# Patient Record
Sex: FEMALE | Race: WHITE | Employment: OTHER | URBAN - NONMETROPOLITAN AREA
[De-identification: names, ages, dates, MRNs, and addresses within clinical notes are randomized per-mention and may not be internally consistent; named-entity substitution may affect disease eponyms.]

---

## 2017-01-09 ENCOUNTER — OFFICE VISIT (OUTPATIENT)
Dept: SURGERY | Age: 52
End: 2017-01-09
Payer: OTHER GOVERNMENT

## 2017-01-09 ENCOUNTER — HOSPITAL ENCOUNTER (OUTPATIENT)
Dept: ULTRASOUND IMAGING | Facility: HOSPITAL | Age: 52
Discharge: HOME | End: 2017-01-09
Admitting: PHYSICIAN ASSISTANT

## 2017-01-09 ENCOUNTER — TRANSCRIBE ORDERS (OUTPATIENT)
Dept: ADMINISTRATIVE | Facility: HOSPITAL | Age: 52
End: 2017-01-09

## 2017-01-09 VITALS — DIASTOLIC BLOOD PRESSURE: 82 MMHG | SYSTOLIC BLOOD PRESSURE: 124 MMHG | HEART RATE: 80 BPM

## 2017-01-09 DIAGNOSIS — N63.20 BREAST MASS, LEFT: ICD-10-CM

## 2017-01-09 DIAGNOSIS — Z12.31 ENCOUNTER FOR SCREENING MAMMOGRAM FOR HIGH-RISK PATIENT: ICD-10-CM

## 2017-01-09 DIAGNOSIS — N63.0 BREAST MASS: Primary | ICD-10-CM

## 2017-01-09 DIAGNOSIS — N63.20 BREAST MASS, LEFT: Primary | ICD-10-CM

## 2017-01-09 PROCEDURE — 76642 ULTRASOUND BREAST LIMITED: CPT

## 2017-01-09 PROCEDURE — 99212 OFFICE O/P EST SF 10 MIN: CPT | Performed by: PHYSICIAN ASSISTANT

## 2017-01-10 ENCOUNTER — INFUSION (OUTPATIENT)
Dept: ONCOLOGY | Facility: CLINIC | Age: 52
End: 2017-01-10

## 2017-01-10 DIAGNOSIS — C50.211 MALIGNANT NEOPLASM OF UPPER-INNER QUADRANT OF RIGHT FEMALE BREAST (HCC): Primary | ICD-10-CM

## 2017-01-10 RX ORDER — SODIUM CHLORIDE 0.9 % (FLUSH) 0.9 %
10 SYRINGE (ML) INJECTION AS NEEDED
Status: DISCONTINUED | OUTPATIENT
Start: 2017-01-10 | End: 2017-01-10 | Stop reason: HOSPADM

## 2017-01-10 RX ORDER — SODIUM CHLORIDE 0.9 % (FLUSH) 0.9 %
10 SYRINGE (ML) INJECTION AS NEEDED
Status: CANCELLED | OUTPATIENT
Start: 2017-01-10

## 2017-01-10 RX ADMIN — Medication 10 ML: at 09:04

## 2017-03-07 ENCOUNTER — INFUSION (OUTPATIENT)
Dept: ONCOLOGY | Facility: CLINIC | Age: 52
End: 2017-03-07

## 2017-03-07 DIAGNOSIS — C50.211 MALIGNANT NEOPLASM OF UPPER-INNER QUADRANT OF RIGHT FEMALE BREAST (HCC): Primary | ICD-10-CM

## 2017-03-07 RX ORDER — SODIUM CHLORIDE 0.9 % (FLUSH) 0.9 %
10 SYRINGE (ML) INJECTION AS NEEDED
Status: DISCONTINUED | OUTPATIENT
Start: 2017-03-07 | End: 2017-03-07 | Stop reason: HOSPADM

## 2017-03-07 RX ORDER — SODIUM CHLORIDE 0.9 % (FLUSH) 0.9 %
10 SYRINGE (ML) INJECTION AS NEEDED
Status: CANCELLED | OUTPATIENT
Start: 2017-03-07

## 2017-03-07 RX ADMIN — Medication 10 ML: at 09:07

## 2017-04-03 RX ORDER — TAMOXIFEN CITRATE 10 MG/1
10 TABLET ORAL 2 TIMES DAILY
Qty: 180 TABLET | Refills: 3 | Status: SHIPPED | OUTPATIENT
Start: 2017-04-03 | End: 2018-03-12 | Stop reason: SDUPTHER

## 2017-04-06 ENCOUNTER — TELEPHONE (OUTPATIENT)
Dept: SURGERY | Age: 52
End: 2017-04-06

## 2017-04-06 ENCOUNTER — TRANSCRIBE ORDERS (OUTPATIENT)
Dept: ADMINISTRATIVE | Facility: HOSPITAL | Age: 52
End: 2017-04-06

## 2017-04-06 DIAGNOSIS — Z12.31 ENCOUNTER FOR SCREENING MAMMOGRAM FOR MALIGNANT NEOPLASM OF BREAST: Primary | ICD-10-CM

## 2017-04-24 ENCOUNTER — TRANSCRIBE ORDERS (OUTPATIENT)
Dept: ADMINISTRATIVE | Facility: HOSPITAL | Age: 52
End: 2017-04-24

## 2017-04-24 DIAGNOSIS — M81.0 OSTEOPOROSIS: Primary | ICD-10-CM

## 2017-05-04 ENCOUNTER — HOSPITAL ENCOUNTER (OUTPATIENT)
Dept: MAMMOGRAPHY | Facility: HOSPITAL | Age: 52
Discharge: HOME | End: 2017-05-04
Admitting: PHYSICIAN ASSISTANT

## 2017-05-04 ENCOUNTER — OFFICE VISIT (OUTPATIENT)
Dept: GASTROENTEROLOGY | Facility: CLINIC | Age: 52
End: 2017-05-04

## 2017-05-04 ENCOUNTER — HOSPITAL ENCOUNTER (OUTPATIENT)
Dept: BONE DENSITY | Facility: HOSPITAL | Age: 52
Discharge: HOME | End: 2017-05-04
Attending: INTERNAL MEDICINE

## 2017-05-04 VITALS
WEIGHT: 156 LBS | TEMPERATURE: 99.4 F | HEART RATE: 88 BPM | SYSTOLIC BLOOD PRESSURE: 124 MMHG | HEIGHT: 62 IN | OXYGEN SATURATION: 98 % | BODY MASS INDEX: 28.71 KG/M2 | DIASTOLIC BLOOD PRESSURE: 82 MMHG

## 2017-05-04 DIAGNOSIS — C50.211 MALIGNANT NEOPLASM OF UPPER-INNER QUADRANT OF RIGHT FEMALE BREAST (HCC): Primary | ICD-10-CM

## 2017-05-04 DIAGNOSIS — M81.0 OSTEOPOROSIS: ICD-10-CM

## 2017-05-04 DIAGNOSIS — Z83.71 FAMILY HISTORY OF POLYPS IN THE COLON: ICD-10-CM

## 2017-05-04 DIAGNOSIS — K63.5 COLON POLYPS: Primary | ICD-10-CM

## 2017-05-04 DIAGNOSIS — Z87.19 HISTORY OF PROCTITIS: ICD-10-CM

## 2017-05-04 DIAGNOSIS — Z12.31 ENCOUNTER FOR SCREENING MAMMOGRAM FOR MALIGNANT NEOPLASM OF BREAST: ICD-10-CM

## 2017-05-04 PROBLEM — Z83.719 FAMILY HISTORY OF POLYPS IN THE COLON: Status: ACTIVE | Noted: 2017-05-04

## 2017-05-04 PROCEDURE — G0202 SCR MAMMO BI INCL CAD: HCPCS

## 2017-05-04 PROCEDURE — 77080 DXA BONE DENSITY AXIAL: CPT

## 2017-05-04 PROCEDURE — 99212 OFFICE O/P EST SF 10 MIN: CPT | Performed by: NURSE PRACTITIONER

## 2017-05-04 PROCEDURE — 77063 BREAST TOMOSYNTHESIS BI: CPT

## 2017-05-04 RX ORDER — TIZANIDINE 4 MG/1
4 TABLET ORAL EVERY 6 HOURS PRN
COMMUNITY
End: 2017-06-09

## 2017-05-04 RX ORDER — POLYETHYLENE GLYCOL 3350, SODIUM SULFATE, SODIUM CHLORIDE, POTASSIUM CHLORIDE, ASCORBIC ACID, SODIUM ASCORBATE 7.5-2.691G
KIT ORAL
Qty: 1 EACH | Refills: 0 | COMMUNITY
Start: 2017-05-04 | End: 2017-06-09

## 2017-05-05 ENCOUNTER — LAB (OUTPATIENT)
Dept: ONCOLOGY | Facility: CLINIC | Age: 52
End: 2017-05-05

## 2017-05-05 ENCOUNTER — OFFICE VISIT (OUTPATIENT)
Dept: ONCOLOGY | Facility: CLINIC | Age: 52
End: 2017-05-05

## 2017-05-05 VITALS
HEIGHT: 62 IN | HEART RATE: 81 BPM | OXYGEN SATURATION: 98 % | WEIGHT: 152.8 LBS | SYSTOLIC BLOOD PRESSURE: 122 MMHG | DIASTOLIC BLOOD PRESSURE: 74 MMHG | RESPIRATION RATE: 16 BRPM | BODY MASS INDEX: 28.12 KG/M2 | TEMPERATURE: 98.7 F

## 2017-05-05 DIAGNOSIS — C50.211 MALIGNANT NEOPLASM OF UPPER-INNER QUADRANT OF RIGHT FEMALE BREAST (HCC): Primary | ICD-10-CM

## 2017-05-05 LAB
ALBUMIN SERPL-MCNC: 3.8 G/DL (ref 3.5–5)
ALBUMIN/GLOB SERPL: 1.2 G/DL
ALP SERPL-CCNC: 65 U/L (ref 38–126)
ALT SERPL W P-5'-P-CCNC: 31 U/L (ref 9–52)
ANION GAP SERPL CALCULATED.3IONS-SCNC: 10 MMOL/L
AST SERPL-CCNC: 24 U/L (ref 5–40)
AUTO MIXED CELLS #: 0.5 10*3/UL (ref 0.1–1.5)
AUTO MIXED CELLS %: 9.4 % (ref 0.2–15.1)
BILIRUB SERPL-MCNC: 0.5 MG/DL (ref 0.2–1.3)
BUN BLD-MCNC: 13 MG/DL (ref 7–26)
BUN/CREAT SERPL: 11.8 (ref 7–25)
CALCIUM SPEC-SCNC: 8.9 MG/DL (ref 8.4–10.2)
CHLORIDE SERPL-SCNC: 113 MMOL/L (ref 98–107)
CO2 SERPL-SCNC: 23 MMOL/L (ref 22–30)
CREAT BLD-MCNC: 1.1 MG/DL (ref 0.7–1.4)
ERYTHROCYTE [DISTWIDTH] IN BLOOD BY AUTOMATED COUNT: 15.6 % (ref 11.5–14.5)
GFR SERPL CREATININE-BSD FRML MDRD: 52 ML/MIN/1.73
GLOBULIN UR ELPH-MCNC: 3.3 GM/DL
GLUCOSE BLD-MCNC: 90 MG/DL (ref 75–110)
HCT VFR BLD AUTO: 41.8 % (ref 37–47)
HGB BLD-MCNC: 13.2 G/DL (ref 12–16)
LYMPHOCYTES # BLD AUTO: 1.8 10*3/MM3 (ref 0.8–7)
LYMPHOCYTES NFR BLD AUTO: 31.9 % (ref 10–58.5)
MCH RBC QN AUTO: 27.4 PG (ref 27–31)
MCHC RBC AUTO-ENTMCNC: 31.6 G/DL (ref 33–37)
MCV RBC AUTO: 86.8 FL (ref 81–99)
NEUTROPHILS # BLD AUTO: 3.3 10*3/MM3 (ref 2–7.8)
NEUTROPHILS NFR BLD AUTO: 58.7 % (ref 37–92)
PLATELET # BLD AUTO: 403 10*3/MM3 (ref 130–400)
PMV BLD AUTO: 9 FL (ref 6–12)
POTASSIUM BLD-SCNC: 3.9 MMOL/L (ref 3.6–5)
PROT SERPL-MCNC: 7.1 G/DL (ref 6.3–8.2)
RBC # BLD AUTO: 4.81 10*6/MM3 (ref 4.2–5.4)
SODIUM BLD-SCNC: 146 MMOL/L (ref 137–145)
WBC NRBC COR # BLD: 5.6 10*3/MM3 (ref 4.8–10.8)

## 2017-05-05 PROCEDURE — 36415 COLL VENOUS BLD VENIPUNCTURE: CPT | Performed by: INTERNAL MEDICINE

## 2017-05-05 PROCEDURE — 99213 OFFICE O/P EST LOW 20 MIN: CPT | Performed by: INTERNAL MEDICINE

## 2017-05-05 PROCEDURE — 80053 COMPREHEN METABOLIC PANEL: CPT | Performed by: INTERNAL MEDICINE

## 2017-05-05 PROCEDURE — 85025 COMPLETE CBC W/AUTO DIFF WBC: CPT | Performed by: INTERNAL MEDICINE

## 2017-05-05 RX ORDER — SODIUM CHLORIDE 0.9 % (FLUSH) 0.9 %
10 SYRINGE (ML) INJECTION AS NEEDED
OUTPATIENT
Start: 2017-05-05

## 2017-05-05 RX ORDER — SODIUM CHLORIDE 0.9 % (FLUSH) 0.9 %
10 SYRINGE (ML) INJECTION AS NEEDED
Status: SHIPPED | OUTPATIENT
Start: 2017-05-05

## 2017-05-05 RX ORDER — HEPARIN SODIUM (PORCINE) LOCK FLUSH IV SOLN 100 UNIT/ML 100 UNIT/ML
500 SOLUTION INTRAVENOUS AS NEEDED
OUTPATIENT
Start: 2017-05-05

## 2017-05-05 RX ADMIN — Medication 10 ML: at 10:09

## 2017-05-10 ENCOUNTER — OFFICE VISIT (OUTPATIENT)
Dept: SURGERY | Age: 52
End: 2017-05-10
Payer: OTHER GOVERNMENT

## 2017-05-10 VITALS
BODY MASS INDEX: 28.52 KG/M2 | HEIGHT: 62 IN | TEMPERATURE: 98.2 F | SYSTOLIC BLOOD PRESSURE: 138 MMHG | DIASTOLIC BLOOD PRESSURE: 70 MMHG | WEIGHT: 155 LBS

## 2017-05-10 DIAGNOSIS — Z85.3 PERSONAL HISTORY OF MALIGNANT NEOPLASM OF BREAST: ICD-10-CM

## 2017-05-10 DIAGNOSIS — C50.911 BREAST CANCER METASTASIZED TO AXILLARY LYMPH NODE, RIGHT (HCC): ICD-10-CM

## 2017-05-10 DIAGNOSIS — C77.3 BREAST CANCER METASTASIZED TO AXILLARY LYMPH NODE, RIGHT (HCC): ICD-10-CM

## 2017-05-10 DIAGNOSIS — Z98.890 S/P LUMPECTOMY, RIGHT BREAST: Primary | ICD-10-CM

## 2017-05-10 PROCEDURE — 99213 OFFICE O/P EST LOW 20 MIN: CPT | Performed by: PHYSICIAN ASSISTANT

## 2017-05-10 RX ORDER — PANTOPRAZOLE SODIUM 40 MG/1
40 TABLET, DELAYED RELEASE ORAL DAILY
COMMUNITY
Start: 2016-09-06

## 2017-05-10 RX ORDER — THEOPHYLLINE 400 MG/1
TABLET, EXTENDED RELEASE ORAL
COMMUNITY
Start: 2017-03-31 | End: 2017-05-10

## 2017-05-10 RX ORDER — TIZANIDINE 4 MG/1
4 TABLET ORAL NIGHTLY
COMMUNITY

## 2017-05-10 RX ORDER — THEOPHYLLINE 400 MG/1
400 TABLET, EXTENDED RELEASE ORAL
COMMUNITY
End: 2017-05-10

## 2017-05-10 RX ORDER — LORATADINE 10 MG/1
10 TABLET ORAL
COMMUNITY

## 2017-05-12 ENCOUNTER — PREP FOR PROCEDURE (OUTPATIENT)
Dept: SURGERY | Age: 52
End: 2017-05-12

## 2017-05-15 ENCOUNTER — ANESTHESIA EVENT (OUTPATIENT)
Dept: OPERATING ROOM | Age: 52
End: 2017-05-15

## 2017-05-18 ENCOUNTER — HOSPITAL ENCOUNTER (OUTPATIENT)
Age: 52
Setting detail: OUTPATIENT SURGERY
Discharge: HOME | End: 2017-05-18
Attending: SURGERY | Admitting: SURGERY
Payer: OTHER GOVERNMENT

## 2017-05-18 ENCOUNTER — ANESTHESIA (OUTPATIENT)
Dept: OPERATING ROOM | Age: 52
End: 2017-05-18
Payer: OTHER GOVERNMENT

## 2017-05-18 VITALS
SYSTOLIC BLOOD PRESSURE: 110 MMHG | WEIGHT: 155 LBS | OXYGEN SATURATION: 99 % | RESPIRATION RATE: 18 BRPM | BODY MASS INDEX: 28.52 KG/M2 | TEMPERATURE: 99.6 F | DIASTOLIC BLOOD PRESSURE: 59 MMHG | HEIGHT: 62 IN | HEART RATE: 80 BPM

## 2017-05-18 VITALS — OXYGEN SATURATION: 99 % | SYSTOLIC BLOOD PRESSURE: 155 MMHG | DIASTOLIC BLOOD PRESSURE: 80 MMHG

## 2017-05-18 PROCEDURE — 36590 REMOVAL TUNNELED CV CATH: CPT

## 2017-05-18 PROCEDURE — G8918 PT W/O PREOP ORDER IV AB PRO: HCPCS

## 2017-05-18 PROCEDURE — 00400 ANES INTEGUMENTARY SYS NOS: CPT | Performed by: NURSE ANESTHETIST, CERTIFIED REGISTERED

## 2017-05-18 PROCEDURE — G8907 PT DOC NO EVENTS ON DISCHARG: HCPCS

## 2017-05-18 PROCEDURE — 99999 PR OFFICE/OUTPT VISIT,PROCEDURE ONLY: CPT | Performed by: PHYSICIAN ASSISTANT

## 2017-05-18 PROCEDURE — 36590 REMOVAL TUNNELED CV CATH: CPT | Performed by: SURGERY

## 2017-05-18 RX ORDER — PROPOFOL 10 MG/ML
INJECTION, EMULSION INTRAVENOUS PRN
Status: DISCONTINUED | OUTPATIENT
Start: 2017-05-18 | End: 2017-05-18 | Stop reason: SDUPTHER

## 2017-05-18 RX ORDER — MIDAZOLAM HYDROCHLORIDE 1 MG/ML
INJECTION INTRAMUSCULAR; INTRAVENOUS PRN
Status: DISCONTINUED | OUTPATIENT
Start: 2017-05-18 | End: 2017-05-18 | Stop reason: SDUPTHER

## 2017-05-18 RX ORDER — HYDROCODONE BITARTRATE AND ACETAMINOPHEN 5; 325 MG/1; MG/1
TABLET ORAL
Qty: 15 TABLET | Refills: 0 | Status: ON HOLD | OUTPATIENT
Start: 2017-05-18 | End: 2019-05-24 | Stop reason: HOSPADM

## 2017-05-18 RX ORDER — HYDROMORPHONE HCL 110MG/55ML
0.25 PATIENT CONTROLLED ANALGESIA SYRINGE INTRAVENOUS EVERY 5 MIN PRN
Status: DISCONTINUED | OUTPATIENT
Start: 2017-05-18 | End: 2017-05-18 | Stop reason: HOSPADM

## 2017-05-18 RX ORDER — SODIUM CHLORIDE, SODIUM LACTATE, POTASSIUM CHLORIDE, CALCIUM CHLORIDE 600; 310; 30; 20 MG/100ML; MG/100ML; MG/100ML; MG/100ML
INJECTION, SOLUTION INTRAVENOUS CONTINUOUS
Status: DISCONTINUED | OUTPATIENT
Start: 2017-05-18 | End: 2017-05-18 | Stop reason: HOSPADM

## 2017-05-18 RX ADMIN — PROPOFOL 30 MG: 10 INJECTION, EMULSION INTRAVENOUS at 08:50

## 2017-05-18 RX ADMIN — MIDAZOLAM HYDROCHLORIDE 4 MG: 1 INJECTION INTRAMUSCULAR; INTRAVENOUS at 08:22

## 2017-05-18 RX ADMIN — SODIUM CHLORIDE, SODIUM LACTATE, POTASSIUM CHLORIDE, CALCIUM CHLORIDE: 600; 310; 30; 20 INJECTION, SOLUTION INTRAVENOUS at 08:22

## 2017-05-18 RX ADMIN — PROPOFOL 50 MG: 10 INJECTION, EMULSION INTRAVENOUS at 08:42

## 2017-05-18 RX ADMIN — SODIUM CHLORIDE, SODIUM LACTATE, POTASSIUM CHLORIDE, CALCIUM CHLORIDE: 600; 310; 30; 20 INJECTION, SOLUTION INTRAVENOUS at 07:29

## 2017-06-09 ENCOUNTER — OFFICE VISIT (OUTPATIENT)
Dept: OBSTETRICS AND GYNECOLOGY | Facility: CLINIC | Age: 52
End: 2017-06-09

## 2017-06-09 VITALS
WEIGHT: 159 LBS | DIASTOLIC BLOOD PRESSURE: 66 MMHG | SYSTOLIC BLOOD PRESSURE: 134 MMHG | BODY MASS INDEX: 29.26 KG/M2 | HEIGHT: 62 IN

## 2017-06-09 DIAGNOSIS — Z01.419 ENCOUNTER FOR GYNECOLOGICAL EXAMINATION WITHOUT ABNORMAL FINDING: Primary | ICD-10-CM

## 2017-06-09 DIAGNOSIS — Z85.3 HISTORY OF BREAST CANCER: ICD-10-CM

## 2017-06-09 DIAGNOSIS — N95.2 VAGINAL ATROPHY: ICD-10-CM

## 2017-06-09 PROCEDURE — 87798 DETECT AGENT NOS DNA AMP: CPT | Performed by: NURSE PRACTITIONER

## 2017-06-09 PROCEDURE — 87624 HPV HI-RISK TYP POOLED RSLT: CPT | Performed by: NURSE PRACTITIONER

## 2017-06-09 PROCEDURE — 87512 GARDNER VAG DNA QUANT: CPT | Performed by: NURSE PRACTITIONER

## 2017-06-09 PROCEDURE — 87481 CANDIDA DNA AMP PROBE: CPT | Performed by: NURSE PRACTITIONER

## 2017-06-09 PROCEDURE — G0123 SCREEN CERV/VAG THIN LAYER: HCPCS | Performed by: NURSE PRACTITIONER

## 2017-06-09 PROCEDURE — 87661 TRICHOMONAS VAGINALIS AMPLIF: CPT | Performed by: NURSE PRACTITIONER

## 2017-06-09 PROCEDURE — 99396 PREV VISIT EST AGE 40-64: CPT | Performed by: NURSE PRACTITIONER

## 2017-06-09 RX ORDER — TIZANIDINE 4 MG/1
TABLET ORAL
COMMUNITY
End: 2017-11-14

## 2017-06-09 RX ORDER — HYDROCODONE BITARTRATE AND ACETAMINOPHEN 5; 325 MG/1; MG/1
TABLET ORAL
COMMUNITY
Start: 2017-05-18 | End: 2018-02-26 | Stop reason: ALTCHOICE

## 2017-06-09 RX ORDER — PANTOPRAZOLE SODIUM 40 MG/1
40 TABLET, DELAYED RELEASE ORAL 2 TIMES DAILY
COMMUNITY
Start: 2016-09-06 | End: 2017-09-12 | Stop reason: SDUPTHER

## 2017-06-09 RX ORDER — LORATADINE 10 MG/1
10 TABLET ORAL DAILY PRN
COMMUNITY
End: 2021-11-09 | Stop reason: SDUPTHER

## 2017-06-09 NOTE — PROGRESS NOTES
Subjective   Luna Cruz is a 51 y.o. female.     HPI Comments: Annual exam.       The following portions of the patient's history were reviewed and updated as appropriate: allergies, current medications, past family history, past medical history, past social history, past surgical history and problem list.    Review of Systems   Constitutional: Negative for activity change, appetite change, fatigue and fever.   HENT: Negative for congestion, sore throat and trouble swallowing.    Eyes: Negative for pain, discharge and visual disturbance.   Respiratory: Negative for apnea, shortness of breath and wheezing.    Cardiovascular: Negative for chest pain, palpitations and leg swelling.   Gastrointestinal: Negative for abdominal pain, constipation and diarrhea.   Genitourinary: Positive for vaginal discharge (and irritiation irregularly.). Negative for frequency, pelvic pain and urgency.   Musculoskeletal: Negative for back pain and gait problem.   Skin: Negative for color change and rash.   Neurological: Negative for dizziness, weakness and numbness.   Psychiatric/Behavioral: Negative for confusion and sleep disturbance.       Objective   Physical Exam   Constitutional: She is oriented to person, place, and time. She appears well-developed and well-nourished. No distress.   HENT:   Head: Normocephalic.   Right Ear: External ear normal.   Left Ear: External ear normal.   Eyes: Conjunctivae are normal. Right eye exhibits no discharge. Left eye exhibits no discharge. No scleral icterus.   Neck: Normal range of motion. Neck supple. Carotid bruit is not present. No tracheal deviation present. No thyromegaly present.   Cardiovascular: Normal rate, regular rhythm, normal heart sounds and intact distal pulses.    No murmur heard.  Pulmonary/Chest: Effort normal and breath sounds normal. No respiratory distress. She has no wheezes. Right breast exhibits no inverted nipple, no mass, no nipple discharge, no skin change and no  tenderness. Left breast exhibits no inverted nipple, no mass, no nipple discharge, no skin change and no tenderness. Breasts are symmetrical. There is no breast swelling.   Abdominal: Soft. She exhibits no distension and no mass. There is no tenderness. There is no guarding. No hernia. Hernia confirmed negative in the right inguinal area and confirmed negative in the left inguinal area.   Genitourinary: Rectum normal, vagina normal and uterus normal. Rectal exam shows no mass. No breast tenderness, discharge or bleeding. Pelvic exam was performed with patient supine. There is no rash, tenderness, lesion or injury on the right labia. There is no rash, tenderness, lesion or injury on the left labia. Uterus is not enlarged, not fixed and not tender. Cervix exhibits no motion tenderness, no discharge and no friability. Right adnexum displays no mass, no tenderness and no fullness. Left adnexum displays no mass, no tenderness and no fullness. No erythema, tenderness or bleeding in the vagina. No foreign body in the vagina. No signs of injury around the vagina. No vaginal discharge found.   Genitourinary Comments: Urethral meatus  Normal  Perineum  Normal  Vaginal atrophy noted.    Musculoskeletal: Normal range of motion. She exhibits no edema or tenderness.   Lymphadenopathy:        Head (right side): No submental, no submandibular, no tonsillar, no preauricular, no posterior auricular and no occipital adenopathy present.        Head (left side): No submental, no submandibular, no tonsillar, no preauricular, no posterior auricular and no occipital adenopathy present.     She has no cervical adenopathy.        Right cervical: No superficial cervical, no deep cervical and no posterior cervical adenopathy present.       Left cervical: No superficial cervical, no deep cervical and no posterior cervical adenopathy present.     She has no axillary adenopathy.        Right: No inguinal adenopathy present.        Left: No  inguinal adenopathy present.   Neurological: She is alert and oriented to person, place, and time. Coordination normal.   Skin: Skin is warm and dry. No bruising and no rash noted. She is not diaphoretic. No erythema.   Psychiatric: She has a normal mood and affect. Her behavior is normal. Judgment and thought content normal.   Nursing note and vitals reviewed.      Assessment/Plan    Well woman exam. Pap collected, BV panel added. Mammograms followed by Dr. Houston. Annual labs and colonoscopy followed by PCP.    Discussed vaginal estrogen cream risk and benefits. Pt to consider using, will need approval from oncologist first, pt voiced understanding.     Luna was seen today for gynecologic exam.    Diagnoses and all orders for this visit:    Encounter for gynecological examination without abnormal finding  -     Liquid-based Pap Smear, Screening    History of breast cancer

## 2017-06-13 ENCOUNTER — ANESTHESIA EVENT (OUTPATIENT)
Dept: GASTROENTEROLOGY | Facility: HOSPITAL | Age: 52
End: 2017-06-13

## 2017-06-14 ENCOUNTER — HOSPITAL ENCOUNTER (OUTPATIENT)
Facility: HOSPITAL | Age: 52
Setting detail: HOSPITAL OUTPATIENT SURGERY
Discharge: HOME | End: 2017-06-14
Attending: INTERNAL MEDICINE | Admitting: INTERNAL MEDICINE

## 2017-06-14 ENCOUNTER — ANESTHESIA (OUTPATIENT)
Dept: GASTROENTEROLOGY | Facility: HOSPITAL | Age: 52
End: 2017-06-14

## 2017-06-14 VITALS
SYSTOLIC BLOOD PRESSURE: 111 MMHG | TEMPERATURE: 98 F | BODY MASS INDEX: 28.52 KG/M2 | OXYGEN SATURATION: 99 % | DIASTOLIC BLOOD PRESSURE: 97 MMHG | WEIGHT: 155 LBS | RESPIRATION RATE: 20 BRPM | HEART RATE: 89 BPM | HEIGHT: 62 IN

## 2017-06-14 PROCEDURE — 45378 DIAGNOSTIC COLONOSCOPY: CPT | Performed by: INTERNAL MEDICINE

## 2017-06-14 PROCEDURE — 25010000002 PROPOFOL 10 MG/ML EMULSION: Performed by: NURSE ANESTHETIST, CERTIFIED REGISTERED

## 2017-06-14 RX ORDER — LIDOCAINE HYDROCHLORIDE 20 MG/ML
INJECTION, SOLUTION INFILTRATION; PERINEURAL AS NEEDED
Status: DISCONTINUED | OUTPATIENT
Start: 2017-06-14 | End: 2017-06-14 | Stop reason: SURG

## 2017-06-14 RX ORDER — SODIUM CHLORIDE 9 MG/ML
9 INJECTION, SOLUTION INTRAVENOUS CONTINUOUS PRN
Status: DISCONTINUED | OUTPATIENT
Start: 2017-06-14 | End: 2017-06-14 | Stop reason: HOSPADM

## 2017-06-14 RX ORDER — SODIUM CHLORIDE 0.9 % (FLUSH) 0.9 %
1-10 SYRINGE (ML) INJECTION AS NEEDED
Status: DISCONTINUED | OUTPATIENT
Start: 2017-06-14 | End: 2017-06-14 | Stop reason: HOSPADM

## 2017-06-14 RX ORDER — FAMOTIDINE 10 MG/ML
20 INJECTION, SOLUTION INTRAVENOUS ONCE
Status: COMPLETED | OUTPATIENT
Start: 2017-06-14 | End: 2017-06-14

## 2017-06-14 RX ORDER — PROPOFOL 10 MG/ML
VIAL (ML) INTRAVENOUS AS NEEDED
Status: DISCONTINUED | OUTPATIENT
Start: 2017-06-14 | End: 2017-06-14 | Stop reason: SURG

## 2017-06-14 RX ADMIN — LIDOCAINE HYDROCHLORIDE 20 MG: 20 INJECTION, SOLUTION INFILTRATION; PERINEURAL at 10:27

## 2017-06-14 RX ADMIN — PROPOFOL 50 MG: 10 INJECTION, EMULSION INTRAVENOUS at 10:32

## 2017-06-14 RX ADMIN — PROPOFOL 50 MG: 10 INJECTION, EMULSION INTRAVENOUS at 10:35

## 2017-06-14 RX ADMIN — PROPOFOL 100 MG: 10 INJECTION, EMULSION INTRAVENOUS at 10:27

## 2017-06-14 RX ADMIN — LIDOCAINE HYDROCHLORIDE 0.5 ML: 10 INJECTION, SOLUTION EPIDURAL; INFILTRATION; INTRACAUDAL; PERINEURAL at 08:22

## 2017-06-14 RX ADMIN — FAMOTIDINE 20 MG: 10 INJECTION, SOLUTION INTRAVENOUS at 08:52

## 2017-06-14 RX ADMIN — SODIUM CHLORIDE 9 ML/HR: 9 INJECTION, SOLUTION INTRAVENOUS at 08:21

## 2017-06-14 RX ADMIN — PROPOFOL 50 MG: 10 INJECTION, EMULSION INTRAVENOUS at 10:38

## 2017-06-14 RX ADMIN — PROPOFOL 50 MG: 10 INJECTION, EMULSION INTRAVENOUS at 10:33

## 2017-06-14 RX ADMIN — PROPOFOL 50 MG: 10 INJECTION, EMULSION INTRAVENOUS at 10:29

## 2017-06-14 NOTE — PLAN OF CARE
Problem: Patient Care Overview (Adult)  Goal: Adult Individualization and Mutuality  Outcome: Ongoing (interventions implemented as appropriate)    06/14/17 0752   Individualization   Patient Specific Preferences none   Patient Specific Goals none   Patient Specific Interventions none   Mutuality/Individual Preferences   What Anxieties, Fears or Concerns Do You Have About Your Health or Care? none   What Questions Do You Have About Your Health or Care? none   What Information Would Help Us Give You More Personalized Care? none

## 2017-06-14 NOTE — ANESTHESIA POSTPROCEDURE EVALUATION
Patient: Luna Cruz    Procedure Summary     Date Anesthesia Start Anesthesia Stop Room / Location    06/14/17 1023 1043 [facility] ENDOSCOPY 6 / [facility]       Procedure Diagnosis Surgeon Provider    COLONOSCOPY WITH ANESTHESIA (N/A ) Family history of polyps in the colon; Colon polyps; History of proctitis  (Family history of polyps in the colon [Z83.71]; Colon polyps [K63.5]; History of proctitis [Z87.19]) MD Viky Chris CRNA          Anesthesia Type: MAC  Last vitals  BP      Temp      Pulse     Resp      SpO2        Post Anesthesia Care and Evaluation    Patient location during evaluation: PHASE II  Patient participation: complete - patient participated  Level of consciousness: awake and alert  Pain score: 0  Pain management: adequate  Airway patency: patent  Anesthetic complications: No anesthetic complications  PONV Status: none  Cardiovascular status: acceptable, hemodynamically stable and stable  Respiratory status: acceptable and room air  Hydration status: acceptable

## 2017-06-14 NOTE — H&P
Chief Complaint:   Screening    Subjective     HPI:   She presents for screening colonoscopy.  Mother had colon polyps at age 54/55.  Her patient's last colonoscopy was over 3 years ago in May 2013.    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 9/26/2016   • Osteoporosis    • PONV (postoperative nausea and vomiting)        Past Surgical History:  [unfilled]    Family History:  Family History   Problem Relation Age of Onset   • Colon polyps Mother    • Cirrhosis Mother    • Colon polyps Sister 54   • Breast cancer Sister    • No Known Problems Father    • No Known Problems Brother    • No Known Problems Daughter    • No Known Problems Son    • No Known Problems Maternal Grandmother    • No Known Problems Paternal Grandmother    • No Known Problems Maternal Aunt    • No Known Problems Paternal Aunt    • Colon cancer Neg Hx    • Crohn's disease Neg Hx    • Irritable bowel syndrome Neg Hx    • Liver cancer Neg Hx    • Liver disease Neg Hx    • Rectal cancer Neg Hx    • Stomach cancer Neg Hx    • BRCA 1/2 Neg Hx    • Endometrial cancer Neg Hx    • Ovarian cancer Neg Hx        Social History:   reports that she has never smoked. She has never used smokeless tobacco. She reports that she does not drink alcohol or use illicit drugs.    Medications:   Prescriptions Prior to Admission   Medication Sig Dispense Refill Last Dose   • atorvastatin (LIPITOR) 40 MG tablet Take 40 mg by mouth daily.   6/13/2017 at 2100   • calcitriol (ROCALTROL) 0.25 MCG capsule Take 0.5 mcg by mouth daily.   6/13/2017 at 0900   • Calcium Citrate-Vitamin D (CALCIUM + D PO) Take  by mouth.   6/13/2017 at 0900   • fluticasone-salmeterol (ADVAIR) 250-50 MCG/DOSE DISKUS Inhale 2 (two) times a day.   " 6/13/2017 at 2100   • levothyroxine (SYNTHROID, LEVOTHROID) 25 MCG tablet Take 0.05 mcg by mouth daily.   6/13/2017 at 0900   • losartan (COZAAR) 25 MG tablet Take 25 mg by mouth daily.   6/13/2017 at 0900   • pantoprazole (PROTONIX) 40 MG EC tablet Take  by mouth.   6/13/2017 at 2100   • rOPINIRole (REQUIP) 0.25 MG tablet Take 0.25 mg by mouth every night. Take 1 hour before bedtime.   6/13/2017 at 2100   • tamoxifen (NOLVADEX) 10 MG tablet Take 1 tablet by mouth 2 (Two) Times a Day. 180 tablet 3 6/13/2017 at 0900   • theophylline (UNIPHYL) 400 MG 24 hr tablet Take 400 mg by mouth daily.   6/13/2017 at 0900   • tiZANidine (ZANAFLEX) 4 MG tablet Take  by mouth.   6/13/2017 at 2100   • vitamin D (ERGOCALCIFEROL) 39157 UNITS capsule capsule Take 50,000 Units by mouth 1 (one) time per week.   6/13/2017 at 0900   • zafirlukast (ACCOLATE) 20 MG tablet Take 20 mg by mouth 2 (two) times a day.   6/13/2017 at 2100   • alendronate (FOSAMAX) 70 MG tablet Take 70 mg by mouth every 7 days.   6/11/2017 at 0900   • HYDROcodone-acetaminophen (NORCO) 5-325 MG per tablet 1-2 tabs PO every 4hrs PRN. For pain.   Unknown at Unknown time   • loratadine (CLARITIN) 10 MG tablet Take  by mouth.   6/11/2017       Allergies:  Keflex [cephalexin]    ROS:    General: Weight stable  Resp: No SOA  Cardiovascular: No CP      Objective     /91 (BP Location: Right arm, Patient Position: Sitting)  Pulse 90  Temp 98 °F (36.7 °C) (Temporal Artery )   Resp 18  Ht 62\" (157.5 cm)  Wt 155 lb (70.3 kg)  LMP 06/14/2008  SpO2 100%  BMI 28.35 kg/m2    Physical Exam   Constitutional: Pt is oriented to person, place, and in no distress.  Eyes: No icterus  ENMT: Unremarkable   Cardiovascular: Normal rate, regular rhythm.    Pulmonary/Chest: No distress.  No audible wheezes  Abdominal: Soft.   Skin: Skin is warm and dry.   Psychiatric: Mood, memory, affect and judgment appear normal.     Assessment/Plan     Diagnosis:  Screening  Family history of " colon polyps and mother <60 years old    Anticipated Surgical Procedure:  Colonoscopy    The risks, benefits, and alternatives of colonoscopy were reviewed with the patient today.  Risks including perforation of the colon possibly requiring surgery or colostomy.  Additional risks include risk of bleeding from biopsies or removal of colon tissue.  There is also the risk of a drug reaction or problems with anesthesia.  This will be discussed with the further by the anesthesia team on the day of the procedure.  Lastly there is a possibility of missing a colon polyp or cancer.  The benefits include the diagnosis and management of disease of the colon and rectum.  Alternatives to colonoscopy include barium enema, laboratory testing, radiographic evaluation, or no intervention.  The patient verbalizes understanding and agrees.    Much of this encounter note is an electronic transcription/translation of spoken language to printed text. The electronic translation of spoken language may permit erroneous, or at times, nonsensical words or phrases to be inadvertently transcribed; although I have reviewed the note for such errors, some may still exist.

## 2017-06-14 NOTE — ANESTHESIA POSTPROCEDURE EVALUATION
"Patient: Luna Cruz    Procedure Summary     Date Anesthesia Start Anesthesia Stop Room / Location    06/14/17 1023 1043  [facility] /  [facility]       Procedure Diagnosis Surgeon Provider    COLONOSCOPY WITH ANESTHESIA (N/A ) Family history of polyps in the colon; Colon polyps; History of proctitis  (Family history of polyps in the colon [Z83.71]; Colon polyps [K63.5]; History of proctitis [Z87.19]) MD Viky Chris, CRNA          Anesthesia Type: MAC  Last vitals  BP      Temp      Pulse     Resp      SpO2        Post Anesthesia Care and Evaluation      Comments: Blood pressure 170/91, pulse 90, temperature 98 °F (36.7 °C), temperature source Temporal Artery , resp. rate 18, height 62\" (157.5 cm), weight 155 lb (70.3 kg), last menstrual period 06/14/2008, SpO2 100 %.        "

## 2017-06-14 NOTE — PLAN OF CARE
Problem: GI Endoscopy (Adult)  Goal: Signs and Symptoms of Listed Potential Problems Will be Absent or Manageable (GI Endoscopy)  Outcome: Outcome(s) achieved Date Met:  06/14/17

## 2017-06-14 NOTE — PLAN OF CARE
Problem: Patient Care Overview (Adult)  Goal: Plan of Care Review  Outcome: Ongoing (interventions implemented as appropriate)    06/14/17 1034   Patient Care Overview   Progress improving   Outcome Evaluation   Outcome Summary/Follow up Plan pt tolerating procedure well          Problem: GI Endoscopy (Adult)  Goal: Signs and Symptoms of Listed Potential Problems Will be Absent or Manageable (GI Endoscopy)  Outcome: Ongoing (interventions implemented as appropriate)    06/14/17 1034   GI Endoscopy   Problems Assessed (GI Endoscopy) all   Problems Present (GI Endoscopy) none

## 2017-06-14 NOTE — PLAN OF CARE
Problem: Patient Care Overview (Adult)  Goal: Plan of Care Review  Outcome: Outcome(s) achieved Date Met:  06/14/17

## 2017-06-19 LAB
GEN CATEG CVX/VAG CYTO-IMP: NORMAL
LAB AP CASE REPORT: NORMAL
Lab: NORMAL
PATH INTERP SPEC-IMP: NORMAL
STAT OF ADQ CVX/VAG CYTO-IMP: NORMAL

## 2017-06-19 RX ORDER — DOXYCYCLINE 100 MG/1
100 CAPSULE ORAL 2 TIMES DAILY
Qty: 14 CAPSULE | Refills: 0 | Status: SHIPPED | OUTPATIENT
Start: 2017-06-19 | End: 2017-06-26

## 2017-07-07 ENCOUNTER — OFFICE VISIT (OUTPATIENT)
Dept: SURGERY | Age: 52
End: 2017-07-07
Payer: OTHER GOVERNMENT

## 2017-07-07 VITALS
HEIGHT: 62 IN | BODY MASS INDEX: 29.08 KG/M2 | SYSTOLIC BLOOD PRESSURE: 138 MMHG | HEART RATE: 80 BPM | DIASTOLIC BLOOD PRESSURE: 82 MMHG | WEIGHT: 158 LBS

## 2017-07-07 DIAGNOSIS — Z85.3 PERSONAL HISTORY OF MALIGNANT NEOPLASM OF BREAST: Primary | ICD-10-CM

## 2017-07-07 PROCEDURE — 99212 OFFICE O/P EST SF 10 MIN: CPT | Performed by: PHYSICIAN ASSISTANT

## 2017-08-21 ENCOUNTER — APPOINTMENT (OUTPATIENT)
Dept: GENERAL RADIOLOGY | Facility: HOSPITAL | Age: 52
End: 2017-08-21

## 2017-08-21 ENCOUNTER — HOSPITAL ENCOUNTER (EMERGENCY)
Facility: HOSPITAL | Age: 52
Discharge: HOME | End: 2017-08-21
Attending: EMERGENCY MEDICINE | Admitting: EMERGENCY MEDICINE

## 2017-08-21 VITALS
DIASTOLIC BLOOD PRESSURE: 74 MMHG | WEIGHT: 154 LBS | HEART RATE: 107 BPM | SYSTOLIC BLOOD PRESSURE: 113 MMHG | HEIGHT: 62 IN | BODY MASS INDEX: 28.34 KG/M2 | OXYGEN SATURATION: 98 % | TEMPERATURE: 98.9 F | RESPIRATION RATE: 25 BRPM

## 2017-08-21 DIAGNOSIS — K52.9 GASTROENTERITIS: Primary | ICD-10-CM

## 2017-08-21 LAB
ALBUMIN SERPL-MCNC: 3.9 G/DL (ref 3.5–5)
ALBUMIN/GLOB SERPL: 1.4 G/DL (ref 1.1–2.5)
ALP SERPL-CCNC: 63 U/L (ref 24–120)
ALT SERPL W P-5'-P-CCNC: 44 U/L (ref 0–54)
ANION GAP SERPL CALCULATED.3IONS-SCNC: 12 MMOL/L (ref 4–13)
AST SERPL-CCNC: 33 U/L (ref 7–45)
BACTERIA UR QL AUTO: ABNORMAL /HPF
BASOPHILS # BLD AUTO: 0.04 10*3/MM3 (ref 0–0.2)
BASOPHILS NFR BLD AUTO: 0.2 % (ref 0–2)
BILIRUB SERPL-MCNC: 0.7 MG/DL (ref 0.1–1)
BILIRUB UR QL STRIP: NEGATIVE
BUN BLD-MCNC: 11 MG/DL (ref 5–21)
BUN/CREAT SERPL: 10.5 (ref 7–25)
CALCIUM SPEC-SCNC: 9 MG/DL (ref 8.4–10.4)
CHLORIDE SERPL-SCNC: 110 MMOL/L (ref 98–110)
CLARITY UR: CLEAR
CO2 SERPL-SCNC: 20 MMOL/L (ref 24–31)
COLOR UR: YELLOW
CREAT BLD-MCNC: 1.05 MG/DL (ref 0.5–1.4)
D-LACTATE SERPL-SCNC: 1.9 MMOL/L (ref 0.5–2)
D-LACTATE SERPL-SCNC: 2.4 MMOL/L (ref 0.5–2)
DEPRECATED RDW RBC AUTO: 47.7 FL (ref 40–54)
EOSINOPHIL # BLD AUTO: 0.27 10*3/MM3 (ref 0–0.7)
EOSINOPHIL NFR BLD AUTO: 1.7 % (ref 0–4)
ERYTHROCYTE [DISTWIDTH] IN BLOOD BY AUTOMATED COUNT: 15.9 % (ref 12–15)
GFR SERPL CREATININE-BSD FRML MDRD: 55 ML/MIN/1.73
GLOBULIN UR ELPH-MCNC: 2.7 GM/DL
GLUCOSE BLD-MCNC: 109 MG/DL (ref 70–100)
GLUCOSE UR STRIP-MCNC: NEGATIVE MG/DL
HCT VFR BLD AUTO: 41.7 % (ref 37–47)
HGB BLD-MCNC: 13.5 G/DL (ref 12–16)
HGB UR QL STRIP.AUTO: NEGATIVE
HOLD SPECIMEN: NORMAL
HOLD SPECIMEN: NORMAL
HYALINE CASTS UR QL AUTO: ABNORMAL /LPF
IMM GRANULOCYTES # BLD: 0.07 10*3/MM3 (ref 0–0.03)
IMM GRANULOCYTES NFR BLD: 0.4 % (ref 0–5)
KETONES UR QL STRIP: NEGATIVE
LEUKOCYTE ESTERASE UR QL STRIP.AUTO: ABNORMAL
LYMPHOCYTES # BLD AUTO: 1.2 10*3/MM3 (ref 0.72–4.86)
LYMPHOCYTES NFR BLD AUTO: 7.4 % (ref 15–45)
MCH RBC QN AUTO: 26.5 PG (ref 28–32)
MCHC RBC AUTO-ENTMCNC: 32.4 G/DL (ref 33–36)
MCV RBC AUTO: 81.8 FL (ref 82–98)
MONOCYTES # BLD AUTO: 0.85 10*3/MM3 (ref 0.19–1.3)
MONOCYTES NFR BLD AUTO: 5.2 % (ref 4–12)
NEUTROPHILS # BLD AUTO: 13.87 10*3/MM3 (ref 1.87–8.4)
NEUTROPHILS NFR BLD AUTO: 85.1 % (ref 39–78)
NITRITE UR QL STRIP: NEGATIVE
NT-PROBNP SERPL-MCNC: 75.8 PG/ML (ref 0–900)
PH UR STRIP.AUTO: 5.5 [PH] (ref 5–8)
PLATELET # BLD AUTO: 384 10*3/MM3 (ref 130–400)
PMV BLD AUTO: 10.1 FL (ref 6–12)
POTASSIUM BLD-SCNC: 3.5 MMOL/L (ref 3.5–5.3)
PROT SERPL-MCNC: 6.6 G/DL (ref 6.3–8.7)
PROT UR QL STRIP: NEGATIVE
RBC # BLD AUTO: 5.1 10*6/MM3 (ref 4.2–5.4)
RBC # UR: ABNORMAL /HPF
REF LAB TEST METHOD: ABNORMAL
SODIUM BLD-SCNC: 142 MMOL/L (ref 135–145)
SP GR UR STRIP: 1.02 (ref 1–1.03)
SQUAMOUS #/AREA URNS HPF: ABNORMAL /HPF
THEOPHYLLINE SERPL-MCNC: 12.9 MCG/ML (ref 10–20)
TROPONIN I SERPL-MCNC: <0.012 NG/ML (ref 0–0.03)
UROBILINOGEN UR QL STRIP: ABNORMAL
WBC NRBC COR # BLD: 16.3 10*3/MM3 (ref 4.8–10.8)
WBC UR QL AUTO: ABNORMAL /HPF
WHOLE BLOOD HOLD SPECIMEN: NORMAL
WHOLE BLOOD HOLD SPECIMEN: NORMAL

## 2017-08-21 PROCEDURE — 87086 URINE CULTURE/COLONY COUNT: CPT | Performed by: EMERGENCY MEDICINE

## 2017-08-21 PROCEDURE — 96361 HYDRATE IV INFUSION ADD-ON: CPT

## 2017-08-21 PROCEDURE — 93005 ELECTROCARDIOGRAM TRACING: CPT

## 2017-08-21 PROCEDURE — 85025 COMPLETE CBC W/AUTO DIFF WBC: CPT | Performed by: EMERGENCY MEDICINE

## 2017-08-21 PROCEDURE — 36415 COLL VENOUS BLD VENIPUNCTURE: CPT | Performed by: EMERGENCY MEDICINE

## 2017-08-21 PROCEDURE — 87040 BLOOD CULTURE FOR BACTERIA: CPT | Performed by: EMERGENCY MEDICINE

## 2017-08-21 PROCEDURE — 99283 EMERGENCY DEPT VISIT LOW MDM: CPT

## 2017-08-21 PROCEDURE — 80053 COMPREHEN METABOLIC PANEL: CPT | Performed by: EMERGENCY MEDICINE

## 2017-08-21 PROCEDURE — 71010 HC CHEST PA OR AP: CPT

## 2017-08-21 PROCEDURE — 94640 AIRWAY INHALATION TREATMENT: CPT

## 2017-08-21 PROCEDURE — 25010000002 LEVOFLOXACIN PER 250 MG: Performed by: EMERGENCY MEDICINE

## 2017-08-21 PROCEDURE — 81001 URINALYSIS AUTO W/SCOPE: CPT | Performed by: EMERGENCY MEDICINE

## 2017-08-21 PROCEDURE — 96365 THER/PROPH/DIAG IV INF INIT: CPT

## 2017-08-21 PROCEDURE — 96366 THER/PROPH/DIAG IV INF ADDON: CPT

## 2017-08-21 PROCEDURE — 80198 ASSAY OF THEOPHYLLINE: CPT | Performed by: EMERGENCY MEDICINE

## 2017-08-21 PROCEDURE — 84484 ASSAY OF TROPONIN QUANT: CPT | Performed by: EMERGENCY MEDICINE

## 2017-08-21 PROCEDURE — 83880 ASSAY OF NATRIURETIC PEPTIDE: CPT | Performed by: EMERGENCY MEDICINE

## 2017-08-21 PROCEDURE — 25010000002 ONDANSETRON PER 1 MG: Performed by: EMERGENCY MEDICINE

## 2017-08-21 PROCEDURE — 93010 ELECTROCARDIOGRAM REPORT: CPT | Performed by: INTERNAL MEDICINE

## 2017-08-21 PROCEDURE — 83605 ASSAY OF LACTIC ACID: CPT | Performed by: EMERGENCY MEDICINE

## 2017-08-21 RX ORDER — SODIUM CHLORIDE 0.9 % (FLUSH) 0.9 %
10 SYRINGE (ML) INJECTION AS NEEDED
Status: DISCONTINUED | OUTPATIENT
Start: 2017-08-21 | End: 2017-08-21 | Stop reason: HOSPADM

## 2017-08-21 RX ORDER — ALBUTEROL SULFATE 90 UG/1
2 AEROSOL, METERED RESPIRATORY (INHALATION) EVERY 4 HOURS PRN
COMMUNITY
End: 2019-10-29 | Stop reason: ALTCHOICE

## 2017-08-21 RX ORDER — LEVOFLOXACIN 5 MG/ML
750 INJECTION, SOLUTION INTRAVENOUS ONCE
Status: COMPLETED | OUTPATIENT
Start: 2017-08-21 | End: 2017-08-21

## 2017-08-21 RX ORDER — RANITIDINE 150 MG/1
150 TABLET ORAL NIGHTLY
COMMUNITY
End: 2018-11-07

## 2017-08-21 RX ORDER — ONDANSETRON 4 MG/1
4 TABLET, ORALLY DISINTEGRATING ORAL EVERY 4 HOURS PRN
Qty: 10 TABLET | Refills: 0 | Status: SHIPPED | OUTPATIENT
Start: 2017-08-21 | End: 2018-11-12

## 2017-08-21 RX ORDER — IPRATROPIUM BROMIDE AND ALBUTEROL SULFATE 2.5; .5 MG/3ML; MG/3ML
3 SOLUTION RESPIRATORY (INHALATION) ONCE
Status: COMPLETED | OUTPATIENT
Start: 2017-08-21 | End: 2017-08-21

## 2017-08-21 RX ORDER — ONDANSETRON 2 MG/ML
4 INJECTION INTRAMUSCULAR; INTRAVENOUS EVERY 6 HOURS PRN
Status: DISCONTINUED | OUTPATIENT
Start: 2017-08-21 | End: 2017-08-21 | Stop reason: HOSPADM

## 2017-08-21 RX ADMIN — IPRATROPIUM BROMIDE AND ALBUTEROL SULFATE 3 ML: .5; 3 SOLUTION RESPIRATORY (INHALATION) at 06:00

## 2017-08-21 RX ADMIN — LEVOFLOXACIN 750 MG: 5 INJECTION INTRAVENOUS at 09:10

## 2017-08-21 RX ADMIN — ONDANSETRON 4 MG: 2 INJECTION INTRAMUSCULAR; INTRAVENOUS at 08:55

## 2017-08-21 RX ADMIN — SODIUM CHLORIDE 500 ML: 9 INJECTION, SOLUTION INTRAVENOUS at 06:08

## 2017-08-21 NOTE — ED PROVIDER NOTES
Subjective   HPI Comments: Physician says she woke up this morning with sudden severe chills.  She could not get warm.  She did not actually take her temperature but felt hot.  She then developed some nausea and vomiting along with this.  She has had a mild cough and some scattered wheezes.  She denies any dysuria or diarrhea.  She says her abdomen was hurting earlier but it was just generalized now feels okay.    Patient is a 51 y.o. female presenting with fever.   History provided by:  Patient   used: No    Fever   Max temp prior to arrival:  Unknown  Temp source:  Subjective  Severity:  Severe  Onset quality:  Sudden  Duration:  2 hours  Timing:  Constant  Progression:  Unchanged  Chronicity:  New  Relieved by:  Nothing  Worsened by:  Nothing  Ineffective treatments:  None tried  Associated symptoms: chills, cough, myalgias, nausea and vomiting    Associated symptoms: no chest pain, no confusion, no congestion, no diarrhea, no dysuria, no ear pain, no headaches, no rash, no rhinorrhea, no somnolence and no sore throat    Risk factors: no contaminated food, no contaminated water, no hx of cancer, no immunosuppression, no occupational exposure, no recent sickness, no recent travel and no sick contacts        Review of Systems   Constitutional: Positive for chills and fever.   HENT: Negative.  Negative for congestion, ear pain, rhinorrhea and sore throat.    Respiratory: Positive for cough.    Cardiovascular: Negative.  Negative for chest pain.   Gastrointestinal: Positive for nausea and vomiting. Negative for diarrhea.   Genitourinary: Negative.  Negative for dysuria.   Musculoskeletal: Positive for myalgias.   Skin: Negative.  Negative for rash.   Neurological: Negative.  Negative for headaches.   Hematological: Negative.    Psychiatric/Behavioral: Negative.  Negative for confusion.   All other systems reviewed and are negative.      Past Medical History:   Diagnosis Date   • Asthma    • Cancer      breast cancer   • Drug therapy    • GERD (gastroesophageal reflux disease)    • Hx of radiation therapy    • Hyperlipidemia    • Hypertension    • Malignant neoplasm of upper-inner quadrant of right female breast 9/26/2016   • Osteoporosis    • PONV (postoperative nausea and vomiting)        Allergies   Allergen Reactions   • Keflex [Cephalexin] Rash       Past Surgical History:   Procedure Laterality Date   • APPENDECTOMY  1994   • BREAST BIOPSY     • BREAST LUMPECTOMY Right 2008   • CHOLECYSTECTOMY  1993   • COLONOSCOPY  05/03/2013   • COLONOSCOPY N/A 6/14/2017    Procedure: COLONOSCOPY WITH ANESTHESIA;  Surgeon: Ksenia Fox MD;  Location: [facility] ENDOSCOPY;  Service:    • D&C HYSTEROSCOPY  1993   • ENDOSCOPY N/A 10/5/2016    Procedure: ESOPHAGOGASTRODUODENOSCOPY WITH ANESTHESIA;  Surgeon: Ksenia Fox MD;  Location: [facility] ENDOSCOPY;  Service:    • MEDIPORT INSERTION, SINGLE  2008   • MEDIPORT REMOVAL     • THYROIDECTOMY  2011    parathyroidectomy   • TONSILLECTOMY AND ADENOIDECTOMY  1970   • TUBAL ABDOMINAL LIGATION         Family History   Problem Relation Age of Onset   • Colon polyps Mother    • Cirrhosis Mother    • Colon polyps Sister 54   • Breast cancer Sister    • No Known Problems Father    • No Known Problems Brother    • No Known Problems Daughter    • No Known Problems Son    • No Known Problems Maternal Grandmother    • No Known Problems Paternal Grandmother    • No Known Problems Maternal Aunt    • No Known Problems Paternal Aunt    • Colon cancer Neg Hx    • Crohn's disease Neg Hx    • Irritable bowel syndrome Neg Hx    • Liver cancer Neg Hx    • Liver disease Neg Hx    • Rectal cancer Neg Hx    • Stomach cancer Neg Hx    • BRCA 1/2 Neg Hx    • Endometrial cancer Neg Hx    • Ovarian cancer Neg Hx        Social History     Social History   • Marital status:      Spouse name: N/A   • Number of children: N/A   • Years of education: N/A     Social History Main Topics   • Smoking status:  Never Smoker   • Smokeless tobacco: Never Used   • Alcohol use No   • Drug use: No   • Sexual activity: Yes     Partners: Male     Birth control/ protection: None     Other Topics Concern   • None     Social History Narrative       Prior to Admission medications    Medication Sig Start Date End Date Taking? Authorizing Provider   albuterol (PROVENTIL HFA;VENTOLIN HFA) 108 (90 Base) MCG/ACT inhaler Inhale 2 puffs Every 4 (Four) Hours As Needed for Wheezing.   Yes Historical Provider, MD   alendronate (FOSAMAX) 70 MG tablet Take 70 mg by mouth every 7 days.   Yes Historical Provider, MD   atorvastatin (LIPITOR) 40 MG tablet Take 40 mg by mouth daily.   Yes Historical Provider, MD   calcitriol (ROCALTROL) 0.25 MCG capsule Take 0.5 mcg by mouth daily.   Yes Historical Provider, MD   Calcium Citrate-Vitamin D (CALCIUM + D PO) Take  by mouth.   Yes Historical Provider, MD   fluticasone-salmeterol (ADVAIR) 250-50 MCG/DOSE DISKUS Inhale 2 (two) times a day.   Yes Historical Provider, MD   levothyroxine (SYNTHROID, LEVOTHROID) 25 MCG tablet Take 0.05 mcg by mouth daily.   Yes Historical Provider, MD   loratadine (CLARITIN) 10 MG tablet Take  by mouth.   Yes Historical Provider, MD   losartan (COZAAR) 25 MG tablet Take 25 mg by mouth daily.   Yes Historical Provider, MD   pantoprazole (PROTONIX) 40 MG EC tablet Take 40 mg by mouth 2 (Two) Times a Day. 9/6/16  Yes Historical Provider, MD   raNITIdine (ZANTAC) 150 MG tablet Take 150 mg by mouth Every Night.   Yes Historical Provider, MD   rOPINIRole (REQUIP) 0.25 MG tablet Take 0.25 mg by mouth every night. Take 1 hour before bedtime.   Yes Historical Provider, MD   tamoxifen (NOLVADEX) 10 MG tablet Take 1 tablet by mouth 2 (Two) Times a Day. 4/3/17  Yes Jese Hernandez MD   theophylline (UNIPHYL) 400 MG 24 hr tablet Take 400 mg by mouth daily.   Yes Historical Provider, MD   tiZANidine (ZANAFLEX) 4 MG tablet Take  by mouth.   Yes Historical Provider, MD   zafirlukast (ACCOLATE)  20 MG tablet Take 20 mg by mouth 2 (two) times a day.   Yes Historical Provider, MD   HYDROcodone-acetaminophen (NORCO) 5-325 MG per tablet 1-2 tabs PO every 4hrs PRN. For pain. 5/18/17   Historical Provider, MD   vitamin D (ERGOCALCIFEROL) 37106 UNITS capsule capsule Take 50,000 Units by mouth 1 (one) time per week.    Historical Provider, MD       Medications   sodium chloride 0.9 % flush 10 mL (not administered)   sodium chloride 0.9 % bolus 2,097 mL (0 mL/kg × 69.9 kg Intravenous Stopped 8/21/17 0800)   ondansetron (ZOFRAN) injection 4 mg (4 mg Intravenous Given 8/21/17 0855)   ipratropium-albuterol (DUO-NEB) nebulizer solution 3 mL (3 mL Nebulization Given 8/21/17 0600)   sodium chloride 0.9 % bolus 500 mL (0 mL Intravenous Stopped 8/21/17 0652)   levoFLOXacin (LEVAQUIN) 750 mg/150 mL D5W (premix) (LEVAQUIN) 750 mg (750 mg Intravenous New Bag 8/21/17 0910)       Vitals:    08/21/17 0931   BP: 131/68   Pulse: 115   Resp:    Temp:    SpO2: 92%         Objective   Physical Exam   Constitutional: She is oriented to person, place, and time. She appears well-developed and well-nourished.   HENT:   Head: Normocephalic and atraumatic.   Mouth/Throat: Oropharynx is clear and moist.   Eyes: EOM are normal. Pupils are equal, round, and reactive to light.   Neck: Normal range of motion. Neck supple.   Cardiovascular:   Patient is markedly tachycardic but has a regular rhythm.   Pulmonary/Chest: Effort normal.   Patient has scattered wheezes and rhonchi.   Abdominal: Soft. Bowel sounds are normal.   Musculoskeletal: Normal range of motion.   Neurological: She is alert and oriented to person, place, and time.   Skin: Skin is warm and dry.   Psychiatric: She has a normal mood and affect. Her behavior is normal.   Nursing note and vitals reviewed.      Procedures         Lab Results (last 24 hours)     Procedure Component Value Units Date/Time    CBC & Differential [[id number]] Collected:  08/21/17 0550    Specimen:  Blood  Updated:  08/21/17 0612    Narrative:       The following orders were created for panel order CBC & Differential.  Procedure                               Abnormality         Status                     ---------                               -----------         ------                     CBC Auto Differential[[id number]]        Abnormal            Final result                 Please view results for these tests on the individual orders.    BNP [[id number]]  (Normal) Collected:  08/21/17 0550    Specimen:  Blood Updated:  08/21/17 0625     proBNP 75.8 pg/mL     Troponin [[id number]]  (Normal) Collected:  08/21/17 0550    Specimen:  Blood Updated:  08/21/17 0625     Troponin I <0.012 ng/mL     Blood Culture [[id number]] Collected:  08/21/17 0550    Specimen:  Blood from Blood, Venous Line Updated:  08/21/17 0756    Blood Culture [[id number]] Collected:  08/21/17 0550    Specimen:  Blood from Hand, Left Updated:  08/21/17 0756    Lactic Acid, Plasma [[id number]]  (Abnormal) Collected:  08/21/17 0550    Specimen:  Blood Updated:  08/21/17 0635     Lactate 2.4 (C) mmol/L     CBC Auto Differential [[id number]]  (Abnormal) Collected:  08/21/17 0550    Specimen:  Blood Updated:  08/21/17 0612     WBC 16.30 (H) 10*3/mm3      RBC 5.10 10*6/mm3      Hemoglobin 13.5 g/dL      Hematocrit 41.7 %      MCV 81.8 (L) fL      MCH 26.5 (L) pg      MCHC 32.4 (L) g/dL      RDW 15.9 (H) %      RDW-SD 47.7 fl      MPV 10.1 fL      Platelets 384 10*3/mm3      Neutrophil % 85.1 (H) %      Lymphocyte % 7.4 (L) %      Monocyte % 5.2 %      Eosinophil % 1.7 %      Basophil % 0.2 %      Immature Grans % 0.4 %      Neutrophils, Absolute 13.87 (H) 10*3/mm3      Lymphocytes, Absolute 1.20 10*3/mm3      Monocytes, Absolute 0.85 10*3/mm3      Eosinophils, Absolute 0.27 10*3/mm3      Basophils, Absolute 0.04 10*3/mm3      Immature Grans, Absolute 0.07 (H) 10*3/mm3     Theophylline Level [[id number]]  (Normal) Collected:  08/21/17 0550    Specimen:  Blood  Updated:  08/21/17 0719     Theophylline Level 12.9 mcg/mL     Comprehensive Metabolic Panel [[id number]]  (Abnormal) Collected:  08/21/17 0639    Specimen:  Blood Updated:  08/21/17 0714     Glucose 109 (H) mg/dL      BUN 11 mg/dL      Creatinine 1.05 mg/dL      Sodium 142 mmol/L      Potassium 3.5 mmol/L      Chloride 110 mmol/L      CO2 20.0 (L) mmol/L      Calcium 9.0 mg/dL      Total Protein 6.6 g/dL      Albumin 3.90 g/dL      ALT (SGPT) 44 U/L      AST (SGOT) 33 U/L      Alkaline Phosphatase 63 U/L      Total Bilirubin 0.7 mg/dL      eGFR Non African Amer 55 (L) mL/min/1.73      Globulin 2.7 gm/dL      A/G Ratio 1.4 g/dL      BUN/Creatinine Ratio 10.5     Anion Gap 12.0 mmol/L     Urinalysis With / Culture If Indicated [[id number]]  (Abnormal) Collected:  08/21/17 0653    Specimen:  Urine from Urine, Clean Catch Updated:  08/21/17 0800     Color, UA Yellow     Appearance, UA Clear     pH, UA 5.5     Specific Gravity, UA 1.023     Glucose, UA Negative     Ketones, UA Negative     Bilirubin, UA Negative     Blood, UA Negative     Protein, UA Negative     Leuk Esterase, UA Small (1+) (A)     Nitrite, UA Negative     Urobilinogen, UA 0.2 E.U./dL    Urine Culture [[id number]] Collected:  08/21/17 0653    Specimen:  Urine from Urine, Clean Catch Updated:  08/21/17 0704    Urinalysis, Microscopic Only [[id number]]  (Abnormal) Collected:  08/21/17 0653    Specimen:  Urine from Urine, Clean Catch Updated:  08/21/17 0800     RBC, UA 0-2 (A) /HPF      WBC, UA 3-5 (A) /HPF      Bacteria, UA None Seen /HPF      Squamous Epithelial Cells, UA 7-12 (A) /HPF      Hyaline Casts, UA None Seen /LPF      Methodology Automated Microscopy          XR Chest 1 View   Final Result   No active disease is seen.           This report was finalized on 08/21/2017 07:12 by Dr. Aydin James MD.          ED Course  ED Course   Comment By Time   The patient's labs are all back and there is no finding of a bacterial infection.  I felt like this  is a viral infection will pass with time.  She is better with fluids and Tylenol.  She is discharged in stable condition. Ignacio Suárez Jr., MD 08/21 0974          [facility]    Final diagnoses:   Gastroenteritis          Ignacio Suárez Jr., MD  08/21/17 4921

## 2017-08-23 LAB — BACTERIA SPEC AEROBE CULT: NORMAL

## 2017-08-23 NOTE — ED NOTES
"ED Call Back Questions    1. How are you doing since leaving the Emergency Department?    Doing a little better  2. Do you have any questions about your discharge instructions? No     3. Have you filled your new prescriptions yet? Yes   a. Do you have any questions about those medications? No     4. Were you able to make a follow-up appointment with the physician? Yes     5. Do you have a primary care physician? Yes   a. If No, would you like for me to set you up with one? No   i. If Yes, “I will have our ED  give you a call right back at this number to work with you on the best time for an appointment.”    6. We are always looking to get better at what we do. Do you have any suggestions for what we can do to be even better? No   a. If Yes, \"Thank you for sharing your concerns. I apologize. I will follow up with our manager and patient . Would you like someone to call you back?\" No     7. Is there anything else I can do for you? No   Visit irais Rodriguez  08/23/17 7674    "

## 2017-08-26 LAB
BACTERIA SPEC AEROBE CULT: NORMAL
BACTERIA SPEC AEROBE CULT: NORMAL

## 2017-09-13 RX ORDER — PANTOPRAZOLE SODIUM 40 MG/1
TABLET, DELAYED RELEASE ORAL
Qty: 180 TABLET | Refills: 3 | Status: ON HOLD | OUTPATIENT
Start: 2017-09-13 | End: 2018-09-27

## 2017-11-07 ENCOUNTER — TRANSCRIBE ORDERS (OUTPATIENT)
Dept: ADMINISTRATIVE | Facility: HOSPITAL | Age: 52
End: 2017-11-07

## 2017-11-07 DIAGNOSIS — R09.89 LEFT CAROTID BRUIT: Primary | ICD-10-CM

## 2017-11-13 ENCOUNTER — HOSPITAL ENCOUNTER (OUTPATIENT)
Dept: ULTRASOUND IMAGING | Facility: HOSPITAL | Age: 52
Discharge: HOME | End: 2017-11-13
Attending: INTERNAL MEDICINE | Admitting: INTERNAL MEDICINE

## 2017-11-13 DIAGNOSIS — R09.89 LEFT CAROTID BRUIT: ICD-10-CM

## 2017-11-13 PROCEDURE — 93880 EXTRACRANIAL BILAT STUDY: CPT

## 2017-11-13 PROCEDURE — 93880 EXTRACRANIAL BILAT STUDY: CPT | Performed by: SURGERY

## 2018-01-09 ENCOUNTER — OFFICE VISIT (OUTPATIENT)
Dept: SURGERY | Age: 53
End: 2018-01-09
Payer: MEDICARE

## 2018-01-09 VITALS
WEIGHT: 164 LBS | SYSTOLIC BLOOD PRESSURE: 130 MMHG | HEIGHT: 62 IN | BODY MASS INDEX: 30.18 KG/M2 | HEART RATE: 80 BPM | DIASTOLIC BLOOD PRESSURE: 82 MMHG

## 2018-01-09 DIAGNOSIS — Z85.3 PERSONAL HISTORY OF MALIGNANT NEOPLASM OF BREAST: Primary | ICD-10-CM

## 2018-01-09 DIAGNOSIS — Z12.31 ENCOUNTER FOR SCREENING MAMMOGRAM FOR HIGH-RISK PATIENT: ICD-10-CM

## 2018-01-09 PROCEDURE — 99212 OFFICE O/P EST SF 10 MIN: CPT | Performed by: PHYSICIAN ASSISTANT

## 2018-01-09 PROCEDURE — G8417 CALC BMI ABV UP PARAM F/U: HCPCS | Performed by: PHYSICIAN ASSISTANT

## 2018-01-09 PROCEDURE — 1036F TOBACCO NON-USER: CPT | Performed by: PHYSICIAN ASSISTANT

## 2018-01-09 PROCEDURE — 3014F SCREEN MAMMO DOC REV: CPT | Performed by: PHYSICIAN ASSISTANT

## 2018-01-09 PROCEDURE — G8427 DOCREV CUR MEDS BY ELIG CLIN: HCPCS | Performed by: PHYSICIAN ASSISTANT

## 2018-01-09 PROCEDURE — 3017F COLORECTAL CA SCREEN DOC REV: CPT | Performed by: PHYSICIAN ASSISTANT

## 2018-01-09 PROCEDURE — G8484 FLU IMMUNIZE NO ADMIN: HCPCS | Performed by: PHYSICIAN ASSISTANT

## 2018-01-09 NOTE — PROGRESS NOTES
HISTORY OF PRESENT ILLNESS:   Mrs. Kathy Villanueva is a 47year old female who is status post 8-7-2008 Right Partial Mastectomy with re-excision and right axillary node dissection with a total of 1 of 11 nodes positive for metastatic malignancy. Grade 1, ER/MS +   She has no new complaints today. She denies weight loss or any other systemic symptoms. Mammograms are due in May 2018. PHYSICAL EXAM:   The right lumpectomy incision is looking good. There are moderate fibrocystic changes throughout both breasts. There are no dominant masses, no skin or nipple changes, and no axillary adenopathy. There is nothing suspicious for new carcinoma and no evidence of local tumor recurrence. ASSESSMENT:   No evidence of disease status post 8-7-2008 Right Partial Mastectomy and axillary node dissection     PLAN:   Follow-up in 6 months for physical exam and bilateral mammograms.

## 2018-01-16 ENCOUNTER — HOSPITAL ENCOUNTER (OUTPATIENT)
Dept: GENERAL RADIOLOGY | Facility: HOSPITAL | Age: 53
Discharge: HOME | End: 2018-01-16
Admitting: NURSE PRACTITIONER

## 2018-01-16 ENCOUNTER — TRANSCRIBE ORDERS (OUTPATIENT)
Dept: ADMINISTRATIVE | Facility: HOSPITAL | Age: 53
End: 2018-01-16

## 2018-01-16 DIAGNOSIS — J45.998 OTHER ASTHMA: Primary | ICD-10-CM

## 2018-01-16 PROCEDURE — 71046 X-RAY EXAM CHEST 2 VIEWS: CPT

## 2018-02-25 PROBLEM — K63.5 COLON POLYPS: Status: RESOLVED | Noted: 2017-05-04 | Resolved: 2018-02-25

## 2018-02-25 PROBLEM — Z12.11 COLON CANCER SCREENING: Status: ACTIVE | Noted: 2018-02-25

## 2018-02-26 ENCOUNTER — OFFICE VISIT (OUTPATIENT)
Dept: GASTROENTEROLOGY | Facility: CLINIC | Age: 53
End: 2018-02-26

## 2018-02-26 VITALS
TEMPERATURE: 99 F | HEART RATE: 107 BPM | WEIGHT: 166 LBS | DIASTOLIC BLOOD PRESSURE: 82 MMHG | SYSTOLIC BLOOD PRESSURE: 148 MMHG | OXYGEN SATURATION: 99 % | BODY MASS INDEX: 30.55 KG/M2 | HEIGHT: 62 IN

## 2018-02-26 DIAGNOSIS — E66.3 OVERWEIGHT (BMI 25.0-29.9): ICD-10-CM

## 2018-02-26 DIAGNOSIS — K21.9 GASTROESOPHAGEAL REFLUX DISEASE WITHOUT ESOPHAGITIS: Primary | ICD-10-CM

## 2018-02-26 DIAGNOSIS — Z83.71 FAMILY HISTORY OF POLYPS IN THE COLON: ICD-10-CM

## 2018-02-26 DIAGNOSIS — Z12.11 COLON CANCER SCREENING: ICD-10-CM

## 2018-02-26 PROBLEM — Z87.19 HISTORY OF PROCTITIS: Status: RESOLVED | Noted: 2017-05-04 | Resolved: 2018-02-26

## 2018-02-26 PROCEDURE — 99213 OFFICE O/P EST LOW 20 MIN: CPT | Performed by: INTERNAL MEDICINE

## 2018-02-26 NOTE — PATIENT INSTRUCTIONS
Anti-reflux measures were reviewed and discussed with patient.  They were advised to refrain from chocolate, alcohol, smoking, peppermint and caffeine.  They were advised to limit fatty foods, large meals, and eating late at nighttime.  They were advised to reach an ideal body mass index.    Please make sure to drink a large glass of water with your Fosamax.

## 2018-02-26 NOTE — PROGRESS NOTES
Primary Physician: Antonio Shaffer MD    Chief Complaint   Patient presents with   • Follow-up     Patient is here today following up with reflux and nausea.       Subjective     Luna Cruz is a 52 y.o. female.    HPI   GERD/Nausea  02/26/18  Patient has long-standing history of reflux disease.  She is currently maintained on pantoprazole 40 mg BID.  She uses an occasional Zantac.  She does drink caffeine.  She tries not to eat late at nighttime.  She does not smoke.  She had an endoscopy done October 2016.  She is having complaints of dysphagia at that point.  She had a nonobstructing Schatzki's ring.  This was dilated up to 20 mm.  She also had a medium-sized hiatal hernia.  She does have occasional nausea.  She has had her gallbladder previously removed.  I reviewed her medication list and she is taking Fosamax.  Her last TSH was about 3 months ago.  She doesn't have the swallowing problems she had in the past.    Colon cancer screening  02/26/18   She had a colonoscopy in May 2013 that showed very distal proctitis.  Biopsy showed mild chronic active inflammation.  Repeat colonoscopy in 2017 however shows complete resolution of symptoms.  Mother had colon polyps less than 60 years old.  Patient denies any melena or hematochezia.    Past Medical History:   Diagnosis Date   • Asthma    • Cancer     breast cancer   • Drug therapy    • GERD (gastroesophageal reflux disease)    • Hx of radiation therapy    • Hyperlipidemia    • Hypertension    • Malignant neoplasm of upper-inner quadrant of right female breast 9/26/2016   • Osteoporosis    • PONV (postoperative nausea and vomiting)        Past Surgical History:   Procedure Laterality Date   • APPENDECTOMY  1994   • BREAST BIOPSY     • BREAST LUMPECTOMY Right 2008   • CHOLECYSTECTOMY  1993   • COLONOSCOPY  05/03/2013   • COLONOSCOPY N/A 6/14/2017    Procedure: COLONOSCOPY WITH ANESTHESIA;  Surgeon: Ksenia Fox MD;  Location: [facility] ENDOSCOPY;  Service:     • D&C HYSTEROSCOPY  1993   • ENDOSCOPY N/A 10/5/2016    Procedure: ESOPHAGOGASTRODUODENOSCOPY WITH ANESTHESIA;  Surgeon: Ksenia Fox MD;  Location: [facility] ENDOSCOPY;  Service:    • MEDIPORT INSERTION, SINGLE  2008   • MEDIPORT REMOVAL     • THYROIDECTOMY  2011    parathyroidectomy   • TONSILLECTOMY AND ADENOIDECTOMY  1970   • TUBAL ABDOMINAL LIGATION          Current Outpatient Prescriptions:   •  albuterol (PROVENTIL HFA;VENTOLIN HFA) 108 (90 Base) MCG/ACT inhaler, Inhale 2 puffs Every 4 (Four) Hours As Needed for Wheezing., Disp: , Rfl:   •  alendronate (FOSAMAX) 70 MG tablet, Take 70 mg by mouth every 7 days., Disp: , Rfl:   •  atorvastatin (LIPITOR) 40 MG tablet, Take 40 mg by mouth daily., Disp: , Rfl:   •  calcitriol (ROCALTROL) 0.25 MCG capsule, Take 0.5 mcg by mouth daily., Disp: , Rfl:   •  Calcium Citrate-Vitamin D (CALCIUM + D PO), Take  by mouth., Disp: , Rfl:   •  fluticasone-salmeterol (ADVAIR) 250-50 MCG/DOSE DISKUS, Inhale 2 (two) times a day., Disp: , Rfl:   •  levothyroxine (SYNTHROID, LEVOTHROID) 25 MCG tablet, Take 0.05 mcg by mouth daily., Disp: , Rfl:   •  loratadine (CLARITIN) 10 MG tablet, Take  by mouth., Disp: , Rfl:   •  losartan (COZAAR) 25 MG tablet, Take 25 mg by mouth daily., Disp: , Rfl:   •  ondansetron ODT (ZOFRAN-ODT) 4 MG disintegrating tablet, Take 1 tablet by mouth Every 4 (Four) Hours As Needed for Nausea., Disp: 10 tablet, Rfl: 0  •  pantoprazole (PROTONIX) 40 MG EC tablet, TAKE 1 TABLET TWICE A DAY, Disp: 180 tablet, Rfl: 3  •  raNITIdine (ZANTAC) 150 MG tablet, Take 150 mg by mouth Every Night., Disp: , Rfl:   •  rOPINIRole (REQUIP) 0.25 MG tablet, Take 0.25 mg by mouth every night. Take 1 hour before bedtime., Disp: , Rfl:   •  tamoxifen (NOLVADEX) 10 MG tablet, Take 1 tablet by mouth 2 (Two) Times a Day., Disp: 180 tablet, Rfl: 3  •  theophylline (UNIPHYL) 400 MG 24 hr tablet, Take 400 mg by mouth daily., Disp: , Rfl:   •  zafirlukast (ACCOLATE) 20 MG tablet, Take 20  mg by mouth 2 (two) times a day., Disp: , Rfl:   No current facility-administered medications for this visit.     Facility-Administered Medications Ordered in Other Visits:   •  heparin flush (porcine) 100 UNIT/ML injection 500 Units, 500 Units, Intracatheter, PRN, Ki Manriquez MD, 500 Units at 09/27/16 1108  •  heparin flush (porcine) 100 UNIT/ML injection 500 Units, 500 Units, Intravenous, PRN, Malathi Brantley APRN, 500 Units at 05/05/17 1009  •  sodium chloride 0.9 % flush 10 mL, 10 mL, Intravenous, PRN, Ki Manriquez MD, 10 mL at 09/27/16 1107  •  sodium chloride 0.9 % flush 10 mL, 10 mL, Intravenous, PRN, Malathi Brantley APRN, 10 mL at 05/05/17 1009    Allergies   Allergen Reactions   • Keflex [Cephalexin] Rash and Unknown (See Comments)       Social History     Social History   • Marital status:      Spouse name: N/A   • Number of children: N/A   • Years of education: N/A     Occupational History   • Not on file.     Social History Main Topics   • Smoking status: Never Smoker   • Smokeless tobacco: Never Used   • Alcohol use No   • Drug use: No   • Sexual activity: Yes     Partners: Male     Birth control/ protection: None     Other Topics Concern   • Not on file     Social History Narrative       Family History   Problem Relation Age of Onset   • Colon polyps Mother    • Cirrhosis Mother    • Colon polyps Sister 54   • Breast cancer Sister    • No Known Problems Father    • No Known Problems Brother    • No Known Problems Daughter    • No Known Problems Son    • No Known Problems Maternal Grandmother    • No Known Problems Paternal Grandmother    • No Known Problems Maternal Aunt    • No Known Problems Paternal Aunt    • Colon cancer Neg Hx    • Crohn's disease Neg Hx    • Irritable bowel syndrome Neg Hx    • Liver cancer Neg Hx    • Liver disease Neg Hx    • Rectal cancer Neg Hx    • Stomach cancer Neg Hx    • BRCA 1/2 Neg Hx    • Endometrial cancer Neg Hx    •  "Ovarian cancer Neg Hx        Review of Systems   Constitutional: Negative for fever.   Respiratory: Negative for cough and shortness of breath.    Cardiovascular: Negative for chest pain.   Genitourinary: Negative for dysuria.   Skin: Negative for rash.   Neurological: Negative for seizures.       Objective     /82  Pulse 107  Temp 99 °F (37.2 °C)  Ht 157.5 cm (62\")  Wt 75.3 kg (166 lb)  LMP 06/14/2008  SpO2 99%  BMI 30.36 kg/m2    Physical Exam   Constitutional: She is oriented to person, place, and time. She appears well-developed.   Eyes: No scleral icterus.   Cardiovascular: Normal rate and regular rhythm.    Pulmonary/Chest: Breath sounds normal.   Abdominal: Soft. Bowel sounds are normal. She exhibits no distension and no mass. There is no tenderness. There is no rebound and no guarding.   Musculoskeletal: Normal range of motion.   Neurological: She is alert and oriented to person, place, and time.   Skin: No rash noted.   Psychiatric: She has a normal mood and affect. Her behavior is normal.   Vitals reviewed.      Lab Results   Component Value Date    WBC 16.30 (H) 08/21/2017    WBC 5.60 05/05/2017    WBC 8.39 05/23/2016    HGB 13.5 08/21/2017    HGB 13.2 05/05/2017    HGB 11.9 (L) 05/23/2016    HCT 41.7 08/21/2017    HCT 41.8 05/05/2017    HCT 36.9 (L) 05/23/2016     08/21/2017     (H) 05/05/2017     05/23/2016        Lab Results   Component Value Date     08/21/2017     (H) 05/05/2017     05/23/2016    K 3.5 08/21/2017    K 3.9 05/05/2017    K 3.4 (L) 05/23/2016     08/21/2017     (H) 05/05/2017     05/23/2016    CO2 20.0 (L) 08/21/2017    CO2 23.0 05/05/2017    CO2 23 (L) 05/23/2016    BUN 11 08/21/2017    BUN 13 05/05/2017    BUN 9 05/23/2016    CREATININE 1.05 08/21/2017    CREATININE 1.10 05/05/2017    CREATININE 0.98 05/23/2016    BILITOT 0.7 08/21/2017    BILITOT 0.5 05/05/2017    BILITOT 0.3 05/23/2016    ALKPHOS 63 08/21/2017    " ALKPHOS 65 05/05/2017    ALKPHOS 75 05/23/2016    ALT 44 08/21/2017    ALT 31 05/05/2017    ALT 22 05/23/2016    AST 33 08/21/2017    AST 24 05/05/2017    AST 32 05/23/2016    GLUCOSE 109 (H) 08/21/2017    GLUCOSE 90 05/05/2017    GLUCOSE 100 05/23/2016       Lab Results   Component Value Date    INR 0.80 (L) 10/27/2014    INR 0.89 (L) 05/16/2014       IMPRESSION/PLAN:    Assessment/Plan      Problem List Items Addressed This Visit        Digestive    Gastroesophageal reflux disease - Primary    Overview     Anti-reflux measures were reviewed and discussed with patient.  They were advised to refrain from chocolate, alcohol, smoking, peppermint and caffeine.  They were advised to limit fatty foods, large meals, and eating late at nighttime.  They were advised to reach an ideal body mass index.    She is also on Fosamax.  I advised her to drink a large glass of water after taking this pill as this can cause esophagitis.              Other    Family history of polyps in the colon    Overview     Mom had colon polyps < 60 years old.         Colon cancer screening    Overview     Last colonoscopy normal 6/2017.  Repeat 6/2022.         Overweight (BMI 25.0-29.9)    Overview     Advise weight reduction will be of benefit regarding GERD symptoms.         Current Assessment & Plan     Obesity is improving with lifestyle modifications.  Discussed the patient's BMI.  The BMI is above average; BMI management plan is completed.  General weight loss/lifestyle modification strategies discussed (elicit support from others; identify saboteurs; non-food rewards, etc).                      RTC 1 year      Ksenia Fox MD  02/26/18  3:12 PM    Much of this encounter note is an electronic transcription/translation of spoken language to printed text. The electronic translation of spoken language may permit erroneous, or at times, nonsensical words or phrases to be inadvertently transcribed; although I have reviewed the note for such  errors, some may still exist.

## 2018-02-26 NOTE — ASSESSMENT & PLAN NOTE
Obesity is improving with lifestyle modifications.  Discussed the patient's BMI.  The BMI is above average; BMI management plan is completed.  General weight loss/lifestyle modification strategies discussed (elicit support from others; identify saboteurs; non-food rewards, etc).

## 2018-03-12 RX ORDER — TAMOXIFEN CITRATE 10 MG/1
TABLET ORAL
Qty: 30 TABLET | Refills: 1 | Status: SHIPPED | OUTPATIENT
Start: 2018-03-12 | End: 2018-05-09 | Stop reason: SDUPTHER

## 2018-04-04 ENCOUNTER — TELEPHONE (OUTPATIENT)
Dept: SURGERY | Age: 53
End: 2018-04-04

## 2018-05-07 ENCOUNTER — HOSPITAL ENCOUNTER (OUTPATIENT)
Dept: WOMENS IMAGING | Age: 53
Discharge: HOME | End: 2018-05-07
Payer: MEDICARE

## 2018-05-07 ENCOUNTER — OFFICE VISIT (OUTPATIENT)
Dept: SURGERY | Age: 53
End: 2018-05-07
Payer: MEDICARE

## 2018-05-07 VITALS
HEIGHT: 62 IN | WEIGHT: 167 LBS | HEART RATE: 84 BPM | SYSTOLIC BLOOD PRESSURE: 118 MMHG | BODY MASS INDEX: 30.73 KG/M2 | DIASTOLIC BLOOD PRESSURE: 82 MMHG

## 2018-05-07 DIAGNOSIS — Z08 ENCOUNTER FOR FOLLOW-UP SURVEILLANCE OF BREAST CANCER: ICD-10-CM

## 2018-05-07 DIAGNOSIS — Z12.31 ENCOUNTER FOR SCREENING MAMMOGRAM FOR HIGH-RISK PATIENT: ICD-10-CM

## 2018-05-07 DIAGNOSIS — Z12.31 ENCOUNTER FOR SCREENING MAMMOGRAM FOR HIGH-RISK PATIENT: Primary | ICD-10-CM

## 2018-05-07 DIAGNOSIS — Z80.3 FAMILY HISTORY OF MALIGNANT NEOPLASM OF BREAST: Primary | ICD-10-CM

## 2018-05-07 DIAGNOSIS — Z85.3 ENCOUNTER FOR FOLLOW-UP SURVEILLANCE OF BREAST CANCER: ICD-10-CM

## 2018-05-07 PROCEDURE — 99212 OFFICE O/P EST SF 10 MIN: CPT | Performed by: PHYSICIAN ASSISTANT

## 2018-05-07 PROCEDURE — 3014F SCREEN MAMMO DOC REV: CPT | Performed by: PHYSICIAN ASSISTANT

## 2018-05-07 PROCEDURE — 77063 BREAST TOMOSYNTHESIS BI: CPT

## 2018-05-07 PROCEDURE — G8427 DOCREV CUR MEDS BY ELIG CLIN: HCPCS | Performed by: PHYSICIAN ASSISTANT

## 2018-05-07 PROCEDURE — G8417 CALC BMI ABV UP PARAM F/U: HCPCS | Performed by: PHYSICIAN ASSISTANT

## 2018-05-07 PROCEDURE — 1036F TOBACCO NON-USER: CPT | Performed by: PHYSICIAN ASSISTANT

## 2018-05-07 PROCEDURE — 3017F COLORECTAL CA SCREEN DOC REV: CPT | Performed by: PHYSICIAN ASSISTANT

## 2018-05-09 ENCOUNTER — APPOINTMENT (OUTPATIENT)
Dept: LAB | Facility: HOSPITAL | Age: 53
End: 2018-05-09

## 2018-05-09 ENCOUNTER — OFFICE VISIT (OUTPATIENT)
Dept: ONCOLOGY | Facility: CLINIC | Age: 53
End: 2018-05-09

## 2018-05-09 VITALS
HEART RATE: 83 BPM | RESPIRATION RATE: 18 BRPM | HEIGHT: 62 IN | TEMPERATURE: 99.1 F | OXYGEN SATURATION: 95 % | WEIGHT: 167.7 LBS | SYSTOLIC BLOOD PRESSURE: 126 MMHG | DIASTOLIC BLOOD PRESSURE: 70 MMHG | BODY MASS INDEX: 30.86 KG/M2

## 2018-05-09 DIAGNOSIS — G25.81 RESTLESS LEG: Primary | ICD-10-CM

## 2018-05-09 DIAGNOSIS — Z08 ENCOUNTER FOR FOLLOW-UP SURVEILLANCE OF BREAST CANCER: Primary | ICD-10-CM

## 2018-05-09 DIAGNOSIS — Z17.0 MALIGNANT NEOPLASM OF LOWER-INNER QUADRANT OF LEFT BREAST IN FEMALE, ESTROGEN RECEPTOR POSITIVE (HCC): ICD-10-CM

## 2018-05-09 DIAGNOSIS — C50.312 MALIGNANT NEOPLASM OF LOWER-INNER QUADRANT OF LEFT BREAST IN FEMALE, ESTROGEN RECEPTOR POSITIVE (HCC): ICD-10-CM

## 2018-05-09 DIAGNOSIS — Z85.3 ENCOUNTER FOR FOLLOW-UP SURVEILLANCE OF BREAST CANCER: Primary | ICD-10-CM

## 2018-05-09 DIAGNOSIS — Z80.3 FAMILY HISTORY OF MALIGNANT NEOPLASM OF BREAST: ICD-10-CM

## 2018-05-09 DIAGNOSIS — F45.8 OTHER SOMATOFORM DISORDERS: ICD-10-CM

## 2018-05-09 LAB
ALBUMIN SERPL-MCNC: 4 G/DL (ref 3.5–5)
ALBUMIN/GLOB SERPL: 1.3 G/DL (ref 1.1–2.5)
ALP SERPL-CCNC: 78 U/L (ref 24–120)
ALT SERPL W P-5'-P-CCNC: 33 U/L (ref 0–54)
ANION GAP SERPL CALCULATED.3IONS-SCNC: 12 MMOL/L (ref 4–13)
AST SERPL-CCNC: 40 U/L (ref 7–45)
BASOPHILS # BLD AUTO: 0.06 10*3/MM3 (ref 0–0.2)
BASOPHILS NFR BLD AUTO: 0.7 % (ref 0–2)
BILIRUB SERPL-MCNC: 0.5 MG/DL (ref 0.1–1)
BUN BLD-MCNC: 14 MG/DL (ref 5–21)
BUN/CREAT SERPL: 13 (ref 7–25)
CALCIUM SPEC-SCNC: 8.1 MG/DL (ref 8.4–10.4)
CHLORIDE SERPL-SCNC: 107 MMOL/L (ref 98–110)
CO2 SERPL-SCNC: 25 MMOL/L (ref 24–31)
CREAT BLD-MCNC: 1.08 MG/DL (ref 0.5–1.4)
DEPRECATED RDW RBC AUTO: 44.1 FL (ref 40–54)
EOSINOPHIL # BLD AUTO: 0.29 10*3/MM3 (ref 0–0.7)
EOSINOPHIL NFR BLD AUTO: 3.5 % (ref 0–4)
ERYTHROCYTE [DISTWIDTH] IN BLOOD BY AUTOMATED COUNT: 15.8 % (ref 12–15)
FERRITIN SERPL-MCNC: 7.34 NG/ML (ref 11.1–264)
GFR SERPL CREATININE-BSD FRML MDRD: 53 ML/MIN/1.73
GLOBULIN UR ELPH-MCNC: 3 GM/DL
GLUCOSE BLD-MCNC: 85 MG/DL (ref 70–100)
HCT VFR BLD AUTO: 42 % (ref 37–47)
HGB BLD-MCNC: 13.3 G/DL (ref 12–16)
HOLD SPECIMEN: NORMAL
IMM GRANULOCYTES # BLD: 0.02 10*3/MM3 (ref 0–0.03)
IMM GRANULOCYTES NFR BLD: 0.2 % (ref 0–5)
IRON 24H UR-MRATE: 44 MCG/DL (ref 42–180)
IRON SATN MFR SERPL: 11 % (ref 20–45)
LYMPHOCYTES # BLD AUTO: 1.81 10*3/MM3 (ref 0.72–4.86)
LYMPHOCYTES NFR BLD AUTO: 21.6 % (ref 15–45)
MCH RBC QN AUTO: 24.6 PG (ref 28–32)
MCHC RBC AUTO-ENTMCNC: 31.7 G/DL (ref 33–36)
MCV RBC AUTO: 77.8 FL (ref 82–98)
MONOCYTES # BLD AUTO: 0.53 10*3/MM3 (ref 0.19–1.3)
MONOCYTES NFR BLD AUTO: 6.3 % (ref 4–12)
NEUTROPHILS # BLD AUTO: 5.68 10*3/MM3 (ref 1.87–8.4)
NEUTROPHILS NFR BLD AUTO: 67.7 % (ref 39–78)
NRBC BLD MANUAL-RTO: 0 /100 WBC (ref 0–0)
PLATELET # BLD AUTO: 467 10*3/MM3 (ref 130–400)
PMV BLD AUTO: 9.4 FL (ref 6–12)
POTASSIUM BLD-SCNC: 3.8 MMOL/L (ref 3.5–5.3)
PROT SERPL-MCNC: 7 G/DL (ref 6.3–8.7)
RBC # BLD AUTO: 5.4 10*6/MM3 (ref 4.2–5.4)
SODIUM BLD-SCNC: 144 MMOL/L (ref 135–145)
TIBC SERPL-MCNC: 412 MCG/DL (ref 225–420)
WBC NRBC COR # BLD: 8.39 10*3/MM3 (ref 4.8–10.8)

## 2018-05-09 PROCEDURE — 80053 COMPREHEN METABOLIC PANEL: CPT | Performed by: INTERNAL MEDICINE

## 2018-05-09 PROCEDURE — 83550 IRON BINDING TEST: CPT | Performed by: INTERNAL MEDICINE

## 2018-05-09 PROCEDURE — 85025 COMPLETE CBC W/AUTO DIFF WBC: CPT | Performed by: INTERNAL MEDICINE

## 2018-05-09 PROCEDURE — 83540 ASSAY OF IRON: CPT | Performed by: INTERNAL MEDICINE

## 2018-05-09 PROCEDURE — 99214 OFFICE O/P EST MOD 30 MIN: CPT | Performed by: INTERNAL MEDICINE

## 2018-05-09 PROCEDURE — 82728 ASSAY OF FERRITIN: CPT | Performed by: INTERNAL MEDICINE

## 2018-05-09 PROCEDURE — 36415 COLL VENOUS BLD VENIPUNCTURE: CPT

## 2018-05-09 RX ORDER — TAMOXIFEN CITRATE 10 MG/1
10 TABLET ORAL 2 TIMES DAILY
Qty: 120 TABLET | Refills: 0 | Status: SHIPPED | OUTPATIENT
Start: 2018-05-09 | End: 2018-05-29 | Stop reason: SDUPTHER

## 2018-05-09 NOTE — PROGRESS NOTES
[facility]  HEMATOLOGY & ONCOLOGY    Cancer Staging Information:  No matching staging information was found for the patient.      Subjective     VISIT DIAGNOSIS:   Encounter Diagnoses   Name Primary?   • Restless leg Yes   • Other somatoform disorders     • Malignant neoplasm of lower-inner quadrant of left breast in female, estrogen receptor positive        REASON FOR VISIT:     Chief Complaint   Patient presents with   • Follow-up     Breast Ca: She is here for f/u visit today. No c/o today, her last mammo was Monday this week, request refill of TMX.        HEMATOLOGY / ONCOLOGY HISTORY:   Oncology/Hematology History    Ms. Luna Cruz is a pleasant 49 year old female with diagnosis of pT1pN1(mi)M0 right breast cancer, ER positive, VT positive, HER2/  leonila normal diagnosed in August 2008. She has passed the 3 yearmark.  Back then, the patient underwent lumpectomy. Tumor was  located in the lower inner quadrant of the right breast. Tye lymph node dissection found to have residual DCIS. She went on to receive  4 cycles of non anthracycline based chemotherapy with Taxotere and Cytoxan. This was followed by radiation therapy and has been  tamoxifen since 11/25/08.  She is here today for regular scheduled followup. . We will request sestamibi scan anyway. Her tumor markers have been stable. No  evidence of effusion or lymphadenopathy. Thyroid ultrasound on 07/25/11 ordered by Dr. Villasenor for hyperthyroidism study show small  thyroid, no lesions identified.  INTERVAL HISTORY  This patient had a lumpectomy for a stage IIA, estrogen receptorpositive,  HER2/  neunegative  breast cancer in 2008. She received  adjuvant interventions that included 5 years of tamoxifen administration.             INTERVAL HISTORY  Patient ID: Luna Cruz is a 52 y.o. year old female No matching staging information was found for the patient.    Past Medical History:   Past Medical History:   Diagnosis Date   • Asthma     • Cancer     breast cancer   • Drug therapy    • GERD (gastroesophageal reflux disease)    • Hx of radiation therapy    • Hyperlipidemia    • Hypertension    • Malignant neoplasm of upper-inner quadrant of right female breast 9/26/2016   • Osteoporosis    • PONV (postoperative nausea and vomiting)      Past Surgical History:   Past Surgical History:   Procedure Laterality Date   • APPENDECTOMY  1994   • BREAST BIOPSY     • BREAST LUMPECTOMY Right 2008   • CHOLECYSTECTOMY  1993   • COLONOSCOPY  05/03/2013   • COLONOSCOPY N/A 6/14/2017    Procedure: COLONOSCOPY WITH ANESTHESIA;  Surgeon: Ksenia Fox MD;  Location: [facility] ENDOSCOPY;  Service:    • D&C HYSTEROSCOPY  1993   • ENDOSCOPY N/A 10/5/2016    Procedure: ESOPHAGOGASTRODUODENOSCOPY WITH ANESTHESIA;  Surgeon: Ksenia Fox MD;  Location: [facility] ENDOSCOPY;  Service:    • MEDIPORT INSERTION, SINGLE  2008   • MEDIPORT REMOVAL     • THYROIDECTOMY  2011    parathyroidectomy   • TONSILLECTOMY AND ADENOIDECTOMY  1970   • TUBAL ABDOMINAL LIGATION       Social History:   Social History     Social History   • Marital status:      Spouse name: N/A   • Number of children: N/A   • Years of education: N/A     Occupational History   • Not on file.     Social History Main Topics   • Smoking status: Never Smoker   • Smokeless tobacco: Never Used   • Alcohol use No   • Drug use: No   • Sexual activity: Yes     Partners: Male     Birth control/ protection: None     Other Topics Concern   • Not on file     Social History Narrative   • No narrative on file     Family History:   Family History   Problem Relation Age of Onset   • Colon polyps Mother    • Cirrhosis Mother    • Colon polyps Sister 54   • Breast cancer Sister    • No Known Problems Father    • No Known Problems Brother    • No Known Problems Daughter    • No Known Problems Son    • No Known Problems Maternal Grandmother    • No Known Problems Paternal Grandmother    • No Known Problems Maternal Aunt    • No  Known Problems Paternal Aunt    • Colon cancer Neg Hx    • Crohn's disease Neg Hx    • Irritable bowel syndrome Neg Hx    • Liver cancer Neg Hx    • Liver disease Neg Hx    • Rectal cancer Neg Hx    • Stomach cancer Neg Hx    • BRCA 1/2 Neg Hx    • Endometrial cancer Neg Hx    • Ovarian cancer Neg Hx        Review of Systems   Constitutional: Negative.    HENT: Negative.    Eyes: Negative.    Respiratory: Negative.    Cardiovascular: Negative.    Gastrointestinal: Negative.    Endocrine: Negative.    Genitourinary: Negative.    Musculoskeletal: Negative.    Skin: Negative.    Neurological: Negative.    Hematological: Negative.    Psychiatric/Behavioral: Negative.         Performance Status:  Asymptomatic    Medications:    Current Outpatient Prescriptions   Medication Sig Dispense Refill   • albuterol (PROVENTIL HFA;VENTOLIN HFA) 108 (90 Base) MCG/ACT inhaler Inhale 2 puffs Every 4 (Four) Hours As Needed for Wheezing.     • alendronate (FOSAMAX) 70 MG tablet Take 70 mg by mouth every 7 days.     • atorvastatin (LIPITOR) 40 MG tablet Take 40 mg by mouth daily.     • calcitriol (ROCALTROL) 0.25 MCG capsule Take 0.5 mcg by mouth daily.     • Calcium Citrate-Vitamin D (CALCIUM + D PO) Take  by mouth.     • fluticasone-salmeterol (ADVAIR) 250-50 MCG/DOSE DISKUS Inhale 2 (two) times a day.     • levothyroxine (SYNTHROID, LEVOTHROID) 25 MCG tablet Take 0.05 mcg by mouth daily.     • loratadine (CLARITIN) 10 MG tablet Take  by mouth.     • losartan (COZAAR) 25 MG tablet Take 25 mg by mouth daily.     • pantoprazole (PROTONIX) 40 MG EC tablet TAKE 1 TABLET TWICE A  tablet 3   • raNITIdine (ZANTAC) 150 MG tablet Take 150 mg by mouth Every Night.     • rOPINIRole (REQUIP) 0.25 MG tablet Take 0.25 mg by mouth every night. Take 1 hour before bedtime.     • tamoxifen (NOLVADEX) 10 MG tablet Take 1 tablet by mouth 2 (Two) Times a Day for 60 days. 120 tablet 0   • theophylline (UNIPHYL) 400 MG 24 hr tablet Take 400 mg by  "mouth daily.     • zafirlukast (ACCOLATE) 20 MG tablet Take 20 mg by mouth 2 (two) times a day.     • ondansetron ODT (ZOFRAN-ODT) 4 MG disintegrating tablet Take 1 tablet by mouth Every 4 (Four) Hours As Needed for Nausea. 10 tablet 0     No current facility-administered medications for this visit.      Facility-Administered Medications Ordered in Other Visits   Medication Dose Route Frequency Provider Last Rate Last Dose   • heparin flush (porcine) 100 UNIT/ML injection 500 Units  500 Units Intracatheter PRN Ki Manriquez MD   500 Units at 09/27/16 1108   • heparin flush (porcine) 100 UNIT/ML injection 500 Units  500 Units Intravenous PRN HEATHER Ayers   500 Units at 05/05/17 1009   • sodium chloride 0.9 % flush 10 mL  10 mL Intravenous PRN Ki Manriquez MD   10 mL at 09/27/16 1107   • sodium chloride 0.9 % flush 10 mL  10 mL Intravenous PRN HEATHER Ayers   10 mL at 05/05/17 1009       ALLERGIES:    Allergies   Allergen Reactions   • Keflex [Cephalexin] Rash and Unknown (See Comments)       Objective      Vitals:    05/09/18 1259   BP: 126/70   Pulse: 83   Resp: 18   Temp: 99.1 °F (37.3 °C)   TempSrc: Tympanic   SpO2: 95%   Weight: 76.1 kg (167 lb 11.2 oz)   Height: 157.5 cm (62\")         Current Status 5/9/2018   ECOG score 0         Physical Exam  General Appearance: Patient is awake, alert, oriented and in no acute distress. Patient is welldeveloped, wellnourished, and appears stated age.  HEENT: Normocephalic. Sclerae clear, conjunctiva pink, extraocular movements intact, pupils, round, reactive to light and  accommodation. Mouth and throat are clear with moist oral mucosa.  NECK: Supple, no jugular venous distention, thyroid not enlarged.  LYMPH: No cervical, supraclavicular, axillary, or inguinal lymphadenopathy.  CHEST: Equal bilateral expansion, AP  diameter normal, resonant percussion note  LUNGS: Good air movement, no rales, rhonchi, rubs or wheezes with " auscultation  CARDIO: Regular sinus rhythm, no murmurs, gallops or rubs.  ABDOMEN: Nondistended, soft, No tenderness, no guarding, no rebound, No hepatosplenomegaly. No abdominal masses. Bowel sounds positive. No hernia  GENITALIA: Not examined.  BREASTS: right breast lumpectomy scar well healed. Left breast with no lumps or bumps.    MUSKEL: No joint swelling, decreased motion, or inflammation  EXTREMS: No edema, clubbing, cyanosis, No varicose veins.  NEURO: Grossly nonfocal, Gait is coordinated and smooth, Cognition is preserved.  SKIN: No rashes, no ecchymoses, no petechia.  PSYCH: Oriented to time, place and person. Memory is preserved. Mood and affect appear normal  RECENT LABS:  Orders Only on 05/07/2018   Component Date Value Ref Range Status   • Glucose 05/09/2018 85  70 - 100 mg/dL Final   • BUN 05/09/2018 14  5 - 21 mg/dL Final   • Creatinine 05/09/2018 1.08  0.50 - 1.40 mg/dL Final   • Sodium 05/09/2018 144  135 - 145 mmol/L Final   • Potassium 05/09/2018 3.8  3.5 - 5.3 mmol/L Final   • Chloride 05/09/2018 107  98 - 110 mmol/L Final   • CO2 05/09/2018 25.0  24.0 - 31.0 mmol/L Final   • Calcium 05/09/2018 8.1* 8.4 - 10.4 mg/dL Final   • Total Protein 05/09/2018 7.0  6.3 - 8.7 g/dL Final   • Albumin 05/09/2018 4.00  3.50 - 5.00 g/dL Final   • ALT (SGPT) 05/09/2018 33  0 - 54 U/L Final   • AST (SGOT) 05/09/2018 40  7 - 45 U/L Final   • Alkaline Phosphatase 05/09/2018 78  24 - 120 U/L Final   • Total Bilirubin 05/09/2018 0.5  0.1 - 1.0 mg/dL Final   • eGFR Non African Amer 05/09/2018 53* >60 mL/min/1.73 Final   • Globulin 05/09/2018 3.0  gm/dL Final   • A/G Ratio 05/09/2018 1.3  1.1 - 2.5 g/dL Final   • BUN/Creatinine Ratio 05/09/2018 13.0  7.0 - 25.0 Final   • Anion Gap 05/09/2018 12.0  4.0 - 13.0 mmol/L Final   • WBC 05/09/2018 8.39  4.80 - 10.80 10*3/mm3 Final   • RBC 05/09/2018 5.40  4.20 - 5.40 10*6/mm3 Final   • Hemoglobin 05/09/2018 13.3  12.0 - 16.0 g/dL Final   • Hematocrit 05/09/2018 42.0  37.0 -  47.0 % Final   • MCV 05/09/2018 77.8* 82.0 - 98.0 fL Final   • MCH 05/09/2018 24.6* 28.0 - 32.0 pg Final   • MCHC 05/09/2018 31.7* 33.0 - 36.0 g/dL Final   • RDW 05/09/2018 15.8* 12.0 - 15.0 % Final   • RDW-SD 05/09/2018 44.1  40.0 - 54.0 fl Final   • MPV 05/09/2018 9.4  6.0 - 12.0 fL Final   • Platelets 05/09/2018 467* 130 - 400 10*3/mm3 Final   • Neutrophil % 05/09/2018 67.7  39.0 - 78.0 % Final   • Lymphocyte % 05/09/2018 21.6  15.0 - 45.0 % Final   • Monocyte % 05/09/2018 6.3  4.0 - 12.0 % Final   • Eosinophil % 05/09/2018 3.5  0.0 - 4.0 % Final   • Basophil % 05/09/2018 0.7  0.0 - 2.0 % Final   • Immature Grans % 05/09/2018 0.2  0.0 - 5.0 % Final   • Neutrophils, Absolute 05/09/2018 5.68  1.87 - 8.40 10*3/mm3 Final   • Lymphocytes, Absolute 05/09/2018 1.81  0.72 - 4.86 10*3/mm3 Final   • Monocytes, Absolute 05/09/2018 0.53  0.19 - 1.30 10*3/mm3 Final   • Eosinophils, Absolute 05/09/2018 0.29  0.00 - 0.70 10*3/mm3 Final   • Basophils, Absolute 05/09/2018 0.06  0.00 - 0.20 10*3/mm3 Final   • Immature Grans, Absolute 05/09/2018 0.02  0.00 - 0.03 10*3/mm3 Final   • nRBC 05/09/2018 0.0  0.0 - 0.0 /100 WBC Final       RADIOLOGY:  No results found.         Assessment/Plan  Luna Cruz is a 52 y.o. year old female     Patient Active Problem List   Diagnosis   • Essential hypertension   • Gastroesophageal reflux disease   • Heartburn   • Coughing   • Family history of polyps in the colon   • Family history of malignant neoplasm of breast   • History of malignant neoplasm of breast   • Colon cancer screening   • Overweight (BMI 25.0-29.9)          1. Stage II breast cancer 2008, status post lumpectomy ×4 TC, followed by radiation and currently started on tamoxifen to complete a total of 10 year Nov 2018.  As far as her breast cancer is concerned there is no evidence of recurrent disease     --recent mammo 5/7/18 No mammographic evidence of malignancy. Recommendation is for the patient to return for routine  mammography in one year or sooner, if clinically indicated.     2. She also had a history of hyperparathyroidism status post resection of the adenoma in 2011.    3. Hyperlipidemia: On lipitor    4. Hypertension: losartan  5. Hypothyroidism: on synthroid.  6. Gerd: on pantoprazole.  7. Osteopenia: on fosamax. Dillon+vit d  8. Emphysemia: on advir, theophyline, zafirkulast      Obiageli Aly Lopes MD    5/9/2018    1:18 PM

## 2018-05-10 ENCOUNTER — TELEPHONE (OUTPATIENT)
Dept: ONCOLOGY | Facility: CLINIC | Age: 53
End: 2018-05-10

## 2018-05-10 DIAGNOSIS — D50.9 IRON DEFICIENCY ANEMIA, UNSPECIFIED IRON DEFICIENCY ANEMIA TYPE: ICD-10-CM

## 2018-05-10 RX ORDER — DIPHENHYDRAMINE HYDROCHLORIDE 50 MG/ML
50 INJECTION INTRAMUSCULAR; INTRAVENOUS AS NEEDED
Status: CANCELLED | OUTPATIENT
Start: 2018-06-08

## 2018-05-10 RX ORDER — SODIUM CHLORIDE 9 MG/ML
250 INJECTION, SOLUTION INTRAVENOUS ONCE
Status: CANCELLED | OUTPATIENT
Start: 2018-05-29

## 2018-05-10 RX ORDER — DIPHENHYDRAMINE HYDROCHLORIDE 50 MG/ML
50 INJECTION INTRAMUSCULAR; INTRAVENOUS AS NEEDED
Status: CANCELLED | OUTPATIENT
Start: 2018-05-25

## 2018-05-10 RX ORDER — FAMOTIDINE 10 MG/ML
20 INJECTION, SOLUTION INTRAVENOUS AS NEEDED
Status: CANCELLED | OUTPATIENT
Start: 2018-05-14

## 2018-05-10 RX ORDER — SODIUM CHLORIDE 9 MG/ML
250 INJECTION, SOLUTION INTRAVENOUS ONCE
Status: CANCELLED | OUTPATIENT
Start: 2018-05-25

## 2018-05-10 RX ORDER — SODIUM CHLORIDE 9 MG/ML
250 INJECTION, SOLUTION INTRAVENOUS ONCE
Status: CANCELLED | OUTPATIENT
Start: 2018-06-04

## 2018-05-10 RX ORDER — SODIUM CHLORIDE 9 MG/ML
250 INJECTION, SOLUTION INTRAVENOUS ONCE
Status: CANCELLED | OUTPATIENT
Start: 2018-05-14

## 2018-05-10 RX ORDER — SODIUM CHLORIDE 9 MG/ML
250 INJECTION, SOLUTION INTRAVENOUS ONCE
Status: CANCELLED | OUTPATIENT
Start: 2018-06-08

## 2018-05-10 RX ORDER — DIPHENHYDRAMINE HYDROCHLORIDE 50 MG/ML
50 INJECTION INTRAMUSCULAR; INTRAVENOUS AS NEEDED
Status: CANCELLED | OUTPATIENT
Start: 2018-05-18

## 2018-05-10 RX ORDER — SODIUM CHLORIDE 9 MG/ML
250 INJECTION, SOLUTION INTRAVENOUS ONCE
Status: CANCELLED | OUTPATIENT
Start: 2018-05-21

## 2018-05-10 RX ORDER — FAMOTIDINE 10 MG/ML
20 INJECTION, SOLUTION INTRAVENOUS AS NEEDED
Status: CANCELLED | OUTPATIENT
Start: 2018-06-01

## 2018-05-10 RX ORDER — MEPERIDINE HYDROCHLORIDE 50 MG/ML
25 INJECTION INTRAMUSCULAR; INTRAVENOUS; SUBCUTANEOUS
Status: CANCELLED | OUTPATIENT
Start: 2018-06-04

## 2018-05-10 RX ORDER — FAMOTIDINE 10 MG/ML
20 INJECTION, SOLUTION INTRAVENOUS AS NEEDED
Status: CANCELLED | OUTPATIENT
Start: 2018-05-25

## 2018-05-10 RX ORDER — DIPHENHYDRAMINE HYDROCHLORIDE 50 MG/ML
50 INJECTION INTRAMUSCULAR; INTRAVENOUS AS NEEDED
Status: CANCELLED | OUTPATIENT
Start: 2018-05-14

## 2018-05-10 RX ORDER — MEPERIDINE HYDROCHLORIDE 50 MG/ML
25 INJECTION INTRAMUSCULAR; INTRAVENOUS; SUBCUTANEOUS
Status: CANCELLED | OUTPATIENT
Start: 2018-05-14

## 2018-05-10 RX ORDER — DIPHENHYDRAMINE HYDROCHLORIDE 50 MG/ML
50 INJECTION INTRAMUSCULAR; INTRAVENOUS AS NEEDED
Status: CANCELLED | OUTPATIENT
Start: 2018-05-21

## 2018-05-10 RX ORDER — DIPHENHYDRAMINE HYDROCHLORIDE 50 MG/ML
50 INJECTION INTRAMUSCULAR; INTRAVENOUS AS NEEDED
Status: CANCELLED | OUTPATIENT
Start: 2018-06-01

## 2018-05-10 RX ORDER — SODIUM CHLORIDE 9 MG/ML
250 INJECTION, SOLUTION INTRAVENOUS ONCE
Status: CANCELLED | OUTPATIENT
Start: 2018-06-01

## 2018-05-10 RX ORDER — FAMOTIDINE 10 MG/ML
20 INJECTION, SOLUTION INTRAVENOUS AS NEEDED
Status: CANCELLED | OUTPATIENT
Start: 2018-05-29

## 2018-05-10 RX ORDER — FAMOTIDINE 10 MG/ML
20 INJECTION, SOLUTION INTRAVENOUS AS NEEDED
Status: CANCELLED | OUTPATIENT
Start: 2018-05-21

## 2018-05-10 RX ORDER — SODIUM CHLORIDE 9 MG/ML
250 INJECTION, SOLUTION INTRAVENOUS ONCE
Status: CANCELLED | OUTPATIENT
Start: 2018-05-18

## 2018-05-10 RX ORDER — MEPERIDINE HYDROCHLORIDE 50 MG/ML
25 INJECTION INTRAMUSCULAR; INTRAVENOUS; SUBCUTANEOUS
Status: CANCELLED | OUTPATIENT
Start: 2018-05-21

## 2018-05-10 RX ORDER — DIPHENHYDRAMINE HYDROCHLORIDE 50 MG/ML
50 INJECTION INTRAMUSCULAR; INTRAVENOUS AS NEEDED
Status: CANCELLED | OUTPATIENT
Start: 2018-05-29

## 2018-05-10 RX ORDER — MEPERIDINE HYDROCHLORIDE 50 MG/ML
25 INJECTION INTRAMUSCULAR; INTRAVENOUS; SUBCUTANEOUS
Status: CANCELLED | OUTPATIENT
Start: 2018-05-18

## 2018-05-10 RX ORDER — MEPERIDINE HYDROCHLORIDE 50 MG/ML
25 INJECTION INTRAMUSCULAR; INTRAVENOUS; SUBCUTANEOUS
Status: CANCELLED | OUTPATIENT
Start: 2018-05-25

## 2018-05-10 RX ORDER — DIPHENHYDRAMINE HYDROCHLORIDE 50 MG/ML
50 INJECTION INTRAMUSCULAR; INTRAVENOUS AS NEEDED
Status: CANCELLED | OUTPATIENT
Start: 2018-06-04

## 2018-05-10 RX ORDER — MEPERIDINE HYDROCHLORIDE 50 MG/ML
25 INJECTION INTRAMUSCULAR; INTRAVENOUS; SUBCUTANEOUS
Status: CANCELLED | OUTPATIENT
Start: 2018-06-08

## 2018-05-10 RX ORDER — MEPERIDINE HYDROCHLORIDE 50 MG/ML
25 INJECTION INTRAMUSCULAR; INTRAVENOUS; SUBCUTANEOUS
Status: CANCELLED | OUTPATIENT
Start: 2018-05-29

## 2018-05-10 RX ORDER — FAMOTIDINE 10 MG/ML
20 INJECTION, SOLUTION INTRAVENOUS AS NEEDED
Status: CANCELLED | OUTPATIENT
Start: 2018-05-18

## 2018-05-10 RX ORDER — FAMOTIDINE 10 MG/ML
20 INJECTION, SOLUTION INTRAVENOUS AS NEEDED
Status: CANCELLED | OUTPATIENT
Start: 2018-06-04

## 2018-05-10 RX ORDER — FAMOTIDINE 10 MG/ML
20 INJECTION, SOLUTION INTRAVENOUS AS NEEDED
Status: CANCELLED | OUTPATIENT
Start: 2018-06-08

## 2018-05-10 RX ORDER — MEPERIDINE HYDROCHLORIDE 50 MG/ML
25 INJECTION INTRAMUSCULAR; INTRAVENOUS; SUBCUTANEOUS
Status: CANCELLED | OUTPATIENT
Start: 2018-06-01

## 2018-05-10 NOTE — TELEPHONE ENCOUNTER
----- Message from Deann Lopes MD sent at 5/9/2018  3:21 PM CDT -----  Please call the patient regarding her abnormal result. Low %sat. Need venofer x8

## 2018-05-10 NOTE — TELEPHONE ENCOUNTER
Called Luna concerning her iron saturation being low and that Dr. Lopes wants her to get iron infusions x8.  Notified Luna that iron infusions are only done on Mondays and Fridays in the infusion center.  Verbalized understanding.

## 2018-05-14 ENCOUNTER — INFUSION (OUTPATIENT)
Dept: ONCOLOGY | Facility: HOSPITAL | Age: 53
End: 2018-05-14

## 2018-05-14 VITALS
HEART RATE: 88 BPM | RESPIRATION RATE: 16 BRPM | SYSTOLIC BLOOD PRESSURE: 141 MMHG | OXYGEN SATURATION: 100 % | DIASTOLIC BLOOD PRESSURE: 68 MMHG | TEMPERATURE: 98.6 F

## 2018-05-14 DIAGNOSIS — D50.9 IRON DEFICIENCY ANEMIA, UNSPECIFIED IRON DEFICIENCY ANEMIA TYPE: Primary | ICD-10-CM

## 2018-05-14 PROCEDURE — 25010000002 IRON SUCROSE PER 1 MG: Performed by: INTERNAL MEDICINE

## 2018-05-14 PROCEDURE — 96365 THER/PROPH/DIAG IV INF INIT: CPT

## 2018-05-14 RX ORDER — SODIUM CHLORIDE 9 MG/ML
250 INJECTION, SOLUTION INTRAVENOUS ONCE
Status: COMPLETED | OUTPATIENT
Start: 2018-05-14 | End: 2018-05-14

## 2018-05-14 RX ORDER — FAMOTIDINE 10 MG/ML
20 INJECTION, SOLUTION INTRAVENOUS AS NEEDED
Status: DISCONTINUED | OUTPATIENT
Start: 2018-05-14 | End: 2018-05-14 | Stop reason: HOSPADM

## 2018-05-14 RX ORDER — DIPHENHYDRAMINE HYDROCHLORIDE 50 MG/ML
50 INJECTION INTRAMUSCULAR; INTRAVENOUS AS NEEDED
Status: DISCONTINUED | OUTPATIENT
Start: 2018-05-14 | End: 2018-05-14 | Stop reason: HOSPADM

## 2018-05-14 RX ORDER — MEPERIDINE HYDROCHLORIDE 50 MG/ML
25 INJECTION INTRAMUSCULAR; INTRAVENOUS; SUBCUTANEOUS
Status: DISCONTINUED | OUTPATIENT
Start: 2018-05-14 | End: 2018-05-14 | Stop reason: HOSPADM

## 2018-05-14 RX ADMIN — IRON SUCROSE 200 MG: 20 INJECTION, SOLUTION INTRAVENOUS at 14:18

## 2018-05-14 RX ADMIN — SODIUM CHLORIDE 250 ML: 9 INJECTION, SOLUTION INTRAVENOUS at 14:18

## 2018-05-18 ENCOUNTER — INFUSION (OUTPATIENT)
Dept: ONCOLOGY | Facility: HOSPITAL | Age: 53
End: 2018-05-18

## 2018-05-18 VITALS
SYSTOLIC BLOOD PRESSURE: 152 MMHG | OXYGEN SATURATION: 96 % | BODY MASS INDEX: 31.1 KG/M2 | RESPIRATION RATE: 18 BRPM | DIASTOLIC BLOOD PRESSURE: 84 MMHG | WEIGHT: 169 LBS | TEMPERATURE: 98.1 F | HEART RATE: 84 BPM | HEIGHT: 62 IN

## 2018-05-18 DIAGNOSIS — D50.9 IRON DEFICIENCY ANEMIA, UNSPECIFIED IRON DEFICIENCY ANEMIA TYPE: Primary | ICD-10-CM

## 2018-05-18 PROCEDURE — 96365 THER/PROPH/DIAG IV INF INIT: CPT

## 2018-05-18 PROCEDURE — 25010000002 IRON SUCROSE PER 1 MG: Performed by: INTERNAL MEDICINE

## 2018-05-18 RX ORDER — DIPHENHYDRAMINE HYDROCHLORIDE 50 MG/ML
50 INJECTION INTRAMUSCULAR; INTRAVENOUS AS NEEDED
Status: DISCONTINUED | OUTPATIENT
Start: 2018-05-18 | End: 2018-05-18 | Stop reason: HOSPADM

## 2018-05-18 RX ORDER — MEPERIDINE HYDROCHLORIDE 50 MG/ML
25 INJECTION INTRAMUSCULAR; INTRAVENOUS; SUBCUTANEOUS
Status: DISCONTINUED | OUTPATIENT
Start: 2018-05-18 | End: 2018-05-18 | Stop reason: HOSPADM

## 2018-05-18 RX ORDER — SODIUM CHLORIDE 9 MG/ML
250 INJECTION, SOLUTION INTRAVENOUS ONCE
Status: COMPLETED | OUTPATIENT
Start: 2018-05-18 | End: 2018-05-18

## 2018-05-18 RX ORDER — FAMOTIDINE 10 MG/ML
20 INJECTION, SOLUTION INTRAVENOUS AS NEEDED
Status: DISCONTINUED | OUTPATIENT
Start: 2018-05-18 | End: 2018-05-18 | Stop reason: HOSPADM

## 2018-05-18 RX ADMIN — SODIUM CHLORIDE 250 ML: 9 INJECTION, SOLUTION INTRAVENOUS at 14:04

## 2018-05-18 RX ADMIN — IRON SUCROSE 200 MG: 20 INJECTION, SOLUTION INTRAVENOUS at 14:04

## 2018-05-21 ENCOUNTER — INFUSION (OUTPATIENT)
Dept: ONCOLOGY | Facility: HOSPITAL | Age: 53
End: 2018-05-21

## 2018-05-21 VITALS
RESPIRATION RATE: 18 BRPM | TEMPERATURE: 97.4 F | HEART RATE: 80 BPM | OXYGEN SATURATION: 100 % | DIASTOLIC BLOOD PRESSURE: 61 MMHG | SYSTOLIC BLOOD PRESSURE: 147 MMHG

## 2018-05-21 DIAGNOSIS — D50.9 IRON DEFICIENCY ANEMIA, UNSPECIFIED IRON DEFICIENCY ANEMIA TYPE: Primary | ICD-10-CM

## 2018-05-21 PROCEDURE — 96365 THER/PROPH/DIAG IV INF INIT: CPT

## 2018-05-21 PROCEDURE — 25010000002 IRON SUCROSE PER 1 MG: Performed by: INTERNAL MEDICINE

## 2018-05-21 RX ORDER — DIPHENHYDRAMINE HYDROCHLORIDE 50 MG/ML
50 INJECTION INTRAMUSCULAR; INTRAVENOUS AS NEEDED
Status: DISCONTINUED | OUTPATIENT
Start: 2018-05-21 | End: 2018-05-21 | Stop reason: HOSPADM

## 2018-05-21 RX ORDER — FAMOTIDINE 10 MG/ML
20 INJECTION, SOLUTION INTRAVENOUS AS NEEDED
Status: DISCONTINUED | OUTPATIENT
Start: 2018-05-21 | End: 2018-05-21 | Stop reason: HOSPADM

## 2018-05-21 RX ORDER — MEPERIDINE HYDROCHLORIDE 50 MG/ML
25 INJECTION INTRAMUSCULAR; INTRAVENOUS; SUBCUTANEOUS
Status: DISCONTINUED | OUTPATIENT
Start: 2018-05-21 | End: 2018-05-21 | Stop reason: HOSPADM

## 2018-05-21 RX ORDER — SODIUM CHLORIDE 9 MG/ML
250 INJECTION, SOLUTION INTRAVENOUS ONCE
Status: COMPLETED | OUTPATIENT
Start: 2018-05-21 | End: 2018-05-21

## 2018-05-21 RX ADMIN — SODIUM CHLORIDE 250 ML: 9 INJECTION, SOLUTION INTRAVENOUS at 13:45

## 2018-05-21 RX ADMIN — IRON SUCROSE 200 MG: 20 INJECTION, SOLUTION INTRAVENOUS at 13:45

## 2018-05-25 ENCOUNTER — INFUSION (OUTPATIENT)
Dept: ONCOLOGY | Facility: HOSPITAL | Age: 53
End: 2018-05-25

## 2018-05-25 VITALS
RESPIRATION RATE: 18 BRPM | SYSTOLIC BLOOD PRESSURE: 131 MMHG | HEART RATE: 87 BPM | BODY MASS INDEX: 30.73 KG/M2 | OXYGEN SATURATION: 98 % | WEIGHT: 167 LBS | HEIGHT: 62 IN | TEMPERATURE: 98.1 F | DIASTOLIC BLOOD PRESSURE: 62 MMHG

## 2018-05-25 DIAGNOSIS — D50.9 IRON DEFICIENCY ANEMIA, UNSPECIFIED IRON DEFICIENCY ANEMIA TYPE: Primary | ICD-10-CM

## 2018-05-25 PROCEDURE — 25010000002 IRON SUCROSE PER 1 MG: Performed by: INTERNAL MEDICINE

## 2018-05-25 PROCEDURE — 96365 THER/PROPH/DIAG IV INF INIT: CPT

## 2018-05-25 RX ORDER — DIPHENHYDRAMINE HYDROCHLORIDE 50 MG/ML
50 INJECTION INTRAMUSCULAR; INTRAVENOUS AS NEEDED
Status: DISCONTINUED | OUTPATIENT
Start: 2018-05-25 | End: 2018-05-25 | Stop reason: HOSPADM

## 2018-05-25 RX ORDER — FAMOTIDINE 10 MG/ML
20 INJECTION, SOLUTION INTRAVENOUS AS NEEDED
Status: DISCONTINUED | OUTPATIENT
Start: 2018-05-25 | End: 2018-05-25 | Stop reason: HOSPADM

## 2018-05-25 RX ORDER — MEPERIDINE HYDROCHLORIDE 50 MG/ML
25 INJECTION INTRAMUSCULAR; INTRAVENOUS; SUBCUTANEOUS
Status: DISCONTINUED | OUTPATIENT
Start: 2018-05-25 | End: 2018-05-25 | Stop reason: HOSPADM

## 2018-05-25 RX ORDER — SODIUM CHLORIDE 9 MG/ML
250 INJECTION, SOLUTION INTRAVENOUS ONCE
Status: COMPLETED | OUTPATIENT
Start: 2018-05-25 | End: 2018-05-25

## 2018-05-25 RX ADMIN — IRON SUCROSE 200 MG: 20 INJECTION, SOLUTION INTRAVENOUS at 13:42

## 2018-05-25 RX ADMIN — SODIUM CHLORIDE 250 ML: 9 INJECTION, SOLUTION INTRAVENOUS at 13:40

## 2018-05-29 ENCOUNTER — INFUSION (OUTPATIENT)
Dept: ONCOLOGY | Facility: HOSPITAL | Age: 53
End: 2018-05-29

## 2018-05-29 VITALS
TEMPERATURE: 98.4 F | DIASTOLIC BLOOD PRESSURE: 65 MMHG | HEIGHT: 62 IN | WEIGHT: 168 LBS | HEART RATE: 89 BPM | RESPIRATION RATE: 20 BRPM | SYSTOLIC BLOOD PRESSURE: 153 MMHG | OXYGEN SATURATION: 100 % | BODY MASS INDEX: 30.91 KG/M2

## 2018-05-29 DIAGNOSIS — D50.9 IRON DEFICIENCY ANEMIA, UNSPECIFIED IRON DEFICIENCY ANEMIA TYPE: Primary | ICD-10-CM

## 2018-05-29 PROCEDURE — 25010000002 IRON SUCROSE PER 1 MG: Performed by: INTERNAL MEDICINE

## 2018-05-29 PROCEDURE — 96365 THER/PROPH/DIAG IV INF INIT: CPT

## 2018-05-29 RX ORDER — TAMOXIFEN CITRATE 10 MG/1
10 TABLET ORAL 2 TIMES DAILY
Qty: 120 TABLET | Refills: 1 | Status: SHIPPED | OUTPATIENT
Start: 2018-05-29 | End: 2018-09-19 | Stop reason: SDUPTHER

## 2018-05-29 RX ORDER — FAMOTIDINE 10 MG/ML
20 INJECTION, SOLUTION INTRAVENOUS AS NEEDED
Status: DISCONTINUED | OUTPATIENT
Start: 2018-05-29 | End: 2018-05-29 | Stop reason: HOSPADM

## 2018-05-29 RX ORDER — SODIUM CHLORIDE 9 MG/ML
250 INJECTION, SOLUTION INTRAVENOUS ONCE
Status: COMPLETED | OUTPATIENT
Start: 2018-05-29 | End: 2018-05-29

## 2018-05-29 RX ORDER — MEPERIDINE HYDROCHLORIDE 50 MG/ML
25 INJECTION INTRAMUSCULAR; INTRAVENOUS; SUBCUTANEOUS
Status: DISCONTINUED | OUTPATIENT
Start: 2018-05-29 | End: 2018-05-29 | Stop reason: HOSPADM

## 2018-05-29 RX ORDER — DIPHENHYDRAMINE HYDROCHLORIDE 50 MG/ML
50 INJECTION INTRAMUSCULAR; INTRAVENOUS AS NEEDED
Status: DISCONTINUED | OUTPATIENT
Start: 2018-05-29 | End: 2018-05-29 | Stop reason: HOSPADM

## 2018-05-29 RX ADMIN — SODIUM CHLORIDE 250 ML: 9 INJECTION, SOLUTION INTRAVENOUS at 14:00

## 2018-05-29 RX ADMIN — IRON SUCROSE 200 MG: 20 INJECTION, SOLUTION INTRAVENOUS at 14:01

## 2018-05-29 NOTE — TELEPHONE ENCOUNTER
Received call from patient requesting her TMX be called to [facility] due to cost instead of [facility] here in [place]

## 2018-06-01 ENCOUNTER — INFUSION (OUTPATIENT)
Dept: ONCOLOGY | Facility: HOSPITAL | Age: 53
End: 2018-06-01

## 2018-06-01 VITALS
RESPIRATION RATE: 18 BRPM | SYSTOLIC BLOOD PRESSURE: 124 MMHG | WEIGHT: 167.8 LBS | OXYGEN SATURATION: 100 % | TEMPERATURE: 97.4 F | HEART RATE: 87 BPM | BODY MASS INDEX: 30.88 KG/M2 | HEIGHT: 62 IN | DIASTOLIC BLOOD PRESSURE: 66 MMHG

## 2018-06-01 DIAGNOSIS — D50.9 IRON DEFICIENCY ANEMIA, UNSPECIFIED IRON DEFICIENCY ANEMIA TYPE: Primary | ICD-10-CM

## 2018-06-01 PROCEDURE — 96365 THER/PROPH/DIAG IV INF INIT: CPT

## 2018-06-01 PROCEDURE — 25010000002 IRON SUCROSE PER 1 MG: Performed by: INTERNAL MEDICINE

## 2018-06-01 RX ORDER — DIPHENHYDRAMINE HYDROCHLORIDE 50 MG/ML
50 INJECTION INTRAMUSCULAR; INTRAVENOUS AS NEEDED
Status: DISCONTINUED | OUTPATIENT
Start: 2018-06-01 | End: 2018-06-01 | Stop reason: HOSPADM

## 2018-06-01 RX ORDER — FAMOTIDINE 10 MG/ML
20 INJECTION, SOLUTION INTRAVENOUS AS NEEDED
Status: DISCONTINUED | OUTPATIENT
Start: 2018-06-01 | End: 2018-06-01 | Stop reason: HOSPADM

## 2018-06-01 RX ORDER — SODIUM CHLORIDE 9 MG/ML
250 INJECTION, SOLUTION INTRAVENOUS ONCE
Status: COMPLETED | OUTPATIENT
Start: 2018-06-01 | End: 2018-06-01

## 2018-06-01 RX ORDER — MEPERIDINE HYDROCHLORIDE 50 MG/ML
25 INJECTION INTRAMUSCULAR; INTRAVENOUS; SUBCUTANEOUS
Status: DISCONTINUED | OUTPATIENT
Start: 2018-06-01 | End: 2018-06-01 | Stop reason: HOSPADM

## 2018-06-01 RX ADMIN — IRON SUCROSE 200 MG: 20 INJECTION, SOLUTION INTRAVENOUS at 13:59

## 2018-06-01 RX ADMIN — SODIUM CHLORIDE 250 ML: 9 INJECTION, SOLUTION INTRAVENOUS at 13:59

## 2018-06-04 ENCOUNTER — INFUSION (OUTPATIENT)
Dept: ONCOLOGY | Facility: HOSPITAL | Age: 53
End: 2018-06-04

## 2018-06-04 VITALS
BODY MASS INDEX: 31.14 KG/M2 | RESPIRATION RATE: 17 BRPM | DIASTOLIC BLOOD PRESSURE: 63 MMHG | TEMPERATURE: 97.4 F | WEIGHT: 169.2 LBS | SYSTOLIC BLOOD PRESSURE: 150 MMHG | HEART RATE: 86 BPM | HEIGHT: 62 IN | OXYGEN SATURATION: 100 %

## 2018-06-04 DIAGNOSIS — D50.9 IRON DEFICIENCY ANEMIA, UNSPECIFIED IRON DEFICIENCY ANEMIA TYPE: Primary | ICD-10-CM

## 2018-06-04 PROCEDURE — 25010000002 IRON SUCROSE PER 1 MG: Performed by: INTERNAL MEDICINE

## 2018-06-04 PROCEDURE — 96365 THER/PROPH/DIAG IV INF INIT: CPT

## 2018-06-04 RX ORDER — DIPHENHYDRAMINE HYDROCHLORIDE 50 MG/ML
50 INJECTION INTRAMUSCULAR; INTRAVENOUS AS NEEDED
Status: DISCONTINUED | OUTPATIENT
Start: 2018-06-04 | End: 2018-06-04 | Stop reason: HOSPADM

## 2018-06-04 RX ORDER — MEPERIDINE HYDROCHLORIDE 50 MG/ML
25 INJECTION INTRAMUSCULAR; INTRAVENOUS; SUBCUTANEOUS
Status: DISCONTINUED | OUTPATIENT
Start: 2018-06-04 | End: 2018-06-04 | Stop reason: HOSPADM

## 2018-06-04 RX ORDER — FAMOTIDINE 10 MG/ML
20 INJECTION, SOLUTION INTRAVENOUS AS NEEDED
Status: DISCONTINUED | OUTPATIENT
Start: 2018-06-04 | End: 2018-06-04 | Stop reason: HOSPADM

## 2018-06-04 RX ORDER — SODIUM CHLORIDE 9 MG/ML
250 INJECTION, SOLUTION INTRAVENOUS ONCE
Status: COMPLETED | OUTPATIENT
Start: 2018-06-04 | End: 2018-06-04

## 2018-06-04 RX ADMIN — SODIUM CHLORIDE 250 ML: 9 INJECTION, SOLUTION INTRAVENOUS at 14:12

## 2018-06-04 RX ADMIN — IRON SUCROSE 200 MG: 20 INJECTION, SOLUTION INTRAVENOUS at 14:12

## 2018-06-08 ENCOUNTER — INFUSION (OUTPATIENT)
Dept: ONCOLOGY | Facility: HOSPITAL | Age: 53
End: 2018-06-08

## 2018-06-08 VITALS
DIASTOLIC BLOOD PRESSURE: 70 MMHG | RESPIRATION RATE: 20 BRPM | SYSTOLIC BLOOD PRESSURE: 168 MMHG | HEART RATE: 86 BPM | TEMPERATURE: 97.4 F | OXYGEN SATURATION: 100 % | WEIGHT: 169 LBS | HEIGHT: 62 IN | BODY MASS INDEX: 31.1 KG/M2

## 2018-06-08 DIAGNOSIS — D50.9 IRON DEFICIENCY ANEMIA, UNSPECIFIED IRON DEFICIENCY ANEMIA TYPE: Primary | ICD-10-CM

## 2018-06-08 PROCEDURE — 96374 THER/PROPH/DIAG INJ IV PUSH: CPT

## 2018-06-08 PROCEDURE — 96365 THER/PROPH/DIAG IV INF INIT: CPT

## 2018-06-08 PROCEDURE — 25010000002 IRON SUCROSE PER 1 MG: Performed by: INTERNAL MEDICINE

## 2018-06-08 RX ORDER — SODIUM CHLORIDE 9 MG/ML
250 INJECTION, SOLUTION INTRAVENOUS ONCE
Status: COMPLETED | OUTPATIENT
Start: 2018-06-08 | End: 2018-06-08

## 2018-06-08 RX ORDER — MEPERIDINE HYDROCHLORIDE 50 MG/ML
25 INJECTION INTRAMUSCULAR; INTRAVENOUS; SUBCUTANEOUS
Status: DISCONTINUED | OUTPATIENT
Start: 2018-06-08 | End: 2018-06-08 | Stop reason: HOSPADM

## 2018-06-08 RX ORDER — FAMOTIDINE 10 MG/ML
20 INJECTION, SOLUTION INTRAVENOUS AS NEEDED
Status: DISCONTINUED | OUTPATIENT
Start: 2018-06-08 | End: 2018-06-08 | Stop reason: HOSPADM

## 2018-06-08 RX ORDER — DIPHENHYDRAMINE HYDROCHLORIDE 50 MG/ML
50 INJECTION INTRAMUSCULAR; INTRAVENOUS AS NEEDED
Status: DISCONTINUED | OUTPATIENT
Start: 2018-06-08 | End: 2018-06-08 | Stop reason: HOSPADM

## 2018-06-08 RX ADMIN — IRON SUCROSE 200 MG: 20 INJECTION, SOLUTION INTRAVENOUS at 13:40

## 2018-06-08 RX ADMIN — SODIUM CHLORIDE 250 ML: 9 INJECTION, SOLUTION INTRAVENOUS at 13:40

## 2018-06-11 ENCOUNTER — OFFICE VISIT (OUTPATIENT)
Dept: OBSTETRICS AND GYNECOLOGY | Facility: CLINIC | Age: 53
End: 2018-06-11

## 2018-06-11 VITALS
SYSTOLIC BLOOD PRESSURE: 142 MMHG | WEIGHT: 166 LBS | HEIGHT: 62 IN | DIASTOLIC BLOOD PRESSURE: 80 MMHG | BODY MASS INDEX: 30.55 KG/M2

## 2018-06-11 DIAGNOSIS — Z85.3 HISTORY OF BREAST CANCER: ICD-10-CM

## 2018-06-11 DIAGNOSIS — Z78.9 NON-SMOKER: ICD-10-CM

## 2018-06-11 DIAGNOSIS — Z01.419 ENCOUNTER FOR GYNECOLOGICAL EXAMINATION WITHOUT ABNORMAL FINDING: Primary | ICD-10-CM

## 2018-06-11 PROCEDURE — G0101 CA SCREEN;PELVIC/BREAST EXAM: HCPCS | Performed by: NURSE PRACTITIONER

## 2018-06-11 PROCEDURE — G0123 SCREEN CERV/VAG THIN LAYER: HCPCS | Performed by: NURSE PRACTITIONER

## 2018-06-11 PROCEDURE — 87624 HPV HI-RISK TYP POOLED RSLT: CPT | Performed by: NURSE PRACTITIONER

## 2018-06-11 NOTE — PROGRESS NOTES
Attempted to obtain health maintenance information, patient unable to provide answers for the following items Tdap and Hepatitis screening.

## 2018-06-11 NOTE — PROGRESS NOTES
"Ellis Cruz is a 52 y.o. female.     Annual exam.         The following portions of the patient's history were reviewed and updated as appropriate: allergies, current medications, past family history, past medical history, past social history, past surgical history and problem list.    /80 (BP Location: Left arm, Patient Position: Sitting, Cuff Size: Adult)   Ht 157.5 cm (62\")   Wt 75.3 kg (166 lb)   LMP 06/14/2008   BMI 30.36 kg/m²     Review of Systems   Constitutional: Negative for activity change, appetite change, fatigue and fever.   HENT: Negative for congestion, sore throat and trouble swallowing.    Eyes: Negative for pain, discharge and visual disturbance.   Respiratory: Negative for apnea, shortness of breath and wheezing.    Cardiovascular: Negative for chest pain, palpitations and leg swelling.   Gastrointestinal: Negative for abdominal pain, constipation and diarrhea.   Genitourinary: Negative for frequency, pelvic pain, urgency and vaginal discharge.   Musculoskeletal: Negative for back pain and gait problem.   Skin: Negative for color change and rash.   Neurological: Negative for dizziness, weakness and numbness.   Psychiatric/Behavioral: Negative for confusion and sleep disturbance.       Objective   Physical Exam   Constitutional: She is oriented to person, place, and time. She appears well-developed and well-nourished. No distress.   HENT:   Head: Normocephalic.   Right Ear: External ear normal.   Left Ear: External ear normal.   Nose: Nose normal.   Mouth/Throat: Oropharynx is clear and moist.   Eyes: Conjunctivae are normal. Right eye exhibits no discharge. Left eye exhibits no discharge. No scleral icterus.   Neck: Normal range of motion. Neck supple. Carotid bruit is not present. No tracheal deviation present. No thyromegaly present.   Cardiovascular: Normal rate, regular rhythm, normal heart sounds and intact distal pulses.    No murmur heard.  Pulmonary/Chest: " Effort normal and breath sounds normal. No respiratory distress. She has no wheezes. Right breast exhibits no inverted nipple, no mass, no nipple discharge, no skin change and no tenderness. Left breast exhibits no inverted nipple, no mass, no nipple discharge, no skin change and no tenderness. Breasts are symmetrical. There is no breast swelling.   Abdominal: Soft. She exhibits no distension and no mass. There is no tenderness. There is no guarding. No hernia. Hernia confirmed negative in the right inguinal area and confirmed negative in the left inguinal area.   Genitourinary: Rectum normal, vagina normal and uterus normal. Rectal exam shows no mass. No breast tenderness, discharge or bleeding. Pelvic exam was performed with patient supine. There is no rash, tenderness, lesion or injury on the right labia. There is no rash, tenderness, lesion or injury on the left labia. Uterus is not enlarged, not fixed and not tender. Cervix exhibits no motion tenderness, no discharge and no friability. Right adnexum displays no mass, no tenderness and no fullness. Left adnexum displays no mass, no tenderness and no fullness. No erythema, tenderness or bleeding in the vagina. No foreign body in the vagina. No signs of injury around the vagina. No vaginal discharge found.   Genitourinary Comments:   BSU normal  Urethral meatus  Normal  Perineum  Normal   Musculoskeletal: Normal range of motion. She exhibits no edema or tenderness.   Lymphadenopathy:        Head (right side): No submental, no submandibular, no tonsillar, no preauricular, no posterior auricular and no occipital adenopathy present.        Head (left side): No submental, no submandibular, no tonsillar, no preauricular, no posterior auricular and no occipital adenopathy present.     She has no cervical adenopathy.        Right cervical: No superficial cervical, no deep cervical and no posterior cervical adenopathy present.       Left cervical: No superficial cervical,  no deep cervical and no posterior cervical adenopathy present.     She has no axillary adenopathy.        Right: No inguinal adenopathy present.        Left: No inguinal adenopathy present.   Neurological: She is alert and oriented to person, place, and time. Coordination normal.   Skin: Skin is warm and dry. No bruising and no rash noted. She is not diaphoretic. No erythema.   Psychiatric: She has a normal mood and affect. Her behavior is normal. Judgment and thought content normal.   Nursing note and vitals reviewed.      Assessment/Plan    Well woman exam. Pap collected.   Pt reports annual labs and bone denisty followed by PCP and mammogram followed by Dr. Houston.      Patient's Body mass index is 30.36 kg/m². BMI is above normal parameters. Recommendations include: educational material.    RV annual exam/prn.   Luna was seen today for gynecologic exam.    Diagnoses and all orders for this visit:    Encounter for gynecological examination without abnormal finding  -     Liquid-based Pap Smear, Screening    History of breast cancer    BMI 30.0-30.9,adult    Non-smoker

## 2018-06-15 NOTE — PATIENT INSTRUCTIONS

## 2018-06-18 LAB
GEN CATEG CVX/VAG CYTO-IMP: NORMAL
LAB AP CASE REPORT: NORMAL
LAB AP GYN ADDITIONAL INFORMATION: NORMAL
Lab: NORMAL
PATH INTERP SPEC-IMP: NORMAL
STAT OF ADQ CVX/VAG CYTO-IMP: NORMAL

## 2018-09-20 RX ORDER — TAMOXIFEN CITRATE 10 MG/1
TABLET ORAL
Qty: 120 TABLET | Refills: 1 | Status: ON HOLD | OUTPATIENT
Start: 2018-09-20 | End: 2018-09-27

## 2018-09-27 ENCOUNTER — APPOINTMENT (OUTPATIENT)
Dept: GENERAL RADIOLOGY | Facility: HOSPITAL | Age: 53
End: 2018-09-27

## 2018-09-27 ENCOUNTER — HOSPITAL ENCOUNTER (OUTPATIENT)
Facility: HOSPITAL | Age: 53
Setting detail: OBSERVATION
Discharge: HOME | End: 2018-09-29
Attending: EMERGENCY MEDICINE | Admitting: EMERGENCY MEDICINE

## 2018-09-27 DIAGNOSIS — Z78.9 IMPAIRED MOBILITY AND ADLS: ICD-10-CM

## 2018-09-27 DIAGNOSIS — Z74.09 IMPAIRED MOBILITY AND ADLS: ICD-10-CM

## 2018-09-27 DIAGNOSIS — J18.9 PNEUMONIA DUE TO INFECTIOUS ORGANISM, UNSPECIFIED LATERALITY, UNSPECIFIED PART OF LUNG: Primary | ICD-10-CM

## 2018-09-27 DIAGNOSIS — Z74.09 IMPAIRED MOBILITY AND ENDURANCE: ICD-10-CM

## 2018-09-27 DIAGNOSIS — A41.9 SEPSIS, DUE TO UNSPECIFIED ORGANISM: ICD-10-CM

## 2018-09-27 LAB
ALBUMIN SERPL-MCNC: 4.1 G/DL (ref 3.5–5)
ALBUMIN/GLOB SERPL: 1.4 G/DL (ref 1.1–2.5)
ALP SERPL-CCNC: 83 U/L (ref 24–120)
ALT SERPL W P-5'-P-CCNC: 37 U/L (ref 0–54)
ANION GAP SERPL CALCULATED.3IONS-SCNC: 14 MMOL/L (ref 4–13)
AST SERPL-CCNC: 34 U/L (ref 7–45)
BACTERIA UR QL AUTO: ABNORMAL /HPF
BASOPHILS # BLD AUTO: 0.05 10*3/MM3 (ref 0–0.2)
BASOPHILS NFR BLD AUTO: 0.3 % (ref 0–2)
BILIRUB SERPL-MCNC: 0.6 MG/DL (ref 0.1–1)
BILIRUB UR QL STRIP: NEGATIVE
BUN BLD-MCNC: 10 MG/DL (ref 5–21)
BUN/CREAT SERPL: 9.6 (ref 7–25)
CALCIUM SPEC-SCNC: 8.2 MG/DL (ref 8.4–10.4)
CHLORIDE SERPL-SCNC: 104 MMOL/L (ref 98–110)
CLARITY UR: CLEAR
CO2 SERPL-SCNC: 25 MMOL/L (ref 24–31)
COLOR UR: YELLOW
CREAT BLD-MCNC: 1.04 MG/DL (ref 0.5–1.4)
D-LACTATE SERPL-SCNC: 2.4 MMOL/L (ref 0.5–2)
D-LACTATE SERPL-SCNC: 2.5 MMOL/L (ref 0.5–2)
DEPRECATED RDW RBC AUTO: 42 FL (ref 40–54)
EOSINOPHIL # BLD AUTO: 0.13 10*3/MM3 (ref 0–0.7)
EOSINOPHIL NFR BLD AUTO: 0.9 % (ref 0–4)
ERYTHROCYTE [DISTWIDTH] IN BLOOD BY AUTOMATED COUNT: 13.2 % (ref 12–15)
GFR SERPL CREATININE-BSD FRML MDRD: 55 ML/MIN/1.73
GLOBULIN UR ELPH-MCNC: 3 GM/DL
GLUCOSE BLD-MCNC: 111 MG/DL (ref 70–100)
GLUCOSE UR STRIP-MCNC: NEGATIVE MG/DL
HCT VFR BLD AUTO: 46.8 % (ref 37–47)
HGB BLD-MCNC: 15.9 G/DL (ref 12–16)
HGB UR QL STRIP.AUTO: NEGATIVE
HOLD SPECIMEN: NORMAL
HYALINE CASTS UR QL AUTO: ABNORMAL /LPF
IMM GRANULOCYTES # BLD: 0.09 10*3/MM3 (ref 0–0.03)
IMM GRANULOCYTES NFR BLD: 0.6 % (ref 0–5)
KETONES UR QL STRIP: NEGATIVE
LEUKOCYTE ESTERASE UR QL STRIP.AUTO: ABNORMAL
LYMPHOCYTES # BLD AUTO: 1.08 10*3/MM3 (ref 0.72–4.86)
LYMPHOCYTES NFR BLD AUTO: 7.5 % (ref 15–45)
MCH RBC QN AUTO: 29.6 PG (ref 28–32)
MCHC RBC AUTO-ENTMCNC: 34 G/DL (ref 33–36)
MCV RBC AUTO: 87 FL (ref 82–98)
MONOCYTES # BLD AUTO: 0.47 10*3/MM3 (ref 0.19–1.3)
MONOCYTES NFR BLD AUTO: 3.3 % (ref 4–12)
NEUTROPHILS # BLD AUTO: 12.6 10*3/MM3 (ref 1.87–8.4)
NEUTROPHILS NFR BLD AUTO: 87.4 % (ref 39–78)
NITRITE UR QL STRIP: NEGATIVE
NRBC BLD MANUAL-RTO: 0 /100 WBC (ref 0–0)
PH UR STRIP.AUTO: <=5 [PH] (ref 5–8)
PLATELET # BLD AUTO: 337 10*3/MM3 (ref 130–400)
PMV BLD AUTO: 10 FL (ref 6–12)
POTASSIUM BLD-SCNC: 3.4 MMOL/L (ref 3.5–5.3)
PROT SERPL-MCNC: 7.1 G/DL (ref 6.3–8.7)
PROT UR QL STRIP: NEGATIVE
RBC # BLD AUTO: 5.38 10*6/MM3 (ref 4.2–5.4)
RBC # UR: ABNORMAL /HPF
REF LAB TEST METHOD: ABNORMAL
SODIUM BLD-SCNC: 143 MMOL/L (ref 135–145)
SP GR UR STRIP: 1.02 (ref 1–1.03)
SQUAMOUS #/AREA URNS HPF: ABNORMAL /HPF
TRANS CELLS #/AREA URNS HPF: ABNORMAL /HPF
UROBILINOGEN UR QL STRIP: ABNORMAL
WBC NRBC COR # BLD: 14.42 10*3/MM3 (ref 4.8–10.8)
WBC UR QL AUTO: ABNORMAL /HPF
WHOLE BLOOD HOLD SPECIMEN: NORMAL
WHOLE BLOOD HOLD SPECIMEN: NORMAL

## 2018-09-27 PROCEDURE — G0378 HOSPITAL OBSERVATION PER HR: HCPCS

## 2018-09-27 PROCEDURE — 87150 DNA/RNA AMPLIFIED PROBE: CPT | Performed by: EMERGENCY MEDICINE

## 2018-09-27 PROCEDURE — G8989 SELF CARE D/C STATUS: HCPCS

## 2018-09-27 PROCEDURE — 80053 COMPREHEN METABOLIC PANEL: CPT | Performed by: EMERGENCY MEDICINE

## 2018-09-27 PROCEDURE — 99284 EMERGENCY DEPT VISIT MOD MDM: CPT

## 2018-09-27 PROCEDURE — 71045 X-RAY EXAM CHEST 1 VIEW: CPT

## 2018-09-27 PROCEDURE — 94799 UNLISTED PULMONARY SVC/PX: CPT

## 2018-09-27 PROCEDURE — 81001 URINALYSIS AUTO W/SCOPE: CPT | Performed by: EMERGENCY MEDICINE

## 2018-09-27 PROCEDURE — 96376 TX/PRO/DX INJ SAME DRUG ADON: CPT

## 2018-09-27 PROCEDURE — 25010000002 ONDANSETRON PER 1 MG: Performed by: EMERGENCY MEDICINE

## 2018-09-27 PROCEDURE — 93005 ELECTROCARDIOGRAM TRACING: CPT | Performed by: EMERGENCY MEDICINE

## 2018-09-27 PROCEDURE — 93005 ELECTROCARDIOGRAM TRACING: CPT

## 2018-09-27 PROCEDURE — 87040 BLOOD CULTURE FOR BACTERIA: CPT | Performed by: EMERGENCY MEDICINE

## 2018-09-27 PROCEDURE — 97161 PT EVAL LOW COMPLEX 20 MIN: CPT | Performed by: PHYSICAL THERAPIST

## 2018-09-27 PROCEDURE — G8980 MOBILITY D/C STATUS: HCPCS | Performed by: PHYSICAL THERAPIST

## 2018-09-27 PROCEDURE — 93010 ELECTROCARDIOGRAM REPORT: CPT | Performed by: INTERNAL MEDICINE

## 2018-09-27 PROCEDURE — G8987 SELF CARE CURRENT STATUS: HCPCS

## 2018-09-27 PROCEDURE — 94640 AIRWAY INHALATION TREATMENT: CPT

## 2018-09-27 PROCEDURE — 87086 URINE CULTURE/COLONY COUNT: CPT | Performed by: EMERGENCY MEDICINE

## 2018-09-27 PROCEDURE — G8978 MOBILITY CURRENT STATUS: HCPCS | Performed by: PHYSICAL THERAPIST

## 2018-09-27 PROCEDURE — 25010000002 ONDANSETRON PER 1 MG: Performed by: INTERNAL MEDICINE

## 2018-09-27 PROCEDURE — 94760 N-INVAS EAR/PLS OXIMETRY 1: CPT

## 2018-09-27 PROCEDURE — G8988 SELF CARE GOAL STATUS: HCPCS

## 2018-09-27 PROCEDURE — 97165 OT EVAL LOW COMPLEX 30 MIN: CPT

## 2018-09-27 PROCEDURE — 25010000002 LEVOFLOXACIN PER 250 MG: Performed by: EMERGENCY MEDICINE

## 2018-09-27 PROCEDURE — 96374 THER/PROPH/DIAG INJ IV PUSH: CPT

## 2018-09-27 PROCEDURE — 83605 ASSAY OF LACTIC ACID: CPT | Performed by: EMERGENCY MEDICINE

## 2018-09-27 PROCEDURE — 85025 COMPLETE CBC W/AUTO DIFF WBC: CPT | Performed by: EMERGENCY MEDICINE

## 2018-09-27 PROCEDURE — G8979 MOBILITY GOAL STATUS: HCPCS | Performed by: PHYSICAL THERAPIST

## 2018-09-27 RX ORDER — CETIRIZINE HYDROCHLORIDE 10 MG/1
10 TABLET ORAL DAILY
Status: DISCONTINUED | OUTPATIENT
Start: 2018-09-27 | End: 2018-09-29 | Stop reason: HOSPADM

## 2018-09-27 RX ORDER — ATORVASTATIN CALCIUM 40 MG/1
40 TABLET, FILM COATED ORAL NIGHTLY
Status: DISCONTINUED | OUTPATIENT
Start: 2018-09-27 | End: 2018-09-29 | Stop reason: HOSPADM

## 2018-09-27 RX ORDER — IPRATROPIUM BROMIDE AND ALBUTEROL SULFATE 2.5; .5 MG/3ML; MG/3ML
3 SOLUTION RESPIRATORY (INHALATION)
Status: DISCONTINUED | OUTPATIENT
Start: 2018-09-27 | End: 2018-09-29 | Stop reason: HOSPADM

## 2018-09-27 RX ORDER — PANTOPRAZOLE SODIUM 40 MG/1
40 TABLET, DELAYED RELEASE ORAL 2 TIMES DAILY
COMMUNITY
End: 2018-11-07 | Stop reason: SDUPTHER

## 2018-09-27 RX ORDER — SODIUM CHLORIDE 0.9 % (FLUSH) 0.9 %
1-10 SYRINGE (ML) INJECTION AS NEEDED
Status: DISCONTINUED | OUTPATIENT
Start: 2018-09-27 | End: 2018-09-29 | Stop reason: HOSPADM

## 2018-09-27 RX ORDER — TRAMADOL HYDROCHLORIDE 50 MG/1
50 TABLET ORAL EVERY 8 HOURS PRN
Status: DISCONTINUED | OUTPATIENT
Start: 2018-09-27 | End: 2018-09-27

## 2018-09-27 RX ORDER — TAMOXIFEN CITRATE 10 MG/1
10 TABLET ORAL 2 TIMES DAILY
Status: DISCONTINUED | OUTPATIENT
Start: 2018-09-27 | End: 2018-09-29 | Stop reason: HOSPADM

## 2018-09-27 RX ORDER — ONDANSETRON 4 MG/1
4 TABLET, ORALLY DISINTEGRATING ORAL EVERY 4 HOURS PRN
Status: DISCONTINUED | OUTPATIENT
Start: 2018-09-27 | End: 2018-09-29 | Stop reason: HOSPADM

## 2018-09-27 RX ORDER — TAMOXIFEN CITRATE 10 MG/1
TABLET ORAL 2 TIMES DAILY
COMMUNITY
End: 2018-11-12

## 2018-09-27 RX ORDER — PANTOPRAZOLE SODIUM 40 MG/1
40 TABLET, DELAYED RELEASE ORAL 2 TIMES DAILY
Status: DISCONTINUED | OUTPATIENT
Start: 2018-09-27 | End: 2018-09-29 | Stop reason: HOSPADM

## 2018-09-27 RX ORDER — TIZANIDINE 4 MG/1
4 TABLET ORAL NIGHTLY PRN
Status: DISCONTINUED | OUTPATIENT
Start: 2018-09-27 | End: 2018-09-27

## 2018-09-27 RX ORDER — LEVOFLOXACIN 5 MG/ML
750 INJECTION, SOLUTION INTRAVENOUS ONCE
Status: COMPLETED | OUTPATIENT
Start: 2018-09-27 | End: 2018-09-27

## 2018-09-27 RX ORDER — CALCITRIOL 0.25 UG/1
0.5 CAPSULE, LIQUID FILLED ORAL DAILY
Status: DISCONTINUED | OUTPATIENT
Start: 2018-09-27 | End: 2018-09-29 | Stop reason: HOSPADM

## 2018-09-27 RX ORDER — TRAMADOL HYDROCHLORIDE 50 MG/1
50 TABLET ORAL EVERY 8 HOURS PRN
COMMUNITY
End: 2020-10-27 | Stop reason: ALTCHOICE

## 2018-09-27 RX ORDER — TIZANIDINE 4 MG/1
4 TABLET ORAL 3 TIMES DAILY PRN
Status: DISCONTINUED | OUTPATIENT
Start: 2018-09-27 | End: 2018-09-29 | Stop reason: HOSPADM

## 2018-09-27 RX ORDER — FAMOTIDINE 20 MG/1
20 TABLET, FILM COATED ORAL DAILY
Status: DISCONTINUED | OUTPATIENT
Start: 2018-09-27 | End: 2018-09-29 | Stop reason: HOSPADM

## 2018-09-27 RX ORDER — ZAFIRLUKAST 20 MG/1
20 TABLET, FILM COATED ORAL
Status: DISCONTINUED | OUTPATIENT
Start: 2018-09-27 | End: 2018-09-29 | Stop reason: HOSPADM

## 2018-09-27 RX ORDER — SODIUM CHLORIDE 0.9 % (FLUSH) 0.9 %
10 SYRINGE (ML) INJECTION AS NEEDED
Status: DISCONTINUED | OUTPATIENT
Start: 2018-09-27 | End: 2018-09-29 | Stop reason: HOSPADM

## 2018-09-27 RX ORDER — TRAMADOL HYDROCHLORIDE 50 MG/1
50 TABLET ORAL EVERY 4 HOURS PRN
Status: DISCONTINUED | OUTPATIENT
Start: 2018-09-27 | End: 2018-09-29 | Stop reason: HOSPADM

## 2018-09-27 RX ORDER — ONDANSETRON 2 MG/ML
4 INJECTION INTRAMUSCULAR; INTRAVENOUS ONCE
Status: COMPLETED | OUTPATIENT
Start: 2018-09-27 | End: 2018-09-27

## 2018-09-27 RX ORDER — ROPINIROLE 0.25 MG/1
0.25 TABLET, FILM COATED ORAL NIGHTLY
Status: DISCONTINUED | OUTPATIENT
Start: 2018-09-27 | End: 2018-09-29 | Stop reason: HOSPADM

## 2018-09-27 RX ORDER — LEVOTHYROXINE SODIUM 0.05 MG/1
50 TABLET ORAL DAILY
Status: DISCONTINUED | OUTPATIENT
Start: 2018-09-27 | End: 2018-09-29 | Stop reason: HOSPADM

## 2018-09-27 RX ORDER — LOSARTAN POTASSIUM 25 MG/1
25 TABLET ORAL DAILY
Status: DISCONTINUED | OUTPATIENT
Start: 2018-09-27 | End: 2018-09-29 | Stop reason: HOSPADM

## 2018-09-27 RX ORDER — LEVOFLOXACIN 5 MG/ML
500 INJECTION, SOLUTION INTRAVENOUS EVERY 24 HOURS
Status: DISCONTINUED | OUTPATIENT
Start: 2018-09-28 | End: 2018-09-29 | Stop reason: HOSPADM

## 2018-09-27 RX ORDER — TIZANIDINE 4 MG/1
4 TABLET ORAL EVERY 8 HOURS PRN
COMMUNITY

## 2018-09-27 RX ORDER — ONDANSETRON 2 MG/ML
4 INJECTION INTRAMUSCULAR; INTRAVENOUS EVERY 6 HOURS PRN
Status: DISCONTINUED | OUTPATIENT
Start: 2018-09-27 | End: 2018-09-29 | Stop reason: HOSPADM

## 2018-09-27 RX ORDER — ACETAMINOPHEN 325 MG/1
650 TABLET ORAL EVERY 4 HOURS PRN
Status: DISCONTINUED | OUTPATIENT
Start: 2018-09-27 | End: 2018-09-29 | Stop reason: HOSPADM

## 2018-09-27 RX ORDER — SODIUM CHLORIDE 9 MG/ML
75 INJECTION, SOLUTION INTRAVENOUS CONTINUOUS
Status: DISCONTINUED | OUTPATIENT
Start: 2018-09-27 | End: 2018-09-28

## 2018-09-27 RX ADMIN — SODIUM CHLORIDE 75 ML/HR: 9 INJECTION, SOLUTION INTRAVENOUS at 12:30

## 2018-09-27 RX ADMIN — ONDANSETRON 4 MG: 2 INJECTION INTRAMUSCULAR; INTRAVENOUS at 06:55

## 2018-09-27 RX ADMIN — PANTOPRAZOLE SODIUM 40 MG: 40 TABLET, DELAYED RELEASE ORAL at 20:17

## 2018-09-27 RX ADMIN — PANTOPRAZOLE SODIUM 40 MG: 40 TABLET, DELAYED RELEASE ORAL at 12:31

## 2018-09-27 RX ADMIN — TAMOXIFEN CITRATE 10 MG: 10 TABLET, FILM COATED ORAL at 12:30

## 2018-09-27 RX ADMIN — ZAFIRLUKAST 20 MG: 20 TABLET, COATED ORAL at 18:23

## 2018-09-27 RX ADMIN — CETIRIZINE HYDROCHLORIDE 10 MG: 10 TABLET ORAL at 22:23

## 2018-09-27 RX ADMIN — TAMOXIFEN CITRATE 10 MG: 10 TABLET, FILM COATED ORAL at 20:17

## 2018-09-27 RX ADMIN — CALCITRIOL CAPSULES 0.25 MCG 0.5 MCG: 0.25 CAPSULE ORAL at 12:30

## 2018-09-27 RX ADMIN — TRAMADOL HYDROCHLORIDE 50 MG: 50 TABLET, FILM COATED ORAL at 22:23

## 2018-09-27 RX ADMIN — ATORVASTATIN CALCIUM 40 MG: 40 TABLET, FILM COATED ORAL at 20:17

## 2018-09-27 RX ADMIN — LEVOFLOXACIN 750 MG: 5 INJECTION, SOLUTION INTRAVENOUS at 06:56

## 2018-09-27 RX ADMIN — ROPINIROLE HYDROCHLORIDE 0.25 MG: 0.25 TABLET, FILM COATED ORAL at 20:17

## 2018-09-27 RX ADMIN — FAMOTIDINE 20 MG: 20 TABLET, FILM COATED ORAL at 12:30

## 2018-09-27 RX ADMIN — LEVOTHYROXINE SODIUM 50 MCG: 50 TABLET ORAL at 12:31

## 2018-09-27 RX ADMIN — ONDANSETRON HYDROCHLORIDE 4 MG: 2 INJECTION INTRAMUSCULAR; INTRAVENOUS at 12:01

## 2018-09-27 RX ADMIN — Medication 1 TABLET: at 12:31

## 2018-09-27 RX ADMIN — Medication 1 TABLET: at 18:23

## 2018-09-27 RX ADMIN — IPRATROPIUM BROMIDE AND ALBUTEROL SULFATE 3 ML: 2.5; .5 SOLUTION RESPIRATORY (INHALATION) at 21:24

## 2018-09-27 RX ADMIN — THEOPHYLLINE ANHYDROUS 400 MG: 200 CAPSULE, EXTENDED RELEASE ORAL at 12:32

## 2018-09-27 RX ADMIN — SODIUM CHLORIDE 1000 ML: 9 INJECTION, SOLUTION INTRAVENOUS at 06:55

## 2018-09-27 RX ADMIN — IPRATROPIUM BROMIDE AND ALBUTEROL SULFATE 3 ML: 2.5; .5 SOLUTION RESPIRATORY (INHALATION) at 15:35

## 2018-09-27 RX ADMIN — LOSARTAN POTASSIUM 25 MG: 25 TABLET ORAL at 12:30

## 2018-09-28 LAB
ANION GAP SERPL CALCULATED.3IONS-SCNC: 8 MMOL/L (ref 4–13)
BACTERIA BLD CULT: NORMAL
BASOPHILS # BLD AUTO: 0.08 10*3/MM3 (ref 0–0.2)
BASOPHILS NFR BLD AUTO: 0.6 % (ref 0–2)
BUN BLD-MCNC: 8 MG/DL (ref 5–21)
BUN/CREAT SERPL: 7.7 (ref 7–25)
CALCIUM SPEC-SCNC: 7.8 MG/DL (ref 8.4–10.4)
CHLORIDE SERPL-SCNC: 108 MMOL/L (ref 98–110)
CO2 SERPL-SCNC: 25 MMOL/L (ref 24–31)
CREAT BLD-MCNC: 1.04 MG/DL (ref 0.5–1.4)
DEPRECATED RDW RBC AUTO: 42.9 FL (ref 40–54)
EOSINOPHIL # BLD AUTO: 0.16 10*3/MM3 (ref 0–0.7)
EOSINOPHIL NFR BLD AUTO: 1.3 % (ref 0–4)
ERYTHROCYTE [DISTWIDTH] IN BLOOD BY AUTOMATED COUNT: 13.5 % (ref 12–15)
GFR SERPL CREATININE-BSD FRML MDRD: 55 ML/MIN/1.73
GLUCOSE BLD-MCNC: 107 MG/DL (ref 70–100)
HCT VFR BLD AUTO: 38.9 % (ref 37–47)
HGB BLD-MCNC: 13.3 G/DL (ref 12–16)
IMM GRANULOCYTES # BLD: 0.07 10*3/MM3 (ref 0–0.03)
IMM GRANULOCYTES NFR BLD: 0.6 % (ref 0–5)
LYMPHOCYTES # BLD AUTO: 2.57 10*3/MM3 (ref 0.72–4.86)
LYMPHOCYTES NFR BLD AUTO: 20.7 % (ref 15–45)
MCH RBC QN AUTO: 29.8 PG (ref 28–32)
MCHC RBC AUTO-ENTMCNC: 34.2 G/DL (ref 33–36)
MCV RBC AUTO: 87.2 FL (ref 82–98)
MONOCYTES # BLD AUTO: 0.67 10*3/MM3 (ref 0.19–1.3)
MONOCYTES NFR BLD AUTO: 5.4 % (ref 4–12)
NEUTROPHILS # BLD AUTO: 8.87 10*3/MM3 (ref 1.87–8.4)
NEUTROPHILS NFR BLD AUTO: 71.4 % (ref 39–78)
NRBC BLD MANUAL-RTO: 0 /100 WBC (ref 0–0)
PLATELET # BLD AUTO: 265 10*3/MM3 (ref 130–400)
PMV BLD AUTO: 10.1 FL (ref 6–12)
POTASSIUM BLD-SCNC: 3.1 MMOL/L (ref 3.5–5.3)
RBC # BLD AUTO: 4.46 10*6/MM3 (ref 4.2–5.4)
SODIUM BLD-SCNC: 141 MMOL/L (ref 135–145)
WBC NRBC COR # BLD: 12.42 10*3/MM3 (ref 4.8–10.8)

## 2018-09-28 PROCEDURE — G0008 ADMIN INFLUENZA VIRUS VAC: HCPCS | Performed by: INTERNAL MEDICINE

## 2018-09-28 PROCEDURE — 94760 N-INVAS EAR/PLS OXIMETRY 1: CPT

## 2018-09-28 PROCEDURE — 90661 CCIIV3 VAC ABX FR 0.5 ML IM: CPT | Performed by: INTERNAL MEDICINE

## 2018-09-28 PROCEDURE — 80048 BASIC METABOLIC PNL TOTAL CA: CPT | Performed by: INTERNAL MEDICINE

## 2018-09-28 PROCEDURE — G0378 HOSPITAL OBSERVATION PER HR: HCPCS

## 2018-09-28 PROCEDURE — 25010000002 LEVOFLOXACIN PER 250 MG: Performed by: INTERNAL MEDICINE

## 2018-09-28 PROCEDURE — 25010000002 INFLUENZA VAC SUBUNIT QUAD 0.5 ML SUSPENSION PREFILLED SYRINGE: Performed by: INTERNAL MEDICINE

## 2018-09-28 PROCEDURE — 85025 COMPLETE CBC W/AUTO DIFF WBC: CPT | Performed by: INTERNAL MEDICINE

## 2018-09-28 PROCEDURE — 94799 UNLISTED PULMONARY SVC/PX: CPT

## 2018-09-28 RX ORDER — POTASSIUM CHLORIDE 750 MG/1
40 CAPSULE, EXTENDED RELEASE ORAL ONCE
Status: COMPLETED | OUTPATIENT
Start: 2018-09-28 | End: 2018-09-28

## 2018-09-28 RX ORDER — CALCIUM CARBONATE 200(500)MG
2 TABLET,CHEWABLE ORAL 3 TIMES DAILY PRN
Status: DISCONTINUED | OUTPATIENT
Start: 2018-09-28 | End: 2018-09-29 | Stop reason: HOSPADM

## 2018-09-28 RX ADMIN — Medication 1 TABLET: at 12:09

## 2018-09-28 RX ADMIN — IPRATROPIUM BROMIDE AND ALBUTEROL SULFATE 3 ML: 2.5; .5 SOLUTION RESPIRATORY (INHALATION) at 06:28

## 2018-09-28 RX ADMIN — LOSARTAN POTASSIUM 25 MG: 25 TABLET ORAL at 08:10

## 2018-09-28 RX ADMIN — TAMOXIFEN CITRATE 10 MG: 10 TABLET, FILM COATED ORAL at 08:10

## 2018-09-28 RX ADMIN — FAMOTIDINE 20 MG: 20 TABLET, FILM COATED ORAL at 08:13

## 2018-09-28 RX ADMIN — IPRATROPIUM BROMIDE AND ALBUTEROL SULFATE 3 ML: 2.5; .5 SOLUTION RESPIRATORY (INHALATION) at 21:32

## 2018-09-28 RX ADMIN — CETIRIZINE HYDROCHLORIDE 10 MG: 10 TABLET ORAL at 08:10

## 2018-09-28 RX ADMIN — PANTOPRAZOLE SODIUM 40 MG: 40 TABLET, DELAYED RELEASE ORAL at 20:55

## 2018-09-28 RX ADMIN — POTASSIUM CHLORIDE 40 MEQ: 750 CAPSULE, EXTENDED RELEASE ORAL at 12:04

## 2018-09-28 RX ADMIN — CALCITRIOL CAPSULES 0.25 MCG 0.5 MCG: 0.25 CAPSULE ORAL at 08:10

## 2018-09-28 RX ADMIN — Medication 1 TABLET: at 08:10

## 2018-09-28 RX ADMIN — THEOPHYLLINE ANHYDROUS 400 MG: 200 CAPSULE, EXTENDED RELEASE ORAL at 08:10

## 2018-09-28 RX ADMIN — ZAFIRLUKAST 20 MG: 20 TABLET, COATED ORAL at 08:10

## 2018-09-28 RX ADMIN — Medication 1 TABLET: at 16:29

## 2018-09-28 RX ADMIN — INFLUENZA A VIRUS A/SINGAPORE/GP1908/2015 IVR-180 (H1N1) ANTIGEN (MDCK CELL DERIVED, PROPIOLACTONE INACTIVATED), INFLUENZA A VIRUS A/NORTH CAROLINA/04/2016 (H3N2) HEMAGGLUTININ ANTIGEN (MDCK CELL DERIVED, PROPIOLACTONE INACTIVATED), INFLUENZA B VIRUS B/IOWA/06/2017 HEMAGGLUTININ ANTIGEN (MDCK CELL DERIVED, PROPIOLACTONE INACTIVATED), INFLUENZA B VIRUS B/SINGAPORE/INFTT-16-0610/2016 HEMAGGLUTININ ANTIGEN (MDCK CELL DERIVED, PROPIOLACTONE INACTIVATED) 0.5 ML: 15; 15; 15; 15 INJECTION, SUSPENSION INTRAMUSCULAR at 12:04

## 2018-09-28 RX ADMIN — ATORVASTATIN CALCIUM 40 MG: 40 TABLET, FILM COATED ORAL at 20:55

## 2018-09-28 RX ADMIN — SODIUM CHLORIDE 75 ML/HR: 9 INJECTION, SOLUTION INTRAVENOUS at 05:46

## 2018-09-28 RX ADMIN — ROPINIROLE HYDROCHLORIDE 0.25 MG: 0.25 TABLET, FILM COATED ORAL at 20:55

## 2018-09-28 RX ADMIN — TAMOXIFEN CITRATE 10 MG: 10 TABLET, FILM COATED ORAL at 20:56

## 2018-09-28 RX ADMIN — LEVOFLOXACIN 500 MG: 5 INJECTION, SOLUTION INTRAVENOUS at 05:46

## 2018-09-28 RX ADMIN — LEVOTHYROXINE SODIUM 50 MCG: 50 TABLET ORAL at 08:13

## 2018-09-28 RX ADMIN — ZAFIRLUKAST 20 MG: 20 TABLET, COATED ORAL at 16:31

## 2018-09-28 RX ADMIN — IPRATROPIUM BROMIDE AND ALBUTEROL SULFATE 3 ML: 2.5; .5 SOLUTION RESPIRATORY (INHALATION) at 14:15

## 2018-09-28 RX ADMIN — TRAMADOL HYDROCHLORIDE 50 MG: 50 TABLET, FILM COATED ORAL at 16:29

## 2018-09-28 RX ADMIN — PANTOPRAZOLE SODIUM 40 MG: 40 TABLET, DELAYED RELEASE ORAL at 08:13

## 2018-09-28 RX ADMIN — IPRATROPIUM BROMIDE AND ALBUTEROL SULFATE 3 ML: 2.5; .5 SOLUTION RESPIRATORY (INHALATION) at 10:53

## 2018-09-29 VITALS
DIASTOLIC BLOOD PRESSURE: 83 MMHG | RESPIRATION RATE: 20 BRPM | HEART RATE: 95 BPM | TEMPERATURE: 98.5 F | HEIGHT: 62 IN | BODY MASS INDEX: 29.92 KG/M2 | OXYGEN SATURATION: 97 % | WEIGHT: 162.6 LBS | SYSTOLIC BLOOD PRESSURE: 145 MMHG

## 2018-09-29 LAB
ANION GAP SERPL CALCULATED.3IONS-SCNC: 10 MMOL/L (ref 4–13)
BACTERIA SPEC AEROBE CULT: ABNORMAL
BASOPHILS # BLD AUTO: 0.07 10*3/MM3 (ref 0–0.2)
BASOPHILS NFR BLD AUTO: 0.7 % (ref 0–2)
BUN BLD-MCNC: 7 MG/DL (ref 5–21)
BUN/CREAT SERPL: 7.4 (ref 7–25)
CALCIUM SPEC-SCNC: 7.9 MG/DL (ref 8.4–10.4)
CHLORIDE SERPL-SCNC: 109 MMOL/L (ref 98–110)
CO2 SERPL-SCNC: 23 MMOL/L (ref 24–31)
CREAT BLD-MCNC: 0.95 MG/DL (ref 0.5–1.4)
DEPRECATED RDW RBC AUTO: 44.4 FL (ref 40–54)
EOSINOPHIL # BLD AUTO: 0.48 10*3/MM3 (ref 0–0.7)
EOSINOPHIL NFR BLD AUTO: 4.7 % (ref 0–4)
ERYTHROCYTE [DISTWIDTH] IN BLOOD BY AUTOMATED COUNT: 13.6 % (ref 12–15)
GFR SERPL CREATININE-BSD FRML MDRD: 61 ML/MIN/1.73
GLUCOSE BLD-MCNC: 104 MG/DL (ref 70–100)
HCT VFR BLD AUTO: 38.9 % (ref 37–47)
HGB BLD-MCNC: 13 G/DL (ref 12–16)
IMM GRANULOCYTES # BLD: 0.05 10*3/MM3 (ref 0–0.03)
IMM GRANULOCYTES NFR BLD: 0.5 % (ref 0–5)
LYMPHOCYTES # BLD AUTO: 2.27 10*3/MM3 (ref 0.72–4.86)
LYMPHOCYTES NFR BLD AUTO: 22 % (ref 15–45)
MAGNESIUM SERPL-MCNC: 1.9 MG/DL (ref 1.4–2.2)
MCH RBC QN AUTO: 29.9 PG (ref 28–32)
MCHC RBC AUTO-ENTMCNC: 33.4 G/DL (ref 33–36)
MCV RBC AUTO: 89.4 FL (ref 82–98)
MONOCYTES # BLD AUTO: 0.8 10*3/MM3 (ref 0.19–1.3)
MONOCYTES NFR BLD AUTO: 7.8 % (ref 4–12)
NEUTROPHILS # BLD AUTO: 6.64 10*3/MM3 (ref 1.87–8.4)
NEUTROPHILS NFR BLD AUTO: 64.3 % (ref 39–78)
NRBC BLD MANUAL-RTO: 0 /100 WBC (ref 0–0)
PLATELET # BLD AUTO: 258 10*3/MM3 (ref 130–400)
PMV BLD AUTO: 10.1 FL (ref 6–12)
POTASSIUM BLD-SCNC: 3.7 MMOL/L (ref 3.5–5.3)
RBC # BLD AUTO: 4.35 10*6/MM3 (ref 4.2–5.4)
SODIUM BLD-SCNC: 142 MMOL/L (ref 135–145)
WBC NRBC COR # BLD: 10.31 10*3/MM3 (ref 4.8–10.8)

## 2018-09-29 PROCEDURE — 80048 BASIC METABOLIC PNL TOTAL CA: CPT | Performed by: INTERNAL MEDICINE

## 2018-09-29 PROCEDURE — 96365 THER/PROPH/DIAG IV INF INIT: CPT

## 2018-09-29 PROCEDURE — 85025 COMPLETE CBC W/AUTO DIFF WBC: CPT | Performed by: INTERNAL MEDICINE

## 2018-09-29 PROCEDURE — G0378 HOSPITAL OBSERVATION PER HR: HCPCS

## 2018-09-29 PROCEDURE — 83735 ASSAY OF MAGNESIUM: CPT | Performed by: NURSE PRACTITIONER

## 2018-09-29 PROCEDURE — 96366 THER/PROPH/DIAG IV INF ADDON: CPT

## 2018-09-29 PROCEDURE — 25010000002 LEVOFLOXACIN PER 250 MG: Performed by: INTERNAL MEDICINE

## 2018-09-29 RX ORDER — LEVOFLOXACIN 500 MG/1
500 TABLET, FILM COATED ORAL DAILY
Qty: 5 TABLET | Refills: 0 | Status: SHIPPED | OUTPATIENT
Start: 2018-09-29 | End: 2018-11-12

## 2018-09-29 RX ADMIN — PANTOPRAZOLE SODIUM 40 MG: 40 TABLET, DELAYED RELEASE ORAL at 08:13

## 2018-09-29 RX ADMIN — CETIRIZINE HYDROCHLORIDE 10 MG: 10 TABLET ORAL at 08:13

## 2018-09-29 RX ADMIN — LEVOFLOXACIN 500 MG: 5 INJECTION, SOLUTION INTRAVENOUS at 06:38

## 2018-09-29 RX ADMIN — ZAFIRLUKAST 20 MG: 20 TABLET, COATED ORAL at 08:13

## 2018-09-29 RX ADMIN — CALCITRIOL CAPSULES 0.25 MCG 0.5 MCG: 0.25 CAPSULE ORAL at 08:13

## 2018-09-29 RX ADMIN — LOSARTAN POTASSIUM 25 MG: 25 TABLET ORAL at 08:13

## 2018-09-29 RX ADMIN — TAMOXIFEN CITRATE 10 MG: 10 TABLET, FILM COATED ORAL at 08:13

## 2018-09-29 RX ADMIN — LEVOTHYROXINE SODIUM 50 MCG: 50 TABLET ORAL at 08:13

## 2018-09-29 RX ADMIN — Medication 1 TABLET: at 08:13

## 2018-09-29 RX ADMIN — THEOPHYLLINE ANHYDROUS 400 MG: 200 CAPSULE, EXTENDED RELEASE ORAL at 08:13

## 2018-09-29 RX ADMIN — FAMOTIDINE 20 MG: 20 TABLET, FILM COATED ORAL at 08:13

## 2018-10-02 LAB
BACTERIA SPEC AEROBE CULT: ABNORMAL
BACTERIA SPEC AEROBE CULT: NORMAL
GRAM STN SPEC: ABNORMAL
ISOLATED FROM: ABNORMAL

## 2018-11-07 RX ORDER — PANTOPRAZOLE SODIUM 40 MG/1
TABLET, DELAYED RELEASE ORAL
Qty: 180 TABLET | Refills: 1 | Status: SHIPPED | OUTPATIENT
Start: 2018-11-07 | End: 2019-02-25 | Stop reason: SDUPTHER

## 2018-11-08 ENCOUNTER — OFFICE VISIT (OUTPATIENT)
Dept: ONCOLOGY | Facility: CLINIC | Age: 53
End: 2018-11-08

## 2018-11-08 ENCOUNTER — APPOINTMENT (OUTPATIENT)
Dept: LAB | Facility: HOSPITAL | Age: 53
End: 2018-11-08

## 2018-11-08 ENCOUNTER — TELEPHONE (OUTPATIENT)
Dept: ONCOLOGY | Facility: CLINIC | Age: 53
End: 2018-11-08

## 2018-11-08 VITALS
OXYGEN SATURATION: 94 % | TEMPERATURE: 99.4 F | HEIGHT: 62 IN | RESPIRATION RATE: 16 BRPM | DIASTOLIC BLOOD PRESSURE: 78 MMHG | BODY MASS INDEX: 29.26 KG/M2 | HEART RATE: 52 BPM | WEIGHT: 159 LBS | SYSTOLIC BLOOD PRESSURE: 134 MMHG

## 2018-11-08 DIAGNOSIS — Z85.3 HISTORY OF MALIGNANT NEOPLASM OF BREAST: ICD-10-CM

## 2018-11-08 DIAGNOSIS — D50.8 OTHER IRON DEFICIENCY ANEMIA: Primary | ICD-10-CM

## 2018-11-08 LAB
ALBUMIN SERPL-MCNC: 3.9 G/DL (ref 3.5–5)
ALBUMIN/GLOB SERPL: 1.3 G/DL (ref 1.1–2.5)
ALP SERPL-CCNC: 74 U/L (ref 24–120)
ALT SERPL W P-5'-P-CCNC: 27 U/L (ref 0–54)
ANION GAP SERPL CALCULATED.3IONS-SCNC: 11 MMOL/L (ref 4–13)
AST SERPL-CCNC: 44 U/L (ref 7–45)
BASOPHILS # BLD AUTO: 0.07 10*3/MM3 (ref 0–0.2)
BASOPHILS NFR BLD AUTO: 1 % (ref 0–2)
BILIRUB SERPL-MCNC: 0.5 MG/DL (ref 0.1–1)
BUN BLD-MCNC: 9 MG/DL (ref 5–21)
BUN/CREAT SERPL: 8.3 (ref 7–25)
CALCIUM SPEC-SCNC: 8.4 MG/DL (ref 8.4–10.4)
CHLORIDE SERPL-SCNC: 105 MMOL/L (ref 98–110)
CO2 SERPL-SCNC: 26 MMOL/L (ref 24–31)
CREAT BLD-MCNC: 1.09 MG/DL (ref 0.5–1.4)
DEPRECATED RDW RBC AUTO: 39.6 FL (ref 40–54)
EOSINOPHIL # BLD AUTO: 0.16 10*3/MM3 (ref 0–0.7)
EOSINOPHIL NFR BLD AUTO: 2.4 % (ref 0–4)
ERYTHROCYTE [DISTWIDTH] IN BLOOD BY AUTOMATED COUNT: 12.6 % (ref 12–15)
GFR SERPL CREATININE-BSD FRML MDRD: 52 ML/MIN/1.73
GLOBULIN UR ELPH-MCNC: 3 GM/DL
GLUCOSE BLD-MCNC: 104 MG/DL (ref 70–100)
HCT VFR BLD AUTO: 44.2 % (ref 37–47)
HGB BLD-MCNC: 15.3 G/DL (ref 12–16)
HOLD SPECIMEN: NORMAL
HOLD SPECIMEN: NORMAL
IMM GRANULOCYTES # BLD: 0.03 10*3/MM3 (ref 0–0.03)
IMM GRANULOCYTES NFR BLD: 0.4 % (ref 0–5)
LYMPHOCYTES # BLD AUTO: 1.95 10*3/MM3 (ref 0.72–4.86)
LYMPHOCYTES NFR BLD AUTO: 29.2 % (ref 15–45)
MCH RBC QN AUTO: 29.8 PG (ref 28–32)
MCHC RBC AUTO-ENTMCNC: 34.6 G/DL (ref 33–36)
MCV RBC AUTO: 86 FL (ref 82–98)
MONOCYTES # BLD AUTO: 0.42 10*3/MM3 (ref 0.19–1.3)
MONOCYTES NFR BLD AUTO: 6.3 % (ref 4–12)
NEUTROPHILS # BLD AUTO: 4.04 10*3/MM3 (ref 1.87–8.4)
NEUTROPHILS NFR BLD AUTO: 60.7 % (ref 39–78)
NRBC BLD MANUAL-RTO: 0 /100 WBC (ref 0–0)
PLATELET # BLD AUTO: 309 10*3/MM3 (ref 130–400)
PMV BLD AUTO: 10.1 FL (ref 6–12)
POTASSIUM BLD-SCNC: 2.8 MMOL/L (ref 3.5–5.3)
PROT SERPL-MCNC: 6.9 G/DL (ref 6.3–8.7)
RBC # BLD AUTO: 5.14 10*6/MM3 (ref 4.2–5.4)
SODIUM BLD-SCNC: 142 MMOL/L (ref 135–145)
WBC NRBC COR # BLD: 6.67 10*3/MM3 (ref 4.8–10.8)

## 2018-11-08 PROCEDURE — 85025 COMPLETE CBC W/AUTO DIFF WBC: CPT | Performed by: INTERNAL MEDICINE

## 2018-11-08 PROCEDURE — 80053 COMPREHEN METABOLIC PANEL: CPT | Performed by: INTERNAL MEDICINE

## 2018-11-08 PROCEDURE — 36415 COLL VENOUS BLD VENIPUNCTURE: CPT | Performed by: INTERNAL MEDICINE

## 2018-11-08 PROCEDURE — 99213 OFFICE O/P EST LOW 20 MIN: CPT | Performed by: INTERNAL MEDICINE

## 2018-11-08 RX ORDER — POTASSIUM CHLORIDE 1500 MG/1
TABLET, FILM COATED, EXTENDED RELEASE ORAL
Qty: 30 TABLET | Refills: 0 | Status: SHIPPED | OUTPATIENT
Start: 2018-11-08 | End: 2020-03-25 | Stop reason: SDUPTHER

## 2018-11-08 NOTE — TELEPHONE ENCOUNTER
Critical potassium level of 2.8.  Verbal order from Dr. Lopes for her to take 20 meq KCL twice a day for 3 days then 20 meq daily and she needs to follow up and get lab rechecked with her PCP in one week.  Left message for Luna about low potassium level and that a script for potassium has been sent to [facility] and for her to follow up with her PCP in one week.  Labs faxed to Antonio Shaffer MD at [phone].

## 2018-11-08 NOTE — PROGRESS NOTES
[facility]  HEMATOLOGY & ONCOLOGY    Cancer Staging Information:  No matching staging information was found for the patient.      Subjective     VISIT DIAGNOSIS:   No diagnosis found.    REASON FOR VISIT:     Chief Complaint   Patient presents with   • Follow-up     breast cancer        HEMATOLOGY / ONCOLOGY HISTORY:   Oncology/Hematology History    Ms. Luna Cruz is a pleasant 49 year old female with diagnosis of pT1pN1(mi)M0 right breast cancer, ER positive, IN positive, HER2/  leonila normal diagnosed in August 2008. She has passed the 3 yearmark.  Back then, the patient underwent lumpectomy. Tumor was  located in the lower inner quadrant of the right breast. [place] lymph node dissection found to have residual DCIS. She went on to receive  4 cycles of non anthracycline based chemotherapy with Taxotere and Cytoxan. This was followed by radiation therapy and has been  tamoxifen since 11/25/08.  She is here today for regular scheduled followup. . We will request sestamibi scan anyway. Her tumor markers have been stable. No  evidence of effusion or lymphadenopathy. Thyroid ultrasound on 07/25/11 ordered by Dr. Villasenor for hyperthyroidism study show small  thyroid, no lesions identified.  INTERVAL HISTORY  This patient had a lumpectomy for a stage IIA, estrogen receptorpositive,  HER2/  neunegative  breast cancer in 2008. She received  adjuvant interventions that included 5 years of tamoxifen administration.             INTERVAL HISTORY  Patient ID: Luna Cruz is a 53 y.o. year old female No matching staging information was found for the patient.    Past Medical History:   Past Medical History:   Diagnosis Date   • Asthma    • Cancer (CMS/HCC)     breast cancer   • Drug therapy    • GERD (gastroesophageal reflux disease)    • Hx of radiation therapy    • Hyperlipidemia    • Hypertension    • Malignant neoplasm of upper-inner quadrant of right female breast (CMS/HCC) 9/26/2016   • Osteoporosis     • PONV (postoperative nausea and vomiting)      Past Surgical History:   Past Surgical History:   Procedure Laterality Date   • ADENOIDECTOMY     • APPENDECTOMY  1994   • BREAST BIOPSY     • BREAST LUMPECTOMY Right 2008   • CHOLECYSTECTOMY  1993   • COLONOSCOPY  05/03/2013   • COLONOSCOPY N/A 6/14/2017    Procedure: COLONOSCOPY WITH ANESTHESIA;  Surgeon: Ksenia Fox MD;  Location: [facility] ENDOSCOPY;  Service:    • D&C HYSTEROSCOPY  1993   • DILATATION AND CURETTAGE     • ENDOSCOPY N/A 10/5/2016    Procedure: ESOPHAGOGASTRODUODENOSCOPY WITH ANESTHESIA;  Surgeon: Ksenia Fox MD;  Location: [facility] ENDOSCOPY;  Service:    • HYSTEROSCOPY     • MEDIPORT INSERTION, SINGLE  2008   • MEDIPORT REMOVAL     • THYROIDECTOMY  2011    parathyroidectomy   • TONSILLECTOMY AND ADENOIDECTOMY  1970   • TUBAL ABDOMINAL LIGATION       Social History:   Social History     Social History   • Marital status:      Spouse name: N/A   • Number of children: N/A   • Years of education: N/A     Occupational History   • Not on file.     Social History Main Topics   • Smoking status: Never Smoker   • Smokeless tobacco: Never Used   • Alcohol use No   • Drug use: No   • Sexual activity: Yes     Partners: Male     Birth control/ protection: None     Other Topics Concern   • Not on file     Social History Narrative   • No narrative on file     Family History:   Family History   Problem Relation Age of Onset   • Colon polyps Mother    • Cirrhosis Mother    • Colon polyps Sister 54   • Breast cancer Sister    • No Known Problems Father    • No Known Problems Brother    • No Known Problems Daughter    • No Known Problems Son    • No Known Problems Maternal Grandmother    • No Known Problems Paternal Grandmother    • No Known Problems Maternal Aunt    • No Known Problems Paternal Aunt    • Colon cancer Neg Hx    • Crohn's disease Neg Hx    • Irritable bowel syndrome Neg Hx    • Liver cancer Neg Hx    • Liver disease Neg Hx    • Rectal cancer  Neg Hx    • Stomach cancer Neg Hx    • BRCA 1/2 Neg Hx    • Endometrial cancer Neg Hx    • Ovarian cancer Neg Hx    • Uterine cancer Neg Hx        Review of Systems   Constitutional: Negative.    HENT: Negative.    Eyes: Negative.    Respiratory: Negative.    Cardiovascular: Negative.    Gastrointestinal: Negative.    Endocrine: Negative.    Genitourinary: Negative.    Musculoskeletal: Negative.    Skin: Negative.    Neurological: Negative.    Hematological: Negative.    Psychiatric/Behavioral: Negative.         Performance Status:  Asymptomatic    Medications:    Current Outpatient Prescriptions   Medication Sig Dispense Refill   • albuterol (PROVENTIL HFA;VENTOLIN HFA) 108 (90 Base) MCG/ACT inhaler Inhale 2 puffs Every 4 (Four) Hours As Needed for Wheezing.     • alendronate (FOSAMAX) 70 MG tablet Take 70 mg by mouth Every 7 (Seven) Days. Sunday's     • atorvastatin (LIPITOR) 40 MG tablet Take 40 mg by mouth daily.     • calcitriol (ROCALTROL) 0.5 MCG capsule Take 0.5 mcg by mouth daily.     • Calcium Citrate-Vitamin D (CALCIUM + D PO) Take 1 tablet by mouth Daily.     • fluticasone-salmeterol (ADVAIR) 250-50 MCG/DOSE DISKUS Inhale 1 puff 2 (Two) Times a Day.     • levoFLOXacin (LEVAQUIN) 500 MG tablet Take 1 tablet by mouth Daily. 5 tablet 0   • levothyroxine (SYNTHROID, LEVOTHROID) 50 MCG tablet Take 50 mcg by mouth Daily.     • loratadine (CLARITIN) 10 MG tablet Take 10 mg by mouth Daily As Needed for Allergies.     • losartan (COZAAR) 25 MG tablet Take 25 mg by mouth daily.     • ondansetron ODT (ZOFRAN-ODT) 4 MG disintegrating tablet Take 1 tablet by mouth Every 4 (Four) Hours As Needed for Nausea. 10 tablet 0   • pantoprazole (PROTONIX) 40 MG EC tablet TAKE 1 TABLET TWICE A  tablet 1   • rOPINIRole (REQUIP) 0.25 MG tablet Take 0.25 mg by mouth every night. Take 1 hour before bedtime.     • tamoxifen (NOLVADEX) 10 MG tablet Take  by mouth 2 (Two) Times a Day.     • theophylline (UNIPHYL) 400 MG 24 hr  "tablet Take 400 mg by mouth daily.     • tiZANidine (ZANAFLEX) 4 MG tablet Take 4 mg by mouth Every 8 (Eight) Hours As Needed for Muscle Spasms.     • traMADol (ULTRAM) 50 MG tablet Take 50 mg by mouth Every 4 (Four) Hours As Needed for Moderate Pain .     • zafirlukast (ACCOLATE) 20 MG tablet Take 20 mg by mouth 2 (two) times a day.       No current facility-administered medications for this visit.      Facility-Administered Medications Ordered in Other Visits   Medication Dose Route Frequency Provider Last Rate Last Dose   • heparin flush (porcine) 100 UNIT/ML injection 500 Units  500 Units Intracatheter PRN Ki Manriquez MD   500 Units at 09/27/16 1108   • heparin flush (porcine) 100 UNIT/ML injection 500 Units  500 Units Intravenous PRN Malathi Brantley, APRN   500 Units at 05/05/17 1009   • sodium chloride 0.9 % flush 10 mL  10 mL Intravenous PRN Ki Manriquez MD   10 mL at 09/27/16 1107   • sodium chloride 0.9 % flush 10 mL  10 mL Intravenous PRN Malathi Brantley, APRN   10 mL at 05/05/17 1009       ALLERGIES:    Allergies   Allergen Reactions   • Keflex [Cephalexin] Rash and Unknown (See Comments)       Objective      Vitals:    11/08/18 1344   BP: 134/78   Pulse: 52   Resp: 16   Temp: 99.4 °F (37.4 °C)   TempSrc: Tympanic   SpO2: 94%   Weight: 72.1 kg (159 lb)   Height: 157.5 cm (62\")         Current Status 11/8/2018   ECOG score 0         Physical Exam    General Appearance: Patient is awake, alert, oriented and in no acute distress. Patient is welldeveloped, wellnourished, and appears stated age.  HEENT: Normocephalic. Sclerae clear, conjunctiva pink, extraocular movements intact, pupils, round, reactive to light and  accommodation. Mouth and throat are clear with moist oral mucosa.  NECK: Supple, no jugular venous distention, thyroid not enlarged.  LYMPH: No cervical, supraclavicular, axillary, or inguinal lymphadenopathy.  CHEST: Equal bilateral expansion, AP  diameter " normal, resonant percussion note  LUNGS: Good air movement, no rales, rhonchi, rubs or wheezes with auscultation  CARDIO: Regular sinus rhythm, no murmurs, gallops or rubs.  ABDOMEN: Nondistended, soft, No tenderness, no guarding, no rebound, No hepatosplenomegaly. No abdominal masses. Bowel sounds positive. No hernia  GENITALIA: Not examined.  BREASTS: right breast lumpectomy scar well healed. Left breast with no lumps or bumps.    MUSKEL: No joint swelling, decreased motion, or inflammation  EXTREMS: No edema, clubbing, cyanosis, No varicose veins.  NEURO: Grossly nonfocal, Gait is coordinated and smooth, Cognition is preserved.  SKIN: No rashes, no ecchymoses, no petechia.  PSYCH: Oriented to time, place and person. Memory is preserved. Mood and affect appear normal  RECENT LABS:  Orders Only on 11/08/2018   Component Date Value Ref Range Status   • Glucose 11/08/2018 104* 70 - 100 mg/dL Final   • BUN 11/08/2018 9  5 - 21 mg/dL Final   • Creatinine 11/08/2018 1.09  0.50 - 1.40 mg/dL Final   • Sodium 11/08/2018 142  135 - 145 mmol/L Final   • Potassium 11/08/2018 2.8* 3.5 - 5.3 mmol/L Final   • Chloride 11/08/2018 105  98 - 110 mmol/L Final   • CO2 11/08/2018 26.0  24.0 - 31.0 mmol/L Final   • Calcium 11/08/2018 8.4  8.4 - 10.4 mg/dL Final   • Total Protein 11/08/2018 6.9  6.3 - 8.7 g/dL Final   • Albumin 11/08/2018 3.90  3.50 - 5.00 g/dL Final   • ALT (SGPT) 11/08/2018 27  0 - 54 U/L Final   • AST (SGOT) 11/08/2018 44  7 - 45 U/L Final   • Alkaline Phosphatase 11/08/2018 74  24 - 120 U/L Final   • Total Bilirubin 11/08/2018 0.5  0.1 - 1.0 mg/dL Final   • eGFR Non African Amer 11/08/2018 52* >60 mL/min/1.73 Final   • Globulin 11/08/2018 3.0  gm/dL Final   • A/G Ratio 11/08/2018 1.3  1.1 - 2.5 g/dL Final   • BUN/Creatinine Ratio 11/08/2018 8.3  7.0 - 25.0 Final   • Anion Gap 11/08/2018 11.0  4.0 - 13.0 mmol/L Final   • WBC 11/08/2018 6.67  4.80 - 10.80 10*3/mm3 Final   • RBC 11/08/2018 5.14  4.20 - 5.40 10*6/mm3  Final   • Hemoglobin 11/08/2018 15.3  12.0 - 16.0 g/dL Final   • Hematocrit 11/08/2018 44.2  37.0 - 47.0 % Final   • MCV 11/08/2018 86.0  82.0 - 98.0 fL Final   • MCH 11/08/2018 29.8  28.0 - 32.0 pg Final   • MCHC 11/08/2018 34.6  33.0 - 36.0 g/dL Final   • RDW 11/08/2018 12.6  12.0 - 15.0 % Final   • RDW-SD 11/08/2018 39.6* 40.0 - 54.0 fl Final   • MPV 11/08/2018 10.1  6.0 - 12.0 fL Final   • Platelets 11/08/2018 309  130 - 400 10*3/mm3 Final   • Neutrophil % 11/08/2018 60.7  39.0 - 78.0 % Final   • Lymphocyte % 11/08/2018 29.2  15.0 - 45.0 % Final   • Monocyte % 11/08/2018 6.3  4.0 - 12.0 % Final   • Eosinophil % 11/08/2018 2.4  0.0 - 4.0 % Final   • Basophil % 11/08/2018 1.0  0.0 - 2.0 % Final   • Immature Grans % 11/08/2018 0.4  0.0 - 5.0 % Final   • Neutrophils, Absolute 11/08/2018 4.04  1.87 - 8.40 10*3/mm3 Final   • Lymphocytes, Absolute 11/08/2018 1.95  0.72 - 4.86 10*3/mm3 Final   • Monocytes, Absolute 11/08/2018 0.42  0.19 - 1.30 10*3/mm3 Final   • Eosinophils, Absolute 11/08/2018 0.16  0.00 - 0.70 10*3/mm3 Final   • Basophils, Absolute 11/08/2018 0.07  0.00 - 0.20 10*3/mm3 Final   • Immature Grans, Absolute 11/08/2018 0.03  0.00 - 0.03 10*3/mm3 Final   • nRBC 11/08/2018 0.0  0.0 - 0.0 /100 WBC Final       RADIOLOGY:  No results found.         Assessment/Plan  Luna Cruz is a 53 y.o. year old female     Patient Active Problem List   Diagnosis   • Essential hypertension   • Gastroesophageal reflux disease   • Heartburn   • Coughing   • Family history of polyps in the colon   • Family history of malignant neoplasm of breast   • History of malignant neoplasm of breast   • Colon cancer screening   • Overweight (BMI 25.0-29.9)   • Iron deficiency anemia   • Pneumonia due to infectious organism          1. Stage II breast cancer 2008, status post lumpectomy ×4 TC, followed by radiation and currently started on tamoxifen to complete a total of 10 year Nov 2018. Advised okay to stop tamoxifen  As far as her  breast cancer is concerned there is no evidence of recurrent disease     --recent mammo 5/7/18 No mammographic evidence of malignancy. Recommendation is for the patient to return for routine mammography in one year or sooner, if clinically indicated.     2. She also had a history of hyperparathyroidism status post resection of the adenoma in 2011.    3. Hyperlipidemia: On lipitor    4. Hypertension: losartan  5. Hypothyroidism: on synthroid.  6. Gerd: on pantoprazole.  7. Osteopenia: on fosamax. Dillon+vit d  8. Emphysemia: on advir, theophyline, zafirkulast  9. Anemia: s/p iron infusions. Hg 15.      Obnena Lopes MD    11/8/2018    2:26 PM

## 2018-11-11 PROBLEM — S83.242A TRAUMATIC TEAR OF MEDIAL MENISCUS OF KNEE, LEFT, INITIAL ENCOUNTER: Status: ACTIVE | Noted: 2018-11-11

## 2018-11-12 ENCOUNTER — APPOINTMENT (OUTPATIENT)
Dept: PREADMISSION TESTING | Facility: HOSPITAL | Age: 53
End: 2018-11-12

## 2018-11-12 VITALS
RESPIRATION RATE: 16 BRPM | DIASTOLIC BLOOD PRESSURE: 82 MMHG | BODY MASS INDEX: 30.26 KG/M2 | SYSTOLIC BLOOD PRESSURE: 154 MMHG | HEART RATE: 78 BPM | OXYGEN SATURATION: 100 % | WEIGHT: 160.27 LBS | HEIGHT: 61 IN

## 2018-11-12 LAB
ANION GAP SERPL CALCULATED.3IONS-SCNC: 11 MMOL/L (ref 4–13)
BUN BLD-MCNC: 8 MG/DL (ref 5–21)
BUN/CREAT SERPL: 7.6 (ref 7–25)
CALCIUM SPEC-SCNC: 8.4 MG/DL (ref 8.4–10.4)
CHLORIDE SERPL-SCNC: 105 MMOL/L (ref 98–110)
CO2 SERPL-SCNC: 24 MMOL/L (ref 24–31)
CREAT BLD-MCNC: 1.05 MG/DL (ref 0.5–1.4)
GFR SERPL CREATININE-BSD FRML MDRD: 55 ML/MIN/1.73
GLUCOSE BLD-MCNC: 87 MG/DL (ref 70–100)
POTASSIUM BLD-SCNC: 3.6 MMOL/L (ref 3.5–5.3)
SODIUM BLD-SCNC: 140 MMOL/L (ref 135–145)

## 2018-11-12 PROCEDURE — 36415 COLL VENOUS BLD VENIPUNCTURE: CPT

## 2018-11-12 PROCEDURE — 80048 BASIC METABOLIC PNL TOTAL CA: CPT | Performed by: ORTHOPAEDIC SURGERY

## 2018-11-12 RX ORDER — RANITIDINE 150 MG/1
150 TABLET ORAL NIGHTLY
COMMUNITY
End: 2019-02-27 | Stop reason: HOSPADM

## 2018-11-12 NOTE — OP NOTE
Patient Name: Emigdio  : 1965  MRN: [id number]    DATE of SURGERY: 18    SURGEON: Matt Maki MD    ASSISTANT: NONE    PREOPERATIVE DIAGNOSIS: Acute traumatic flap tear of the posterior horn of the medial meniscus, Left knee    POSTOPERATIVE DIAGNOSIS: Acute traumatic flap tear of the posterior horn of the medial meniscus, Left knee    PROCEDURE PERFORMED: Left knee arthroscopic partial medial meniscectomy    IMPLANTS: none    ANESTHESIA USED: LMA    OPERATIVE INDICATIONS: This patient is a 53 y.o. female who presented to my clinic with complaints of progressive pain and mechanical symptoms after a traumatic injury to the knee after twisting her knee getting out of her truck. An MRI was obtained which showed the above named diagnoses. Pain and mechanical symptoms were not relieved with conservative treatment consisting of anti-inflammatories, corticosteroid injections, physical therapy.  Due to progressive pain and loss of function, surgical evaluation was discussed and the patient wished to proceed understanding risks, benefits, and alternatives. Surgical indications were to relieve pain and mechanical symptoms related to an unstable meniscus tear.  Risks included, but were not limited to, that of anesthesia, bleeding, infection, pain, damage to local structures, need for further surgery, failure to improve, stiffness, failure of implants, and loss of function.      ESTIMATED BLOOD LOSS: minimal    DRAINS: none     COMPLICATIONS: none    SPECIMENS: none    OPERATIVE FINDINGS:  1) Exam Under Anesthesia:  ROM 0-130, stable ligamentously without laxity, no effusion  2) Patellofemoral Joint: Intact chondral surfaces, no loss bodies in gutters  3) Central Compartment:  Intact ACL/PCL  4) Lateral Compartment:  Intact chondral surface, intact lateral meniscus  5) Medial Compartment: grade I change MFC, unstable flap tear of the posterior horn medial meniscus    PROCEDURE IN DETAIL:  The patient  was seen in the preoperative holding room, the informed consent was reviewed and signed, and the correct operative extremity marked with the patient’s agreement.  The patient was transported to the operating room, where a timeout was performed identifying the correct patient and operative site.  Perioperative antibiotics were administered prior to incision.  A sterile prep and drape was performed. The operative leg was placed into an arthroscopic leg prince device, the contralateral leg well protected and a tourniquet was placed about the thigh.  Total tourniquet time was <30 minutes.    A standard anterolateral arthroscopy portal was established and an outside-in technique was used to establish and anteromedial portal.  The arthroscopic probe was inserted and a thorough diagnostic arthroscopy of the knee was performed as outline above.    Attention was turned to the medial compartment where a probe was used to demonstrate and unstable flap tear of the posterior horn of the medial meniscus.  The combination of a biter and shaver were used to debride the meniscus back to a stable surface.  The probe was reinserted showing no further tear existed and the remaining meniscus to be stable.    Free fluid was suctioned from the knee, portals were closed with monocryl suture and steri-strips.  A sterile dressing was placed.  The patient was awakened from anesthesia, transported to the PACU in stable condition.    POSTOPERATIVE PLAN:  1) Discharge home with family  2) Return to clinic 1 week for a clinical check    Electronically signed by Matt Maki MD on 11/13/2018 at 1:02 PM

## 2018-11-12 NOTE — DISCHARGE INSTRUCTIONS
DAY OF SURGERY INSTRUCTIONS        YOUR SURGEON: ***NARA     PROCEDURE: ***LEFT KNEE ARTHROSCOPIC PARTIAL MEDIAL MENISCECTOMY    DATE OF SURGERY: ***11/13/2018    ARRIVAL TIME: AS DIRECTED BY OFFICE     DAY OF SURGERY TAKE ONLY THESE MEDICATIONS UNLESS OTHERWISE INSTRUCTED BY YOUR PHYSICIAN: ***NONE        MANAGING PAIN AFTER SURGERY    We know you are probably wondering what your pain will be like after surgery.  Following surgery it is unrealistic to expect you will not have pain.   Pain is how our bodies let us know that something is wrong or cautions us to be careful.  That said, our goal is to make your pain tolerable.    Methods we may use to treat your pain include (oral or IV medications, PCAs, epidurals, nerve blocks, etc.)   While some procedures require IV pain medications for a short time after surgery, transitioning to pain medications by mouth allows for better management of pain.   Your nurse will encourage you to take oral pain medications whenever possible.  IV medications work almost immediately, but only last a short while.  Taking medications by mouth allows for a more constant level of medication in your blood stream for a longer period of time.      Once your pain is out of control it is harder to get back under control.  It is important you are aware when your next dose of pain medication is due.  If you are admitted, your nurse may write the time of your next dose on the white board in your room to help you remember.      We are interested in your pain and encourage you to inform us about aggravating factors during your visit.   Many times a simple repositioning every few hours can make a big difference.    If your physician says it is okay, do not let your pain prevent you from getting out of bed. Be sure to call your nurse for assistance prior to getting up so you do not fall.      Before surgery, please decide your tolerable pain goal.  These faces help describe the pain ratings we use  on a 0-10 scale.   Be prepared to tell us your goal and whether or not you take pain or anxiety medications at home.          BEFORE YOU COME TO THE HOSPITAL  (Pre-op instructions)  • Do not eat, drink, smoke or chew gum after midnight the night before surgery.  This also includes no mints.  • Morning of surgery take only the medicines you have been instructed with a sip of water unless otherwise instructed  by your physician.  • Do not shave, wear makeup or dark nail polish.  • Remove all jewelry including rings.  • Leave anything you consider valuable at home.  • Leave your suitcase in the car until after your surgery.  • Bring the following with you if applicable:  o Picture ID and insurance, Medicare or Medicaid cards  o Co-pay/deductible required by insurance (cash, check, credit card)  o Copy of advance directive, living will or power-of- documents if not brought to PAT  o CPAP or BIPAP mask and tubing  o Relaxation aids (MP3 player, book, magazine)  • On the day of surgery check in at registration located at the main entrance of the hospital.       Outpatient Surgery Guidelines, Adult  Outpatient procedures are those for which the person having the procedure is allowed to go home the same day as the procedure. Various procedures are done on an outpatient basis. You should follow some general guidelines if you will be having an outpatient procedure.  LET YOUR HEALTH CARE PROVIDER KNOW ABOUT:  · Any allergies you have.  · All medicines you are taking, including vitamins, herbs, eye drops, creams, and over-the-counter medicines.  · Previous problems you or members of your family have had with the use of anesthetics.  · Any blood disorders you have.  · Previous surgeries you have had.  · Medical conditions you have.  RISKS AND COMPLICATIONS  Your health care provider will discuss possible risks and complications with you before surgery. Common risks and complications include:    · Problems due to the use  of anesthetics.  · Blood loss and replacement (does not apply to minor surgical procedures).  · Temporary increase in pain due to surgery.  · Uncorrected pain or problems that the surgery was meant to correct.  · Infection.  · New damage.  BEFORE THE PROCEDURE  · Ask your health care provider about changing or stopping your regular medicines. You may need to stop taking certain medicines in the days or weeks before the procedure.  · Stop smoking at least 2 weeks before surgery. This lowers your risk for complications during and after surgery. Ask your health care provider for help with this if needed.  · Eat your usual meals and a light supper the day before surgery. Continue fluid intake. Do not drink alcohol.  · Do not eat or drink after midnight the night before your surgery.   · Arrange for someone to take you home and to stay with you for 24 hours after the procedure. Medicine given for your procedure may affect your ability to drive or to care for yourself.  · Call your health care provider's office if you develop an illness or problem that may prevent you from safely having your procedure.  AFTER THE PROCEDURE  After surgery, you will be taken to a recovery area, where your progress will be monitored. If there are no complications, you will be allowed to go home when you are awake, stable, and taking fluids well. You may have numbness around the surgical site. Healing will take some time. You will have tenderness at the surgical site and may have some swelling and bruising. You may also have some nausea.  HOME CARE INSTRUCTIONS  · Do not drive for 24 hours, or as directed by your health care provider. Do not drive while taking prescription pain medicines.  · Do not drink alcohol for 24 hours.  · Do not make important decisions or sign legal documents for 24 hours.  · You may resume a normal diet and activities as directed.  · Do not lift anything heavier than 10 pounds (4.5 kg) or play contact sports until  your health care provider says it is okay.  · Change your bandages (dressings) as directed.  · Only take over-the-counter or prescription medicines as directed by your health care provider.  · Follow up with your health care provider as directed.  SEEK MEDICAL CARE IF:  · You have increased bleeding (more than a small spot) from the surgical site.  · You have redness, swelling, or increasing pain in the wound.  · You see pus coming from the wound.  · You have a fever.  · You notice a bad smell coming from the wound or dressing.  · You feel lightheaded or faint.  · You develop a rash.  · You have trouble breathing.  · You develop allergies.  MAKE SURE YOU:  · Understand these instructions.  · Will watch your condition.  · Will get help right away if you are not doing well or get worse.     This information is not intended to replace advice given to you by your health care provider. Make sure you discuss any questions you have with your health care provider.     Document Released: 09/12/2002 Document Revised: 05/03/2016 Document Reviewed: 05/22/2014  [employer] Interactive Patient Education ©2016 [employer]       Fall Prevention in Hospitals, Adult  As a hospital patient, your condition and the treatments you receive can increase your risk for falls. Some additional risk factors for falls in a hospital include:  · Being in an unfamiliar environment.  · Being on bed rest.  · Your surgery.  · Taking certain medicines.  · Your tubing requirements, such as intravenous (IV) therapy or catheters.  It is important that you learn how to decrease fall risks while at the hospital. Below are important tips that can help prevent falls.  SAFETY TIPS FOR PREVENTING FALLS  Talk about your risk of falling.  · Ask your health care provider why you are at risk for falling. Is it your medicine, illness, tubing placement, or something else?  · Make a plan with your health care provider to keep you safe from falls.  · Ask your health  care provider or pharmacist about side effects of your medicines. Some medicines can make you dizzy or affect your coordination.  Ask for help.  · Ask for help before getting out of bed. You may need to press your call button.  · Ask for assistance in getting safely to the toilet.  · Ask for a walker or cane to be put at your bedside. Ask that most of the side rails on your bed be placed up before your health care provider leaves the room.  · Ask family or friends to sit with you.  · Ask for things that are out of your reach, such as your glasses, hearing aids, telephone, bedside table, or call button.  Follow these tips to avoid falling:  · Stay lying or seated, rather than standing, while waiting for help.  · Wear rubber-soled slippers or shoes whenever you walk in the hospital.  · Avoid quick, sudden movements.  ¨ Change positions slowly.  ¨ Sit on the side of your bed before standing.  ¨ Stand up slowly and wait before you start to walk.  · Let your health care provider know if there is a spill on the floor.  · Pay careful attention to the medical equipment, electrical cords, and tubes around you.  · When you need help, use your call button by your bed or in the bathroom. Wait for one of your health care providers to help you.  · If you feel dizzy or unsure of your footing, return to bed and wait for assistance.  · Avoid being distracted by the TV, telephone, or another person in your room.  · Do not lean or support yourself on rolling objects, such as IV poles or bedside tables.     This information is not intended to replace advice given to you by your health care provider. Make sure you discuss any questions you have with your health care provider.     Document Released: 12/15/2001 Document Revised: 01/08/2016 Document Reviewed: 08/25/2013  [employer] Interactive Patient Education ©2016 [employer]       Surgical Site Infections FAQs  What is a Surgical Site Infection (SSI)?  A surgical site infection is an  infection that occurs after surgery in the part of the body where the surgery took place. Most patients who have surgery do not develop an infection. However, infections develop in about 1 to 3 out of every 100 patients who have surgery.  Some of the common symptoms of a surgical site infection are:  · Redness and pain around the area where you had surgery  · Drainage of cloudy fluid from your surgical wound  · Fever  Can SSIs be treated?  Yes. Most surgical site infections can be treated with antibiotics. The antibiotic given to you depends on the bacteria (germs) causing the infection. Sometimes patients with SSIs also need another surgery to treat the infection.  What are some of the things that hospitals are doing to prevent SSIs?  To prevent SSIs, doctors, nurses, and other healthcare providers:  · Clean their hands and arms up to their elbows with an antiseptic agent just before the surgery.  · Clean their hands with soap and water or an alcohol-based hand rub before and after caring for each patient.  · May remove some of your hair immediately before your surgery using electric clippers if the hair is in the same area where the procedure will occur. They should not shave you with a razor.  · Wear special hair covers, masks, gowns, and gloves during surgery to keep the surgery area clean.  · Give you antibiotics before your surgery starts. In most cases, you should get antibiotics within 60 minutes before the surgery starts and the antibiotics should be stopped within 24 hours after surgery.  · Clean the skin at the site of your surgery with a special soap that kills germs.  What can I do to help prevent SSIs?  Before your surgery:  · Tell your doctor about other medical problems you may have. Health problems such as allergies, diabetes, and obesity could affect your surgery and your treatment.  · Quit smoking. Patients who smoke get more infections. Talk to your doctor about how you can quit before your  surgery.  · Do not shave near where you will have surgery. Shaving with a razor can irritate your skin and make it easier to develop an infection.  At the time of your surgery:  · Speak up if someone tries to shave you with a razor before surgery. Ask why you need to be shaved and talk with your surgeon if you have any concerns.  · Ask if you will get antibiotics before surgery.  After your surgery:  · Make sure that your healthcare providers clean their hands before examining you, either with soap and water or an alcohol-based hand rub.  · If you do not see your providers clean their hands, please ask them to do so.  · Family and friends who visit you should not touch the surgical wound or dressings.  · Family and friends should clean their hands with soap and water or an alcohol-based hand rub before and after visiting you. If you do not see them clean their hands, ask them to clean their hands.  What do I need to do when I go home from the hospital?  · Before you go home, your doctor or nurse should explain everything you need to know about taking care of your wound. Make sure you understand how to care for your wound before you leave the hospital.  · Always clean your hands before and after caring for your wound.  · Before you go home, make sure you know who to contact if you have questions or problems after you get home.  · If you have any symptoms of an infection, such as redness and pain at the surgery site, drainage, or fever, call your doctor immediately.  If you have additional questions, please ask your doctor or nurse.  Developed and co-sponsored by The Society for Healthcare Epidemiology of [place] (SHEA); Infectious Diseases Society of [place] (IDSA); American Hospital Association; Association for Professionals in Infection Control and Epidemiology (APIC); Centers for Disease Control and Prevention (CDC); and The Joint Commission.     This information is not intended to replace advice given to you by  your health care provider. Make sure you discuss any questions you have with your health care provider.     Document Released: 12/23/2014 Document Revised: 01/08/2016 Document Reviewed: 03/02/2016  [employer] Interactive Patient Education ©2016 [employer]       [facility]  CHG 4% Patient Instruction Sheet    Preparing the Skin Before Surgery  Preparing or “prepping” skin before surgery can reduce the risk of infection at the surgical site. To make the process easier,[facility] has chosen 4% Chlorhexidine Gluconate (CHG) antiseptic solution.   The steps below outline the prepping process and should be carefully followed.                                                                                                                                                      Prep the skin at the following time(s):                                                      We recommend you shower the night before surgery, and again the morning of surgery with the 4% CHG antiseptic solution using half of the bottle and a cloth each time.  Dress in clean clothes/sleepwear after showering.  See instructions below for application.          Do not apply any lotions or moisturizers.       Do not shave the area to be prepped for at least 2 days prior to surgery.    Clipping the hair may be done immediately prior to your surgery at the hospital    if needed.    Directions:  Thoroughly rinse your body with water.  Apply 4% CHG to a cloth and wash skin gently, paying special attention to the operative site.  Rinse again thoroughly.  Once you have begun using this product do not apply anything else to your skin. If itching or redness persists, rinse affected areas and discontinue use.    When using this product:  • Keep out of eyes, ears, and mouth.  • If solution should contact these areas, rinse out promptly and thoroughly with water.  • For external use only.  • Do not use in genital area, on your face or  head.      PATIENT/FAMILY/RESPONSIBLE PARTY VERBALIZES UNDERSTANDING OF ABOVE EDUCATION.  COPY OF PAIN SCALE GIVEN AND REVIEWED WITH VERBALIZED UNDERSTANDING.

## 2018-11-12 NOTE — DISCHARGE INSTRUCTIONS
YOUR NEXT PAIN MEDICATION IS DUE AT______________         General Anesthesia, Adult, Care After  Refer to this sheet in the next few weeks. These instructions provide you with information on caring for yourself after your procedure. Your health care provider may also give you more specific instructions. Your treatment has been planned according to current medical practices, but problems sometimes occur. Call your health care provider if you have any problems or questions after your procedure.  WHAT TO EXPECT AFTER THE PROCEDURE  After the procedure, it is typical to experience:  · Sleepiness.  · Nausea and vomiting.  HOME CARE INSTRUCTIONS  · For the first 24 hours after general anesthesia:  ¨ Have a responsible person with you.  ¨ Do not drive a car. If you are alone, do not take public transportation.  ¨ Do not drink alcohol.  ¨ Do not take medicine that has not been prescribed by your health care provider.  ¨ Do not sign important papers or make important decisions.  ¨ You may resume a normal diet and activities as directed by your health care provider.  · Change bandages (dressings) as directed.  · If you have questions or problems that seem related to general anesthesia, call the hospital and ask for the anesthetist or anesthesiologist on call.  SEEK MEDICAL CARE IF:  · You have nausea and vomiting that continue the day after anesthesia.  · You develop a rash.  SEEK IMMEDIATE MEDICAL CARE IF:    · You have difficulty breathing.  · You have chest pain.  · You have any allergic problems.     This information is not intended to replace advice given to you by your health care provider. Make sure you discuss any questions you have with your health care provider.     Document Released: 03/26/2002 Document Revised: 01/08/2016 Document Reviewed: 07/03/2014  [employer] Patient Education ©2016 [employer]    CALL YOUR PHYSICIAN IF YOU EXPERIENCE  INCREASED PAIN NOT HELPED BY YOUR PAIN MEDICATION.    .                                               Fall Prevention in the Home      Falls can cause injuries. They can happen to people of all ages. There are many things you can do to make your home safe and to help prevent falls.    WHAT CAN I DO ON THE OUTSIDE OF MY HOME?  · Regularly fix the edges of walkways and driveways and fix any cracks.  · Remove anything that might make you trip as you walk through a door, such as a raised step or threshold.  · Trim any bushes or trees on the path to your home.  · Use bright outdoor lighting.  · Clear any walking paths of anything that might make someone trip, such as rocks or tools.  · Regularly check to see if handrails are loose or broken. Make sure that both sides of any steps have handrails.  · Any raised decks and porches should have guardrails on the edges.  · Have any leaves, snow, or ice cleared regularly.  · Use sand or salt on walking paths during winter.  · Clean up any spills in your garage right away. This includes oil or grease spills.  WHAT CAN I DO IN THE BATHROOM?    · Use night lights.  · Install grab bars by the toilet and in the tub and shower. Do not use towel bars as grab bars.  · Use non-skid mats or decals in the tub or shower.  · If you need to sit down in the shower, use a plastic, non-slip stool.  · Keep the floor dry. Clean up any water that spills on the floor as soon as it happens.  · Remove soap buildup in the tub or shower regularly.  · Attach bath mats securely with double-sided non-slip rug tape.  · Do not have throw rugs and other things on the floor that can make you trip.  WHAT CAN I DO IN THE BEDROOM?  · Use night lights.  · Make sure that you have a light by your bed that is easy to reach.  · Do not use any sheets or blankets that are too big for your bed. They should not hang down onto the floor.  · Have a firm chair that has side arms. You can use this for support while you get dressed.  · Do not have throw rugs and other things on the floor  that can make you trip.  WHAT CAN I DO IN THE KITCHEN?  · Clean up any spills right away.  · Avoid walking on wet floors.  · Keep items that you use a lot in easy-to-reach places.  · If you need to reach something above you, use a strong step stool that has a grab bar.  · Keep electrical cords out of the way.  · Do not use floor polish or wax that makes floors slippery. If you must use wax, use non-skid floor wax.  · Do not have throw rugs and other things on the floor that can make you trip.  WHAT CAN I DO WITH MY STAIRS?  · Do not leave any items on the stairs.  · Make sure that there are handrails on both sides of the stairs and use them. Fix handrails that are broken or loose. Make sure that handrails are as long as the stairways.  · Check any carpeting to make sure that it is firmly attached to the stairs. Fix any carpet that is loose or worn.  · Avoid having throw rugs at the top or bottom of the stairs. If you do have throw rugs, attach them to the floor with carpet tape.  · Make sure that you have a light switch at the top of the stairs and the bottom of the stairs. If you do not have them, ask someone to add them for you.  WHAT ELSE CAN I DO TO HELP PREVENT FALLS?  · Wear shoes that:  ¨ Do not have high heels.  ¨ Have rubber bottoms.  ¨ Are comfortable and fit you well.  ¨ Are closed at the toe. Do not wear sandals.  · If you use a stepladder:  ¨ Make sure that it is fully opened. Do not climb a closed stepladder.  ¨ Make sure that both sides of the stepladder are locked into place.  ¨ Ask someone to hold it for you, if possible.  · Clearly nel and make sure that you can see:  ¨ Any grab bars or handrails.  ¨ First and last steps.  ¨ Where the edge of each step is.  · Use tools that help you move around (mobility aids) if they are needed. These include:  ¨ Canes.  ¨ Walkers.  ¨ Scooters.  ¨ Crutches.  · Turn on the lights when you go into a dark area. Replace any light bulbs as soon as they burn  out.  · Set up your furniture so you have a clear path. Avoid moving your furniture around.  · If any of your floors are uneven, fix them.  · If there are any pets around you, be aware of where they are.  · Review your medicines with your doctor. Some medicines can make you feel dizzy. This can increase your chance of falling.  Ask your doctor what other things that you can do to help prevent falls.     This information is not intended to replace advice given to you by your health care provider. Make sure you discuss any questions you have with your health care provider.     Document Released: 10/14/2010 Document Revised: 05/03/2016 Document Reviewed: 01/22/2016  [employer] Interactive Patient Education ©2016 [employer]     PATIENT/FAMILY/RESPONSIBLE PARTY VERBALIZES UNDERSTANDING OF ABOVE EDUCATION.  COPY OF PAIN SCALE GIVEN AND REVIEWED WITH VERBALIZED UNDERSTANDING.      Lower Extremity Post-op Instructions  Dr. BAINS        POST-OP CARE: Please follow these instructions closely!    IMPORTANT PHONE NUMBERS:  • For emergencies, please call 911  • You may reach Dr. Bains or his medical assistant Lucia Minor, Pauly Geiger, or Naina Caruso at [phone], M-F 8:0am-5:00pm  • After 5pm or on the weekends, please call the answering service which can be reached from the number above   • Call immediately if you have any of the following symptoms:  - Elevated temperature above 101.5 degrees for more than 48 hours after surgery  - Persistent drainage  from wound  - Severe pain around surgical site  - Calf pain    Weight Bearing:   __X___ Weight Bearing as tolerated (with or without crutches)   _____ Touch-Toe Weight-bearing    _____ Partial Weight Bearing  ___ % for ___ weeks (must use crutches)   _____ Non Weight Bearing for ____ weeks (must use crutches, wheelchair or                          knee walker)    Bathing:  If stated below, it is ok to remove your postop dressing and shower.  DO NOT SOAK the incision in  water.  Pat the incision site dry after surgery  _X__ You may remove your dressing and shower on the 3rd day following surgery; if you shower before this, please cover your incision thoroughly  ___Keep your splint/dressing clean, dry, intact.  Do not place foreign objects inside the splint/dressing.    Dressings: Do NOT remove dressing/splint unless unless told to do so. SOME DRAINAGE IS NORMAL!  • If you have a splint or cast, do NOT get wet!!    • DO NOT touch, remove, or apply ointment to the incision and/or steri strips  • Steri strips may fall off on their own  • Signs of infection that warrant a phone call to our clinical line:  o Excessive drainage or redness  o Red streaking coming away from the incision  o Increased pain  o Increased temperature above 101 degrees    Sutures:  If your physician uses sutures in your knee or ankle, they will dissolve on their own and will not need to be removed.  Black sutures occasionally used will need to be removed 10-14 days after surgery.    Elevate: Place 2 pillows under your ankle to get the incision area above the level of the heart to help in swelling (a recliner is not elevated!!!)    Ice: Ice your surgery site 5-6 times per day for 20 minutes at a time with dressing in place. You should wait at least 30 minutes before icing again to avoid ice irritation. It may be difficult at first to ice the surgery area due to the amount of dressing, but continue to be diligent with icing.  Your dressings will be taken down at your first post-op appointment.     Range of motion:   - For the knee- It is important to gain gain full extension (knee straight) as soon as possible following surgery.         Ice to decrease swelling --> Knee fully straight --> Walk without a limp!!!  - NO PILLOWS UNDER THE KNEE, ONLY under the ankle  - For the foot/ankle- range of motion restrictions will be given to you at your first post-op appointment. Until that time, avoid any unnecessary range o f  motion.  - Physical therapy- Your physical therapy status will be discussed with you at your first post-op appointment.   **Achieving range of motion goals and decreasing swelling/inflammation are the primary focus for the first two (2) weeks following surgery. **    Medications: You will be discharged with the appropriate medications following your surgery. Fill these at the pharmacy and take them as directed on the label.   Possible medications that will be prescribed are below.  You may or may not receive all of these. Occasionally, additional medications may be given with specific instructions.  Percocet/Lortab (oxycodone/hydrocodone with tylenol) - Pain Medication.  o Take one tablet every 4-6 hours. DO NOT EXCEED 4,000mg of Tylenol in 24 hours.        **Itching is not an allergy - take benadryl or an over the counter allergy medication (claritin, Zyrtec) if needed  **DO NOT MIX WITH ALCOHOL, DRIVE WHILE TAKING, OR TAKE EXTRA TYLENOL*   Zofran - Anti-nausea medication to help prevent nausea and vomiting after                             surgery.     Aspirin 81 mg - all patients with lower extremity surgery should take one aspirin                            81 mg for 17 days after surgery    DO NOT TAKE NSAID'S (ex. IBUPROFEN, MOTRIN, ALEVE, ETC) AFTER A BROKEN BONE HAS BEEN REPAIRED OR AFTER ACL SURGERY - THESE MEDICATIONS WILL SLOW THE HEALING PROCESS!!!    **If you are running low on pain medications, please notify us if you need a refill 24-48 hours prior to when you run out, so we can make arrangements to refill the prescription for you if we determine it is necessary**

## 2018-11-13 ENCOUNTER — ANESTHESIA (OUTPATIENT)
Dept: PERIOP | Facility: HOSPITAL | Age: 53
End: 2018-11-13

## 2018-11-13 ENCOUNTER — HOSPITAL ENCOUNTER (OUTPATIENT)
Facility: HOSPITAL | Age: 53
Setting detail: HOSPITAL OUTPATIENT SURGERY
Discharge: HOME | End: 2018-11-13
Attending: ORTHOPAEDIC SURGERY | Admitting: ORTHOPAEDIC SURGERY

## 2018-11-13 ENCOUNTER — ANESTHESIA EVENT (OUTPATIENT)
Dept: PERIOP | Facility: HOSPITAL | Age: 53
End: 2018-11-13

## 2018-11-13 VITALS
TEMPERATURE: 98 F | RESPIRATION RATE: 16 BRPM | HEART RATE: 78 BPM | OXYGEN SATURATION: 95 % | DIASTOLIC BLOOD PRESSURE: 71 MMHG | SYSTOLIC BLOOD PRESSURE: 149 MMHG

## 2018-11-13 DIAGNOSIS — S83.242A TRAUMATIC TEAR OF MEDIAL MENISCUS OF KNEE, LEFT, INITIAL ENCOUNTER: Primary | ICD-10-CM

## 2018-11-13 PROCEDURE — 25010000002 DEXAMETHASONE PER 1 MG: Performed by: ANESTHESIOLOGY

## 2018-11-13 PROCEDURE — 25010000002 PROPOFOL 10 MG/ML EMULSION: Performed by: NURSE ANESTHETIST, CERTIFIED REGISTERED

## 2018-11-13 PROCEDURE — 25010000002 ONDANSETRON PER 1 MG: Performed by: ANESTHESIOLOGY

## 2018-11-13 PROCEDURE — 25010000002 MIDAZOLAM PER 1 MG: Performed by: ANESTHESIOLOGY

## 2018-11-13 PROCEDURE — 25010000002 FENTANYL CITRATE (PF) 250 MCG/5ML SOLUTION: Performed by: NURSE ANESTHETIST, CERTIFIED REGISTERED

## 2018-11-13 PROCEDURE — 25010000002 KETOROLAC TROMETHAMINE PER 15 MG: Performed by: ORTHOPAEDIC SURGERY

## 2018-11-13 PROCEDURE — 25010000002 FENTANYL CITRATE (PF) 100 MCG/2ML SOLUTION: Performed by: ANESTHESIOLOGY

## 2018-11-13 PROCEDURE — 25010000002 FENTANYL CITRATE (PF) 100 MCG/2ML SOLUTION: Performed by: NURSE ANESTHETIST, CERTIFIED REGISTERED

## 2018-11-13 RX ORDER — ONDANSETRON 2 MG/ML
4 INJECTION INTRAMUSCULAR; INTRAVENOUS ONCE AS NEEDED
Status: DISCONTINUED | OUTPATIENT
Start: 2018-11-13 | End: 2018-11-13 | Stop reason: HOSPADM

## 2018-11-13 RX ORDER — ONDANSETRON 2 MG/ML
4 INJECTION INTRAMUSCULAR; INTRAVENOUS ONCE AS NEEDED
Status: COMPLETED | OUTPATIENT
Start: 2018-11-13 | End: 2018-11-13

## 2018-11-13 RX ORDER — LIDOCAINE HYDROCHLORIDE 20 MG/ML
INJECTION, SOLUTION INFILTRATION; PERINEURAL AS NEEDED
Status: DISCONTINUED | OUTPATIENT
Start: 2018-11-13 | End: 2018-11-13 | Stop reason: SURG

## 2018-11-13 RX ORDER — FENTANYL CITRATE 50 UG/ML
INJECTION, SOLUTION INTRAMUSCULAR; INTRAVENOUS AS NEEDED
Status: DISCONTINUED | OUTPATIENT
Start: 2018-11-13 | End: 2018-11-13 | Stop reason: SURG

## 2018-11-13 RX ORDER — SODIUM CHLORIDE 0.9 % (FLUSH) 0.9 %
3 SYRINGE (ML) INJECTION AS NEEDED
Status: DISCONTINUED | OUTPATIENT
Start: 2018-11-13 | End: 2018-11-13 | Stop reason: HOSPADM

## 2018-11-13 RX ORDER — SODIUM CHLORIDE, SODIUM LACTATE, POTASSIUM CHLORIDE, CALCIUM CHLORIDE 600; 310; 30; 20 MG/100ML; MG/100ML; MG/100ML; MG/100ML
100 INJECTION, SOLUTION INTRAVENOUS CONTINUOUS
Status: DISCONTINUED | OUTPATIENT
Start: 2018-11-13 | End: 2018-11-13 | Stop reason: HOSPADM

## 2018-11-13 RX ORDER — LABETALOL HYDROCHLORIDE 5 MG/ML
5 INJECTION, SOLUTION INTRAVENOUS
Status: DISCONTINUED | OUTPATIENT
Start: 2018-11-13 | End: 2018-11-13 | Stop reason: HOSPADM

## 2018-11-13 RX ORDER — ONDANSETRON 4 MG/1
4 TABLET, FILM COATED ORAL EVERY 8 HOURS PRN
Qty: 10 TABLET | Refills: 0 | Status: SHIPPED | OUTPATIENT
Start: 2018-11-13 | End: 2019-10-29 | Stop reason: ALTCHOICE

## 2018-11-13 RX ORDER — ACETAMINOPHEN 500 MG
1000 TABLET ORAL ONCE
Status: COMPLETED | OUTPATIENT
Start: 2018-11-13 | End: 2018-11-13

## 2018-11-13 RX ORDER — OXYCODONE AND ACETAMINOPHEN 10; 325 MG/1; MG/1
1 TABLET ORAL ONCE AS NEEDED
Status: DISCONTINUED | OUTPATIENT
Start: 2018-11-13 | End: 2018-11-13 | Stop reason: HOSPADM

## 2018-11-13 RX ORDER — METOCLOPRAMIDE HYDROCHLORIDE 5 MG/ML
5 INJECTION INTRAMUSCULAR; INTRAVENOUS
Status: DISCONTINUED | OUTPATIENT
Start: 2018-11-13 | End: 2018-11-13 | Stop reason: HOSPADM

## 2018-11-13 RX ORDER — FENTANYL CITRATE 50 UG/ML
25 INJECTION, SOLUTION INTRAMUSCULAR; INTRAVENOUS AS NEEDED
Status: DISCONTINUED | OUTPATIENT
Start: 2018-11-13 | End: 2018-11-13 | Stop reason: HOSPADM

## 2018-11-13 RX ORDER — CLINDAMYCIN PHOSPHATE 900 MG/50ML
900 INJECTION INTRAVENOUS ONCE
Status: COMPLETED | OUTPATIENT
Start: 2018-11-13 | End: 2018-11-13

## 2018-11-13 RX ORDER — SODIUM CHLORIDE 0.9 % (FLUSH) 0.9 %
1-10 SYRINGE (ML) INJECTION AS NEEDED
Status: DISCONTINUED | OUTPATIENT
Start: 2018-11-13 | End: 2018-11-13 | Stop reason: HOSPADM

## 2018-11-13 RX ORDER — MIDAZOLAM HYDROCHLORIDE 1 MG/ML
1 INJECTION INTRAMUSCULAR; INTRAVENOUS
Status: DISCONTINUED | OUTPATIENT
Start: 2018-11-13 | End: 2018-11-13 | Stop reason: HOSPADM

## 2018-11-13 RX ORDER — MEPERIDINE HYDROCHLORIDE 25 MG/ML
12.5 INJECTION INTRAMUSCULAR; INTRAVENOUS; SUBCUTANEOUS
Status: DISCONTINUED | OUTPATIENT
Start: 2018-11-13 | End: 2018-11-13 | Stop reason: HOSPADM

## 2018-11-13 RX ORDER — MAGNESIUM HYDROXIDE 1200 MG/15ML
LIQUID ORAL AS NEEDED
Status: DISCONTINUED | OUTPATIENT
Start: 2018-11-13 | End: 2018-11-13 | Stop reason: HOSPADM

## 2018-11-13 RX ORDER — HYDROCODONE BITARTRATE AND ACETAMINOPHEN 5; 325 MG/1; MG/1
TABLET ORAL
Qty: 20 TABLET | Refills: 0 | Status: SHIPPED | OUTPATIENT
Start: 2018-11-13 | End: 2019-01-29

## 2018-11-13 RX ORDER — SODIUM CHLORIDE 0.9 % (FLUSH) 0.9 %
3 SYRINGE (ML) INJECTION EVERY 12 HOURS SCHEDULED
Status: DISCONTINUED | OUTPATIENT
Start: 2018-11-13 | End: 2018-11-13 | Stop reason: HOSPADM

## 2018-11-13 RX ORDER — NALOXONE HCL 0.4 MG/ML
0.4 VIAL (ML) INJECTION AS NEEDED
Status: DISCONTINUED | OUTPATIENT
Start: 2018-11-13 | End: 2018-11-13 | Stop reason: HOSPADM

## 2018-11-13 RX ORDER — KETOROLAC TROMETHAMINE 30 MG/ML
INJECTION, SOLUTION INTRAMUSCULAR; INTRAVENOUS AS NEEDED
Status: DISCONTINUED | OUTPATIENT
Start: 2018-11-13 | End: 2018-11-13 | Stop reason: HOSPADM

## 2018-11-13 RX ORDER — SCOLOPAMINE TRANSDERMAL SYSTEM 1 MG/1
1 PATCH, EXTENDED RELEASE TRANSDERMAL CONTINUOUS
Status: DISCONTINUED | OUTPATIENT
Start: 2018-11-13 | End: 2018-11-13 | Stop reason: HOSPADM

## 2018-11-13 RX ORDER — LIDOCAINE HYDROCHLORIDE 10 MG/ML
INJECTION, SOLUTION INFILTRATION; PERINEURAL AS NEEDED
Status: DISCONTINUED | OUTPATIENT
Start: 2018-11-13 | End: 2018-11-13 | Stop reason: HOSPADM

## 2018-11-13 RX ORDER — SODIUM CHLORIDE, SODIUM LACTATE, POTASSIUM CHLORIDE, CALCIUM CHLORIDE 600; 310; 30; 20 MG/100ML; MG/100ML; MG/100ML; MG/100ML
1000 INJECTION, SOLUTION INTRAVENOUS CONTINUOUS
Status: DISCONTINUED | OUTPATIENT
Start: 2018-11-13 | End: 2018-11-13 | Stop reason: HOSPADM

## 2018-11-13 RX ORDER — DEXAMETHASONE SODIUM PHOSPHATE 4 MG/ML
4 INJECTION, SOLUTION INTRA-ARTICULAR; INTRALESIONAL; INTRAMUSCULAR; INTRAVENOUS; SOFT TISSUE ONCE AS NEEDED
Status: COMPLETED | OUTPATIENT
Start: 2018-11-13 | End: 2018-11-13

## 2018-11-13 RX ORDER — IPRATROPIUM BROMIDE AND ALBUTEROL SULFATE 2.5; .5 MG/3ML; MG/3ML
3 SOLUTION RESPIRATORY (INHALATION) ONCE AS NEEDED
Status: DISCONTINUED | OUTPATIENT
Start: 2018-11-13 | End: 2018-11-13 | Stop reason: HOSPADM

## 2018-11-13 RX ORDER — PROPOFOL 10 MG/ML
VIAL (ML) INTRAVENOUS AS NEEDED
Status: DISCONTINUED | OUTPATIENT
Start: 2018-11-13 | End: 2018-11-13 | Stop reason: SURG

## 2018-11-13 RX ORDER — MIDAZOLAM HYDROCHLORIDE 1 MG/ML
2 INJECTION INTRAMUSCULAR; INTRAVENOUS
Status: DISCONTINUED | OUTPATIENT
Start: 2018-11-13 | End: 2018-11-13 | Stop reason: HOSPADM

## 2018-11-13 RX ORDER — OXYCODONE AND ACETAMINOPHEN 7.5; 325 MG/1; MG/1
2 TABLET ORAL ONCE AS NEEDED
Status: COMPLETED | OUTPATIENT
Start: 2018-11-13 | End: 2018-11-13

## 2018-11-13 RX ORDER — IBUPROFEN 600 MG/1
600 TABLET ORAL ONCE AS NEEDED
Status: DISCONTINUED | OUTPATIENT
Start: 2018-11-13 | End: 2018-11-13 | Stop reason: HOSPADM

## 2018-11-13 RX ORDER — BUPIVACAINE HYDROCHLORIDE 2.5 MG/ML
INJECTION, SOLUTION INFILTRATION; PERINEURAL AS NEEDED
Status: DISCONTINUED | OUTPATIENT
Start: 2018-11-13 | End: 2018-11-13 | Stop reason: HOSPADM

## 2018-11-13 RX ORDER — HYDROCODONE BITARTRATE AND ACETAMINOPHEN 5; 325 MG/1; MG/1
1 TABLET ORAL ONCE AS NEEDED
Status: DISCONTINUED | OUTPATIENT
Start: 2018-11-13 | End: 2018-11-13 | Stop reason: HOSPADM

## 2018-11-13 RX ADMIN — LIDOCAINE HYDROCHLORIDE 0.5 ML: 10 INJECTION, SOLUTION EPIDURAL; INFILTRATION; INTRACAUDAL; PERINEURAL at 10:02

## 2018-11-13 RX ADMIN — SODIUM CHLORIDE, POTASSIUM CHLORIDE, SODIUM LACTATE AND CALCIUM CHLORIDE 1000 ML: 600; 310; 30; 20 INJECTION, SOLUTION INTRAVENOUS at 10:02

## 2018-11-13 RX ADMIN — MIDAZOLAM HYDROCHLORIDE 2 MG: 1 INJECTION, SOLUTION INTRAMUSCULAR; INTRAVENOUS at 11:20

## 2018-11-13 RX ADMIN — OXYCODONE HYDROCHLORIDE AND ACETAMINOPHEN 2 TABLET: 7.5; 325 TABLET ORAL at 13:29

## 2018-11-13 RX ADMIN — DEXAMETHASONE SODIUM PHOSPHATE 4 MG: 4 INJECTION, SOLUTION INTRA-ARTICULAR; INTRALESIONAL; INTRAMUSCULAR; INTRAVENOUS; SOFT TISSUE at 11:19

## 2018-11-13 RX ADMIN — LIDOCAINE HYDROCHLORIDE 100 MG: 20 INJECTION, SOLUTION INFILTRATION; PERINEURAL at 12:24

## 2018-11-13 RX ADMIN — CLINDAMYCIN PHOSPHATE 900 MG: 18 INJECTION, SOLUTION INTRAVENOUS at 12:22

## 2018-11-13 RX ADMIN — SCOPOLAMINE 1 PATCH: 1 PATCH, EXTENDED RELEASE TRANSDERMAL at 11:19

## 2018-11-13 RX ADMIN — ACETAMINOPHEN 1000 MG: 500 TABLET ORAL at 11:19

## 2018-11-13 RX ADMIN — FENTANYL CITRATE 100 MCG: 50 INJECTION INTRAMUSCULAR; INTRAVENOUS at 13:15

## 2018-11-13 RX ADMIN — SODIUM CHLORIDE, POTASSIUM CHLORIDE, SODIUM LACTATE AND CALCIUM CHLORIDE: 600; 310; 30; 20 INJECTION, SOLUTION INTRAVENOUS at 12:22

## 2018-11-13 RX ADMIN — FENTANYL CITRATE 100 MCG: 50 INJECTION, SOLUTION INTRAMUSCULAR; INTRAVENOUS at 12:24

## 2018-11-13 RX ADMIN — ONDANSETRON HYDROCHLORIDE 4 MG: 2 INJECTION INTRAMUSCULAR; INTRAVENOUS at 13:15

## 2018-11-13 RX ADMIN — PROPOFOL 200 MG: 10 INJECTION, EMULSION INTRAVENOUS at 12:24

## 2018-11-13 RX ADMIN — FENTANYL CITRATE 250 MCG: 50 INJECTION INTRAMUSCULAR; INTRAVENOUS at 12:43

## 2018-11-13 NOTE — H&P
Pt Name: Luna Cruz  MRN: [id number]  YOB: 1965  Date of evaluation: 11/13/2018    H&P including current review of systems was updated in the paper chart and/or the document previously scanned into the record.  There have been no significant changes or new problems since the original evaluation.  The patient's problems continue and indications for contemplated procedure have not changed.    Electronically signed by Matt Maki MD on 11/13/2018 at 8:59 AM

## 2018-11-13 NOTE — ANESTHESIA PROCEDURE NOTES
ANESTHESIA INTUBATION  Urgency: elective    Airway not difficult    General Information and Staff    Patient location during procedure: OR  CRNA: Luke Vega CRNA    Indications and Patient Condition  Indications for airway management: airway protection    Preoxygenated: yes  MILS maintained throughout  Mask difficulty assessment: 1 - vent by mask    Final Airway Details  Final airway type: supraglottic airway      Successful airway: unique  Size 4    Number of attempts at approach: 1

## 2018-11-13 NOTE — BRIEF OP NOTE
KNEE ARTHROSCOPY  Progress Note    Luna A Anthony  11/13/2018    Pre-op Diagnosis:   LEFT KNEE MEDIAL MENISCUS TEAR        Post-Op Diagnosis Codes:     * Traumatic tear of medial meniscus of knee, left, initial encounter [S83.242A]    Procedure/CPT® Codes:  IL KNEE SCOPE,MED/LAT MENISECTOMY [05607]    Procedure(s):  LEFT KNEE ARTHROSCOPIC PARTIAL MEDIAL MENISCECTOMY    Surgeon(s):  Matt Maki MD    Anesthesia: General    Staff:   Circulator: Mart Jean Baptiste RN  Scrub Person: Jacinta Vidal Tiffany R  Assistant: Antelmo Gonzalez PA-C    Estimated Blood Loss: minimal    Urine Voided: * No values recorded between 11/13/2018 12:23 PM and 11/13/2018  1:01 PM *    Specimens:                None      Drains:      Findings: see op note     Complications: none      Matt Maki MD     Date: 11/13/2018  Time: 1:01 PM

## 2018-11-13 NOTE — ANESTHESIA PREPROCEDURE EVALUATION
Anesthesia Evaluation     Patient summary reviewed   history of anesthetic complications: PONV  NPO Solid Status: > 8 hours  NPO Liquid Status: > 8 hours           Airway   Mallampati: II  TM distance: >3 FB  Neck ROM: full  No difficulty expected  Dental - normal exam     Pulmonary    (+) asthma,   (-) not a smoker  Cardiovascular   Exercise tolerance: good (4-7 METS)    ECG reviewed    (+) hypertension, hyperlipidemia,   (-) past MI, angina, cardiac stents      Neuro/Psych  (-) seizures, TIA, CVA  GI/Hepatic/Renal/Endo    (+)  GERD,    (-) liver disease, no renal disease, diabetes    Musculoskeletal     (+) chronic pain,   Abdominal    Substance History      OB/GYN          Other      history of cancer (breast)                  Anesthesia Plan    ASA 2     general     intravenous induction   Anesthetic plan, all risks, benefits, and alternatives have been provided, discussed and informed consent has been obtained with: patient.

## 2018-11-13 NOTE — ANESTHESIA POSTPROCEDURE EVALUATION
Patient: Luna Cruz    Procedure Summary     Date:  11/13/18 Room / Location:   [facility] OR  /  [facility]    Anesthesia Start:  1222 Anesthesia Stop:  1308    Procedure:  LEFT KNEE ARTHROSCOPIC PARTIAL MEDIAL MENISCECTOMY (Left Knee) Diagnosis:       Traumatic tear of medial meniscus of knee, left, initial encounter      (LEFT KNEE MEDIAL MENISCUS TEAR )    Surgeon:  Matt Maki MD Provider:  Luke Vega CRNA    Anesthesia Type:  general ASA Status:  2          Anesthesia Type: general  Last vitals  BP   144/72 (11/13/18 0945)   Temp   97.8 °F (36.6 °C) (11/13/18 0945)   Pulse   86 (11/13/18 1114)   Resp   18 (11/13/18 1114)     SpO2   100 % (11/13/18 1114)     Post Anesthesia Care and Evaluation    Patient location during evaluation: PACU  Patient participation: complete - patient participated  Level of consciousness: awake and alert  Pain management: adequate  Airway patency: patent  Anesthetic complications: No anesthetic complications    Cardiovascular status: acceptable  Respiratory status: acceptable  Hydration status: acceptable    Comments: Blood pressure 144/72, pulse 86, temperature 97.8 °F (36.6 °C), temperature source Temporal, resp. rate 18, last menstrual period 06/14/2008, SpO2 100 %, not currently breastfeeding.    Pt discharged from PACU based on brown score >8

## 2018-12-31 RX ORDER — POTASSIUM CHLORIDE 1500 MG/1
TABLET, FILM COATED, EXTENDED RELEASE ORAL
Qty: 30 TABLET | Refills: 0 | OUTPATIENT
Start: 2018-12-31

## 2019-01-29 ENCOUNTER — OFFICE VISIT (OUTPATIENT)
Dept: PULMONOLOGY | Facility: CLINIC | Age: 54
End: 2019-01-29

## 2019-01-29 VITALS
BODY MASS INDEX: 29.08 KG/M2 | WEIGHT: 158 LBS | HEIGHT: 62 IN | DIASTOLIC BLOOD PRESSURE: 90 MMHG | SYSTOLIC BLOOD PRESSURE: 120 MMHG | OXYGEN SATURATION: 99 % | HEART RATE: 89 BPM

## 2019-01-29 DIAGNOSIS — Z85.3 PERSONAL HISTORY OF BREAST CANCER: ICD-10-CM

## 2019-01-29 DIAGNOSIS — Z78.9 NON-SMOKER: ICD-10-CM

## 2019-01-29 DIAGNOSIS — J45.909 MODERATE ASTHMA, UNSPECIFIED WHETHER COMPLICATED, UNSPECIFIED WHETHER PERSISTENT: Primary | ICD-10-CM

## 2019-01-29 DIAGNOSIS — E66.3 OVERWEIGHT (BMI 25.0-29.9): ICD-10-CM

## 2019-01-29 PROCEDURE — 99213 OFFICE O/P EST LOW 20 MIN: CPT | Performed by: NURSE PRACTITIONER

## 2019-01-29 NOTE — PROGRESS NOTES
HEATHER Connolly  [facility]   Res[facility] Disease Clinic  [address] [place] [address]  Phone: [phone]  Fax: [phone]     Luna Cruz is a 53 y.o. female.   CC:   Chief Complaint   Patient presents with   • Shortness of Breath        HPI: She is here for her routine visit with a history of asthma, breast cancer in 2008 and being a never smoker.  She was hospitalized this past fall with pneumonia but she is now recovered and doing well.  She is on a daily asthma regimen of Advair, theophylline, Accolate and albuterol as needed.  She uses breathing treatments less often than she does her pro-air rescue inhaler.  She reports that she occasionally has wheezing and shortness of air that is helped by her rescue inhaler.  She is followed by Dr. Nabil Shaffer for routine medical care and she is up-to-date on her flu and pneumonia vaccines.    The following portions of the patient's history were reviewed and updated as appropriate: allergies, current medications, past family history, past medical history, past social history, past surgical history and problem list.    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 breast (CMS/HCC) 9/26/2016   • Osteoporosis    • PONV (postoperative nausea and vomiting)    • Scoliosis        Family History   Problem Relation Age of Onset   • Colon polyps Mother    • Cirrhosis Mother    • Colon polyps Sister 54   • Breast cancer Sister    • No Known Problems Father    • No Known Problems Brother    • No Known Problems Daughter    • No Known Problems Son    • No Known Problems Maternal Grandmother    • No Known Problems Paternal Grandmother    • No Known Problems  "Maternal Aunt    • No Known Problems Paternal Aunt    • Colon cancer Neg Hx    • Crohn's disease Neg Hx    • Irritable bowel syndrome Neg Hx    • Liver cancer Neg Hx    • Liver disease Neg Hx    • Rectal cancer Neg Hx    • Stomach cancer Neg Hx    • BRCA 1/2 Neg Hx    • Endometrial cancer Neg Hx    • Ovarian cancer Neg Hx    • Uterine cancer Neg Hx        Social History     Socioeconomic History   • Marital status:      Spouse name: Not on file   • Number of children: Not on file   • Years of education: Not on file   • Highest education level: Not on file   Social Needs   • Financial resource strain: Not on file   • Food insecurity - worry: Not on file   • Food insecurity - inability: Not on file   • Transportation needs - medical: Not on file   • Transportation needs - non-medical: Not on file   Occupational History   • Not on file   Tobacco Use   • Smoking status: Never Smoker   • Smokeless tobacco: Never Used   Substance and Sexual Activity   • Alcohol use: No   • Drug use: No   • Sexual activity: Yes     Partners: Male     Birth control/protection: None   Other Topics Concern   • Not on file   Social History Narrative   • Not on file       Review of Systems   Constitutional: Negative for appetite change, chills, fatigue and fever.   HENT: Negative for trouble swallowing and voice change.    Eyes: Negative for visual disturbance.   Respiratory: Positive for shortness of breath (Occasional) and wheezing (Occasional). Negative for cough.    Cardiovascular: Negative for chest pain and leg swelling.   Gastrointestinal: Negative for abdominal distention, abdominal pain, nausea and vomiting.   Genitourinary: Negative.    Musculoskeletal: Negative for gait problem and myalgias.   Skin: Negative.    Neurological: Negative for weakness.   Hematological: Negative.    Psychiatric/Behavioral: The patient is not nervous/anxious.        /90   Pulse 89   Ht 157.5 cm (62\")   Wt 71.7 kg (158 lb)   LMP 06/14/2008  "  SpO2 99%   Breastfeeding? No   BMI 28.90 kg/m²     Physical Exam   Constitutional: She is oriented to person, place, and time. She appears well-developed and well-nourished. No distress.   HENT:   Head: Normocephalic and atraumatic.   Eyes: Pupils are equal, round, and reactive to light. No scleral icterus.   Neck: Normal range of motion. Neck supple.   Cardiovascular: Normal rate, regular rhythm, S1 normal and S2 normal.   Pulmonary/Chest: Effort normal and breath sounds normal. No tachypnea. She has no wheezes. She has no rhonchi. She has no rales.   Abdominal: Soft. Bowel sounds are normal. She exhibits no distension.   Musculoskeletal: Normal range of motion. She exhibits no edema.   Lymphadenopathy:     She has no cervical adenopathy.   Neurological: She is alert and oriented to person, place, and time.   Skin: Skin is warm and dry. No rash noted. No cyanosis. Nails show no clubbing.   Psychiatric: She has a normal mood and affect. Her behavior is normal.   Vitals reviewed.        Luna was seen today for shortness of breath.    Diagnoses and all orders for this visit:    Moderate asthma, unspecified whether complicated, unspecified whether persistent    Personal history of breast cancer  Comments:  2008    Non-smoker  Comments:  never smoker    Overweight (BMI 25.0-29.9)      Patient's Body mass index is 28.9 kg/m². BMI is above normal parameters. Recommendations include: Recommend increase physical activity.      Follow-up/Plan: Continue on current regimen of theophylline, Advair, Accolate, pro-air, albuterol nebs.  Return to clinic in 9 months with flow volume loop.    Klaus Wagner, APRN  1/29/2019  9:27 AM

## 2019-01-29 NOTE — PATIENT INSTRUCTIONS
Asthma, Adult  Asthma is a condition of the lungs in which the airways tighten and narrow. Asthma can make it hard to breathe. Asthma cannot be cured, but medicine and lifestyle changes can help control it. Asthma may be started (triggered) by:  · Animal skin flakes (dander).  · Dust.  · Cockroaches.  · Pollen.  · Mold.  · Smoke.  · Cleaning products.  · Hair sprays or aerosol sprays.  · Paint fumes or strong smells.  · Cold air, weather changes, and winds.  · Crying or laughing hard.  · Stress.  · Certain medicines or drugs.  · Foods, such as dried fruit, potato chips, and sparkling grape juice.  · Infections or conditions (colds, flu).  · Exercise.  · Certain medical conditions or diseases.  · Exercise or tiring activities.    Follow these instructions at home:  · Take medicine as told by your doctor.  · Use a peak flow meter as told by your doctor. A peak flow meter is a tool that measures how well the lungs are working.  · Record and keep track of the peak flow meter's readings.  · Understand and use the asthma action plan. An asthma action plan is a written plan for taking care of your asthma and treating your attacks.  · To help prevent asthma attacks:  ? Do not smoke. Stay away from secondhand smoke.  ? Change your heating and air conditioning filter often.  ? Limit your use of fireplaces and wood stoves.  ? Get rid of pests (such as roaches and mice) and their droppings.  ? Throw away plants if you see mold on them.  ? Clean your floors. Dust regularly. Use cleaning products that do not smell.  ? Have someone vacuum when you are not home. Use a vacuum  with a HEPA filter if possible.  ? Replace carpet with wood, tile, or vinyl zhao. Carpet can trap animal skin flakes and dust.  ? Use allergy-proof pillows, mattress covers, and box spring covers.  ? Wash bed sheets and blankets every week in hot water and dry them in a dryer.  ? Use blankets that are made of polyester or cotton.  ? Clean bathrooms  and aranza with bleach. If possible, have someone repaint the walls in these rooms with mold-resistant paint. Keep out of the rooms that are being cleaned and painted.  ? Wash hands often.  Contact a doctor if:  · You have make a whistling sound when breaking (wheeze), have shortness of breath, or have a cough even if taking medicine to prevent attacks.  · The colored mucus you cough up (sputum) is thicker than usual.  · The colored mucus you cough up changes from clear or white to yellow, green, gray, or bloody.  · You have problems from the medicine you are taking such as:  ? A rash.  ? Itching.  ? Swelling.  ? Trouble breathing.  · You need reliever medicines more than 2-3 times a week.  · Your peak flow measurement is still at 50-79% of your personal best after following the action plan for 1 hour.  · You have a fever.  Get help right away if:  · You seem to be worse and are not responding to medicine during an asthma attack.  · You are short of breath even at rest.  · You get short of breath when doing very little activity.  · You have trouble eating, drinking, or talking.  · You have chest pain.  · You have a fast heartbeat.  · Your lips or fingernails start to turn blue.  · You are light-headed, dizzy, or faint.  · Your peak flow is less than 50% of your personal best.  This information is not intended to replace advice given to you by your health care provider. Make sure you discuss any questions you have with your health care provider.  Document Released: 06/05/2009 Document Revised: 05/25/2017 Document Reviewed: 07/17/2014  [employer] Interactive Patient Education © 2017 [employer]

## 2019-02-21 RX ORDER — ALBUTEROL SULFATE 90 UG/1
2 AEROSOL, METERED RESPIRATORY (INHALATION) EVERY 4 HOURS PRN
Qty: 3 INHALER | Refills: 3 | Status: SHIPPED | OUTPATIENT
Start: 2019-02-21 | End: 2021-01-04 | Stop reason: SDUPTHER

## 2019-02-21 NOTE — PROGRESS NOTES
Primary Physician: Antonio Shaffer MD    Chief Complaint   Patient presents with   • Follow-up     Pt presents today for yearly f/u for GERD-needs refills on Pantoprazole; pt states overall she feels pretty good-does occasionally still have reflux       Subjective     Luna Cruz is a 53 y.o. female.    HPI   GERD/Nausea  Patient has long-standing history of reflux disease.  She is currently maintained on pantoprazole 40 mg BID.  She uses an occasional Zantac.  She does drink caffeine.  She tries not to eat late at nighttime.  She does not smoke.  She had an endoscopy done October 2016.  She is having complaints of dysphagia at that point.  She had a nonobstructing Schatzki's ring.  This was dilated up to 20 mm.  She also had a medium-sized hiatal hernia.        Dysphagia-new problem today  She is having swallowing problems occur once again.  This is been going on for the last 3-4 months.  She cannot identify any exacerbating or relieving factors.  It tends to occur with breads and meats and also with large pills.  Her weight is holding stable.  She does have occasional nausea.  She has had her gallbladder previously removed.  I reviewed her medication list and she is taking Fosamax and potassium supplements.  She does use ibuprofen 600mg prn knee pain.      Colon cancer screening/family history colon polyps in first-degree relative  She had a colonoscopy in May 2013 that showed very distal proctitis.  Biopsy showed mild chronic active inflammation.  Repeat colonoscopy in 2017 however shows complete resolution of symptoms.  Mother had colon polyps less than 60 years old.  Patient denies any melena or hematochezia.  She will be due for repeat colonoscopy in 2022.        Past Medical History:   Diagnosis Date   • Asthma    • Cancer (CMS/HCC)     breast cancer   • Chronic back pain    • Disease of thyroid gland    • Drug therapy    • GERD (gastroesophageal reflux disease)    • Hx of radiation therapy    •  Hyperlipidemia    • Hypertension    • Malignant neoplasm of upper-inner quadrant of right female breast (CMS/HCC) 9/26/2016   • Osteoporosis    • PONV (postoperative nausea and vomiting)    • Scoliosis        Past Surgical History:   Procedure Laterality Date   • ADENOIDECTOMY     • APPENDECTOMY  1994   • BREAST BIOPSY     • BREAST LUMPECTOMY Right 2008   • CHOLECYSTECTOMY  1993   • COLONOSCOPY  05/03/2013    Erythematous mucosa in the rectum-biopsied; Repeat 4 years    • COLONOSCOPY N/A 6/14/2017    Normal; Repeat 5 years; Procedure: COLONOSCOPY WITH ANESTHESIA;  Surgeon: Ksenia Fox MD;  Location: [facility] ENDOSCOPY;  Service:    • COLONOSCOPY  04/23/2010    Dr. Arzola-Extremely poorly prepped colon   • D&C HYSTEROSCOPY  1993   • DILATATION AND CURETTAGE     • ENDOSCOPY N/A 10/5/2016    Medium-sized HH; Widely patent and non-obstructing Schatzki ring-dilated; Procedure: ESOPHAGOGASTRODUODENOSCOPY WITH ANESTHESIA;  Surgeon: Ksenia Fox MD;  Location:  [facility] ENDOSCOPY;  Service:    • HYSTEROSCOPY     • KNEE ARTHROSCOPY Left 11/13/2018    Procedure: LEFT KNEE ARTHROSCOPIC PARTIAL MEDIAL MENISCECTOMY;  Surgeon: Matt Maki MD;  Location: [facility] OR;  Service: Orthopedics   • MEDIPORT INSERTION, SINGLE  2008   • MEDIPORT REMOVAL     • THYROIDECTOMY  2011    parathyroidectomy   • TONSILLECTOMY AND ADENOIDECTOMY  1970   • TUBAL ABDOMINAL LIGATION          Current Outpatient Medications:   •  albuterol (PROVENTIL HFA;VENTOLIN HFA) 108 (90 Base) MCG/ACT inhaler, Inhale 2 puffs Every 4 (Four) Hours As Needed for Wheezing., Disp: , Rfl:   •  albuterol sulfate HFA (PROAIR HFA) 108 (90 Base) MCG/ACT inhaler, Inhale 2 puffs Every 4 (Four) Hours As Needed for Wheezing., Disp: 3 inhaler, Rfl: 3  •  alendronate (FOSAMAX) 70 MG tablet, Take 70 mg by mouth Every 7 (Seven) Days. Sunday's, Disp: , Rfl:   •  atorvastatin (LIPITOR) 40 MG tablet, Take 40 mg by mouth daily., Disp: , Rfl:   •  calcitriol (ROCALTROL) 0.5  MCG capsule, Take 0.5 mcg by mouth daily., Disp: , Rfl:   •  Calcium Citrate-Vitamin D (CALCIUM + D PO), Take 1 tablet by mouth Daily., Disp: , Rfl:   •  Diclofenac Sodium (PENNSAID TD), Apply 1 application topically to the appropriate area as directed 2 (Two) Times a Day. For back pain, Disp: , Rfl:   •  fluticasone-salmeterol (ADVAIR) 250-50 MCG/DOSE DISKUS, Inhale 1 puff 2 (Two) Times a Day., Disp: , Rfl:   •  levothyroxine (SYNTHROID, LEVOTHROID) 50 MCG tablet, Take 50 mcg by mouth Daily., Disp: , Rfl:   •  loratadine (CLARITIN) 10 MG tablet, Take 10 mg by mouth Daily As Needed for Allergies., Disp: , Rfl:   •  losartan (COZAAR) 25 MG tablet, Take 25 mg by mouth daily., Disp: , Rfl:   •  ondansetron (ZOFRAN) 4 MG tablet, Take 1 tablet by mouth Every 8 (Eight) Hours As Needed for Nausea or Vomiting., Disp: 10 tablet, Rfl: 0  •  pantoprazole (PROTONIX) 40 MG EC tablet, Take 1 tablet by mouth 2 (Two) Times a Day., Disp: 180 tablet, Rfl: 3  •  potassium chloride (K-TAB) 20 MEQ tablet controlled-release ER tablet, Take 1 tablet by mouth twice a day for 3 days then 1 tablet daily.  Follow up with PCP in one week. (Patient taking differently: Take 20 mEq by mouth Daily. Take 1 tablet by mouth twice a day for 3 days then 1 tablet daily.  Follow up with PCP in one week.), Disp: 30 tablet, Rfl: 0  •  raNITIdine (ZANTAC) 150 MG tablet, Take 150 mg by mouth Every Night., Disp: , Rfl:   •  rOPINIRole (REQUIP) 0.25 MG tablet, Take 0.25 mg by mouth every night. Take 1 hour before bedtime., Disp: , Rfl:   •  theophylline (UNIPHYL) 400 MG 24 hr tablet, Take 400 mg by mouth daily., Disp: , Rfl:   •  tiZANidine (ZANAFLEX) 4 MG tablet, Take 4 mg by mouth Every 8 (Eight) Hours As Needed for Muscle Spasms., Disp: , Rfl:   •  traMADol (ULTRAM) 50 MG tablet, Take 50 mg by mouth Every 8 (Eight) Hours As Needed for Moderate Pain ., Disp: , Rfl:   •  zafirlukast (ACCOLATE) 20 MG tablet, Take 20 mg by mouth 2 (two) times a day., Disp: ,  Rfl:   No current facility-administered medications for this visit.     Facility-Administered Medications Ordered in Other Visits:   •  heparin flush (porcine) 100 UNIT/ML injection 500 Units, 500 Units, Intracatheter, PRN, Ki Manriquez MD, 500 Units at 09/27/16 1108  •  heparin flush (porcine) 100 UNIT/ML injection 500 Units, 500 Units, Intravenous, PRN, Malathi Brantley APRN, 500 Units at 05/05/17 1009  •  sodium chloride 0.9 % flush 10 mL, 10 mL, Intravenous, PRN, Ki Manriquez MD, 10 mL at 09/27/16 1107  •  sodium chloride 0.9 % flush 10 mL, 10 mL, Intravenous, PRN, Malathi Brantley, APRN, 10 mL at 05/05/17 1009    Allergies   Allergen Reactions   • Keflex [Cephalexin] Rash and Unknown (See Comments)       Social History     Socioeconomic History   • Marital status:      Spouse name: Not on file   • Number of children: Not on file   • Years of education: Not on file   • Highest education level: Not on file   Social Needs   • Financial resource strain: Not on file   • Food insecurity - worry: Not on file   • Food insecurity - inability: Not on file   • Transportation needs - medical: Not on file   • Transportation needs - non-medical: Not on file   Occupational History   • Not on file   Tobacco Use   • Smoking status: Never Smoker   • Smokeless tobacco: Never Used   Substance and Sexual Activity   • Alcohol use: No   • Drug use: No   • Sexual activity: Yes     Partners: Male     Birth control/protection: None   Other Topics Concern   • Not on file   Social History Narrative   • Not on file       Family History   Problem Relation Age of Onset   • Colon polyps Mother    • Cirrhosis Mother    • Colon polyps Sister 54   • Breast cancer Sister    • No Known Problems Father    • No Known Problems Brother    • No Known Problems Daughter    • No Known Problems Son    • No Known Problems Maternal Grandmother    • No Known Problems Paternal Grandmother    • No Known Problems  "Maternal Aunt    • No Known Problems Paternal Aunt    • Colon cancer Neg Hx    • Crohn's disease Neg Hx    • Irritable bowel syndrome Neg Hx    • Liver cancer Neg Hx    • Liver disease Neg Hx    • Rectal cancer Neg Hx    • Stomach cancer Neg Hx    • BRCA 1/2 Neg Hx    • Endometrial cancer Neg Hx    • Ovarian cancer Neg Hx    • Uterine cancer Neg Hx        Review of Systems   Constitutional: Negative for fever and unexpected weight change.   HENT: Negative for hearing loss.    Eyes: Negative for visual disturbance.   Respiratory: Negative for cough.    Cardiovascular: Negative for chest pain.   Gastrointestinal:        See HPI   Endocrine: Negative for cold intolerance and heat intolerance.   Genitourinary: Negative for dysuria.   Musculoskeletal: Positive for back pain.   Skin: Negative for rash.   Neurological: Negative for seizures.   Psychiatric/Behavioral: Negative for hallucinations.       Objective     /76 (BP Location: Left arm, Patient Position: Sitting, Cuff Size: Adult)   Pulse 98   Temp 98.5 °F (36.9 °C) (Tympanic)   Ht 157.5 cm (62\")   Wt 72.1 kg (159 lb)   LMP 06/14/2008   SpO2 99%   Breastfeeding? No   BMI 29.08 kg/m²     Physical Exam   Constitutional: She is oriented to person, place, and time. She appears well-developed and well-nourished.   Eyes: EOM are normal.   Pulmonary/Chest: Effort normal.   Musculoskeletal: Normal range of motion.   Neurological: She is alert and oriented to person, place, and time.   Skin: Skin is warm.   Psychiatric: She has a normal mood and affect. Her behavior is normal.       Lab Results   Component Value Date    WBC 6.67 11/08/2018    WBC 10.31 09/29/2018    WBC 12.42 (H) 09/28/2018    HGB 15.3 11/08/2018    HGB 13.0 09/29/2018    HGB 13.3 09/28/2018    HCT 44.2 11/08/2018    HCT 38.9 09/29/2018    HCT 38.9 09/28/2018     11/08/2018     09/29/2018     09/28/2018        Lab Results   Component Value Date     11/12/2018     " 11/08/2018     09/29/2018    K 3.6 11/12/2018    K 2.8 (C) 11/08/2018    K 3.7 09/29/2018     11/12/2018     11/08/2018     09/29/2018    CO2 24.0 11/12/2018    CO2 26.0 11/08/2018    CO2 23.0 (L) 09/29/2018    BUN 8 11/12/2018    BUN 9 11/08/2018    BUN 7 09/29/2018    CREATININE 1.05 11/12/2018    CREATININE 1.09 11/08/2018    CREATININE 0.95 09/29/2018    BILITOT 0.5 11/08/2018    BILITOT 0.6 09/27/2018    BILITOT 0.5 05/09/2018    ALKPHOS 74 11/08/2018    ALKPHOS 83 09/27/2018    ALKPHOS 78 05/09/2018    ALT 27 11/08/2018    ALT 37 09/27/2018    ALT 33 05/09/2018    AST 44 11/08/2018    AST 34 09/27/2018    AST 40 05/09/2018    GLUCOSE 87 11/12/2018    GLUCOSE 104 (H) 11/08/2018    GLUCOSE 104 (H) 09/29/2018       Lab Results   Component Value Date    INR 0.80 (L) 10/27/2014    INR 0.89 (L) 05/16/2014       IMPRESSION/PLAN:    Assessment/Plan      Problem List Items Addressed This Visit        Digestive    Gastroesophageal reflux disease    Overview     Anti-reflux measures were reviewed and discussed with patient.  They were advised to refrain from chocolate, alcohol, smoking, peppermint and caffeine.  They were advised to limit fatty foods, large meals, and eating late at nighttime.  They were advised to reach an ideal body mass index.    She is also on Fosamax.  I advised her to drink a large glass of water after taking this pill as this can cause esophagitis.    Symptoms well controlled with active medical treatment with pantoprazole.           Relevant Medications    pantoprazole (PROTONIX) 40 MG EC tablet    Dysphagia - Primary    Overview     Difficulty swallowing solids and large pills.  She has Schatzki's ring that was dilated in 2016.  Symptoms are now returning over the last 3-4 months.  We will need to repeat endoscopy again.         Current Assessment & Plan     The risks, benefits, and alternatives of endoscopy were reviewed with the patient today.  Risks including  perforation, with or without dilation, possibly requiring surgery.  Additional risks include risk of bleeding.  There is also the risk of a drug reaction or problems with anesthesia.  This will be discussed with the further by the anesthesia team on the day of the procedure. The benefits include the diagnosis and management of disease of the upper digestive tract.  Alternatives to endoscopy include upper GI series, laboratory testing, radiographic evaluation, or no intervention.  The patient verbalizes understanding and agrees.    In accordance with requirements under the Affordable Care Act, [facility] has provided pricing for all hospital services and items on each of its websites. However, a patient's actual cost may differ based on the services the patient receives to meet individual healthcare needs and based on the benefits provided under the patient’s insurance coverage.           Relevant Medications    pantoprazole (PROTONIX) 40 MG EC tablet    Other Relevant Orders    Case Request (Completed)       Other    Family history of polyps in the colon    Overview     Mom had colon polyps < 60 years old.  Last colonoscopy normal 6/2017.  Repeat 6/2022.         Overweight (BMI 25.0-29.9)    Overview     Advise weight reduction will be of benefit regarding GERD symptoms.             Patient's Body mass index is 29.08 kg/m². BMI is within normal parameters. No follow-up required..        RTC 1 year      Ksenia Fox MD  02/25/19  3:26 PM    Much of this encounter note is an electronic transcription/translation of spoken language to printed text. The electronic translation of spoken language may permit erroneous, or at times, nonsensical words or phrases to be inadvertently transcribed; although I have reviewed the note for such errors, some may still exist.

## 2019-02-25 ENCOUNTER — OFFICE VISIT (OUTPATIENT)
Dept: GASTROENTEROLOGY | Facility: CLINIC | Age: 54
End: 2019-02-25

## 2019-02-25 VITALS
HEART RATE: 98 BPM | SYSTOLIC BLOOD PRESSURE: 130 MMHG | BODY MASS INDEX: 29.26 KG/M2 | WEIGHT: 159 LBS | TEMPERATURE: 98.5 F | OXYGEN SATURATION: 99 % | DIASTOLIC BLOOD PRESSURE: 76 MMHG | HEIGHT: 62 IN

## 2019-02-25 DIAGNOSIS — Z83.71 FAMILY HISTORY OF POLYPS IN THE COLON: ICD-10-CM

## 2019-02-25 DIAGNOSIS — E66.3 OVERWEIGHT (BMI 25.0-29.9): ICD-10-CM

## 2019-02-25 DIAGNOSIS — K21.9 GASTROESOPHAGEAL REFLUX DISEASE, ESOPHAGITIS PRESENCE NOT SPECIFIED: ICD-10-CM

## 2019-02-25 DIAGNOSIS — R13.19 ESOPHAGEAL DYSPHAGIA: Primary | ICD-10-CM

## 2019-02-25 PROCEDURE — 99214 OFFICE O/P EST MOD 30 MIN: CPT | Performed by: INTERNAL MEDICINE

## 2019-02-25 RX ORDER — PANTOPRAZOLE SODIUM 40 MG/1
40 TABLET, DELAYED RELEASE ORAL 2 TIMES DAILY
Qty: 180 TABLET | Refills: 3 | Status: SHIPPED | OUTPATIENT
Start: 2019-02-25 | End: 2019-07-08

## 2019-02-25 NOTE — PATIENT INSTRUCTIONS
Anti-reflux measures were reviewed and discussed with patient.  They were advised to refrain from chocolate, alcohol, smoking, peppermint and caffeine.  They were advised to limit fatty foods, large meals, and eating late at nighttime.  They were advised to reach an ideal body mass index.

## 2019-02-25 NOTE — ASSESSMENT & PLAN NOTE
The risks, benefits, and alternatives of endoscopy were reviewed with the patient today.  Risks including perforation, with or without dilation, possibly requiring surgery.  Additional risks include risk of bleeding.  There is also the risk of a drug reaction or problems with anesthesia.  This will be discussed with the further by the anesthesia team on the day of the procedure. The benefits include the diagnosis and management of disease of the upper digestive tract.  Alternatives to endoscopy include upper GI series, laboratory testing, radiographic evaluation, or no intervention.  The patient verbalizes understanding and agrees.    In accordance with requirements under the Affordable Care Act, [facility] has provided pricing for all hospital services and items on each of its websites. However, a patient's actual cost may differ based on the services the patient receives to meet individual healthcare needs and based on the benefits provided under the patient’s insurance coverage.

## 2019-02-26 PROBLEM — R13.19 ESOPHAGEAL DYSPHAGIA: Status: ACTIVE | Noted: 2019-02-26

## 2019-02-27 ENCOUNTER — ANESTHESIA EVENT (OUTPATIENT)
Dept: GASTROENTEROLOGY | Facility: HOSPITAL | Age: 54
End: 2019-02-27

## 2019-02-27 ENCOUNTER — ANESTHESIA (OUTPATIENT)
Dept: GASTROENTEROLOGY | Facility: HOSPITAL | Age: 54
End: 2019-02-27

## 2019-02-27 ENCOUNTER — HOSPITAL ENCOUNTER (OUTPATIENT)
Facility: HOSPITAL | Age: 54
Setting detail: HOSPITAL OUTPATIENT SURGERY
Discharge: HOME | End: 2019-02-27
Attending: INTERNAL MEDICINE | Admitting: INTERNAL MEDICINE

## 2019-02-27 VITALS
HEIGHT: 61 IN | SYSTOLIC BLOOD PRESSURE: 148 MMHG | RESPIRATION RATE: 14 BRPM | DIASTOLIC BLOOD PRESSURE: 72 MMHG | HEART RATE: 80 BPM | OXYGEN SATURATION: 99 % | WEIGHT: 159 LBS | BODY MASS INDEX: 30.02 KG/M2 | TEMPERATURE: 97.6 F

## 2019-02-27 DIAGNOSIS — R13.19 ESOPHAGEAL DYSPHAGIA: ICD-10-CM

## 2019-02-27 PROCEDURE — 25010000002 PROPOFOL 10 MG/ML EMULSION: Performed by: NURSE ANESTHETIST, CERTIFIED REGISTERED

## 2019-02-27 PROCEDURE — 43239 EGD BIOPSY SINGLE/MULTIPLE: CPT | Performed by: INTERNAL MEDICINE

## 2019-02-27 PROCEDURE — 43249 ESOPH EGD DILATION <30 MM: CPT | Performed by: INTERNAL MEDICINE

## 2019-02-27 PROCEDURE — 88305 TISSUE EXAM BY PATHOLOGIST: CPT | Performed by: INTERNAL MEDICINE

## 2019-02-27 PROCEDURE — 87081 CULTURE SCREEN ONLY: CPT | Performed by: INTERNAL MEDICINE

## 2019-02-27 PROCEDURE — C1726 CATH, BAL DIL, NON-VASCULAR: HCPCS | Performed by: INTERNAL MEDICINE

## 2019-02-27 RX ORDER — SODIUM CHLORIDE 0.9 % (FLUSH) 0.9 %
3 SYRINGE (ML) INJECTION AS NEEDED
Status: DISCONTINUED | OUTPATIENT
Start: 2019-02-27 | End: 2019-02-27 | Stop reason: HOSPADM

## 2019-02-27 RX ORDER — SODIUM CHLORIDE 9 MG/ML
500 INJECTION, SOLUTION INTRAVENOUS CONTINUOUS PRN
Status: DISCONTINUED | OUTPATIENT
Start: 2019-02-27 | End: 2019-02-27 | Stop reason: HOSPADM

## 2019-02-27 RX ORDER — PROPOFOL 10 MG/ML
VIAL (ML) INTRAVENOUS AS NEEDED
Status: DISCONTINUED | OUTPATIENT
Start: 2019-02-27 | End: 2019-02-27 | Stop reason: SURG

## 2019-02-27 RX ORDER — LIDOCAINE HYDROCHLORIDE 20 MG/ML
INJECTION, SOLUTION INFILTRATION; PERINEURAL AS NEEDED
Status: DISCONTINUED | OUTPATIENT
Start: 2019-02-27 | End: 2019-02-27 | Stop reason: SURG

## 2019-02-27 RX ADMIN — LIDOCAINE HYDROCHLORIDE 50 MG: 20 INJECTION, SOLUTION INFILTRATION; PERINEURAL at 08:12

## 2019-02-27 RX ADMIN — LIDOCAINE HYDROCHLORIDE 50 MG: 20 INJECTION, SOLUTION INFILTRATION; PERINEURAL at 08:07

## 2019-02-27 RX ADMIN — PROPOFOL 5 MG: 10 INJECTION, EMULSION INTRAVENOUS at 08:07

## 2019-02-27 RX ADMIN — SODIUM CHLORIDE 500 ML: 9 INJECTION, SOLUTION INTRAVENOUS at 07:30

## 2019-02-27 NOTE — ANESTHESIA POSTPROCEDURE EVALUATION
Patient: Luna Cruz    Procedure Summary     Date:  02/27/19 Room / Location:  [facility] ENDOSCOPY 2 / [facility]    Anesthesia Start:  0806 Anesthesia Stop:  0818    Procedure:  ESOPHAGOGASTRODUODENOSCOPY WITH ANESTHESIA (N/A ) Diagnosis:       Esophageal dysphagia      (Esophageal dysphagia [R13.10])    Surgeon:  Ksenia Fox MD Provider:  Sheldon Hernandez CRNA    Anesthesia Type:  MAC ASA Status:  2          Anesthesia Type: MAC  Last vitals  BP   159/96 (02/27/19 0715)   Temp   97.6 °F (36.4 °C) (02/27/19 0715)   Pulse   84 (02/27/19 0715)   Resp   18 (02/27/19 0715)     SpO2   99 % (02/27/19 0715)     Post Anesthesia Care and Evaluation    Patient location during evaluation: PHASE II  Patient participation: complete - patient participated  Level of consciousness: awake and alert  Pain score: 0  Pain management: adequate  Airway patency: patent  Anesthetic complications: No anesthetic complications  PONV Status: none  Cardiovascular status: acceptable and stable  Respiratory status: acceptable  Hydration status: acceptable

## 2019-02-27 NOTE — ANESTHESIA PREPROCEDURE EVALUATION
Anesthesia Evaluation     Patient summary reviewed   no history of anesthetic complications:  NPO Solid Status: > 8 hours             Airway   Mallampati: II  TM distance: >3 FB  Neck ROM: full  Dental      Pulmonary    (+) asthma,   Cardiovascular     (+) hypertension, hyperlipidemia,       Neuro/Psych- negative ROS  GI/Hepatic/Renal/Endo    (+)  GERD,  hypothyroidism,     Musculoskeletal     Abdominal    Substance History      OB/GYN          Other                        Anesthesia Plan    ASA 2     MAC     intravenous induction   Anesthetic plan, all risks, benefits, and alternatives have been provided, discussed and informed consent has been obtained with: patient.

## 2019-02-28 LAB
CYTO UR: NORMAL
LAB AP CASE REPORT: NORMAL
PATH REPORT.FINAL DX SPEC: NORMAL
PATH REPORT.GROSS SPEC: NORMAL
UREASE TISS QL: NEGATIVE

## 2019-03-06 ENCOUNTER — HOSPITAL ENCOUNTER (OUTPATIENT)
Dept: GENERAL RADIOLOGY | Facility: HOSPITAL | Age: 54
Discharge: HOME | End: 2019-03-06
Admitting: INTERNAL MEDICINE

## 2019-03-06 ENCOUNTER — TELEPHONE (OUTPATIENT)
Dept: GASTROENTEROLOGY | Facility: CLINIC | Age: 54
End: 2019-03-06

## 2019-03-06 DIAGNOSIS — R13.19 ESOPHAGEAL DYSPHAGIA: ICD-10-CM

## 2019-03-06 PROCEDURE — 74220 X-RAY XM ESOPHAGUS 1CNTRST: CPT

## 2019-03-06 RX ADMIN — BARIUM SULFATE 240 ML: 960 POWDER, FOR SUSPENSION ORAL at 09:07

## 2019-03-06 RX ADMIN — BARIUM SULFATE 700 MG: 700 TABLET ORAL at 09:07

## 2019-03-06 NOTE — TELEPHONE ENCOUNTER
Please let her know that her barium swallow shows that she has a large hiatal hernia which we already knew.  The pill got temporarily hung up in the bottom part of the esophagus.  This is the very area that I stretched.  I cannot stretch it any larger than what I did.  Advised her to continue Protonix 40 mg daily.  Let us have her return to see Amy in 4-6 weeks.  If she is still having swallowing complaints, we could consider repeat endoscopy with dilation as I just did.  Let us also keep the appointment as already scheduled with me for July.    Ksenia Fox MD

## 2019-03-07 DIAGNOSIS — J45.998 OTHER ASTHMA: Primary | ICD-10-CM

## 2019-03-07 RX ORDER — THEOPHYLLINE 400 MG/1
400 TABLET, EXTENDED RELEASE ORAL DAILY
Qty: 90 TABLET | Refills: 3 | Status: SHIPPED | OUTPATIENT
Start: 2019-03-07 | End: 2020-05-18

## 2019-03-07 NOTE — TELEPHONE ENCOUNTER
Can you schedule pt with Amy in 4-6 weeks for a f/u? Please don't cancel the appt in July with Dr. Fox as Dr. Fox would still like to see the pt that day. Thanks.

## 2019-03-07 NOTE — TELEPHONE ENCOUNTER
Pt returned my call this morning-left me a VM. I called her back and spoke to her re: results-she VU. I tried to transfer her up front to schedule OV but was unable to reach anyone. I will have Linda call pt to schedule OV. Pt advised to call back between now and then with any further questions/problems.

## 2019-04-02 ENCOUNTER — TELEPHONE (OUTPATIENT)
Dept: SURGERY | Age: 54
End: 2019-04-02

## 2019-04-02 NOTE — TELEPHONE ENCOUNTER
I called patient today at 0935, no answer, left  telling her I was calling to inform her of some appt info[facility] & then appt at our office to see [facility] on 5/10/19 @ 0900.

## 2019-04-22 ENCOUNTER — OFFICE VISIT (OUTPATIENT)
Dept: GASTROENTEROLOGY | Facility: CLINIC | Age: 54
End: 2019-04-22

## 2019-04-22 VITALS
TEMPERATURE: 98.6 F | HEIGHT: 62 IN | WEIGHT: 161 LBS | BODY MASS INDEX: 29.63 KG/M2 | OXYGEN SATURATION: 98 % | HEART RATE: 93 BPM | SYSTOLIC BLOOD PRESSURE: 132 MMHG | DIASTOLIC BLOOD PRESSURE: 74 MMHG

## 2019-04-22 DIAGNOSIS — K21.9 GASTROESOPHAGEAL REFLUX DISEASE, ESOPHAGITIS PRESENCE NOT SPECIFIED: ICD-10-CM

## 2019-04-22 DIAGNOSIS — R13.19 OTHER DYSPHAGIA: Primary | ICD-10-CM

## 2019-04-22 DIAGNOSIS — R12 HEARTBURN: ICD-10-CM

## 2019-04-22 PROCEDURE — 99213 OFFICE O/P EST LOW 20 MIN: CPT | Performed by: NURSE PRACTITIONER

## 2019-04-22 NOTE — PROGRESS NOTES
Chief Complaint:   Chief Complaint   Patient presents with   • Follow-up     Pt presents today for dysphagia follow up-had endo and barium swallow 2/27/19; pt states she feels a lot better since her endo, but does still have occasional issues about once a week         Patient ID: Luna Cruz is a 53 y.o. female     History of Present Illness: This is a very pleasant 53-year-old female who is here to follow-up status post EGD and barium swallow for dysphagia.    The patient underwent EGD on 2/27/19 for dysphagia.  A widely patent Schatzki's ring was found at the GE junction dilated shin was performed.  Is abnormal motility noted in the upper third of the esophagus in the middle third and lower third.  There is sigmoid esophagus of the esophageal body.  Suspicious for presbyesophagus patient instructed to continue on Zantac.  Barium swallow was ordered.  The patient was notified that she was found to have a very large hiatal hernia which was already noted.  The pill did get temporarily hung in the bottom part of the esophagus which was the area that Dr. Fox stretched on EGD.  The patient was informed that she could not stretch it any larger than what was performed.    The patient states today that she has felt much better since the dilation was performed on EGD.  She states she does have some occasional difficulty swallowing about once a week. The patient denies any nausea, vomiting, epigastric pain,  pyrosis or hematemesis.  The patient denies any fever or chills.  Denies any melena or hematochezia.  Denies any unintentional weight loss or loss of appetite.    Barium swallow 3/6/19  Summary:  1. Large hiatal hernia with prominent gastroesophageal reflux.  2. Distal esophageal narrowing with moderate delay before a 1/2 inch  barium tablet would pass into the stomach.  This report was finalized on 03/06/2019 09:28 by Dr. Isael Cesar MD.    Past Medical History:   Diagnosis Date   • Asthma    • Cancer (CMS/HCC)      breast cancer   • Chronic back pain    • Disease of thyroid gland    • Drug therapy    • GERD (gastroesophageal reflux disease)    • Hx of radiation therapy    • Hyperlipidemia    • Hypertension    • Malignant neoplasm of upper-inner quadrant of right female breast (CMS/HCC) 9/26/2016   • Osteoporosis    • PONV (postoperative nausea and vomiting)    • Scoliosis        Past Surgical History:   Procedure Laterality Date   • ADENOIDECTOMY     • APPENDECTOMY  1994   • BREAST BIOPSY     • BREAST LUMPECTOMY Right 2008   • CHOLECYSTECTOMY  1993   • COLONOSCOPY  05/03/2013    Erythematous mucosa in the rectum-biopsied; Repeat 4 years    • COLONOSCOPY N/A 6/14/2017    Normal; Repeat 5 years; Procedure: COLONOSCOPY WITH ANESTHESIA;  Surgeon: Ksenia Fox MD;  Location: [facility] ENDOSCOPY;  Service:    • COLONOSCOPY  04/23/2010    Dr. Arzola-Extremely poorly prepped colon   • D&C HYSTEROSCOPY  1993   • DILATATION AND CURETTAGE     • ENDOSCOPY N/A 10/5/2016    Medium-sized HH; Widely patent and non-obstructing Schatzki ring-dilated; Procedure: ESOPHAGOGASTRODUODENOSCOPY WITH ANESTHESIA;  Surgeon: Ksenia Fox MD;  Location: [facility] ENDOSCOPY;  Service:    • ENDOSCOPY N/A 2/27/2019    Normal 1st portion of the duodenum and 2 portion of the duodenum; A few gastric polyps-biopsied; Small HH; Widely patent Schatzki ring-dilated; Abnormal esophageal motility, suspicious for presbyesophagus; Arrange for barium swallow to assess for dysmotility   • HYSTEROSCOPY     • KNEE ARTHROSCOPY Left 11/13/2018    Procedure: LEFT KNEE ARTHROSCOPIC PARTIAL MEDIAL MENISCECTOMY;  Surgeon: Matt Maki MD;  Location: [facility] OR;  Service: Orthopedics   • MEDIPORT INSERTION, SINGLE  2008   • MEDIPORT REMOVAL     • THYROIDECTOMY  2011    parathyroidectomy   • TONSILLECTOMY AND ADENOIDECTOMY  1970   • TUBAL ABDOMINAL LIGATION           Current Outpatient Medications:   •  albuterol (PROVENTIL HFA;VENTOLIN HFA) 108 (90 Base) MCG/ACT  inhaler, Inhale 2 puffs Every 4 (Four) Hours As Needed for Wheezing., Disp: , Rfl:   •  albuterol sulfate HFA (PROAIR HFA) 108 (90 Base) MCG/ACT inhaler, Inhale 2 puffs Every 4 (Four) Hours As Needed for Wheezing., Disp: 3 inhaler, Rfl: 3  •  alendronate (FOSAMAX) 70 MG tablet, Take 70 mg by mouth Every 7 (Seven) Days. Sunday's, Disp: , Rfl:   •  atorvastatin (LIPITOR) 40 MG tablet, Take 40 mg by mouth daily., Disp: , Rfl:   •  calcitriol (ROCALTROL) 0.5 MCG capsule, Take 0.5 mcg by mouth daily., Disp: , Rfl:   •  Calcium Citrate-Vitamin D (CALCIUM + D PO), Take 1 tablet by mouth Daily., Disp: , Rfl:   •  Diclofenac Sodium (PENNSAID TD), Apply 1 application topically to the appropriate area as directed 2 (Two) Times a Day. For back pain, Disp: , Rfl:   •  fluticasone-salmeterol (ADVAIR) 250-50 MCG/DOSE DISKUS, Inhale 1 puff 2 (Two) Times a Day., Disp: , Rfl:   •  levothyroxine (SYNTHROID, LEVOTHROID) 50 MCG tablet, Take 50 mcg by mouth Daily., Disp: , Rfl:   •  loratadine (CLARITIN) 10 MG tablet, Take 10 mg by mouth Daily As Needed for Allergies., Disp: , Rfl:   •  losartan (COZAAR) 25 MG tablet, Take 25 mg by mouth daily., Disp: , Rfl:   •  ondansetron (ZOFRAN) 4 MG tablet, Take 1 tablet by mouth Every 8 (Eight) Hours As Needed for Nausea or Vomiting., Disp: 10 tablet, Rfl: 0  •  pantoprazole (PROTONIX) 40 MG EC tablet, Take 1 tablet by mouth 2 (Two) Times a Day., Disp: 180 tablet, Rfl: 3  •  potassium chloride (K-TAB) 20 MEQ tablet controlled-release ER tablet, Take 1 tablet by mouth twice a day for 3 days then 1 tablet daily.  Follow up with PCP in one week. (Patient taking differently: Take 20 mEq by mouth Daily. Take 1 tablet by mouth twice a day for 3 days then 1 tablet daily.  Follow up with PCP in one week.), Disp: 30 tablet, Rfl: 0  •  rOPINIRole (REQUIP) 0.25 MG tablet, Take 0.25 mg by mouth every night. Take 1 hour before bedtime., Disp: , Rfl:   •  theophylline (UNIPHYL) 400 MG 24 hr tablet, Take 1  tablet by mouth Daily., Disp: 90 tablet, Rfl: 3  •  tiZANidine (ZANAFLEX) 4 MG tablet, Take 4 mg by mouth Every 8 (Eight) Hours As Needed for Muscle Spasms., Disp: , Rfl:   •  traMADol (ULTRAM) 50 MG tablet, Take 50 mg by mouth Every 8 (Eight) Hours As Needed for Moderate Pain ., Disp: , Rfl:   •  zafirlukast (ACCOLATE) 20 MG tablet, Take 20 mg by mouth 2 (two) times a day., Disp: , Rfl:   No current facility-administered medications for this visit.     Facility-Administered Medications Ordered in Other Visits:   •  heparin flush (porcine) 100 UNIT/ML injection 500 Units, 500 Units, Intracatheter, PRN, Ki Manriquez MD, 500 Units at 09/27/16 1108  •  heparin flush (porcine) 100 UNIT/ML injection 500 Units, 500 Units, Intravenous, PRN, Malathi Brantley APRN, 500 Units at 05/05/17 1009  •  sodium chloride 0.9 % flush 10 mL, 10 mL, Intravenous, PRN, Ki Manriquez MD, 10 mL at 09/27/16 1107  •  sodium chloride 0.9 % flush 10 mL, 10 mL, Intravenous, PRN, Malathi Brantley APRN, 10 mL at 05/05/17 1009    Allergies   Allergen Reactions   • Keflex [Cephalexin] Rash and Unknown (See Comments)       Social History     Socioeconomic History   • Marital status:      Spouse name: Not on file   • Number of children: Not on file   • Years of education: Not on file   • Highest education level: Not on file   Tobacco Use   • Smoking status: Never Smoker   • Smokeless tobacco: Never Used   Substance and Sexual Activity   • Alcohol use: No   • Drug use: No   • Sexual activity: Yes     Partners: Male     Birth control/protection: None       Family History   Problem Relation Age of Onset   • Colon polyps Mother    • Cirrhosis Mother    • Colon polyps Sister 54   • Breast cancer Sister    • No Known Problems Father    • No Known Problems Brother    • No Known Problems Daughter    • No Known Problems Son    • No Known Problems Maternal Grandmother    • No Known Problems Paternal Grandmother    •  "No Known Problems Maternal Aunt    • No Known Problems Paternal Aunt    • Colon cancer Neg Hx    • Crohn's disease Neg Hx    • Irritable bowel syndrome Neg Hx    • Liver cancer Neg Hx    • Liver disease Neg Hx    • Rectal cancer Neg Hx    • Stomach cancer Neg Hx    • BRCA 1/2 Neg Hx    • Endometrial cancer Neg Hx    • Ovarian cancer Neg Hx    • Uterine cancer Neg Hx        Vitals:    04/22/19 1333   BP: 132/74   BP Location: Left arm   Patient Position: Sitting   Cuff Size: Adult   Pulse: 93   Temp: 98.6 °F (37 °C)   TempSrc: Tympanic   SpO2: 98%   Weight: 73 kg (161 lb)   Height: 157.5 cm (62\")       Review of Systems:    General:    Present -feeling well   Skin:    Not Present-Rash   HEENT:     Not Present-Acute visual changes or Acute hearing changes   Neck :    Not Present- swollen glands   Genitourinary:      Not Present- burning, frequency, urgency hematuria, dysuria,   Cardiovascular:   Not Present-chest pain, palpitations, or pressure   Respiratory:   Not Present- shortness of breath or cough   Gastrointestinal:  Musculoskeletal:  Neurological:  Psychiatric:   Present as mentioned in the HP    Not Present. Recent gait disturbances.    Not Present-Seizures and weakness in extremities.    Not Present- Anxiety or Depression.       Physical Exam:    General Appearance:    Alert, cooperative, in no acute distress   Psych:    Mood appropriate    Eyes:          conjunctivae and sclerae normal, no   icterus, no pallor   ENMT:    Ears appear intact with no abnormalities noted oral mucosa moist   Neck:   No adenopathy, supple, trachea midline, no thyromegaly, no   carotid bruit, no JVD    Cardiovascular:    Regular rhythm and normal rate, normal S1 and S2, no            murmur, no gallop, no rub, no click   Gastrointestinal:     Inspection normal.  Normal bowel sounds, no masses, no organomegaly, soft round non-tender, non-distended, no guarding, no rebound or tenderness. No hepatosplenomegaly.   Skin:   No bleeding, " bruising or rash   Neurologic:   nonfocal       Lab Results   Component Value Date    WBC 6.67 11/08/2018    WBC 10.31 09/29/2018    WBC 12.42 (H) 09/28/2018    HGB 15.3 11/08/2018    HGB 13.0 09/29/2018    HGB 13.3 09/28/2018    HCT 44.2 11/08/2018    HCT 38.9 09/29/2018    HCT 38.9 09/28/2018     11/08/2018     09/29/2018     09/28/2018        Lab Results   Component Value Date     11/12/2018     11/08/2018     09/29/2018    K 3.6 11/12/2018    K 2.8 (C) 11/08/2018    K 3.7 09/29/2018     11/12/2018     11/08/2018     09/29/2018    CO2 24.0 11/12/2018    CO2 26.0 11/08/2018    CO2 23.0 (L) 09/29/2018    BUN 8 11/12/2018    BUN 9 11/08/2018    BUN 7 09/29/2018    CREATININE 1.05 11/12/2018    CREATININE 1.09 11/08/2018    CREATININE 0.95 09/29/2018    BILITOT 0.5 11/08/2018    BILITOT 0.6 09/27/2018    BILITOT 0.5 05/09/2018    ALKPHOS 74 11/08/2018    ALKPHOS 83 09/27/2018    ALKPHOS 78 05/09/2018    ALT 27 11/08/2018    ALT 37 09/27/2018    ALT 33 05/09/2018    AST 44 11/08/2018    AST 34 09/27/2018    AST 40 05/09/2018    GLUCOSE 87 11/12/2018    GLUCOSE 104 (H) 11/08/2018    GLUCOSE 104 (H) 09/29/2018       Lab Results   Component Value Date    INR 0.80 (L) 10/27/2014    INR 0.89 (L) 05/16/2014       Body mass index is 29.45 kg/m². Patient's Body mass index is 29.45 kg/m². BMI is above normal parameters. Recommendations include: nutrition counseling.    Assessment and Plan:  Assessment/Plan   Luna was seen today for follow-up.    Diagnoses and all orders for this visit:    Other dysphagia    Gastroesophageal reflux disease, esophagitis presence not specified  Comments:  feels well controlled on Protonix and has been taking bid for about one year    Heartburn         There are no Patient Instructions on file for this visit.    Next follow-up appointment        EMR Dragon/Transcription disclaimer:  Much of this encounter note is an electronic  transcription/translation of spoken language to printed text. The electronic translation of spoken language may permit erroneous, or at times, nonsensical words or phrases to be inadvertently transcribed; although I have reviewed the note for such errors, some may still exist.

## 2019-05-06 DIAGNOSIS — D50.0 IRON DEFICIENCY ANEMIA DUE TO CHRONIC BLOOD LOSS: Primary | ICD-10-CM

## 2019-05-07 ENCOUNTER — DOCUMENTATION (OUTPATIENT)
Dept: PULMONOLOGY | Facility: CLINIC | Age: 54
End: 2019-05-07

## 2019-05-08 ENCOUNTER — HOSPITAL ENCOUNTER (OUTPATIENT)
Dept: WOMENS IMAGING | Age: 54
Discharge: HOME | End: 2019-05-08
Payer: MEDICARE

## 2019-05-08 ENCOUNTER — APPOINTMENT (OUTPATIENT)
Dept: LAB | Facility: HOSPITAL | Age: 54
End: 2019-05-08

## 2019-05-08 ENCOUNTER — OFFICE VISIT (OUTPATIENT)
Dept: ONCOLOGY | Facility: CLINIC | Age: 54
End: 2019-05-08

## 2019-05-08 VITALS
HEIGHT: 62 IN | TEMPERATURE: 98.4 F | OXYGEN SATURATION: 93 % | DIASTOLIC BLOOD PRESSURE: 68 MMHG | HEART RATE: 84 BPM | RESPIRATION RATE: 18 BRPM | SYSTOLIC BLOOD PRESSURE: 124 MMHG | WEIGHT: 163.2 LBS | BODY MASS INDEX: 30.03 KG/M2

## 2019-05-08 DIAGNOSIS — Z85.3 HISTORY OF MALIGNANT NEOPLASM OF BREAST: Primary | ICD-10-CM

## 2019-05-08 DIAGNOSIS — D50.0 IRON DEFICIENCY ANEMIA DUE TO CHRONIC BLOOD LOSS: Primary | ICD-10-CM

## 2019-05-08 DIAGNOSIS — Z12.31 ENCOUNTER FOR SCREENING MAMMOGRAM FOR HIGH-RISK PATIENT: ICD-10-CM

## 2019-05-08 LAB
ALBUMIN SERPL-MCNC: 4.2 G/DL (ref 3.5–5)
ALBUMIN/GLOB SERPL: 1.4 G/DL (ref 1.1–2.5)
ALP SERPL-CCNC: 91 U/L (ref 24–120)
ALT SERPL W P-5'-P-CCNC: 22 U/L (ref 0–54)
ANION GAP SERPL CALCULATED.3IONS-SCNC: 9 MMOL/L (ref 4–13)
AST SERPL-CCNC: 33 U/L (ref 7–45)
BASOPHILS # BLD AUTO: 0.06 10*3/MM3 (ref 0–0.2)
BASOPHILS NFR BLD AUTO: 0.7 % (ref 0–2)
BILIRUB SERPL-MCNC: 0.3 MG/DL (ref 0.1–1)
BUN BLD-MCNC: 14 MG/DL (ref 5–21)
BUN/CREAT SERPL: 13.2 (ref 7–25)
CALCIUM SPEC-SCNC: 9.3 MG/DL (ref 8.4–10.4)
CHLORIDE SERPL-SCNC: 105 MMOL/L (ref 98–110)
CO2 SERPL-SCNC: 27 MMOL/L (ref 24–31)
CREAT BLD-MCNC: 1.06 MG/DL (ref 0.5–1.4)
DEPRECATED RDW RBC AUTO: 43.6 FL (ref 40–54)
EOSINOPHIL # BLD AUTO: 0.33 10*3/MM3 (ref 0–0.7)
EOSINOPHIL NFR BLD AUTO: 3.7 % (ref 0–4)
ERYTHROCYTE [DISTWIDTH] IN BLOOD BY AUTOMATED COUNT: 13.5 % (ref 12–15)
FERRITIN SERPL-MCNC: 185 NG/ML (ref 11.1–264)
GFR SERPL CREATININE-BSD FRML MDRD: 54 ML/MIN/1.73
GLOBULIN UR ELPH-MCNC: 3 GM/DL
GLUCOSE BLD-MCNC: 76 MG/DL (ref 70–100)
HCT VFR BLD AUTO: 43.9 % (ref 37–47)
HGB BLD-MCNC: 14.7 G/DL (ref 12–16)
HOLD SPECIMEN: NORMAL
IMM GRANULOCYTES # BLD AUTO: 0.02 10*3/MM3 (ref 0–0.05)
IMM GRANULOCYTES NFR BLD AUTO: 0.2 % (ref 0–5)
IRON 24H UR-MRATE: 58 MCG/DL (ref 42–180)
IRON SATN MFR SERPL: 22 % (ref 20–45)
LYMPHOCYTES # BLD AUTO: 2.16 10*3/MM3 (ref 0.72–4.86)
LYMPHOCYTES NFR BLD AUTO: 24.3 % (ref 15–45)
MCH RBC QN AUTO: 29.6 PG (ref 28–32)
MCHC RBC AUTO-ENTMCNC: 33.5 G/DL (ref 33–36)
MCV RBC AUTO: 88.3 FL (ref 82–98)
MONOCYTES # BLD AUTO: 0.59 10*3/MM3 (ref 0.19–1.3)
MONOCYTES NFR BLD AUTO: 6.6 % (ref 4–12)
NEUTROPHILS # BLD AUTO: 5.73 10*3/MM3 (ref 1.87–8.4)
NEUTROPHILS NFR BLD AUTO: 64.5 % (ref 39–78)
NRBC BLD AUTO-RTO: 0 /100 WBC (ref 0–0.2)
PLATELET # BLD AUTO: 344 10*3/MM3 (ref 130–400)
PMV BLD AUTO: 9.9 FL (ref 6–12)
POTASSIUM BLD-SCNC: 4.1 MMOL/L (ref 3.5–5.3)
PROT SERPL-MCNC: 7.2 G/DL (ref 6.3–8.7)
RBC # BLD AUTO: 4.97 10*6/MM3 (ref 4.2–5.4)
SODIUM BLD-SCNC: 141 MMOL/L (ref 135–145)
TIBC SERPL-MCNC: 269 MCG/DL (ref 225–420)
WBC NRBC COR # BLD: 8.89 10*3/MM3 (ref 4.8–10.8)

## 2019-05-08 PROCEDURE — 83540 ASSAY OF IRON: CPT | Performed by: INTERNAL MEDICINE

## 2019-05-08 PROCEDURE — 82728 ASSAY OF FERRITIN: CPT | Performed by: INTERNAL MEDICINE

## 2019-05-08 PROCEDURE — 80053 COMPREHEN METABOLIC PANEL: CPT | Performed by: INTERNAL MEDICINE

## 2019-05-08 PROCEDURE — 83550 IRON BINDING TEST: CPT | Performed by: INTERNAL MEDICINE

## 2019-05-08 PROCEDURE — 85025 COMPLETE CBC W/AUTO DIFF WBC: CPT | Performed by: INTERNAL MEDICINE

## 2019-05-08 PROCEDURE — 99213 OFFICE O/P EST LOW 20 MIN: CPT | Performed by: INTERNAL MEDICINE

## 2019-05-08 PROCEDURE — 36415 COLL VENOUS BLD VENIPUNCTURE: CPT | Performed by: INTERNAL MEDICINE

## 2019-05-08 PROCEDURE — 77063 BREAST TOMOSYNTHESIS BI: CPT

## 2019-05-08 NOTE — PROGRESS NOTES
[facility]  HEMATOLOGY & ONCOLOGY    Cancer Staging Information:  No matching staging information was found for the patient.      Subjective     VISIT DIAGNOSIS:   No diagnosis found.    REASON FOR VISIT:     Chief Complaint   Patient presents with   • Breast Cancer     She is here for 6 mo f/u visit today, mammo this am KACEY Miles fall 2018   • Anemia     Here to go over her lab work   • DEPRESSION SCREENING     Initial Depression Screening (see chart)        HEMATOLOGY / ONCOLOGY HISTORY:   Oncology/Hematology History    Ms. Luna Cruz is a pleasant 49 year old female with diagnosis of pT1pN1(mi)M0 right breast cancer, ER positive, VA positive, HER2/  leonila normal diagnosed in August 2008. She has passed the 3 yearmark.  Back then, the patient underwent lumpectomy. Tumor was  located in the lower inner quadrant of the right breast. [place] lymph node dissection found to have residual DCIS. She went on to receive  4 cycles of non anthracycline based chemotherapy with Taxotere and Cytoxan. This was followed by radiation therapy and has been  tamoxifen since 11/25/08.  She is here today for regular scheduled followup. . We will request sestamibi scan anyway. Her tumor markers have been stable. No  evidence of effusion or lymphadenopathy. Thyroid ultrasound on 07/25/11 ordered by Dr. Villasenor for hyperthyroidism study show small  thyroid, no lesions identified.  INTERVAL HISTORY  This patient had a lumpectomy for a stage IIA, estrogen receptorpositive,  HER2/  neunegative  breast cancer in 2008. She received  adjuvant interventions that included 5 years of tamoxifen administration.             INTERVAL HISTORY  Patient ID: Luna Cruz is a 53 y.o. year old female  With br ca here for f/u. She had endo and barium swallow 2/27/19 that showed patent Schatzki's ring at the GE jxn. pt states she feels a lot better since her endo, but does still have occasional issues about once a week.  She is  scheduled to undergo knee surgery sometime soon  Denies new lumps or bumps. Denies sob, cp, n/v abdominal pain, BLANQUITA, new back pain, focal weakness, weight loss, night sweats.  Rest of ros unremarkable.      Past Medical History:   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 breast (CMS/HCC) 9/26/2016   • Osteoporosis    • PONV (postoperative nausea and vomiting)    • Scoliosis      Past Surgical History:   Past Surgical History:   Procedure Laterality Date   • ADENOIDECTOMY     • APPENDECTOMY  1994   • BREAST BIOPSY     • BREAST LUMPECTOMY Right 2008   • CHOLECYSTECTOMY  1993   • COLONOSCOPY  05/03/2013    Erythematous mucosa in the rectum-biopsied; Repeat 4 years    • COLONOSCOPY N/A 6/14/2017    Normal; Repeat 5 years; Procedure: COLONOSCOPY WITH ANESTHESIA;  Surgeon: Ksenia Fox MD;  Location: [facility] ENDOSCOPY;  Service:    • COLONOSCOPY  04/23/2010    Dr. Arzola-Extremely poorly prepped colon   • D&C HYSTEROSCOPY  1993   • DILATATION AND CURETTAGE     • ENDOSCOPY N/A 10/5/2016    Medium-sized HH; Widely patent and non-obstructing Schatzki ring-dilated; Procedure: ESOPHAGOGASTRODUODENOSCOPY WITH ANESTHESIA;  Surgeon: Ksenia Fox MD;  Location: [facility] ENDOSCOPY;  Service:    • ENDOSCOPY N/A 2/27/2019    Normal 1st portion of the duodenum and 2 portion of the duodenum; A few gastric polyps-biopsied; Small HH; Widely patent Schatzki ring-dilated; Abnormal esophageal motility, suspicious for presbyesophagus; Arrange for barium swallow to assess for dysmotility   • HYSTEROSCOPY     • KNEE ARTHROSCOPY Left 11/13/2018    Procedure: LEFT KNEE ARTHROSCOPIC PARTIAL MEDIAL MENISCECTOMY;  Surgeon: Matt Maki MD;   Location: [facility] OR;  Service: Orthopedics   • MEDIPORT INSERTION, SINGLE  2008   • MEDIPORT REMOVAL     • THYROIDECTOMY  2011    parathyroidectomy   • TONSILLECTOMY AND ADENOIDECTOMY  1970   • TUBAL ABDOMINAL LIGATION       Social History:   Social History     Socioeconomic History   • Marital status:      Spouse name: Not on file   • Number of children: Not on file   • Years of education: Not on file   • Highest education level: Not on file   Tobacco Use   • Smoking status: Never Smoker   • Smokeless tobacco: Never Used   Substance and Sexual Activity   • Alcohol use: No   • Drug use: No   • Sexual activity: Yes     Partners: Male     Birth control/protection: None     Family History:   Family History   Problem Relation Age of Onset   • Colon polyps Mother    • Cirrhosis Mother    • Colon polyps Sister 54   • Breast cancer Sister    • No Known Problems Father    • No Known Problems Brother    • No Known Problems Daughter    • No Known Problems Son    • No Known Problems Maternal Grandmother    • No Known Problems Paternal Grandmother    • No Known Problems Maternal Aunt    • No Known Problems Paternal Aunt    • Colon cancer Neg Hx    • Crohn's disease Neg Hx    • Irritable bowel syndrome Neg Hx    • Liver cancer Neg Hx    • Liver disease Neg Hx    • Rectal cancer Neg Hx    • Stomach cancer Neg Hx    • BRCA 1/2 Neg Hx    • Endometrial cancer Neg Hx    • Ovarian cancer Neg Hx    • Uterine cancer Neg Hx        Review of Systems   Constitutional: Negative.    HENT: Negative.    Eyes: Negative.    Respiratory: Negative.    Cardiovascular: Negative.    Gastrointestinal: Negative.    Endocrine: Negative.    Genitourinary: Negative.    Musculoskeletal: Negative.    Skin: Negative.    Neurological: Negative.    Hematological: Negative.    Psychiatric/Behavioral: Negative.         Performance Status:  Asymptomatic    Medications:    Current Outpatient Medications   Medication Sig Dispense Refill   • albuterol  (PROVENTIL HFA;VENTOLIN HFA) 108 (90 Base) MCG/ACT inhaler Inhale 2 puffs Every 4 (Four) Hours As Needed for Wheezing.     • albuterol sulfate HFA (PROAIR HFA) 108 (90 Base) MCG/ACT inhaler Inhale 2 puffs Every 4 (Four) Hours As Needed for Wheezing. 3 inhaler 3   • alendronate (FOSAMAX) 70 MG tablet Take 70 mg by mouth Every 7 (Seven) Days. Sunday's     • atorvastatin (LIPITOR) 40 MG tablet Take 40 mg by mouth daily.     • calcitriol (ROCALTROL) 0.5 MCG capsule Take 0.5 mcg by mouth daily.     • Calcium Citrate-Vitamin D (CALCIUM + D PO) Take 1 tablet by mouth Daily.     • Diclofenac Sodium (PENNSAID TD) Apply 1 application topically to the appropriate area as directed 2 (Two) Times a Day. For back pain     • fluticasone-salmeterol (ADVAIR) 250-50 MCG/DOSE DISKUS Inhale 1 puff 2 (Two) Times a Day.     • levothyroxine (SYNTHROID, LEVOTHROID) 50 MCG tablet Take 50 mcg by mouth Daily.     • loratadine (CLARITIN) 10 MG tablet Take 10 mg by mouth Daily As Needed for Allergies.     • losartan (COZAAR) 25 MG tablet Take 25 mg by mouth daily.     • ondansetron (ZOFRAN) 4 MG tablet Take 1 tablet by mouth Every 8 (Eight) Hours As Needed for Nausea or Vomiting. 10 tablet 0   • pantoprazole (PROTONIX) 40 MG EC tablet Take 1 tablet by mouth 2 (Two) Times a Day. 180 tablet 3   • potassium chloride (K-TAB) 20 MEQ tablet controlled-release ER tablet Take 1 tablet by mouth twice a day for 3 days then 1 tablet daily.  Follow up with PCP in one week. (Patient taking differently: Take 20 mEq by mouth Daily. Take 1 tablet by mouth twice a day for 3 days then 1 tablet daily.  Follow up with PCP in one week.) 30 tablet 0   • rOPINIRole (REQUIP) 0.25 MG tablet Take 0.25 mg by mouth every night. Take 1 hour before bedtime.     • theophylline (UNIPHYL) 400 MG 24 hr tablet Take 1 tablet by mouth Daily. 90 tablet 3   • tiZANidine (ZANAFLEX) 4 MG tablet Take 4 mg by mouth Every 8 (Eight) Hours As Needed for Muscle Spasms.     • traMADol  "(ULTRAM) 50 MG tablet Take 50 mg by mouth Every 8 (Eight) Hours As Needed for Moderate Pain .     • zafirlukast (ACCOLATE) 20 MG tablet Take 20 mg by mouth 2 (two) times a day.       No current facility-administered medications for this visit.      Facility-Administered Medications Ordered in Other Visits   Medication Dose Route Frequency Provider Last Rate Last Dose   • heparin flush (porcine) 100 UNIT/ML injection 500 Units  500 Units Intracatheter PRN Ki Manriquez MD   500 Units at 09/27/16 1108   • heparin flush (porcine) 100 UNIT/ML injection 500 Units  500 Units Intravenous PRN Malathi Brantley, APRN   500 Units at 05/05/17 1009   • sodium chloride 0.9 % flush 10 mL  10 mL Intravenous PRN Ki Manriquez MD   10 mL at 09/27/16 1107   • sodium chloride 0.9 % flush 10 mL  10 mL Intravenous PRN Malathi Brantley, APRN   10 mL at 05/05/17 1009       ALLERGIES:    Allergies   Allergen Reactions   • Keflex [Cephalexin] Rash and Unknown (See Comments)       Objective      Vitals:    05/08/19 1414   BP: 124/68   Pulse: 84   Resp: 18   Temp: 98.4 °F (36.9 °C)   TempSrc: Tympanic   SpO2: 93%   Weight: 74 kg (163 lb 3.2 oz)   Height: 157.5 cm (62\")   PainSc: 0-No pain         Current Status 5/8/2019   ECOG score 0         Physical Exam  General Appearance: Patient is awake, alert, oriented and in no acute distress. Patient is welldeveloped, wellnourished, and appears stated age.  HEENT: Normocephalic. Sclerae clear, conjunctiva pink, extraocular movements intact, pupils, round, reactive to light and  accommodation. Mouth and throat are clear with moist oral mucosa.  NECK: Supple, no jugular venous distention, thyroid not enlarged.  LYMPH: No cervical, supraclavicular, axillary, or inguinal lymphadenopathy.  CHEST: Equal bilateral expansion, AP  diameter normal, resonant percussion note  LUNGS: Good air movement, no rales, rhonchi, rubs or wheezes with auscultation  CARDIO: Regular sinus " rhythm, no murmurs, gallops or rubs.  ABDOMEN: Nondistended, soft, No tenderness, no guarding, no rebound, No hepatosplenomegaly. No abdominal masses. Bowel sounds positive. No hernia  GENITALIA: Not examined.  BREASTS: right breast lumpectomy scar well healed. Left breast with no lumps or bumps.    MUSKEL: No joint swelling, decreased motion, or inflammation  EXTREMS: No edema, clubbing, cyanosis, No varicose veins.  NEURO: Grossly nonfocal, Gait is coordinated and smooth, Cognition is preserved.  SKIN: No rashes, no ecchymoses, no petechia.  PSYCH: Oriented to time, place and person. Memory is preserved. Mood and affect appear normal  RECENT LABS:  Orders Only on 05/06/2019   Component Date Value Ref Range Status   • Glucose 05/08/2019 76  70 - 100 mg/dL Final   • BUN 05/08/2019 14  5 - 21 mg/dL Final   • Creatinine 05/08/2019 1.06  0.50 - 1.40 mg/dL Final   • Sodium 05/08/2019 141  135 - 145 mmol/L Final   • Potassium 05/08/2019 4.1  3.5 - 5.3 mmol/L Final   • Chloride 05/08/2019 105  98 - 110 mmol/L Final   • CO2 05/08/2019 27.0  24.0 - 31.0 mmol/L Final   • Calcium 05/08/2019 9.3  8.4 - 10.4 mg/dL Final   • Total Protein 05/08/2019 7.2  6.3 - 8.7 g/dL Final   • Albumin 05/08/2019 4.20  3.50 - 5.00 g/dL Final   • ALT (SGPT) 05/08/2019 22  0 - 54 U/L Final   • AST (SGOT) 05/08/2019 33  7 - 45 U/L Final   • Alkaline Phosphatase 05/08/2019 91  24 - 120 U/L Final   • Total Bilirubin 05/08/2019 0.3  0.1 - 1.0 mg/dL Final   • eGFR Non African Amer 05/08/2019 54* >60 mL/min/1.73 Final   • Globulin 05/08/2019 3.0  gm/dL Final   • A/G Ratio 05/08/2019 1.4  1.1 - 2.5 g/dL Final   • BUN/Creatinine Ratio 05/08/2019 13.2  7.0 - 25.0 Final   • Anion Gap 05/08/2019 9.0  4.0 - 13.0 mmol/L Final   • Iron 05/08/2019 58  42 - 180 mcg/dL Final   • TIBC 05/08/2019 269  225 - 420 mcg/dL Final   • Iron Saturation 05/08/2019 22  20 - 45 % Final   • WBC 05/08/2019 8.89  4.80 - 10.80 10*3/mm3 Final   • RBC 05/08/2019 4.97  4.20 - 5.40  10*6/mm3 Final   • Hemoglobin 05/08/2019 14.7  12.0 - 16.0 g/dL Final   • Hematocrit 05/08/2019 43.9  37.0 - 47.0 % Final   • MCV 05/08/2019 88.3  82.0 - 98.0 fL Final   • MCH 05/08/2019 29.6  28.0 - 32.0 pg Final   • MCHC 05/08/2019 33.5  33.0 - 36.0 g/dL Final   • RDW 05/08/2019 13.5  12.0 - 15.0 % Final   • RDW-SD 05/08/2019 43.6  40.0 - 54.0 fl Final   • MPV 05/08/2019 9.9  6.0 - 12.0 fL Final   • Platelets 05/08/2019 344  130 - 400 10*3/mm3 Final   • Neutrophil % 05/08/2019 64.5  39.0 - 78.0 % Final   • Lymphocyte % 05/08/2019 24.3  15.0 - 45.0 % Final   • Monocyte % 05/08/2019 6.6  4.0 - 12.0 % Final   • Eosinophil % 05/08/2019 3.7  0.0 - 4.0 % Final   • Basophil % 05/08/2019 0.7  0.0 - 2.0 % Final   • Immature Grans % 05/08/2019 0.2  0.0 - 5.0 % Final   • Neutrophils, Absolute 05/08/2019 5.73  1.87 - 8.40 10*3/mm3 Final   • Lymphocytes, Absolute 05/08/2019 2.16  0.72 - 4.86 10*3/mm3 Final   • Monocytes, Absolute 05/08/2019 0.59  0.19 - 1.30 10*3/mm3 Final   • Eosinophils, Absolute 05/08/2019 0.33  0.00 - 0.70 10*3/mm3 Final   • Basophils, Absolute 05/08/2019 0.06  0.00 - 0.20 10*3/mm3 Final   • Immature Grans, Absolute 05/08/2019 0.02  0.00 - 0.05 10*3/mm3 Final   • nRBC 05/08/2019 0.0  0.0 - 0.2 /100 WBC Final       RADIOLOGY:  No results found.         Assessment/Plan  Luna FUCHS Cruz is a 53 y.o. year old female with HR+ br s/p MG for 10yr clinically BOBBY.    Patient Active Problem List   Diagnosis   • Essential hypertension   • Gastroesophageal reflux disease   • Heartburn   • Coughing   • Dysphagia   • Family history of polyps in the colon   • Family history of malignant neoplasm of breast   • History of malignant neoplasm of breast   • Colon cancer screening   • Overweight (BMI 25.0-29.9)   • Iron deficiency anemia   • Pneumonia due to infectious organism   • Traumatic tear of medial meniscus of knee, left, initial encounter   • Esophageal dysphagia          1. Stage II breast cancer 2008, status post  lumpectomy ×4 TC, followed by radiation and currently started on tamoxifen to complete a total of 10 year Nov 2018. Advised okay to stop tamoxifen  As far as her breast cancer is concerned there is no evidence of recurrent disease   5/8/19 mammo No mammographic evidence of malignancy. Recommendation is for the  patient to return for routine mammography in one year or sooner, if  clinically indicated.     2. She also had a history of hyperparathyroidism status post resection of the adenoma in 2011.    3. Hyperlipidemia: On lipitor    4. Hypertension: losartan  5. Hypothyroidism: on synthroid.  6. Gerd: on pantoprazole.  7. Osteopenia: on fosamax. Dillon+vit d  8. Emphysemia: on advir, theophyline, zafirkulast  9. Anemia: s/p iron infusions. Hg 15.      Obiageli Aly Lopes MD    5/8/2019    2:39 PM

## 2019-05-20 ENCOUNTER — HOSPITAL ENCOUNTER (OUTPATIENT)
Dept: PREADMISSION TESTING | Age: 54
Discharge: HOME | DRG: 470 | End: 2019-05-24
Payer: MEDICARE

## 2019-05-20 VITALS — WEIGHT: 162 LBS | BODY MASS INDEX: 28.7 KG/M2 | HEIGHT: 63 IN

## 2019-05-20 LAB
APTT: 28.4 SEC (ref 26–36.2)
BILIRUBIN URINE: NEGATIVE
BLOOD, URINE: NEGATIVE
CLARITY: ABNORMAL
COLOR: YELLOW
EKG P AXIS: 41 DEGREES
EKG P-R INTERVAL: 160 MS
EKG Q-T INTERVAL: 374 MS
EKG QRS DURATION: 88 MS
EKG QTC CALCULATION (BAZETT): 400 MS
EKG T AXIS: 28 DEGREES
GLUCOSE URINE: NEGATIVE MG/DL
INR BLD: 0.94 (ref 0.88–1.18)
KETONES, URINE: NEGATIVE MG/DL
LEUKOCYTE ESTERASE, URINE: NEGATIVE
NITRITE, URINE: NEGATIVE
PH UA: 6 (ref 5–8)
PROTEIN UA: NEGATIVE MG/DL
PROTHROMBIN TIME: 12 SEC (ref 12–14.6)
SPECIFIC GRAVITY UA: 1.02 (ref 1–1.03)
URINE REFLEX TO CULTURE: ABNORMAL
UROBILINOGEN, URINE: 0.2 E.U./DL

## 2019-05-20 PROCEDURE — 93005 ELECTROCARDIOGRAM TRACING: CPT

## 2019-05-20 PROCEDURE — 85730 THROMBOPLASTIN TIME PARTIAL: CPT

## 2019-05-20 PROCEDURE — 87081 CULTURE SCREEN ONLY: CPT

## 2019-05-20 PROCEDURE — 81003 URINALYSIS AUTO W/O SCOPE: CPT

## 2019-05-20 PROCEDURE — 85610 PROTHROMBIN TIME: CPT

## 2019-05-20 NOTE — CARE COORDINATION
Patient attended [employer] on 5-7-2019.   Electronically signed by Joanne Ac RN on 5/20/2019 at 8:25 AM

## 2019-05-21 ENCOUNTER — OFFICE VISIT (OUTPATIENT)
Dept: SURGERY | Age: 54
End: 2019-05-21
Payer: MEDICARE

## 2019-05-21 VITALS
DIASTOLIC BLOOD PRESSURE: 90 MMHG | SYSTOLIC BLOOD PRESSURE: 146 MMHG | HEART RATE: 79 BPM | WEIGHT: 163 LBS | BODY MASS INDEX: 30 KG/M2 | HEIGHT: 62 IN

## 2019-05-21 DIAGNOSIS — Z85.3 PERSONAL HISTORY OF MALIGNANT NEOPLASM OF BREAST: Primary | ICD-10-CM

## 2019-05-21 DIAGNOSIS — Z12.39 SCREENING BREAST EXAMINATION: ICD-10-CM

## 2019-05-21 LAB — MRSA CULTURE ONLY: NORMAL

## 2019-05-21 PROCEDURE — 3017F COLORECTAL CA SCREEN DOC REV: CPT | Performed by: PHYSICIAN ASSISTANT

## 2019-05-21 PROCEDURE — 1036F TOBACCO NON-USER: CPT | Performed by: PHYSICIAN ASSISTANT

## 2019-05-21 PROCEDURE — G8427 DOCREV CUR MEDS BY ELIG CLIN: HCPCS | Performed by: PHYSICIAN ASSISTANT

## 2019-05-21 PROCEDURE — 99213 OFFICE O/P EST LOW 20 MIN: CPT | Performed by: PHYSICIAN ASSISTANT

## 2019-05-21 PROCEDURE — G8419 CALC BMI OUT NRM PARAM NOF/U: HCPCS | Performed by: PHYSICIAN ASSISTANT

## 2019-05-21 NOTE — PROGRESS NOTES
HISTORY OF PRESENT ILLNESS:   Mrs. Wendy Miguel is a 46year old female who is status post 8-7-2008 Right Partial Mastectomy with re-excision and right axillary node dissection with a total of 1 of 11 nodes positive for metastatic malignancy. Grade 1, ER/MO +   She has no new complaints today. She denies weight loss or any other systemic symptoms. She is having her knee replaced later this week. CM DIGITAL SCREEN W OR WO CAD BILATERAL 5/8/2019 8:30 AM   HISTORY: Z12.31     ADDITIONAL HISTORY: Personal history of right breast cancer at age 49   in 2008. Sister with breast cancer 40. COMPARISON STUDIES: Screening mammogram May 2008. BREAST COMPOSITION: Category B -- Scattered fibroglandular densities   (approximately 25-50 percent glandular tissue). FINDINGS:    2-D and 3-D digital mammography of bilateral breasts was obtained in   the CC and MLO projection. CAD was also utilized for assistance in   diagnosis. No suspicious masses or calcifications are identified. There are   stable postlumpectomy changes of the right breast. No new/developing   architectural distortion. IMPRESSION AND RECOMMENDATION:    No mammographic evidence of malignancy. Recommendation is for the   patient to return for routine mammography in one year or sooner, if   clinically indicated. BI-RADS Category 2, benign. PHYSICAL EXAM:   The right lumpectomy incision is looking good. There are moderate fibrocystic changes throughout both breasts. There are no dominant masses, no skin or nipple changes, and no axillary adenopathy. There is nothing suspicious for new carcinoma and no evidence of local tumor recurrence. ASSESSMENT:   No evidence of disease status post 8-7-2008 Right Partial Mastectomy and axillary node dissection     PLAN:   Follow-up in 1 year for physical exam and bilateral mammograms. Over 50% of the visit was counseling. I spent 15 minutes with the patient and coordinating her care.

## 2019-05-23 ENCOUNTER — ANESTHESIA (OUTPATIENT)
Dept: OPERATING ROOM | Age: 54
DRG: 470 | End: 2019-05-23
Payer: MEDICARE

## 2019-05-23 ENCOUNTER — ANESTHESIA EVENT (OUTPATIENT)
Dept: OPERATING ROOM | Age: 54
DRG: 470 | End: 2019-05-23
Payer: MEDICARE

## 2019-05-23 ENCOUNTER — HOSPITAL ENCOUNTER (INPATIENT)
Age: 54
LOS: 1 days | Discharge: HOME | DRG: 470 | End: 2019-05-24
Attending: ORTHOPAEDIC SURGERY | Admitting: ORTHOPAEDIC SURGERY
Payer: MEDICARE

## 2019-05-23 VITALS — OXYGEN SATURATION: 93 % | TEMPERATURE: 98 F | DIASTOLIC BLOOD PRESSURE: 49 MMHG | SYSTOLIC BLOOD PRESSURE: 110 MMHG

## 2019-05-23 DIAGNOSIS — M17.12 PRIMARY OSTEOARTHRITIS OF LEFT KNEE: Primary | ICD-10-CM

## 2019-05-23 LAB
ABO/RH: NORMAL
ANTIBODY SCREEN: NORMAL

## 2019-05-23 PROCEDURE — 6360000002 HC RX W HCPCS: Performed by: ORTHOPAEDIC SURGERY

## 2019-05-23 PROCEDURE — 86850 RBC ANTIBODY SCREEN: CPT

## 2019-05-23 PROCEDURE — 2720000010 HC SURG SUPPLY STERILE: Performed by: ORTHOPAEDIC SURGERY

## 2019-05-23 PROCEDURE — 1210000000 HC MED SURG R&B

## 2019-05-23 PROCEDURE — 2709999900 HC NON-CHARGEABLE SUPPLY: Performed by: ORTHOPAEDIC SURGERY

## 2019-05-23 PROCEDURE — C1713 ANCHOR/SCREW BN/BN,TIS/BN: HCPCS | Performed by: ORTHOPAEDIC SURGERY

## 2019-05-23 PROCEDURE — 6370000000 HC RX 637 (ALT 250 FOR IP): Performed by: ORTHOPAEDIC SURGERY

## 2019-05-23 PROCEDURE — 36415 COLL VENOUS BLD VENIPUNCTURE: CPT

## 2019-05-23 PROCEDURE — C1776 JOINT DEVICE (IMPLANTABLE): HCPCS | Performed by: ORTHOPAEDIC SURGERY

## 2019-05-23 PROCEDURE — 2500000003 HC RX 250 WO HCPCS: Performed by: NURSE ANESTHETIST, CERTIFIED REGISTERED

## 2019-05-23 PROCEDURE — 86900 BLOOD TYPING SEROLOGIC ABO: CPT

## 2019-05-23 PROCEDURE — 3700000001 HC ADD 15 MINUTES (ANESTHESIA): Performed by: ORTHOPAEDIC SURGERY

## 2019-05-23 PROCEDURE — 3700000000 HC ANESTHESIA ATTENDED CARE: Performed by: ORTHOPAEDIC SURGERY

## 2019-05-23 PROCEDURE — 2580000003 HC RX 258: Performed by: ORTHOPAEDIC SURGERY

## 2019-05-23 PROCEDURE — 6360000002 HC RX W HCPCS: Performed by: NURSE ANESTHETIST, CERTIFIED REGISTERED

## 2019-05-23 PROCEDURE — 2500000003 HC RX 250 WO HCPCS: Performed by: ORTHOPAEDIC SURGERY

## 2019-05-23 PROCEDURE — 6360000002 HC RX W HCPCS: Performed by: ANESTHESIOLOGY

## 2019-05-23 PROCEDURE — 94640 AIRWAY INHALATION TREATMENT: CPT

## 2019-05-23 PROCEDURE — 3600000005 HC SURGERY LEVEL 5 BASE: Performed by: ORTHOPAEDIC SURGERY

## 2019-05-23 PROCEDURE — 3600000015 HC SURGERY LEVEL 5 ADDTL 15MIN: Performed by: ORTHOPAEDIC SURGERY

## 2019-05-23 PROCEDURE — 64447 NJX AA&/STRD FEMORAL NRV IMG: CPT | Performed by: NURSE ANESTHETIST, CERTIFIED REGISTERED

## 2019-05-23 PROCEDURE — 86901 BLOOD TYPING SEROLOGIC RH(D): CPT

## 2019-05-23 PROCEDURE — 0SRD0J9 REPLACEMENT OF LEFT KNEE JOINT WITH SYNTHETIC SUBSTITUTE, CEMENTED, OPEN APPROACH: ICD-10-PCS | Performed by: ORTHOPAEDIC SURGERY

## 2019-05-23 PROCEDURE — 7100000001 HC PACU RECOVERY - ADDTL 15 MIN: Performed by: ORTHOPAEDIC SURGERY

## 2019-05-23 PROCEDURE — 7100000000 HC PACU RECOVERY - FIRST 15 MIN: Performed by: ORTHOPAEDIC SURGERY

## 2019-05-23 DEVICE — IMPLANTABLE DEVICE: Type: IMPLANTABLE DEVICE | Site: KNEE | Status: FUNCTIONAL

## 2019-05-23 DEVICE — PSN TIB STM 5 DEG SZ C L: Type: IMPLANTABLE DEVICE | Site: KNEE | Status: FUNCTIONAL

## 2019-05-23 DEVICE — CEMENT BNE 40GM HI VISC RADPQ FOR REV SURG: Type: IMPLANTABLE DEVICE | Site: KNEE | Status: FUNCTIONAL

## 2019-05-23 DEVICE — NEXGEN ALL-POLY PATELLA 26MM: Type: IMPLANTABLE DEVICE | Site: KNEE | Status: FUNCTIONAL

## 2019-05-23 DEVICE — COMPONENT FEM SZ 5 NAR L KNEE CO CHROM CEM POST STBL COR: Type: IMPLANTABLE DEVICE | Site: KNEE | Status: FUNCTIONAL

## 2019-05-23 RX ORDER — MIDAZOLAM HYDROCHLORIDE 1 MG/ML
2 INJECTION INTRAMUSCULAR; INTRAVENOUS
Status: COMPLETED | OUTPATIENT
Start: 2019-05-23 | End: 2019-05-23

## 2019-05-23 RX ORDER — SODIUM CHLORIDE, SODIUM LACTATE, POTASSIUM CHLORIDE, CALCIUM CHLORIDE 600; 310; 30; 20 MG/100ML; MG/100ML; MG/100ML; MG/100ML
INJECTION, SOLUTION INTRAVENOUS CONTINUOUS
Status: DISCONTINUED | OUTPATIENT
Start: 2019-05-23 | End: 2019-05-24 | Stop reason: HOSPADM

## 2019-05-23 RX ORDER — ZAFIRLUKAST 20 MG/1
20 TABLET, FILM COATED ORAL 2 TIMES DAILY
Status: DISCONTINUED | OUTPATIENT
Start: 2019-05-23 | End: 2019-05-24 | Stop reason: HOSPADM

## 2019-05-23 RX ORDER — KETAMINE HYDROCHLORIDE 100 MG/ML
INJECTION, SOLUTION INTRAMUSCULAR; INTRAVENOUS PRN
Status: DISCONTINUED | OUTPATIENT
Start: 2019-05-23 | End: 2019-05-23 | Stop reason: SDUPTHER

## 2019-05-23 RX ORDER — OXYCODONE HYDROCHLORIDE AND ACETAMINOPHEN 5; 325 MG/1; MG/1
2 TABLET ORAL EVERY 4 HOURS PRN
Status: DISCONTINUED | OUTPATIENT
Start: 2019-05-23 | End: 2019-05-24 | Stop reason: HOSPADM

## 2019-05-23 RX ORDER — LABETALOL HYDROCHLORIDE 5 MG/ML
INJECTION, SOLUTION INTRAVENOUS PRN
Status: DISCONTINUED | OUTPATIENT
Start: 2019-05-23 | End: 2019-05-23 | Stop reason: SDUPTHER

## 2019-05-23 RX ORDER — ALENDRONATE SODIUM 70 MG/1
70 TABLET ORAL
Status: DISCONTINUED | OUTPATIENT
Start: 2019-05-23 | End: 2019-05-23 | Stop reason: RX

## 2019-05-23 RX ORDER — SODIUM CHLORIDE, SODIUM LACTATE, POTASSIUM CHLORIDE, CALCIUM CHLORIDE 600; 310; 30; 20 MG/100ML; MG/100ML; MG/100ML; MG/100ML
INJECTION, SOLUTION INTRAVENOUS CONTINUOUS
Status: DISCONTINUED | OUTPATIENT
Start: 2019-05-23 | End: 2019-05-23

## 2019-05-23 RX ORDER — SODIUM CHLORIDE 0.9 % (FLUSH) 0.9 %
10 SYRINGE (ML) INJECTION PRN
Status: DISCONTINUED | OUTPATIENT
Start: 2019-05-23 | End: 2019-05-23 | Stop reason: HOSPADM

## 2019-05-23 RX ORDER — FENTANYL CITRATE 50 UG/ML
25 INJECTION, SOLUTION INTRAMUSCULAR; INTRAVENOUS
Status: DISCONTINUED | OUTPATIENT
Start: 2019-05-23 | End: 2019-05-23 | Stop reason: HOSPADM

## 2019-05-23 RX ORDER — TIZANIDINE 4 MG/1
4 TABLET ORAL NIGHTLY
Status: DISCONTINUED | OUTPATIENT
Start: 2019-05-23 | End: 2019-05-24 | Stop reason: HOSPADM

## 2019-05-23 RX ORDER — LABETALOL 20 MG/4 ML (5 MG/ML) INTRAVENOUS SYRINGE
5 EVERY 10 MIN PRN
Status: DISCONTINUED | OUTPATIENT
Start: 2019-05-23 | End: 2019-05-23 | Stop reason: HOSPADM

## 2019-05-23 RX ORDER — MORPHINE SULFATE 2 MG/ML
4 INJECTION, SOLUTION INTRAMUSCULAR; INTRAVENOUS
Status: DISCONTINUED | OUTPATIENT
Start: 2019-05-23 | End: 2019-05-24 | Stop reason: HOSPADM

## 2019-05-23 RX ORDER — BUDESONIDE 0.25 MG/2ML
0.25 INHALANT ORAL 2 TIMES DAILY
Status: DISCONTINUED | OUTPATIENT
Start: 2019-05-23 | End: 2019-05-24 | Stop reason: HOSPADM

## 2019-05-23 RX ORDER — OXYCODONE HCL 10 MG/1
10 TABLET, FILM COATED, EXTENDED RELEASE ORAL
Status: COMPLETED | OUTPATIENT
Start: 2019-05-23 | End: 2019-05-23

## 2019-05-23 RX ORDER — VANCOMYCIN HYDROCHLORIDE 1 G/200ML
1000 INJECTION, SOLUTION INTRAVENOUS EVERY 12 HOURS
Status: COMPLETED | OUTPATIENT
Start: 2019-05-23 | End: 2019-05-24

## 2019-05-23 RX ORDER — ONDANSETRON 2 MG/ML
4 INJECTION INTRAMUSCULAR; INTRAVENOUS EVERY 6 HOURS PRN
Status: DISCONTINUED | OUTPATIENT
Start: 2019-05-23 | End: 2019-05-24 | Stop reason: HOSPADM

## 2019-05-23 RX ORDER — ROPINIROLE 0.5 MG/1
0.25 TABLET, FILM COATED ORAL NIGHTLY
Status: DISCONTINUED | OUTPATIENT
Start: 2019-05-23 | End: 2019-05-24 | Stop reason: HOSPADM

## 2019-05-23 RX ORDER — MEPERIDINE HYDROCHLORIDE 50 MG/ML
12.5 INJECTION INTRAMUSCULAR; INTRAVENOUS; SUBCUTANEOUS EVERY 5 MIN PRN
Status: DISCONTINUED | OUTPATIENT
Start: 2019-05-23 | End: 2019-05-23 | Stop reason: HOSPADM

## 2019-05-23 RX ORDER — ONDANSETRON 2 MG/ML
INJECTION INTRAMUSCULAR; INTRAVENOUS PRN
Status: DISCONTINUED | OUTPATIENT
Start: 2019-05-23 | End: 2019-05-23 | Stop reason: SDUPTHER

## 2019-05-23 RX ORDER — SODIUM CHLORIDE 0.9 % (FLUSH) 0.9 %
10 SYRINGE (ML) INJECTION EVERY 12 HOURS SCHEDULED
Status: DISCONTINUED | OUTPATIENT
Start: 2019-05-23 | End: 2019-05-23 | Stop reason: HOSPADM

## 2019-05-23 RX ORDER — DIPHENHYDRAMINE HYDROCHLORIDE 50 MG/ML
12.5 INJECTION INTRAMUSCULAR; INTRAVENOUS
Status: DISCONTINUED | OUTPATIENT
Start: 2019-05-23 | End: 2019-05-23 | Stop reason: HOSPADM

## 2019-05-23 RX ORDER — OXYCODONE HYDROCHLORIDE AND ACETAMINOPHEN 5; 325 MG/1; MG/1
1 TABLET ORAL EVERY 4 HOURS PRN
Status: DISCONTINUED | OUTPATIENT
Start: 2019-05-23 | End: 2019-05-24 | Stop reason: HOSPADM

## 2019-05-23 RX ORDER — ROPIVACAINE HYDROCHLORIDE 5 MG/ML
INJECTION, SOLUTION EPIDURAL; INFILTRATION; PERINEURAL PRN
Status: DISCONTINUED | OUTPATIENT
Start: 2019-05-23 | End: 2019-05-23 | Stop reason: SDUPTHER

## 2019-05-23 RX ORDER — SODIUM CHLORIDE 0.9 % (FLUSH) 0.9 %
10 SYRINGE (ML) INJECTION PRN
Status: DISCONTINUED | OUTPATIENT
Start: 2019-05-23 | End: 2019-05-24 | Stop reason: HOSPADM

## 2019-05-23 RX ORDER — MORPHINE SULFATE 2 MG/ML
2 INJECTION, SOLUTION INTRAMUSCULAR; INTRAVENOUS
Status: DISCONTINUED | OUTPATIENT
Start: 2019-05-23 | End: 2019-05-24 | Stop reason: HOSPADM

## 2019-05-23 RX ORDER — ALBUTEROL SULFATE 2.5 MG/3ML
2.5 SOLUTION RESPIRATORY (INHALATION) 4 TIMES DAILY
Status: DISCONTINUED | OUTPATIENT
Start: 2019-05-23 | End: 2019-05-24 | Stop reason: HOSPADM

## 2019-05-23 RX ORDER — MIDAZOLAM HYDROCHLORIDE 1 MG/ML
INJECTION INTRAMUSCULAR; INTRAVENOUS PRN
Status: DISCONTINUED | OUTPATIENT
Start: 2019-05-23 | End: 2019-05-23 | Stop reason: SDUPTHER

## 2019-05-23 RX ORDER — 0.9 % SODIUM CHLORIDE 0.9 %
500 INTRAVENOUS SOLUTION INTRAVENOUS PRN
Status: DISCONTINUED | OUTPATIENT
Start: 2019-05-23 | End: 2019-05-24 | Stop reason: HOSPADM

## 2019-05-23 RX ORDER — PANTOPRAZOLE SODIUM 40 MG/1
40 TABLET, DELAYED RELEASE ORAL 2 TIMES DAILY
Status: DISCONTINUED | OUTPATIENT
Start: 2019-05-23 | End: 2019-05-24 | Stop reason: HOSPADM

## 2019-05-23 RX ORDER — METOCLOPRAMIDE HYDROCHLORIDE 5 MG/ML
10 INJECTION INTRAMUSCULAR; INTRAVENOUS
Status: DISCONTINUED | OUTPATIENT
Start: 2019-05-23 | End: 2019-05-23 | Stop reason: HOSPADM

## 2019-05-23 RX ORDER — SODIUM CHLORIDE 0.9 % (FLUSH) 0.9 %
10 SYRINGE (ML) INJECTION EVERY 12 HOURS SCHEDULED
Status: DISCONTINUED | OUTPATIENT
Start: 2019-05-23 | End: 2019-05-24 | Stop reason: HOSPADM

## 2019-05-23 RX ORDER — MORPHINE SULFATE 2 MG/ML
4 INJECTION, SOLUTION INTRAMUSCULAR; INTRAVENOUS EVERY 5 MIN PRN
Status: DISCONTINUED | OUTPATIENT
Start: 2019-05-23 | End: 2019-05-23 | Stop reason: HOSPADM

## 2019-05-23 RX ORDER — FENTANYL CITRATE 50 UG/ML
50 INJECTION, SOLUTION INTRAMUSCULAR; INTRAVENOUS
Status: DISCONTINUED | OUTPATIENT
Start: 2019-05-23 | End: 2019-05-23 | Stop reason: HOSPADM

## 2019-05-23 RX ORDER — ATORVASTATIN CALCIUM 40 MG/1
40 TABLET, FILM COATED ORAL NIGHTLY
Status: DISCONTINUED | OUTPATIENT
Start: 2019-05-23 | End: 2019-05-24 | Stop reason: HOSPADM

## 2019-05-23 RX ORDER — CALCITRIOL 0.25 UG/1
0.5 CAPSULE, LIQUID FILLED ORAL DAILY
Status: DISCONTINUED | OUTPATIENT
Start: 2019-05-23 | End: 2019-05-24 | Stop reason: HOSPADM

## 2019-05-23 RX ORDER — PROMETHAZINE HYDROCHLORIDE 25 MG/ML
6.25 INJECTION, SOLUTION INTRAMUSCULAR; INTRAVENOUS
Status: DISCONTINUED | OUTPATIENT
Start: 2019-05-23 | End: 2019-05-23 | Stop reason: HOSPADM

## 2019-05-23 RX ORDER — TRANEXAMIC ACID 650 1/1
1950 TABLET ORAL
Status: COMPLETED | OUTPATIENT
Start: 2019-05-23 | End: 2019-05-23

## 2019-05-23 RX ORDER — HYDRALAZINE HYDROCHLORIDE 20 MG/ML
5 INJECTION INTRAMUSCULAR; INTRAVENOUS EVERY 10 MIN PRN
Status: DISCONTINUED | OUTPATIENT
Start: 2019-05-23 | End: 2019-05-23 | Stop reason: HOSPADM

## 2019-05-23 RX ORDER — BUPIVACAINE HYDROCHLORIDE 7.5 MG/ML
INJECTION, SOLUTION INTRASPINAL PRN
Status: DISCONTINUED | OUTPATIENT
Start: 2019-05-23 | End: 2019-05-23 | Stop reason: SDUPTHER

## 2019-05-23 RX ORDER — ASPIRIN 81 MG/1
81 TABLET ORAL 2 TIMES DAILY
Status: DISCONTINUED | OUTPATIENT
Start: 2019-05-23 | End: 2019-05-24 | Stop reason: HOSPADM

## 2019-05-23 RX ORDER — DEXAMETHASONE SODIUM PHOSPHATE 4 MG/ML
INJECTION, SOLUTION INTRA-ARTICULAR; INTRALESIONAL; INTRAMUSCULAR; INTRAVENOUS; SOFT TISSUE PRN
Status: DISCONTINUED | OUTPATIENT
Start: 2019-05-23 | End: 2019-05-23 | Stop reason: SDUPTHER

## 2019-05-23 RX ORDER — MORPHINE SULFATE 2 MG/ML
2 INJECTION, SOLUTION INTRAMUSCULAR; INTRAVENOUS EVERY 5 MIN PRN
Status: DISCONTINUED | OUTPATIENT
Start: 2019-05-23 | End: 2019-05-23 | Stop reason: HOSPADM

## 2019-05-23 RX ORDER — ENALAPRILAT 2.5 MG/2ML
1.25 INJECTION INTRAVENOUS
Status: DISCONTINUED | OUTPATIENT
Start: 2019-05-23 | End: 2019-05-23 | Stop reason: HOSPADM

## 2019-05-23 RX ORDER — DOCUSATE SODIUM 100 MG/1
100 CAPSULE, LIQUID FILLED ORAL 2 TIMES DAILY
Status: DISCONTINUED | OUTPATIENT
Start: 2019-05-23 | End: 2019-05-24 | Stop reason: HOSPADM

## 2019-05-23 RX ORDER — SCOLOPAMINE TRANSDERMAL SYSTEM 1 MG/1
1 PATCH, EXTENDED RELEASE TRANSDERMAL ONCE
Status: DISCONTINUED | OUTPATIENT
Start: 2019-05-23 | End: 2019-05-23

## 2019-05-23 RX ORDER — CETIRIZINE HYDROCHLORIDE 5 MG/1
5 TABLET ORAL DAILY
Status: DISCONTINUED | OUTPATIENT
Start: 2019-05-23 | End: 2019-05-24 | Stop reason: HOSPADM

## 2019-05-23 RX ORDER — ROPIVACAINE HYDROCHLORIDE 2 MG/ML
INJECTION, SOLUTION EPIDURAL; INFILTRATION; PERINEURAL PRN
Status: DISCONTINUED | OUTPATIENT
Start: 2019-05-23 | End: 2019-05-23 | Stop reason: HOSPADM

## 2019-05-23 RX ORDER — LIDOCAINE HYDROCHLORIDE 10 MG/ML
INJECTION, SOLUTION INFILTRATION; PERINEURAL PRN
Status: DISCONTINUED | OUTPATIENT
Start: 2019-05-23 | End: 2019-05-23 | Stop reason: SDUPTHER

## 2019-05-23 RX ORDER — PROPOFOL 10 MG/ML
INJECTION, EMULSION INTRAVENOUS CONTINUOUS PRN
Status: DISCONTINUED | OUTPATIENT
Start: 2019-05-23 | End: 2019-05-23 | Stop reason: SDUPTHER

## 2019-05-23 RX ORDER — ALBUTEROL SULFATE 2.5 MG/3ML
2.5 SOLUTION RESPIRATORY (INHALATION) EVERY 4 HOURS PRN
Status: DISCONTINUED | OUTPATIENT
Start: 2019-05-23 | End: 2019-05-24 | Stop reason: HOSPADM

## 2019-05-23 RX ORDER — LOSARTAN POTASSIUM 25 MG/1
25 TABLET ORAL DAILY
Status: DISCONTINUED | OUTPATIENT
Start: 2019-05-23 | End: 2019-05-24 | Stop reason: HOSPADM

## 2019-05-23 RX ORDER — LIDOCAINE HYDROCHLORIDE 10 MG/ML
1 INJECTION, SOLUTION EPIDURAL; INFILTRATION; INTRACAUDAL; PERINEURAL
Status: DISCONTINUED | OUTPATIENT
Start: 2019-05-23 | End: 2019-05-23 | Stop reason: HOSPADM

## 2019-05-23 RX ADMIN — LIDOCAINE HYDROCHLORIDE 3 ML: 10 INJECTION, SOLUTION INFILTRATION; PERINEURAL at 11:23

## 2019-05-23 RX ADMIN — Medication 10 MG: at 11:53

## 2019-05-23 RX ADMIN — BUDESONIDE 250 MCG: 0.25 INHALANT RESPIRATORY (INHALATION) at 19:49

## 2019-05-23 RX ADMIN — Medication 15 MG: at 12:21

## 2019-05-23 RX ADMIN — OXYCODONE HYDROCHLORIDE AND ACETAMINOPHEN 2 TABLET: 5; 325 TABLET ORAL at 21:28

## 2019-05-23 RX ADMIN — LABETALOL HYDROCHLORIDE 2.5 MG: 5 INJECTION, SOLUTION INTRAVENOUS at 11:55

## 2019-05-23 RX ADMIN — VANCOMYCIN HYDROCHLORIDE 1000 MG: 1 INJECTION, SOLUTION INTRAVENOUS at 17:58

## 2019-05-23 RX ADMIN — MEPERIDINE HYDROCHLORIDE 12.5 MG: 50 INJECTION, SOLUTION INTRAMUSCULAR; INTRAVENOUS; SUBCUTANEOUS at 14:06

## 2019-05-23 RX ADMIN — ALBUTEROL SULFATE 2.5 MG: 2.5 SOLUTION RESPIRATORY (INHALATION) at 19:49

## 2019-05-23 RX ADMIN — DEXAMETHASONE SODIUM PHOSPHATE 4 MG: 4 INJECTION, SOLUTION INTRAMUSCULAR; INTRAVENOUS at 11:38

## 2019-05-23 RX ADMIN — ATORVASTATIN CALCIUM 40 MG: 40 TABLET, FILM COATED ORAL at 21:28

## 2019-05-23 RX ADMIN — PROPOFOL 50 MCG/KG/MIN: 10 INJECTION, EMULSION INTRAVENOUS at 11:32

## 2019-05-23 RX ADMIN — LABETALOL HYDROCHLORIDE 2.5 MG: 5 INJECTION, SOLUTION INTRAVENOUS at 11:35

## 2019-05-23 RX ADMIN — OXYCODONE HYDROCHLORIDE 10 MG: 10 TABLET, FILM COATED, EXTENDED RELEASE ORAL at 09:53

## 2019-05-23 RX ADMIN — OXYCODONE HYDROCHLORIDE AND ACETAMINOPHEN 2 TABLET: 5; 325 TABLET ORAL at 15:52

## 2019-05-23 RX ADMIN — BUPIVACAINE HYDROCHLORIDE IN DEXTROSE 1.6 ML: 7.5 INJECTION, SOLUTION SUBARACHNOID at 11:27

## 2019-05-23 RX ADMIN — Medication 25 MG: at 11:32

## 2019-05-23 RX ADMIN — SODIUM CHLORIDE, SODIUM LACTATE, POTASSIUM CHLORIDE, AND CALCIUM CHLORIDE: 600; 310; 30; 20 INJECTION, SOLUTION INTRAVENOUS at 12:03

## 2019-05-23 RX ADMIN — MIDAZOLAM 1 MG: 1 INJECTION INTRAMUSCULAR; INTRAVENOUS at 11:19

## 2019-05-23 RX ADMIN — SODIUM CHLORIDE, POTASSIUM CHLORIDE, SODIUM LACTATE AND CALCIUM CHLORIDE: 600; 310; 30; 20 INJECTION, SOLUTION INTRAVENOUS at 15:30

## 2019-05-23 RX ADMIN — Medication 2 G: at 11:32

## 2019-05-23 RX ADMIN — PANTOPRAZOLE SODIUM 40 MG: 40 TABLET, DELAYED RELEASE ORAL at 21:27

## 2019-05-23 RX ADMIN — ROPINIROLE HYDROCHLORIDE 0.25 MG: 0.5 TABLET, FILM COATED ORAL at 21:28

## 2019-05-23 RX ADMIN — SODIUM CHLORIDE, SODIUM LACTATE, POTASSIUM CHLORIDE, AND CALCIUM CHLORIDE: 600; 310; 30; 20 INJECTION, SOLUTION INTRAVENOUS at 09:53

## 2019-05-23 RX ADMIN — LIDOCAINE HYDROCHLORIDE 3 ML: 10 INJECTION, SOLUTION INFILTRATION; PERINEURAL at 11:16

## 2019-05-23 RX ADMIN — LOSARTAN POTASSIUM 25 MG: 25 TABLET ORAL at 15:52

## 2019-05-23 RX ADMIN — ALBUTEROL SULFATE 2.5 MG: 2.5 SOLUTION RESPIRATORY (INHALATION) at 15:33

## 2019-05-23 RX ADMIN — CALCITRIOL 0.5 MCG: 0.25 CAPSULE ORAL at 15:51

## 2019-05-23 RX ADMIN — TIZANIDINE 4 MG: 4 TABLET ORAL at 21:28

## 2019-05-23 RX ADMIN — ASPIRIN 81 MG: 81 TABLET ORAL at 21:27

## 2019-05-23 RX ADMIN — TRANEXAMIC ACID 1950 MG: 650 TABLET ORAL at 09:53

## 2019-05-23 RX ADMIN — SODIUM CHLORIDE, SODIUM LACTATE, POTASSIUM CHLORIDE, AND CALCIUM CHLORIDE: 600; 310; 30; 20 INJECTION, SOLUTION INTRAVENOUS at 12:54

## 2019-05-23 RX ADMIN — ONDANSETRON HYDROCHLORIDE 4 MG: 2 INJECTION, SOLUTION INTRAMUSCULAR; INTRAVENOUS at 11:38

## 2019-05-23 RX ADMIN — MIDAZOLAM 2 MG: 1 INJECTION INTRAMUSCULAR; INTRAVENOUS at 10:28

## 2019-05-23 RX ADMIN — MIDAZOLAM 1 MG: 1 INJECTION INTRAMUSCULAR; INTRAVENOUS at 11:31

## 2019-05-23 RX ADMIN — MORPHINE SULFATE 4 MG: 2 INJECTION, SOLUTION INTRAMUSCULAR; INTRAVENOUS at 18:56

## 2019-05-23 RX ADMIN — ROPIVACAINE HYDROCHLORIDE 20 ML: 5 INJECTION, SOLUTION EPIDURAL; INFILTRATION; PERINEURAL at 10:32

## 2019-05-23 RX ADMIN — LABETALOL HYDROCHLORIDE 2.5 MG: 5 INJECTION, SOLUTION INTRAVENOUS at 11:44

## 2019-05-23 RX ADMIN — DOCUSATE SODIUM 100 MG: 100 CAPSULE, LIQUID FILLED ORAL at 21:28

## 2019-05-23 ASSESSMENT — LIFESTYLE VARIABLES: SMOKING_STATUS: 0

## 2019-05-23 ASSESSMENT — PAIN SCALES - GENERAL
PAINLEVEL_OUTOF10: 0
PAINLEVEL_OUTOF10: 4
PAINLEVEL_OUTOF10: 8
PAINLEVEL_OUTOF10: 7
PAINLEVEL_OUTOF10: 9
PAINLEVEL_OUTOF10: 6
PAINLEVEL_OUTOF10: 3
PAINLEVEL_OUTOF10: 4
PAINLEVEL_OUTOF10: 9

## 2019-05-23 ASSESSMENT — PAIN - FUNCTIONAL ASSESSMENT: PAIN_FUNCTIONAL_ASSESSMENT: 0-10

## 2019-05-23 ASSESSMENT — PAIN DESCRIPTION - DESCRIPTORS: DESCRIPTORS: ACHING

## 2019-05-23 NOTE — ANESTHESIA PROCEDURE NOTES
Peripheral Block    Patient location during procedure: holding area  Start time: 5/23/2019 10:31 AM  End time: 5/23/2019 10:31 AM  Staffing  Anesthesiologist: Jd Landeros MD  Performed: anesthesiologist   Preanesthetic Checklist  Completed: patient identified, site marked, surgical consent, pre-op evaluation, timeout performed, IV checked, risks and benefits discussed, monitors and equipment checked, anesthesia consent given, oxygen available and patient being monitored  Peripheral Block  Patient position: supine  Prep: ChloraPrep  Patient monitoring: cardiac monitor, continuous pulse ox, frequent blood pressure checks and IV access  Block type: Femoral  Laterality: left  Injection technique: single-shot  Procedures: ultrasound guided  Local infiltration: lidocaine  Infiltration strength: 1 %  Dose: 3 mL  Adductor canal  Provider prep: sterile gloves  Local infiltration: lidocaine  Needle  Needle type: combined needle/nerve stimulator   Needle gauge: 21 G  Needle length: 10 cm  Needle localization: ultrasound guidance  Assessment  Injection assessment: negative aspiration for heme, no paresthesia on injection and local visualized surrounding nerve on ultrasound  Paresthesia pain: none  Slow fractionated injection: yes  Hemodynamics: stable  Additional Notes  Under ultrasound (\"US\") guidance, a 21 gauge needle was inserted and placed in close proximity to the femoral nerve. Ultrasound was also used to visualize the spread of the anesthetic in close proximity to the nerve being blocked. The nerve appeared anatomically normal, and there were no abnormal pathological findings. A permanent image was recorded. Emergency resuscitation equipment and lipid emulsion readily available.     Reason for block: post-op pain management

## 2019-05-23 NOTE — ANESTHESIA PRE PROCEDURE
Department of Anesthesiology  Preprocedure Note       Name:  Luc Johnson   Age:  46 y.o.  :  1965                                          MRN:  [id number]         Date:  2019      Surgeon: Alexandro Arenas):  Kendra David MD    Procedure: LEFT KNEE UNI VERSUS (Left Knee)  LEFT TOTAL KNEE REPLACEMENT (Left Knee)    Medications prior to admission:   Prior to Admission medications    Medication Sig Start Date End Date Taking? Authorizing Provider   HYDROcodone-acetaminophen (NORCO) 5-325 MG per tablet 1-2 tabs PO every 4hrs PRN. For pain. 17   Eleazar Krishna MD   loratadine (CLARITIN) 10 MG tablet Take 10 mg by mouth    Historical Provider, MD   pantoprazole (PROTONIX) 40 MG tablet Take 40 mg by mouth 2 times daily  16   Historical Provider, MD   tiZANidine (ZANAFLEX) 4 MG tablet Take 4 mg by mouth nightly     Historical Provider, MD   alendronate (FOSAMAX) 70 MG tablet Take 70 mg by mouth every 7 days Indications: Sander  10/23/14   Historical Provider, MD   calcitRIOL (ROCALTROL) 0.5 MCG capsule Take 0.5 mcg by mouth daily  10/24/14   Historical Provider, MD   rOPINIRole (REQUIP) 0.25 MG tablet Take 0.25 mg by mouth nightly  10/23/14   Historical Provider, MD   fluticasone-salmeterol (ADVAIR) 500-50 MCG/DOSE diskus inhaler Inhale 1 puff into the lungs every 12 hours. Historical Provider, MD   losartan (COZAAR) 25 MG tablet Take 25 mg by mouth daily. Historical Provider, MD   albuterol (PROVENTIL) (2.5 MG/3ML) 0.083% nebulizer solution Take 2.5 mg by nebulization every 4 hours as needed  10/23/11   Historical Provider, MD   LIPITOR 40 MG tablet Take 40 mg by mouth daily.  11   Historical Provider, MD   zafirlukast (ACCOLATE) 20 MG tablet Take 20 mg by mouth 2 times daily  10/14/11   Historical Provider, MD       Current medications:    Current Facility-Administered Medications   Medication Dose Route Frequency Provider Last Rate Last Dose    oxyCODONE (OXYCONTIN) extended release tablet 10 mg  10 mg Oral [address] Tyrese Monique MD        scopolamine (TRANSDERM-SCOP) transdermal patch 1 patch  1 patch Transdermal Once Tyrese Monique MD        lactated ringers infusion   Intravenous Continuous Tyrese Monique MD        ceFAZolin (ANCEF) 2 g in 0.9% sodium chloride 50 mL IVPB  2 g Intravenous On Call to OR Tyrese Monique MD        tranexamic acid (LYSTEDA) tablet 1,950 mg  1,950 mg Oral On Call to [address], MD           Allergies: Allergies   Allergen Reactions    Cephalexin Rash       Problem List:    Patient Active Problem List   Diagnosis Code    Personal history of malignant neoplasm of breast Z85.3    Family history of malignant neoplasm of breast, sister Z80.2    S/P lumpectomy, right breast 8/2008 Z98.890    Breast cancer metastasized to axillary lymph node 1 of 11 C50.919, C77.3       Past Medical History:        Diagnosis Date    Asthma     Cancer ([facility] [address]     Breast Cancer    GERD (gastroesophageal reflux disease)     Hyperlipidemia     Hypertension     Osteoporosis     PONV (postoperative nausea and vomiting)     Thyroid disease     Wears glasses        Past Surgical History:        Procedure Laterality Date    APPENDECTOMY      BREAST BIOPSY  6/6/2008    Rt Breast Stereotactic Biopsy, Infiltrating ductal carcinoma, grade 1, Focus of low grade ductal carcinoma in-situ    CHOLECYSTECTOMY  1993    COLONOSCOPY  2008    Dr Fredy Chung      x 3    MASTECTOMY, PARTIAL  7/3/2008    Rt Breast Lumpectomy & [place] lymph Node Biopsy displaying esidual infiltrating ductal carcinoma grade 1 with foci of low grade DCIS involving the margin. 1 of 2 sentinel lymph nodes positive for . 56mm micrometastisis.     MASTECTOMY, PARTIAL  8/7/2008    Right Partial Mastectomy Re-excision Right axillary node dissection 9 out of 9 lymph nodes negative for a total of 1 of 11 nodes positive for metastatic malignancy.  CO RMVL MARIELLA CTR VAD W/SUBQ PORT/ CTR/PRPH INSJ N/A 5/18/2017    PORT REMOVAL performed by Sumeet Marina MD at [place] [address]    Dr Yan \[facility] [place] [facility]\""       Social History:    Social History     Tobacco Use    Smoking status: Never Smoker    Smokeless tobacco: Never Used   Substance Use Topics    Alcohol use: No                                Counseling given: Not Answered      Vital Signs (Current): There were no vitals filed for this visit. BP Readings from Last 3 Encounters:   05/21/19 (!) 146/90   05/07/18 118/82   01/09/18 130/82       NPO Status:                                                                                 BMI:   Wt Readings from Last 3 Encounters:   05/21/19 163 lb (73.9 kg)   05/20/19 162 lb (73.5 kg)   05/07/18 167 lb (75.8 kg)     There is no height or weight on file to calculate BMI.    CBC: No results found for: WBC, RBC, HGB, HCT, MCV, RDW, PLT    CMP: No results found for: NA, K, CL, CO2, BUN, CREATININE, GFRAA, AGRATIO, LABGLOM, GLUCOSE, PROT, CALCIUM, BILITOT, ALKPHOS, AST, ALT    POC Tests: No results for input(s): POCGLU, POCNA, POCK, POCCL, POCBUN, POCHEMO, POCHCT in the last 72 hours. Coags:   Lab Results   Component Value Date    PROTIME 12.0 05/20/2019    INR 0.94 05/20/2019    APTT 28.4 05/20/2019       HCG (If Applicable): No results found for: PREGTESTUR, PREGSERUM, HCG, HCGQUANT     ABGs: No results found for: PHART, PO2ART, OVY3VTZ, NAB9RNC, BEART, P7LVTSHC     Type & Screen (If Applicable):  No results found for: LABABO, [address]    Anesthesia Evaluation  Patient summary reviewed and Nursing notes reviewed   history of anesthetic complications: PONV.   Airway: Mallampati: II  TM distance: >3 FB   Neck ROM: full  Mouth opening: > = 3 FB Dental:          Pulmonary:   (+) asthma:     (-) recent URI, sleep apnea and not a current smoker          Patient did not smoke on day of surgery. Cardiovascular:    (+) hypertension:,     (-) past MI, CAD, CABG/stent and dysrhythmias    ECG reviewed               Beta Blocker:  Not on Beta Blocker         Neuro/Psych:   Negative Neuro/Psych ROS     (-) TIA, CVA and headaches           GI/Hepatic/Renal:   (+) GERD:,           Endo/Other:    (+) : arthritis:., malignancy/cancer. ROS comment: Scoliosis per pt Abdominal:           Vascular:                                        Anesthesia Plan      spinal and regional     ASA 3     (Decadron/Zofran Intraop)  Induction: intravenous. MIPS: Postoperative opioids intended and Prophylactic antiemetics administered. Anesthetic plan and risks discussed with patient.                       Ramiro Kang MD   5/23/2019

## 2019-05-23 NOTE — ANESTHESIA POSTPROCEDURE EVALUATION
Department of Anesthesiology  Postprocedure Note    Patient: Yenni Delgado  MRN: [id number]  YOB: 1965  Date of evaluation: 5/23/2019  Time:  1:36 PM     Procedure Summary     Date:  05/23/19 Room / Location:  [facility] OR 09 / [facility] OR    Anesthesia Start:  1111 Anesthesia Stop:      Procedures:       LEFT KNEE UNI VERSUS (Left Knee)      LEFT TOTAL KNEE REPLACEMENT (Left Knee) Diagnosis:  (M17.12)    Surgeon:  Keshia Lin MD Responsible Provider:  DAVID Wei CRNA    Anesthesia Type:  spinal, regional ASA Status:  3          Anesthesia Type: spinal, regional    Karen Phase I:      Karen Phase II:      Last vitals: Reviewed and per EMR flowsheets.        Anesthesia Post Evaluation    Patient location during evaluation: PACU  Patient participation: complete - patient participated  Level of consciousness: sleepy but conscious  Pain score: 0  Airway patency: patent  Nausea & Vomiting: no nausea and no vomiting  Complications: no  Cardiovascular status: blood pressure returned to baseline and hemodynamically stable  Respiratory status: acceptable, room air and spontaneous ventilation  Hydration status: stable

## 2019-05-23 NOTE — ANESTHESIA PROCEDURE NOTES
Spinal Block    Patient location during procedure: OR  Start time: 5/23/2019 11:11 AM  End time: 5/23/2019 11:31 AM  Reason for block: primary anesthetic  Staffing  Resident/CRNA: Garrison Snellen, APRN - CRNA  Performed: resident/CRNA   Preanesthetic Checklist  Completed: patient identified, site marked, surgical consent, pre-op evaluation, timeout performed, IV checked, risks and benefits discussed, monitors and equipment checked, anesthesia consent given, oxygen available and patient being monitored  Spinal Block  Patient position: sitting  Prep: Betadine  Patient monitoring: continuous pulse ox and frequent blood pressure checks  Approach: midline  Location: L3/L4  Provider prep: mask and sterile gloves  Local infiltration: lidocaine  Agent: bupivacaine  Dose: 1.6  Dose: 1.6  Needle  Needle type: Pencan   Needle gauge: 25 G  Needle length: 4 in  Needle insertion depth: 12 cm  Kit: Pencan Spinal Tray  Lot number: [id number]  Expiration date: 7/1/2020  Assessment  Sensory level: T8  Swirl obtained: Yes  CSF: clear  Attempts: 1  Hemodynamics: stable  Additional Notes  Pt tolerated well.

## 2019-05-24 VITALS
OXYGEN SATURATION: 100 % | WEIGHT: 162 LBS | BODY MASS INDEX: 28.7 KG/M2 | TEMPERATURE: 97.4 F | SYSTOLIC BLOOD PRESSURE: 127 MMHG | RESPIRATION RATE: 18 BRPM | HEART RATE: 76 BPM | DIASTOLIC BLOOD PRESSURE: 68 MMHG | HEIGHT: 63 IN

## 2019-05-24 LAB
ANION GAP SERPL CALCULATED.3IONS-SCNC: 15 MMOL/L (ref 7–19)
BUN BLDV-MCNC: 12 MG/DL (ref 6–20)
CALCIUM SERPL-MCNC: 7.8 MG/DL (ref 8.6–10)
CHLORIDE BLD-SCNC: 108 MMOL/L (ref 98–111)
CO2: 20 MMOL/L (ref 22–29)
CREAT SERPL-MCNC: 1.1 MG/DL (ref 0.5–0.9)
GFR NON-AFRICAN AMERICAN: 52
GLUCOSE BLD-MCNC: 139 MG/DL (ref 74–109)
HCT VFR BLD CALC: 38 % (ref 37–47)
HEMOGLOBIN: 12.1 G/DL (ref 12–16)
POTASSIUM REFLEX MAGNESIUM: 4.2 MMOL/L (ref 3.5–5)
SODIUM BLD-SCNC: 143 MMOL/L (ref 136–145)

## 2019-05-24 PROCEDURE — 94640 AIRWAY INHALATION TREATMENT: CPT

## 2019-05-24 PROCEDURE — 2580000003 HC RX 258: Performed by: ORTHOPAEDIC SURGERY

## 2019-05-24 PROCEDURE — 97165 OT EVAL LOW COMPLEX 30 MIN: CPT

## 2019-05-24 PROCEDURE — 97535 SELF CARE MNGMENT TRAINING: CPT

## 2019-05-24 PROCEDURE — 80048 BASIC METABOLIC PNL TOTAL CA: CPT

## 2019-05-24 PROCEDURE — 6370000000 HC RX 637 (ALT 250 FOR IP): Performed by: ORTHOPAEDIC SURGERY

## 2019-05-24 PROCEDURE — 85018 HEMOGLOBIN: CPT

## 2019-05-24 PROCEDURE — 85014 HEMATOCRIT: CPT

## 2019-05-24 PROCEDURE — 36415 COLL VENOUS BLD VENIPUNCTURE: CPT

## 2019-05-24 PROCEDURE — 97161 PT EVAL LOW COMPLEX 20 MIN: CPT

## 2019-05-24 PROCEDURE — 6360000002 HC RX W HCPCS: Performed by: ORTHOPAEDIC SURGERY

## 2019-05-24 PROCEDURE — 97116 GAIT TRAINING THERAPY: CPT

## 2019-05-24 RX ORDER — ASPIRIN 81 MG/1
81 TABLET ORAL 2 TIMES DAILY
Qty: 60 TABLET | Refills: 0 | Status: SHIPPED | OUTPATIENT
Start: 2019-05-24

## 2019-05-24 RX ORDER — OXYCODONE HYDROCHLORIDE AND ACETAMINOPHEN 5; 325 MG/1; MG/1
1 TABLET ORAL EVERY 4 HOURS PRN
Qty: 30 TABLET | Refills: 0 | Status: SHIPPED | OUTPATIENT
Start: 2019-05-24 | End: 2019-05-27

## 2019-05-24 RX ORDER — IBUPROFEN 400 MG/1
400 TABLET ORAL EVERY 8 HOURS PRN
Qty: 30 TABLET | Refills: 0 | Status: SHIPPED | OUTPATIENT
Start: 2019-05-24

## 2019-05-24 RX ORDER — DOCUSATE SODIUM 100 MG/1
100 CAPSULE, LIQUID FILLED ORAL DAILY
Qty: 20 CAPSULE | Refills: 0 | Status: SHIPPED | OUTPATIENT
Start: 2019-05-24 | End: 2021-12-15

## 2019-05-24 RX ADMIN — ALBUTEROL SULFATE 2.5 MG: 2.5 SOLUTION RESPIRATORY (INHALATION) at 11:20

## 2019-05-24 RX ADMIN — CETIRIZINE HYDROCHLORIDE 5 MG: 5 TABLET ORAL at 07:51

## 2019-05-24 RX ADMIN — PANTOPRAZOLE SODIUM 40 MG: 40 TABLET, DELAYED RELEASE ORAL at 07:50

## 2019-05-24 RX ADMIN — LOSARTAN POTASSIUM 25 MG: 25 TABLET ORAL at 07:50

## 2019-05-24 RX ADMIN — ALBUTEROL SULFATE 2.5 MG: 2.5 SOLUTION RESPIRATORY (INHALATION) at 07:57

## 2019-05-24 RX ADMIN — DOCUSATE SODIUM 100 MG: 100 CAPSULE, LIQUID FILLED ORAL at 07:50

## 2019-05-24 RX ADMIN — BUDESONIDE 250 MCG: 0.25 INHALANT RESPIRATORY (INHALATION) at 07:57

## 2019-05-24 RX ADMIN — MORPHINE SULFATE 2 MG: 2 INJECTION, SOLUTION INTRAMUSCULAR; INTRAVENOUS at 00:42

## 2019-05-24 RX ADMIN — ASPIRIN 81 MG: 81 TABLET ORAL at 07:50

## 2019-05-24 RX ADMIN — VANCOMYCIN HYDROCHLORIDE 1000 MG: 1 INJECTION, SOLUTION INTRAVENOUS at 06:03

## 2019-05-24 RX ADMIN — OXYCODONE HYDROCHLORIDE AND ACETAMINOPHEN 2 TABLET: 5; 325 TABLET ORAL at 15:05

## 2019-05-24 RX ADMIN — ZAFIRLUKAST 20 MG: 20 TABLET, FILM COATED ORAL at 07:52

## 2019-05-24 RX ADMIN — OXYCODONE HYDROCHLORIDE AND ACETAMINOPHEN 2 TABLET: 5; 325 TABLET ORAL at 06:12

## 2019-05-24 RX ADMIN — MORPHINE SULFATE 2 MG: 2 INJECTION, SOLUTION INTRAMUSCULAR; INTRAVENOUS at 08:09

## 2019-05-24 RX ADMIN — CALCITRIOL 0.5 MCG: 0.25 CAPSULE ORAL at 07:50

## 2019-05-24 ASSESSMENT — PAIN SCALES - GENERAL
PAINLEVEL_OUTOF10: 3
PAINLEVEL_OUTOF10: 6
PAINLEVEL_OUTOF10: 6
PAINLEVEL_OUTOF10: 8
PAINLEVEL_OUTOF10: 8
PAINLEVEL_OUTOF10: 7

## 2019-05-24 ASSESSMENT — PAIN DESCRIPTION - ORIENTATION: ORIENTATION: LEFT

## 2019-05-24 ASSESSMENT — PAIN DESCRIPTION - LOCATION: LOCATION: KNEE

## 2019-05-24 ASSESSMENT — PAIN DESCRIPTION - PAIN TYPE: TYPE: SURGICAL PAIN

## 2019-05-24 NOTE — CARE COORDINATION
Marylen Comes, RN  P# [phone]    Patient would like dme items to be delivered to room #503. Please call if you have any questions. Patient Information     Patient Name  Sabi Denise ([id number]) Sex  Female   1965   Room Bed   9167 [id number]   Patient Ethnicity & Race     Ethnic Group Patient Race   Non-/Non [address] [place] [address] Status   No [002]   Patient Demographics     Address  [address] [place] [address] [facility] [place] [address] Phone  [phone] (Home)  [phone] (Mobile) *Preferred* E-mail Address  [email or web address]   PCP and [facility] Terri, [address]   Emergency Contact(s)     Name Relation Home Work Mobile   Keven Gould Spouse [phone]   Documents on File      Status Date Received Description   Documents for the Patient   HIPAA Notice of Privacy Signed () 11    Photo ID Received () 11 Exp 10/15/11   Insurance Card Received () 11 [facility]   Physician Office Consent for Treatment Not Received     Advance Directives and Living Will Not Received     Power of  Not Received     Financial Responsibility Form Not Received     Encounter Dictation  12 Dr Herson Zendejas  12 [facility]   Insurance Card Received () 12 [facility] 2012   Financial Assistance Program Applications Not Received     Percolate Adult Proxy Access Form Not Received     streamithart Child Proxy Access Form Not Received     Referral Attachment   REF INFO FROM DR Chance Chirinos   HIPAA Notice of Privacy Signed 12    Endoscopy Report   10/01/10 endo/path/urea/pictures   Endoscopy Report   07/09/10 endo/urea/pictures   Endoscopy Report   05/07/10 endo/urea   Colonoscopy   04/23/10 colon/pictures   Endoscopy Report   08 endo/path/urea/pictures   Endoscopy Report   06 endo/urea/pictures   Colonoscopy   05 colon/path   Progress Notes 10/18/10 gi office note   Progress Notes   03/11/10 gi office note   Progress Notes   08 gi office note   X-Ray   04/23/10 barium enema   Diagnostics   08 barium swallow   Diagnostics   05 small bowel follow-thru   Form   SCHED FORM   Form   10/4/12 [place] form/[employer]   Form   10/8/12 [employer] denial/Nexium   Encounter Dictation  13 Dr Alarcon Money   Encounter Dictation  13 Dr Louie Villagran   Oncology Report   3/16/13 ONCOLOGY NOTE   (OLD) [facility] ([facility]) Physician Consent and NPP Signed () 13    Insurance Card Received () 11/10/14 [employer]    ACO Consent for the Release of  Confidential Alcohol or Drug Treatment Information Not Received     ACO Declining to [address] [facility] Not Received     Photo ID Received () 11/10/14 Exp 10/15/15   (OLD) [facility]) Physician Consent and NPP Signed () 11/10/14    Financial Responsibility Form Received 11/10/14    Progress Notes  03/17/15 Dr Herlinda Valadez Received () 05/07/15 [employer]    Cardiac Rehab Phase 3 Payment Not Received     Recurring Hospital Consent/HIPAA Scanned Not Received     Insurance Card Received () 17    (OLD) [facility] ([facility]) Physician Consent and NPP Signed () 16    Financial Responsibility Form Signed 16    Insurance Card Received () 17    Photo ID Received () 17 EXPIRES 99/65/23   Drivers License  50/33/83    (OLD) [facility] ([facility]) Physician Consent and NPP Signed () 17    [address] Physician Communication Release NPP Signed 19    [facility]) Physician Consent and NPP Signed 19    Insurance Card Received 18 Medicare/ for [employer] Card Received 18    Photo ID Received () 18    Outside Record Received 10/03/18 [facility]   H&P Received 18 Dr Chris Marques Received 19 scanned- Cards current   Photo ID edited by  on  at    [facility] [place]   Date Reviewed: 5/18/2017      ICD-10-CM Priority Class Noted POA   Primary osteoarthritis of left knee M17.12   5/23/2019 Yes      Non-Hospital Problem List   Date Reviewed: 5/18/2017      ICD-10-CM Priority Class Noted   Personal history of malignant neoplasm of breast Z85.3   6/6/2008   Family history of malignant neoplasm of breast, sister (Chronic) Z80.3   11/18/2011   S/P lumpectomy, right breast 8/2008 Z98.890   11/15/2012   Breast cancer metastasized to axillary lymph node 1 of 11            Electronically signed by Bree Liu RN on 5/24/2019 at 9:36 AM

## 2019-05-24 NOTE — PROGRESS NOTES
Occupational Therapy   Occupational Therapy Initial Assessment  Date: 2019   Patient Name: Saverio Moritz  MRN: [id number]     : 1965    Date of Service: 2019    Discharge Recommendations:          Assessment   Performance deficits / Impairments: Decreased ADL status; Decreased balance;Decreased functional mobility   Assessment: Will progress as tolerated  Treatment Diagnosis: L TKR  Prognosis: Good  Decision Making: Low Complexity  REQUIRES OT FOLLOW UP: Yes  Activity Tolerance  Activity Tolerance: Patient Tolerated treatment well           Patient Diagnosis(es): There were no encounter diagnoses. has a past medical history of Asthma, Cancer ([facility] [address] GERD (gastroesophageal reflux disease), Hyperlipidemia, Hypertension, Osteoporosis, PONV (postoperative nausea and vomiting), Thyroid disease, and Wears glasses. has a past surgical history that includes Cholecystectomy (); Appendectomy; Ectopic pregnancy surgery; Breast biopsy (2008); Mastectomy, partial (7/3/2008); Mastectomy, partial (2008); Excision of Parathyroid Mass; Colonoscopy (); Upper gastrointestinal endoscopy (); pr rmvl marisela ctr vad w/subq port/ ctr/prph insj (N/A, 2017); Knee Arthroplasty (Left, 2019); and Total knee arthroplasty (Left, 2019).     Treatment Diagnosis: L TKR      Restrictions       Subjective   General  Chart Reviewed: Yes  Patient assessed for rehabilitation services?: Yes  Family / Caregiver Present: No  Diagnosis: L TKR  Pain Assessment  Pain Assessment: 0-10  Pain Level: 8  Pain Type: Surgical pain  Pain Location: Knee  Pain Orientation: Left  Social/Functional History  Social/Functional History  Lives With: Spouse  Type of Home: House  Home Layout: One level  Home Access: Stairs to enter with rails  Entrance Stairs - Number of Steps: 5  Bathroom Shower/Tub: Tub/Shower unit  Bathroom Toilet: Standard  ADL Assistance: Independent  Homemaking Assistance: Independent  Homemaking
PHARMACY NOTE  Coral Hussein was ordered alendronate (Fosamax). Per the [address], this medication is non-formulary and not stocked by pharmacy. It has been discontinued. The medication can be reordered at discharge.      Electronically signed by Peggy Vallejo [address] [place] [address] on 5/23/2019 at 3:09 PM
Patient discharged home today with outpatient therapy script. Medications and discharge instructions reviewed with patient. A handout of all new medications was given to the patient for reference with all possible side effects highlighted. Patient verbalized understanding. Patient stable upon discharge.   Electronically signed by Brain White RN on 5/24/2019 at 12:56 PM
level  Home Access: Stairs to enter with rails  Entrance Stairs - Number of Steps: 5  Bathroom Shower/Tub: Tub/Shower unit  Bathroom Toilet: Standard  ADL Assistance: Independent  Homemaking Assistance: Independent  Homemaking Responsibilities: Yes  Ambulation Assistance: Independent  Transfer Assistance: Independent  Cognition        Objective          AROM LLE (degrees)  LLE General AROM: SEATED KNEE FLEX ~ 75  Strength LLE  Comment: GROSSLY +3/5        Bed mobility  Supine to Sit: Minimal assistance  Transfers  Sit to Stand: Contact guard assistance  Bed to Chair: Contact guard assistance  Ambulation  WB Status: FWB  Ambulation 1  Device: Rolling Walker  Assistance: Contact guard assistance  Quality of Gait: STEP-TO PATTERN, SUFFICIENT WEIGHTBEARING  Gait Deviations: Slow Silvia;Decreased step length  Distance: 10' X 2     Balance  Sitting - Dynamic: Good  Standing - Dynamic: Good;-        Plan   Plan  Times per week: AT LEAST 7  Current Treatment Recommendations: Strengthening, Balance Training, ROM, Functional Mobility Training, Transfer Training, Gait Training, Stair training, Patient/Caregiver Education & Training, Safety Education & Training  Safety Devices  Type of devices: Left in chair, Call light within reach    G-Code       OutComes Score                                                  AM-PAC Score             Goals  Short term goals  Time Frame for Short term goals: 14 DAYS  Short term goal 1: BED MOB MOD IND  Short term goal 2: TRANSFERS SUP-IND  Short term goal 3: ' RW SUP-IND  Short term goal 4: UP/DOWN 3 STEPS CGA       Therapy Time   Individual Concurrent Group Co-treatment   Time In           Time Out           Minutes                   Timmy Acuna PT

## 2019-05-26 ASSESSMENT — ENCOUNTER SYMPTOMS
COUGH: 0
VOMITING: 0
DIARRHEA: 0
SHORTNESS OF BREATH: 0
NAUSEA: 0

## 2019-05-27 NOTE — CONSULTS
N/A 5/18/2017    PORT REMOVAL performed by Antonette Dupont MD at [address] Left 5/23/2019    LEFT TOTAL KNEE REPLACEMENT performed by Scott Walker MD at [facility] 2008    Dr Salome Hernandez        Medications Prior to Admission:       Prior to Admission medications    Medication Sig Start Date End Date Taking? Authorizing Provider   oxyCODONE-acetaminophen (PERCOCET) 5-325 MG per tablet Take 1 tablet by mouth every 4 hours as needed for Pain for up to 3 days. 5/24/19 5/27/19 Yes Scott Walker MD   aspirin EC 81 MG EC tablet Take 1 tablet by mouth 2 times daily 5/24/19  Yes Scott Walker MD   docusate sodium (COLACE) 100 MG capsule Take 1 capsule by mouth daily To prevent constipation 5/24/19  Yes Scott Walker MD   ibuprofen (ADVIL;MOTRIN) 400 MG tablet Take 1 tablet by mouth every 8 hours as needed for Pain 5/24/19  Yes Scott Walker MD   albuterol (PROVENTIL) (2.5 MG/3ML) 0.083% nebulizer solution Take 2.5 mg by nebulization every 4 hours as needed  10/23/11  Yes Historical Provider, MD   loratadine (CLARITIN) 10 MG tablet Take 10 mg by mouth    Historical Provider, MD   pantoprazole (PROTONIX) 40 MG tablet Take 40 mg by mouth 2 times daily  9/6/16   Historical Provider, MD   tiZANidine (ZANAFLEX) 4 MG tablet Take 4 mg by mouth nightly     Historical Provider, MD   alendronate (FOSAMAX) 70 MG tablet Take 70 mg by mouth every 7 days Indications: Sander  10/23/14   Historical Provider, MD   calcitRIOL (ROCALTROL) 0.5 MCG capsule Take 0.5 mcg by mouth daily  10/24/14   Historical Provider, MD   rOPINIRole (REQUIP) 0.25 MG tablet Take 0.25 mg by mouth nightly  10/23/14   Historical Provider, MD   fluticasone-salmeterol (ADVAIR) 500-50 MCG/DOSE diskus inhaler Inhale 1 puff into the lungs every 12 hours. Historical Provider, MD   losartan (COZAAR) 25 MG tablet Take 25 mg by mouth daily.       Historical with dc, encourage therapy, follow up at the office      Electronically signed by Alva Dominguez MD on 5/26/2019 at 8:15 PM     Copy sent to Paula Miller MD

## 2019-05-31 ENCOUNTER — HOSPITAL ENCOUNTER (EMERGENCY)
Facility: HOSPITAL | Age: 54
Discharge: HOME | End: 2019-05-31
Admitting: EMERGENCY MEDICINE

## 2019-05-31 VITALS
OXYGEN SATURATION: 100 % | HEIGHT: 62 IN | RESPIRATION RATE: 16 BRPM | SYSTOLIC BLOOD PRESSURE: 146 MMHG | BODY MASS INDEX: 29.81 KG/M2 | WEIGHT: 162 LBS | TEMPERATURE: 98.3 F | DIASTOLIC BLOOD PRESSURE: 89 MMHG | HEART RATE: 77 BPM

## 2019-05-31 DIAGNOSIS — H66.90 ACUTE OTITIS MEDIA, UNSPECIFIED OTITIS MEDIA TYPE: Primary | ICD-10-CM

## 2019-05-31 PROCEDURE — 99282 EMERGENCY DEPT VISIT SF MDM: CPT

## 2019-05-31 RX ORDER — IBUPROFEN 400 MG/1
400 TABLET ORAL EVERY 8 HOURS PRN
COMMUNITY

## 2019-05-31 RX ORDER — OXYCODONE HYDROCHLORIDE AND ACETAMINOPHEN 5; 325 MG/1; MG/1
1 TABLET ORAL EVERY 4 HOURS
COMMUNITY
End: 2019-10-29 | Stop reason: ALTCHOICE

## 2019-05-31 RX ORDER — AMOXICILLIN 500 MG/1
1000 CAPSULE ORAL 2 TIMES DAILY
Qty: 40 CAPSULE | Refills: 0 | Status: SHIPPED | OUTPATIENT
Start: 2019-05-31 | End: 2019-06-10

## 2019-05-31 RX ORDER — DOCUSATE SODIUM 100 MG/1
100 CAPSULE, LIQUID FILLED ORAL AS NEEDED
COMMUNITY

## 2019-05-31 RX ORDER — ASPIRIN 81 MG/1
81 TABLET ORAL DAILY
COMMUNITY
End: 2019-10-29 | Stop reason: ALTCHOICE

## 2019-06-01 NOTE — ED PROVIDER NOTES
Subjective   History of Present Illness  53-year-old female presents with a chief complaint of decreased hearing and pain in her left ear.  She reports just prior to arrival that she had itching in her ear and she had put a finger in there and then she felt decreased hearing.  She reports very mild tenderness.  No nausea vomiting diarrhea  Review of Systems   All other systems reviewed and are negative.      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 breast (CMS/HCC) 9/26/2016   • Osteoporosis    • PONV (postoperative nausea and vomiting)    • Scoliosis        Allergies   Allergen Reactions   • Keflex [Cephalexin] Rash and Unknown (See Comments)       Past Surgical History:   Procedure Laterality Date   • ADENOIDECTOMY     • APPENDECTOMY  1994   • BREAST BIOPSY     • BREAST LUMPECTOMY Right 2008   • CHOLECYSTECTOMY  1993   • COLONOSCOPY  05/03/2013    Erythematous mucosa in the rectum-biopsied; Repeat 4 years    • COLONOSCOPY N/A 6/14/2017    Normal; Repeat 5 years; Procedure: COLONOSCOPY WITH ANESTHESIA;  Surgeon: Ksenia Fox MD;  Location: [facility] ENDOSCOPY;  Service:    • COLONOSCOPY  04/23/2010    Dr. Arzola-Extremely poorly prepped colon   • D&C HYSTEROSCOPY  1993   • DILATATION AND CURETTAGE     • ENDOSCOPY N/A 10/5/2016    Medium-sized HH; Widely patent and non-obstructing Schatzki ring-dilated; Procedure: ESOPHAGOGASTRODUODENOSCOPY WITH ANESTHESIA;  Surgeon: Ksenia Fox MD;  Location: [facility] ENDOSCOPY;  Service:    • ENDOSCOPY N/A 2/27/2019    Normal 1st portion of the duodenum and 2 portion of the duodenum; A few gastric polyps-biopsied; Small HH; Widely patent Schatzki ring-dilated; Abnormal esophageal  motility, suspicious for presbyesophagus; Arrange for barium swallow to assess for dysmotility   • HYSTEROSCOPY     • KNEE ARTHROSCOPY Left 11/13/2018    Procedure: LEFT KNEE ARTHROSCOPIC PARTIAL MEDIAL MENISCECTOMY;  Surgeon: Matt Maki MD;  Location: Health system;  Service: Orthopedics   • MEDIPORT INSERTION, SINGLE  2008   • MEDIPORT REMOVAL     • REPLACEMENT TOTAL KNEE Left 05/23/2019   • THYROIDECTOMY  2011    parathyroidectomy   • TONSILLECTOMY AND ADENOIDECTOMY  1970   • TUBAL ABDOMINAL LIGATION         Family History   Problem Relation Age of Onset   • Colon polyps Mother    • Cirrhosis Mother    • Colon polyps Sister 54   • Breast cancer Sister    • No Known Problems Father    • No Known Problems Brother    • No Known Problems Daughter    • No Known Problems Son    • No Known Problems Maternal Grandmother    • No Known Problems Paternal Grandmother    • No Known Problems Maternal Aunt    • No Known Problems Paternal Aunt    • Colon cancer Neg Hx    • Crohn's disease Neg Hx    • Irritable bowel syndrome Neg Hx    • Liver cancer Neg Hx    • Liver disease Neg Hx    • Rectal cancer Neg Hx    • Stomach cancer Neg Hx    • BRCA 1/2 Neg Hx    • Endometrial cancer Neg Hx    • Ovarian cancer Neg Hx    • Uterine cancer Neg Hx        Social History     Socioeconomic History   • Marital status:      Spouse name: Not on file   • Number of children: Not on file   • Years of education: Not on file   • Highest education level: Not on file   Tobacco Use   • Smoking status: Never Smoker   • Smokeless tobacco: Never Used   Substance and Sexual Activity   • Alcohol use: No   • Drug use: No   • Sexual activity: Yes     Partners: Male     Birth control/protection: None           Objective   Physical Exam   Constitutional: She is oriented to person, place, and time. She appears well-developed and well-nourished.   HENT:   Head: Normocephalic and atraumatic.   Middle ear effusion   Eyes: EOM are normal. Pupils are  equal, round, and reactive to light.   Neck: Normal range of motion. Neck supple.   Cardiovascular: Normal rate and regular rhythm.   Pulmonary/Chest: Effort normal and breath sounds normal.   Abdominal: Soft. Bowel sounds are normal.   Musculoskeletal: Normal range of motion.   Neurological: She is alert and oriented to person, place, and time. No cranial nerve deficit. Coordination normal.   Skin: Skin is warm and dry. Capillary refill takes less than 2 seconds.   Psychiatric: She has a normal mood and affect. Her behavior is normal.   Nursing note and vitals reviewed.      Procedures           ED Course                  MDM  Number of Diagnoses or Management Options  Acute otitis media, unspecified otitis media type: new and requires workup  Diagnosis management comments: Probable developing otitis media     Risk of Complications, Morbidity, and/or Mortality  Presenting problems: moderate  Diagnostic procedures: moderate  Management options: moderate    Patient Progress  Patient progress: stable        Final diagnoses:   Acute otitis media, unspecified otitis media type            Nicolás Cantu PA-C  05/31/19 [id number]

## 2019-06-11 RX ORDER — ZAFIRLUKAST 20 MG/1
TABLET, FILM COATED ORAL
Qty: 180 TABLET | Refills: 3 | Status: SHIPPED | OUTPATIENT
Start: 2019-06-11 | End: 2020-08-17

## 2019-06-13 ENCOUNTER — TELEPHONE (OUTPATIENT)
Dept: INPATIENT UNIT | Age: 54
End: 2019-06-13

## 2019-06-13 NOTE — TELEPHONE ENCOUNTER
Follow up phone call returned. Left me a voicemail.   Electronically signed by Rosa Elena Donnelly RN on 6/13/2019 at 1:12 PM

## 2019-07-07 NOTE — PROGRESS NOTES
"Primary Physician: Antonio Shaffer MD    Chief Complaint   Patient presents with   • Follow-up     Pt presents today for dysphagia follow up; pt states overall she is better but still gets choked occasionally-not as often as before though       Subjective     Luna Cruz is a 53 y.o. female.    HPI   GERD/Nausea  Patient has long-standing history of reflux disease.  She is currently maintained on pantoprazole 40 mg BID which she has done for \"awhile\".  She uses an occasional Zantac.  She does drink caffeine.  She tries not to eat late at nighttime.  She does not smoke.  She had an endoscopy done October 2016.  She is having complaints of dysphagia at that point.  She had a nonobstructing Schatzki's ring.  This was dilated up to 20 mm.  She also had a medium-sized hiatal hernia.  Denies any melena/hematochezia.     Dysphagia  She has a h/o dysphagia.  She cannot identify any exacerbating or relieving factors.  It tends to occur with breads and meats and also with large pills.  Her weight is holding stable.  She does have occasional nausea.  She has had her gallbladder previously removed.  I reviewed her medication list and she is taking Fosamax and potassium supplements.  She does use ibuprofen 600mg prn knee pain. Endoscopy 2/2019 showed what looked like presbyesophagus with poor motility.  Dilation up to 20mm was done.  BaS 3/2019 showed that the barium tablet transiently got hung up in the distal esophagus.  She is doing better since the dilation took place.  She is on pantoprazole 40mg BID for \"awhile\".     Colon cancer screening/family history colon polyps in first-degree relative  She had a colonoscopy in May 2013 that showed very distal proctitis.  Biopsy showed mild chronic active inflammation.  Repeat colonoscopy in 2017 however shows complete resolution of symptoms.  Mother had colon polyps less than 60 years old.  Patient denies any melena or hematochezia.  She will be due for repeat colonoscopy " in 2022.        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 Arzola-Extremely poorly prepped colon   • D&C HYSTEROSCOPY  1993   • DILATATION AND CURETTAGE     • ENDOSCOPY N/A 10/5/2016    Medium-sized HH; Widely patent and non-obstructing Schatzki ring-dilated; Procedure: ESOPHAGOGASTRODUODENOSCOPY WITH ANESTHESIA;  Surgeon: Ksenia Fox MD;  Location: [facility] ENDOSCOPY;  Service:    • ENDOSCOPY N/A 2/27/2019    Normal 1st portion of the duodenum and 2 portion of the duodenum; A few gastric polyps-biopsied; Small HH; Widely patent Schatzki ring-dilated; Abnormal esophageal motility, suspicious for presbyesophagus; Arrange for barium swallow to assess for dysmotility   • HYSTEROSCOPY     • KNEE ARTHROSCOPY Left 11/13/2018    Procedure: LEFT KNEE ARTHROSCOPIC PARTIAL MEDIAL MENISCECTOMY;  Surgeon: Matt Maki MD;  Location: [facility] OR;  Service: Orthopedics   • MEDIPORT INSERTION, SINGLE  2008   • MEDIPORT REMOVAL     • REPLACEMENT TOTAL KNEE Left 05/23/2019   • THYROIDECTOMY  2011    parathyroidectomy   • TONSILLECTOMY AND ADENOIDECTOMY  1970   • TUBAL ABDOMINAL  LIGATION          Current Outpatient Medications:   •  albuterol (PROVENTIL HFA;VENTOLIN HFA) 108 (90 Base) MCG/ACT inhaler, Inhale 2 puffs Every 4 (Four) Hours As Needed for Wheezing., Disp: , Rfl:   •  albuterol sulfate HFA (PROAIR HFA) 108 (90 Base) MCG/ACT inhaler, Inhale 2 puffs Every 4 (Four) Hours As Needed for Wheezing., Disp: 3 inhaler, Rfl: 3  •  alendronate (FOSAMAX) 70 MG tablet, Take 70 mg by mouth Every 7 (Seven) Days. Sunday's, Disp: , Rfl:   •  aspirin 81 MG EC tablet, Take 81 mg by mouth Daily., Disp: , Rfl:   •  atorvastatin (LIPITOR) 40 MG tablet, Take 40 mg by mouth daily., Disp: , Rfl:   •  calcitriol (ROCALTROL) 0.5 MCG capsule, Take 0.5 mcg by mouth daily., Disp: , Rfl:   •  Calcium Citrate-Vitamin D (CALCIUM + D PO), Take 1 tablet by mouth Daily., Disp: , Rfl:   •  Diclofenac Sodium (PENNSAID TD), Apply 1 application topically to the appropriate area as directed 2 (Two) Times a Day. For back pain, Disp: , Rfl:   •  docusate sodium (COLACE) 100 MG capsule, Take 100 mg by mouth Daily., Disp: , Rfl:   •  docusate sodium (COLACE) 100 MG capsule, Take 100 mg by mouth Daily., Disp: , Rfl:   •  fluticasone-salmeterol (ADVAIR) 250-50 MCG/DOSE DISKUS, Inhale 1 puff 2 (Two) Times a Day., Disp: , Rfl:   •  HYDROcodone-acetaminophen (NORCO) 5-325 MG per tablet, Take 1 tablet by mouth As Needed. for pain, Disp: , Rfl: 0  •  ibuprofen (ADVIL,MOTRIN) 400 MG tablet, Take 400 mg by mouth Every 8 (Eight) Hours As Needed for Mild Pain ., Disp: , Rfl:   •  levothyroxine (SYNTHROID, LEVOTHROID) 50 MCG tablet, Take 50 mcg by mouth Daily., Disp: , Rfl:   •  loratadine (CLARITIN) 10 MG tablet, Take 10 mg by mouth Daily As Needed for Allergies., Disp: , Rfl:   •  losartan (COZAAR) 25 MG tablet, Take 25 mg by mouth daily., Disp: , Rfl:   •  ondansetron (ZOFRAN) 4 MG tablet, Take 1 tablet by mouth Every 8 (Eight) Hours As Needed for Nausea or Vomiting., Disp: 10 tablet, Rfl: 0  •  oxyCODONE-acetaminophen (PERCOCET)  5-325 MG per tablet, Take 1 tablet by mouth Every 4 (Four) Hours., Disp: , Rfl:   •  pantoprazole (PROTONIX) 40 MG EC tablet, Take 1 tablet by mouth 2 (Two) Times a Day., Disp: 180 tablet, Rfl: 3  •  potassium chloride (K-TAB) 20 MEQ tablet controlled-release ER tablet, Take 1 tablet by mouth twice a day for 3 days then 1 tablet daily.  Follow up with PCP in one week. (Patient taking differently: Take 20 mEq by mouth Daily. Take 1 tablet by mouth twice a day for 3 days then 1 tablet daily.  Follow up with PCP in one week.), Disp: 30 tablet, Rfl: 0  •  rOPINIRole (REQUIP) 0.25 MG tablet, Take 0.25 mg by mouth every night. Take 1 hour before bedtime., Disp: , Rfl:   •  theophylline (UNIPHYL) 400 MG 24 hr tablet, Take 1 tablet by mouth Daily., Disp: 90 tablet, Rfl: 3  •  tiZANidine (ZANAFLEX) 4 MG tablet, Take 4 mg by mouth Every 8 (Eight) Hours As Needed for Muscle Spasms., Disp: , Rfl:   •  traMADol (ULTRAM) 50 MG tablet, Take 50 mg by mouth Every 8 (Eight) Hours As Needed for Moderate Pain ., Disp: , Rfl:   •  zafirlukast (ACCOLATE) 20 MG tablet, TAKE 1 TABLET TWICE A DAY, Disp: 180 tablet, Rfl: 3  No current facility-administered medications for this visit.     Facility-Administered Medications Ordered in Other Visits:   •  heparin flush (porcine) 100 UNIT/ML injection 500 Units, 500 Units, Intracatheter, PRN, Ki Manriquez MD, 500 Units at 09/27/16 1108  •  heparin flush (porcine) 100 UNIT/ML injection 500 Units, 500 Units, Intravenous, PRN, Malathi Brantley APRN, 500 Units at 05/05/17 1009  •  sodium chloride 0.9 % flush 10 mL, 10 mL, Intravenous, PRN, Ki Manriquez MD, 10 mL at 09/27/16 1107  •  sodium chloride 0.9 % flush 10 mL, 10 mL, Intravenous, PRN, Malathi Brantley APRN, 10 mL at 05/05/17 1009    Allergies   Allergen Reactions   • Keflex [Cephalexin] Rash and Unknown (See Comments)       Social History     Socioeconomic History   • Marital status:      Spouse  "name: Not on file   • Number of children: Not on file   • Years of education: Not on file   • Highest education level: Not on file   Tobacco Use   • Smoking status: Never Smoker   • Smokeless tobacco: Never Used   Substance and Sexual Activity   • Alcohol use: No   • Drug use: No   • Sexual activity: Yes     Partners: Male     Birth control/protection: None       Family History   Problem Relation Age of Onset   • Colon polyps Mother    • Cirrhosis Mother    • Colon polyps Sister 54   • Breast cancer Sister    • No Known Problems Father    • No Known Problems Brother    • No Known Problems Daughter    • No Known Problems Son    • No Known Problems Maternal Grandmother    • No Known Problems Paternal Grandmother    • No Known Problems Maternal Aunt    • No Known Problems Paternal Aunt    • Colon cancer Neg Hx    • Crohn's disease Neg Hx    • Irritable bowel syndrome Neg Hx    • Liver cancer Neg Hx    • Liver disease Neg Hx    • Rectal cancer Neg Hx    • Stomach cancer Neg Hx    • BRCA 1/2 Neg Hx    • Endometrial cancer Neg Hx    • Ovarian cancer Neg Hx    • Uterine cancer Neg Hx        Review of Systems   Constitutional: Negative for fever.   Respiratory: Negative for cough and shortness of breath.    Cardiovascular: Negative for chest pain.   Genitourinary: Negative for dysuria.   Skin: Negative for rash.   Neurological: Negative for seizures.       Objective     /86 (BP Location: Left arm, Patient Position: Sitting, Cuff Size: Adult)   Pulse 96   Temp 99.3 °F (37.4 °C) (Tympanic)   Ht 157.5 cm (62\")   Wt 72.6 kg (160 lb)   LMP 06/14/2008   SpO2 99%   Breastfeeding? No   BMI 29.26 kg/m²     Physical Exam   Constitutional: She is oriented to person, place, and time. She appears well-developed and well-nourished.   Eyes: EOM are normal.   Pulmonary/Chest: Effort normal.   Musculoskeletal: Normal range of motion.   Neurological: She is alert and oriented to person, place, and time.   Skin: Skin is warm. "   Psychiatric: She has a normal mood and affect. Her behavior is normal.       Lab Results   Component Value Date    WBC 8.89 05/08/2019    WBC 6.67 11/08/2018    WBC 10.31 09/29/2018    HGB 12.1 05/24/2019    HGB 14.7 05/08/2019    HGB 15.3 11/08/2018    HCT 38.0 05/24/2019    HCT 43.9 05/08/2019    HCT 44.2 11/08/2018     05/08/2019     11/08/2018     09/29/2018        Lab Results   Component Value Date     05/24/2019     05/08/2019     11/12/2018    K 4.1 05/08/2019    K 3.6 11/12/2018    K 2.8 (C) 11/08/2018     05/24/2019     05/08/2019     11/12/2018    CO2 20 (L) 05/24/2019    CO2 27.0 05/08/2019    CO2 24.0 11/12/2018    BUN 12 05/24/2019    BUN 14 05/08/2019    BUN 8 11/12/2018    CREATININE 1.1 (H) 05/24/2019    CREATININE 1.06 05/08/2019    CREATININE 1.05 11/12/2018    BILITOT 0.3 05/08/2019    BILITOT 0.5 11/08/2018    BILITOT 0.6 09/27/2018    ALKPHOS 91 05/08/2019    ALKPHOS 74 11/08/2018    ALKPHOS 83 09/27/2018    ALT 22 05/08/2019    ALT 27 11/08/2018    ALT 37 09/27/2018    AST 33 05/08/2019    AST 44 11/08/2018    AST 34 09/27/2018    GLUCOSE 139 (H) 05/24/2019    GLUCOSE 76 05/08/2019    GLUCOSE 87 11/12/2018       Lab Results   Component Value Date    INR 0.94 05/20/2019    INR 0.80 (L) 10/27/2014    INR 0.89 (L) 05/16/2014       EXAMINATION: FL ESOPHAGRAM COMPLETE-     3/6/2019 8:45 AM CST     HISTORY: dysphagia; R13.10-Dysphagia, unspecified.     35 fluoroscopic spot images obtained.  Fluoroscopy time = 2.1 minutes.     Single contrast esophagram.     Large hiatal hernia.  Narrowing of the distal esophagus near the GE junction with a 1/2 inch  tablet holding up for several seconds and requiring additional swallows  of water before would pass into the stomach.     No mucosal lesion is seen.     Normal gastric emptying occurs.     Prominent gastroesophageal reflux noted during the exam.     Summary:  1. Large hiatal hernia with prominent  gastroesophageal reflux.  2. Distal esophageal narrowing with moderate delay before a 1/2 inch  barium tablet would pass into the stomach.  This report was finalized on 03/06/2019 09:28 by Dr. Isael Cesar MD.        IMPRESSION/PLAN:    Assessment/Plan      Problem List Items Addressed This Visit        Digestive    Gastroesophageal reflux disease - Primary    Overview     Anti-reflux measures were reviewed and discussed with patient.  They were advised to refrain from chocolate, alcohol, smoking, peppermint and caffeine.  They were advised to limit fatty foods, large meals, and eating late at nighttime.  They were advised to reach an ideal body mass index.    She is also on Fosamax.  I advised her to drink a large glass of water after taking this pill as this can cause esophagitis.    Symptoms well controlled with active medical treatment with pantoprazole.  I have advised to decrease to 40mg QD form BID.      Also needs to limit narcotics which are negatively impacting her GI health.           Dysphagia    Overview     Difficulty swallowing solids and large pills.  She has Schatzki's ring that was dilated in 2016.  Repeat endoscopy 2/2019 showed what looked like dysmotility.  Dilation to 20mm was done.  BaS 3/2019 showed a transient delay in the pill traversing the GEJ.    She was advised to drink a large glass of water after taking Fosamax, diclofenac, potassium.            Other    Family history of polyps in the colon    Overview     Mom had colon polyps < 60 years old.  Last colonoscopy normal 6/2017.  Repeat 6/2022.             Patient's Body mass index is 29.26 kg/m². BMI is within normal parameters. No follow-up required..        RTC 1 year      Ksenia Fox MD  07/08/19  3:33 PM    Much of this encounter note is an electronic transcription/translation of spoken language to printed text. The electronic translation of spoken language may permit erroneous, or at times, nonsensical words or phrases to be  inadvertently transcribed; although I have reviewed the note for such errors, some may still exist.

## 2019-07-08 ENCOUNTER — OFFICE VISIT (OUTPATIENT)
Dept: GASTROENTEROLOGY | Facility: CLINIC | Age: 54
End: 2019-07-08

## 2019-07-08 VITALS
SYSTOLIC BLOOD PRESSURE: 132 MMHG | OXYGEN SATURATION: 99 % | TEMPERATURE: 99.3 F | WEIGHT: 160 LBS | HEART RATE: 96 BPM | BODY MASS INDEX: 29.44 KG/M2 | DIASTOLIC BLOOD PRESSURE: 86 MMHG | HEIGHT: 62 IN

## 2019-07-08 DIAGNOSIS — R13.19 ESOPHAGEAL DYSPHAGIA: ICD-10-CM

## 2019-07-08 DIAGNOSIS — Z83.71 FAMILY HISTORY OF POLYPS IN THE COLON: ICD-10-CM

## 2019-07-08 DIAGNOSIS — K21.9 GASTROESOPHAGEAL REFLUX DISEASE WITHOUT ESOPHAGITIS: Primary | ICD-10-CM

## 2019-07-08 DIAGNOSIS — K21.9 GASTROESOPHAGEAL REFLUX DISEASE, ESOPHAGITIS PRESENCE NOT SPECIFIED: ICD-10-CM

## 2019-07-08 PROCEDURE — 99213 OFFICE O/P EST LOW 20 MIN: CPT | Performed by: INTERNAL MEDICINE

## 2019-07-08 RX ORDER — DOCUSATE SODIUM 100 MG/1
100 CAPSULE, LIQUID FILLED ORAL DAILY
COMMUNITY
Start: 2019-05-24 | End: 2019-10-29 | Stop reason: ALTCHOICE

## 2019-07-08 RX ORDER — PANTOPRAZOLE SODIUM 40 MG/1
40 TABLET, DELAYED RELEASE ORAL DAILY
Qty: 180 TABLET | Refills: 3
Start: 2019-07-08 | End: 2020-04-13

## 2019-07-08 RX ORDER — HYDROCODONE BITARTRATE AND ACETAMINOPHEN 5; 325 MG/1; MG/1
1 TABLET ORAL AS NEEDED
Refills: 0 | COMMUNITY
Start: 2019-06-19 | End: 2019-10-29 | Stop reason: ALTCHOICE

## 2019-10-27 PROBLEM — J45.998 OTHER ASTHMA: Status: ACTIVE | Noted: 2019-10-27

## 2019-10-27 NOTE — PROGRESS NOTES
HEATHER Connolly  [facility]   Respiratory Disease Clinic  [address] [place] [address]  Phone: [phone]  Fax: [phone]     Luna Cruz is a 54 y.o. female.   CC:   Chief Complaint   Patient presents with   • F/u of Asthma        HPI: She has a history of asthma and being a never smoker.  She had breast cancer in 2008 and continues to be followed by Dr. EDGAR.  She has seasonal allergies and reports that her breathing stays stable as long as she does not miss a dose of any of her medicine.  She is on daily theophylline, Advair, Accolate and uses albuterol as needed.  She reports that she typically uses her rescue inhaler no more than 2 times a week and her breathing treatments just occasional.  She has had an updated flu shot today.  She is followed by Dr. Shaffer for routine medical care.    The following portions of the patient's history were reviewed and updated as appropriate: allergies, current medications, past family history, past medical history, past social history, past surgical history and problem list.    Past Medical History:   Diagnosis Date   • Asthma    • Breast cancer (CMS/HCC)    • Chronic back pain    • Disease of thyroid gland    • Drug therapy    • GERD (gastroesophageal reflux disease)    • Hx of radiation therapy    • Hyperlipidemia    • Hypertension    • Malignant neoplasm of upper-inner quadrant of right female breast (CMS/HCC) 9/26/2016   • Osteoporosis    • PONV (postoperative nausea and vomiting)    • Scoliosis        Family History   Problem Relation Age of Onset   • Colon polyps Mother    • Cirrhosis Mother    • Colon polyps Sister 54   • Breast cancer Sister    • No Known Problems Father    • No Known Problems Brother    • No Known Problems Daughter    • No Known Problems Son    • No Known Problems Maternal Grandmother    • No Known Problems Paternal Grandmother    • No Known Problems Maternal Aunt    • No Known Problems Paternal Aunt    •  "Colon cancer Neg Hx    • Crohn's disease Neg Hx    • Irritable bowel syndrome Neg Hx    • Liver cancer Neg Hx    • Liver disease Neg Hx    • Rectal cancer Neg Hx    • Stomach cancer Neg Hx    • BRCA 1/2 Neg Hx    • Endometrial cancer Neg Hx    • Ovarian cancer Neg Hx    • Uterine cancer Neg Hx        Social History     Socioeconomic History   • Marital status:      Spouse name: Not on file   • Number of children: Not on file   • Years of education: Not on file   • Highest education level: Not on file   Tobacco Use   • Smoking status: Never Smoker   • Smokeless tobacco: Never Used   Substance and Sexual Activity   • Alcohol use: No   • Drug use: No   • Sexual activity: Yes     Partners: Male     Birth control/protection: None       Review of Systems   Constitutional: Negative for appetite change, chills, fatigue and fever.   HENT: Negative for swollen glands, trouble swallowing and voice change.    Eyes: Negative for visual disturbance.   Respiratory: Positive for cough, shortness of breath (with excertion) and wheezing (occasional).    Cardiovascular: Negative for chest pain and leg swelling.   Gastrointestinal: Negative for abdominal distention, abdominal pain, nausea and vomiting.   Genitourinary: Negative.    Musculoskeletal: Negative for gait problem and myalgias.   Skin: Negative.    Neurological: Negative for weakness.   Hematological: Negative.    Psychiatric/Behavioral: The patient is not nervous/anxious.        /90   Pulse 94   Ht 157.5 cm (62\")   Wt 72.1 kg (159 lb)   LMP 06/14/2008   SpO2 95% Comment: Ra  Breastfeeding? No   BMI 29.08 kg/m²     Physical Exam   Constitutional: She is oriented to person, place, and time. She appears well-developed and well-nourished. No distress.   HENT:   Head: Normocephalic and atraumatic.   Eyes: Pupils are equal, round, and reactive to light. No scleral icterus.   Neck: Normal range of motion. Neck supple.   Cardiovascular: Normal rate, regular " rhythm, S1 normal and S2 normal.   Pulmonary/Chest: Effort normal and breath sounds normal. No tachypnea. She has no wheezes. She has no rhonchi. She has no rales.   Abdominal: Soft. Bowel sounds are normal. She exhibits no distension.   Musculoskeletal: Normal range of motion. She exhibits no edema.   Lymphadenopathy:     She has no cervical adenopathy.   Neurological: She is alert and oriented to person, place, and time.   Skin: Skin is warm and dry. No rash noted. No cyanosis. Nails show no clubbing.   Psychiatric: She has a normal mood and affect. Her behavior is normal.   Vitals reviewed.      Pulmonary Function Test  Performed by: Klaus Wagner APRN  Authorized by: Klaus Wagner APRN      Pre Drug    FVC: 62%   FEV1: 52%   FEF 25-75%: 31%   FEV1/FVC: 69.23%              My interpretation of  Pulmonary Functions Testing: Spirometry shows a moderate obstructive defect with FEV1 of 52% predicted.  Mid flows are decreased at 31% predicted.  Actual FEV1/FVC is 69.23.  The patient's spirometry has stayed stable over the past year.    Luna was seen today for f/u of asthma.    Diagnoses and all orders for this visit:    Other asthma  -     Pulmonary Function Test    Personal history of breast cancer    Non-smoker    Overweight (BMI 25.0-29.9)    Need for immunization against influenza  -     Flucelvax Quad=>4Years (PFS)      Patient's Body mass index is 29.08 kg/m². BMI is above normal parameters. Recommendations include: referral to primary care.      Follow-up/Plan: Continue on current regimen of theophylline, Advair, Accolate, pro-air and albuterol nebs.  Return to clinic in 1 year with flow volume loop.    HEATHER Connolly  10/29/2019  10:20 AM

## 2019-10-29 ENCOUNTER — OFFICE VISIT (OUTPATIENT)
Dept: PULMONOLOGY | Facility: CLINIC | Age: 54
End: 2019-10-29

## 2019-10-29 VITALS
HEIGHT: 62 IN | SYSTOLIC BLOOD PRESSURE: 140 MMHG | BODY MASS INDEX: 29.26 KG/M2 | DIASTOLIC BLOOD PRESSURE: 90 MMHG | HEART RATE: 94 BPM | OXYGEN SATURATION: 95 % | WEIGHT: 159 LBS

## 2019-10-29 DIAGNOSIS — Z78.9 NON-SMOKER: ICD-10-CM

## 2019-10-29 DIAGNOSIS — E66.3 OVERWEIGHT (BMI 25.0-29.9): ICD-10-CM

## 2019-10-29 DIAGNOSIS — J45.998 OTHER ASTHMA: Primary | ICD-10-CM

## 2019-10-29 DIAGNOSIS — Z23 NEED FOR IMMUNIZATION AGAINST INFLUENZA: ICD-10-CM

## 2019-10-29 DIAGNOSIS — Z85.3 PERSONAL HISTORY OF BREAST CANCER: ICD-10-CM

## 2019-10-29 PROCEDURE — 99213 OFFICE O/P EST LOW 20 MIN: CPT | Performed by: NURSE PRACTITIONER

## 2019-10-29 PROCEDURE — G0008 ADMIN INFLUENZA VIRUS VAC: HCPCS | Performed by: NURSE PRACTITIONER

## 2019-10-29 PROCEDURE — 90674 CCIIV4 VAC NO PRSV 0.5 ML IM: CPT | Performed by: NURSE PRACTITIONER

## 2019-10-29 PROCEDURE — 94010 BREATHING CAPACITY TEST: CPT | Performed by: NURSE PRACTITIONER

## 2019-10-29 RX ORDER — ALBUTEROL SULFATE 2.5 MG/3ML
2.5 SOLUTION RESPIRATORY (INHALATION) AS NEEDED
COMMUNITY
Start: 2011-10-23 | End: 2022-11-10 | Stop reason: SDUPTHER

## 2019-10-29 NOTE — PATIENT INSTRUCTIONS
Asthma, Adult    Asthma is a long-term (chronic) condition in which the airways get tight and narrow. The airways are the breathing passages that lead from the nose and mouth down into the lungs. A person with asthma will have times when symptoms get worse. These are called asthma attacks. They can cause coughing, whistling sounds when you breathe (wheezing), shortness of breath, and chest pain. They can make it hard to breathe. There is no cure for asthma, but medicines and lifestyle changes can help control it.  There are many things that can bring on an asthma attack or make asthma symptoms worse (triggers). Common triggers include:  · Mold.  · Dust.  · Cigarette smoke.  · Cockroaches.  · Things that can cause allergy symptoms (allergens). These include animal skin flakes (dander) and pollen from trees or grass.  · Things that pollute the air. These may include household , wood smoke, smog, or chemical odors.  · Cold air, weather changes, and wind.  · Crying or laughing hard.  · Stress.  · Certain medicines or drugs.  · Certain foods such as dried fruit, potato chips, and grape juice.  · Infections, such as a cold or the flu.  · Certain medical conditions or diseases.  · Exercise or tiring activities.  Asthma may be treated with medicines and by staying away from the things that cause asthma attacks. Types of medicines may include:  · Controller medicines. These help prevent asthma symptoms. They are usually taken every day.  · Fast-acting reliever or rescue medicines. These quickly relieve asthma symptoms. They are used as needed and provide short-term relief.  · Allergy medicines if your attacks are brought on by allergens.  · Medicines to help control the body's defense (immune) system.  Follow these instructions at home:  Avoiding triggers in your home  · Change your heating and air conditioning filter often.  · Limit your use of fireplaces and wood stoves.  · Get rid of pests (such as roaches and  mice) and their droppings.  · Throw away plants if you see mold on them.  · Clean your floors. Dust regularly. Use cleaning products that do not smell.  · Have someone vacuum when you are not home. Use a vacuum  with a HEPA filter if possible.  · Replace carpet with wood, tile, or vinyl zhao. Carpet can trap animal skin flakes and dust.  · Use allergy-proof pillows, mattress covers, and box spring covers.  · Wash bed sheets and blankets every week in hot water. Dry them in a dryer.  · Keep your bedroom free of any triggers.   · Avoid pets and keep windows closed when things that cause allergy symptoms are in the air.  · Use blankets that are made of polyester or cotton.  · Clean bathrooms and aranza with bleach. If possible, have someone repaint the walls in these rooms with mold-resistant paint. Keep out of the rooms that are being cleaned and painted.  · Wash your hands often with soap and water. If soap and water are not available, use hand .  · Do not allow anyone to smoke in your home.  General instructions  · Take over-the-counter and prescription medicines only as told by your doctor.  ? Talk with your doctor if you have questions about how or when to take your medicines.  ? Make note if you need to use your medicines more often than usual.  · Do not use any products that contain nicotine or tobacco, such as cigarettes and e-cigarettes. If you need help quitting, ask your doctor.  · Stay away from secondhand smoke.  · Avoid doing things outdoors when allergen counts are high and when air quality is low.  · Wear a ski mask when doing outdoor activities in the winter. The mask should cover your nose and mouth. Exercise indoors on cold days if you can.  · Warm up before you exercise. Take time to cool down after exercise.  · Use a peak flow meter as told by your doctor. A peak flow meter is a tool that measures how well the lungs are working.  · Keep track of the peak flow meter's readings.  Write them down.  · Follow your asthma action plan. This is a written plan for taking care of your asthma and treating your attacks.  · Make sure you get all the shots (vaccines) that your doctor recommends. Ask your doctor about a flu shot and a pneumonia shot.  · Keep all follow-up visits as told by your doctor. This is important.  Contact a doctor if:  · You have wheezing, shortness of breath, or a cough even while taking medicine to prevent attacks.  · The mucus you cough up (sputum) is thicker than usual.  · The mucus you cough up changes from clear or white to yellow, green, gray, or bloody.  · You have problems from the medicine you are taking, such as:  ? A rash.  ? Itching.  ? Swelling.  ? Trouble breathing.  · You need reliever medicines more than 2-3 times a week.  · Your peak flow reading is still at 50-79% of your personal best after following the action plan for 1 hour.  · You have a fever.  Get help right away if:  · You seem to be worse and are not responding to medicine during an asthma attack.  · You are short of breath even at rest.  · You get short of breath when doing very little activity.  · You have trouble eating, drinking, or talking.  · You have chest pain or tightness.  · You have a fast heartbeat.  · Your lips or fingernails start to turn blue.  · You are light-headed or dizzy, or you faint.  · Your peak flow is less than 50% of your personal best.  · You feel too tired to breathe normally.  Summary  · Asthma is a long-term (chronic) condition in which the airways get tight and narrow. An asthma attack can make it hard to breathe.  · Asthma cannot be cured, but medicines and lifestyle changes can help control it.  · Make sure you understand how to avoid triggers and how and when to use your medicines.  This information is not intended to replace advice given to you by your health care provider. Make sure you discuss any questions you have with your health care provider.  Document  Released: 06/05/2009 Document Revised: 01/22/2018 Document Reviewed: 01/22/2018  Elsevier Interactive Patient Education © 2019 [employer]

## 2019-10-29 NOTE — PROCEDURES
Pulmonary Function Test  Performed by: Klaus Wagner APRN  Authorized by: Klaus Wagner APRN      Pre Drug    FVC: 62%   FEV1: 52%   FEF 25-75%: 31%   FEV1/FVC: 69.23%

## 2020-01-24 ENCOUNTER — TELEPHONE (OUTPATIENT)
Dept: PULMONOLOGY | Facility: CLINIC | Age: 55
End: 2020-01-24

## 2020-01-24 RX ORDER — AZITHROMYCIN 250 MG/1
TABLET, FILM COATED ORAL
Qty: 6 TABLET | Refills: 0 | Status: SHIPPED | OUTPATIENT
Start: 2020-01-24 | End: 2020-05-11

## 2020-01-24 NOTE — TELEPHONE ENCOUNTER
Patient called stating she was having chest congestion with thick, white sputum.  She had a low grade fever yesterday and using her inhalers more as this is irritating her asthma.  She was wanting an antibiotic sent in to [facility] on the [address].

## 2020-03-25 RX ORDER — ROPINIROLE 0.25 MG/1
0.25 TABLET, FILM COATED ORAL NIGHTLY
Qty: 90 TABLET | Refills: 3 | Status: SHIPPED | OUTPATIENT
Start: 2020-03-25 | End: 2020-03-26 | Stop reason: SDUPTHER

## 2020-03-25 RX ORDER — POTASSIUM CHLORIDE 1500 MG/1
20 TABLET, FILM COATED, EXTENDED RELEASE ORAL DAILY
Qty: 90 TABLET | Refills: 3 | Status: SHIPPED | OUTPATIENT
Start: 2020-03-25 | End: 2020-03-26 | Stop reason: SDUPTHER

## 2020-03-25 RX ORDER — ATORVASTATIN CALCIUM 40 MG/1
40 TABLET, FILM COATED ORAL DAILY
Qty: 90 TABLET | Refills: 3 | Status: SHIPPED | OUTPATIENT
Start: 2020-03-25 | End: 2020-03-26 | Stop reason: SDUPTHER

## 2020-03-26 RX ORDER — POTASSIUM CHLORIDE 1500 MG/1
20 TABLET, FILM COATED, EXTENDED RELEASE ORAL DAILY
Qty: 90 TABLET | Refills: 3 | Status: SHIPPED | OUTPATIENT
Start: 2020-03-26 | End: 2021-05-28

## 2020-03-26 RX ORDER — ATORVASTATIN CALCIUM 40 MG/1
40 TABLET, FILM COATED ORAL DAILY
Qty: 90 TABLET | Refills: 3 | Status: SHIPPED | OUTPATIENT
Start: 2020-03-26 | End: 2021-03-31

## 2020-03-26 RX ORDER — ROPINIROLE 0.25 MG/1
0.25 TABLET, FILM COATED ORAL NIGHTLY
Qty: 90 TABLET | Refills: 3 | Status: SHIPPED | OUTPATIENT
Start: 2020-03-26 | End: 2021-03-10

## 2020-03-26 NOTE — TELEPHONE ENCOUNTER
PT CALLED REQUESTING A REFILL ON THE FOLLOWING MEDICATIONS:    rOPINIRole (REQUIP) 0.25 MG tablet    atorvastatin (LIPITOR) 40 MG tablet    potassium chloride ER (K-TAB) 20 MEQ tablet controlled-release ER     PT REQUESTED THESE MEDICATIONS A FEW DAYS AGO BUT [facility] BEEN SENT TO [facility]    PHARMACY CONFIRMED.    PT'S CALLBACK NUMBER: [phone]

## 2020-04-13 DIAGNOSIS — K21.9 GASTROESOPHAGEAL REFLUX DISEASE, ESOPHAGITIS PRESENCE NOT SPECIFIED: ICD-10-CM

## 2020-04-13 DIAGNOSIS — R13.19 ESOPHAGEAL DYSPHAGIA: ICD-10-CM

## 2020-04-13 NOTE — TELEPHONE ENCOUNTER
Pt due yearly OV in July 2020-pended enough refills to Dr. Fox to last until pt can schedule appt.

## 2020-04-14 RX ORDER — LOSARTAN POTASSIUM 25 MG/1
25 TABLET ORAL DAILY
Qty: 90 TABLET | Refills: 3 | Status: SHIPPED | OUTPATIENT
Start: 2020-04-14 | End: 2020-06-04 | Stop reason: SDUPTHER

## 2020-04-15 RX ORDER — PANTOPRAZOLE SODIUM 40 MG/1
40 TABLET, DELAYED RELEASE ORAL DAILY
Qty: 90 TABLET | Refills: 0 | Status: SHIPPED | OUTPATIENT
Start: 2020-04-15 | End: 2020-07-09

## 2020-05-11 ENCOUNTER — OFFICE VISIT (OUTPATIENT)
Dept: ONCOLOGY | Facility: CLINIC | Age: 55
End: 2020-05-11

## 2020-05-11 ENCOUNTER — APPOINTMENT (OUTPATIENT)
Dept: LAB | Facility: HOSPITAL | Age: 55
End: 2020-05-11

## 2020-05-11 VITALS
HEIGHT: 62 IN | DIASTOLIC BLOOD PRESSURE: 62 MMHG | BODY MASS INDEX: 29.81 KG/M2 | SYSTOLIC BLOOD PRESSURE: 110 MMHG | OXYGEN SATURATION: 99 % | WEIGHT: 162 LBS | HEART RATE: 92 BPM | TEMPERATURE: 97.8 F | RESPIRATION RATE: 16 BRPM

## 2020-05-11 DIAGNOSIS — D50.0 IRON DEFICIENCY ANEMIA DUE TO CHRONIC BLOOD LOSS: Primary | ICD-10-CM

## 2020-05-11 DIAGNOSIS — Z85.3 HISTORY OF MALIGNANT NEOPLASM OF BREAST: Primary | ICD-10-CM

## 2020-05-11 DIAGNOSIS — Z17.0 MALIGNANT NEOPLASM OF LOWER-INNER QUADRANT OF LEFT BREAST IN FEMALE, ESTROGEN RECEPTOR POSITIVE (HCC): ICD-10-CM

## 2020-05-11 DIAGNOSIS — C50.312 MALIGNANT NEOPLASM OF LOWER-INNER QUADRANT OF LEFT BREAST IN FEMALE, ESTROGEN RECEPTOR POSITIVE (HCC): ICD-10-CM

## 2020-05-11 DIAGNOSIS — D50.8 OTHER IRON DEFICIENCY ANEMIA: ICD-10-CM

## 2020-05-11 LAB
ALBUMIN SERPL-MCNC: 4.1 G/DL (ref 3.5–5.2)
ALBUMIN/GLOB SERPL: 1.5 G/DL
ALP SERPL-CCNC: 107 U/L (ref 39–117)
ALT SERPL W P-5'-P-CCNC: 16 U/L (ref 1–33)
ANION GAP SERPL CALCULATED.3IONS-SCNC: 12 MMOL/L (ref 5–15)
AST SERPL-CCNC: 19 U/L (ref 1–32)
BASOPHILS # BLD AUTO: 0.05 10*3/MM3 (ref 0–0.2)
BASOPHILS NFR BLD AUTO: 0.7 % (ref 0–1.5)
BILIRUB SERPL-MCNC: 0.2 MG/DL (ref 0.2–1.2)
BUN BLD-MCNC: 9 MG/DL (ref 6–20)
BUN/CREAT SERPL: 9.5 (ref 7–25)
CALCIUM SPEC-SCNC: 9.1 MG/DL (ref 8.6–10.5)
CHLORIDE SERPL-SCNC: 105 MMOL/L (ref 98–107)
CO2 SERPL-SCNC: 24 MMOL/L (ref 22–29)
CREAT BLD-MCNC: 0.95 MG/DL (ref 0.57–1)
DEPRECATED RDW RBC AUTO: 43.4 FL (ref 37–54)
EOSINOPHIL # BLD AUTO: 0.24 10*3/MM3 (ref 0–0.4)
EOSINOPHIL NFR BLD AUTO: 3.2 % (ref 0.3–6.2)
ERYTHROCYTE [DISTWIDTH] IN BLOOD BY AUTOMATED COUNT: 13.6 % (ref 12.3–15.4)
FERRITIN SERPL-MCNC: 161.5 NG/ML (ref 13–150)
GFR SERPL CREATININE-BSD FRML MDRD: 61 ML/MIN/1.73
GLOBULIN UR ELPH-MCNC: 2.8 GM/DL
GLUCOSE BLD-MCNC: 104 MG/DL (ref 65–99)
HCT VFR BLD AUTO: 45.9 % (ref 34–46.6)
HGB BLD-MCNC: 14.8 G/DL (ref 12–15.9)
HOLD SPECIMEN: NORMAL
HOLD SPECIMEN: NORMAL
IMM GRANULOCYTES # BLD AUTO: 0.02 10*3/MM3 (ref 0–0.05)
IMM GRANULOCYTES NFR BLD AUTO: 0.3 % (ref 0–0.5)
IRON 24H UR-MRATE: 57 MCG/DL (ref 37–145)
IRON SATN MFR SERPL: 19 % (ref 20–50)
LYMPHOCYTES # BLD AUTO: 1.84 10*3/MM3 (ref 0.7–3.1)
LYMPHOCYTES NFR BLD AUTO: 24.3 % (ref 19.6–45.3)
MCH RBC QN AUTO: 28.2 PG (ref 26.6–33)
MCHC RBC AUTO-ENTMCNC: 32.2 G/DL (ref 31.5–35.7)
MCV RBC AUTO: 87.4 FL (ref 79–97)
MONOCYTES # BLD AUTO: 0.62 10*3/MM3 (ref 0.1–0.9)
MONOCYTES NFR BLD AUTO: 8.2 % (ref 5–12)
NEUTROPHILS # BLD AUTO: 4.79 10*3/MM3 (ref 1.7–7)
NEUTROPHILS NFR BLD AUTO: 63.3 % (ref 42.7–76)
NRBC BLD AUTO-RTO: 0 /100 WBC (ref 0–0.2)
PLATELET # BLD AUTO: 352 10*3/MM3 (ref 140–450)
PMV BLD AUTO: 10 FL (ref 6–12)
POTASSIUM BLD-SCNC: 4.3 MMOL/L (ref 3.5–5.2)
PROT SERPL-MCNC: 6.9 G/DL (ref 6–8.5)
RBC # BLD AUTO: 5.25 10*6/MM3 (ref 3.77–5.28)
SODIUM BLD-SCNC: 141 MMOL/L (ref 136–145)
TIBC SERPL-MCNC: 295 MCG/DL (ref 298–536)
TRANSFERRIN SERPL-MCNC: 198 MG/DL (ref 200–360)
WBC NRBC COR # BLD: 7.56 10*3/MM3 (ref 3.4–10.8)

## 2020-05-11 PROCEDURE — 99213 OFFICE O/P EST LOW 20 MIN: CPT | Performed by: INTERNAL MEDICINE

## 2020-05-11 PROCEDURE — 85025 COMPLETE CBC W/AUTO DIFF WBC: CPT | Performed by: INTERNAL MEDICINE

## 2020-05-11 PROCEDURE — 84466 ASSAY OF TRANSFERRIN: CPT | Performed by: INTERNAL MEDICINE

## 2020-05-11 PROCEDURE — 82728 ASSAY OF FERRITIN: CPT | Performed by: INTERNAL MEDICINE

## 2020-05-11 PROCEDURE — 36415 COLL VENOUS BLD VENIPUNCTURE: CPT | Performed by: INTERNAL MEDICINE

## 2020-05-11 PROCEDURE — 83540 ASSAY OF IRON: CPT | Performed by: INTERNAL MEDICINE

## 2020-05-11 PROCEDURE — 80053 COMPREHEN METABOLIC PANEL: CPT | Performed by: INTERNAL MEDICINE

## 2020-05-11 NOTE — PROGRESS NOTES
[facility]  HEMATOLOGY & ONCOLOGY    Cancer Staging Information:  No matching staging information was found for the patient.      Subjective     VISIT DIAGNOSIS:   No diagnosis found.    REASON FOR VISIT:     Chief Complaint   Patient presents with   • Breast Cancer     Here for f/u        HEMATOLOGY / ONCOLOGY HISTORY:   Oncology/Hematology History    Ms. Luna Cruz is a pleasant 49 year old female with diagnosis of pT1pN1(mi)M0 right breast cancer, ER positive, ID positive, HER2/  leonila normal diagnosed in August 2008. She has passed the 3 yearmark.  Back then, the patient underwent lumpectomy. Tumor was  located in the lower inner quadrant of the right breast. Mills lymph node dissection found to have residual DCIS. She went on to receive  4 cycles of non anthracycline based chemotherapy with Taxotere and Cytoxan. This was followed by radiation therapy and has been  tamoxifen since 11/25/08.  She is here today for regular scheduled followup. . We will request sestamibi scan anyway. Her tumor markers have been stable. No  evidence of effusion or lymphadenopathy. Thyroid ultrasound on 07/25/11 ordered by Dr. Villasenor for hyperthyroidism study show small  thyroid, no lesions identified.  INTERVAL HISTORY  This patient had a lumpectomy for a stage IIA, estrogen receptorpositive,  HER2/  neunegative  breast cancer in 2008. She received  adjuvant interventions that included 5 years of tamoxifen administration.             INTERVAL HISTORY  Patient ID: Luna Cruz is a 54 y.o. year old female  With br ca here for f/u. She had endo and barium swallow 2/27/19 that showed patent Schatzki's ring at the GE jxn. pt states she feels a lot better since her endo, but does still have occasional issues about once a week.  She is scheduled to undergo knee surgery sometime soon  5/11/20: she presents today for a yr f/u. Off MG. No lumps or bumps detected. Mammo due in June, late due to covid19.  Denies new  lumps or bumps. Denies sob, cp, n/v abdominal pain, BLANQUITA, new back pain, focal weakness, weight loss, night sweats.  Rest of ros unremarkable. PE remain the same and non focal.      Past Medical History:   Past Medical History:   Diagnosis Date   • Asthma    • Breast cancer (CMS/HCC)    • Chronic back pain    • Disease of thyroid gland    • Drug therapy    • GERD (gastroesophageal reflux disease)    • Hx of radiation therapy    • Hyperlipidemia    • Hypertension    • Malignant neoplasm of upper-inner quadrant of right female breast (CMS/HCC) 9/26/2016   • Osteoporosis    • PONV (postoperative nausea and vomiting)    • Scoliosis      Past Surgical History:   Past Surgical History:   Procedure Laterality Date   • ADENOIDECTOMY     • APPENDECTOMY  1994   • BREAST BIOPSY     • BREAST LUMPECTOMY Right 2008   • CHOLECYSTECTOMY  1993   • COLONOSCOPY  05/03/2013    Erythematous mucosa in the rectum-biopsied; Repeat 4 years    • COLONOSCOPY N/A 6/14/2017    Normal; Repeat 5 years; Procedure: COLONOSCOPY WITH ANESTHESIA;  Surgeon: Ksenia Fox MD;  Location: [facility] ENDOSCOPY;  Service:    • COLONOSCOPY  04/23/2010    Dr. Arzola-Extremely poorly prepped colon   • D&C HYSTEROSCOPY  1993   • DILATATION AND CURETTAGE     • ENDOSCOPY N/A 10/5/2016    Medium-sized HH; Widely patent and non-obstructing Schatzki ring-dilated; Procedure: ESOPHAGOGASTRODUODENOSCOPY WITH ANESTHESIA;  Surgeon: Ksenia Fox MD;  Location: [facility] ENDOSCOPY;  Service:    • ENDOSCOPY N/A 2/27/2019    Normal 1st portion of the duodenum and 2 portion of the duodenum; A few gastric polyps-biopsied; Small HH; Widely patent Schatzki ring-dilated; Abnormal esophageal motility, suspicious for presbyesophagus; Arrange for barium swallow to assess for dysmotility   • HYSTEROSCOPY     • KNEE ARTHROSCOPY Left 11/13/2018    Procedure: LEFT KNEE ARTHROSCOPIC PARTIAL MEDIAL MENISCECTOMY;  Surgeon: Matt Maki MD;  Location: [facility] OR;  Service:  Orthopedics   • MEDIPORT INSERTION, SINGLE  2008   • MEDIPORT REMOVAL     • REPLACEMENT TOTAL KNEE Left 05/23/2019   • THYROIDECTOMY  2011    parathyroidectomy   • TONSILLECTOMY AND ADENOIDECTOMY  1970   • TUBAL ABDOMINAL LIGATION       Social History:   Social History     Socioeconomic History   • Marital status:      Spouse name: Not on file   • Number of children: Not on file   • Years of education: Not on file   • Highest education level: Not on file   Tobacco Use   • Smoking status: Never Smoker   • Smokeless tobacco: Never Used   Substance and Sexual Activity   • Alcohol use: No   • Drug use: No   • Sexual activity: Yes     Partners: Male     Birth control/protection: None     Family History:   Family History   Problem Relation Age of Onset   • Colon polyps Mother    • Cirrhosis Mother    • Colon polyps Sister 54   • Breast cancer Sister    • No Known Problems Father    • No Known Problems Brother    • No Known Problems Daughter    • No Known Problems Son    • No Known Problems Maternal Grandmother    • No Known Problems Paternal Grandmother    • No Known Problems Maternal Aunt    • No Known Problems Paternal Aunt    • Colon cancer Neg Hx    • Crohn's disease Neg Hx    • Irritable bowel syndrome Neg Hx    • Liver cancer Neg Hx    • Liver disease Neg Hx    • Rectal cancer Neg Hx    • Stomach cancer Neg Hx    • BRCA 1/2 Neg Hx    • Endometrial cancer Neg Hx    • Ovarian cancer Neg Hx    • Uterine cancer Neg Hx        Review of Systems   Constitutional: Negative.    HENT: Negative.    Eyes: Negative.    Respiratory: Negative.    Cardiovascular: Negative.    Gastrointestinal: Negative.    Endocrine: Negative.    Genitourinary: Negative.    Musculoskeletal: Negative.    Skin: Negative.    Neurological: Negative.    Hematological: Negative.    Psychiatric/Behavioral: Negative.         Performance Status:  Asymptomatic    Medications:    Current Outpatient Medications   Medication Sig Dispense Refill   •  ADVAIR DISKUS 250-50 MCG/DOSE DISKUS USE 1 INHALATION TWICE A DAY 3 each 4   • albuterol (PROVENTIL) (2.5 MG/3ML) 0.083% nebulizer solution 2.5 mg.     • albuterol sulfate HFA (PROAIR HFA) 108 (90 Base) MCG/ACT inhaler Inhale 2 puffs Every 4 (Four) Hours As Needed for Wheezing. 3 inhaler 3   • alendronate (FOSAMAX) 70 MG tablet Take 70 mg by mouth Every 7 (Seven) Days. Sunday's     • atorvastatin (LIPITOR) 40 MG tablet Take 1 tablet by mouth Daily. 90 tablet 3   • azithromycin (ZITHROMAX) 250 MG tablet Take 2 by mouth today then 1 daily for 4 days 6 tablet 0   • calcitriol (ROCALTROL) 0.5 MCG capsule Take 0.5 mcg by mouth daily.     • Calcium Citrate-Vitamin D (CALCIUM + D PO) Take 1 tablet by mouth Daily.     • Diclofenac Sodium (PENNSAID TD) Apply 1 application topically to the appropriate area as directed 2 (Two) Times a Day. For back pain     • docusate sodium (COLACE) 100 MG capsule Take 100 mg by mouth Daily.     • ibuprofen (ADVIL,MOTRIN) 400 MG tablet Take 400 mg by mouth Every 8 (Eight) Hours As Needed for Mild Pain .     • levothyroxine (SYNTHROID, LEVOTHROID) 50 MCG tablet Take 50 mcg by mouth Daily.     • loratadine (CLARITIN) 10 MG tablet Take 10 mg by mouth Daily As Needed for Allergies.     • losartan (COZAAR) 25 MG tablet Take 1 tablet by mouth Daily. 90 tablet 3   • pantoprazole (PROTONIX) 40 MG EC tablet Take 1 tablet by mouth Daily. 90 tablet 0   • potassium chloride ER (K-TAB) 20 MEQ tablet controlled-release ER tablet Take 1 tablet by mouth Daily. 90 tablet 3   • rOPINIRole (REQUIP) 0.25 MG tablet Take 1 tablet by mouth Every Night. Take 1 hour before bedtime. 90 tablet 3   • theophylline (UNIPHYL) 400 MG 24 hr tablet Take 1 tablet by mouth Daily. 90 tablet 3   • tiZANidine (ZANAFLEX) 4 MG tablet Take 4 mg by mouth Every 8 (Eight) Hours As Needed for Muscle Spasms.     • traMADol (ULTRAM) 50 MG tablet Take 50 mg by mouth Every 8 (Eight) Hours As Needed for Moderate Pain .     • zafirlukast  "(ACCOLATE) 20 MG tablet TAKE 1 TABLET TWICE A  tablet 3     No current facility-administered medications for this visit.      Facility-Administered Medications Ordered in Other Visits   Medication Dose Route Frequency Provider Last Rate Last Dose   • heparin flush (porcine) 100 UNIT/ML injection 500 Units  500 Units Intracatheter PRN Ki Manriquez MD   500 Units at 09/27/16 1108   • heparin flush (porcine) 100 UNIT/ML injection 500 Units  500 Units Intravenous PRN Malathi Brantley APRN   500 Units at 05/05/17 1009   • sodium chloride 0.9 % flush 10 mL  10 mL Intravenous PRN Ki Manriquez MD   10 mL at 09/27/16 1107   • sodium chloride 0.9 % flush 10 mL  10 mL Intravenous PRN Malathi Brantley, APRN   10 mL at 05/05/17 1009       ALLERGIES:    Allergies   Allergen Reactions   • Keflex [Cephalexin] Rash and Unknown (See Comments)       Objective      Vitals:    05/11/20 1346   Height: 157.5 cm (62\")         Current Status 5/11/2020   ECOG score 0         Physical Examremains the same  General Appearance: Patient is awake, alert, oriented and in no acute distress. Patient is welldeveloped, wellnourished, and appears stated age.  HEENT: Normocephalic. Sclerae clear, conjunctiva pink, extraocular movements intact, pupils, round, reactive to light and  accommodation. Mouth and throat are clear with moist oral mucosa.  NECK: Supple, no jugular venous distention, thyroid not enlarged.  LYMPH: No cervical, supraclavicular, axillary, or inguinal lymphadenopathy.  CHEST: Equal bilateral expansion, AP  diameter normal, resonant percussion note  LUNGS: Good air movement, no rales, rhonchi, rubs or wheezes with auscultation  CARDIO: Regular sinus rhythm, no murmurs, gallops or rubs.  ABDOMEN: Nondistended, soft, No tenderness, no guarding, no rebound, No hepatosplenomegaly. No abdominal masses. Bowel sounds positive. No hernia  GENITALIA: Not examined.  BREASTS: right breast lumpectomy " scar well healed. Left breast with no lumps or bumps.    MUSKEL: No joint swelling, decreased motion, or inflammation  EXTREMS: No edema, clubbing, cyanosis, No varicose veins.  NEURO: Grossly nonfocal, Gait is coordinated and smooth, Cognition is preserved.  SKIN: No rashes, no ecchymoses, no petechia.  PSYCH: Oriented to time, place and person. Memory is preserved. Mood and affect appear normal  RECENT LABS:  No visits with results within 7 Day(s) from this visit.   Latest known visit with results is:   Appointment on 05/08/2019   Component Date Value Ref Range Status   • Extra Tube 05/08/2019 Hold for add-ons.   Final    Auto resulted.       RADIOLOGY:  No results found.         Assessment/Plan  Luna Cruz is a 54 y.o. year old female with HR+ br s/p MG for 10yr clinically BOBBY.    Patient Active Problem List   Diagnosis   • Essential hypertension   • Gastroesophageal reflux disease   • Heartburn   • Coughing   • Dysphagia   • Family history of polyps in the colon   • Family history of malignant neoplasm of breast   • History of malignant neoplasm of breast   • Colon cancer screening   • Overweight (BMI 25.0-29.9)   • Iron deficiency anemia   • Pneumonia due to infectious organism   • Traumatic tear of medial meniscus of knee, left, initial encounter   • Esophageal dysphagia   • Other asthma          1. Stage II breast cancer 2008, status post lumpectomy ×4 TC, followed by radiation and currently started on tamoxifen to complete a total of 10 year Nov 2018. Advised okay to stop tamoxifen  As far as her breast cancer is concerned there is no evidence of recurrent disease   5/8/19 mammo No mammographic evidence of malignancy. Recommendation is for the patient to return for routine mammography in one year or sooner, if clinically indicated. I reviewed her labs with her and it is unremarkable. Wbc 7.56, Hg 14.8, plt 352.   -mammo scheduled for June.rtc in a year.    2. She also had a history of  hyperparathyroidism status post resection of the adenoma in 2011.    3. Hyperlipidemia: On lipitor    4. Hypertension: losartan  5. Hypothyroidism: on synthroid.  6. Gerd: on pantoprazole.  7. Osteopenia: on fosamax. Dillon+vit d  8. Emphysemia: on advir, theophyline, zafirkulast  9. Anemia: s/p iron infusions. Hg 14.8.      Deann Lopes MD    5/11/2020    13:48

## 2020-05-12 ENCOUNTER — TELEPHONE (OUTPATIENT)
Dept: ONCOLOGY | Facility: CLINIC | Age: 55
End: 2020-05-12

## 2020-05-12 DIAGNOSIS — D50.9 IRON DEFICIENCY ANEMIA, UNSPECIFIED IRON DEFICIENCY ANEMIA TYPE: ICD-10-CM

## 2020-05-12 RX ORDER — SODIUM CHLORIDE 9 MG/ML
250 INJECTION, SOLUTION INTRAVENOUS ONCE
Status: CANCELLED | OUTPATIENT
Start: 2020-05-22

## 2020-05-12 RX ORDER — DIPHENHYDRAMINE HYDROCHLORIDE 50 MG/ML
50 INJECTION INTRAMUSCULAR; INTRAVENOUS AS NEEDED
Status: CANCELLED | OUTPATIENT
Start: 2020-05-22

## 2020-05-12 RX ORDER — DIPHENHYDRAMINE HYDROCHLORIDE 50 MG/ML
50 INJECTION INTRAMUSCULAR; INTRAVENOUS AS NEEDED
Status: CANCELLED | OUTPATIENT
Start: 2020-05-15

## 2020-05-12 RX ORDER — SODIUM CHLORIDE 9 MG/ML
250 INJECTION, SOLUTION INTRAVENOUS ONCE
Status: CANCELLED | OUTPATIENT
Start: 2020-05-15

## 2020-05-12 RX ORDER — FAMOTIDINE 10 MG/ML
20 INJECTION, SOLUTION INTRAVENOUS AS NEEDED
Status: CANCELLED | OUTPATIENT
Start: 2020-05-22

## 2020-05-12 RX ORDER — FAMOTIDINE 10 MG/ML
20 INJECTION, SOLUTION INTRAVENOUS AS NEEDED
Status: CANCELLED | OUTPATIENT
Start: 2020-05-15

## 2020-05-12 NOTE — TELEPHONE ENCOUNTER
----- Message from Catia Linares CMA sent at 5/12/2020  9:00 AM CDT -----  INJECTAFER TRENT 10:30 ON 15TH AND 22ND. LI WAS NOTIFIED.

## 2020-05-12 NOTE — TELEPHONE ENCOUNTER
NOTIFIED PT OF IRON INFUSION [facility]ERT. AT 10:30 ON THE 15TH AND 22ND. SHE V/U    ----- Message from Deann Lopes MD sent at 5/12/2020  8:45 AM CDT -----  Please call the patient regarding her abnormal result. She needs injectafer. tnx

## 2020-05-15 ENCOUNTER — INFUSION (OUTPATIENT)
Dept: ONCOLOGY | Facility: HOSPITAL | Age: 55
End: 2020-05-15

## 2020-05-15 VITALS
TEMPERATURE: 98.3 F | RESPIRATION RATE: 16 BRPM | HEIGHT: 62 IN | WEIGHT: 172 LBS | OXYGEN SATURATION: 100 % | SYSTOLIC BLOOD PRESSURE: 138 MMHG | HEART RATE: 89 BPM | DIASTOLIC BLOOD PRESSURE: 73 MMHG | BODY MASS INDEX: 31.65 KG/M2

## 2020-05-15 DIAGNOSIS — D50.9 IRON DEFICIENCY ANEMIA, UNSPECIFIED IRON DEFICIENCY ANEMIA TYPE: Primary | ICD-10-CM

## 2020-05-15 PROCEDURE — 25010000002 FERRIC CARBOXYMALTOSE 750 MG/15ML SOLUTION 15 ML VIAL: Performed by: INTERNAL MEDICINE

## 2020-05-15 PROCEDURE — 96365 THER/PROPH/DIAG IV INF INIT: CPT

## 2020-05-15 RX ORDER — FAMOTIDINE 10 MG/ML
20 INJECTION, SOLUTION INTRAVENOUS AS NEEDED
Status: DISCONTINUED | OUTPATIENT
Start: 2020-05-15 | End: 2020-05-15 | Stop reason: HOSPADM

## 2020-05-15 RX ORDER — DIPHENHYDRAMINE HYDROCHLORIDE 50 MG/ML
50 INJECTION INTRAMUSCULAR; INTRAVENOUS AS NEEDED
Status: DISCONTINUED | OUTPATIENT
Start: 2020-05-15 | End: 2020-05-15 | Stop reason: HOSPADM

## 2020-05-15 RX ORDER — SODIUM CHLORIDE 9 MG/ML
250 INJECTION, SOLUTION INTRAVENOUS ONCE
Status: COMPLETED | OUTPATIENT
Start: 2020-05-15 | End: 2020-05-15

## 2020-05-15 RX ADMIN — SODIUM CHLORIDE 250 ML: 9 INJECTION, SOLUTION INTRAVENOUS at 11:30

## 2020-05-15 RX ADMIN — FERRIC CARBOXYMALTOSE INJECTION 750 MG: 50 INJECTION, SOLUTION INTRAVENOUS at 11:35

## 2020-05-15 NOTE — PATIENT INSTRUCTIONS
Ferric carboxymaltose injection  What is this medicine?  FERRIC CARBOXYMALTOSE (ferr-ik car-box-ee-mol-toes) is an iron complex. Iron is used to make healthy red blood cells, which carry oxygen and nutrients throughout the body. This medicine is used to treat anemia in people with chronic kidney disease or people who cannot take iron by mouth.  This medicine may be used for other purposes; ask your health care provider or pharmacist if you have questions.  COMMON BRAND NAME(S): Injectafer  What should I tell my health care provider before I take this medicine?  They need to know if you have any of these conditions:  -high levels of iron in the blood  -liver disease  -an unusual or allergic reaction to iron, other medicines, foods, dyes, or preservatives  -pregnant or trying to get pregnant  -breast-feeding  How should I use this medicine?  This medicine is for infusion into a vein. It is given by a health care professional in a hospital or clinic setting.  Talk to your pediatrician regarding the use of this medicine in children. Special care may be needed.  Overdosage: If you think you have taken too much of this medicine contact a poison control center or emergency room at once.  NOTE: This medicine is only for you. Do not share this medicine with others.  What if I miss a dose?  It is important not to miss your dose. Call your doctor or health care professional if you are unable to keep an appointment.  What may interact with this medicine?  Do not take this medicine with any of the following medications:  -deferoxamine  -dimercaprol  -other iron products  This list may not describe all possible interactions. Give your health care provider a list of all the medicines, herbs, non-prescription drugs, or dietary supplements you use. Also tell them if you smoke, drink alcohol, or use illegal drugs. Some items may interact with your medicine.  What should I watch for while using this medicine?  Visit your doctor or  health care professional regularly. Tell your doctor if your symptoms do not start to get better or if they get worse. You may need blood work done while you are taking this medicine.  You may need to follow a special diet. Talk to your doctor. Foods that contain iron include: whole grains/cereals, dried fruits, beans, or peas, leafy green vegetables, and organ meats (liver, kidney).  What side effects may I notice from receiving this medicine?  Side effects that you should report to your doctor or health care professional as soon as possible:  -allergic reactions like skin rash, itching or hives, swelling of the face, lips, or tongue  -dizziness  -facial flushing  Side effects that usually do not require medical attention (report to your doctor or health care professional if they continue or are bothersome):  -changes in taste  -constipation  -headache  -nausea, vomiting  -pain, redness, or irritation at site where injected  This list may not describe all possible side effects. Call your doctor for medical advice about side effects. You may report side effects to FDA at 4-761-FDA-0683.  Where should I keep my medicine?  This drug is given in a hospital or clinic and will not be stored at home.  NOTE: This sheet is a summary. It may not cover all possible information. If you have questions about this medicine, talk to your doctor, pharmacist, or health care provider.  © 2020 [employer] (2018-02-01 09:40:29)

## 2020-05-17 DIAGNOSIS — J45.998 OTHER ASTHMA: ICD-10-CM

## 2020-05-18 RX ORDER — THEOPHYLLINE 400 MG/1
TABLET, EXTENDED RELEASE ORAL
Qty: 90 TABLET | Refills: 3 | Status: SHIPPED | OUTPATIENT
Start: 2020-05-18 | End: 2021-06-28 | Stop reason: SDUPTHER

## 2020-05-22 ENCOUNTER — INFUSION (OUTPATIENT)
Dept: ONCOLOGY | Facility: HOSPITAL | Age: 55
End: 2020-05-22

## 2020-05-22 VITALS
TEMPERATURE: 98.9 F | HEART RATE: 82 BPM | BODY MASS INDEX: 31.47 KG/M2 | SYSTOLIC BLOOD PRESSURE: 146 MMHG | RESPIRATION RATE: 16 BRPM | OXYGEN SATURATION: 100 % | WEIGHT: 171 LBS | HEIGHT: 62 IN | DIASTOLIC BLOOD PRESSURE: 64 MMHG

## 2020-05-22 DIAGNOSIS — D50.9 IRON DEFICIENCY ANEMIA, UNSPECIFIED IRON DEFICIENCY ANEMIA TYPE: Primary | ICD-10-CM

## 2020-05-22 PROCEDURE — 25010000002 FERRIC CARBOXYMALTOSE 750 MG/15ML SOLUTION 15 ML VIAL: Performed by: INTERNAL MEDICINE

## 2020-05-22 PROCEDURE — 96365 THER/PROPH/DIAG IV INF INIT: CPT

## 2020-05-22 RX ORDER — FAMOTIDINE 10 MG/ML
20 INJECTION, SOLUTION INTRAVENOUS AS NEEDED
Status: DISCONTINUED | OUTPATIENT
Start: 2020-05-22 | End: 2020-05-22 | Stop reason: HOSPADM

## 2020-05-22 RX ORDER — DIPHENHYDRAMINE HYDROCHLORIDE 50 MG/ML
50 INJECTION INTRAMUSCULAR; INTRAVENOUS AS NEEDED
Status: DISCONTINUED | OUTPATIENT
Start: 2020-05-22 | End: 2020-05-22 | Stop reason: HOSPADM

## 2020-05-22 RX ORDER — SODIUM CHLORIDE 9 MG/ML
250 INJECTION, SOLUTION INTRAVENOUS ONCE
Status: COMPLETED | OUTPATIENT
Start: 2020-05-22 | End: 2020-05-22

## 2020-05-22 RX ADMIN — SODIUM CHLORIDE 250 ML: 9 INJECTION, SOLUTION INTRAVENOUS at 11:03

## 2020-05-22 RX ADMIN — FERRIC CARBOXYMALTOSE INJECTION 750 MG: 50 INJECTION, SOLUTION INTRAVENOUS at 11:14

## 2020-06-04 ENCOUNTER — OFFICE VISIT (OUTPATIENT)
Dept: INTERNAL MEDICINE | Facility: CLINIC | Age: 55
End: 2020-06-04

## 2020-06-04 VITALS
HEART RATE: 93 BPM | TEMPERATURE: 98.1 F | SYSTOLIC BLOOD PRESSURE: 140 MMHG | HEIGHT: 62 IN | BODY MASS INDEX: 31.17 KG/M2 | OXYGEN SATURATION: 99 % | DIASTOLIC BLOOD PRESSURE: 80 MMHG | WEIGHT: 169.4 LBS

## 2020-06-04 DIAGNOSIS — K21.9 GASTROESOPHAGEAL REFLUX DISEASE WITHOUT ESOPHAGITIS: ICD-10-CM

## 2020-06-04 DIAGNOSIS — D50.8 OTHER IRON DEFICIENCY ANEMIA: ICD-10-CM

## 2020-06-04 DIAGNOSIS — E78.00 HYPERCHOLESTEROLEMIA: ICD-10-CM

## 2020-06-04 DIAGNOSIS — N39.0 URINARY TRACT INFECTION WITH HEMATURIA, SITE UNSPECIFIED: ICD-10-CM

## 2020-06-04 DIAGNOSIS — R00.2 PALPITATIONS: ICD-10-CM

## 2020-06-04 DIAGNOSIS — I10 ESSENTIAL HYPERTENSION: Primary | ICD-10-CM

## 2020-06-04 DIAGNOSIS — R31.9 URINARY TRACT INFECTION WITH HEMATURIA, SITE UNSPECIFIED: ICD-10-CM

## 2020-06-04 LAB
BILIRUB BLD-MCNC: NEGATIVE MG/DL
BILIRUB BLD-MCNC: NEGATIVE MG/DL
CLARITY, POC: CLEAR
CLARITY, POC: CLEAR
COLOR UR: YELLOW
COLOR UR: YELLOW
GLUCOSE UR STRIP-MCNC: NEGATIVE MG/DL
GLUCOSE UR STRIP-MCNC: NEGATIVE MG/DL
KETONES UR QL: NEGATIVE
KETONES UR QL: NEGATIVE
LEUKOCYTE EST, POC: NEGATIVE
LEUKOCYTE EST, POC: NEGATIVE
NITRITE UR-MCNC: NEGATIVE MG/ML
NITRITE UR-MCNC: NEGATIVE MG/ML
PH UR: 5.5 [PH] (ref 5–8)
PH UR: 5.5 [PH] (ref 5–8)
PROT UR STRIP-MCNC: NEGATIVE MG/DL
PROT UR STRIP-MCNC: NEGATIVE MG/DL
RBC # UR STRIP: ABNORMAL /UL
RBC # UR STRIP: ABNORMAL /UL
SP GR UR: 1.02 (ref 1–1.03)
SP GR UR: 1.02 (ref 1–1.03)
UROBILINOGEN UR QL: NORMAL
UROBILINOGEN UR QL: NORMAL

## 2020-06-04 PROCEDURE — 93000 ELECTROCARDIOGRAM COMPLETE: CPT | Performed by: NURSE PRACTITIONER

## 2020-06-04 PROCEDURE — 81003 URINALYSIS AUTO W/O SCOPE: CPT | Performed by: NURSE PRACTITIONER

## 2020-06-04 PROCEDURE — 99214 OFFICE O/P EST MOD 30 MIN: CPT | Performed by: NURSE PRACTITIONER

## 2020-06-04 RX ORDER — LOSARTAN POTASSIUM 25 MG/1
50 TABLET ORAL DAILY
Qty: 90 TABLET | Refills: 3
Start: 2020-06-04 | End: 2020-09-08 | Stop reason: SDUPTHER

## 2020-06-04 RX ORDER — NITROFURANTOIN 25; 75 MG/1; MG/1
100 CAPSULE ORAL 2 TIMES DAILY
Qty: 14 CAPSULE | Refills: 0 | Status: SHIPPED | OUTPATIENT
Start: 2020-06-04 | End: 2020-06-11

## 2020-06-04 RX ORDER — HYDROCODONE BITARTRATE AND ACETAMINOPHEN 5; 325 MG/1; MG/1
1 TABLET ORAL 2 TIMES DAILY PRN
COMMUNITY
Start: 2020-05-20

## 2020-06-04 NOTE — PROGRESS NOTES
Subjective   Luna Cruz is a 54 y.o. female.   Chief Complaint   Patient presents with   • Follow-up     6 month: per patient may be time for yearly   • Urinary Tract Infection     burning with urination and frequency       Luna presents today for 6 month follow up.  She thinks this may be her annual physical.  Able to see yearly labs completed in December 2019.  Will complete annual physical in December 2020.  She denies complaints of concerns to address today.  She does mention that she is still following up with Oncology for a past history of breast cancer.  She was ordered an iron infusion for iron deficiency anemia.      Hypertension  She has a history of hypertension which she takes Losartan 25mg daily for.  She checks her blood pressure at home and states after taking her medicine her BP runs about 130-140/80-90.  She denies chest pain or shortness of breath but does state that occasionally she has an episodes of her heat beating fast in her chest.  She states these episodes started recently and last a few minutes and occur randomly.  They have happened while sitting and with active.  Laying down helps relieve the symptoms.  She states she may feel a little dizzy when it happens.  They occur 1-2 episodes a week for the past 2 weeks.      Urinary Tract Infection:  Luna also complaints of dysuria and urinary frequency that started about 1 week ago.  She has a history of urinary tract infections and reports this is how they usually feel to her.             The following portions of the patient's history were reviewed and updated as appropriate: allergies, current medications, past family history, past medical history, past social history, past surgical history and problem list.    Review of Systems   Constitutional: Negative for activity change, appetite change, fatigue, fever, unexpected weight gain and unexpected weight loss.   HENT: Negative for swollen glands, trouble swallowing and voice change.     Eyes: Negative for blurred vision and visual disturbance.   Respiratory: Negative for cough and shortness of breath.    Cardiovascular: Positive for palpitations. Negative for chest pain and leg swelling.   Gastrointestinal: Negative for abdominal pain, constipation, diarrhea, nausea, vomiting and indigestion.   Endocrine: Negative for cold intolerance, heat intolerance, polydipsia and polyphagia.   Genitourinary: Positive for dysuria and frequency.   Musculoskeletal: Negative for arthralgias, back pain, joint swelling and neck pain.   Skin: Negative for color change, rash and skin lesions.   Neurological: Positive for dizziness (With palpitations). Negative for weakness, headache, memory problem and confusion.   Hematological: Does not bruise/bleed easily.   Psychiatric/Behavioral: Negative for agitation, hallucinations and suicidal ideas. The patient is not nervous/anxious.        Objective   Past Medical History:   Diagnosis Date   • Asthma    • Breast cancer (CMS/HCC)    • Chronic back pain    • Disease of thyroid gland    • Drug therapy    • GERD (gastroesophageal reflux disease)    • Hx of radiation therapy    • Hyperlipidemia    • Hypertension    • Malignant neoplasm of upper-inner quadrant of right female breast (CMS/HCC) 9/26/2016   • Osteoporosis    • PONV (postoperative nausea and vomiting)    • Scoliosis       Past Surgical History:   Procedure Laterality Date   • ADENOIDECTOMY     • APPENDECTOMY  1994   • BREAST BIOPSY     • BREAST LUMPECTOMY Right 2008   • CHOLECYSTECTOMY  1993   • COLONOSCOPY  05/03/2013    Erythematous mucosa in the rectum-biopsied; Repeat 4 years    • COLONOSCOPY N/A 6/14/2017    Normal; Repeat 5 years; Procedure: COLONOSCOPY WITH ANESTHESIA;  Surgeon: Ksenia Fox MD;  Location: [facility] ENDOSCOPY;  Service:    • COLONOSCOPY  04/23/2010    Dr. Arzola-Extremely poorly prepped colon   • D&C HYSTEROSCOPY  1993   • DILATATION AND CURETTAGE     • ENDOSCOPY N/A 10/5/2016     Medium-sized HH; Widely patent and non-obstructing Schatzki ring-dilated; Procedure: ESOPHAGOGASTRODUODENOSCOPY WITH ANESTHESIA;  Surgeon: Ksenia Fox MD;  Location: [facility] ENDOSCOPY;  Service:    • ENDOSCOPY N/A 2/27/2019    Normal 1st portion of the duodenum and 2 portion of the duodenum; A few gastric polyps-biopsied; Small HH; Widely patent Schatzki ring-dilated; Abnormal esophageal motility, suspicious for presbyesophagus; Arrange for barium swallow to assess for dysmotility   • HYSTEROSCOPY     • KNEE ARTHROSCOPY Left 11/13/2018    Procedure: LEFT KNEE ARTHROSCOPIC PARTIAL MEDIAL MENISCECTOMY;  Surgeon: Matt Maki MD;  Location: [facility] OR;  Service: Orthopedics   • MEDIPORT INSERTION, SINGLE  2008   • MEDIPORT REMOVAL     • REPLACEMENT TOTAL KNEE Left 05/23/2019   • THYROIDECTOMY  2011    parathyroidectomy   • TONSILLECTOMY AND ADENOIDECTOMY  1970   • TUBAL ABDOMINAL LIGATION          Current Outpatient Medications:   •  ADVAIR DISKUS 250-50 MCG/DOSE DISKUS, USE 1 INHALATION TWICE A DAY, Disp: 3 each, Rfl: 4  •  albuterol (PROVENTIL) (2.5 MG/3ML) 0.083% nebulizer solution, 2.5 mg., Disp: , Rfl:   •  albuterol sulfate HFA (PROAIR HFA) 108 (90 Base) MCG/ACT inhaler, Inhale 2 puffs Every 4 (Four) Hours As Needed for Wheezing., Disp: 3 inhaler, Rfl: 3  •  alendronate (FOSAMAX) 70 MG tablet, Take 70 mg by mouth Every 7 (Seven) Days. Sunday's, Disp: , Rfl:   •  atorvastatin (LIPITOR) 40 MG tablet, Take 1 tablet by mouth Daily., Disp: 90 tablet, Rfl: 3  •  calcitriol (ROCALTROL) 0.5 MCG capsule, Take 0.5 mcg by mouth daily., Disp: , Rfl:   •  Calcium Citrate-Vitamin D (CALCIUM + D PO), Take 1 tablet by mouth Daily., Disp: , Rfl:   •  Diclofenac Sodium (PENNSAID TD), Apply 1 application topically to the appropriate area as directed 2 (Two) Times a Day. For back pain, Disp: , Rfl:   •  docusate sodium (COLACE) 100 MG capsule, Take 100 mg by mouth Daily., Disp: , Rfl:   •  HYDROcodone-acetaminophen  (NORCO) 5-325 MG per tablet, TK 1 T PO  TID PRN P, Disp: , Rfl:   •  ibuprofen (ADVIL,MOTRIN) 400 MG tablet, Take 400 mg by mouth Every 8 (Eight) Hours As Needed for Mild Pain ., Disp: , Rfl:   •  levothyroxine (SYNTHROID, LEVOTHROID) 50 MCG tablet, Take 50 mcg by mouth Daily., Disp: , Rfl:   •  loratadine (CLARITIN) 10 MG tablet, Take 10 mg by mouth Daily As Needed for Allergies., Disp: , Rfl:   •  losartan (COZAAR) 25 MG tablet, Take 2 tablets by mouth Daily., Disp: 90 tablet, Rfl: 3  •  pantoprazole (PROTONIX) 40 MG EC tablet, Take 1 tablet by mouth Daily., Disp: 90 tablet, Rfl: 0  •  potassium chloride ER (K-TAB) 20 MEQ tablet controlled-release ER tablet, Take 1 tablet by mouth Daily., Disp: 90 tablet, Rfl: 3  •  rOPINIRole (REQUIP) 0.25 MG tablet, Take 1 tablet by mouth Every Night. Take 1 hour before bedtime., Disp: 90 tablet, Rfl: 3  •  theophylline (UNIPHYL) 400 MG 24 hr tablet, TAKE 1 TABLET DAILY, Disp: 90 tablet, Rfl: 3  •  tiZANidine (ZANAFLEX) 4 MG tablet, Take 4 mg by mouth Every 8 (Eight) Hours As Needed for Muscle Spasms., Disp: , Rfl:   •  traMADol (ULTRAM) 50 MG tablet, Take 50 mg by mouth Every 8 (Eight) Hours As Needed for Moderate Pain ., Disp: , Rfl:   •  zafirlukast (ACCOLATE) 20 MG tablet, TAKE 1 TABLET TWICE A DAY, Disp: 180 tablet, Rfl: 3  •  nitrofurantoin, macrocrystal-monohydrate, (Macrobid) 100 MG capsule, Take 1 capsule by mouth 2 (Two) Times a Day for 7 days., Disp: 14 capsule, Rfl: 0  No current facility-administered medications for this visit.     Facility-Administered Medications Ordered in Other Visits:   •  heparin flush (porcine) 100 UNIT/ML injection 500 Units, 500 Units, Intracatheter, PRN, Declan, Ki Quevedo MD, 500 Units at 09/27/16 1108  •  heparin flush (porcine) 100 UNIT/ML injection 500 Units, 500 Units, Intravenous, PRN, Malathi Brantley, HEATHER, 500 Units at 05/05/17 1009  •  sodium chloride 0.9 % flush 10 mL, 10 mL, Intravenous, PRN, Ki Manriquez  MD Emy, 10 mL at 09/27/16 1107  •  sodium chloride 0.9 % flush 10 mL, 10 mL, Intravenous, PRN, Malathi Brantley APRN, 10 mL at 05/05/17 1009     Vitals:    06/04/20 0806   BP: 140/80   Pulse: 93   Temp: 98.1 °F (36.7 °C)   SpO2: 99%         06/04/20  0806   Weight: 76.8 kg (169 lb 6.4 oz)     Patient's Body mass index is 30.98 kg/m². BMI is above normal parameters. Recommendations include: nutrition counseling.      Physical Exam   Constitutional: She is oriented to person, place, and time. She appears well-developed and well-nourished.   HENT:   Head: Normocephalic and atraumatic.   Right Ear: External ear normal.   Left Ear: External ear normal.   Nose: Nose normal.   Mouth/Throat: Oropharynx is clear and moist. No oral lesions.   Eyes: Pupils are equal, round, and reactive to light. Conjunctivae and EOM are normal.   Neck: Normal range of motion. Neck supple. No spinous process tenderness and no muscular tenderness present. Carotid bruit is not present. Normal range of motion present. No thyromegaly present.   Cardiovascular: Normal rate, regular rhythm, normal heart sounds and intact distal pulses.   Pulses:       Radial pulses are 2+ on the right side, and 2+ on the left side.   1+ bilateral lower extremity edema; only noted by her sock line.    Pulmonary/Chest: Effort normal and breath sounds normal.   Lymphadenopathy:     She has no cervical adenopathy.   Neurological: She is alert and oriented to person, place, and time.   Skin: Skin is warm and dry.   Psychiatric: She has a normal mood and affect. Her behavior is normal. Thought content normal.   Nursing note and vitals reviewed.          Assessment/Plan   Diagnoses and all orders for this visit:    1. Essential hypertension (Primary)  -     Lipid panel  -     losartan (COZAAR) 25 MG tablet; Take 2 tablets by mouth Daily.  Dispense: 90 tablet; Refill: 3    2. Urinary tract infection with hematuria, site unspecified  -     POC Urinalysis  Dipstick, Multipro  -     nitrofurantoin, macrocrystal-monohydrate, (Macrobid) 100 MG capsule; Take 1 capsule by mouth 2 (Two) Times a Day for 7 days.  Dispense: 14 capsule; Refill: 0  -     Urine Culture - Urine, Urine, Clean Catch    3. Gastroesophageal reflux disease without esophagitis    4. Other iron deficiency anemia    5. Hypercholesterolemia  -     Lipid panel    6. Palpitations  -     Holter monitor - 24 hour; Future  -     ECG 12 Lead    Other orders  -     Cancel: ECG 12 Lead        I would like to increase her Losartan to 50mg daily for better blood pressure control.  She will have follow up scheduled with Dr. Shaffer next week for Holter monitor interpretation and can have BP reviewed at that time. Please check daily and log readings.   EKG performed today.  I have ordered a Holter Monitor to be placed next week with follow up with Dr. Shaffer.   Increase water intake and avoid caffeine, carbonation to help with UTI symptoms as well as palpitations.  Macrobid sent to the pharmacy.  I have ordered a urine culture to be completed.    Will check lipid panel today.  CMP recently done by oncology on May.  Kidney function and liver enzymes reviewed.   She is scheduled for a Mammogram this month.  She gets Pap smears completed at an external facility.       ECG 12 Lead  Date/Time: 6/4/2020 12:40 PM  Performed by: Che Calvert APRN  Authorized by: Che Calvert APRN   Comparison: not compared with previous ECG   Rhythm: sinus rhythm  Conduction: incomplete left bundle branch block    Clinical impression: non-specific ECG  Comments: NSR  Incomplete LBBB  Interpreted by Dr. Cesar Shaffer

## 2020-06-05 ENCOUNTER — TELEPHONE (OUTPATIENT)
Dept: INTERNAL MEDICINE | Facility: CLINIC | Age: 55
End: 2020-06-05

## 2020-06-05 LAB
CHOLEST SERPL-MCNC: 158 MG/DL (ref 0–200)
HDLC SERPL-MCNC: 56 MG/DL (ref 40–60)
LDLC SERPL CALC-MCNC: 77 MG/DL (ref 0–100)
TRIGL SERPL-MCNC: 124 MG/DL (ref 0–150)
VLDLC SERPL CALC-MCNC: 24.8 MG/DL

## 2020-06-05 NOTE — TELEPHONE ENCOUNTER
Please note:     Pt called in stating she and her  has been exposed to Covid 19. Pt stated she watched he niece on Mon. 06/01/20 who tested positive for Covid. Pt stated she is not experiencing any symptoms but would like to know what she and her  should do.     Per Che, since pt is asymptomatic she needs to self quarantine for 14 days. Pt voiced understanding.    Pt stated her  needs to be tested in order to return to work. Per Che, husbands employer should send him to be tested.   Pt voiced understanding.

## 2020-06-06 LAB
BACTERIA UR CULT: NO GROWTH
BACTERIA UR CULT: NORMAL

## 2020-06-10 ENCOUNTER — HOSPITAL ENCOUNTER (OUTPATIENT)
Dept: WOMENS IMAGING | Age: 55
Discharge: HOME | End: 2020-06-10
Payer: MEDICARE

## 2020-06-10 ENCOUNTER — OFFICE VISIT (OUTPATIENT)
Dept: SURGERY | Age: 55
End: 2020-06-10
Payer: MEDICARE

## 2020-06-10 VITALS — SYSTOLIC BLOOD PRESSURE: 118 MMHG | HEART RATE: 72 BPM | DIASTOLIC BLOOD PRESSURE: 70 MMHG

## 2020-06-10 PROCEDURE — 3017F COLORECTAL CA SCREEN DOC REV: CPT | Performed by: PHYSICIAN ASSISTANT

## 2020-06-10 PROCEDURE — G8421 BMI NOT CALCULATED: HCPCS | Performed by: PHYSICIAN ASSISTANT

## 2020-06-10 PROCEDURE — G9899 SCRN MAM PERF RSLTS DOC: HCPCS | Performed by: PHYSICIAN ASSISTANT

## 2020-06-10 PROCEDURE — G8427 DOCREV CUR MEDS BY ELIG CLIN: HCPCS | Performed by: PHYSICIAN ASSISTANT

## 2020-06-10 PROCEDURE — 99213 OFFICE O/P EST LOW 20 MIN: CPT | Performed by: PHYSICIAN ASSISTANT

## 2020-06-10 PROCEDURE — 1036F TOBACCO NON-USER: CPT | Performed by: PHYSICIAN ASSISTANT

## 2020-06-10 PROCEDURE — 77063 BREAST TOMOSYNTHESIS BI: CPT

## 2020-06-10 NOTE — PROGRESS NOTES
bilateral breast ultrasound. CLINICAL HISTORY: Abnormal screening mammogram.   COMPARISON STUDIES: 6/10/2020, 5/8/2019 at 5/7/2018. FINDINGS:    Breast composition: Scattered fibroglandular densities   Background breast echotexture: Heterogeneous   Reidentified lumpectomy change in the right breast. Previously   identified equal density masses in both the inner and outer left   breast persists with added compression. Similarly, the far posterior   upper outer right breast equal density mass persists with compression. Right breast benign calcification. This study was interpreted with   CAD. Targeted right breast ultrasound reveals a 0.4 cm oval circumscribed   anechoic cyst with through transmission at the 10:00 position, 9 cm   the nipple. This correlates well with the mammographic abnormality in   the far posterior upper outer right breast.    Targeted left breast ultrasound reveals a 0.3 cm oval circumscribed   anechoic cyst at the 6:00 position, 3 cm from the nipple. Responds   well with the benign-appearing inner left breast equal density mass   from mammogram. Lower outer left breast ultrasound does not   demonstrate a correlate for the outer left breast mammographic   finding. IMPRESSION AND RECOMMENDATION:    1. There is no sonographic correlate for the mammographic finding in   the outer left breast. This displays a benign appearance by mammogram,   appearing oval, circumscribed and equal density. However, as there is   no sonographic correlate I would recommend a 6 month follow-up left   breast mammogram to ensure stability. 2. Inner left breast mammographic abnormality is shown to be a 0.3 cm   simple cyst. Similarly, the far posterior upper outer right breast   mammographic abnormality is shown to be a 0.4 cm simple cyst.   BI-RADS CATEGORY 3: PROBABLY BENIGN FINDINGS        PHYSICAL EXAM:   The right lumpectomy incision is looking good.  There are moderate fibrocystic changes throughout both

## 2020-06-11 DIAGNOSIS — D48.60 NEOPLASM OF UNCERTAIN BEHAVIOR OF BREAST, UNSPECIFIED LATERALITY: Primary | ICD-10-CM

## 2020-06-17 ENCOUNTER — HOSPITAL ENCOUNTER (OUTPATIENT)
Dept: WOMENS IMAGING | Age: 55
Discharge: HOME | End: 2020-06-17
Payer: MEDICARE

## 2020-06-17 PROCEDURE — 76642 ULTRASOUND BREAST LIMITED: CPT

## 2020-06-17 PROCEDURE — G0279 TOMOSYNTHESIS, MAMMO: HCPCS

## 2020-06-17 PROCEDURE — 77066 DX MAMMO INCL CAD BI: CPT

## 2020-06-18 ENCOUNTER — OFFICE VISIT (OUTPATIENT)
Dept: INTERNAL MEDICINE | Facility: CLINIC | Age: 55
End: 2020-06-18

## 2020-06-18 ENCOUNTER — HOSPITAL ENCOUNTER (OUTPATIENT)
Dept: GENERAL RADIOLOGY | Facility: HOSPITAL | Age: 55
Discharge: HOME | End: 2020-06-18
Admitting: INTERNAL MEDICINE

## 2020-06-18 ENCOUNTER — LAB (OUTPATIENT)
Dept: LAB | Facility: HOSPITAL | Age: 55
End: 2020-06-18

## 2020-06-18 VITALS
TEMPERATURE: 98.6 F | HEIGHT: 62 IN | HEART RATE: 94 BPM | SYSTOLIC BLOOD PRESSURE: 134 MMHG | OXYGEN SATURATION: 98 % | BODY MASS INDEX: 31.65 KG/M2 | RESPIRATION RATE: 18 BRPM | DIASTOLIC BLOOD PRESSURE: 100 MMHG | WEIGHT: 172 LBS

## 2020-06-18 DIAGNOSIS — J45.21 MILD INTERMITTENT CHRONIC ASTHMA WITH ACUTE EXACERBATION: ICD-10-CM

## 2020-06-18 DIAGNOSIS — R00.2 PALPITATIONS: ICD-10-CM

## 2020-06-18 DIAGNOSIS — E03.4 HYPOTHYROIDISM DUE TO ACQUIRED ATROPHY OF THYROID: ICD-10-CM

## 2020-06-18 DIAGNOSIS — Z78.0 POSTMENOPAUSAL: ICD-10-CM

## 2020-06-18 DIAGNOSIS — J45.21 MILD INTERMITTENT CHRONIC ASTHMA WITH ACUTE EXACERBATION: Primary | ICD-10-CM

## 2020-06-18 PROBLEM — J45.901 CHRONIC ASTHMA WITH ACUTE EXACERBATION: Status: ACTIVE | Noted: 2019-10-27

## 2020-06-18 PROBLEM — M81.0 OSTEOPOROSIS: Status: ACTIVE | Noted: 2020-06-18

## 2020-06-18 PROBLEM — E03.9 MYXEDEMA HEART DISEASE: Status: ACTIVE | Noted: 2020-06-18

## 2020-06-18 PROBLEM — I51.9 MYXEDEMA HEART DISEASE: Status: ACTIVE | Noted: 2020-06-18

## 2020-06-18 PROBLEM — E55.9 VITAMIN D DEFICIENCY: Status: ACTIVE | Noted: 2020-06-18

## 2020-06-18 PROBLEM — C50.919 BREAST CANCER: Status: ACTIVE | Noted: 2020-06-18

## 2020-06-18 PROBLEM — E78.00 ELEVATED CHOLESTEROL: Status: ACTIVE | Noted: 2020-06-18

## 2020-06-18 PROBLEM — R09.89 LEFT CAROTID BRUIT: Status: ACTIVE | Noted: 2020-06-18

## 2020-06-18 PROCEDURE — 99214 OFFICE O/P EST MOD 30 MIN: CPT | Performed by: INTERNAL MEDICINE

## 2020-06-18 PROCEDURE — 93227 XTRNL ECG REC<48 HR R&I: CPT | Performed by: INTERNAL MEDICINE

## 2020-06-18 PROCEDURE — 71046 X-RAY EXAM CHEST 2 VIEWS: CPT

## 2020-06-18 NOTE — PROGRESS NOTES
Subjective   Luna Cruz is a 54 y.o. female.   Chief Complaint   Patient presents with   • Rapid Heart Rate     Holter monitor removal.       Patient is here to follow-up and remove a Holter monitor she has had several episodes of feeling her heart pounding she did keep a diary however she did not push the button on the monitor.  I was able to correlate 1 of her episodes with sinus tachycardia.  Patient does have hypothyroidism and is taking medication as prescribed.       The following portions of the patient's history were reviewed and updated as appropriate: allergies, current medications, past family history, past medical history, past social history, past surgical history and problem list.    Review of Systems   Constitutional: Negative for activity change, appetite change, fatigue, fever, unexpected weight gain and unexpected weight loss.   HENT: Negative for swollen glands, trouble swallowing and voice change.    Eyes: Negative for blurred vision and visual disturbance.   Respiratory: Negative for cough and shortness of breath.    Cardiovascular: Negative for chest pain, palpitations and leg swelling.   Gastrointestinal: Negative for abdominal pain, constipation, diarrhea, nausea, vomiting and indigestion.   Endocrine: Negative for cold intolerance, heat intolerance, polydipsia and polyphagia.   Genitourinary: Negative for dysuria and frequency.   Musculoskeletal: Negative for arthralgias, back pain, joint swelling and neck pain.   Skin: Negative for color change, rash and skin lesions.   Neurological: Negative for dizziness, weakness, headache, memory problem and confusion.   Hematological: Does not bruise/bleed easily.   Psychiatric/Behavioral: Negative for agitation, hallucinations and suicidal ideas. The patient is not nervous/anxious.        Objective   Past Medical History:   Diagnosis Date   • Asthma    • Breast cancer (CMS/HCC)    • Chronic back pain    • Disease of thyroid gland    • Drug  therapy    • GERD (gastroesophageal reflux disease)    • Hx of radiation therapy    • Hyperlipidemia    • Hypertension    • Malignant neoplasm of upper-inner quadrant of right female breast (CMS/HCC) 9/26/2016   • Osteoporosis    • PONV (postoperative nausea and vomiting)    • Scoliosis       Past Surgical History:   Procedure Laterality Date   • ADENOIDECTOMY     • APPENDECTOMY  1994   • BREAST BIOPSY     • BREAST LUMPECTOMY Right 2008   • CHOLECYSTECTOMY  1993   • COLONOSCOPY  05/03/2013    Erythematous mucosa in the rectum-biopsied; Repeat 4 years    • COLONOSCOPY N/A 6/14/2017    Normal; Repeat 5 years; Procedure: COLONOSCOPY WITH ANESTHESIA;  Surgeon: Ksenia Fox MD;  Location: [facility] ENDOSCOPY;  Service:    • COLONOSCOPY  04/23/2010    Dr. Arzola-Extremely poorly prepped colon   • D&C HYSTEROSCOPY  1993   • DILATATION AND CURETTAGE     • ENDOSCOPY N/A 10/5/2016    Medium-sized HH; Widely patent and non-obstructing Schatzki ring-dilated; Procedure: ESOPHAGOGASTRODUODENOSCOPY WITH ANESTHESIA;  Surgeon: Ksenia Fox MD;  Location: [facility] ENDOSCOPY;  Service:    • ENDOSCOPY N/A 2/27/2019    Normal 1st portion of the duodenum and 2 portion of the duodenum; A few gastric polyps-biopsied; Small HH; Widely patent Schatzki ring-dilated; Abnormal esophageal motility, suspicious for presbyesophagus; Arrange for barium swallow to assess for dysmotility   • HYSTEROSCOPY     • KNEE ARTHROSCOPY Left 11/13/2018    Procedure: LEFT KNEE ARTHROSCOPIC PARTIAL MEDIAL MENISCECTOMY;  Surgeon: Matt Maki MD;  Location: [facility] OR;  Service: Orthopedics   • MEDIPORT INSERTION, SINGLE  2008   • MEDIPORT REMOVAL     • REPLACEMENT TOTAL KNEE Left 05/23/2019   • THYROIDECTOMY  2011    parathyroidectomy   • TONSILLECTOMY AND ADENOIDECTOMY  1970   • TUBAL ABDOMINAL LIGATION          Current Outpatient Medications:   •  ADVAIR DISKUS 250-50 MCG/DOSE DISKUS, USE 1 INHALATION TWICE A DAY, Disp: 3 each, Rfl: 4  •  albuterol  (PROVENTIL) (2.5 MG/3ML) 0.083% nebulizer solution, 2.5 mg., Disp: , Rfl:   •  albuterol sulfate HFA (PROAIR HFA) 108 (90 Base) MCG/ACT inhaler, Inhale 2 puffs Every 4 (Four) Hours As Needed for Wheezing., Disp: 3 inhaler, Rfl: 3  •  alendronate (FOSAMAX) 70 MG tablet, Take 70 mg by mouth Every 7 (Seven) Days. Sunday's, Disp: , Rfl:   •  atorvastatin (LIPITOR) 40 MG tablet, Take 1 tablet by mouth Daily., Disp: 90 tablet, Rfl: 3  •  calcitriol (ROCALTROL) 0.5 MCG capsule, Take 0.5 mcg by mouth daily., Disp: , Rfl:   •  Calcium Citrate-Vitamin D (CALCIUM + D PO), Take 1 tablet by mouth Daily., Disp: , Rfl:   •  Diclofenac Sodium (PENNSAID TD), Apply 1 application topically to the appropriate area as directed 2 (Two) Times a Day. For back pain, Disp: , Rfl:   •  docusate sodium (COLACE) 100 MG capsule, Take 100 mg by mouth Daily., Disp: , Rfl:   •  HYDROcodone-acetaminophen (NORCO) 5-325 MG per tablet, TK 1 T PO  TID PRN P, Disp: , Rfl:   •  ibuprofen (ADVIL,MOTRIN) 400 MG tablet, Take 400 mg by mouth Every 8 (Eight) Hours As Needed for Mild Pain ., Disp: , Rfl:   •  levothyroxine (SYNTHROID, LEVOTHROID) 50 MCG tablet, Take 50 mcg by mouth Daily., Disp: , Rfl:   •  loratadine (CLARITIN) 10 MG tablet, Take 10 mg by mouth Daily As Needed for Allergies., Disp: , Rfl:   •  losartan (COZAAR) 25 MG tablet, Take 2 tablets by mouth Daily., Disp: 90 tablet, Rfl: 3  •  pantoprazole (PROTONIX) 40 MG EC tablet, Take 1 tablet by mouth Daily., Disp: 90 tablet, Rfl: 0  •  potassium chloride ER (K-TAB) 20 MEQ tablet controlled-release ER tablet, Take 1 tablet by mouth Daily., Disp: 90 tablet, Rfl: 3  •  rOPINIRole (REQUIP) 0.25 MG tablet, Take 1 tablet by mouth Every Night. Take 1 hour before bedtime., Disp: 90 tablet, Rfl: 3  •  theophylline (UNIPHYL) 400 MG 24 hr tablet, TAKE 1 TABLET DAILY, Disp: 90 tablet, Rfl: 3  •  tiZANidine (ZANAFLEX) 4 MG tablet, Take 4 mg by mouth Every 8 (Eight) Hours As Needed for Muscle Spasms., Disp: ,  Rfl:   •  traMADol (ULTRAM) 50 MG tablet, Take 50 mg by mouth Every 8 (Eight) Hours As Needed for Moderate Pain ., Disp: , Rfl:   •  zafirlukast (ACCOLATE) 20 MG tablet, TAKE 1 TABLET TWICE A DAY, Disp: 180 tablet, Rfl: 3  No current facility-administered medications for this visit.     Facility-Administered Medications Ordered in Other Visits:   •  heparin flush (porcine) 100 UNIT/ML injection 500 Units, 500 Units, Intracatheter, PRN, Ki Manriquez MD, 500 Units at 09/27/16 1108  •  heparin flush (porcine) 100 UNIT/ML injection 500 Units, 500 Units, Intravenous, PRN, Malathi Brantley APRN, 500 Units at 05/05/17 1009  •  sodium chloride 0.9 % flush 10 mL, 10 mL, Intravenous, PRN, Ki Manriquez MD, 10 mL at 09/27/16 1107  •  sodium chloride 0.9 % flush 10 mL, 10 mL, Intravenous, PRN, Malathi Brantley APRN, 10 mL at 05/05/17 1009     Vitals:    06/18/20 0857   BP: 134/100   Pulse: 94   Resp: 18   Temp: 98.6 °F (37 °C)   SpO2: 98%         06/18/20  0857   Weight: 78 kg (172 lb)     Patient's Body mass index is 31.46 kg/m². BMI is above normal parameters. Recommendations include: exercise counseling and nutrition counseling.      Physical Exam   Constitutional: She is oriented to person, place, and time. She appears well-developed and well-nourished.   HENT:   Head: Normocephalic and atraumatic.   Right Ear: External ear normal.   Left Ear: External ear normal.   Nose: Nose normal.   Mouth/Throat: Oropharynx is clear and moist.   Eyes: Pupils are equal, round, and reactive to light. Conjunctivae and EOM are normal.   Neck: Normal range of motion. Neck supple. No thyromegaly present.   Cardiovascular: Normal rate, regular rhythm, normal heart sounds and intact distal pulses.   Pulmonary/Chest: Effort normal and breath sounds normal.   Abdominal: Soft. Bowel sounds are normal.   Lymphadenopathy:     She has no cervical adenopathy.   Neurological: She is alert and oriented to person,  place, and time.   Skin: Skin is warm and dry.   Psychiatric: She has a normal mood and affect. Her behavior is normal. Thought content normal.   Nursing note and vitals reviewed.    Procedure: Holter monitor: 24-hour Holter monitor was analyzed she had brief periods of sinus tachycardia 1 of which correlated roughly with 1 of her diary entries.  At night she runs sinus bradycardia in the 50s.  No complex arrhythmias were seen her baseline rhythm is sinus        Assessment/Plan   Diagnoses and all orders for this visit:    1. Mild intermittent chronic asthma with acute exacerbation (Primary)  -     XR Chest PA & Lateral; Future    2. Postmenopausal  -     DEXA Bone Density Axial; Future    3. Hypothyroidism due to acquired atrophy of thyroid  -     TSH  -     T4, Free  -     T3, Free      Because of her sinus tachycardia I am getting thyroid function studies today she will also have a chest x-ray due to her history of breast cancer.  May consider stress testing after I reviewed the chest x-ray and her thyroid function studies.

## 2020-06-19 LAB
T3FREE SERPL-MCNC: 3.2 PG/ML (ref 2–4.4)
T4 FREE SERPL-MCNC: 1.29 NG/DL (ref 0.93–1.7)
TSH SERPL DL<=0.005 MIU/L-ACNC: 1.57 UIU/ML (ref 0.27–4.2)

## 2020-06-22 DIAGNOSIS — R93.89 ABNORMAL CHEST X-RAY: Primary | ICD-10-CM

## 2020-06-26 ENCOUNTER — TELEPHONE (OUTPATIENT)
Dept: INTERNAL MEDICINE | Facility: CLINIC | Age: 55
End: 2020-06-26

## 2020-06-26 NOTE — TELEPHONE ENCOUNTER
Pt called to check status on bone density, stress test, and ct of the chest.     Please call [phone].

## 2020-06-29 NOTE — TELEPHONE ENCOUNTER
Patient notified regarding referrals that are still pending. I will follow-up regarding this and call patient with any further information

## 2020-07-06 ENCOUNTER — HOSPITAL ENCOUNTER (OUTPATIENT)
Dept: BONE DENSITY | Facility: HOSPITAL | Age: 55
Discharge: HOME | End: 2020-07-06
Admitting: INTERNAL MEDICINE

## 2020-07-06 DIAGNOSIS — Z78.0 POSTMENOPAUSAL: ICD-10-CM

## 2020-07-06 PROCEDURE — 77080 DXA BONE DENSITY AXIAL: CPT

## 2020-07-07 ENCOUNTER — HOSPITAL ENCOUNTER (OUTPATIENT)
Dept: CT IMAGING | Facility: HOSPITAL | Age: 55
Discharge: HOME | End: 2020-07-07
Admitting: INTERNAL MEDICINE

## 2020-07-07 ENCOUNTER — TELEPHONE (OUTPATIENT)
Dept: INTERNAL MEDICINE | Facility: CLINIC | Age: 55
End: 2020-07-07

## 2020-07-07 DIAGNOSIS — R07.9 CHEST PAIN, UNSPECIFIED TYPE: ICD-10-CM

## 2020-07-07 DIAGNOSIS — R00.2 PALPITATIONS: Primary | ICD-10-CM

## 2020-07-07 DIAGNOSIS — R93.89 ABNORMAL CHEST X-RAY: ICD-10-CM

## 2020-07-07 LAB — CREAT BLDA-MCNC: 1.2 MG/DL (ref 0.6–1.3)

## 2020-07-07 PROCEDURE — 25010000002 IOPAMIDOL 61 % SOLUTION: Performed by: INTERNAL MEDICINE

## 2020-07-07 PROCEDURE — 82565 ASSAY OF CREATININE: CPT

## 2020-07-07 PROCEDURE — 71260 CT THORAX DX C+: CPT

## 2020-07-07 RX ADMIN — IOPAMIDOL 100 ML: 612 INJECTION, SOLUTION INTRAVENOUS at 09:53

## 2020-07-07 NOTE — TELEPHONE ENCOUNTER
Please let her know that we have placed an order for stress test at [facility] and someone from scheduling will be calling her to arrange.

## 2020-07-07 NOTE — TELEPHONE ENCOUNTER
At her visit, you mentioned stress testing if other tests were negative, but no notations on proceeding with stress test after you had reviewed her CXR, lab, and CT of chest.  Please advise.

## 2020-07-07 NOTE — TELEPHONE ENCOUNTER
Called patient in regards todexa scan and CT chest results. During this time patient mentioned being informed that she needed to have a stress test done.     I couldn't see where a referral was made for this. Not sure if this has any thing to do with the Holter monitor results. Please advise.

## 2020-07-09 DIAGNOSIS — R13.19 ESOPHAGEAL DYSPHAGIA: ICD-10-CM

## 2020-07-09 DIAGNOSIS — K21.9 GASTROESOPHAGEAL REFLUX DISEASE, ESOPHAGITIS PRESENCE NOT SPECIFIED: ICD-10-CM

## 2020-07-09 NOTE — TELEPHONE ENCOUNTER
Pt due yearly OV-has appt next week with Dr. Fox. Pended 1 refill to Dr. Fox to last until pt's appt.

## 2020-07-10 PROBLEM — K76.0 FATTY LIVER: Status: ACTIVE | Noted: 2020-07-10

## 2020-07-10 RX ORDER — PANTOPRAZOLE SODIUM 40 MG/1
40 TABLET, DELAYED RELEASE ORAL DAILY
Qty: 90 TABLET | Refills: 0 | Status: SHIPPED | OUTPATIENT
Start: 2020-07-10 | End: 2020-07-15 | Stop reason: SDUPTHER

## 2020-07-10 NOTE — PROGRESS NOTES
"Primary Physician: Che Calvert APRN    Chief Complaint   Patient presents with   • Follow-up     Pt presents today for yearly OV for GERD; Needs refills on Pantoprazole; Pt states she has occasional reflux but overall feels good; Pt does state she feels like she has been getting choked on her medicines more often       Subjective     Luna Cruz is a 54 y.o. female.    HPI    Fatty liver-new problem to address today  This is a new problem to discuss today.  She has CT imaging done this week for other reasons, which incidentally shows fatty infiltration of the liver.  Patient is known to be obese.  She does not have a history of diabetes, but she is hypertensive and meds were just increased.  Liver function tests were checked recently and were normal.  Mom had cirrhosis due to Hep C.  Pt has been tested for Hep C and it was negative.  She doesn't have a HbA1C on file.  U/A recently neg for glucose.    GERD/Nausea  Patient has long-standing history of reflux disease.  She is currently maintained on pantoprazole 40 mg daily.  She does drink caffeine.  She tries not to eat late at nighttime.  She does not smoke.  She had an endoscopy done October 2016 and 2/2019 which didn't show esophagitis.     Dysphagia-worsening  She has a h/o dysphagia.  She cannot identify any relieving factors.  It tends to occur with breads and meats and also with large pills.  Her weight is holding stable.  She does have occasional nausea.  She has had her gallbladder previously removed.  I reviewed her medication list and she is taking Fosamax and potassium supplements.  She does use ibuprofen 600mg prn knee pain. Endoscopy 2/2019 showed what looked like presbyesophagus with poor motility.  Dilation up to 20mm was done.  BaS 3/2019 showed that the barium tablet transiently got hung up in the distal esophagus.  She has been on pantoprazole 40mg BID for \"awhile\".  She claims that she doesn't eat much, but her weight is stable.    Rectal " bleeding-new problem to address today  This is a new problem brought to my attention today.  She noted this occurred on several occasions in June 2020.  She cannot think of any exacerbating or relieving factors.  It has not happened this month in July 2020.  She does have a history of having had very distal proctitis seen on colonoscopy in May 2013.  Repeat colonoscopy however in 2017 did not show any abnormality.     Colon cancer screening/family history colon polyps in first-degree relative  She had a colonoscopy in May 2013 that showed very distal proctitis.  Biopsy showed mild chronic active inflammation.  Repeat colonoscopy in 2017 however shows complete resolution of symptoms.  Mother had colon polyps less than 60 years old.  Patient denies any melena or hematochezia.  She will be due for repeat colonoscopy in 6/2022.      Past Medical History:   Diagnosis Date   • Asthma    • Breast cancer (CMS/HCC)    • Chronic back pain    • Disease of thyroid gland    • Drug therapy    • Family history of colonic polyps    • GERD (gastroesophageal reflux disease)    • Hx of radiation therapy    • Hyperlipidemia    • Hypertension    • Malignant neoplasm of upper-inner quadrant of right female breast (CMS/HCC) 9/26/2016   • Osteoporosis    • PONV (postoperative nausea and vomiting)    • Scoliosis        Past Surgical History:   Procedure Laterality Date   • ADENOIDECTOMY     • APPENDECTOMY  1994   • BREAST BIOPSY     • BREAST LUMPECTOMY Right 2008   • CHOLECYSTECTOMY  1993   • COLONOSCOPY  05/03/2013    Erythematous mucosa in the rectum-biopsied; Repeat 4 years    • COLONOSCOPY N/A 6/14/2017    Normal; Repeat 5 years; Procedure: COLONOSCOPY WITH ANESTHESIA;  Surgeon: Ksenia Fox MD;  Location: [facility] ENDOSCOPY;  Service:    • COLONOSCOPY  04/23/2010    Dr. Arzola-Extremely poorly prepped colon   • D&C HYSTEROSCOPY  1993   • DILATATION AND CURETTAGE     • ENDOSCOPY N/A 10/5/2016    Medium-sized HH; Widely patent and  non-obstructing Schatzki ring-dilated; Procedure: ESOPHAGOGASTRODUODENOSCOPY WITH ANESTHESIA;  Surgeon: Ksenia Fox MD;  Location: [facility] ENDOSCOPY;  Service:    • ENDOSCOPY N/A 2/27/2019    Normal 1st portion of the duodenum and 2 portion of the duodenum; A few gastric polyps-biopsied; Small HH; Widely patent Schatzki ring-dilated; Abnormal esophageal motility, suspicious for presbyesophagus; Arrange for barium swallow to assess for dysmotility   • HYSTEROSCOPY     • KNEE ARTHROSCOPY Left 11/13/2018    Procedure: LEFT KNEE ARTHROSCOPIC PARTIAL MEDIAL MENISCECTOMY;  Surgeon: Matt Maki MD;  Location: [facility] OR;  Service: Orthopedics   • MEDIPORT INSERTION, SINGLE  2008   • MEDIPORT REMOVAL     • REPLACEMENT TOTAL KNEE Left 05/23/2019   • THYROIDECTOMY  2011    parathyroidectomy   • TONSILLECTOMY AND ADENOIDECTOMY  1970   • TUBAL ABDOMINAL LIGATION          Current Outpatient Medications:   •  ADVAIR DISKUS 250-50 MCG/DOSE DISKUS, USE 1 INHALATION TWICE A DAY (Patient taking differently: Inhale 1 puff 2 (Two) Times a Day.), Disp: 3 each, Rfl: 4  •  albuterol (PROVENTIL) (2.5 MG/3ML) 0.083% nebulizer solution, Take 2.5 mg by nebulization As Needed., Disp: , Rfl:   •  albuterol sulfate HFA (PROAIR HFA) 108 (90 Base) MCG/ACT inhaler, Inhale 2 puffs Every 4 (Four) Hours As Needed for Wheezing., Disp: 3 inhaler, Rfl: 3  •  alendronate (FOSAMAX) 70 MG tablet, Take 70 mg by mouth Every 7 (Seven) Days. Sunday's, Disp: , Rfl:   •  atorvastatin (LIPITOR) 40 MG tablet, Take 1 tablet by mouth Daily., Disp: 90 tablet, Rfl: 3  •  calcitriol (ROCALTROL) 0.5 MCG capsule, Take 0.5 mcg by mouth daily., Disp: , Rfl:   •  Calcium Citrate-Vitamin D (CALCIUM + D PO), Take 1 tablet by mouth Daily., Disp: , Rfl:   •  Diclofenac Sodium (PENNSAID TD), Apply 1 application topically to the appropriate area as directed 2 (Two) Times a Day. For back pain, Disp: , Rfl:   •  docusate sodium (COLACE) 100 MG capsule, Take 100 mg by  mouth As Needed., Disp: , Rfl:   •  HYDROcodone-acetaminophen (NORCO) 5-325 MG per tablet, Take 1 tablet by mouth As Needed., Disp: , Rfl:   •  ibuprofen (ADVIL,MOTRIN) 400 MG tablet, Take 400 mg by mouth Every 8 (Eight) Hours As Needed for Mild Pain ., Disp: , Rfl:   •  levothyroxine (SYNTHROID, LEVOTHROID) 50 MCG tablet, Take 50 mcg by mouth Daily., Disp: , Rfl:   •  loratadine (CLARITIN) 10 MG tablet, Take 10 mg by mouth Daily As Needed for Allergies., Disp: , Rfl:   •  losartan (COZAAR) 25 MG tablet, Take 2 tablets by mouth Daily., Disp: 90 tablet, Rfl: 3  •  pantoprazole (PROTONIX) 40 MG EC tablet, Take 1 tablet by mouth Daily., Disp: 90 tablet, Rfl: 3  •  potassium chloride ER (K-TAB) 20 MEQ tablet controlled-release ER tablet, Take 1 tablet by mouth Daily., Disp: 90 tablet, Rfl: 3  •  rOPINIRole (REQUIP) 0.25 MG tablet, Take 1 tablet by mouth Every Night. Take 1 hour before bedtime., Disp: 90 tablet, Rfl: 3  •  theophylline (UNIPHYL) 400 MG 24 hr tablet, TAKE 1 TABLET DAILY (Patient taking differently: Take 400 mg by mouth Daily.), Disp: 90 tablet, Rfl: 3  •  tiZANidine (ZANAFLEX) 4 MG tablet, Take 4 mg by mouth Every 8 (Eight) Hours As Needed for Muscle Spasms., Disp: , Rfl:   •  traMADol (ULTRAM) 50 MG tablet, Take 50 mg by mouth Every 8 (Eight) Hours As Needed for Moderate Pain ., Disp: , Rfl:   •  zafirlukast (ACCOLATE) 20 MG tablet, TAKE 1 TABLET TWICE A DAY (Patient taking differently: Take 20 mg by mouth 2 (Two) Times a Day.), Disp: 180 tablet, Rfl: 3  •  sodium-potassium-magnesium sulfates (Suprep Bowel Prep Kit) 17.5-3.13-1.6 GM/177ML solution oral solution, Take 1 bottle by mouth Every 12 (Twelve) Hours. Split dose prep as directed by office instructions provided.  2 bottles = one kit., Disp: 2 bottle, Rfl: 0  No current facility-administered medications for this visit.     Facility-Administered Medications Ordered in Other Visits:   •  heparin flush (porcine) 100 UNIT/ML injection 500 Units, 500  Units, Intracatheter, PRN, Ki Manriquez MD, 500 Units at 09/27/16 1108  •  heparin flush (porcine) 100 UNIT/ML injection 500 Units, 500 Units, Intravenous, PRN, Malathi Brantley, APRN, 500 Units at 05/05/17 1009  •  sodium chloride 0.9 % flush 10 mL, 10 mL, Intravenous, PRN, Ki Manriquez MD, 10 mL at 09/27/16 1107  •  sodium chloride 0.9 % flush 10 mL, 10 mL, Intravenous, PRN, Malathi Brantley, APRN, 10 mL at 05/05/17 1009    Allergies   Allergen Reactions   • Cephalosporins Hives   • Keflex [Cephalexin] Rash and Unknown (See Comments)       Social History     Socioeconomic History   • Marital status:      Spouse name: Not on file   • Number of children: Not on file   • Years of education: Not on file   • Highest education level: Not on file   Tobacco Use   • Smoking status: Never Smoker   • Smokeless tobacco: Never Used   Substance and Sexual Activity   • Alcohol use: No   • Drug use: No   • Sexual activity: Yes     Partners: Male     Birth control/protection: None       Family History   Problem Relation Age of Onset   • Colon polyps Mother    • Cirrhosis Mother    • Colon polyps Sister 54   • Breast cancer Sister    • No Known Problems Father    • No Known Problems Brother    • No Known Problems Daughter    • No Known Problems Son    • No Known Problems Maternal Grandmother    • No Known Problems Paternal Grandmother    • No Known Problems Maternal Aunt    • No Known Problems Paternal Aunt    • Colon cancer Neg Hx    • Crohn's disease Neg Hx    • Irritable bowel syndrome Neg Hx    • Liver cancer Neg Hx    • Liver disease Neg Hx    • Rectal cancer Neg Hx    • Stomach cancer Neg Hx    • BRCA 1/2 Neg Hx    • Endometrial cancer Neg Hx    • Ovarian cancer Neg Hx    • Uterine cancer Neg Hx    • Esophageal cancer Neg Hx        Review of Systems   Constitutional: Negative for fever and unexpected weight change.   HENT: Negative for hearing loss.    Eyes: Negative for visual  "disturbance.   Respiratory: Negative for cough.    Cardiovascular: Negative for chest pain.   Gastrointestinal:        See HPI   Endocrine: Negative for cold intolerance and heat intolerance.   Genitourinary: Negative for dysuria.   Musculoskeletal: Negative for arthralgias.   Skin: Negative for rash.   Neurological: Negative for seizures.   Psychiatric/Behavioral: Negative for hallucinations.       Objective     /76 (BP Location: Left arm, Patient Position: Sitting, Cuff Size: Adult)   Pulse 109   Temp 98.5 °F (36.9 °C) (Tympanic)   Ht 157.5 cm (62\")   Wt 78 kg (172 lb)   LMP 06/14/2008   SpO2 99%   Breastfeeding No   BMI 31.46 kg/m²     Physical Exam   Constitutional: She is oriented to person, place, and time. She appears well-developed and well-nourished.   Eyes: EOM are normal.   Pulmonary/Chest: Effort normal.   Musculoskeletal: Normal range of motion.   Neurological: She is alert and oriented to person, place, and time.   Skin: Skin is warm.   Psychiatric: She has a normal mood and affect. Her behavior is normal.       Lab Results - Last 18 Months   Lab Units 07/07/20  0957 05/11/20  1406 05/24/19  0231 05/08/19  1358   GLUCOSE mg/dL  --  104* 139* 76   BUN mg/dL  --  9 12 14   CREATININE mg/dL 1.20 0.95 1.1* 1.06   SODIUM mmol/L  --  141 143 141   POTASSIUM mmol/L  --  4.3  --  4.1   CHLORIDE mmol/L  --  105 108 105   TOTAL CO2 mmol/L  --   --  20*  --    CO2 mmol/L  --  24.0  --  27.0   TOTAL PROTEIN g/dL  --  6.9  --  7.2   ALBUMIN g/dL  --  4.10  --  4.20   ALT (SGPT) U/L  --  16  --  22   AST (SGOT) U/L  --  19  --  33   ALK PHOS U/L  --  107  --  91   BILIRUBIN mg/dL  --  0.2  --  0.3   GLOBULIN gm/dL  --  2.8  --  3.0       Lab Results - Last 18 Months   Lab Units 05/11/20  1406 05/24/19  0231 05/20/19  1040 05/08/19  1358   HEMOGLOBIN g/dL 14.8 12.1  --  14.7   HEMATOCRIT % 45.9 38.0  --  43.9   MCV fL 87.4  --   --  88.3   WBC 10*3/mm3 7.56  --   --  8.89   RDW % 13.6  --   --  13.5 "   MPV fL 10.0  --   --  9.9   PLATELETS 10*3/mm3 352  --   --  344   INR   --   --  0.94  --        Lab Results - Last 18 Months   Lab Units 06/18/20  0829 05/11/20  1406 05/08/19  1358   IRON mcg/dL  --  57 58   TIBC mcg/dL  --  295* 269   IRON SATURATION %  --  19* 22   FERRITIN ng/mL  --  161.50* 185.00   TSH uIU/mL 1.570  --   --         CT CHEST W CONTRAST- 7/7/2020 9:47 AM CDT     HISTORY: scar tissue right lung; R93.89-Abnormal findings on diagnostic  imaging of other specified body structures, nodular opacity of right  lung base on chest x-ray 06/18/2020.     COMPARISON: Chest x-ray 06/18/2020, CT chest 07/13/2011     DOSE LENGTH PRODUCT: 275 mGy cm. Automated exposure control was also  utilized to decrease patient radiation dose.     TECHNIQUE: Axial images the chest are obtained following IV contrast     FINDINGS:  No pathologic intrathoracic or axillary lymphadenopathy  identified. Surgical clips of the right axilla. Calcified aorta  subcarinal and left hilar lymph nodes. Mild to moderate vascular  calcification of the thoracic aorta. Heart is normal in size. Trace  pericardial fluid or thickening. No pleural effusions. Moderate size  hiatal hernia.     Images of the upper abdomen demonstrate no adrenal nodules. There is  hepatic steatosis. Under distended colon.     There is anterior subpleural scarring of the right upper and middle  lobes, possibly related to radiation therapy to the right breast. There  is mild central bronchiectasis. There is no suspicious pulmonary nodule.  No 8 basilar nodule identified. No pneumothorax. No endobronchial  lesions.     Degenerative change of the thoracic spine.     IMPRESSION:  1. Anterior scarring the right upper and middle lobes, perhaps related  to prior radiation therapy of the right breast. Mild central  bronchiectasis. No suspicious pulmonary nodules. No evidence of  intrathoracic metastasis.  2. Moderate size hiatal hernia.  3. Hepatic steatosis.  This report  was finalized on 07/07/2020 10:33 by Dr. Lakeisha Knox MD.    ----------------------------    EXAMINATION: FL ESOPHAGRAM COMPLETE-     3/6/2019 8:45 AM CST     HISTORY: dysphagia; R13.10-Dysphagia, unspecified.     35 fluoroscopic spot images obtained.  Fluoroscopy time = 2.1 minutes.     Single contrast esophagram.     Large hiatal hernia.  Narrowing of the distal esophagus near the GE junction with a 1/2 inch  tablet holding up for several seconds and requiring additional swallows  of water before would pass into the stomach.     No mucosal lesion is seen.     Normal gastric emptying occurs.     Prominent gastroesophageal reflux noted during the exam.     Summary:  1. Large hiatal hernia with prominent gastroesophageal reflux.  2. Distal esophageal narrowing with moderate delay before a 1/2 inch  barium tablet would pass into the stomach.  This report was finalized on 03/06/2019 09:28 by Dr. Isael Cesar MD.      IMPRESSION/PLAN:    Assessment/Plan      Problem List Items Addressed This Visit        Digestive    Gastroesophageal reflux disease without esophagitis    Overview     Anti-reflux measures were reviewed and discussed with patient.  They were advised to refrain from chocolate, alcohol, smoking, peppermint and caffeine.  They were advised to limit fatty foods, large meals, and eating late at nighttime.  They were advised to reach an ideal body mass index.    She is also on Fosamax.  I advised her to drink a large glass of water after taking this pill as this can cause esophagitis.    Symptoms well controlled with active medical treatment with pantoprazole 40 mg daily.    Also needs to limit narcotics which are negatively impacting her GI health.           Relevant Medications    pantoprazole (PROTONIX) 40 MG EC tablet    Esophageal dysphagia - Primary    Overview     Difficulty swallowing solids and large pills.  She has Schatzki's ring that was dilated in 2016.  Repeat endoscopy 2/2019 showed what  looked like dysmotility.  Dilation to 20mm was done.  BaS 3/2019 showed a transient delay in the pill traversing the GEJ.  Due to abn BaS and symptoms, will plan endoscopy.    She was advised to drink a large glass of water after taking Fosamax, diclofenac, potassium.         Current Assessment & Plan     The risks, benefits, and alternatives of endoscopy were reviewed with the patient today.  Risks including perforation, with or without dilation, possibly requiring surgery.  Additional risks include risk of bleeding.  There is also the risk of a drug reaction or problems with anesthesia.  This will be discussed with the further by the anesthesia team on the day of the procedure. The benefits include the diagnosis and management of disease of the upper digestive tract.  Alternatives to endoscopy include upper GI series, laboratory testing, radiographic evaluation, or no intervention.  The patient verbalizes understanding and agrees.    In accordance with requirements under the Affordable Care Act, [facility] has provided pricing for all hospital services and items on each of its websites. However, a patient's actual cost may differ based on the services the patient receives to meet individual healthcare needs and based on the benefits provided under the patient’s insurance coverage.           Relevant Medications    pantoprazole (PROTONIX) 40 MG EC tablet    Other Relevant Orders    Case Request (Completed)    Fatty liver    Overview     This was seen on CT imaging July 2020.  She is not known to be diabetic.  No hemoglobin A1c on file.  Urinalysis June 2020- for glucose.  She is overweight and does have hypertension.  I have asked her to work on weight reduction.  She drinks 3 cokes daily--advised to stop.  Buy kitchen scale.  [employer].    We will check ultrasound liver and elastography.  LFTs were normal in May 2020.         Relevant Orders    US Liver    US Elastography Parenchyma    Rectal bleeding     Overview     This is a new problem being addressed today in July 2020.  She does carry history of proctitis seen in 2013 with resolution on last colonoscopy 2017.  We will plan colonoscopy to assess.         Current Assessment & Plan     The risks, benefits, and alternatives of colonoscopy were reviewed with the patient today.  Risks including perforation of the colon possibly requiring surgery or colostomy.  Additional risks include risk of bleeding from biopsies or removal of colon tissue.  There is also the risk of a drug reaction or problems with anesthesia.  This will be discussed with the further by the anesthesia team on the day of the procedure.  Lastly there is a possibility of missing a colon polyp or cancer.  The benefits include the diagnosis and management of disease of the colon and rectum.  Alternatives to colonoscopy include barium enema, laboratory testing, radiographic evaluation, or no intervention.  The patient verbalizes understanding and agrees.    In accordance with requirements under the Affordable Care Act, [facility] has provided pricing for all hospital services and items on each of its websites. However, a patient's actual cost may differ based on the services the patient receives to meet individual healthcare needs and based on the benefits provided under the patient’s insurance coverage.           Relevant Orders    Case Request (Completed)       Other    Family history of polyps in the colon    Overview     Mom had colon polyps < 60 years old.  Last colonoscopy normal 6/2017.  Repeat 6/2022.         Colon cancer screening    Overview     Last colonoscopy normal 6/2017.  Repeat 6/2022.           Other Visit Diagnoses     Gastroesophageal reflux disease, esophagitis presence not specified        Relevant Medications    pantoprazole (PROTONIX) 40 MG EC tablet        Patient's Body mass index is 31.46 kg/m². BMI is above normal parameters. Recommendations include: nutrition  counseling.        RTC 6 months      Ksenia Fox MD  07/15/20  15:21    Much of this encounter note is an electronic transcription/translation of spoken language to printed text. The electronic translation of spoken language may permit erroneous, or at times, nonsensical words or phrases to be inadvertently transcribed; although I have reviewed the note for such errors, some may still exist.

## 2020-07-10 NOTE — H&P (VIEW-ONLY)
"Primary Physician: Che Calvert APRN    Chief Complaint   Patient presents with   • Follow-up     Pt presents today for yearly OV for GERD; Needs refills on Pantoprazole; Pt states she has occasional reflux but overall feels good; Pt does state she feels like she has been getting choked on her medicines more often       Subjective     Luna Cruz is a 54 y.o. female.    HPI    Fatty liver-new problem to address today  This is a new problem to discuss today.  She has CT imaging done this week for other reasons, which incidentally shows fatty infiltration of the liver.  Patient is known to be obese.  She does not have a history of diabetes, but she is hypertensive and meds were just increased.  Liver function tests were checked recently and were normal.  Mom had cirrhosis due to Hep C.  Pt has been tested for Hep C and it was negative.  She doesn't have a HbA1C on file.  U/A recently neg for glucose.    GERD/Nausea  Patient has long-standing history of reflux disease.  She is currently maintained on pantoprazole 40 mg daily.  She does drink caffeine.  She tries not to eat late at nighttime.  She does not smoke.  She had an endoscopy done October 2016 and 2/2019 which didn't show esophagitis.     Dysphagia-worsening  She has a h/o dysphagia.  She cannot identify any relieving factors.  It tends to occur with breads and meats and also with large pills.  Her weight is holding stable.  She does have occasional nausea.  She has had her gallbladder previously removed.  I reviewed her medication list and she is taking Fosamax and potassium supplements.  She does use ibuprofen 600mg prn knee pain. Endoscopy 2/2019 showed what looked like presbyesophagus with poor motility.  Dilation up to 20mm was done.  BaS 3/2019 showed that the barium tablet transiently got hung up in the distal esophagus.  She has been on pantoprazole 40mg BID for \"awhile\".  She claims that she doesn't eat much, but her weight is stable.    Rectal " bleeding-new problem to address today  This is a new problem brought to my attention today.  She noted this occurred on several occasions in June 2020.  She cannot think of any exacerbating or relieving factors.  It has not happened this month in July 2020.  She does have a history of having had very distal proctitis seen on colonoscopy in May 2013.  Repeat colonoscopy however in 2017 did not show any abnormality.     Colon cancer screening/family history colon polyps in first-degree relative  She had a colonoscopy in May 2013 that showed very distal proctitis.  Biopsy showed mild chronic active inflammation.  Repeat colonoscopy in 2017 however shows complete resolution of symptoms.  Mother had colon polyps less than 60 years old.  Patient denies any melena or hematochezia.  She will be due for repeat colonoscopy in 6/2022.      Past Medical History:   Diagnosis Date   • Asthma    • Breast cancer (CMS/HCC)    • Chronic back pain    • Disease of thyroid gland    • Drug therapy    • Family history of colonic polyps    • GERD (gastroesophageal reflux disease)    • Hx of radiation therapy    • Hyperlipidemia    • Hypertension    • Malignant neoplasm of upper-inner quadrant of right female breast (CMS/HCC) 9/26/2016   • Osteoporosis    • PONV (postoperative nausea and vomiting)    • Scoliosis        Past Surgical History:   Procedure Laterality Date   • ADENOIDECTOMY     • APPENDECTOMY  1994   • BREAST BIOPSY     • BREAST LUMPECTOMY Right 2008   • CHOLECYSTECTOMY  1993   • COLONOSCOPY  05/03/2013    Erythematous mucosa in the rectum-biopsied; Repeat 4 years    • COLONOSCOPY N/A 6/14/2017    Normal; Repeat 5 years; Procedure: COLONOSCOPY WITH ANESTHESIA;  Surgeon: Ksenia Fox MD;  Location: [facility] ENDOSCOPY;  Service:    • COLONOSCOPY  04/23/2010    Dr. Arzola-Extremely poorly prepped colon   • D&C HYSTEROSCOPY  1993   • DILATATION AND CURETTAGE     • ENDOSCOPY N/A 10/5/2016    Medium-sized HH; Widely patent and  non-obstructing Schatzki ring-dilated; Procedure: ESOPHAGOGASTRODUODENOSCOPY WITH ANESTHESIA;  Surgeon: Ksenia Fox MD;  Location: [facility] ENDOSCOPY;  Service:    • ENDOSCOPY N/A 2/27/2019    Normal 1st portion of the duodenum and 2 portion of the duodenum; A few gastric polyps-biopsied; Small HH; Widely patent Schatzki ring-dilated; Abnormal esophageal motility, suspicious for presbyesophagus; Arrange for barium swallow to assess for dysmotility   • HYSTEROSCOPY     • KNEE ARTHROSCOPY Left 11/13/2018    Procedure: LEFT KNEE ARTHROSCOPIC PARTIAL MEDIAL MENISCECTOMY;  Surgeon: Matt Maki MD;  Location: [facility] OR;  Service: Orthopedics   • MEDIPORT INSERTION, SINGLE  2008   • MEDIPORT REMOVAL     • REPLACEMENT TOTAL KNEE Left 05/23/2019   • THYROIDECTOMY  2011    parathyroidectomy   • TONSILLECTOMY AND ADENOIDECTOMY  1970   • TUBAL ABDOMINAL LIGATION          Current Outpatient Medications:   •  ADVAIR DISKUS 250-50 MCG/DOSE DISKUS, USE 1 INHALATION TWICE A DAY (Patient taking differently: Inhale 1 puff 2 (Two) Times a Day.), Disp: 3 each, Rfl: 4  •  albuterol (PROVENTIL) (2.5 MG/3ML) 0.083% nebulizer solution, Take 2.5 mg by nebulization As Needed., Disp: , Rfl:   •  albuterol sulfate HFA (PROAIR HFA) 108 (90 Base) MCG/ACT inhaler, Inhale 2 puffs Every 4 (Four) Hours As Needed for Wheezing., Disp: 3 inhaler, Rfl: 3  •  alendronate (FOSAMAX) 70 MG tablet, Take 70 mg by mouth Every 7 (Seven) Days. Sunday's, Disp: , Rfl:   •  atorvastatin (LIPITOR) 40 MG tablet, Take 1 tablet by mouth Daily., Disp: 90 tablet, Rfl: 3  •  calcitriol (ROCALTROL) 0.5 MCG capsule, Take 0.5 mcg by mouth daily., Disp: , Rfl:   •  Calcium Citrate-Vitamin D (CALCIUM + D PO), Take 1 tablet by mouth Daily., Disp: , Rfl:   •  Diclofenac Sodium (PENNSAID TD), Apply 1 application topically to the appropriate area as directed 2 (Two) Times a Day. For back pain, Disp: , Rfl:   •  docusate sodium (COLACE) 100 MG capsule, Take 100 mg by  mouth As Needed., Disp: , Rfl:   •  HYDROcodone-acetaminophen (NORCO) 5-325 MG per tablet, Take 1 tablet by mouth As Needed., Disp: , Rfl:   •  ibuprofen (ADVIL,MOTRIN) 400 MG tablet, Take 400 mg by mouth Every 8 (Eight) Hours As Needed for Mild Pain ., Disp: , Rfl:   •  levothyroxine (SYNTHROID, LEVOTHROID) 50 MCG tablet, Take 50 mcg by mouth Daily., Disp: , Rfl:   •  loratadine (CLARITIN) 10 MG tablet, Take 10 mg by mouth Daily As Needed for Allergies., Disp: , Rfl:   •  losartan (COZAAR) 25 MG tablet, Take 2 tablets by mouth Daily., Disp: 90 tablet, Rfl: 3  •  pantoprazole (PROTONIX) 40 MG EC tablet, Take 1 tablet by mouth Daily., Disp: 90 tablet, Rfl: 3  •  potassium chloride ER (K-TAB) 20 MEQ tablet controlled-release ER tablet, Take 1 tablet by mouth Daily., Disp: 90 tablet, Rfl: 3  •  rOPINIRole (REQUIP) 0.25 MG tablet, Take 1 tablet by mouth Every Night. Take 1 hour before bedtime., Disp: 90 tablet, Rfl: 3  •  theophylline (UNIPHYL) 400 MG 24 hr tablet, TAKE 1 TABLET DAILY (Patient taking differently: Take 400 mg by mouth Daily.), Disp: 90 tablet, Rfl: 3  •  tiZANidine (ZANAFLEX) 4 MG tablet, Take 4 mg by mouth Every 8 (Eight) Hours As Needed for Muscle Spasms., Disp: , Rfl:   •  traMADol (ULTRAM) 50 MG tablet, Take 50 mg by mouth Every 8 (Eight) Hours As Needed for Moderate Pain ., Disp: , Rfl:   •  zafirlukast (ACCOLATE) 20 MG tablet, TAKE 1 TABLET TWICE A DAY (Patient taking differently: Take 20 mg by mouth 2 (Two) Times a Day.), Disp: 180 tablet, Rfl: 3  •  sodium-potassium-magnesium sulfates (Suprep Bowel Prep Kit) 17.5-3.13-1.6 GM/177ML solution oral solution, Take 1 bottle by mouth Every 12 (Twelve) Hours. Split dose prep as directed by office instructions provided.  2 bottles = one kit., Disp: 2 bottle, Rfl: 0  No current facility-administered medications for this visit.     Facility-Administered Medications Ordered in Other Visits:   •  heparin flush (porcine) 100 UNIT/ML injection 500 Units, 500  Units, Intracatheter, PRN, Ki Manriquez MD, 500 Units at 09/27/16 1108  •  heparin flush (porcine) 100 UNIT/ML injection 500 Units, 500 Units, Intravenous, PRN, Malathi Brantley, APRN, 500 Units at 05/05/17 1009  •  sodium chloride 0.9 % flush 10 mL, 10 mL, Intravenous, PRN, Ki Manriquez MD, 10 mL at 09/27/16 1107  •  sodium chloride 0.9 % flush 10 mL, 10 mL, Intravenous, PRN, Malathi Brantley, APRN, 10 mL at 05/05/17 1009    Allergies   Allergen Reactions   • Cephalosporins Hives   • Keflex [Cephalexin] Rash and Unknown (See Comments)       Social History     Socioeconomic History   • Marital status:      Spouse name: Not on file   • Number of children: Not on file   • Years of education: Not on file   • Highest education level: Not on file   Tobacco Use   • Smoking status: Never Smoker   • Smokeless tobacco: Never Used   Substance and Sexual Activity   • Alcohol use: No   • Drug use: No   • Sexual activity: Yes     Partners: Male     Birth control/protection: None       Family History   Problem Relation Age of Onset   • Colon polyps Mother    • Cirrhosis Mother    • Colon polyps Sister 54   • Breast cancer Sister    • No Known Problems Father    • No Known Problems Brother    • No Known Problems Daughter    • No Known Problems Son    • No Known Problems Maternal Grandmother    • No Known Problems Paternal Grandmother    • No Known Problems Maternal Aunt    • No Known Problems Paternal Aunt    • Colon cancer Neg Hx    • Crohn's disease Neg Hx    • Irritable bowel syndrome Neg Hx    • Liver cancer Neg Hx    • Liver disease Neg Hx    • Rectal cancer Neg Hx    • Stomach cancer Neg Hx    • BRCA 1/2 Neg Hx    • Endometrial cancer Neg Hx    • Ovarian cancer Neg Hx    • Uterine cancer Neg Hx    • Esophageal cancer Neg Hx        Review of Systems   Constitutional: Negative for fever and unexpected weight change.   HENT: Negative for hearing loss.    Eyes: Negative for visual  "disturbance.   Respiratory: Negative for cough.    Cardiovascular: Negative for chest pain.   Gastrointestinal:        See HPI   Endocrine: Negative for cold intolerance and heat intolerance.   Genitourinary: Negative for dysuria.   Musculoskeletal: Negative for arthralgias.   Skin: Negative for rash.   Neurological: Negative for seizures.   Psychiatric/Behavioral: Negative for hallucinations.       Objective     /76 (BP Location: Left arm, Patient Position: Sitting, Cuff Size: Adult)   Pulse 109   Temp 98.5 °F (36.9 °C) (Tympanic)   Ht 157.5 cm (62\")   Wt 78 kg (172 lb)   LMP 06/14/2008   SpO2 99%   Breastfeeding No   BMI 31.46 kg/m²     Physical Exam   Constitutional: She is oriented to person, place, and time. She appears well-developed and well-nourished.   Eyes: EOM are normal.   Pulmonary/Chest: Effort normal.   Musculoskeletal: Normal range of motion.   Neurological: She is alert and oriented to person, place, and time.   Skin: Skin is warm.   Psychiatric: She has a normal mood and affect. Her behavior is normal.       Lab Results - Last 18 Months   Lab Units 07/07/20  0957 05/11/20  1406 05/24/19  0231 05/08/19  1358   GLUCOSE mg/dL  --  104* 139* 76   BUN mg/dL  --  9 12 14   CREATININE mg/dL 1.20 0.95 1.1* 1.06   SODIUM mmol/L  --  141 143 141   POTASSIUM mmol/L  --  4.3  --  4.1   CHLORIDE mmol/L  --  105 108 105   TOTAL CO2 mmol/L  --   --  20*  --    CO2 mmol/L  --  24.0  --  27.0   TOTAL PROTEIN g/dL  --  6.9  --  7.2   ALBUMIN g/dL  --  4.10  --  4.20   ALT (SGPT) U/L  --  16  --  22   AST (SGOT) U/L  --  19  --  33   ALK PHOS U/L  --  107  --  91   BILIRUBIN mg/dL  --  0.2  --  0.3   GLOBULIN gm/dL  --  2.8  --  3.0       Lab Results - Last 18 Months   Lab Units 05/11/20  1406 05/24/19  0231 05/20/19  1040 05/08/19  1358   HEMOGLOBIN g/dL 14.8 12.1  --  14.7   HEMATOCRIT % 45.9 38.0  --  43.9   MCV fL 87.4  --   --  88.3   WBC 10*3/mm3 7.56  --   --  8.89   RDW % 13.6  --   --  13.5 "   MPV fL 10.0  --   --  9.9   PLATELETS 10*3/mm3 352  --   --  344   INR   --   --  0.94  --        Lab Results - Last 18 Months   Lab Units 06/18/20  0829 05/11/20  1406 05/08/19  1358   IRON mcg/dL  --  57 58   TIBC mcg/dL  --  295* 269   IRON SATURATION %  --  19* 22   FERRITIN ng/mL  --  161.50* 185.00   TSH uIU/mL 1.570  --   --         CT CHEST W CONTRAST- 7/7/2020 9:47 AM CDT     HISTORY: scar tissue right lung; R93.89-Abnormal findings on diagnostic  imaging of other specified body structures, nodular opacity of right  lung base on chest x-ray 06/18/2020.     COMPARISON: Chest x-ray 06/18/2020, CT chest 07/13/2011     DOSE LENGTH PRODUCT: 275 mGy cm. Automated exposure control was also  utilized to decrease patient radiation dose.     TECHNIQUE: Axial images the chest are obtained following IV contrast     FINDINGS:  No pathologic intrathoracic or axillary lymphadenopathy  identified. Surgical clips of the right axilla. Calcified aorta  subcarinal and left hilar lymph nodes. Mild to moderate vascular  calcification of the thoracic aorta. Heart is normal in size. Trace  pericardial fluid or thickening. No pleural effusions. Moderate size  hiatal hernia.     Images of the upper abdomen demonstrate no adrenal nodules. There is  hepatic steatosis. Under distended colon.     There is anterior subpleural scarring of the right upper and middle  lobes, possibly related to radiation therapy to the right breast. There  is mild central bronchiectasis. There is no suspicious pulmonary nodule.  No 8 basilar nodule identified. No pneumothorax. No endobronchial  lesions.     Degenerative change of the thoracic spine.     IMPRESSION:  1. Anterior scarring the right upper and middle lobes, perhaps related  to prior radiation therapy of the right breast. Mild central  bronchiectasis. No suspicious pulmonary nodules. No evidence of  intrathoracic metastasis.  2. Moderate size hiatal hernia.  3. Hepatic steatosis.  This report  was finalized on 07/07/2020 10:33 by Dr. Lakeisha Knox MD.    ----------------------------    EXAMINATION: FL ESOPHAGRAM COMPLETE-     3/6/2019 8:45 AM CST     HISTORY: dysphagia; R13.10-Dysphagia, unspecified.     35 fluoroscopic spot images obtained.  Fluoroscopy time = 2.1 minutes.     Single contrast esophagram.     Large hiatal hernia.  Narrowing of the distal esophagus near the GE junction with a 1/2 inch  tablet holding up for several seconds and requiring additional swallows  of water before would pass into the stomach.     No mucosal lesion is seen.     Normal gastric emptying occurs.     Prominent gastroesophageal reflux noted during the exam.     Summary:  1. Large hiatal hernia with prominent gastroesophageal reflux.  2. Distal esophageal narrowing with moderate delay before a 1/2 inch  barium tablet would pass into the stomach.  This report was finalized on 03/06/2019 09:28 by Dr. Isael Cesar MD.      IMPRESSION/PLAN:    Assessment/Plan      Problem List Items Addressed This Visit        Digestive    Gastroesophageal reflux disease without esophagitis    Overview     Anti-reflux measures were reviewed and discussed with patient.  They were advised to refrain from chocolate, alcohol, smoking, peppermint and caffeine.  They were advised to limit fatty foods, large meals, and eating late at nighttime.  They were advised to reach an ideal body mass index.    She is also on Fosamax.  I advised her to drink a large glass of water after taking this pill as this can cause esophagitis.    Symptoms well controlled with active medical treatment with pantoprazole 40 mg daily.    Also needs to limit narcotics which are negatively impacting her GI health.           Relevant Medications    pantoprazole (PROTONIX) 40 MG EC tablet    Esophageal dysphagia - Primary    Overview     Difficulty swallowing solids and large pills.  She has Schatzki's ring that was dilated in 2016.  Repeat endoscopy 2/2019 showed what  looked like dysmotility.  Dilation to 20mm was done.  BaS 3/2019 showed a transient delay in the pill traversing the GEJ.  Due to abn BaS and symptoms, will plan endoscopy.    She was advised to drink a large glass of water after taking Fosamax, diclofenac, potassium.         Current Assessment & Plan     The risks, benefits, and alternatives of endoscopy were reviewed with the patient today.  Risks including perforation, with or without dilation, possibly requiring surgery.  Additional risks include risk of bleeding.  There is also the risk of a drug reaction or problems with anesthesia.  This will be discussed with the further by the anesthesia team on the day of the procedure. The benefits include the diagnosis and management of disease of the upper digestive tract.  Alternatives to endoscopy include upper GI series, laboratory testing, radiographic evaluation, or no intervention.  The patient verbalizes understanding and agrees.    In accordance with requirements under the Affordable Care Act, [facility] has provided pricing for all hospital services and items on each of its websites. However, a patient's actual cost may differ based on the services the patient receives to meet individual healthcare needs and based on the benefits provided under the patient’s insurance coverage.           Relevant Medications    pantoprazole (PROTONIX) 40 MG EC tablet    Other Relevant Orders    Case Request (Completed)    Fatty liver    Overview     This was seen on CT imaging July 2020.  She is not known to be diabetic.  No hemoglobin A1c on file.  Urinalysis June 2020- for glucose.  She is overweight and does have hypertension.  I have asked her to work on weight reduction.  She drinks 3 cokes daily--advised to stop.  Buy kitchen scale.  [employer].    We will check ultrasound liver and elastography.  LFTs were normal in May 2020.         Relevant Orders    US Liver    US Elastography Parenchyma    Rectal bleeding     Overview     This is a new problem being addressed today in July 2020.  She does carry history of proctitis seen in 2013 with resolution on last colonoscopy 2017.  We will plan colonoscopy to assess.         Current Assessment & Plan     The risks, benefits, and alternatives of colonoscopy were reviewed with the patient today.  Risks including perforation of the colon possibly requiring surgery or colostomy.  Additional risks include risk of bleeding from biopsies or removal of colon tissue.  There is also the risk of a drug reaction or problems with anesthesia.  This will be discussed with the further by the anesthesia team on the day of the procedure.  Lastly there is a possibility of missing a colon polyp or cancer.  The benefits include the diagnosis and management of disease of the colon and rectum.  Alternatives to colonoscopy include barium enema, laboratory testing, radiographic evaluation, or no intervention.  The patient verbalizes understanding and agrees.    In accordance with requirements under the Affordable Care Act, [facility] has provided pricing for all hospital services and items on each of its websites. However, a patient's actual cost may differ based on the services the patient receives to meet individual healthcare needs and based on the benefits provided under the patient’s insurance coverage.           Relevant Orders    Case Request (Completed)       Other    Family history of polyps in the colon    Overview     Mom had colon polyps < 60 years old.  Last colonoscopy normal 6/2017.  Repeat 6/2022.         Colon cancer screening    Overview     Last colonoscopy normal 6/2017.  Repeat 6/2022.           Other Visit Diagnoses     Gastroesophageal reflux disease, esophagitis presence not specified        Relevant Medications    pantoprazole (PROTONIX) 40 MG EC tablet        Patient's Body mass index is 31.46 kg/m². BMI is above normal parameters. Recommendations include: nutrition  counseling.        RTC 6 months      Ksenia Fox MD  07/15/20  15:21    Much of this encounter note is an electronic transcription/translation of spoken language to printed text. The electronic translation of spoken language may permit erroneous, or at times, nonsensical words or phrases to be inadvertently transcribed; although I have reviewed the note for such errors, some may still exist.

## 2020-07-15 ENCOUNTER — OFFICE VISIT (OUTPATIENT)
Dept: GASTROENTEROLOGY | Facility: CLINIC | Age: 55
End: 2020-07-15

## 2020-07-15 VITALS
SYSTOLIC BLOOD PRESSURE: 128 MMHG | WEIGHT: 172 LBS | HEART RATE: 109 BPM | OXYGEN SATURATION: 99 % | BODY MASS INDEX: 31.65 KG/M2 | HEIGHT: 62 IN | DIASTOLIC BLOOD PRESSURE: 76 MMHG | TEMPERATURE: 98.5 F

## 2020-07-15 DIAGNOSIS — Z83.71 FAMILY HISTORY OF POLYPS IN THE COLON: ICD-10-CM

## 2020-07-15 DIAGNOSIS — K21.9 GASTROESOPHAGEAL REFLUX DISEASE WITHOUT ESOPHAGITIS: ICD-10-CM

## 2020-07-15 DIAGNOSIS — K62.5 RECTAL BLEEDING: ICD-10-CM

## 2020-07-15 DIAGNOSIS — K76.0 FATTY LIVER: ICD-10-CM

## 2020-07-15 DIAGNOSIS — K21.9 GASTROESOPHAGEAL REFLUX DISEASE, ESOPHAGITIS PRESENCE NOT SPECIFIED: ICD-10-CM

## 2020-07-15 DIAGNOSIS — Z12.11 COLON CANCER SCREENING: ICD-10-CM

## 2020-07-15 DIAGNOSIS — R13.19 ESOPHAGEAL DYSPHAGIA: Primary | ICD-10-CM

## 2020-07-15 PROCEDURE — 99214 OFFICE O/P EST MOD 30 MIN: CPT | Performed by: INTERNAL MEDICINE

## 2020-07-15 RX ORDER — PANTOPRAZOLE SODIUM 40 MG/1
40 TABLET, DELAYED RELEASE ORAL DAILY
Qty: 90 TABLET | Refills: 3 | Status: SHIPPED | OUTPATIENT
Start: 2020-07-15 | End: 2021-09-20

## 2020-07-15 RX ORDER — SODIUM, POTASSIUM,MAG SULFATES 17.5-3.13G
1 SOLUTION, RECONSTITUTED, ORAL ORAL EVERY 12 HOURS
Qty: 2 BOTTLE | Refills: 0 | Status: ON HOLD | OUTPATIENT
Start: 2020-07-15 | End: 2020-07-24

## 2020-07-15 NOTE — ASSESSMENT & PLAN NOTE
The risks, benefits, and alternatives of colonoscopy were reviewed with the patient today.  Risks including perforation of the colon possibly requiring surgery or colostomy.  Additional risks include risk of bleeding from biopsies or removal of colon tissue.  There is also the risk of a drug reaction or problems with anesthesia.  This will be discussed with the further by the anesthesia team on the day of the procedure.  Lastly there is a possibility of missing a colon polyp or cancer.  The benefits include the diagnosis and management of disease of the colon and rectum.  Alternatives to colonoscopy include barium enema, laboratory testing, radiographic evaluation, or no intervention.  The patient verbalizes understanding and agrees.    In accordance with requirements under the Affordable Care Act, [facility] has provided pricing for all hospital services and items on each of its websites. However, a patient's actual cost may differ based on the services the patient receives to meet individual healthcare needs and based on the benefits provided under the patient’s insurance coverage.

## 2020-07-15 NOTE — ASSESSMENT & PLAN NOTE
The risks, benefits, and alternatives of endoscopy were reviewed with the patient today.  Risks including perforation, with or without dilation, possibly requiring surgery.  Additional risks include risk of bleeding.  There is also the risk of a drug reaction or problems with anesthesia.  This will be discussed with the further by the anesthesia team on the day of the procedure. The benefits include the diagnosis and management of disease of the upper digestive tract.  Alternatives to endoscopy include upper GI series, laboratory testing, radiographic evaluation, or no intervention.  The patient verbalizes understanding and agrees.    In accordance with requirements under the Affordable Care Act, [facility] has provided pricing for all hospital services and items on each of its websites. However, a patient's actual cost may differ based on the services the patient receives to meet individual healthcare needs and based on the benefits provided under the patient’s insurance coverage.

## 2020-07-16 ENCOUNTER — TRANSCRIBE ORDERS (OUTPATIENT)
Dept: ADMINISTRATIVE | Facility: HOSPITAL | Age: 55
End: 2020-07-16

## 2020-07-16 DIAGNOSIS — Z01.818 PREOP TESTING: Primary | ICD-10-CM

## 2020-07-21 ENCOUNTER — LAB (OUTPATIENT)
Dept: LAB | Facility: HOSPITAL | Age: 55
End: 2020-07-21

## 2020-07-21 PROCEDURE — C9803 HOPD COVID-19 SPEC COLLECT: HCPCS | Performed by: INTERNAL MEDICINE

## 2020-07-21 PROCEDURE — U0003 INFECTIOUS AGENT DETECTION BY NUCLEIC ACID (DNA OR RNA); SEVERE ACUTE RESPIRATORY SYNDROME CORONAVIRUS 2 (SARS-COV-2) (CORONAVIRUS DISEASE [COVID-19]), AMPLIFIED PROBE TECHNIQUE, MAKING USE OF HIGH THROUGHPUT TECHNOLOGIES AS DESCRIBED BY CMS-2020-01-R: HCPCS | Performed by: INTERNAL MEDICINE

## 2020-07-22 LAB
COVID LABCORP PRIORITY: NORMAL
SARS-COV-2 RNA RESP QL NAA+PROBE: NOT DETECTED

## 2020-07-24 ENCOUNTER — HOSPITAL ENCOUNTER (OUTPATIENT)
Facility: HOSPITAL | Age: 55
Setting detail: HOSPITAL OUTPATIENT SURGERY
Discharge: HOME | End: 2020-07-24
Attending: INTERNAL MEDICINE | Admitting: INTERNAL MEDICINE

## 2020-07-24 ENCOUNTER — ANESTHESIA EVENT (OUTPATIENT)
Dept: GASTROENTEROLOGY | Facility: HOSPITAL | Age: 55
End: 2020-07-24

## 2020-07-24 ENCOUNTER — ANESTHESIA (OUTPATIENT)
Dept: GASTROENTEROLOGY | Facility: HOSPITAL | Age: 55
End: 2020-07-24

## 2020-07-24 VITALS
RESPIRATION RATE: 17 BRPM | TEMPERATURE: 97 F | WEIGHT: 170 LBS | DIASTOLIC BLOOD PRESSURE: 71 MMHG | BODY MASS INDEX: 31.28 KG/M2 | OXYGEN SATURATION: 97 % | HEIGHT: 62 IN | HEART RATE: 86 BPM | SYSTOLIC BLOOD PRESSURE: 130 MMHG

## 2020-07-24 DIAGNOSIS — R13.19 ESOPHAGEAL DYSPHAGIA: ICD-10-CM

## 2020-07-24 DIAGNOSIS — K62.5 RECTAL BLEEDING: ICD-10-CM

## 2020-07-24 PROCEDURE — 45385 COLONOSCOPY W/LESION REMOVAL: CPT | Performed by: INTERNAL MEDICINE

## 2020-07-24 PROCEDURE — 43249 ESOPH EGD DILATION <30 MM: CPT | Performed by: INTERNAL MEDICINE

## 2020-07-24 PROCEDURE — 25010000002 PROPOFOL 10 MG/ML EMULSION: Performed by: NURSE ANESTHETIST, CERTIFIED REGISTERED

## 2020-07-24 PROCEDURE — C1726 CATH, BAL DIL, NON-VASCULAR: HCPCS | Performed by: INTERNAL MEDICINE

## 2020-07-24 PROCEDURE — 88305 TISSUE EXAM BY PATHOLOGIST: CPT | Performed by: INTERNAL MEDICINE

## 2020-07-24 PROCEDURE — 45380 COLONOSCOPY AND BIOPSY: CPT | Performed by: INTERNAL MEDICINE

## 2020-07-24 RX ORDER — LIDOCAINE HYDROCHLORIDE 10 MG/ML
0.5 INJECTION, SOLUTION EPIDURAL; INFILTRATION; INTRACAUDAL; PERINEURAL ONCE AS NEEDED
Status: DISCONTINUED | OUTPATIENT
Start: 2020-07-24 | End: 2020-07-24

## 2020-07-24 RX ORDER — SODIUM CHLORIDE 0.9 % (FLUSH) 0.9 %
10 SYRINGE (ML) INJECTION AS NEEDED
Status: DISCONTINUED | OUTPATIENT
Start: 2020-07-24 | End: 2020-07-24 | Stop reason: HOSPADM

## 2020-07-24 RX ORDER — LIDOCAINE HYDROCHLORIDE 20 MG/ML
0.5 INJECTION, SOLUTION INFILTRATION; PERINEURAL ONCE
Status: COMPLETED | OUTPATIENT
Start: 2020-07-24 | End: 2020-07-24

## 2020-07-24 RX ORDER — SODIUM CHLORIDE 9 MG/ML
500 INJECTION, SOLUTION INTRAVENOUS CONTINUOUS PRN
Status: DISCONTINUED | OUTPATIENT
Start: 2020-07-24 | End: 2020-07-24 | Stop reason: HOSPADM

## 2020-07-24 RX ORDER — SUCRALFATE 1 G/1
1 TABLET ORAL 4 TIMES DAILY
Qty: 120 TABLET | Refills: 0 | Status: SHIPPED | OUTPATIENT
Start: 2020-07-24 | End: 2020-10-27

## 2020-07-24 RX ORDER — PROPOFOL 10 MG/ML
VIAL (ML) INTRAVENOUS AS NEEDED
Status: DISCONTINUED | OUTPATIENT
Start: 2020-07-24 | End: 2020-07-24 | Stop reason: SURG

## 2020-07-24 RX ADMIN — PROPOFOL 30 MG: 10 INJECTION, EMULSION INTRAVENOUS at 10:50

## 2020-07-24 RX ADMIN — PROPOFOL 50 MG: 10 INJECTION, EMULSION INTRAVENOUS at 10:36

## 2020-07-24 RX ADMIN — SODIUM CHLORIDE 500 ML: 9 INJECTION, SOLUTION INTRAVENOUS at 10:11

## 2020-07-24 RX ADMIN — PROPOFOL 30 MG: 10 INJECTION, EMULSION INTRAVENOUS at 10:54

## 2020-07-24 RX ADMIN — PROPOFOL 30 MG: 10 INJECTION, EMULSION INTRAVENOUS at 10:57

## 2020-07-24 RX ADMIN — PROPOFOL 30 MG: 10 INJECTION, EMULSION INTRAVENOUS at 10:47

## 2020-07-24 RX ADMIN — PROPOFOL 150 MG: 10 INJECTION, EMULSION INTRAVENOUS at 10:34

## 2020-07-24 RX ADMIN — LIDOCAINE HYDROCHLORIDE 0.5 ML: 20 INJECTION, SOLUTION EPIDURAL; INFILTRATION; INTRACAUDAL; PERINEURAL at 10:11

## 2020-07-24 RX ADMIN — PROPOFOL 30 MG: 10 INJECTION, EMULSION INTRAVENOUS at 10:44

## 2020-07-24 RX ADMIN — LIDOCAINE HYDROCHLORIDE 100 MG: 20 INJECTION, SOLUTION INTRAVENOUS at 10:34

## 2020-07-24 RX ADMIN — PROPOFOL 20 MG: 10 INJECTION, EMULSION INTRAVENOUS at 10:40

## 2020-07-24 RX ADMIN — PROPOFOL 30 MG: 10 INJECTION, EMULSION INTRAVENOUS at 10:38

## 2020-07-24 RX ADMIN — PROPOFOL 30 MG: 10 INJECTION, EMULSION INTRAVENOUS at 10:52

## 2020-07-24 NOTE — ANESTHESIA POSTPROCEDURE EVALUATION
"Patient: Luna Cruz    Procedure Summary     Date:  07/24/20 Room / Location:  [facility] ENDOSCOPY 2 / [facility] ENDOSCOPY    Anesthesia Start:  1028 Anesthesia Stop:  1105    Procedures:       ESOPHAGOGASTRODUODENOSCOPY WITH ANESTHESIA (N/A )      COLONOSCOPY WITH ANESTHESIA (N/A ) Diagnosis:       Esophageal dysphagia      Rectal bleeding      (Esophageal dysphagia [R13.10])      (Rectal bleeding [K62.5])    Surgeon:  Ksenia Fox MD Provider:  Ghazal Landers CRNA    Anesthesia Type:  MAC ASA Status:  2          Anesthesia Type: MAC    Vitals  No vitals data found for the desired time range.          Post Anesthesia Care and Evaluation    Patient location during evaluation: PHASE II  Patient participation: complete - patient participated  Level of consciousness: awake and alert  Pain management: adequate  Airway patency: patent  Anesthetic complications: No anesthetic complications    Cardiovascular status: acceptable  Respiratory status: acceptable  Hydration status: acceptable    Comments: Blood pressure 145/100, pulse 85, temperature 97 °F (36.1 °C), temperature source Temporal, resp. rate 20, height 157.5 cm (62\"), weight 77.1 kg (170 lb), last menstrual period 06/14/2008, SpO2 96 %, not currently breastfeeding.    Pt discharged from PACU based on brown score >8      "

## 2020-07-24 NOTE — ANESTHESIA PREPROCEDURE EVALUATION
Anesthesia Evaluation     Patient summary reviewed   history of anesthetic complications: PONV  NPO Solid Status: > 8 hours  NPO Liquid Status: > 4 hours           Airway   Mallampati: II  TM distance: >3 FB  Neck ROM: full  No difficulty expected  Dental - normal exam     Pulmonary    (+) asthma,  (-) sleep apnea  Cardiovascular   Exercise tolerance: good (4-7 METS)    (+) hypertension, hyperlipidemia,       Neuro/Psych  (-) seizures, TIA, CVA  GI/Hepatic/Renal/Endo    (+) obesity,  GERD,    (-) liver disease, no renal disease, diabetes    Musculoskeletal     Abdominal    Substance History      OB/GYN          Other      history of cancer (breast, recent abnormal mammogram)                    Anesthesia Plan    ASA 2     MAC     intravenous induction     Anesthetic plan, all risks, benefits, and alternatives have been provided, discussed and informed consent has been obtained with: patient.

## 2020-07-28 LAB
CYTO UR: NORMAL
LAB AP CASE REPORT: NORMAL
PATH REPORT.FINAL DX SPEC: NORMAL
PATH REPORT.GROSS SPEC: NORMAL

## 2020-07-31 ENCOUNTER — HOSPITAL ENCOUNTER (OUTPATIENT)
Dept: CARDIOLOGY | Facility: HOSPITAL | Age: 55
Discharge: HOME | End: 2020-07-31

## 2020-07-31 ENCOUNTER — HOSPITAL ENCOUNTER (OUTPATIENT)
Dept: ULTRASOUND IMAGING | Facility: HOSPITAL | Age: 55
Discharge: HOME | End: 2020-07-31
Admitting: INTERNAL MEDICINE

## 2020-07-31 ENCOUNTER — HOSPITAL ENCOUNTER (OUTPATIENT)
Dept: ULTRASOUND IMAGING | Facility: HOSPITAL | Age: 55
Discharge: HOME | End: 2020-07-31

## 2020-07-31 VITALS
HEIGHT: 62 IN | DIASTOLIC BLOOD PRESSURE: 75 MMHG | BODY MASS INDEX: 31.28 KG/M2 | HEART RATE: 86 BPM | WEIGHT: 170 LBS | SYSTOLIC BLOOD PRESSURE: 185 MMHG

## 2020-07-31 DIAGNOSIS — K76.0 FATTY LIVER: ICD-10-CM

## 2020-07-31 DIAGNOSIS — R07.9 CHEST PAIN, UNSPECIFIED TYPE: ICD-10-CM

## 2020-07-31 DIAGNOSIS — R00.2 PALPITATIONS: ICD-10-CM

## 2020-07-31 LAB
BH CV STRESS BP STAGE 1: NORMAL
BH CV STRESS BP STAGE 2: NORMAL
BH CV STRESS DOSE DOBUTAMINE STAGE 1: 10
BH CV STRESS DOSE DOBUTAMINE STAGE 2: 20
BH CV STRESS DURATION MIN STAGE 1: 3
BH CV STRESS DURATION MIN STAGE 2: 3
BH CV STRESS DURATION SEC STAGE 1: 0
BH CV STRESS DURATION SEC STAGE 2: 5
BH CV STRESS HR STAGE 1: 93
BH CV STRESS HR STAGE 2: 143
BH CV STRESS PROTOCOL 1: NORMAL
BH CV STRESS RECOVERY BP: NORMAL MMHG
BH CV STRESS RECOVERY HR: 81 BPM
BH CV STRESS STAGE 1: 1
BH CV STRESS STAGE 2: 2
LV EF NUC BP: 73 %
MAXIMAL PREDICTED HEART RATE: 165 BPM
PERCENT MAX PREDICTED HR: 86.67 %
STRESS BASELINE BP: NORMAL MMHG
STRESS BASELINE HR: 86 BPM
STRESS PERCENT HR: 102 %
STRESS POST EXERCISE DUR MIN: 6 MIN
STRESS POST EXERCISE DUR SEC: 5 SEC
STRESS POST PEAK BP: NORMAL MMHG
STRESS POST PEAK HR: 143 BPM
STRESS TARGET HR: 140 BPM

## 2020-07-31 PROCEDURE — 93017 CV STRESS TEST TRACING ONLY: CPT

## 2020-07-31 PROCEDURE — 78452 HT MUSCLE IMAGE SPECT MULT: CPT

## 2020-07-31 PROCEDURE — 0 TECHNETIUM SESTAMIBI: Performed by: NURSE PRACTITIONER

## 2020-07-31 PROCEDURE — 76981 USE PARENCHYMA: CPT

## 2020-07-31 PROCEDURE — 93018 CV STRESS TEST I&R ONLY: CPT | Performed by: INTERNAL MEDICINE

## 2020-07-31 PROCEDURE — 76705 ECHO EXAM OF ABDOMEN: CPT

## 2020-07-31 PROCEDURE — A9500 TC99M SESTAMIBI: HCPCS | Performed by: NURSE PRACTITIONER

## 2020-07-31 PROCEDURE — 78452 HT MUSCLE IMAGE SPECT MULT: CPT | Performed by: INTERNAL MEDICINE

## 2020-07-31 PROCEDURE — 25010000003 DOBUTAMINE PER 250 MG: Performed by: INTERNAL MEDICINE

## 2020-07-31 RX ORDER — DOBUTAMINE HYDROCHLORIDE 100 MG/100ML
10-50 INJECTION INTRAVENOUS CONTINUOUS
Status: DISCONTINUED | OUTPATIENT
Start: 2020-07-31 | End: 2020-08-01 | Stop reason: HOSPADM

## 2020-07-31 RX ADMIN — TECHNETIUM TC 99M SESTAMIBI 1 DOSE: 1 INJECTION INTRAVENOUS at 08:40

## 2020-07-31 RX ADMIN — Medication 10 MCG/KG/MIN: at 09:47

## 2020-07-31 RX ADMIN — TECHNETIUM TC 99M SESTAMIBI 1 DOSE: 1 INJECTION INTRAVENOUS at 09:55

## 2020-08-12 ENCOUNTER — TELEPHONE (OUTPATIENT)
Dept: GASTROENTEROLOGY | Facility: CLINIC | Age: 55
End: 2020-08-12

## 2020-08-12 PROBLEM — K63.5 HYPERPLASTIC POLYP OF TRANSVERSE COLON: Status: ACTIVE | Noted: 2018-02-25

## 2020-08-12 PROBLEM — J18.9 PNEUMONIA DUE TO INFECTIOUS ORGANISM: Status: RESOLVED | Noted: 2018-09-27 | Resolved: 2020-08-12

## 2020-08-12 NOTE — TELEPHONE ENCOUNTER
Please let her know that her colon polyp was benign.  She will need a repeat colonoscopy in 5 years.    Also let her know that there was not any colitis on biopsies from the colon.    Lastly the sonogram with elastography is looking good.  She does not have any cirrhosis at this stage based upon imaging.  Need to continue working on weight reduction.  I will see her back in the office as planned.    Ksenia Fox MD

## 2020-08-17 DIAGNOSIS — J45.998 OTHER ASTHMA: Primary | ICD-10-CM

## 2020-08-17 RX ORDER — ZAFIRLUKAST 20 MG/1
TABLET, FILM COATED ORAL
Qty: 180 TABLET | Refills: 3 | Status: SHIPPED | OUTPATIENT
Start: 2020-08-17 | End: 2020-11-20 | Stop reason: SDUPTHER

## 2020-08-19 RX ORDER — LEVOTHYROXINE SODIUM 0.05 MG/1
50 TABLET ORAL DAILY
Qty: 90 TABLET | Refills: 3 | Status: SHIPPED | OUTPATIENT
Start: 2020-08-19 | End: 2021-09-20

## 2020-09-08 ENCOUNTER — TELEPHONE (OUTPATIENT)
Dept: INTERNAL MEDICINE | Facility: CLINIC | Age: 55
End: 2020-09-08

## 2020-09-08 DIAGNOSIS — I10 ESSENTIAL HYPERTENSION: ICD-10-CM

## 2020-09-08 RX ORDER — LOSARTAN POTASSIUM 50 MG/1
50 TABLET ORAL DAILY
Qty: 90 TABLET | Refills: 3 | Status: SHIPPED | OUTPATIENT
Start: 2020-09-08 | End: 2020-09-30 | Stop reason: SDUPTHER

## 2020-09-08 NOTE — TELEPHONE ENCOUNTER
PATIENT CALLING IN STATING THAT HER BLOOD PRESSURE MEDICATION losartan (COZAAR) 25 MG tablet  SHOULD HAVE BEEN CHANGED FROM 25MG TO 50MG AND IT HAS NOT BEEN CHANGED YET.     PHARMACY WONT REFILL IT WITH OUT A NEW PRESCRIPTION FOR IT.    CONFIRMED PHARMACY:  [facility] - [address] - [phone]  - [phone]   [phone]    CALL BACK NUMBER: [phone]

## 2020-09-28 RX ORDER — CALCITRIOL 0.5 UG/1
0.5 CAPSULE, LIQUID FILLED ORAL DAILY
Qty: 90 CAPSULE | Refills: 3 | Status: SHIPPED | OUTPATIENT
Start: 2020-09-28 | End: 2020-09-30 | Stop reason: SDUPTHER

## 2020-09-30 ENCOUNTER — OFFICE VISIT (OUTPATIENT)
Dept: INTERNAL MEDICINE | Facility: CLINIC | Age: 55
End: 2020-09-30

## 2020-09-30 VITALS
DIASTOLIC BLOOD PRESSURE: 98 MMHG | SYSTOLIC BLOOD PRESSURE: 140 MMHG | HEART RATE: 103 BPM | BODY MASS INDEX: 32.76 KG/M2 | OXYGEN SATURATION: 99 % | WEIGHT: 178 LBS | TEMPERATURE: 98.2 F | HEIGHT: 62 IN

## 2020-09-30 DIAGNOSIS — Z23 NEED FOR INFLUENZA VACCINATION: ICD-10-CM

## 2020-09-30 DIAGNOSIS — K75.81 NASH (NONALCOHOLIC STEATOHEPATITIS): ICD-10-CM

## 2020-09-30 DIAGNOSIS — I10 ESSENTIAL HYPERTENSION: Primary | ICD-10-CM

## 2020-09-30 DIAGNOSIS — E78.00 ELEVATED CHOLESTEROL: ICD-10-CM

## 2020-09-30 PROCEDURE — 90686 IIV4 VACC NO PRSV 0.5 ML IM: CPT | Performed by: INTERNAL MEDICINE

## 2020-09-30 PROCEDURE — 99214 OFFICE O/P EST MOD 30 MIN: CPT | Performed by: INTERNAL MEDICINE

## 2020-09-30 PROCEDURE — G0008 ADMIN INFLUENZA VIRUS VAC: HCPCS | Performed by: INTERNAL MEDICINE

## 2020-09-30 RX ORDER — CALCITRIOL 0.5 UG/1
0.5 CAPSULE, LIQUID FILLED ORAL DAILY
Qty: 90 CAPSULE | Refills: 3 | Status: SHIPPED | OUTPATIENT
Start: 2020-09-30 | End: 2021-11-10

## 2020-09-30 RX ORDER — LOSARTAN POTASSIUM 100 MG/1
100 TABLET ORAL DAILY
Qty: 90 TABLET | Refills: 3 | Status: SHIPPED | OUTPATIENT
Start: 2020-09-30 | End: 2021-11-10

## 2020-09-30 NOTE — PROGRESS NOTES
Subjective   Luna Cruz is a 55 y.o. female.   Chief Complaint   Patient presents with   • Hypothyroidism     follow up on test completed in july        Patient is here for follow-up for a previously ordered stress test which was negative she has had evaluation by Dr. Fox apparently she has BAE she also has hypertension and hypercholesterolemia.  She is trying to lose weight to help control her cholesterol.       The following portions of the patient's history were reviewed and updated as appropriate: allergies, current medications, past family history, past medical history, past social history, past surgical history and problem list.    Review of Systems   Constitutional: Negative for activity change, appetite change, fatigue, fever, unexpected weight gain and unexpected weight loss.   HENT: Negative for swollen glands, trouble swallowing and voice change.    Eyes: Negative for blurred vision and visual disturbance.   Respiratory: Negative for cough and shortness of breath.    Cardiovascular: Negative for chest pain, palpitations and leg swelling.   Gastrointestinal: Negative for abdominal pain, constipation, diarrhea, nausea, vomiting and indigestion.   Endocrine: Negative for cold intolerance, heat intolerance, polydipsia and polyphagia.   Genitourinary: Negative for dysuria and frequency.   Musculoskeletal: Negative for arthralgias, back pain, joint swelling and neck pain.   Skin: Negative for color change, rash and skin lesions.   Neurological: Negative for dizziness, weakness, headache, memory problem and confusion.   Hematological: Does not bruise/bleed easily.   Psychiatric/Behavioral: Negative for agitation, hallucinations and suicidal ideas. The patient is not nervous/anxious.        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 (CMS/HCC) 9/26/2016   • Osteoporosis    • PONV (postoperative nausea and vomiting)    • Scoliosis       Past Surgical History:   Procedure Laterality Date   • ADENOIDECTOMY     • APPENDECTOMY  1994   • BREAST BIOPSY     • BREAST LUMPECTOMY Right 2008   • CARDIAC CATHETERIZATION     • CHOLECYSTECTOMY  1993   • COLONOSCOPY  05/03/2013    Erythematous mucosa in the rectum-biopsied; Repeat 4 years    • COLONOSCOPY N/A 6/14/2017    Normal; Repeat 5 years; Procedure: COLONOSCOPY WITH ANESTHESIA;  Surgeon: Ksenia Fox MD;  Location: [facility] ENDOSCOPY;  Service:    • COLONOSCOPY  04/23/2010    Dr. Arzola-Extremely poorly prepped colon   • COLONOSCOPY N/A 7/24/2020    Procedure: COLONOSCOPY WITH ANESTHESIA;  Surgeon: Ksenia Fox MD;  Location: [facility] ENDOSCOPY;  Service: Gastroenterology;  Laterality: N/A;  pre: rectal bleed; fatty liver  post: polyp  Che Calvert APRN   • D&C HYSTEROSCOPY  1993   • DILATATION AND CURETTAGE     • ENDOSCOPY N/A 10/5/2016    Medium-sized HH; Widely patent and non-obstructing Schatzki ring-dilated; Procedure: ESOPHAGOGASTRODUODENOSCOPY WITH ANESTHESIA;  Surgeon: Ksenia Fox MD;  Location: [facility] ENDOSCOPY;  Service:    • ENDOSCOPY N/A 2/27/2019    Normal 1st portion of the duodenum and 2 portion of the duodenum; A few gastric polyps-biopsied; Small HH; Widely patent Schatzki ring-dilated; Abnormal esophageal motility, suspicious for presbyesophagus; Arrange for barium swallow to assess for dysmotility   • ENDOSCOPY N/A 7/24/2020    Procedure: ESOPHAGOGASTRODUODENOSCOPY WITH ANESTHESIA;  Surgeon: Ksenia Fox MD;  Location: [facility] ENDOSCOPY;  Service: Gastroenterology;  Laterality: N/A;  pre: rectal bleed; fatty liver  post: esophagitis; hiatal hernial; balloon dilation 15/18mm  Che Calvert APRN   •  HYSTEROSCOPY     • KNEE ARTHROSCOPY Left 11/13/2018    Procedure: LEFT KNEE ARTHROSCOPIC PARTIAL MEDIAL MENISCECTOMY;  Surgeon: Matt Maki MD;  Location: [facility] OR;  Service: Orthopedics   • MEDIPORT INSERTION, SINGLE  2008   • MEDIPORT REMOVAL     • REPLACEMENT TOTAL KNEE Left 05/23/2019   • THYROIDECTOMY  2011    parathyroidectomy   • TONSILLECTOMY AND ADENOIDECTOMY  1970   • TUBAL ABDOMINAL LIGATION          Current Outpatient Medications:   •  ADVAIR DISKUS 250-50 MCG/DOSE DISKUS, USE 1 INHALATION TWICE A DAY (Patient taking differently: Inhale 1 puff 2 (Two) Times a Day.), Disp: 3 each, Rfl: 4  •  albuterol (PROVENTIL) (2.5 MG/3ML) 0.083% nebulizer solution, Take 2.5 mg by nebulization As Needed., Disp: , Rfl:   •  albuterol sulfate HFA (PROAIR HFA) 108 (90 Base) MCG/ACT inhaler, Inhale 2 puffs Every 4 (Four) Hours As Needed for Wheezing., Disp: 3 inhaler, Rfl: 3  •  alendronate (FOSAMAX) 70 MG tablet, Take 70 mg by mouth Every 7 (Seven) Days. Sunday's, Disp: , Rfl:   •  atorvastatin (LIPITOR) 40 MG tablet, Take 1 tablet by mouth Daily., Disp: 90 tablet, Rfl: 3  •  calcitriol (ROCALTROL) 0.5 MCG capsule, Take 1 capsule by mouth Daily., Disp: 90 capsule, Rfl: 3  •  Calcium Citrate-Vitamin D (CALCIUM + D PO), Take 1 tablet by mouth Daily., Disp: , Rfl:   •  Diclofenac Sodium (PENNSAID TD), Apply 1 application topically to the appropriate area as directed 2 (Two) Times a Day. For back pain, Disp: , Rfl:   •  docusate sodium (COLACE) 100 MG capsule, Take 100 mg by mouth As Needed., Disp: , Rfl:   •  HYDROcodone-acetaminophen (NORCO) 5-325 MG per tablet, Take 1 tablet by mouth As Needed., Disp: , Rfl:   •  ibuprofen (ADVIL,MOTRIN) 400 MG tablet, Take 400 mg by mouth Every 8 (Eight) Hours As Needed for Mild Pain ., Disp: , Rfl:   •  levothyroxine (SYNTHROID, LEVOTHROID) 50 MCG tablet, Take 1 tablet by mouth Daily., Disp: 90 tablet, Rfl: 3  •  loratadine (CLARITIN) 10 MG tablet, Take 10 mg by mouth Daily  As Needed for Allergies., Disp: , Rfl:   •  losartan (COZAAR) 100 MG tablet, Take 1 tablet by mouth Daily., Disp: 90 tablet, Rfl: 3  •  pantoprazole (PROTONIX) 40 MG EC tablet, Take 1 tablet by mouth Daily., Disp: 90 tablet, Rfl: 3  •  potassium chloride ER (K-TAB) 20 MEQ tablet controlled-release ER tablet, Take 1 tablet by mouth Daily., Disp: 90 tablet, Rfl: 3  •  rOPINIRole (REQUIP) 0.25 MG tablet, Take 1 tablet by mouth Every Night. Take 1 hour before bedtime., Disp: 90 tablet, Rfl: 3  •  sucralfate (Carafate) 1 g tablet, Take 1 tablet by mouth 4 (Four) Times a Day., Disp: 120 tablet, Rfl: 0  •  theophylline (UNIPHYL) 400 MG 24 hr tablet, TAKE 1 TABLET DAILY (Patient taking differently: Take 400 mg by mouth Daily.), Disp: 90 tablet, Rfl: 3  •  tiZANidine (ZANAFLEX) 4 MG tablet, Take 4 mg by mouth Every 8 (Eight) Hours As Needed for Muscle Spasms., Disp: , Rfl:   •  traMADol (ULTRAM) 50 MG tablet, Take 50 mg by mouth Every 8 (Eight) Hours As Needed for Moderate Pain ., Disp: , Rfl:   •  zafirlukast (ACCOLATE) 20 MG tablet, TAKE 1 TABLET TWICE A DAY, Disp: 180 tablet, Rfl: 3  No current facility-administered medications for this visit.     Facility-Administered Medications Ordered in Other Visits:   •  heparin flush (porcine) 100 UNIT/ML injection 500 Units, 500 Units, Intracatheter, PRN, Ki Manriquez MD, 500 Units at 09/27/16 1108  •  heparin flush (porcine) 100 UNIT/ML injection 500 Units, 500 Units, Intravenous, PRN, Malathi Brantley APRN, 500 Units at 05/05/17 1009  •  sodium chloride 0.9 % flush 10 mL, 10 mL, Intravenous, PRN, Ki Manriquez MD, 10 mL at 09/27/16 1107  •  sodium chloride 0.9 % flush 10 mL, 10 mL, Intravenous, PRN, Malathi Brantley APRN, 10 mL at 05/05/17 1009     Vitals:    09/30/20 1554   BP: 140/98   Pulse:    Temp:    SpO2:          09/30/20  1531   Weight: 80.7 kg (178 lb)     Patient's Body mass index is 32.56 kg/m². BMI is .      Physical  Exam  Constitutional:       Appearance: Normal appearance. She is well-developed.   HENT:      Head: Normocephalic and atraumatic.      Right Ear: External ear normal.      Left Ear: External ear normal.   Eyes:      Extraocular Movements: Extraocular movements intact.      Conjunctiva/sclera: Conjunctivae normal.      Pupils: Pupils are equal, round, and reactive to light.   Neck:      Musculoskeletal: Normal range of motion and neck supple. No neck rigidity.      Vascular: No carotid bruit.   Cardiovascular:      Rate and Rhythm: Normal rate and regular rhythm.      Pulses: Normal pulses.      Heart sounds: Normal heart sounds. No murmur. No gallop.    Pulmonary:      Effort: Pulmonary effort is normal.      Breath sounds: Normal breath sounds.   Musculoskeletal: Normal range of motion.         General: No swelling or tenderness.   Skin:     General: Skin is warm and dry.   Neurological:      General: No focal deficit present.      Mental Status: She is alert and oriented to person, place, and time.   Psychiatric:         Mood and Affect: Mood normal.         Behavior: Behavior normal.               Assessment/Plan   Diagnoses and all orders for this visit:    1. Essential hypertension (Primary)  -     losartan (COZAAR) 100 MG tablet; Take 1 tablet by mouth Daily.  Dispense: 90 tablet; Refill: 3    2. Elevated cholesterol    3. BAE (nonalcoholic steatohepatitis)      Patient's blood pressure is elevated we are going to go ahead and increase her Cozaar to 100 mg today.  In regard to her Bae I spent time cautioning her about her weight and medications.  She is taking her cholesterol medication as prescribed we will see her back in 3 months due to controlled substances.

## 2020-10-14 ENCOUNTER — OFFICE VISIT (OUTPATIENT)
Dept: OBSTETRICS AND GYNECOLOGY | Facility: CLINIC | Age: 55
End: 2020-10-14

## 2020-10-14 VITALS
SYSTOLIC BLOOD PRESSURE: 118 MMHG | BODY MASS INDEX: 34.78 KG/M2 | DIASTOLIC BLOOD PRESSURE: 74 MMHG | WEIGHT: 189 LBS | HEIGHT: 62 IN

## 2020-10-14 DIAGNOSIS — Z78.9 NON-SMOKER: ICD-10-CM

## 2020-10-14 DIAGNOSIS — I10 ESSENTIAL HYPERTENSION: ICD-10-CM

## 2020-10-14 DIAGNOSIS — Z85.3 HISTORY OF MALIGNANT NEOPLASM OF BREAST: ICD-10-CM

## 2020-10-14 DIAGNOSIS — Z01.419 ENCOUNTER FOR ANNUAL ROUTINE GYNECOLOGICAL EXAMINATION: Primary | ICD-10-CM

## 2020-10-14 DIAGNOSIS — E55.9 VITAMIN D DEFICIENCY: ICD-10-CM

## 2020-10-14 PROCEDURE — G0101 CA SCREEN;PELVIC/BREAST EXAM: HCPCS | Performed by: NURSE PRACTITIONER

## 2020-10-14 PROCEDURE — 87624 HPV HI-RISK TYP POOLED RSLT: CPT | Performed by: NURSE PRACTITIONER

## 2020-10-14 PROCEDURE — G0123 SCREEN CERV/VAG THIN LAYER: HCPCS | Performed by: NURSE PRACTITIONER

## 2020-10-14 NOTE — PROGRESS NOTES
"Ellis Cruz is a 55 y.o. female.     Annual exam      The following portions of the patient's history were reviewed and updated as appropriate: allergies, current medications, past family history, past medical history, past social history, past surgical history and problem list.    /74   Ht 157.5 cm (62\")   Wt 85.7 kg (189 lb)   LMP 06/14/2008   BMI 34.57 kg/m²     Review of Systems   Constitutional: Negative for activity change, appetite change, fatigue and fever.   HENT: Negative for congestion, sore throat and trouble swallowing.    Eyes: Negative for pain, discharge and visual disturbance.   Respiratory: Negative for apnea, shortness of breath and wheezing.    Cardiovascular: Negative for chest pain, palpitations and leg swelling.   Gastrointestinal: Negative for abdominal pain, constipation and diarrhea.   Genitourinary: Negative for frequency, pelvic pain, urgency and vaginal discharge.   Musculoskeletal: Negative for back pain and gait problem.   Skin: Negative for color change and rash.   Neurological: Negative for dizziness, weakness and numbness.   Psychiatric/Behavioral: Negative for confusion and sleep disturbance.       Objective   Physical Exam  Vitals signs and nursing note reviewed. Exam conducted with a chaperone present.   Constitutional:       General: She is not in acute distress.     Appearance: She is well-developed. She is not diaphoretic.   HENT:      Head: Normocephalic.      Right Ear: External ear normal.      Left Ear: External ear normal.      Nose: Nose normal.   Eyes:      General: No scleral icterus.        Right eye: No discharge.         Left eye: No discharge.      Conjunctiva/sclera: Conjunctivae normal.   Neck:      Musculoskeletal: Normal range of motion and neck supple.      Thyroid: No thyromegaly.      Vascular: No carotid bruit.      Trachea: No tracheal deviation.   Cardiovascular:      Rate and Rhythm: Normal rate and regular rhythm.      Heart " sounds: Normal heart sounds. No murmur.   Pulmonary:      Effort: Pulmonary effort is normal. No respiratory distress.      Breath sounds: Normal breath sounds. No wheezing.   Chest:      Breasts: Breasts are asymmetrical (r/t scar).         Right: No inverted nipple, nipple discharge, skin change or tenderness.         Left: No inverted nipple, nipple discharge, skin change or tenderness.      Comments: Scar on left breast r/t lumpectomy  Dense tissue in regions of the breast    Abdominal:      General: There is no distension.      Palpations: Abdomen is soft. There is no mass.      Tenderness: There is no abdominal tenderness. There is no guarding.      Hernia: No hernia is present. There is no hernia in the left inguinal area or right inguinal area.   Genitourinary:     General: Normal vulva.      Exam position: Lithotomy position.      Labia:         Right: No rash, tenderness, lesion or injury.         Left: No rash, tenderness, lesion or injury.       Vagina: Normal. No signs of injury and foreign body. No vaginal discharge, erythema, tenderness or bleeding.      Cervix: Normal.      Uterus: Normal. Not enlarged, not fixed and not tender.       Adnexa: Right adnexa normal and left adnexa normal.        Right: No mass, tenderness or fullness.          Left: No mass, tenderness or fullness.        Rectum: Normal. No mass.      Comments:   BSU normal  Urethral meatus  Normal  Perineum  Normal  Musculoskeletal: Normal range of motion.         General: No tenderness.   Lymphadenopathy:      Head:      Right side of head: No submental, submandibular, tonsillar, preauricular, posterior auricular or occipital adenopathy.      Left side of head: No submental, submandibular, tonsillar, preauricular, posterior auricular or occipital adenopathy.      Cervical: No cervical adenopathy.      Right cervical: No superficial, deep or posterior cervical adenopathy.     Left cervical: No superficial, deep or posterior cervical  adenopathy.      Lower Body: No right inguinal adenopathy. No left inguinal adenopathy.   Skin:     General: Skin is warm and dry.      Findings: No bruising, erythema or rash.   Neurological:      Mental Status: She is alert and oriented to person, place, and time.      Coordination: Coordination normal.   Psychiatric:         Mood and Affect: Mood normal.         Behavior: Behavior normal.         Thought Content: Thought content normal.         Judgment: Judgment normal.         Assessment/Plan   Well woman exam. Pap collected.   Pt reports mammogram followed by Dr. Houston, bone density followed by Dr. Shaffer and colonoscopy followed by Dr. Fox.   Pt reports she has an eye exam scheduled for this month.     Discussed pt breast exam advised to follow up with Dr. Houston as instructed.    Patient's Body mass index is 34.57 kg/m². BMI is above normal parameters. Recommendations include: educational material.    RV annual exam/prn.   Diagnoses and all orders for this visit:    1. Encounter for annual routine gynecological examination (Primary)  -     Liquid-based Pap Smear, Screening  -     HPV DNA Probe, Direct - ThinPrep Vial, Cervix    2. BMI 34.0-34.9,adult    3. Non-smoker    4. Essential hypertension    5. Vitamin D deficiency    6. History of malignant neoplasm of breast

## 2020-10-15 LAB
GEN CATEG CVX/VAG CYTO-IMP: NORMAL
HPV I/H RISK 4 DNA CVX QL PROBE+SIG AMP: NOT DETECTED
LAB AP CASE REPORT: NORMAL
LAB AP GYN ADDITIONAL INFORMATION: NORMAL
LAB AP GYN OTHER FINDINGS: NORMAL
PATH INTERP SPEC-IMP: NORMAL
STAT OF ADQ CVX/VAG CYTO-IMP: NORMAL

## 2020-10-27 ENCOUNTER — OFFICE VISIT (OUTPATIENT)
Dept: PULMONOLOGY | Facility: CLINIC | Age: 55
End: 2020-10-27

## 2020-10-27 VITALS
DIASTOLIC BLOOD PRESSURE: 82 MMHG | WEIGHT: 180 LBS | BODY MASS INDEX: 33.13 KG/M2 | SYSTOLIC BLOOD PRESSURE: 140 MMHG | TEMPERATURE: 98.1 F | OXYGEN SATURATION: 98 % | HEIGHT: 62 IN | HEART RATE: 84 BPM

## 2020-10-27 DIAGNOSIS — J98.4 PULMONARY SCARRING: ICD-10-CM

## 2020-10-27 DIAGNOSIS — J45.20 MILD INTERMITTENT ASTHMA WITHOUT COMPLICATION: Primary | ICD-10-CM

## 2020-10-27 DIAGNOSIS — Z78.9 NON-SMOKER: ICD-10-CM

## 2020-10-27 DIAGNOSIS — G25.81 RESTLESS LEGS SYNDROME (RLS): ICD-10-CM

## 2020-10-27 DIAGNOSIS — R05.9 COUGH: ICD-10-CM

## 2020-10-27 DIAGNOSIS — J30.89 NON-SEASONAL ALLERGIC RHINITIS, UNSPECIFIED TRIGGER: ICD-10-CM

## 2020-10-27 PROCEDURE — 99214 OFFICE O/P EST MOD 30 MIN: CPT | Performed by: INTERNAL MEDICINE

## 2020-10-27 NOTE — PATIENT INSTRUCTIONS
"BMI for Adults  What is BMI?  Body mass index (BMI) is a number that is calculated from a person's weight and height. BMI can help estimate how much of a person's weight is composed of fat. BMI does not measure body fat directly. Rather, it is an alternative to procedures that directly measure body fat, which can be difficult and expensive.  BMI can help identify people who may be at higher risk for certain medical problems.  What are BMI measurements used for?  BMI is used as a screening tool to identify possible weight problems. It helps determine whether a person is obese, overweight, a healthy weight, or underweight.  BMI is useful for:  · Identifying a weight problem that may be related to a medical condition or may increase the risk for medical problems.  · Promoting changes, such as changes in diet and exercise, to help reach a healthy weight. BMI screening can be repeated to see if these changes are working.  How is BMI calculated?  BMI involves measuring your weight in relation to your height. Both height and weight are measured, and the BMI is calculated from those numbers. This can be done either in English ([place] or metric measurements. Note that charts and online BMI calculators are available to help you find your BMI quickly and easily without having to do these calculations yourself.  To calculate your BMI in English ([place].S[place] measurements:    1. Measure your weight in pounds (lb).  2. Multiply the number of pounds by 703.  ? For example, for a person who weighs 180 lb, multiply that number by 703, which equals 126,540.  3. Measure your height in inches. Then multiply that number by itself to get a measurement called \"inches squared.\"  ? For example, for a person who is 70 inches tall, the \"inches squared\" measurement is 70 inches x 70 inches, which equals 4,900 inches squared.  4. Divide the total from step 2 (number of lb x 703) by the total from step 3 (inches squared): 126,540 ÷ 4,900 = 25.8. This is " "your BMI.  To calculate your BMI in metric measurements:  1. Measure your weight in kilograms (kg).  2. Measure your height in meters (m). Then multiply that number by itself to get a measurement called \"meters squared.\"  ? For example, for a person who is 1.75 m tall, the \"meters squared\" measurement is 1.75 m x 1.75 m, which is equal to 3.1 meters squared.  3. Divide the number of kilograms (your weight) by the meters squared number. In this example: 70 ÷ 3.1 = 22.6. This is your BMI.  What do the results mean?  BMI charts are used to identify whether you are underweight, normal weight, overweight, or obese. The following guidelines will be used:  · Underweight: BMI less than 18.5.  · Normal weight: BMI between 18.5 and 24.9.  · Overweight: BMI between 25 and 29.9.  · Obese: BMI of 30 or above.  Keep these notes in mind:  · Weight includes both fat and muscle, so someone with a muscular build, such as an athlete, may have a BMI that is higher than 24.9. In cases like these, BMI is not an accurate measure of body fat.  · To determine if excess body fat is the cause of a BMI of 25 or higher, further assessments may need to be done by a health care provider.  · BMI is usually interpreted in the same way for men and women.  Where to find more information  For more information about BMI, including tools to quickly calculate your BMI, go to these websites:  · Centers for Disease Control and Prevention: www.cdc.gov  · American Heart Association: www.heart.org  · National Heart, Lung, and Blood [place]: www.nhlbi.nih.gov  Summary  · Body mass index (BMI) is a number that is calculated from a person's weight and height.  · BMI may help estimate how much of a person's weight is composed of fat. BMI can help identify those who may be at higher risk for certain medical problems.  · BMI can be measured using English measurements or metric measurements.  · BMI charts are used to identify whether you are underweight, normal " weight, overweight, or obese.  This information is not intended to replace advice given to you by your health care provider. Make sure you discuss any questions you have with your health care provider.  Document Released: 08/29/2005 Document Revised: 09/09/2020 Document Reviewed: 07/17/2020  Elsevier Patient Education © 2020 [employer]

## 2020-10-27 NOTE — PROGRESS NOTES
[facility] OUTPATIENT PROGRESS NOTE    Patient: Luna Cruz  : 1965  Age: 55 y.o.  Date of Service: 2020    REASON FOR CLINIC VISIT:  Chief Complaint   Patient presents with   • other asthma     No hospitalizations; no exposure; tested in  and negative       Subjective:    History of Present Illness:  Luna Cruz is a 55 y.o. female who presents to the office today to be seen for    Diagnosis Plan   1. Mild intermittent asthma without complication     2. Cough     3. Non-seasonal allergic rhinitis, unspecified trigger     4. Restless legs syndrome (RLS)     5. Non-smoker     6. Pulmonary scarring     .  Other problems per record.    History:    Patient is a very pleasant 55 years old  female returns the pulmonary clinic today for a follow-up visit.  She was seen by HEATHER Jha and Dr. Gresham in the past.    Patient has a diagnosis of moderate intermittent asthma.  She is a lifelong non-smoker but has asthma for few years and currently using Advair and albuterol rescue inhaler and nebulizer as needed.  She did not have any acute asthma exacerbation in last 1 year but was hospitalized 1 year ago.  She was recently short of breath and was seen by primary care provider and she was sent for a CT scan of the chest which showed pulmonary scarring most likely secondary to radiation for her underlying breast cancer which is already treated.  She is not on home oxygen.  She also has allergy symptoms and currently using loratadine fluticasone nasal spray and Accolate.    Patient is a retired LPN.  She lives at home with her .  She did not have any exposure to any Covid patient and recent travel and recent hospitalizations and ER visit or urgent care visit.  She did not have any other new complaints.  No recent PFT was done last year PFT was reviewed.  The CT scan was also reviewed.    PFT done today:  Not done today    PFT Values        Some  values may be hidden. Unless noted otherwise, only the newest values recorded on each date are displayed.         Old Values PFT Results 10/29/19   No data to display.      Pre Drug PFT Results 10/29/19   FVC 62   FEV1 52   FEF 25-75% 31   FEV1/FVC 69.23      Post Drug PFT Results 10/29/19   No data to display.      Other Tests PFT Results 10/29/19   No data to display.           Results for orders placed in visit on 10/29/19   Pulmonary Function Test    Narrative Sara Cramer, RRT     10/29/2019 10:03 AM  Pulmonary Function Test  Performed by: Klaus Wagner APRN  Authorized by: Klaus Wagner APRN      Pre Drug    FVC: 62%   FEV1: 52%   FEF 25-75%: 31%   FEV1/FVC: 69.23%          Bronchodilator therapy: Advair and Albuterol neb and inhaler    Smoking Status:   Social History     Tobacco Use   Smoking Status Never Smoker   Smokeless Tobacco Never Used     Pulm Rehab: no  Sleep: yes    Support System: lives with their spouse    Code Status:   There are no questions and answers to display.        Review of Systems:  A complete review of systems is performed and all other systems were reviewed and negative as note above in the HPI.  Review of Systems    CAT/ACT Score:  Not done today    Medications:  Outpatient Encounter Medications as of 10/27/2020   Medication Sig Dispense Refill   • Advair Diskus 250-50 MCG/DOSE DISKUS USE 1 INHALATION TWICE A DAY 3 each 3   • albuterol (PROVENTIL) (2.5 MG/3ML) 0.083% nebulizer solution Take 2.5 mg by nebulization As Needed.     • albuterol sulfate HFA (PROAIR HFA) 108 (90 Base) MCG/ACT inhaler Inhale 2 puffs Every 4 (Four) Hours As Needed for Wheezing. 3 inhaler 3   • alendronate (FOSAMAX) 70 MG tablet Take 70 mg by mouth Every 7 (Seven) Days. Sunday's     • atorvastatin (LIPITOR) 40 MG tablet Take 1 tablet by mouth Daily. 90 tablet 3   • calcitriol (ROCALTROL) 0.5 MCG capsule Take 1 capsule by mouth Daily. 90 capsule 3   • Calcium Citrate-Vitamin D  (CALCIUM + D PO) Take 1 tablet by mouth Daily.     • Diclofenac Sodium (PENNSAID TD) Apply 1 application topically to the appropriate area as directed 2 (Two) Times a Day. For back pain     • docusate sodium (COLACE) 100 MG capsule Take 100 mg by mouth As Needed.     • HYDROcodone-acetaminophen (NORCO) 5-325 MG per tablet Take 1 tablet by mouth As Needed.     • ibuprofen (ADVIL,MOTRIN) 400 MG tablet Take 400 mg by mouth Every 8 (Eight) Hours As Needed for Mild Pain .     • levothyroxine (SYNTHROID, LEVOTHROID) 50 MCG tablet Take 1 tablet by mouth Daily. 90 tablet 3   • loratadine (CLARITIN) 10 MG tablet Take 10 mg by mouth Daily As Needed for Allergies.     • losartan (COZAAR) 100 MG tablet Take 1 tablet by mouth Daily. 90 tablet 3   • pantoprazole (PROTONIX) 40 MG EC tablet Take 1 tablet by mouth Daily. 90 tablet 3   • potassium chloride ER (K-TAB) 20 MEQ tablet controlled-release ER tablet Take 1 tablet by mouth Daily. 90 tablet 3   • rOPINIRole (REQUIP) 0.25 MG tablet Take 1 tablet by mouth Every Night. Take 1 hour before bedtime. 90 tablet 3   • theophylline (UNIPHYL) 400 MG 24 hr tablet TAKE 1 TABLET DAILY (Patient taking differently: Take 400 mg by mouth Daily.) 90 tablet 3   • tiZANidine (ZANAFLEX) 4 MG tablet Take 4 mg by mouth Every 8 (Eight) Hours As Needed for Muscle Spasms.     • zafirlukast (ACCOLATE) 20 MG tablet TAKE 1 TABLET TWICE A  tablet 3   • [DISCONTINUED] sucralfate (Carafate) 1 g tablet Take 1 tablet by mouth 4 (Four) Times a Day. 120 tablet 0   • [DISCONTINUED] traMADol (ULTRAM) 50 MG tablet Take 50 mg by mouth Every 8 (Eight) Hours As Needed for Moderate Pain .       Facility-Administered Encounter Medications as of 10/27/2020   Medication Dose Route Frequency Provider Last Rate Last Dose   • heparin flush (porcine) 100 UNIT/ML injection 500 Units  500 Units Intracatheter PRN Ki Manriquez MD   500 Units at 09/27/16 1108   • heparin flush (porcine) 100 UNIT/ML injection  "500 Units  500 Units Intravenous PRN Fercho Malathimushtaq Abbott, APRN   500 Units at 05/05/17 1009   • sodium chloride 0.9 % flush 10 mL  10 mL Intravenous PRN Ki Manriquez MD   10 mL at 09/27/16 1107   • sodium chloride 0.9 % flush 10 mL  10 mL Intravenous PRN Leolasonja Malathimushtaq Abbott, APRN   10 mL at 05/05/17 1009       Allergies:  Allergies   Allergen Reactions   • Cephalosporins Hives   • Keflex [Cephalexin] Rash and Unknown (See Comments)       Immunizations:  Immunization History   Administered Date(s) Administered   • Flu Vaccine Intradermal Quad 18-64YR 09/28/2018, 10/29/2019, 09/29/2020   • Flulaval/Fluarix/Fluzone Quad 10/10/2016, 09/30/2020   • Pneumococcal Polysaccharide (PPSV23) 10/28/2014   • flucelvax quad pfs =>4 YRS 09/28/2018, 10/29/2019       Objective:    Vitals:  /82   Pulse 84   Temp 98.1 °F (36.7 °C)   Ht 157.5 cm (62\")   Wt 81.6 kg (180 lb)   LMP 06/14/2008   SpO2 98% Comment: RA  Breastfeeding No   BMI 32.92 kg/m²     Physical Exam:  General: Patient is a 55 y.o. middle aged   female. Looks stated age. Appears to be in no acute distress.  Eyes: EOMI. PERRLA. Vision intact. No scleral icterus.  Ear, Nose, Mouth and Throat: Hearing is grossly intact. No Leukoplakia, pharyngitis, stomatitis or thrush. Swollen nasal mucosa with post nasal drop.  Neck: Range of motion of neck normal. No thyromegaly or masses. Mallampati Class 3, No JVD  Respiratory: Clear to auscultation bilaterally. No use of accessory muscles. Decreased breath sounds.  Cardiovascular: Normal heart sounds. Regularly regular rhythm without murmur.  Gastrointestinal: Non tender, non distended, soft. Bowel sounds positive in all four quadrants. No organomegaly.  Skin: No obvious rashes, lesions, ulcers or large amount of bruising. No edema.   Neurological: No new motor deficits. Cranial nerves appear intact.  Psychiatric: Patient is alert and oriented to person, place and time.    Chest " Imaging:    Last chest CT in July 2020 showed    IMPRESSION:  1. Anterior scarring the right upper and middle lobes, perhaps related  to prior radiation therapy of the right breast. Mild central  bronchiectasis. No suspicious pulmonary nodules. No evidence of  intrathoracic metastasis.  2. Moderate size hiatal hernia.  3. Hepatic steatosis.  This report was finalized on 07/07/2020 10:33 by Dr. Lakeisha Knox MD.    Assessment:  1. Mild intermittent asthma without complication    2. Cough    3. Non-seasonal allergic rhinitis, unspecified trigger    4. Restless legs syndrome (RLS)    5. Non-smoker    6. Pulmonary scarring        Plan/Recommendations:    1.  Patient will continue Advair 250/50 1 puff twice a day with albuterol nebulizer and rescue inhaler as needed for her asthma.  Her asthma is under good control and she did not have much nocturnal symptoms and occasionally gets short of breath.  2.  Coronary the symptoms she should continue using Claritin, fluticasone nasal spray and Accolate which she already had and she did not need any prescription refill.  3.  He is not on home oxygen.  She will continue follow-up with a primary care provider and she already had her influenza vaccine.  4.  She will return to the pulmonary clinic for a follow-up visit with me in 3 months time or earlier if needed.    Follow up:  12 Months    Time Spent:  35 minutes    I appreciate the opportunity of participating in this patient's care. I would like to thank the PCP for the referral.  Please feel free to contact me with any other questions.    Kevin Agiurre MD   Pulmonologist/Intensivist     Electronically signed by: Kevin Aguirre MD, 10/27/2020 10:44 CDT

## 2020-11-19 ENCOUNTER — TELEPHONE (OUTPATIENT)
Dept: OBSTETRICS AND GYNECOLOGY | Facility: CLINIC | Age: 55
End: 2020-11-19

## 2020-11-20 DIAGNOSIS — J45.998 OTHER ASTHMA: ICD-10-CM

## 2020-11-20 RX ORDER — ZAFIRLUKAST 20 MG/1
20 TABLET, FILM COATED ORAL 2 TIMES DAILY
Qty: 180 TABLET | Refills: 3 | Status: SHIPPED | OUTPATIENT
Start: 2020-11-20 | End: 2021-09-20

## 2020-11-20 NOTE — TELEPHONE ENCOUNTER
Patient called stating that [facility] doesn't have the Zafirlukast 20 mg in stock and wanting to know if you would send the prescription to [facility]

## 2020-12-02 ENCOUNTER — OFFICE VISIT (OUTPATIENT)
Dept: INTERNAL MEDICINE | Facility: CLINIC | Age: 55
End: 2020-12-02

## 2020-12-02 VITALS
HEIGHT: 62 IN | WEIGHT: 179 LBS | TEMPERATURE: 98.4 F | HEART RATE: 95 BPM | DIASTOLIC BLOOD PRESSURE: 84 MMHG | OXYGEN SATURATION: 97 % | SYSTOLIC BLOOD PRESSURE: 128 MMHG | BODY MASS INDEX: 32.94 KG/M2

## 2020-12-02 DIAGNOSIS — E78.00 ELEVATED CHOLESTEROL: ICD-10-CM

## 2020-12-02 DIAGNOSIS — Z79.899 ENCOUNTER FOR LONG-TERM (CURRENT) DRUG USE: ICD-10-CM

## 2020-12-02 DIAGNOSIS — E03.4 HYPOTHYROIDISM DUE TO ACQUIRED ATROPHY OF THYROID: ICD-10-CM

## 2020-12-02 DIAGNOSIS — K76.0 FATTY LIVER: ICD-10-CM

## 2020-12-02 DIAGNOSIS — Z00.00 MEDICARE ANNUAL WELLNESS VISIT, INITIAL: Primary | ICD-10-CM

## 2020-12-02 DIAGNOSIS — Z23 NEED FOR SHINGLES VACCINE: ICD-10-CM

## 2020-12-02 PROCEDURE — G0438 PPPS, INITIAL VISIT: HCPCS | Performed by: NURSE PRACTITIONER

## 2020-12-02 NOTE — PROGRESS NOTES
The ABCs of the Annual Wellness Visit  Initial Medicare Wellness Visit    Chief Complaint   Patient presents with   • Medicare Wellness-Initial Visit     Discuss potassium. Patient wondering about increasing to the 20mg or is she suppose to be taking 10mg       Subjective   History of Present Illness:  Luna Cruz is a 55 y.o. female who presents for an Initial Medicare Wellness Visit.    HEALTH RISK ASSESSMENT    Recent Hospitalizations:  No hospitalization(s) within the last year.    Current Medical Providers:  Patient Care Team:  Che Calvert APRN as PCP - General (Family Medicine)  Klaus Wagner APRN as Nurse Practitioner (Pulmonary Disease)  Ksenia Fox MD as Consulting Physician (Gastroenterology)    Smoking Status:  Social History     Tobacco Use   Smoking Status Never Smoker   Smokeless Tobacco Never Used       Alcohol Consumption:  Social History     Substance and Sexual Activity   Alcohol Use No       Depression Screen:   PHQ-2/PHQ-9 Depression Screening 12/2/2020   Little interest or pleasure in doing things 0   Feeling down, depressed, or hopeless 0   Trouble falling or staying asleep, or sleeping too much -   Feeling tired or having little energy -   Poor appetite or overeating -   Feeling bad about yourself - or that you are a failure or have let yourself or your family down -   Trouble concentrating on things, such as reading the newspaper or watching television -   Moving or speaking so slowly that other people could have noticed. Or the opposite - being so fidgety or restless that you have been moving around a lot more than usual -   Thoughts that you would be better off dead, or of hurting yourself in some way -   Total Score 0   If you checked off any problems, how difficult have these problems made it for you to do your work, take care of things at home, or get along with other people? -       Fall Risk Screen:  VALENTIN Fall Risk Assessment has not been completed.    Health  Habits and Functional and Cognitive Screening:  Functional & Cognitive Status 12/2/2020   Do you have difficulty preparing food and eating? No   Do you have difficulty bathing yourself, getting dressed or grooming yourself? No   Do you have difficulty using the toilet? No   Do you have difficulty moving around from place to place? Yes   Do you have trouble with steps or getting out of a bed or a chair? Yes   Current Diet Well Balanced Diet   Dental Exam Not up to date   Eye Exam Up to date   Exercise (times per week) 7 times per week   Current Exercise Activities Include Walking   Do you need help using the phone?  No   Are you deaf or do you have serious difficulty hearing?  No   Do you need help with transportation? No   Do you need help shopping? No   Do you need help preparing meals?  No   Do you need help with housework?  No   Do you need help with laundry? No   Do you need help taking your medications? No   Do you need help managing money? No   Do you ever drive or ride in a car without wearing a seat belt? No   Have you felt unusual stress, anger or loneliness in the last month? No   Who do you live with? Spouse   If you need help, do you have trouble finding someone available to you? No   Have you been bothered in the last four weeks by sexual problems? No   Do you have difficulty concentrating, remembering or making decisions? No         Does the patient have evidence of cognitive impairment? No    Asprin use counseling:Taking ASA appropriately as indicated    Age-appropriate Screening Schedule:  Refer to the list below for future screening recommendations based on patient's age, sex and/or medical conditions. Orders for these recommended tests are listed in the plan section. The patient has been provided with a written plan.    Health Maintenance   Topic Date Due   • ZOSTER VACCINE (1 of 2) 09/14/2014   • TDAP/TD VACCINES (1 - Tdap) 12/02/2020 (Originally 9/14/1984)   • LIPID PANEL  06/04/2021   •  MAMMOGRAM  06/17/2021   • DXA SCAN  07/06/2022   • PAP SMEAR  10/14/2023   • COLONOSCOPY  07/24/2030   • INFLUENZA VACCINE  Completed          The following portions of the patient's history were reviewed and updated as appropriate: allergies, current medications, past family history, past medical history, past social history, past surgical history and problem list.    Outpatient Medications Prior to Visit   Medication Sig Dispense Refill   • Advair Diskus 250-50 MCG/DOSE DISKUS USE 1 INHALATION TWICE A DAY 3 each 3   • albuterol (PROVENTIL) (2.5 MG/3ML) 0.083% nebulizer solution Take 2.5 mg by nebulization As Needed.     • albuterol sulfate HFA (PROAIR HFA) 108 (90 Base) MCG/ACT inhaler Inhale 2 puffs Every 4 (Four) Hours As Needed for Wheezing. 3 inhaler 3   • alendronate (FOSAMAX) 70 MG tablet Take 70 mg by mouth Every 7 (Seven) Days. Sunday's     • atorvastatin (LIPITOR) 40 MG tablet Take 1 tablet by mouth Daily. 90 tablet 3   • Calcium Citrate-Vitamin D (CALCIUM + D PO) Take 1 tablet by mouth Daily.     • Diclofenac Sodium (PENNSAID TD) Apply 1 application topically to the appropriate area as directed 2 (Two) Times a Day. For back pain     • docusate sodium (COLACE) 100 MG capsule Take 100 mg by mouth As Needed.     • HYDROcodone-acetaminophen (NORCO) 5-325 MG per tablet Take 1 tablet by mouth As Needed.     • ibuprofen (ADVIL,MOTRIN) 400 MG tablet Take 400 mg by mouth Every 8 (Eight) Hours As Needed for Mild Pain .     • levothyroxine (SYNTHROID, LEVOTHROID) 50 MCG tablet Take 1 tablet by mouth Daily. 90 tablet 3   • loratadine (CLARITIN) 10 MG tablet Take 10 mg by mouth Daily As Needed for Allergies.     • losartan (COZAAR) 100 MG tablet Take 1 tablet by mouth Daily. 90 tablet 3   • pantoprazole (PROTONIX) 40 MG EC tablet Take 1 tablet by mouth Daily. 90 tablet 3   • potassium chloride ER (K-TAB) 20 MEQ tablet controlled-release ER tablet Take 1 tablet by mouth Daily. 90 tablet 3   • rOPINIRole (REQUIP) 0.25  MG tablet Take 1 tablet by mouth Every Night. Take 1 hour before bedtime. 90 tablet 3   • theophylline (UNIPHYL) 400 MG 24 hr tablet TAKE 1 TABLET DAILY (Patient taking differently: Take 400 mg by mouth Daily.) 90 tablet 3   • tiZANidine (ZANAFLEX) 4 MG tablet Take 4 mg by mouth Every 8 (Eight) Hours As Needed for Muscle Spasms.     • zafirlukast (ACCOLATE) 20 MG tablet Take 1 tablet by mouth 2 (Two) Times a Day. 180 tablet 3   • calcitriol (ROCALTROL) 0.5 MCG capsule Take 1 capsule by mouth Daily. 90 capsule 3     Facility-Administered Medications Prior to Visit   Medication Dose Route Frequency Provider Last Rate Last Admin   • heparin flush (porcine) 100 UNIT/ML injection 500 Units  500 Units Intracatheter PRN Ki Manriquez MD   500 Units at 09/27/16 1108   • heparin flush (porcine) 100 UNIT/ML injection 500 Units  500 Units Intravenous PRN Malathi Brantley APRN   500 Units at 05/05/17 1009   • sodium chloride 0.9 % flush 10 mL  10 mL Intravenous PRN Ki Manriquez MD   10 mL at 09/27/16 1107   • sodium chloride 0.9 % flush 10 mL  10 mL Intravenous PRN Malathi Brantley APRN   10 mL at 05/05/17 1009       Patient Active Problem List   Diagnosis   • Essential hypertension   • Gastroesophageal reflux disease without esophagitis   • Heartburn   • Cough   • Esophageal dysphagia   • Family history of polyps in the colon   • Family history of malignant neoplasm of breast   • History of malignant neoplasm of breast   • Hyperplastic polyp of transverse colon   • Overweight (BMI 25.0-29.9)   • Iron deficiency anemia   • Traumatic tear of medial meniscus of knee, left, initial encounter   • Chronic asthma with acute exacerbation   • Malignant neoplasm of lower-inner quadrant of left breast in female, estrogen receptor positive (CMS/HCC)   • Elevated cholesterol   • Left carotid bruit   • Myxedema heart disease   • Osteoporosis   • Postmenopausal status   • Vitamin D deficiency   • Breast  cancer (CMS/HCC)   • Fatty liver   • Rectal bleeding   • Mild intermittent asthma without complication   • Non-smoker   • Non-seasonal allergic rhinitis   • Restless legs syndrome (RLS)   • Pulmonary scarring       Advanced Care Planning:  ACP discussion was declined by the patient. Patient does not have an advance directive, declines further assistance.    Review of Systems   Constitutional: Negative for activity change, appetite change, fatigue, fever and unexpected weight change.   HENT: Positive for congestion. Negative for ear discharge, ear pain, hearing loss, postnasal drip, rhinorrhea, sinus pressure, tinnitus, trouble swallowing and voice change.    Eyes: Negative for discharge and visual disturbance.   Respiratory: Negative for apnea, cough and shortness of breath.    Cardiovascular: Negative for chest pain, palpitations and leg swelling.   Gastrointestinal: Negative for abdominal pain, blood in stool, constipation and rectal pain.   Endocrine: Negative for cold intolerance, heat intolerance, polydipsia and polyphagia.   Genitourinary: Negative for decreased urine volume, difficulty urinating, frequency, hematuria and urgency.   Musculoskeletal: Positive for arthralgias, back pain and myalgias. Negative for neck pain and neck stiffness.   Skin: Negative for color change, rash and wound.   Allergic/Immunologic: Positive for environmental allergies.   Neurological: Negative for dizziness, speech difficulty, weakness, numbness and headaches.   Hematological: Negative for adenopathy. Does not bruise/bleed easily.   Psychiatric/Behavioral: Negative for agitation, confusion, decreased concentration and sleep disturbance. The patient is not nervous/anxious.        Compared to one year ago, the patient feels her physical health is better.  Compared to one year ago, the patient feels her mental health is better.    Reviewed chart for potential of high risk medication in the elderly: yes  Reviewed chart for  "potential of harmful drug interactions in the elderly:yes    Objective         Vitals:    12/02/20 0838   BP: 128/84   BP Location: Left arm   Pulse: 95   Temp: 98.4 °F (36.9 °C)   SpO2: 97%   Weight: 81.2 kg (179 lb)   Height: 157.5 cm (62\")   PainSc:   6   PainLoc: Back       Body mass index is 32.74 kg/m².  Discussed the patient's BMI with her. The BMI is above average; BMI management plan is completed.    Physical Exam  Vitals signs and nursing note reviewed.   Constitutional:       Appearance: Normal appearance. She is well-developed. She is obese.   HENT:      Head: Normocephalic and atraumatic.      Right Ear: Hearing, tympanic membrane, ear canal and external ear normal.      Left Ear: Hearing, tympanic membrane, ear canal and external ear normal.      Nose: Nose normal.      Mouth/Throat:      Lips: Pink.      Mouth: Mucous membranes are moist.      Pharynx: Oropharynx is clear. Uvula midline.   Eyes:      General: Lids are normal. Lids are everted, no foreign bodies appreciated. Vision grossly intact.      Extraocular Movements: Extraocular movements intact.      Conjunctiva/sclera: Conjunctivae normal.      Pupils: Pupils are equal, round, and reactive to light.   Neck:      Musculoskeletal: Full passive range of motion without pain, normal range of motion and neck supple.      Thyroid: No thyroid mass or thyromegaly.     Cardiovascular:      Rate and Rhythm: Normal rate and regular rhythm.      Pulses: Normal pulses.      Heart sounds: Normal heart sounds.   Pulmonary:      Effort: Pulmonary effort is normal.      Breath sounds: Examination of the left-upper field reveals wheezing. Wheezing present. No decreased breath sounds.      Comments: Chronic asthma complaints  Chest:      Comments: Deferred exam; GYN breast exam 10/11/2020; mammogram completed  Abdominal:      General: Abdomen is protuberant. Bowel sounds are normal.      Palpations: Abdomen is soft. There is no fluid wave.      Tenderness: There " is abdominal tenderness in the suprapubic area. There is no right CVA tenderness or left CVA tenderness.      Comments: Follow up with GYN with [facility] this month   Musculoskeletal:      Lumbar back: She exhibits decreased range of motion and pain.      Right lower leg: No edema.      Left lower leg: No edema.   Lymphadenopathy:      Cervical: No cervical adenopathy.   Skin:     General: Skin is warm and dry.      Capillary Refill: Capillary refill takes less than 2 seconds.   Neurological:      General: No focal deficit present.      Mental Status: She is alert and oriented to person, place, and time.      Cranial Nerves: Cranial nerves are intact.      Sensory: Sensation is intact.      Motor: Motor function is intact.      Coordination: Coordination is intact.      Gait: Gait is intact.      Deep Tendon Reflexes: Reflexes are normal and symmetric.   Psychiatric:         Attention and Perception: Attention normal.         Mood and Affect: Mood normal.         Speech: Speech normal.         Behavior: Behavior normal. Behavior is cooperative.         Thought Content: Thought content normal.         Cognition and Memory: Cognition normal.         Judgment: Judgment normal.               Assessment/Plan   Medicare Risks and Personalized Health Plan  CMS Preventative Services Quick Reference  Breast Cancer/Mammogram Screening  Cardiovascular risk  Colon Cancer Screening  Dementia/Memory   Fall Risk  Immunizations Discussed/Encouraged (specific immunizations; adacel Tdap and Shingrix )  Inadequate Social Support, Isolation, Loneliness, Lack of Transportation, Financial Difficulties, or Caregiver Stress   Inactivity/Sedentary  Obesity/Overweight   Osteoprorosis Risk  Polypharmacy    The above risks/problems have been discussed with the patient.  Pertinent information has been shared with the patient in the After Visit Summary.  Follow up plans and orders are seen below in the Assessment/Plan Section.    Diagnoses and all  orders for this visit:    1. Medicare annual wellness visit, initial (Primary)    2. Elevated cholesterol  -     Lipid Panel    3. Encounter for long-term (current) drug use  -     ALT  -     AST  -     Basic Metabolic Panel    4. Fatty liver  -     ALT  -     AST    5. Need for shingles vaccine  -     Zoster Vac Recomb Adjuvanted 50 MCG/0.5ML reconstituted suspension; Inject 0.5 mL into the appropriate muscle as directed by prescriber 1 (One) Time for 1 dose.  Dispense: 1 each; Refill: 0    6. Hypothyroidism due to acquired atrophy of thyroid      Follow Up:  Return in about 6 months (around 6/2/2021) for Annual physical. Up-to-date on colonoscopy, mammogram, pap smear, dexa bone scan, and blood pressure is controlled with current therapies. Continue current osteopenia therapies, she denies difficulties with Fosamax. Will need recheck lipid, renal, liver function, and hypothyroidism labs to monitor compliance with therapies. She will continue 10 mg potassium as prescribed. Patient does have chronic back and RLS symptoms related to scoliosis. Managed pain by Dr. German. Dr. Houston found simple cyst in left inner breast, repeating mammogram next month related to history of malignant neoplasm of breast.     An After Visit Summary and PPPS were given to the patient.

## 2020-12-03 LAB
ALT SERPL-CCNC: 20 U/L (ref 1–33)
AST SERPL-CCNC: 21 U/L (ref 1–32)
BUN SERPL-MCNC: 9 MG/DL (ref 6–20)
BUN/CREAT SERPL: 8.9 (ref 7–25)
CALCIUM SERPL-MCNC: 9.2 MG/DL (ref 8.6–10.5)
CHLORIDE SERPL-SCNC: 102 MMOL/L (ref 98–107)
CHOLEST SERPL-MCNC: 162 MG/DL (ref 0–200)
CO2 SERPL-SCNC: 25.2 MMOL/L (ref 22–29)
CREAT SERPL-MCNC: 1.01 MG/DL (ref 0.57–1)
GLUCOSE SERPL-MCNC: 83 MG/DL (ref 65–99)
HDLC SERPL-MCNC: 52 MG/DL (ref 40–60)
LDLC SERPL CALC-MCNC: 85 MG/DL (ref 0–100)
POTASSIUM SERPL-SCNC: 4.5 MMOL/L (ref 3.5–5.2)
SODIUM SERPL-SCNC: 138 MMOL/L (ref 136–145)
TRIGL SERPL-MCNC: 143 MG/DL (ref 0–150)
VLDLC SERPL CALC-MCNC: 25 MG/DL (ref 5–40)

## 2020-12-09 ENCOUNTER — TELEPHONE (OUTPATIENT)
Dept: INTERNAL MEDICINE | Facility: CLINIC | Age: 55
End: 2020-12-09

## 2020-12-09 ENCOUNTER — HOSPITAL ENCOUNTER (EMERGENCY)
Facility: HOSPITAL | Age: 55
Discharge: HOME | End: 2020-12-09
Attending: INTERNAL MEDICINE | Admitting: EMERGENCY MEDICINE

## 2020-12-09 ENCOUNTER — OFFICE VISIT (OUTPATIENT)
Dept: INTERNAL MEDICINE | Facility: CLINIC | Age: 55
End: 2020-12-09

## 2020-12-09 VITALS — HEIGHT: 62 IN | BODY MASS INDEX: 32.94 KG/M2 | TEMPERATURE: 100.5 F | WEIGHT: 179 LBS

## 2020-12-09 VITALS
HEART RATE: 88 BPM | BODY MASS INDEX: 32.94 KG/M2 | SYSTOLIC BLOOD PRESSURE: 150 MMHG | DIASTOLIC BLOOD PRESSURE: 96 MMHG | TEMPERATURE: 100.1 F | RESPIRATION RATE: 18 BRPM | HEIGHT: 62 IN | OXYGEN SATURATION: 100 % | WEIGHT: 179 LBS

## 2020-12-09 DIAGNOSIS — J45.21 MILD INTERMITTENT CHRONIC ASTHMA WITH ACUTE EXACERBATION: ICD-10-CM

## 2020-12-09 DIAGNOSIS — U07.1 COVID-19: Primary | ICD-10-CM

## 2020-12-09 DIAGNOSIS — J06.9 ACUTE URI: Primary | ICD-10-CM

## 2020-12-09 LAB
ALBUMIN SERPL-MCNC: 4 G/DL (ref 3.5–5.2)
ALBUMIN/GLOB SERPL: 1.5 G/DL
ALP SERPL-CCNC: 110 U/L (ref 39–117)
ALT SERPL W P-5'-P-CCNC: 21 U/L (ref 1–33)
ANION GAP SERPL CALCULATED.3IONS-SCNC: 10 MMOL/L (ref 5–15)
AST SERPL-CCNC: 22 U/L (ref 1–32)
BASOPHILS # BLD AUTO: 0.04 10*3/MM3 (ref 0–0.2)
BASOPHILS NFR BLD AUTO: 0.7 % (ref 0–1.5)
BILIRUB SERPL-MCNC: 0.2 MG/DL (ref 0–1.2)
BUN SERPL-MCNC: 12 MG/DL (ref 6–20)
BUN/CREAT SERPL: 10.7 (ref 7–25)
CALCIUM SPEC-SCNC: 8.8 MG/DL (ref 8.6–10.5)
CHLORIDE SERPL-SCNC: 103 MMOL/L (ref 98–107)
CO2 SERPL-SCNC: 23 MMOL/L (ref 22–29)
CREAT SERPL-MCNC: 1.12 MG/DL (ref 0.57–1)
CRP SERPL-MCNC: 0.77 MG/DL (ref 0–0.5)
D-LACTATE SERPL-SCNC: 1.6 MMOL/L (ref 0.5–2)
DEPRECATED RDW RBC AUTO: 41.1 FL (ref 37–54)
EOSINOPHIL # BLD AUTO: 0.06 10*3/MM3 (ref 0–0.4)
EOSINOPHIL NFR BLD AUTO: 1 % (ref 0.3–6.2)
ERYTHROCYTE [DISTWIDTH] IN BLOOD BY AUTOMATED COUNT: 13.1 % (ref 12.3–15.4)
FERRITIN SERPL-MCNC: 636.6 NG/ML (ref 13–150)
GFR SERPL CREATININE-BSD FRML MDRD: 50 ML/MIN/1.73
GLOBULIN UR ELPH-MCNC: 2.7 GM/DL
GLUCOSE SERPL-MCNC: 102 MG/DL (ref 65–99)
HCT VFR BLD AUTO: 42.9 % (ref 34–46.6)
HGB BLD-MCNC: 14.7 G/DL (ref 12–15.9)
IMM GRANULOCYTES # BLD AUTO: 0.02 10*3/MM3 (ref 0–0.05)
IMM GRANULOCYTES NFR BLD AUTO: 0.3 % (ref 0–0.5)
LDH SERPL-CCNC: 213 U/L (ref 135–214)
LYMPHOCYTES # BLD AUTO: 0.38 10*3/MM3 (ref 0.7–3.1)
LYMPHOCYTES NFR BLD AUTO: 6.4 % (ref 19.6–45.3)
MCH RBC QN AUTO: 29.5 PG (ref 26.6–33)
MCHC RBC AUTO-ENTMCNC: 34.3 G/DL (ref 31.5–35.7)
MCV RBC AUTO: 86.1 FL (ref 79–97)
MONOCYTES # BLD AUTO: 0.65 10*3/MM3 (ref 0.1–0.9)
MONOCYTES NFR BLD AUTO: 11 % (ref 5–12)
NEUTROPHILS NFR BLD AUTO: 4.78 10*3/MM3 (ref 1.7–7)
NEUTROPHILS NFR BLD AUTO: 80.6 % (ref 42.7–76)
NRBC BLD AUTO-RTO: 0 /100 WBC (ref 0–0.2)
PLATELET # BLD AUTO: 310 10*3/MM3 (ref 140–450)
PMV BLD AUTO: 10 FL (ref 6–12)
POTASSIUM SERPL-SCNC: 3.7 MMOL/L (ref 3.5–5.2)
PROCALCITONIN SERPL-MCNC: 0.04 NG/ML (ref 0–0.25)
PROT SERPL-MCNC: 6.7 G/DL (ref 6–8.5)
RBC # BLD AUTO: 4.98 10*6/MM3 (ref 3.77–5.28)
SARS-COV-2 RDRP RESP QL NAA+PROBE: DETECTED
SODIUM SERPL-SCNC: 136 MMOL/L (ref 136–145)
WBC # BLD AUTO: 5.93 10*3/MM3 (ref 3.4–10.8)

## 2020-12-09 PROCEDURE — 84145 PROCALCITONIN (PCT): CPT | Performed by: INTERNAL MEDICINE

## 2020-12-09 PROCEDURE — 82728 ASSAY OF FERRITIN: CPT | Performed by: INTERNAL MEDICINE

## 2020-12-09 PROCEDURE — 86140 C-REACTIVE PROTEIN: CPT | Performed by: INTERNAL MEDICINE

## 2020-12-09 PROCEDURE — 83615 LACTATE (LD) (LDH) ENZYME: CPT | Performed by: INTERNAL MEDICINE

## 2020-12-09 PROCEDURE — 25010000002 LEVOFLOXACIN PER 250 MG: Performed by: INTERNAL MEDICINE

## 2020-12-09 PROCEDURE — 96365 THER/PROPH/DIAG IV INF INIT: CPT

## 2020-12-09 PROCEDURE — M0239 BAMLANIVIMAB-XXXX INFUSION: HCPCS | Performed by: INTERNAL MEDICINE

## 2020-12-09 PROCEDURE — 99283 EMERGENCY DEPT VISIT LOW MDM: CPT

## 2020-12-09 PROCEDURE — 99442 PR PHYS/QHP TELEPHONE EVALUATION 11-20 MIN: CPT | Performed by: INTERNAL MEDICINE

## 2020-12-09 PROCEDURE — 96375 TX/PRO/DX INJ NEW DRUG ADDON: CPT

## 2020-12-09 PROCEDURE — 85025 COMPLETE CBC W/AUTO DIFF WBC: CPT | Performed by: INTERNAL MEDICINE

## 2020-12-09 PROCEDURE — 87635 SARS-COV-2 COVID-19 AMP PRB: CPT | Performed by: INTERNAL MEDICINE

## 2020-12-09 PROCEDURE — 80053 COMPREHEN METABOLIC PANEL: CPT | Performed by: INTERNAL MEDICINE

## 2020-12-09 PROCEDURE — 83605 ASSAY OF LACTIC ACID: CPT | Performed by: INTERNAL MEDICINE

## 2020-12-09 PROCEDURE — 87040 BLOOD CULTURE FOR BACTERIA: CPT | Performed by: INTERNAL MEDICINE

## 2020-12-09 PROCEDURE — 25010000006 BAMLANIVIMAB 700 MG/20ML SOLUTION 20 ML VIAL: Performed by: INTERNAL MEDICINE

## 2020-12-09 RX ORDER — ACETAMINOPHEN 500 MG
1000 TABLET ORAL ONCE
Status: COMPLETED | OUTPATIENT
Start: 2020-12-09 | End: 2020-12-09

## 2020-12-09 RX ORDER — SODIUM CHLORIDE 9 MG/ML
30 INJECTION, SOLUTION INTRAVENOUS ONCE
Status: COMPLETED | OUTPATIENT
Start: 2020-12-09 | End: 2020-12-09

## 2020-12-09 RX ORDER — DIPHENHYDRAMINE HYDROCHLORIDE 50 MG/ML
50 INJECTION INTRAMUSCULAR; INTRAVENOUS ONCE AS NEEDED
Status: DISCONTINUED | OUTPATIENT
Start: 2020-12-09 | End: 2020-12-10 | Stop reason: HOSPADM

## 2020-12-09 RX ORDER — METHYLPREDNISOLONE SODIUM SUCCINATE 125 MG/2ML
125 INJECTION, POWDER, LYOPHILIZED, FOR SOLUTION INTRAMUSCULAR; INTRAVENOUS ONCE AS NEEDED
Status: DISCONTINUED | OUTPATIENT
Start: 2020-12-09 | End: 2020-12-10 | Stop reason: HOSPADM

## 2020-12-09 RX ORDER — LEVOFLOXACIN 5 MG/ML
750 INJECTION, SOLUTION INTRAVENOUS ONCE
Status: COMPLETED | OUTPATIENT
Start: 2020-12-09 | End: 2020-12-09

## 2020-12-09 RX ORDER — PREDNISONE 20 MG/1
20 TABLET ORAL 2 TIMES DAILY
Qty: 10 TABLET | Refills: 0 | Status: SHIPPED | OUTPATIENT
Start: 2020-12-09 | End: 2021-01-05

## 2020-12-09 RX ORDER — LABETALOL HYDROCHLORIDE 5 MG/ML
20 INJECTION, SOLUTION INTRAVENOUS ONCE
Status: COMPLETED | OUTPATIENT
Start: 2020-12-09 | End: 2020-12-09

## 2020-12-09 RX ORDER — EPINEPHRINE 0.3 MG/.3ML
0.3 INJECTION SUBCUTANEOUS ONCE AS NEEDED
Status: DISCONTINUED | OUTPATIENT
Start: 2020-12-09 | End: 2020-12-10 | Stop reason: HOSPADM

## 2020-12-09 RX ADMIN — LABETALOL HYDROCHLORIDE 20 MG: 5 INJECTION INTRAVENOUS at 21:28

## 2020-12-09 RX ADMIN — LEVOFLOXACIN 750 MG: 750 INJECTION, SOLUTION INTRAVENOUS at 15:29

## 2020-12-09 RX ADMIN — SODIUM CHLORIDE 700 MG: 9 INJECTION, SOLUTION INTRAVENOUS at 20:11

## 2020-12-09 RX ADMIN — SODIUM CHLORIDE 30 ML: 9 INJECTION, SOLUTION INTRAVENOUS at 21:26

## 2020-12-09 RX ADMIN — ACETAMINOPHEN 1000 MG: 500 TABLET, FILM COATED ORAL at 22:32

## 2020-12-09 RX ADMIN — SODIUM CHLORIDE 1503 ML: 9 INJECTION, SOLUTION INTRAVENOUS at 15:28

## 2020-12-09 NOTE — ED PROVIDER NOTES
Subjective   Ms. Cruz is a 55-year-old female who's  was recently exposed with Covid and she has been having symptoms of Covid for the last 4 days with shortness of breath and this morning developed fever because this comes to the ER for further evaluation.          Review of Systems   Constitutional: Positive for fever. Negative for chills and fatigue.   HENT: Negative for congestion and facial swelling.    Eyes: Negative for photophobia, discharge and visual disturbance.   Respiratory: Positive for shortness of breath. Negative for cough and wheezing.    Cardiovascular: Negative for chest pain, palpitations and leg swelling.   Gastrointestinal: Negative for abdominal pain, diarrhea, nausea and vomiting.   Endocrine: Negative for cold intolerance and heat intolerance.   Genitourinary: Negative for difficulty urinating and urgency.   Musculoskeletal: Negative for arthralgias, joint swelling and myalgias.   Skin: Negative for color change and pallor.   Neurological: Negative for dizziness and light-headedness.   Hematological: Negative for adenopathy. Does not bruise/bleed easily.   Psychiatric/Behavioral: Negative for agitation, behavioral problems and confusion.       Past Medical History:   Diagnosis Date   • Asthma    • Breast cancer (CMS/HCC)    • Chronic back pain    • Disease of thyroid gland    • Drug therapy    • Family history of colonic polyps    • GERD (gastroesophageal reflux disease)    • Hx of radiation therapy    • Hyperlipidemia    • Hypertension    • Malignant neoplasm of upper-inner quadrant of right female breast (CMS/HCC) 9/26/2016   • Osteoporosis    • PONV (postoperative nausea and vomiting)    • Scoliosis        Allergies   Allergen Reactions   • Cephalosporins Hives   • Keflex [Cephalexin] Rash and Unknown (See Comments)       Past Surgical History:   Procedure Laterality Date   • ADENOIDECTOMY     • APPENDECTOMY  1994   • BREAST BIOPSY     • BREAST LUMPECTOMY Right 2008   • CARDIAC  CATHETERIZATION     • CHOLECYSTECTOMY  1993   • COLONOSCOPY  05/03/2013    Erythematous mucosa in the rectum-biopsied; Repeat 4 years    • COLONOSCOPY N/A 6/14/2017    Normal; Repeat 5 years; Procedure: COLONOSCOPY WITH ANESTHESIA;  Surgeon: Ksenia Fox MD;  Location: [facility] ENDOSCOPY;  Service:    • COLONOSCOPY  04/23/2010    Dr. Arzola-Extremely poorly prepped colon   • COLONOSCOPY N/A 7/24/2020    Procedure: COLONOSCOPY WITH ANESTHESIA;  Surgeon: Ksenia Fox MD;  Location: [facility] ENDOSCOPY;  Service: Gastroenterology;  Laterality: N/A;  pre: rectal bleed; fatty liver  post: polyp  Che Calvert APRN   • D&C HYSTEROSCOPY  1993   • DILATATION AND CURETTAGE     • ENDOSCOPY N/A 10/5/2016    Medium-sized HH; Widely patent and non-obstructing Schatzki ring-dilated; Procedure: ESOPHAGOGASTRODUODENOSCOPY WITH ANESTHESIA;  Surgeon: Ksenia Fox MD;  Location: [facility] ENDOSCOPY;  Service:    • ENDOSCOPY N/A 2/27/2019    Normal 1st portion of the duodenum and 2 portion of the duodenum; A few gastric polyps-biopsied; Small HH; Widely patent Schatzki ring-dilated; Abnormal esophageal motility, suspicious for presbyesophagus; Arrange for barium swallow to assess for dysmotility   • ENDOSCOPY N/A 7/24/2020    Procedure: ESOPHAGOGASTRODUODENOSCOPY WITH ANESTHESIA;  Surgeon: Ksenia Fox MD;  Location: [facility] ENDOSCOPY;  Service: Gastroenterology;  Laterality: N/A;  pre: rectal bleed; fatty liver  post: esophagitis; hiatal hernial; balloon dilation 15/18mm  Che Calvert APRN   • HYSTEROSCOPY     • KNEE ARTHROSCOPY Left 11/13/2018    Procedure: LEFT KNEE ARTHROSCOPIC PARTIAL MEDIAL MENISCECTOMY;  Surgeon: Matt Maki MD;  Location: [facility] OR;  Service: Orthopedics   • MEDIPORT INSERTION, SINGLE  2008   • MEDIPORT REMOVAL     • REPLACEMENT TOTAL KNEE Left 05/23/2019   • THYROIDECTOMY  2011    parathyroidectomy   • TONSILLECTOMY AND ADENOIDECTOMY  1970   • TUBAL ABDOMINAL LIGATION         Family History    Problem Relation Age of Onset   • Colon polyps Mother    • Cirrhosis Mother    • Colon polyps Sister 54   • Breast cancer Sister    • No Known Problems Father    • No Known Problems Brother    • No Known Problems Daughter    • No Known Problems Son    • No Known Problems Maternal Grandmother    • No Known Problems Paternal Grandmother    • No Known Problems Maternal Aunt    • No Known Problems Paternal Aunt    • Colon cancer Neg Hx    • Crohn's disease Neg Hx    • Irritable bowel syndrome Neg Hx    • Liver cancer Neg Hx    • Liver disease Neg Hx    • Rectal cancer Neg Hx    • Stomach cancer Neg Hx    • BRCA 1/2 Neg Hx    • Endometrial cancer Neg Hx    • Ovarian cancer Neg Hx    • Uterine cancer Neg Hx    • Esophageal cancer Neg Hx        Social History     Socioeconomic History   • Marital status:      Spouse name: Not on file   • Number of children: Not on file   • Years of education: Not on file   • Highest education level: Not on file   Tobacco Use   • Smoking status: Never Smoker   • Smokeless tobacco: Never Used   Substance and Sexual Activity   • Alcohol use: No   • Drug use: No   • Sexual activity: Yes     Partners: Male     Birth control/protection: None           Objective   Physical Exam  Vitals signs and nursing note reviewed.   Constitutional:       Appearance: She is well-developed.   HENT:      Head: Normocephalic and atraumatic.   Eyes:      Conjunctiva/sclera: Conjunctivae normal.      Pupils: Pupils are equal, round, and reactive to light.   Neck:      Musculoskeletal: Normal range of motion and neck supple.   Cardiovascular:      Rate and Rhythm: Normal rate and regular rhythm.      Heart sounds: Normal heart sounds.   Pulmonary:      Effort: Pulmonary effort is normal.      Breath sounds: Normal breath sounds.   Abdominal:      General: Bowel sounds are normal. There is no distension.      Palpations: Abdomen is soft.   Musculoskeletal: Normal range of motion.   Skin:     General: Skin is  warm and dry.   Neurological:      Mental Status: She is alert and oriented to person, place, and time.      Cranial Nerves: No cranial nerve deficit.   Psychiatric:         Behavior: Behavior normal.         Thought Content: Thought content normal.         Procedures           ED Course  ED Course as of Dec 11 1803   Wed Dec 09, 2020   1746 I discussed with the patient the options for therapy and explained that she could go home after receiving the Bham IV treatment however she would need to monitor her oxygen levels and if her oxygen levels dropped below 90% certainly to return back to the emergency room.    [RW]   2144 Infusion was completed she has had no symptoms. We will watch for one hour if still okay will dc.     [JH]      ED Course User Index  [JH] Mj Reina MD  [RW] Antonio Shaffer MD                                           [facility]    Final diagnoses:   COVID-19            Antonio Shaffer MD  12/09/20 1747       Antonio Shaffer MD  12/11/20 1803

## 2020-12-09 NOTE — TELEPHONE ENCOUNTER
Caller: Luna Cruz    Relationship to patient: Self    Best call back number: [phone]    Concerns or Questions if Applicable: Patient states her  has had COVID for 9 days. She states she is currently experiencing fever and SOB, and has been for 2 days. Please advise.    Callback : [phone]

## 2020-12-09 NOTE — PROGRESS NOTES
Subjective   Luna Cruz is a 55 y.o. female.   Chief Complaint   Patient presents with   • Fever     fever, SOA, HA; cough, runny nose   tested positive friday ( symptoms started wednesday)        You have chosen to receive care through a telephone visit. Do you consent to use a telephone visit for your medical care today? Yes    Patient has a  who is Covid positive she has been short of breath now for couple days she has a history of asthma she is running a temperature of 101.         The following portions of the patient's history were reviewed and updated as appropriate: allergies, current medications, past family history, past medical history, past social history, past surgical history and problem list.    Review of Systems   Constitutional: Positive for fatigue and fever.   Respiratory: Positive for cough and shortness of breath.        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/26/2016   • Osteoporosis    • PONV (postoperative nausea and vomiting)    • Scoliosis       Past Surgical History:   Procedure Laterality Date   • ADENOIDECTOMY     • APPENDECTOMY  1994   • BREAST BIOPSY     • BREAST LUMPECTOMY Right 2008   • CARDIAC CATHETERIZATION     • CHOLECYSTECTOMY  1993   • COLONOSCOPY  05/03/2013    Erythematous mucosa in the rectum-biopsied; Repeat 4 years    • COLONOSCOPY N/A 6/14/2017    Normal; Repeat 5 years; Procedure: COLONOSCOPY WITH ANESTHESIA;  Surgeon: Ksenia Fox MD;  Location: [facility] ENDOSCOPY;  Service:    • COLONOSCOPY  04/23/2010    Dr. Arzola-Extremely poorly prepped colon   • COLONOSCOPY N/A 7/24/2020    Procedure:  COLONOSCOPY WITH ANESTHESIA;  Surgeon: Ksenia Fox MD;  Location: [facility] ENDOSCOPY;  Service: Gastroenterology;  Laterality: N/A;  pre: rectal bleed; fatty liver  post: polyp  Che Calvert APRN   • D&C HYSTEROSCOPY  1993   • DILATATION AND CURETTAGE     • ENDOSCOPY N/A 10/5/2016    Medium-sized HH; Widely patent and non-obstructing Schatzki ring-dilated; Procedure: ESOPHAGOGASTRODUODENOSCOPY WITH ANESTHESIA;  Surgeon: Ksenia Fox MD;  Location: [facility] ENDOSCOPY;  Service:    • ENDOSCOPY N/A 2/27/2019    Normal 1st portion of the duodenum and 2 portion of the duodenum; A few gastric polyps-biopsied; Small HH; Widely patent Schatzki ring-dilated; Abnormal esophageal motility, suspicious for presbyesophagus; Arrange for barium swallow to assess for dysmotility   • ENDOSCOPY N/A 7/24/2020    Procedure: ESOPHAGOGASTRODUODENOSCOPY WITH ANESTHESIA;  Surgeon: Ksenia Fox MD;  Location: [facility] ENDOSCOPY;  Service: Gastroenterology;  Laterality: N/A;  pre: rectal bleed; fatty liver  post: esophagitis; hiatal hernial; balloon dilation 15/18mm  Che Calvert APRN   • HYSTEROSCOPY     • KNEE ARTHROSCOPY Left 11/13/2018    Procedure: LEFT KNEE ARTHROSCOPIC PARTIAL MEDIAL MENISCECTOMY;  Surgeon: Matt Maki MD;  Location: [facility] OR;  Service: Orthopedics   • MEDIPORT INSERTION, SINGLE  2008   • MEDIPORT REMOVAL     • REPLACEMENT TOTAL KNEE Left 05/23/2019   • THYROIDECTOMY  2011    parathyroidectomy   • TONSILLECTOMY AND ADENOIDECTOMY  1970   • TUBAL ABDOMINAL LIGATION          Current Outpatient Medications:   •  Advair Diskus 250-50 MCG/DOSE DISKUS, USE 1 INHALATION TWICE A DAY, Disp: 3 each, Rfl: 3  •  albuterol (PROVENTIL) (2.5 MG/3ML) 0.083% nebulizer solution, Take 2.5 mg by nebulization As Needed., Disp: , Rfl:   •  albuterol sulfate HFA (PROAIR HFA) 108 (90 Base) MCG/ACT inhaler, Inhale 2 puffs Every 4 (Four) Hours As Needed for Wheezing., Disp: 3 inhaler, Rfl: 3  •  alendronate (FOSAMAX) 70 MG  tablet, Take 70 mg by mouth Every 7 (Seven) Days. Sunday's, Disp: , Rfl:   •  atorvastatin (LIPITOR) 40 MG tablet, Take 1 tablet by mouth Daily., Disp: 90 tablet, Rfl: 3  •  calcitriol (ROCALTROL) 0.5 MCG capsule, Take 1 capsule by mouth Daily., Disp: 90 capsule, Rfl: 3  •  Calcium Citrate-Vitamin D (CALCIUM + D PO), Take 1 tablet by mouth Daily., Disp: , Rfl:   •  Diclofenac Sodium (PENNSAID TD), Apply 1 application topically to the appropriate area as directed 2 (Two) Times a Day. For back pain, Disp: , Rfl:   •  docusate sodium (COLACE) 100 MG capsule, Take 100 mg by mouth As Needed., Disp: , Rfl:   •  HYDROcodone-acetaminophen (NORCO) 5-325 MG per tablet, Take 1 tablet by mouth As Needed., Disp: , Rfl:   •  ibuprofen (ADVIL,MOTRIN) 400 MG tablet, Take 400 mg by mouth Every 8 (Eight) Hours As Needed for Mild Pain ., Disp: , Rfl:   •  levothyroxine (SYNTHROID, LEVOTHROID) 50 MCG tablet, Take 1 tablet by mouth Daily., Disp: 90 tablet, Rfl: 3  •  loratadine (CLARITIN) 10 MG tablet, Take 10 mg by mouth Daily As Needed for Allergies., Disp: , Rfl:   •  losartan (COZAAR) 100 MG tablet, Take 1 tablet by mouth Daily., Disp: 90 tablet, Rfl: 3  •  pantoprazole (PROTONIX) 40 MG EC tablet, Take 1 tablet by mouth Daily., Disp: 90 tablet, Rfl: 3  •  potassium chloride ER (K-TAB) 20 MEQ tablet controlled-release ER tablet, Take 1 tablet by mouth Daily., Disp: 90 tablet, Rfl: 3  •  rOPINIRole (REQUIP) 0.25 MG tablet, Take 1 tablet by mouth Every Night. Take 1 hour before bedtime., Disp: 90 tablet, Rfl: 3  •  theophylline (UNIPHYL) 400 MG 24 hr tablet, TAKE 1 TABLET DAILY (Patient taking differently: Take 400 mg by mouth Daily.), Disp: 90 tablet, Rfl: 3  •  tiZANidine (ZANAFLEX) 4 MG tablet, Take 4 mg by mouth Every 8 (Eight) Hours As Needed for Muscle Spasms., Disp: , Rfl:   •  zafirlukast (ACCOLATE) 20 MG tablet, Take 1 tablet by mouth 2 (Two) Times a Day., Disp: 180 tablet, Rfl: 3  No current facility-administered medications  for this visit.     Facility-Administered Medications Ordered in Other Visits:   •  heparin flush (porcine) 100 UNIT/ML injection 500 Units, 500 Units, Intracatheter, PRN, Ki Manriquez MD, 500 Units at 09/27/16 1108  •  heparin flush (porcine) 100 UNIT/ML injection 500 Units, 500 Units, Intravenous, PRN, Malathi Brantley APRN, 500 Units at 05/05/17 1009  •  sodium chloride 0.9 % flush 10 mL, 10 mL, Intravenous, PRN, Ki Manriquez MD, 10 mL at 09/27/16 1107  •  sodium chloride 0.9 % flush 10 mL, 10 mL, Intravenous, PRN, Malathi Brantley APRN, 10 mL at 05/05/17 1009     Vitals:    12/09/20 1344   Temp: 100.5 °F (38.1 °C)         12/09/20  1344   Weight: 81.2 kg (179 lb)     Patient's Body mass index is 32.74 kg/m². BMI is .      Physical Exam    This visit has been rescheduled as a phone visit to comply with patient safety concerns in accordance with CDC recommendations. Total time of discussion was 12 minutes.    At this point patient is short of breath when I am talking to her by phone and now that we have antibody treatment available to our patients with COVID-19 and suggested she go the emergency room due to the shortness of breath and be evaluated.  If it were not for shortness of breath she might be a candidate for outpatient antibody therapy.      Assessment/Plan   There are no diagnoses linked to this encounter.

## 2020-12-10 ENCOUNTER — EPISODE CHANGES (OUTPATIENT)
Dept: CASE MANAGEMENT | Facility: OTHER | Age: 55
End: 2020-12-10

## 2020-12-10 ENCOUNTER — PATIENT OUTREACH (OUTPATIENT)
Dept: CASE MANAGEMENT | Facility: OTHER | Age: 55
End: 2020-12-10

## 2020-12-10 NOTE — ED PROVIDER NOTES
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) 9/26/2016   • Osteoporosis    • PONV (postoperative nausea and vomiting)    • Scoliosis        Allergies   Allergen Reactions   • Cephalosporins Hives   • Keflex [Cephalexin] Rash and Unknown (See Comments)       Past Surgical History:   Procedure Laterality Date   • ADENOIDECTOMY     • APPENDECTOMY  1994   • BREAST BIOPSY     • BREAST LUMPECTOMY Right 2008   • CARDIAC CATHETERIZATION     • CHOLECYSTECTOMY  1993   • COLONOSCOPY  05/03/2013    Erythematous mucosa in the rectum-biopsied; Repeat 4 years    • COLONOSCOPY N/A 6/14/2017    Normal; Repeat 5 years; Procedure: COLONOSCOPY WITH ANESTHESIA;  Surgeon: Ksenia Fox MD;  Location: [facility] ENDOSCOPY;  Service:    • COLONOSCOPY  04/23/2010    Dr. Arzola-Extremely poorly prepped colon   • COLONOSCOPY N/A 7/24/2020    Procedure: COLONOSCOPY WITH ANESTHESIA;  Surgeon: Ksenia Fox MD;  Location: [facility] ENDOSCOPY;  Service: Gastroenterology;  Laterality: N/A;  pre: rectal bleed; fatty liver  post: polyp  Che Calvert, HEATHER   • D&C HYSTEROSCOPY  1993   • DILATATION AND CURETTAGE     • ENDOSCOPY N/A 10/5/2016    Medium-sized HH; Widely patent and non-obstructing Schatzki ring-dilated; Procedure: ESOPHAGOGASTRODUODENOSCOPY WITH ANESTHESIA;  Surgeon: Ksenia Fox MD;  Location: [facility] ENDOSCOPY;  Service:    • ENDOSCOPY N/A 2/27/2019    Normal 1st portion of the duodenum and 2 portion of the duodenum; A few gastric polyps-biopsied; Small HH; Widely patent Schatzki ring-dilated; Abnormal esophageal motility, suspicious for  presbyesophagus; Arrange for barium swallow to assess for dysmotility   • ENDOSCOPY N/A 7/24/2020    Procedure: ESOPHAGOGASTRODUODENOSCOPY WITH ANESTHESIA;  Surgeon: Ksenia Fox MD;  Location: [facility] ENDOSCOPY;  Service: Gastroenterology;  Laterality: N/A;  pre: rectal bleed; fatty liver  post: esophagitis; hiatal hernial; balloon dilation 15/18mm  Che Calvert APRN   • HYSTEROSCOPY     • KNEE ARTHROSCOPY Left 11/13/2018    Procedure: LEFT KNEE ARTHROSCOPIC PARTIAL MEDIAL MENISCECTOMY;  Surgeon: Matt Maki MD;  Location: [facility] OR;  Service: Orthopedics   • MEDIPORT INSERTION, SINGLE  2008   • MEDIPORT REMOVAL     • REPLACEMENT TOTAL KNEE Left 05/23/2019   • THYROIDECTOMY  2011    parathyroidectomy   • TONSILLECTOMY AND ADENOIDECTOMY  1970   • TUBAL ABDOMINAL LIGATION         Family History   Problem Relation Age of Onset   • Colon polyps Mother    • Cirrhosis Mother    • Colon polyps Sister 54   • Breast cancer Sister    • No Known Problems Father    • No Known Problems Brother    • No Known Problems Daughter    • No Known Problems Son    • No Known Problems Maternal Grandmother    • No Known Problems Paternal Grandmother    • No Known Problems Maternal Aunt    • No Known Problems Paternal Aunt    • Colon cancer Neg Hx    • Crohn's disease Neg Hx    • Irritable bowel syndrome Neg Hx    • Liver cancer Neg Hx    • Liver disease Neg Hx    • Rectal cancer Neg Hx    • Stomach cancer Neg Hx    • BRCA 1/2 Neg Hx    • Endometrial cancer Neg Hx    • Ovarian cancer Neg Hx    • Uterine cancer Neg Hx    • Esophageal cancer Neg Hx        Social History     Socioeconomic History   • Marital status:      Spouse name: Not on file   • Number of children: Not on file   • Years of education: Not on file   • Highest education level: Not on file   Tobacco Use   • Smoking status: Never Smoker   • Smokeless tobacco: Never Used   Substance and Sexual Activity   • Alcohol use: No   • Drug use: No   • Sexual  activity: Yes     Partners: Male     Birth control/protection: None           Objective   Physical Exam    Procedures           ED Course  ED Course as of Dec 09 2145   Wed Dec 09, 2020   1746 I discussed with the patient the options for therapy and explained that she could go home after receiving the [place] IV treatment however she would need to monitor her oxygen levels and if her oxygen levels dropped below 90% certainly to return back to the emergency room.    [RW]   2144 Infusion was completed she has had no symptoms. We will watch for one hour if still okay will dc.     [JH]      ED Course User Index  [JH] Mj Reina MD  [RW] Antonio Shaffer MD                                           [facility]    Final diagnoses:   COVID-19            Mj Reina MD  12/09/20 2145

## 2020-12-10 NOTE — OUTREACH NOTE
"ED Potential Covid Discharge Follow-up    Patient seen at [facility] ED 12-9-2020 for an exposure to COVID-19;she tested positive for COVID-19. She reports a low grade temperature, fatigue, and a non productive cough. She stated the prednisone prescribed in ED is \"on its way\" being delivered to her home. She has plenty of supplies in the home. Consuming fluids;no diarrhea or N/V. Pulse oximeter remains in the upper 90% range;educated to return to ED with pulse oximeter less than 90% or with any worsening symptoms. Discussed 24/7 nurse triage line and COVID-19 hotline. 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     Sonia Tabor RN  Ambulatory     12/10/2020, 15:09 CST      "

## 2020-12-14 LAB
BACTERIA SPEC AEROBE CULT: NORMAL
BACTERIA SPEC AEROBE CULT: NORMAL

## 2020-12-21 ENCOUNTER — HOSPITAL ENCOUNTER (OUTPATIENT)
Dept: WOMENS IMAGING | Age: 55
Discharge: HOME | End: 2020-12-21
Payer: MEDICARE

## 2020-12-21 ENCOUNTER — OFFICE VISIT (OUTPATIENT)
Dept: SURGERY | Age: 55
End: 2020-12-21
Payer: MEDICARE

## 2020-12-21 VITALS
OXYGEN SATURATION: 98 % | SYSTOLIC BLOOD PRESSURE: 134 MMHG | HEIGHT: 62 IN | BODY MASS INDEX: 32.2 KG/M2 | WEIGHT: 175 LBS | HEART RATE: 95 BPM | DIASTOLIC BLOOD PRESSURE: 78 MMHG

## 2020-12-21 DIAGNOSIS — D48.60 NEOPLASM OF UNCERTAIN BEHAVIOR OF BREAST, UNSPECIFIED LATERALITY: ICD-10-CM

## 2020-12-21 PROCEDURE — G8427 DOCREV CUR MEDS BY ELIG CLIN: HCPCS | Performed by: PHYSICIAN ASSISTANT

## 2020-12-21 PROCEDURE — G8417 CALC BMI ABV UP PARAM F/U: HCPCS | Performed by: PHYSICIAN ASSISTANT

## 2020-12-21 PROCEDURE — G8484 FLU IMMUNIZE NO ADMIN: HCPCS | Performed by: PHYSICIAN ASSISTANT

## 2020-12-21 PROCEDURE — 76642 ULTRASOUND BREAST LIMITED: CPT

## 2020-12-21 PROCEDURE — 99213 OFFICE O/P EST LOW 20 MIN: CPT | Performed by: PHYSICIAN ASSISTANT

## 2020-12-21 PROCEDURE — 1036F TOBACCO NON-USER: CPT | Performed by: PHYSICIAN ASSISTANT

## 2020-12-21 PROCEDURE — 77065 DX MAMMO INCL CAD UNI: CPT

## 2020-12-21 PROCEDURE — G9899 SCRN MAM PERF RSLTS DOC: HCPCS | Performed by: PHYSICIAN ASSISTANT

## 2020-12-21 PROCEDURE — 3017F COLORECTAL CA SCREEN DOC REV: CPT | Performed by: PHYSICIAN ASSISTANT

## 2020-12-21 NOTE — PROGRESS NOTES
HISTORY OF PRESENT ILLNESS:   Mrs. Abbey Perez is a 63year old female who is status post 8-7-2008 Right Partial Mastectomy with re-excision and right axillary node dissection with a total of 1 of 11 nodes positive for metastatic malignancy. Grade 1, ER/SC +   She has no new complaints today. She denies weight loss or any other systemic symptoms. EXAMINATION: CM DIGITAL DIAGNOSTIC UNILATERAL LEFT, US BREAST LIMITED   LEFT 12/21/2020 9:06 AM   HISTORY: Six-month follow-up probable benign finding in the left   breast. Personal history of breast cancer status post right   lumpectomy. Comparison diagnostic mammogram and ultrasound 6/17/2020   FINDINGS:   Unilateral left CC, MLO and full lateral views were obtained in   addition to CC and MLO spot compression views. Spot compression views   of the lower outer left breast demonstrate stable appearance of an   isodense, oval, partially obscured mass measuring 5-6 mm in size. No   new mass, architectural distortion, or suspicious appearing   microcalcifications are present. Targeted left breast ultrasound was performed for complete evaluation. Evaluation was performed from the 3-6 o'clock positions. Normal-appearing tissue is seen without solid or suspicious mass. No   ultrasound correlate is seen for the mammographic finding. This is   similar to the prior exam.       Impression   1. Mammographically stable, probably benign lesion in the left breast   without ultrasound correlate for which six-month follow-up diagnostic   mammogram is recommended to document stability. Today marks 6 months   of stability. BI-RADS Category 3-probably benign. 2. Six-month follow-up will correspond with the patient's annual   bilateral exam, due in June 2021. PHYSICAL EXAM:   The right lumpectomy incision is looking good. There are moderate fibrocystic changes throughout both breasts. There are no dominant masses, no skin or nipple changes, and no axillary adenopathy.  There is nothing suspicious for new carcinoma and no evidence of local tumor recurrence. ASSESSMENT: Abnormal mammogram-stable    PLAN:  I will plan to see her in 6 months with a bilateral mammogram.  She will call sooner with any concerns. 15 minutes spent, which includes face to face with patient, record review, evaluation, planning, and education. I spent over 50% of this visit counseling patient.

## 2020-12-22 DIAGNOSIS — N63.20 LEFT BREAST MASS: ICD-10-CM

## 2020-12-22 DIAGNOSIS — Z85.3 HISTORY OF MALIGNANT NEOPLASM OF BREAST: Primary | ICD-10-CM

## 2020-12-28 ENCOUNTER — OFFICE VISIT (OUTPATIENT)
Dept: INTERNAL MEDICINE | Facility: CLINIC | Age: 55
End: 2020-12-28

## 2020-12-28 VITALS — SYSTOLIC BLOOD PRESSURE: 140 MMHG | DIASTOLIC BLOOD PRESSURE: 80 MMHG | OXYGEN SATURATION: 96 %

## 2020-12-28 DIAGNOSIS — U07.1 COVID-19: Primary | ICD-10-CM

## 2020-12-28 DIAGNOSIS — J45.21 MILD INTERMITTENT CHRONIC ASTHMA WITH ACUTE EXACERBATION: ICD-10-CM

## 2020-12-28 DIAGNOSIS — I10 ESSENTIAL HYPERTENSION: ICD-10-CM

## 2020-12-28 PROCEDURE — 99442 PR PHYS/QHP TELEPHONE EVALUATION 11-20 MIN: CPT | Performed by: NURSE PRACTITIONER

## 2020-12-29 ENCOUNTER — OFFICE VISIT (OUTPATIENT)
Dept: OBSTETRICS AND GYNECOLOGY | Facility: CLINIC | Age: 55
End: 2020-12-29

## 2020-12-29 VITALS
BODY MASS INDEX: 32.76 KG/M2 | SYSTOLIC BLOOD PRESSURE: 112 MMHG | WEIGHT: 178 LBS | DIASTOLIC BLOOD PRESSURE: 70 MMHG | HEIGHT: 62 IN

## 2020-12-29 DIAGNOSIS — Z78.9 NON-SMOKER: ICD-10-CM

## 2020-12-29 DIAGNOSIS — R93.89 INCREASED ENDOMETRIAL STRIPE THICKNESS: Primary | ICD-10-CM

## 2020-12-29 PROCEDURE — 99213 OFFICE O/P EST LOW 20 MIN: CPT | Performed by: NURSE PRACTITIONER

## 2020-12-29 NOTE — PROGRESS NOTES
"Subjective   Luna Kriss Cruz is a 55 y.o. female.   Chief Complaint   Patient presents with   • Follow-up     Covid 19 follow up, feeling better, still having SOB-relating to asthma.       You have chosen to receive care through a telephone visit. Do you consent to use a telephone visit for your medical care today? Yes    Ms. Cruz is a pleasant 55-year-old female who presents via telephone visit to follow-up on Covid pneumonia.  Patient reports that she is doing very well overall and does have intermittent shortness of breath but states that this is her normal and related to her asthma.  Patient has a SPO2 sensor and indicates that it has been greater than 96% without oxygen.  Patient reports that she is tolerating exercise and denies excessive fatigue greater than her usual.  Her blood pressure is elevated but much improved from last visit.  She denies chest pain, worsening shortness of breath, palpitations, or excessive fatigue.  She states today \"I feel a lot better overall and have no other complaints today \".       The following portions of the patient's history were reviewed and updated as appropriate: allergies, current medications, past family history, past medical history, past social history, past surgical history and problem list.    Review of Systems   Constitutional: Negative for activity change, appetite change, fatigue, fever, unexpected weight gain and unexpected weight loss.   HENT: Negative for swollen glands, trouble swallowing and voice change.    Eyes: Negative for blurred vision and visual disturbance.   Respiratory: Positive for shortness of breath. Negative for cough.         Chronic asthma no change from chronic status.   Cardiovascular: Negative for chest pain, palpitations and leg swelling.   Gastrointestinal: Negative for abdominal pain, constipation, diarrhea, nausea, vomiting and indigestion.   Endocrine: Negative for cold intolerance, heat intolerance, polydipsia and polyphagia. "   Genitourinary: Negative for dysuria and frequency.   Musculoskeletal: Negative for arthralgias, back pain, joint swelling and neck pain.   Skin: Negative for color change, rash and skin lesions.   Neurological: Negative for dizziness, weakness, headache, memory problem and confusion.   Hematological: Does not bruise/bleed easily.   Psychiatric/Behavioral: Negative for agitation, hallucinations and suicidal ideas. The patient is not nervous/anxious.        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 Arzola-Extremely poorly prepped colon   • COLONOSCOPY N/A 7/24/2020    Hemorrhoids found on perianal exam; One 4mm hyperplastic polyp in the transverse colon; Normal mucosa in the entire examined colon-biopsied; Non-bleeding internal hemorrhoids; Repeat 5 years   • D&C HYSTEROSCOPY  1993   • DILATATION AND CURETTAGE     • ENDOSCOPY N/A 10/5/2016    Medium-sized HH; Widely patent and non-obstructing Schatzki ring-dilated; Procedure: ESOPHAGOGASTRODUODENOSCOPY WITH ANESTHESIA;  Surgeon: Ksenia Fox MD;  Location: [facility] ENDOSCOPY;  Service:    • ENDOSCOPY N/A 2/27/2019     Normal 1st portion of the duodenum and 2 portion of the duodenum; A few gastric polyps-biopsied; Small HH; Widely patent Schatzki ring-dilated; Abnormal esophageal motility, suspicious for presbyesophagus; Arrange for barium swallow to assess for dysmotility   • ENDOSCOPY N/A 7/24/2020    Medium-sized HH; LA Grade A reflux esophagitis; No endoscopic esophageal abnormality to explain patient's dysphagia-Esophagus dilated; Normal stomach; Normal examined duodenum; No specimens collected   • HYSTEROSCOPY     • KNEE ARTHROSCOPY Left 11/13/2018    Procedure: LEFT KNEE ARTHROSCOPIC PARTIAL MEDIAL MENISCECTOMY;  Surgeon: Matt Maki MD;  Location: [facility];  Service: Orthopedics   • MEDIPORT INSERTION, SINGLE  2008   • MEDIPORT REMOVAL     • REPLACEMENT TOTAL KNEE Left 05/23/2019   • THYROIDECTOMY  2011    parathyroidectomy   • TONSILLECTOMY AND ADENOIDECTOMY  1970   • TUBAL ABDOMINAL LIGATION          Current Outpatient Medications:   •  Advair Diskus 250-50 MCG/DOSE DISKUS, USE 1 INHALATION TWICE A DAY, Disp: 3 each, Rfl: 3  •  albuterol (PROVENTIL) (2.5 MG/3ML) 0.083% nebulizer solution, Take 2.5 mg by nebulization As Needed., Disp: , Rfl:   •  albuterol sulfate HFA (PROAIR HFA) 108 (90 Base) MCG/ACT inhaler, Inhale 2 puffs Every 4 (Four) Hours As Needed for Wheezing., Disp: 3 inhaler, Rfl: 3  •  alendronate (FOSAMAX) 70 MG tablet, Take 70 mg by mouth Every 7 (Seven) Days. Sunday's, Disp: , Rfl:   •  atorvastatin (LIPITOR) 40 MG tablet, Take 1 tablet by mouth Daily., Disp: 90 tablet, Rfl: 3  •  calcitriol (ROCALTROL) 0.5 MCG capsule, Take 1 capsule by mouth Daily., Disp: 90 capsule, Rfl: 3  •  Calcium Citrate-Vitamin D (CALCIUM + D PO), Take 1 tablet by mouth Daily., Disp: , Rfl:   •  Diclofenac Sodium (PENNSAID TD), Apply 1 application topically to the appropriate area as directed 2 (Two) Times a Day. For back pain, Disp: , Rfl:   •  docusate sodium (COLACE) 100 MG capsule, Take 100 mg by mouth As Needed.,  Disp: , Rfl:   •  HYDROcodone-acetaminophen (NORCO) 5-325 MG per tablet, Take 1 tablet by mouth As Needed., Disp: , Rfl:   •  ibuprofen (ADVIL,MOTRIN) 400 MG tablet, Take 400 mg by mouth Every 8 (Eight) Hours As Needed for Mild Pain ., Disp: , Rfl:   •  levothyroxine (SYNTHROID, LEVOTHROID) 50 MCG tablet, Take 1 tablet by mouth Daily., Disp: 90 tablet, Rfl: 3  •  loratadine (CLARITIN) 10 MG tablet, Take 10 mg by mouth Daily As Needed for Allergies., Disp: , Rfl:   •  losartan (COZAAR) 100 MG tablet, Take 1 tablet by mouth Daily., Disp: 90 tablet, Rfl: 3  •  pantoprazole (PROTONIX) 40 MG EC tablet, Take 1 tablet by mouth Daily., Disp: 90 tablet, Rfl: 3  •  potassium chloride ER (K-TAB) 20 MEQ tablet controlled-release ER tablet, Take 1 tablet by mouth Daily., Disp: 90 tablet, Rfl: 3  •  predniSONE (DELTASONE) 20 MG tablet, Take 1 tablet by mouth 2 (Two) Times a Day., Disp: 10 tablet, Rfl: 0  •  rOPINIRole (REQUIP) 0.25 MG tablet, Take 1 tablet by mouth Every Night. Take 1 hour before bedtime., Disp: 90 tablet, Rfl: 3  •  theophylline (UNIPHYL) 400 MG 24 hr tablet, TAKE 1 TABLET DAILY (Patient taking differently: Take 400 mg by mouth Daily.), Disp: 90 tablet, Rfl: 3  •  tiZANidine (ZANAFLEX) 4 MG tablet, Take 4 mg by mouth Every 8 (Eight) Hours As Needed for Muscle Spasms., Disp: , Rfl:   •  zafirlukast (ACCOLATE) 20 MG tablet, Take 1 tablet by mouth 2 (Two) Times a Day., Disp: 180 tablet, Rfl: 3  No current facility-administered medications for this visit.     Facility-Administered Medications Ordered in Other Visits:   •  heparin flush (porcine) 100 UNIT/ML injection 500 Units, 500 Units, Intracatheter, PRN, Ki Manriquez MD, 500 Units at 09/27/16 1108  •  heparin flush (porcine) 100 UNIT/ML injection 500 Units, 500 Units, Intravenous, PRN, Mlaathi Brantley APRN, 500 Units at 05/05/17 1009  •  sodium chloride 0.9 % flush 10 mL, 10 mL, Intravenous, PRN, Ki Manriquez MD, 10 mL at  09/27/16 1107  •  sodium chloride 0.9 % flush 10 mL, 10 mL, Intravenous, PRN, Malathi Brantley, APRN, 10 mL at 05/05/17 1009     Vitals:    12/28/20 1625   BP: 140/80   SpO2: 96%         Assessment/Plan   Diagnoses and all orders for this visit:    1. COVID-19 (Primary)  Comments:  symptoms have resolved     2. Essential hypertension    3. Mild intermittent chronic asthma with acute exacerbation    This visit has been rescheduled as a phone visit to comply with patient safety concerns in accordance with CDC recommendations. Total time of discussion was 11 minutes.    Will continue current asthma regiment and patient reports shortness of breath has not worsened and stating that she is feeling a lot better does not require further assessment at this time.  Advised her on the signs and symptoms of worsening Covid symptoms and would need to be reevaluated with possible chest x-ray and an office visit.  Blood pressure is currently controlled, advised to call the office if she notices blood pressure trending greater than 140/90 for medication adjustment.  Will follow up in 6 months for next scheduled visit.

## 2020-12-30 DIAGNOSIS — N63.20 LEFT BREAST MASS: Primary | ICD-10-CM

## 2020-12-30 DIAGNOSIS — R92.8 OTHER ABNORMAL AND INCONCLUSIVE FINDINGS ON DIAGNOSTIC IMAGING OF BREAST: ICD-10-CM

## 2021-01-04 DIAGNOSIS — J45.20 MILD INTERMITTENT ASTHMA WITHOUT COMPLICATION: Primary | ICD-10-CM

## 2021-01-04 RX ORDER — ALBUTEROL SULFATE 90 UG/1
2 AEROSOL, METERED RESPIRATORY (INHALATION) EVERY 4 HOURS PRN
Qty: 54 G | Refills: 3 | Status: SHIPPED | OUTPATIENT
Start: 2021-01-04 | End: 2021-01-26

## 2021-01-04 NOTE — TELEPHONE ENCOUNTER
Pharmacy sent a request for refills on Proair HFA. Patient was last seen on 10/27/20 and has a return appointment for 11/9/21.  Refill request sent to Dr. Aguirre.

## 2021-01-05 ENCOUNTER — OFFICE VISIT (OUTPATIENT)
Dept: OBSTETRICS AND GYNECOLOGY | Facility: CLINIC | Age: 56
End: 2021-01-05

## 2021-01-05 VITALS
DIASTOLIC BLOOD PRESSURE: 82 MMHG | SYSTOLIC BLOOD PRESSURE: 130 MMHG | BODY MASS INDEX: 31.83 KG/M2 | HEIGHT: 62 IN | WEIGHT: 173 LBS

## 2021-01-05 DIAGNOSIS — R93.89 ENDOMETRIAL THICKENING ON ULTRASOUND: Primary | ICD-10-CM

## 2021-01-05 PROCEDURE — 99214 OFFICE O/P EST MOD 30 MIN: CPT | Performed by: OBSTETRICS & GYNECOLOGY

## 2021-01-05 RX ORDER — SODIUM CHLORIDE 0.9 % (FLUSH) 0.9 %
1-10 SYRINGE (ML) INJECTION AS NEEDED
Status: CANCELLED | OUTPATIENT
Start: 2021-01-05

## 2021-01-05 RX ORDER — SODIUM CHLORIDE 0.9 % (FLUSH) 0.9 %
3 SYRINGE (ML) INJECTION EVERY 12 HOURS SCHEDULED
Status: CANCELLED | OUTPATIENT
Start: 2021-01-05

## 2021-01-05 RX ORDER — SODIUM CHLORIDE 9 MG/ML
100 INJECTION, SOLUTION INTRAVENOUS CONTINUOUS
Status: CANCELLED | OUTPATIENT
Start: 2021-01-05

## 2021-01-05 NOTE — PROGRESS NOTES
"Luna Cruz is a 55 y.o. female here today to discuss treatment of an endometrial polyp.  Her Pap smear in October showed elevated estrogen for age, but was otherwise normal.  She denies vaginal bleeding but has had some intermittent pelvic pain.    Visit Vitals  /82 (BP Location: Left arm, Patient Position: Sitting)   Ht 157.5 cm (62\")   Wt 78.5 kg (173 lb)   LMP 06/14/2008 Comment: per pt dr bundy confirmed she has been through menopause but is having some post menopausal bleeding that recently started   BMI 31.64 kg/m²     Pleasant female no acute distress  Mood and affect normal  Breathing unlabored    CBC normal 12/9    US 12/29 showed 8mm with possible polyp, normal ovaries    Assessment: Endometrial thickening on ultrasound    We discussed definitive diagnosis of a polyp can be made with hysteroscopy.  She declines office hysteroscopy and prefers surgical evaluation and treatment with hysteroscopy, D&C.  We discussed the procedure including risks of bleeding, infection, uterine perforation, and damage to surrounding structures.  All of her questions were answered and preoperative orders been placed.  She is scheduled for surgery on January 13.          "

## 2021-01-07 ENCOUNTER — OFFICE VISIT (OUTPATIENT)
Dept: GASTROENTEROLOGY | Facility: CLINIC | Age: 56
End: 2021-01-07

## 2021-01-07 ENCOUNTER — APPOINTMENT (OUTPATIENT)
Dept: PREADMISSION TESTING | Facility: HOSPITAL | Age: 56
End: 2021-01-07

## 2021-01-07 VITALS
TEMPERATURE: 97.7 F | DIASTOLIC BLOOD PRESSURE: 78 MMHG | OXYGEN SATURATION: 99 % | BODY MASS INDEX: 32.2 KG/M2 | SYSTOLIC BLOOD PRESSURE: 132 MMHG | HEIGHT: 62 IN | WEIGHT: 175 LBS | HEART RATE: 106 BPM

## 2021-01-07 VITALS
HEIGHT: 61 IN | RESPIRATION RATE: 18 BRPM | OXYGEN SATURATION: 96 % | WEIGHT: 177.47 LBS | DIASTOLIC BLOOD PRESSURE: 80 MMHG | BODY MASS INDEX: 33.51 KG/M2 | HEART RATE: 105 BPM | SYSTOLIC BLOOD PRESSURE: 151 MMHG

## 2021-01-07 DIAGNOSIS — Z83.71 FAMILY HISTORY OF POLYPS IN THE COLON: ICD-10-CM

## 2021-01-07 DIAGNOSIS — R13.19 ESOPHAGEAL DYSPHAGIA: Primary | ICD-10-CM

## 2021-01-07 DIAGNOSIS — K62.5 RECTAL BLEEDING: ICD-10-CM

## 2021-01-07 DIAGNOSIS — E66.3 OVERWEIGHT (BMI 25.0-29.9): ICD-10-CM

## 2021-01-07 DIAGNOSIS — K76.0 FATTY LIVER: ICD-10-CM

## 2021-01-07 DIAGNOSIS — K63.5 HYPERPLASTIC POLYP OF TRANSVERSE COLON: ICD-10-CM

## 2021-01-07 DIAGNOSIS — K21.00 GASTROESOPHAGEAL REFLUX DISEASE WITH ESOPHAGITIS WITHOUT HEMORRHAGE: ICD-10-CM

## 2021-01-07 PROCEDURE — 93010 ELECTROCARDIOGRAM REPORT: CPT | Performed by: EMERGENCY MEDICINE

## 2021-01-07 PROCEDURE — 99214 OFFICE O/P EST MOD 30 MIN: CPT | Performed by: INTERNAL MEDICINE

## 2021-01-07 PROCEDURE — 80048 BASIC METABOLIC PNL TOTAL CA: CPT | Performed by: OBSTETRICS & GYNECOLOGY

## 2021-01-07 PROCEDURE — 93005 ELECTROCARDIOGRAM TRACING: CPT

## 2021-01-07 PROCEDURE — 85027 COMPLETE CBC AUTOMATED: CPT | Performed by: OBSTETRICS & GYNECOLOGY

## 2021-01-07 NOTE — PROGRESS NOTES
"Primary Physician: Cesar Shaffer MD    Chief Complaint   Patient presents with   • Follow-up     Pt presents today for fatty liver follow up; Pt states she is feeling good        Subjective     Luna Cruz is a 55 y.o. female.    HPI   Fatty liver  CT imaging incidentally showed fatty infiltration of the liver.  Patient is known to be obese.  She does not have a history of diabetes, but she is hypertensive.  Liver function tests were checked recently and were normal.  Mom had cirrhosis due to Hep C.  Pt has been tested for Hep C and it was negative.  She doesn't have a HbA1C on file.  U/A recently neg for glucose.     GERD/Nausea  Patient has long-standing history of reflux disease.  She is currently maintained on pantoprazole 40 mg daily.  She does drink caffeine.  She tries not to eat late at nighttime.  She does not smoke.  She had an endoscopy done October 2016, 2/2019  which didn't show esophagitis.  Endoscopy 7/2020 showed LA grade a esophagitis.     Dysphagia  She has a h/o dysphagia.  She cannot identify any relieving factors.  It tends to occur with breads and meats and also with large pills.  Her weight is holding stable.  She does have occasional nausea.  She has had her gallbladder previously removed.  I reviewed her medication list and she is taking Fosamax and potassium supplements.  She does use ibuprofen 600mg prn knee pain. Endoscopy 2/2019 showed what looked like presbyesophagus with poor motility.  Dilation up to 20mm was done.  BaS 3/2019 showed that the barium tablet transiently got hung up in the distal esophagus.  She has been on pantoprazole 40mg BID for \"awhile\".  She claims that she doesn't eat much, but her weight is stable.  Endoscopy 7/2020 showed LA A esophagitis and  GEJ was dilated to 18mm.     Rectal bleeding  She noted this occurred on several occasions in June 2020.  She cannot think of any exacerbating or relieving factors. She does have a history of having had very " distal proctitis seen on colonoscopy in May 2013.  Repeat colonoscopy however in 2017 did not show any abnormality and colonoscopy 7/2020 showed internal hemorrhoids..     Colon cancer screening/family history colon polyps in first-degree relative  She had a colonoscopy in May 2013 that showed very distal proctitis.  Biopsy showed mild chronic active inflammation.  Repeat colonoscopy in 2017 however shows complete resolution of symptoms and colonoscopy 2020 normal.  Mother had colon polyps less than 60 years old.  She will be due for repeat colonoscopy in 7/2025.           Past Surgical History:   Procedure Laterality Date   • ADENOIDECTOMY     • APPENDECTOMY  1994   • BREAST BIOPSY     • BREAST LUMPECTOMY Right 2008    right sentinel lymph nodes were also removed for biopsy   • CARDIAC CATHETERIZATION     • CHOLECYSTECTOMY  1993   • COLONOSCOPY  05/03/2013    Erythematous mucosa in the rectum-biopsied; Repeat 4 years    • COLONOSCOPY N/A 6/14/2017    Normal; Repeat 5 years   • COLONOSCOPY  04/23/2010    Dr. Arzola-Extremely poorly prepped colon   • COLONOSCOPY N/A 7/24/2020    Hemorrhoids found on perianal exam; One 4mm hyperplastic polyp in the transverse colon; Normal mucosa in the entire examined colon-biopsied; Non-bleeding internal hemorrhoids; Repeat 5 years   • D&C HYSTEROSCOPY  1993   • DILATATION AND CURETTAGE     • ENDOSCOPY N/A 10/5/2016    Medium-sized HH; Widely patent and non-obstructing Schatzki ring-dilated; Procedure: ESOPHAGOGASTRODUODENOSCOPY WITH ANESTHESIA;  Surgeon: Ksenia Fox MD;  Location: [facility] ENDOSCOPY;  Service:    • ENDOSCOPY N/A 2/27/2019    Normal 1st portion of the duodenum and 2 portion of the duodenum; A few gastric polyps-biopsied; Small HH; Widely patent Schatzki ring-dilated; Abnormal esophageal motility, suspicious for presbyesophagus; Arrange for barium swallow to assess for dysmotility   • ENDOSCOPY N/A 7/24/2020    Medium-sized HH; LA Grade A reflux esophagitis; No  endoscopic esophageal abnormality to explain patient's dysphagia-Esophagus dilated; Normal stomach; Normal examined duodenum; No specimens collected   • HYSTEROSCOPY     • KNEE ARTHROSCOPY Left 11/13/2018    Procedure: LEFT KNEE ARTHROSCOPIC PARTIAL MEDIAL MENISCECTOMY;  Surgeon: Matt Maki MD;  Location: [facility];  Service: Orthopedics   • MEDIPORT INSERTION, SINGLE  2008   • MEDIPORT REMOVAL     • PARATHYROIDECTOMY  2011   • REPLACEMENT TOTAL KNEE Left 05/23/2019   • TONSILLECTOMY AND ADENOIDECTOMY  1970   • TUBAL ABDOMINAL LIGATION          Current Outpatient Medications:   •  Advair Diskus 250-50 MCG/DOSE DISKUS, USE 1 INHALATION TWICE A DAY (Patient taking differently: Inhale 1 puff 2 (Two) Times a Day.), Disp: 3 each, Rfl: 3  •  albuterol (PROVENTIL) (2.5 MG/3ML) 0.083% nebulizer solution, Take 2.5 mg by nebulization As Needed., Disp: , Rfl:   •  albuterol sulfate HFA (ProAir HFA) 108 (90 Base) MCG/ACT inhaler, Inhale 2 puffs Every 4 (Four) Hours As Needed for Wheezing., Disp: 54 g, Rfl: 3  •  alendronate (FOSAMAX) 70 MG tablet, Take 70 mg by mouth Every 7 (Seven) Days. Sunday's, Disp: , Rfl:   •  atorvastatin (LIPITOR) 40 MG tablet, Take 1 tablet by mouth Daily., Disp: 90 tablet, Rfl: 3  •  calcitriol (ROCALTROL) 0.5 MCG capsule, Take 1 capsule by mouth Daily., Disp: 90 capsule, Rfl: 3  •  Calcium Citrate-Vitamin D (CALCIUM + D PO), Take 1 tablet by mouth Daily., Disp: , Rfl:   •  Diclofenac Sodium (PENNSAID TD), Apply 1 application topically to the appropriate area as directed 2 (Two) Times a Day As Needed (BACK PAIN). For back pain , Disp: , Rfl:   •  docusate sodium (COLACE) 100 MG capsule, Take 100 mg by mouth As Needed., Disp: , Rfl:   •  HYDROcodone-acetaminophen (NORCO) 5-325 MG per tablet, Take 1 tablet by mouth 2 (Two) Times a Day As Needed for Moderate Pain ., Disp: , Rfl:   •  ibuprofen (ADVIL,MOTRIN) 400 MG tablet, Take 400 mg by mouth Every 8 (Eight) Hours As Needed for Mild Pain .,  Disp: , Rfl:   •  levothyroxine (SYNTHROID, LEVOTHROID) 50 MCG tablet, Take 1 tablet by mouth Daily., Disp: 90 tablet, Rfl: 3  •  loratadine (CLARITIN) 10 MG tablet, Take 10 mg by mouth Daily As Needed for Allergies., Disp: , Rfl:   •  losartan (COZAAR) 100 MG tablet, Take 1 tablet by mouth Daily., Disp: 90 tablet, Rfl: 3  •  pantoprazole (PROTONIX) 40 MG EC tablet, Take 1 tablet by mouth Daily., Disp: 90 tablet, Rfl: 3  •  potassium chloride ER (K-TAB) 20 MEQ tablet controlled-release ER tablet, Take 1 tablet by mouth Daily. (Patient taking differently: Take 10 mEq by mouth Daily. Pt breaks them in half and takes 10 meq daily), Disp: 90 tablet, Rfl: 3  •  rOPINIRole (REQUIP) 0.25 MG tablet, Take 1 tablet by mouth Every Night. Take 1 hour before bedtime., Disp: 90 tablet, Rfl: 3  •  theophylline (UNIPHYL) 400 MG 24 hr tablet, TAKE 1 TABLET DAILY (Patient taking differently: Take 400 mg by mouth Daily.), Disp: 90 tablet, Rfl: 3  •  tiZANidine (ZANAFLEX) 4 MG tablet, Take 4 mg by mouth Every 8 (Eight) Hours As Needed for Muscle Spasms., Disp: , Rfl:   •  zafirlukast (ACCOLATE) 20 MG tablet, Take 1 tablet by mouth 2 (Two) Times a Day., Disp: 180 tablet, Rfl: 3  No current facility-administered medications for this visit.     Facility-Administered Medications Ordered in Other Visits:   •  heparin flush (porcine) 100 UNIT/ML injection 500 Units, 500 Units, Intracatheter, PRN, Ki Manriquez MD, 500 Units at 09/27/16 1108  •  heparin flush (porcine) 100 UNIT/ML injection 500 Units, 500 Units, Intravenous, PRN, Malathi Brantley APRN, 500 Units at 05/05/17 1009  •  sodium chloride 0.9 % flush 10 mL, 10 mL, Intravenous, PRN, Ki Manriquez MD, 10 mL at 09/27/16 1107  •  sodium chloride 0.9 % flush 10 mL, 10 mL, Intravenous, PRN, Malathi Brantley, APRN, 10 mL at 05/05/17 1009    Allergies   Allergen Reactions   • Cephalosporins Hives   • Keflex [Cephalexin] Rash       Social History  "    Socioeconomic History   • Marital status:      Spouse name: Not on file   • Number of children: Not on file   • Years of education: Not on file   • Highest education level: Not on file   Tobacco Use   • Smoking status: Never Smoker   • Smokeless tobacco: Never Used   Substance and Sexual Activity   • Alcohol use: No   • Drug use: No   • Sexual activity: Yes     Partners: Male     Birth control/protection: None       Family History   Problem Relation Age of Onset   • Colon polyps Mother         < 60 years of age    • Cirrhosis Mother    • Colon polyps Sister 54        < 60 years of age    • Breast cancer Sister    • No Known Problems Father    • No Known Problems Brother    • No Known Problems Daughter    • No Known Problems Son    • No Known Problems Maternal Grandmother    • No Known Problems Paternal Grandmother    • No Known Problems Maternal Aunt    • No Known Problems Paternal Aunt    • Colon cancer Neg Hx    • Crohn's disease Neg Hx    • Irritable bowel syndrome Neg Hx    • Liver cancer Neg Hx    • Liver disease Neg Hx    • Rectal cancer Neg Hx    • Stomach cancer Neg Hx    • BRCA 1/2 Neg Hx    • Endometrial cancer Neg Hx    • Ovarian cancer Neg Hx    • Uterine cancer Neg Hx    • Esophageal cancer Neg Hx        Review of Systems   Genitourinary: Positive for vaginal bleeding (surgery scheduled by gyne).       Objective     /78 (BP Location: Left arm, Patient Position: Sitting, Cuff Size: Adult)   Pulse 106   Temp 97.7 °F (36.5 °C) (Infrared)   Ht 157.5 cm (62\")   Wt 79.4 kg (175 lb)   LMP 06/14/2008 Comment: per pt dr bundy confirmed she has been through menopause but is having some post menopausal bleeding that recently started  SpO2 99%   Breastfeeding No   BMI 32.01 kg/m²     Physical Exam  Constitutional:       Appearance: She is well-developed.   Pulmonary:      Effort: Pulmonary effort is normal.   Musculoskeletal: Normal range of motion.   Skin:     General: Skin is warm. "   Neurological:      Mental Status: She is alert and oriented to person, place, and time.   Psychiatric:         Behavior: Behavior normal.         Lab Results - Last 18 Months   Lab Units 01/07/21  1025 12/09/20  1522 12/02/20  0825 12/02/20  0822 07/07/20  0957 05/11/20  1406   GLUCOSE mg/dL 97 102*  --   --   --  104*   BUN mg/dL 12 12 9  --   --  9   CREATININE mg/dL 1.11* 1.12* 1.01*  --  1.20 0.95   SODIUM mmol/L 140 136 138  --   --  141   POTASSIUM mmol/L 4.0 3.7 4.5  --   --  4.3   CHLORIDE mmol/L 105 103 102  --   --  105   TOTAL CO2 mmol/L  --   --  25.2  --   --   --    CO2 mmol/L 24.0 23.0  --   --   --  24.0   TOTAL PROTEIN g/dL  --  6.7  --   --   --  6.9   ALBUMIN g/dL  --  4.00  --   --   --  4.10   ALT (SGPT) U/L  --  21  --  20  --  16   AST (SGOT) U/L  --  22  --  21  --  19   ALK PHOS U/L  --  110  --   --   --  107   BILIRUBIN mg/dL  --  0.2  --   --   --  0.2   GLOBULIN gm/dL  --  2.7  --   --   --  2.8   CRP mg/dL  --  0.77*  --   --   --   --        Lab Results - Last 18 Months   Lab Units 01/07/21  1025 12/09/20  1522 05/11/20  1406   HEMOGLOBIN g/dL 15.1 14.7 14.8   HEMATOCRIT % 44.6 42.9 45.9   MCV fL 88.1 86.1 87.4   WBC 10*3/mm3 7.88 5.93 7.56   RDW % 13.2 13.1 13.6   MPV fL 10.3 10.0 10.0   PLATELETS 10*3/mm3 309 310 352       Lab Results - Last 18 Months   Lab Units 12/09/20  1522 06/18/20  0829 05/11/20  1406   IRON mcg/dL  --   --  57   TIBC mcg/dL  --   --  295*   IRON SATURATION %  --   --  19*   FERRITIN ng/mL 636.60*  --  161.50*   TSH uIU/mL  --  1.570  --         Lab Results - Last 18 Months   Lab Units 12/09/20  1522   FERRITIN ng/mL 636.60*       US LIVER- 7/31/2020 9:48 AM CDT     HISTORY: Cirrhosis; K76.0-Fatty (change of) liver, not elsewhere  classified      COMPARISON: None     TECHNIQUE: Transverse and longitudinal sonographic images of the liver  and right upper quadrant were obtained using a [employer] E9 sonography  system and a C1-6 curved array probe. Elastography  was also performed.  Images are obtained in the usual manner with right arm above shoulder  rolled 30 degrees. A median of 10 measurements was calculated. An  interquartile range IQR/median <0.3 considered reliable data.     FINDINGS:  The liver is coarsened in echotexture. Contour is normal. No focal  lesion is seen. The gallbladder has been removed. The common bile duct  measures 0.3 cm.     10 shear wave measurements were acquired and demonstrate a median  velocity of 1.59 m/s. The IQR/median velocity ratio is 0.15.     Metavir Score F1 -- Portal fibrosis without septa.     IMPRESSION:  1. Mildly coarsened echotexture of the liver. No focal liver lesion.  2. Metavir score F1 -- Portal fibrosis without septa.     Shear wave measurements may be affected by hepatic congestion,  steatosis, and inflammation. Shear wave speed measurements should be  interpreted in conjunction with other available clinical data, and they  should not be considered interchangeable with MRI-derived stiffness  measurements at this time.     This report was finalized on 07/31/2020 15:02 by Dr. Shen Gonzalez MD.        IMPRESSION/PLAN:    Assessment/Plan      Problem List Items Addressed This Visit        Other    Gastroesophageal reflux disease with esophagitis    Overview     LA A esopahgitis seen on endoscopy 7/2020.      Anti-reflux measures were reviewed and discussed with patient.  They were advised to refrain from chocolate, alcohol, smoking, peppermint and caffeine.  They were advised to limit fatty foods, large meals, and eating late at nighttime.  They were advised to reach an ideal body mass index.    She is also on Fosamax.  I advised her to drink a large glass of water after taking this pill as this can cause esophagitis.    Symptoms well controlled with active medical treatment with pantoprazole 40 mg daily.    Also needs to limit narcotics which are negatively impacting her GI health.           Esophageal dysphagia - Primary     Overview     Difficulty swallowing solids and large pills.  She has Schatzki's ring that was dilated in 2016.  Repeat endoscopy 2/2019 showed what looked like dysmotility.  Dilation to 20mm was done.  BaS 3/2019 showed a transient delay in the pill traversing the GEJ.  Endoscopy 7/2020 unremarkable--dilated up to 18mm.    She was advised to drink a large glass of water after taking Fosamax, diclofenac, potassium. This has been helpful.  No longer having dysphagia.         Family history of polyps in the colon    Overview     Mom had colon polyps < 60 years old.  Colonoscopy 7/2020 showed hyperplastic polyp in the transverse colon that was removed.  Repeat 7/2025.         Hyperplastic polyp of transverse colon    Overview     Removed and 7/2020.  Due for repeat colonoscopy in 7/2025.         Overweight (BMI 25.0-29.9)    Overview     Advise weight reduction will be of benefit regarding GERD symptoms.         Fatty liver    Overview     This was seen on CT imaging July 2020.  She is not known to be diabetic.  No hemoglobin A1c on file.  Urinalysis June 2020- for glucose.  She is overweight and does have hypertension.  I have asked her to work on weight reduction.  She drinks 3 cokes daily--advised to stop--she hasn't.  She bought kitchen scale, but really isn't using it.  She forgot about my rec to use [employer].    ultrasound liver good and elastography = F1 7/2020.  LFTs were normal in May 2020 and again in 12/2020.           Rectal bleeding    Overview     She does carry history of proctitis seen in 2013 with resolution on last colonoscopy 2017.  Colonoscopy 7/2020 showed internal hemorrhoids.  Bleeding is much less now.             Patient's Body mass index is 32.01 kg/m². BMI is above normal parameters. Recommendations include: nutrition counseling.    Billing [id number]:  Moderate medical decision making with 2 or more stable chronic illnesses, review and/or order of 3 unique tests, and prescription drug  management.      RTC 1 year      Ksenia Fox MD  01/07/21  14:46 CST    Much of this encounter note is an electronic transcription/translation of spoken language to printed text. The electronic translation of spoken language may permit erroneous, or at times, nonsensical words or phrases to be inadvertently transcribed; although I have reviewed the note for such errors, some may still exist.

## 2021-01-07 NOTE — DISCHARGE INSTRUCTIONS
DAY OF SURGERY INSTRUCTIONS        YOUR SURGEON: ANTONIO COVINGTON    PROCEDURE: D&C HYSTEROSCOPY WITH MYOSURE    DATE OF SURGERY: January 13, 2021    ARRIVAL TIME: AS DIRECTED BY OFFICE    YOU MAY TAKE THE FOLLOWING MEDICATION(S) THE MORNING OF SURGERY WITH A SIP OF WATER: NORCO    ALL OTHER HOME MEDICATIONS CHECK WITH YOUR DOCTOR    DO NOT TAKE ANY ERECTILE DYSFUNCTION MEDICATIONS (EX:  CIALIS, VIAGRA) 24 HOURS PRIOR TO SURGERY              MANAGING PAIN AFTER SURGERY    We know you are probably wondering what your pain will be like after surgery.  Following surgery it is unrealistic to expect you will not have pain.   Pain is how our bodies let us know that something is wrong or cautions us to be careful.  That said, our goal is to make your pain tolerable.    Methods we may use to treat your pain include (oral or IV medications, PCAs, epidurals, nerve blocks, etc.)   While some procedures require IV pain medications for a short time after surgery, transitioning to pain medications by mouth allows for better management of pain.   Your nurse will encourage you to take oral pain medications whenever possible.  IV medications work almost immediately, but only last a short while.  Taking medications by mouth allows for a more constant level of medication in your blood stream for a longer period of time.      Once your pain is out of control it is harder to get back under control.  It is important you are aware when your next dose of pain medication is due.  If you are admitted, your nurse may write the time of your next dose on the white board in your room to help you remember.      We are interested in your pain and encourage you to inform us about aggravating factors during your visit.   Many times a simple repositioning every few hours can make a big difference.    If your physician says it is okay, do not let your pain prevent you from getting out of bed. Be sure to call your nurse for assistance prior to getting  up so you do not fall.      Before surgery, please decide your tolerable pain goal.  These faces help describe the pain ratings we use on a 0-10 scale.   Be prepared to tell us your goal and whether or not you take pain or anxiety medications at home.      BEFORE YOU COME TO THE HOSPITAL  (Pre-op instructions)  • Do not eat, drink, smoke or chew gum after midnight the night before surgery.  This also includes no mints.  • Morning of surgery take only the medicines you have been instructed with a sip of water unless otherwise instructed  by your physician.  • Do not shave, wear makeup or dark nail polish.  • Remove all jewelry including rings.  • Leave anything you consider valuable at home.  • Leave your suitcase in the car until after your surgery.  • Bring the following with you if applicable:  o Picture ID and insurance, Medicare or Medicaid cards  o Co-pay/deductible required by insurance (cash, check, credit card)  o Copy of advance directive, living will or power-of- documents if not brought to PAT  o CPAP or BIPAP mask and tubing  o Relaxation aids ( book, magazine), etc.  o Hearing aids                                 ON THE DAY OF SURGERY  · On the day of surgery check in at registration located at the main entrance of the hospital.   ? You will be registered and given a beeper with instructions where to wait in the main lobby.  ? When your beeper lights up and vibrates a member of the Outpatient Surgery staff will meet you at the double doors under the stair steps and escort you to your preoperative room.   · You may have cloth compression devices placed on your legs. These help to prevent blood clots and reduce swelling in your legs.  · An IV may be inserted into one of your veins.  · In the operating room, you may be given one or more of the following:  ? A medicine to help you relax (sedative).  ? A medicine to numb the area (local anesthetic).  ? A medicine to make you fall asleep (general  "anesthetic).  ? A medicine that is injected into an area of your body to numb everything below the injection site (regional anesthetic).  · Your surgical site will be marked or identified.  · You may be given an antibiotic through your IV to help prevent infection.  Contact a health care provider if you:  · Develop a fever of more than 100.4°F (38°C) or other feelings of illness during the 48 hours before your surgery.  · Have symptoms that get worse.  Have questions or concerns about your surgery    General Anesthesia/Surgery, Adult  General anesthesia is the use of medicines to make a person \"go to sleep\" (unconscious) for a medical procedure. General anesthesia must be used for certain procedures, and is often recommended for procedures that:  · Last a long time.  · Require you to be still or in an unusual position.  · Are major and can cause blood loss.  The medicines used for general anesthesia are called general anesthetics. As well as making you unconscious for a certain amount of time, these medicines:  · Prevent pain.  · Control your blood pressure.  · Relax your muscles.  Tell a health care provider about:  · Any allergies you have.  · All medicines you are taking, including vitamins, herbs, eye drops, creams, and over-the-counter medicines.  · Any problems you or family members have had with anesthetic medicines.  · Types of anesthetics you have had in the past.  · Any blood disorders you have.  · Any surgeries you have had.  · Any medical conditions you have.  · Any recent upper respiratory, chest, or ear infections.  · Any history of:  ? Heart or lung conditions, such as heart failure, sleep apnea, asthma, or chronic obstructive pulmonary disease (COPD).  ?  service.  ? Depression or anxiety.  · Any tobacco or drug use, including marijuana or alcohol use.  · Whether you are pregnant or may be pregnant.  What are the risks?  Generally, this is a safe procedure. However, problems may occur, " including:  · Allergic reaction.  · Lung and heart problems.  · Inhaling food or liquid from the stomach into the lungs (aspiration).  · Nerve injury.  · Air in the bloodstream, which can lead to stroke.  · Extreme agitation or confusion (delirium) when you wake up from the anesthetic.  · Waking up during your procedure and being unable to move. This is rare.  These problems are more likely to develop if you are having a major surgery or if you have an advanced or serious medical condition. You can prevent some of these complications by answering all of your health care provider's questions thoroughly and by following all instructions before your procedure.  General anesthesia can cause side effects, including:  · Nausea or vomiting.  · A sore throat from the breathing tube.  · Hoarseness.  · Wheezing or coughing.  · Shaking chills.  · Tiredness.  · Body aches.  · Anxiety.  · Sleepiness or drowsiness.  · Confusion or agitation.  RISKS AND COMPLICATIONS OF SURGERY  Your health care provider will discuss possible risks and complications with you before surgery. Common risks and complications include:    · Problems due to the use of anesthetics.  · Blood loss and replacement (does not apply to minor surgical procedures).  · Temporary increase in pain due to surgery.  · Uncorrected pain or problems that the surgery was meant to correct.  · Infection.  · New damage.    What happens before the procedure?    Medicines  Ask your health care provider about:  · Changing or stopping your regular medicines. This is especially important if you are taking diabetes medicines or blood thinners.  · Taking medicines such as aspirin and ibuprofen. These medicines can thin your blood. Do not take these medicines unless your health care provider tells you to take them.  · Taking over-the-counter medicines, vitamins, herbs, and supplements. Do not take these during the week before your procedure unless your health care provider approves  them.  General instructions  · Starting 3-6 weeks before the procedure, do not use any products that contain nicotine or tobacco, such as cigarettes and e-cigarettes. If you need help quitting, ask your health care provider.  · If you brush your teeth on the morning of the procedure, make sure to spit out all of the toothpaste.  · Tell your health care provider if you become ill or develop a cold, cough, or fever.  · If instructed by your health care provider, bring your sleep apnea device with you on the day of your surgery (if applicable).  · Ask your health care provider if you will be going home the same day, the following day, or after a longer hospital stay.  ? Plan to have someone take you home from the hospital or clinic.  ? Plan to have a responsible adult care for you for at least 24 hours after you leave the hospital or clinic. This is important.  What happens during the procedure?  · You will be given anesthetics through both of the following:  ? A mask placed over your nose and mouth.  ? An IV in one of your veins.  · You may receive a medicine to help you relax (sedative).  · After you are unconscious, a breathing tube may be inserted down your throat to help you breathe. This will be removed before you wake up.  · An anesthesia specialist will stay with you throughout your procedure. He or she will:  ? Keep you comfortable and safe by continuing to give you medicines and adjusting the amount of medicine that you get.  ? Monitor your blood pressure, pulse, and oxygen levels to make sure that the anesthetics do not cause any problems.  The procedure may vary among health care providers and hospitals.  What happens after the procedure?  · Your blood pressure, temperature, heart rate, breathing rate, and blood oxygen level will be monitored until the medicines you were given have worn off.  · You will wake up in a recovery area. You may wake up slowly.  · If you feel anxious or agitated, you may be given  medicine to help you calm down.  · If you will be going home the same day, your health care provider may check to make sure you can walk, drink, and urinate.  · Your health care provider will treat any pain or side effects you have before you go home.  · Do not drive for 24 hours if you were given a sedative.  Summary  · General anesthesia is used to keep you still and prevent pain during a procedure.  · It is important to tell your healthcare provider about your medical history and any surgeries you have had, and previous experience with anesthesia.  · Follow your healthcare provider’s instructions about when to stop eating, drinking, or taking certain medicines before your procedure.  · Plan to have someone take you home from the hospital or clinic.  This information is not intended to replace advice given to you by your health care provider. Make sure you discuss any questions you have with your health care provider.  Document Released: 03/26/2009 Document Revised: 08/03/2018 Document Reviewed: 08/03/2018  [employer] Interactive Patient Education © 2019 [employer]      Fall Prevention in Hospitals, Adult  As a hospital patient, your condition and the treatments you receive can increase your risk for falls. Some additional risk factors for falls in a hospital include:  · Being in an unfamiliar environment.  · Being on bed rest.  · Your surgery.  · Taking certain medicines.  · Your tubing requirements, such as intravenous (IV) therapy or catheters.  It is important that you learn how to decrease fall risks while at the hospital. Below are important tips that can help prevent falls.  SAFETY TIPS FOR PREVENTING FALLS  Talk about your risk of falling.  · Ask your health care provider why you are at risk for falling. Is it your medicine, illness, tubing placement, or something else?  · Make a plan with your health care provider to keep you safe from falls.  · Ask your health care provider or pharmacist about side  effects of your medicines. Some medicines can make you dizzy or affect your coordination.  Ask for help.  · Ask for help before getting out of bed. You may need to press your call button.  · Ask for assistance in getting safely to the toilet.  · Ask for a walker or cane to be put at your bedside. Ask that most of the side rails on your bed be placed up before your health care provider leaves the room.  · Ask family or friends to sit with you.  · Ask for things that are out of your reach, such as your glasses, hearing aids, telephone, bedside table, or call button.  Follow these tips to avoid falling:  · Stay lying or seated, rather than standing, while waiting for help.  · Wear rubber-soled slippers or shoes whenever you walk in the hospital.  · Avoid quick, sudden movements.  ¨ Change positions slowly.  ¨ Sit on the side of your bed before standing.  ¨ Stand up slowly and wait before you start to walk.  · Let your health care provider know if there is a spill on the floor.  · Pay careful attention to the medical equipment, electrical cords, and tubes around you.  · When you need help, use your call button by your bed or in the bathroom. Wait for one of your health care providers to help you.  · If you feel dizzy or unsure of your footing, return to bed and wait for assistance.  · Avoid being distracted by the TV, telephone, or another person in your room.  · Do not lean or support yourself on rolling objects, such as IV poles or bedside tables.     This information is not intended to replace advice given to you by your health care provider. Make sure you discuss any questions you have with your health care provider.     Document Released: 12/15/2001 Document Revised: 01/08/2016 Document Reviewed: 08/25/2013  [employer] Interactive Patient Education ©2016 [employer]            PATIENT/FAMILY/RESPONSIBLE PARTY VERBALIZES UNDERSTANDING OF ABOVE EDUCATION.  COPY OF PAIN SCALE GIVEN AND REVIEWED WITH VERBALIZED  UNDERSTANDING.

## 2021-01-08 ENCOUNTER — TRANSCRIBE ORDERS (OUTPATIENT)
Dept: ADMINISTRATIVE | Facility: HOSPITAL | Age: 56
End: 2021-01-08

## 2021-01-08 DIAGNOSIS — Z11.59 SCREENING FOR VIRAL DISEASE: Primary | ICD-10-CM

## 2021-01-10 NOTE — H&P (VIEW-ONLY)
[facility]  Luna Cruz  : 1965  MRN: [id number]  [facility]: [id number]    History and Physical    Subjective   Luna Cruz is a 55 y.o. year old  who presents for surgery due to an endometrial polyp.  Her Pap smear showed elevated estrogen for age and an ultrasound was performed showing a fairly distinct endometrial lesion suggesting a polyp.  She has not had any vaginal bleeding.    Past Medical History:   Diagnosis Date   • Abnormal uterine bleeding due to endometrial polyp    • Arthritis    • Asthma    • Chronic back pain    • Disease of thyroid gland    • Drug therapy    • Family history of colonic polyps    • GERD (gastroesophageal reflux disease)    • History of breast cancer    • History of colon polyps    • Hx of radiation therapy    • Hyperlipidemia    • Hypertension    • Malignant neoplasm of upper-inner quadrant of right female breast (CMS/HCC) 2016   • Osteoporosis    • PONV (postoperative nausea and vomiting)    • Scoliosis      Past Surgical History:   Procedure Laterality Date   • ADENOIDECTOMY     • APPENDECTOMY     • BREAST BIOPSY     • BREAST LUMPECTOMY Right     right sentinel lymph nodes were also removed for biopsy   • CARDIAC CATHETERIZATION     • CHOLECYSTECTOMY     • COLONOSCOPY  2013    Erythematous mucosa in the rectum-biopsied; Repeat 4 years    • COLONOSCOPY N/A 2017    Normal; Repeat 5 years   • COLONOSCOPY  2010    Dr. Arzola-Extremely poorly prepped colon   • COLONOSCOPY N/A 2020    Hemorrhoids found on perianal exam; One 4mm hyperplastic polyp in the transverse colon; Normal mucosa in the entire examined colon-biopsied; Non-bleeding internal hemorrhoids; Repeat 5 years   • D&C HYSTEROSCOPY     • DILATATION AND CURETTAGE     • ENDOSCOPY N/A 10/5/2016    Medium-sized HH; Widely patent and non-obstructing Schatzki ring-dilated; Procedure: ESOPHAGOGASTRODUODENOSCOPY WITH ANESTHESIA;  Surgeon: Ksenia Fox MD;   Location: [facility] ENDOSCOPY;  Service:    • ENDOSCOPY N/A 2/27/2019    Normal 1st portion of the duodenum and 2 portion of the duodenum; A few gastric polyps-biopsied; Small HH; Widely patent Schatzki ring-dilated; Abnormal esophageal motility, suspicious for presbyesophagus; Arrange for barium swallow to assess for dysmotility   • ENDOSCOPY N/A 7/24/2020    Medium-sized HH; LA Grade A reflux esophagitis; No endoscopic esophageal abnormality to explain patient's dysphagia-Esophagus dilated; Normal stomach; Normal examined duodenum; No specimens collected   • HYSTEROSCOPY     • KNEE ARTHROSCOPY Left 11/13/2018    Procedure: LEFT KNEE ARTHROSCOPIC PARTIAL MEDIAL MENISCECTOMY;  Surgeon: Matt Maki MD;  Location: [facility] OR;  Service: Orthopedics   • MEDIPORT INSERTION, SINGLE  2008   • MEDIPORT REMOVAL     • PARATHYROIDECTOMY  2011   • REPLACEMENT TOTAL KNEE Left 05/23/2019   • TONSILLECTOMY AND ADENOIDECTOMY  1970   • TUBAL ABDOMINAL LIGATION       Social History    Tobacco Use      Smoking status: Never Smoker      Smokeless tobacco: Never Used      Current Outpatient Medications:   •  Advair Diskus 250-50 MCG/DOSE DISKUS, USE 1 INHALATION TWICE A DAY (Patient taking differently: Inhale 1 puff 2 (Two) Times a Day.), Disp: 3 each, Rfl: 3  •  albuterol (PROVENTIL) (2.5 MG/3ML) 0.083% nebulizer solution, Take 2.5 mg by nebulization As Needed., Disp: , Rfl:   •  albuterol sulfate HFA (ProAir HFA) 108 (90 Base) MCG/ACT inhaler, Inhale 2 puffs Every 4 (Four) Hours As Needed for Wheezing., Disp: 54 g, Rfl: 3  •  alendronate (FOSAMAX) 70 MG tablet, Take 70 mg by mouth Every 7 (Seven) Days. Sunday's, Disp: , Rfl:   •  atorvastatin (LIPITOR) 40 MG tablet, Take 1 tablet by mouth Daily., Disp: 90 tablet, Rfl: 3  •  calcitriol (ROCALTROL) 0.5 MCG capsule, Take 1 capsule by mouth Daily., Disp: 90 capsule, Rfl: 3  •  Calcium Citrate-Vitamin D (CALCIUM + D PO), Take 1 tablet by mouth Daily., Disp: , Rfl:   •  Diclofenac  Sodium (PENNSAID TD), Apply 1 application topically to the appropriate area as directed 2 (Two) Times a Day As Needed (BACK PAIN). For back pain , Disp: , Rfl:   •  docusate sodium (COLACE) 100 MG capsule, Take 100 mg by mouth As Needed., Disp: , Rfl:   •  HYDROcodone-acetaminophen (NORCO) 5-325 MG per tablet, Take 1 tablet by mouth 2 (Two) Times a Day As Needed for Moderate Pain ., Disp: , Rfl:   •  ibuprofen (ADVIL,MOTRIN) 400 MG tablet, Take 400 mg by mouth Every 8 (Eight) Hours As Needed for Mild Pain ., Disp: , Rfl:   •  levothyroxine (SYNTHROID, LEVOTHROID) 50 MCG tablet, Take 1 tablet by mouth Daily., Disp: 90 tablet, Rfl: 3  •  loratadine (CLARITIN) 10 MG tablet, Take 10 mg by mouth Daily As Needed for Allergies., Disp: , Rfl:   •  losartan (COZAAR) 100 MG tablet, Take 1 tablet by mouth Daily., Disp: 90 tablet, Rfl: 3  •  pantoprazole (PROTONIX) 40 MG EC tablet, Take 1 tablet by mouth Daily., Disp: 90 tablet, Rfl: 3  •  potassium chloride ER (K-TAB) 20 MEQ tablet controlled-release ER tablet, Take 1 tablet by mouth Daily. (Patient taking differently: Take 10 mEq by mouth Daily. Pt breaks them in half and takes 10 meq daily), Disp: 90 tablet, Rfl: 3  •  rOPINIRole (REQUIP) 0.25 MG tablet, Take 1 tablet by mouth Every Night. Take 1 hour before bedtime., Disp: 90 tablet, Rfl: 3  •  theophylline (UNIPHYL) 400 MG 24 hr tablet, TAKE 1 TABLET DAILY (Patient taking differently: Take 400 mg by mouth Daily.), Disp: 90 tablet, Rfl: 3  •  tiZANidine (ZANAFLEX) 4 MG tablet, Take 4 mg by mouth Every 8 (Eight) Hours As Needed for Muscle Spasms., Disp: , Rfl:   •  zafirlukast (ACCOLATE) 20 MG tablet, Take 1 tablet by mouth 2 (Two) Times a Day., Disp: 180 tablet, Rfl: 3  No current facility-administered medications for this visit.     Facility-Administered Medications Ordered in Other Visits:   •  heparin flush (porcine) 100 UNIT/ML injection 500 Units, 500 Units, Intracatheter, PRN, Declan, Ki Quevedo MD, 500 Units  "at 09/27/16 1108  •  heparin flush (porcine) 100 UNIT/ML injection 500 Units, 500 Units, Intravenous, PRN, Malathi Brantley, APRN, 500 Units at 05/05/17 1009  •  sodium chloride 0.9 % flush 10 mL, 10 mL, Intravenous, PRN, Ki Manriquez MD, 10 mL at 09/27/16 1107  •  sodium chloride 0.9 % flush 10 mL, 10 mL, Intravenous, PRN, Malathi Brantley, APRN, 10 mL at 05/05/17 1009    Allergies   Allergen Reactions   • Cephalosporins Hives   • Keflex [Cephalexin] Rash       Family History   Problem Relation Age of Onset   • Colon polyps Mother         < 60 years of age    • Cirrhosis Mother    • Colon polyps Sister 54        < 60 years of age    • Breast cancer Sister    • No Known Problems Father    • No Known Problems Brother    • No Known Problems Daughter    • No Known Problems Son    • No Known Problems Maternal Grandmother    • No Known Problems Paternal Grandmother    • No Known Problems Maternal Aunt    • No Known Problems Paternal Aunt    • Colon cancer Neg Hx    • Crohn's disease Neg Hx    • Irritable bowel syndrome Neg Hx    • Liver cancer Neg Hx    • Liver disease Neg Hx    • Rectal cancer Neg Hx    • Stomach cancer Neg Hx    • BRCA 1/2 Neg Hx    • Endometrial cancer Neg Hx    • Ovarian cancer Neg Hx    • Uterine cancer Neg Hx    • Esophageal cancer Neg Hx      Review of Systems   Constitutional: Negative for unexpected weight change.   HENT: Negative for trouble swallowing.    Respiratory: Negative for shortness of breath.    Cardiovascular: Negative for chest pain.   Musculoskeletal: Negative for neck stiffness.   Neurological: Negative for seizures.   Hematological: Does not bruise/bleed easily.         Objective   /82 (BP Location: Left arm, Patient Position: Sitting)   Ht 157.5 cm (62\")   Wt 78.5 kg (173 lb)   LMP 06/14/2008 Comment: per pt dr bundy confirmed she has been through menopause but is having some post menopausal bleeding that recently started  BMI 31.64 kg/m² "     Physical Exam   Physical Exam  Vitals signs reviewed.   Constitutional:       Appearance: She is well-developed.   HENT:      Head: Normocephalic and atraumatic.   Eyes:      General: No scleral icterus.  Neck:      Trachea: No tracheal deviation.   Cardiovascular:      Rate and Rhythm: Normal rate and regular rhythm.   Pulmonary:      Effort: Pulmonary effort is normal.      Breath sounds: Normal breath sounds.   Abdominal:      General: Bowel sounds are normal. There is no distension.      Palpations: Abdomen is soft.      Tenderness: There is no abdominal tenderness.   Genitourinary:     Exam position: Supine.      Labia:         Right: No lesion.         Left: No lesion.       Vagina: No vaginal discharge or bleeding.      Cervix: No cervical motion tenderness.      Uterus: Not enlarged, not fixed and not tender.       Adnexa:         Right: No mass.          Left: No mass.        Rectum: No external hemorrhoid.   Skin:     General: Skin is warm and dry.   Neurological:      Mental Status: She is alert and oriented to person, place, and time.         Labs  Lab Results   Component Value Date     01/07/2021    HGB 15.1 01/07/2021    HCT 44.6 01/07/2021    WBC 7.88 01/07/2021     01/07/2021    K 4.0 01/07/2021     01/07/2021    CO2 24.0 01/07/2021    BUN 12 01/07/2021    CREATININE 1.11 (H) 01/07/2021    GLUCOSE 97 01/07/2021    ALBUMIN 4.00 12/09/2020    CALCIUM 9.4 01/07/2021    AST 22 12/09/2020    ALT 21 12/09/2020    BILITOT 0.2 12/09/2020        Assessment & Plan    Diagnoses and all orders for this visit:    1. Endometrial thickening on ultrasound (Primary)  -     Case Request  -     CBC (No Diff); Future  -     Basic Metabolic Panel; Future  -     sodium chloride 0.9 % infusion  -     sodium chloride 0.9 % flush 3 mL  -     sodium chloride 0.9 % flush 1-10 mL    Other orders  -     Follow Anesthesia Guidelines / Standing Orders; Future  -     Provide NPO Instructions to Patient;  Future  -     Follow Anesthesia Guidelines / Standing Orders; Standing  -     Verify NPO Status; Standing  -     Obtain informed consent; Standing  -     Have patient void prior to transfer; Standing  -     Insert Peripheral IV; Standing  -     Saline Lock & Maintain IV Access; Standing    Risks, benefits, and alternatives of a D&C with hysteroscopy were discussed with the patient in detail. Intraoperative risks of bleeding and damage to surrounding organs, including but not limited to intestine, bladder and ureter, were explained. Management of these were also explained. Postoperative complications such as infection, pneumonia, DVT, and bleeding were explained. The importance of compliance with postoperative restrictions was discussed. The diagnostic nature of the procedure was explained.    Uterine perforation was discussed with the patient at length.     All of the patient's questions were answered to her satisfaction. She was encouraged to return for an additional appointment if she had further questions. She verbalized understanding of the above and wished to proceed with the outlined plan.       Marcello Salas MD  1/10/2021  08:28 CST

## 2021-01-10 NOTE — H&P
[facility]  Luna Cruz  : 1965  MRN: [id number]  [facility]: [id number]    History and Physical    Subjective   Luna Cruz is a 55 y.o. year old  who presents for surgery due to an endometrial polyp.  Her Pap smear showed elevated estrogen for age and an ultrasound was performed showing a fairly distinct endometrial lesion suggesting a polyp.  She has not had any vaginal bleeding.    Past Medical History:   Diagnosis Date   • Abnormal uterine bleeding due to endometrial polyp    • Arthritis    • Asthma    • Chronic back pain    • Disease of thyroid gland    • Drug therapy    • Family history of colonic polyps    • GERD (gastroesophageal reflux disease)    • History of breast cancer    • History of colon polyps    • Hx of radiation therapy    • Hyperlipidemia    • Hypertension    • Malignant neoplasm of upper-inner quadrant of right female breast (CMS/HCC) 2016   • Osteoporosis    • PONV (postoperative nausea and vomiting)    • Scoliosis      Past Surgical History:   Procedure Laterality Date   • ADENOIDECTOMY     • APPENDECTOMY     • BREAST BIOPSY     • BREAST LUMPECTOMY Right     right sentinel lymph nodes were also removed for biopsy   • CARDIAC CATHETERIZATION     • CHOLECYSTECTOMY     • COLONOSCOPY  2013    Erythematous mucosa in the rectum-biopsied; Repeat 4 years    • COLONOSCOPY N/A 2017    Normal; Repeat 5 years   • COLONOSCOPY  2010    Dr. Arzola-Extremely poorly prepped colon   • COLONOSCOPY N/A 2020    Hemorrhoids found on perianal exam; One 4mm hyperplastic polyp in the transverse colon; Normal mucosa in the entire examined colon-biopsied; Non-bleeding internal hemorrhoids; Repeat 5 years   • D&C HYSTEROSCOPY     • DILATATION AND CURETTAGE     • ENDOSCOPY N/A 10/5/2016    Medium-sized HH; Widely patent and non-obstructing Schatzki ring-dilated; Procedure: ESOPHAGOGASTRODUODENOSCOPY WITH ANESTHESIA;  Surgeon: Ksenia Fox MD;   Location: [facility] ENDOSCOPY;  Service:    • ENDOSCOPY N/A 2/27/2019    Normal 1st portion of the duodenum and 2 portion of the duodenum; A few gastric polyps-biopsied; Small HH; Widely patent Schatzki ring-dilated; Abnormal esophageal motility, suspicious for presbyesophagus; Arrange for barium swallow to assess for dysmotility   • ENDOSCOPY N/A 7/24/2020    Medium-sized HH; LA Grade A reflux esophagitis; No endoscopic esophageal abnormality to explain patient's dysphagia-Esophagus dilated; Normal stomach; Normal examined duodenum; No specimens collected   • HYSTEROSCOPY     • KNEE ARTHROSCOPY Left 11/13/2018    Procedure: LEFT KNEE ARTHROSCOPIC PARTIAL MEDIAL MENISCECTOMY;  Surgeon: Matt Maki MD;  Location: [facility] OR;  Service: Orthopedics   • MEDIPORT INSERTION, SINGLE  2008   • MEDIPORT REMOVAL     • PARATHYROIDECTOMY  2011   • REPLACEMENT TOTAL KNEE Left 05/23/2019   • TONSILLECTOMY AND ADENOIDECTOMY  1970   • TUBAL ABDOMINAL LIGATION       Social History    Tobacco Use      Smoking status: Never Smoker      Smokeless tobacco: Never Used      Current Outpatient Medications:   •  Advair Diskus 250-50 MCG/DOSE DISKUS, USE 1 INHALATION TWICE A DAY (Patient taking differently: Inhale 1 puff 2 (Two) Times a Day.), Disp: 3 each, Rfl: 3  •  albuterol (PROVENTIL) (2.5 MG/3ML) 0.083% nebulizer solution, Take 2.5 mg by nebulization As Needed., Disp: , Rfl:   •  albuterol sulfate HFA (ProAir HFA) 108 (90 Base) MCG/ACT inhaler, Inhale 2 puffs Every 4 (Four) Hours As Needed for Wheezing., Disp: 54 g, Rfl: 3  •  alendronate (FOSAMAX) 70 MG tablet, Take 70 mg by mouth Every 7 (Seven) Days. Sunday's, Disp: , Rfl:   •  atorvastatin (LIPITOR) 40 MG tablet, Take 1 tablet by mouth Daily., Disp: 90 tablet, Rfl: 3  •  calcitriol (ROCALTROL) 0.5 MCG capsule, Take 1 capsule by mouth Daily., Disp: 90 capsule, Rfl: 3  •  Calcium Citrate-Vitamin D (CALCIUM + D PO), Take 1 tablet by mouth Daily., Disp: , Rfl:   •  Diclofenac  Sodium (PENNSAID TD), Apply 1 application topically to the appropriate area as directed 2 (Two) Times a Day As Needed (BACK PAIN). For back pain , Disp: , Rfl:   •  docusate sodium (COLACE) 100 MG capsule, Take 100 mg by mouth As Needed., Disp: , Rfl:   •  HYDROcodone-acetaminophen (NORCO) 5-325 MG per tablet, Take 1 tablet by mouth 2 (Two) Times a Day As Needed for Moderate Pain ., Disp: , Rfl:   •  ibuprofen (ADVIL,MOTRIN) 400 MG tablet, Take 400 mg by mouth Every 8 (Eight) Hours As Needed for Mild Pain ., Disp: , Rfl:   •  levothyroxine (SYNTHROID, LEVOTHROID) 50 MCG tablet, Take 1 tablet by mouth Daily., Disp: 90 tablet, Rfl: 3  •  loratadine (CLARITIN) 10 MG tablet, Take 10 mg by mouth Daily As Needed for Allergies., Disp: , Rfl:   •  losartan (COZAAR) 100 MG tablet, Take 1 tablet by mouth Daily., Disp: 90 tablet, Rfl: 3  •  pantoprazole (PROTONIX) 40 MG EC tablet, Take 1 tablet by mouth Daily., Disp: 90 tablet, Rfl: 3  •  potassium chloride ER (K-TAB) 20 MEQ tablet controlled-release ER tablet, Take 1 tablet by mouth Daily. (Patient taking differently: Take 10 mEq by mouth Daily. Pt breaks them in half and takes 10 meq daily), Disp: 90 tablet, Rfl: 3  •  rOPINIRole (REQUIP) 0.25 MG tablet, Take 1 tablet by mouth Every Night. Take 1 hour before bedtime., Disp: 90 tablet, Rfl: 3  •  theophylline (UNIPHYL) 400 MG 24 hr tablet, TAKE 1 TABLET DAILY (Patient taking differently: Take 400 mg by mouth Daily.), Disp: 90 tablet, Rfl: 3  •  tiZANidine (ZANAFLEX) 4 MG tablet, Take 4 mg by mouth Every 8 (Eight) Hours As Needed for Muscle Spasms., Disp: , Rfl:   •  zafirlukast (ACCOLATE) 20 MG tablet, Take 1 tablet by mouth 2 (Two) Times a Day., Disp: 180 tablet, Rfl: 3  No current facility-administered medications for this visit.     Facility-Administered Medications Ordered in Other Visits:   •  heparin flush (porcine) 100 UNIT/ML injection 500 Units, 500 Units, Intracatheter, PRN, Declan, Ki Quevedo MD, 500 Units  "at 09/27/16 1108  •  heparin flush (porcine) 100 UNIT/ML injection 500 Units, 500 Units, Intravenous, PRN, Malathi Brantley, APRN, 500 Units at 05/05/17 1009  •  sodium chloride 0.9 % flush 10 mL, 10 mL, Intravenous, PRN, Ki Manriquez MD, 10 mL at 09/27/16 1107  •  sodium chloride 0.9 % flush 10 mL, 10 mL, Intravenous, PRN, Malathi Brantley, APRN, 10 mL at 05/05/17 1009    Allergies   Allergen Reactions   • Cephalosporins Hives   • Keflex [Cephalexin] Rash       Family History   Problem Relation Age of Onset   • Colon polyps Mother         < 60 years of age    • Cirrhosis Mother    • Colon polyps Sister 54        < 60 years of age    • Breast cancer Sister    • No Known Problems Father    • No Known Problems Brother    • No Known Problems Daughter    • No Known Problems Son    • No Known Problems Maternal Grandmother    • No Known Problems Paternal Grandmother    • No Known Problems Maternal Aunt    • No Known Problems Paternal Aunt    • Colon cancer Neg Hx    • Crohn's disease Neg Hx    • Irritable bowel syndrome Neg Hx    • Liver cancer Neg Hx    • Liver disease Neg Hx    • Rectal cancer Neg Hx    • Stomach cancer Neg Hx    • BRCA 1/2 Neg Hx    • Endometrial cancer Neg Hx    • Ovarian cancer Neg Hx    • Uterine cancer Neg Hx    • Esophageal cancer Neg Hx      Review of Systems   Constitutional: Negative for unexpected weight change.   HENT: Negative for trouble swallowing.    Respiratory: Negative for shortness of breath.    Cardiovascular: Negative for chest pain.   Musculoskeletal: Negative for neck stiffness.   Neurological: Negative for seizures.   Hematological: Does not bruise/bleed easily.         Objective   /82 (BP Location: Left arm, Patient Position: Sitting)   Ht 157.5 cm (62\")   Wt 78.5 kg (173 lb)   LMP 06/14/2008 Comment: per pt dr bundy confirmed she has been through menopause but is having some post menopausal bleeding that recently started  BMI 31.64 kg/m² "     Physical Exam   Physical Exam  Vitals signs reviewed.   Constitutional:       Appearance: She is well-developed.   HENT:      Head: Normocephalic and atraumatic.   Eyes:      General: No scleral icterus.  Neck:      Trachea: No tracheal deviation.   Cardiovascular:      Rate and Rhythm: Normal rate and regular rhythm.   Pulmonary:      Effort: Pulmonary effort is normal.      Breath sounds: Normal breath sounds.   Abdominal:      General: Bowel sounds are normal. There is no distension.      Palpations: Abdomen is soft.      Tenderness: There is no abdominal tenderness.   Genitourinary:     Exam position: Supine.      Labia:         Right: No lesion.         Left: No lesion.       Vagina: No vaginal discharge or bleeding.      Cervix: No cervical motion tenderness.      Uterus: Not enlarged, not fixed and not tender.       Adnexa:         Right: No mass.          Left: No mass.        Rectum: No external hemorrhoid.   Skin:     General: Skin is warm and dry.   Neurological:      Mental Status: She is alert and oriented to person, place, and time.         Labs  Lab Results   Component Value Date     01/07/2021    HGB 15.1 01/07/2021    HCT 44.6 01/07/2021    WBC 7.88 01/07/2021     01/07/2021    K 4.0 01/07/2021     01/07/2021    CO2 24.0 01/07/2021    BUN 12 01/07/2021    CREATININE 1.11 (H) 01/07/2021    GLUCOSE 97 01/07/2021    ALBUMIN 4.00 12/09/2020    CALCIUM 9.4 01/07/2021    AST 22 12/09/2020    ALT 21 12/09/2020    BILITOT 0.2 12/09/2020        Assessment & Plan    Diagnoses and all orders for this visit:    1. Endometrial thickening on ultrasound (Primary)  -     Case Request  -     CBC (No Diff); Future  -     Basic Metabolic Panel; Future  -     sodium chloride 0.9 % infusion  -     sodium chloride 0.9 % flush 3 mL  -     sodium chloride 0.9 % flush 1-10 mL    Other orders  -     Follow Anesthesia Guidelines / Standing Orders; Future  -     Provide NPO Instructions to Patient;  Future  -     Follow Anesthesia Guidelines / Standing Orders; Standing  -     Verify NPO Status; Standing  -     Obtain informed consent; Standing  -     Have patient void prior to transfer; Standing  -     Insert Peripheral IV; Standing  -     Saline Lock & Maintain IV Access; Standing    Risks, benefits, and alternatives of a D&C with hysteroscopy were discussed with the patient in detail. Intraoperative risks of bleeding and damage to surrounding organs, including but not limited to intestine, bladder and ureter, were explained. Management of these were also explained. Postoperative complications such as infection, pneumonia, DVT, and bleeding were explained. The importance of compliance with postoperative restrictions was discussed. The diagnostic nature of the procedure was explained.    Uterine perforation was discussed with the patient at length.     All of the patient's questions were answered to her satisfaction. She was encouraged to return for an additional appointment if she had further questions. She verbalized understanding of the above and wished to proceed with the outlined plan.       Marcello Salas MD  1/10/2021  08:28 CST

## 2021-01-11 ENCOUNTER — LAB (OUTPATIENT)
Dept: LAB | Facility: HOSPITAL | Age: 56
End: 2021-01-11

## 2021-01-11 LAB
QT INTERVAL: 362 MS
QTC INTERVAL: 422 MS
SARS-COV-2 ORF1AB RESP QL NAA+PROBE: NOT DETECTED

## 2021-01-11 PROCEDURE — U0004 COV-19 TEST NON-CDC HGH THRU: HCPCS | Performed by: OBSTETRICS & GYNECOLOGY

## 2021-01-11 PROCEDURE — C9803 HOPD COVID-19 SPEC COLLECT: HCPCS | Performed by: OBSTETRICS & GYNECOLOGY

## 2021-01-13 ENCOUNTER — ANESTHESIA (OUTPATIENT)
Dept: PERIOP | Facility: HOSPITAL | Age: 56
End: 2021-01-13

## 2021-01-13 ENCOUNTER — HOSPITAL ENCOUNTER (OUTPATIENT)
Facility: HOSPITAL | Age: 56
Setting detail: HOSPITAL OUTPATIENT SURGERY
Discharge: HOME | End: 2021-01-13
Attending: OBSTETRICS & GYNECOLOGY | Admitting: OBSTETRICS & GYNECOLOGY

## 2021-01-13 ENCOUNTER — ANESTHESIA EVENT (OUTPATIENT)
Dept: PERIOP | Facility: HOSPITAL | Age: 56
End: 2021-01-13

## 2021-01-13 VITALS
SYSTOLIC BLOOD PRESSURE: 142 MMHG | DIASTOLIC BLOOD PRESSURE: 93 MMHG | TEMPERATURE: 98.3 F | HEART RATE: 81 BPM | RESPIRATION RATE: 20 BRPM | OXYGEN SATURATION: 96 %

## 2021-01-13 DIAGNOSIS — R93.89 ENDOMETRIAL THICKENING ON ULTRASOUND: ICD-10-CM

## 2021-01-13 PROCEDURE — 25010000002 PROPOFOL 10 MG/ML EMULSION: Performed by: NURSE ANESTHETIST, CERTIFIED REGISTERED

## 2021-01-13 PROCEDURE — 25010000002 DEXAMETHASONE PER 1 MG: Performed by: NURSE ANESTHETIST, CERTIFIED REGISTERED

## 2021-01-13 PROCEDURE — 25010000002 DEXAMETHASONE PER 1 MG: Performed by: ANESTHESIOLOGY

## 2021-01-13 PROCEDURE — 25010000002 ONDANSETRON PER 1 MG: Performed by: NURSE ANESTHETIST, CERTIFIED REGISTERED

## 2021-01-13 PROCEDURE — C1782 MORCELLATOR: HCPCS | Performed by: OBSTETRICS & GYNECOLOGY

## 2021-01-13 PROCEDURE — 88305 TISSUE EXAM BY PATHOLOGIST: CPT | Performed by: OBSTETRICS & GYNECOLOGY

## 2021-01-13 PROCEDURE — 58558 HYSTEROSCOPY BIOPSY: CPT | Performed by: OBSTETRICS & GYNECOLOGY

## 2021-01-13 PROCEDURE — 25010000002 FENTANYL CITRATE (PF) 100 MCG/2ML SOLUTION: Performed by: NURSE ANESTHETIST, CERTIFIED REGISTERED

## 2021-01-13 PROCEDURE — 25010000002 PHENYLEPHRINE 10 MG/ML SOLUTION: Performed by: NURSE ANESTHETIST, CERTIFIED REGISTERED

## 2021-01-13 RX ORDER — IBUPROFEN 600 MG/1
600 TABLET ORAL ONCE AS NEEDED
Status: DISCONTINUED | OUTPATIENT
Start: 2021-01-13 | End: 2021-01-13 | Stop reason: HOSPADM

## 2021-01-13 RX ORDER — SODIUM CHLORIDE 9 MG/ML
100 INJECTION, SOLUTION INTRAVENOUS CONTINUOUS
Status: DISCONTINUED | OUTPATIENT
Start: 2021-01-13 | End: 2021-01-13 | Stop reason: HOSPADM

## 2021-01-13 RX ORDER — OXYCODONE AND ACETAMINOPHEN 7.5; 325 MG/1; MG/1
2 TABLET ORAL EVERY 4 HOURS PRN
Status: DISCONTINUED | OUTPATIENT
Start: 2021-01-13 | End: 2021-01-13 | Stop reason: HOSPADM

## 2021-01-13 RX ORDER — PHENYLEPHRINE HYDROCHLORIDE 10 MG/ML
INJECTION INTRAVENOUS AS NEEDED
Status: DISCONTINUED | OUTPATIENT
Start: 2021-01-13 | End: 2021-01-13 | Stop reason: SURG

## 2021-01-13 RX ORDER — ONDANSETRON 2 MG/ML
4 INJECTION INTRAMUSCULAR; INTRAVENOUS ONCE AS NEEDED
Status: DISCONTINUED | OUTPATIENT
Start: 2021-01-13 | End: 2021-01-13 | Stop reason: HOSPADM

## 2021-01-13 RX ORDER — HYDROCODONE BITARTRATE AND ACETAMINOPHEN 5; 325 MG/1; MG/1
1 TABLET ORAL ONCE AS NEEDED
Status: DISCONTINUED | OUTPATIENT
Start: 2021-01-13 | End: 2021-01-13 | Stop reason: HOSPADM

## 2021-01-13 RX ORDER — LABETALOL HYDROCHLORIDE 5 MG/ML
5 INJECTION, SOLUTION INTRAVENOUS
Status: DISCONTINUED | OUTPATIENT
Start: 2021-01-13 | End: 2021-01-13 | Stop reason: HOSPADM

## 2021-01-13 RX ORDER — FENTANYL CITRATE 50 UG/ML
INJECTION, SOLUTION INTRAMUSCULAR; INTRAVENOUS AS NEEDED
Status: DISCONTINUED | OUTPATIENT
Start: 2021-01-13 | End: 2021-01-13 | Stop reason: SURG

## 2021-01-13 RX ORDER — ACETAMINOPHEN 500 MG
1000 TABLET ORAL ONCE
Status: COMPLETED | OUTPATIENT
Start: 2021-01-13 | End: 2021-01-13

## 2021-01-13 RX ORDER — FENTANYL CITRATE 50 UG/ML
25 INJECTION, SOLUTION INTRAMUSCULAR; INTRAVENOUS
Status: DISCONTINUED | OUTPATIENT
Start: 2021-01-13 | End: 2021-01-13 | Stop reason: HOSPADM

## 2021-01-13 RX ORDER — SCOLOPAMINE TRANSDERMAL SYSTEM 1 MG/1
1 PATCH, EXTENDED RELEASE TRANSDERMAL CONTINUOUS
Status: DISCONTINUED | OUTPATIENT
Start: 2021-01-13 | End: 2021-01-13 | Stop reason: HOSPADM

## 2021-01-13 RX ORDER — PROPOFOL 10 MG/ML
VIAL (ML) INTRAVENOUS AS NEEDED
Status: DISCONTINUED | OUTPATIENT
Start: 2021-01-13 | End: 2021-01-13 | Stop reason: SURG

## 2021-01-13 RX ORDER — SODIUM CHLORIDE 0.9 % (FLUSH) 0.9 %
3 SYRINGE (ML) INJECTION EVERY 12 HOURS SCHEDULED
Status: DISCONTINUED | OUTPATIENT
Start: 2021-01-13 | End: 2021-01-13 | Stop reason: HOSPADM

## 2021-01-13 RX ORDER — LIDOCAINE HYDROCHLORIDE 10 MG/ML
0.5 INJECTION, SOLUTION EPIDURAL; INFILTRATION; INTRACAUDAL; PERINEURAL ONCE AS NEEDED
Status: DISCONTINUED | OUTPATIENT
Start: 2021-01-13 | End: 2021-01-13 | Stop reason: HOSPADM

## 2021-01-13 RX ORDER — FAMOTIDINE 10 MG/ML
20 INJECTION, SOLUTION INTRAVENOUS
Status: COMPLETED | OUTPATIENT
Start: 2021-01-13 | End: 2021-01-13

## 2021-01-13 RX ORDER — SODIUM CHLORIDE, SODIUM LACTATE, POTASSIUM CHLORIDE, CALCIUM CHLORIDE 600; 310; 30; 20 MG/100ML; MG/100ML; MG/100ML; MG/100ML
100 INJECTION, SOLUTION INTRAVENOUS CONTINUOUS
Status: DISCONTINUED | OUTPATIENT
Start: 2021-01-13 | End: 2021-01-13 | Stop reason: HOSPADM

## 2021-01-13 RX ORDER — OXYCODONE AND ACETAMINOPHEN 10; 325 MG/1; MG/1
1 TABLET ORAL ONCE AS NEEDED
Status: COMPLETED | OUTPATIENT
Start: 2021-01-13 | End: 2021-01-13

## 2021-01-13 RX ORDER — MAGNESIUM HYDROXIDE 1200 MG/15ML
LIQUID ORAL AS NEEDED
Status: DISCONTINUED | OUTPATIENT
Start: 2021-01-13 | End: 2021-01-13 | Stop reason: HOSPADM

## 2021-01-13 RX ORDER — SODIUM CHLORIDE, SODIUM LACTATE, POTASSIUM CHLORIDE, CALCIUM CHLORIDE 600; 310; 30; 20 MG/100ML; MG/100ML; MG/100ML; MG/100ML
1000 INJECTION, SOLUTION INTRAVENOUS CONTINUOUS
Status: DISCONTINUED | OUTPATIENT
Start: 2021-01-13 | End: 2021-01-13 | Stop reason: HOSPADM

## 2021-01-13 RX ORDER — DEXAMETHASONE SODIUM PHOSPHATE 4 MG/ML
INJECTION, SOLUTION INTRA-ARTICULAR; INTRALESIONAL; INTRAMUSCULAR; INTRAVENOUS; SOFT TISSUE AS NEEDED
Status: DISCONTINUED | OUTPATIENT
Start: 2021-01-13 | End: 2021-01-13 | Stop reason: SURG

## 2021-01-13 RX ORDER — ONDANSETRON 2 MG/ML
INJECTION INTRAMUSCULAR; INTRAVENOUS AS NEEDED
Status: DISCONTINUED | OUTPATIENT
Start: 2021-01-13 | End: 2021-01-13 | Stop reason: SURG

## 2021-01-13 RX ORDER — HYDROCODONE BITARTRATE AND ACETAMINOPHEN 5; 325 MG/1; MG/1
1 TABLET ORAL EVERY 4 HOURS PRN
Qty: 6 TABLET | Refills: 0 | Status: SHIPPED | OUTPATIENT
Start: 2021-01-13 | End: 2021-01-26

## 2021-01-13 RX ORDER — DEXAMETHASONE SODIUM PHOSPHATE 4 MG/ML
4 INJECTION, SOLUTION INTRA-ARTICULAR; INTRALESIONAL; INTRAMUSCULAR; INTRAVENOUS; SOFT TISSUE ONCE AS NEEDED
Status: COMPLETED | OUTPATIENT
Start: 2021-01-13 | End: 2021-01-13

## 2021-01-13 RX ORDER — LIDOCAINE HYDROCHLORIDE 20 MG/ML
INJECTION, SOLUTION EPIDURAL; INFILTRATION; INTRACAUDAL; PERINEURAL AS NEEDED
Status: DISCONTINUED | OUTPATIENT
Start: 2021-01-13 | End: 2021-01-13 | Stop reason: SURG

## 2021-01-13 RX ORDER — SODIUM CHLORIDE 0.9 % (FLUSH) 0.9 %
3-10 SYRINGE (ML) INJECTION AS NEEDED
Status: DISCONTINUED | OUTPATIENT
Start: 2021-01-13 | End: 2021-01-13 | Stop reason: HOSPADM

## 2021-01-13 RX ORDER — SODIUM CHLORIDE 0.9 % (FLUSH) 0.9 %
1-10 SYRINGE (ML) INJECTION AS NEEDED
Status: DISCONTINUED | OUTPATIENT
Start: 2021-01-13 | End: 2021-01-13 | Stop reason: HOSPADM

## 2021-01-13 RX ORDER — NALOXONE HCL 0.4 MG/ML
0.4 VIAL (ML) INJECTION AS NEEDED
Status: DISCONTINUED | OUTPATIENT
Start: 2021-01-13 | End: 2021-01-13 | Stop reason: HOSPADM

## 2021-01-13 RX ORDER — FLUMAZENIL 0.1 MG/ML
0.2 INJECTION INTRAVENOUS AS NEEDED
Status: DISCONTINUED | OUTPATIENT
Start: 2021-01-13 | End: 2021-01-13 | Stop reason: HOSPADM

## 2021-01-13 RX ADMIN — FENTANYL CITRATE 25 MCG: 50 INJECTION, SOLUTION INTRAMUSCULAR; INTRAVENOUS at 09:02

## 2021-01-13 RX ADMIN — FENTANYL CITRATE 50 MCG: 50 INJECTION, SOLUTION INTRAMUSCULAR; INTRAVENOUS at 09:23

## 2021-01-13 RX ADMIN — PROPOFOL 150 MG: 10 INJECTION, EMULSION INTRAVENOUS at 09:04

## 2021-01-13 RX ADMIN — OXYCODONE HYDROCHLORIDE AND ACETAMINOPHEN 1 TABLET: 10; 325 TABLET ORAL at 09:54

## 2021-01-13 RX ADMIN — PHENYLEPHRINE HYDROCHLORIDE 160 MCG: 10 INJECTION INTRAVENOUS at 09:19

## 2021-01-13 RX ADMIN — LIDOCAINE HYDROCHLORIDE 100 MG: 20 INJECTION, SOLUTION EPIDURAL; INFILTRATION; INTRACAUDAL; PERINEURAL at 09:04

## 2021-01-13 RX ADMIN — SCOPALAMINE 1 PATCH: 1 PATCH, EXTENDED RELEASE TRANSDERMAL at 08:25

## 2021-01-13 RX ADMIN — DEXAMETHASONE SODIUM PHOSPHATE 4 MG: 4 INJECTION, SOLUTION INTRAMUSCULAR; INTRAVENOUS at 08:25

## 2021-01-13 RX ADMIN — ONDANSETRON HYDROCHLORIDE 4 MG: 2 SOLUTION INTRAMUSCULAR; INTRAVENOUS at 09:35

## 2021-01-13 RX ADMIN — SODIUM CHLORIDE, POTASSIUM CHLORIDE, SODIUM LACTATE AND CALCIUM CHLORIDE 1000 ML: 600; 310; 30; 20 INJECTION, SOLUTION INTRAVENOUS at 07:54

## 2021-01-13 RX ADMIN — PHENYLEPHRINE HYDROCHLORIDE 160 MCG: 10 INJECTION INTRAVENOUS at 09:22

## 2021-01-13 RX ADMIN — FENTANYL CITRATE 25 MCG: 50 INJECTION, SOLUTION INTRAMUSCULAR; INTRAVENOUS at 09:05

## 2021-01-13 RX ADMIN — SODIUM CHLORIDE, POTASSIUM CHLORIDE, SODIUM LACTATE AND CALCIUM CHLORIDE: 600; 310; 30; 20 INJECTION, SOLUTION INTRAVENOUS at 09:01

## 2021-01-13 RX ADMIN — PROPOFOL 50 MG: 10 INJECTION, EMULSION INTRAVENOUS at 09:05

## 2021-01-13 RX ADMIN — PHENYLEPHRINE HYDROCHLORIDE 80 MCG: 10 INJECTION INTRAVENOUS at 09:15

## 2021-01-13 RX ADMIN — FENTANYL CITRATE 50 MCG: 50 INJECTION, SOLUTION INTRAMUSCULAR; INTRAVENOUS at 09:04

## 2021-01-13 RX ADMIN — DEXAMETHASONE SODIUM PHOSPHATE 4 MG: 4 INJECTION, SOLUTION INTRAMUSCULAR; INTRAVENOUS at 09:08

## 2021-01-13 RX ADMIN — ACETAMINOPHEN 1000 MG: 500 TABLET, FILM COATED ORAL at 08:25

## 2021-01-13 RX ADMIN — FAMOTIDINE 20 MG: 10 INJECTION INTRAVENOUS at 08:25

## 2021-01-13 RX ADMIN — FENTANYL CITRATE 50 MCG: 50 INJECTION, SOLUTION INTRAMUSCULAR; INTRAVENOUS at 09:33

## 2021-01-13 NOTE — ANESTHESIA PROCEDURE NOTES
Airway  Urgency: elective    Date/Time: 1/13/2021 9:06 AM  Airway not difficult    General Information and Staff    Patient location during procedure: OR  CRNA: Babak Crawford CRNA    Indications and Patient Condition  Indications for airway management: airway protection    Preoxygenated: yes  Mask difficulty assessment: 1 - vent by mask    Final Airway Details  Final airway type: supraglottic airway      Successful airway: I-gel  Size 3    Number of attempts at approach: 1  Assessment: lips, teeth, and gum same as pre-op and atraumatic intubation

## 2021-01-13 NOTE — OP NOTE
Marcello Salas MD  [facility]  [address]  [place] [address]  Office [phone]  Fax [phone]      [facility]  Luna Cruz  1965  [id number]  [id number]  1/13/2021      Pre-operative Diagnosis: Thickened endometrial lining    Post-operative Diagnosis: Thickened endometrial lining    Operation: Hysteroscopic Polypectomy    Surgeon: Marcello Salas MD, FACOG    Assistants: none    Anesthesia: General endotracheal anesthesia    Findings: Minimal uterine descent    Estimated Blood Loss: Minimal      Specimens: Hysteroscopic resected edometrial tissue    Complications:  None    Disposition: PACU - hemodynamically stable.      Procedure Details      After consents were obtained the patient was taken to the operating room, where general anesthesia was administered. She was placed in dorsal lithotomy position, with care taken in placement of legs to avoid nerve injury. She is prepped and draped in the usual sterile fashion.    In-and-out catheter was placed into the bladder for decompression. The cervix was identified and grasped with a single-tooth tenaculum. Please see comments above regarding details of the exam.   Endocervical curettage not indicated. The cervix was then gradually and gently dilated to a #7 and the MyoSure hysteroscope was inserted under direct visualization. The above findings were noted.     MyoSure Reach device was then used to resect a single polyp located near the left cornual region. Hemostasis was noted. Fluid deficit was 75. The instrument and the hysteroscope were removed. Curettage was then performed with a sharp curette to a gritty texture. Hemostasis was noted from the os, as well as the single-tooth tenaculum site.     She tolerated the procedure well. Instrument counts are correct. She was extubated, awakened, and taken to recovery room in stable condition.       Marcello Salas MD  01/13/21  09:30 CST

## 2021-01-13 NOTE — DISCHARGE INSTRUCTIONS

## 2021-01-13 NOTE — ANESTHESIA PREPROCEDURE EVALUATION
Anesthesia Evaluation     Patient summary reviewed and Nursing notes reviewed   history of anesthetic complications: PONV  NPO Solid Status: > 8 hours  NPO Liquid Status: > 8 hours           Airway   Mallampati: I  TM distance: >3 FB  Neck ROM: full  Possible difficult intubation  Dental          Pulmonary    (+) asthma (uses inhalers regularly),  Cardiovascular   Exercise tolerance: poor (<4 METS)    (+) hypertension, hyperlipidemia,   (-) CAD    ROS comment: Low risk stress test 7/2020      Neuro/Psych  (-) seizures, TIA, CVA  GI/Hepatic/Renal/Endo    (+)  GERD,  liver disease fatty liver disease,   (-) no renal disease, diabetes    Musculoskeletal     Abdominal    Substance History      OB/GYN          Other   arthritis,    history of cancer (breast cancer) remission                    Anesthesia Plan    ASA 3     general     intravenous induction     Anesthetic plan, all risks, benefits, and alternatives have been provided, discussed and informed consent has been obtained with: patient.

## 2021-01-13 NOTE — ANESTHESIA POSTPROCEDURE EVALUATION
Patient: Luna Cruz    Procedure Summary     Date: 01/13/21 Room / Location:  [facility] OR 08 /  [facility]    Anesthesia Start: 0901 Anesthesia Stop: 0943    Procedure: DILATATION AND CURETTAGE HYSTEROSCOPY WITH MYOSURE, polypectomy (N/A Vagina) Diagnosis:       Endometrial thickening on ultrasound      (Endometrial thickening on ultrasound [R93.89])    Surgeon: Marcello Salas MD Provider: Babak Crawford CRNA    Anesthesia Type: general ASA Status: 3          Anesthesia Type: general    Vitals  Vitals Value Taken Time   /84 01/13/21 1000   Temp 98.3 °F (36.8 °C) 01/13/21 1000   Pulse 89 01/13/21 1000   Resp 12 01/13/21 1000   SpO2 96 % 01/13/21 1000           Post Anesthesia Care and Evaluation    Patient location during evaluation: PACU  Patient participation: complete - patient participated  Level of consciousness: awake and alert  Pain management: adequate  Airway patency: patent  Anesthetic complications: No anesthetic complications  PONV Status: none  Cardiovascular status: acceptable and hemodynamically stable  Respiratory status: acceptable  Hydration status: acceptable    Comments: Blood pressure 142/93, pulse 81, temperature 98.3 °F (36.8 °C), temperature source Temporal, resp. rate 20, last menstrual period 06/14/2008, SpO2 96 %, not currently breastfeeding.    Patient discharged from PACU based upon Lilian score. Please see RN notes for further details

## 2021-01-26 ENCOUNTER — OFFICE VISIT (OUTPATIENT)
Dept: OBSTETRICS AND GYNECOLOGY | Facility: CLINIC | Age: 56
End: 2021-01-26

## 2021-01-26 VITALS
BODY MASS INDEX: 32.02 KG/M2 | SYSTOLIC BLOOD PRESSURE: 138 MMHG | WEIGHT: 174 LBS | HEIGHT: 62 IN | DIASTOLIC BLOOD PRESSURE: 84 MMHG

## 2021-01-26 DIAGNOSIS — Z09 POSTOP CHECK: ICD-10-CM

## 2021-01-26 DIAGNOSIS — N95.0 POSTMENOPAUSAL BLEEDING: Primary | ICD-10-CM

## 2021-01-26 PROBLEM — R93.89 ENDOMETRIAL THICKENING ON ULTRASOUND: Status: RESOLVED | Noted: 2021-01-05 | Resolved: 2021-01-26

## 2021-01-26 PROCEDURE — 99213 OFFICE O/P EST LOW 20 MIN: CPT | Performed by: OBSTETRICS & GYNECOLOGY

## 2021-01-26 NOTE — PROGRESS NOTES
"Luna Cruz is a 55 y.o. female here today for follow up of postmenopausal bleeding, and a postop visit after undergoing hysteroscopy with polypectomy on 1/13.  She has been doing well postoperatively since her procedure and denies any fevers, pain, or vaginal bleeding.  Her pathology report returned showing a benign endometrial polyp.    /84 (BP Location: Left arm, Patient Position: Sitting, Cuff Size: Adult)   Ht 157.5 cm (62\")   Wt 78.9 kg (174 lb)   LMP 06/14/2008 Comment: per pt dr bunyd confirmed she has been through menopause but is having some post menopausal bleeding that recently started  Breastfeeding No   BMI 31.83 kg/m²    In general pleasant female in no acute distress  Abdomen is soft nontender nondistended    Hgb 15.1 preop    Assessment: Postmenopausal bleeding, endometrial polyp, normal postoperative visit after hysteroscopy and polypectomy    Luna Cruz will return in 1 year for annual exam.  We have discussed the benign pathology report and the findings of a polyp.  In the meantime if she has questions or problems she will call the office.   "

## 2021-03-10 RX ORDER — ROPINIROLE 0.25 MG/1
TABLET, FILM COATED ORAL
Qty: 90 TABLET | Refills: 3 | Status: SHIPPED | OUTPATIENT
Start: 2021-03-10 | End: 2022-03-17

## 2021-03-24 ENCOUNTER — IMMUNIZATION (OUTPATIENT)
Age: 56
End: 2021-03-24
Payer: MEDICARE

## 2021-03-24 PROCEDURE — 91300 COVID-19, PFIZER VACCINE 30MCG/0.3ML DOSE: CPT | Performed by: FAMILY MEDICINE

## 2021-03-24 PROCEDURE — 0001A COVID-19, PFIZER VACCINE 30MCG/0.3ML DOSE: CPT | Performed by: FAMILY MEDICINE

## 2021-03-31 RX ORDER — ATORVASTATIN CALCIUM 40 MG/1
TABLET, FILM COATED ORAL
Qty: 90 TABLET | Refills: 3 | Status: SHIPPED | OUTPATIENT
Start: 2021-03-31 | End: 2022-04-06

## 2021-04-14 ENCOUNTER — IMMUNIZATION (OUTPATIENT)
Age: 56
End: 2021-04-14
Payer: MEDICARE

## 2021-04-14 PROCEDURE — 91300 COVID-19, PFIZER VACCINE 30MCG/0.3ML DOSE: CPT | Performed by: FAMILY MEDICINE

## 2021-04-14 PROCEDURE — 0002A COVID-19, PFIZER VACCINE 30MCG/0.3ML DOSE: CPT | Performed by: FAMILY MEDICINE

## 2021-04-22 ENCOUNTER — TELEPHONE (OUTPATIENT)
Dept: PULMONOLOGY | Facility: CLINIC | Age: 56
End: 2021-04-22

## 2021-04-22 NOTE — TELEPHONE ENCOUNTER
Patient states her asthma and allergies are flaring up. She states she is coughing up white to clear sputum and wheezing. She woke up this morning with a temp of 99.  She is requesting medicine to be [place] to [facility] [place]

## 2021-04-23 RX ORDER — PREDNISONE 10 MG/1
TABLET ORAL
Qty: 42 TABLET | Refills: 0 | Status: SHIPPED | OUTPATIENT
Start: 2021-04-23 | End: 2021-06-02

## 2021-04-23 RX ORDER — AZITHROMYCIN 250 MG/1
TABLET, FILM COATED ORAL
Qty: 6 TABLET | Refills: 0 | Status: SHIPPED | OUTPATIENT
Start: 2021-04-23 | End: 2021-06-02

## 2021-04-23 NOTE — TELEPHONE ENCOUNTER
Per Dr Aguirre: I called in a prescription for azithromycin and prednisone taper.  Please ask her to pick those up and let me know how she feels.  Thanks.       Patient notified and to let us know how she feels after taking medicine.

## 2021-04-26 RX ORDER — AZITHROMYCIN 250 MG/1
TABLET, FILM COATED ORAL
Qty: 6 TABLET | Refills: 0 | Status: SHIPPED | OUTPATIENT
Start: 2021-04-26 | End: 2021-06-02

## 2021-04-26 NOTE — TELEPHONE ENCOUNTER
Patient called back regarding the antibiotic and steroid that was supposed to be called into [facility].  It looks like Dr [facility]   Dr Aguirre is out for a few days so sending to [facility] that has also seen her in the past.  Patient was notified of this.

## 2021-04-29 ENCOUNTER — TELEPHONE (OUTPATIENT)
Dept: PULMONOLOGY | Facility: CLINIC | Age: 56
End: 2021-04-29

## 2021-04-29 NOTE — TELEPHONE ENCOUNTER
Patient left a voicemail that the medicine that was sent in earlier this month did help her breathing/wheezing.

## 2021-05-03 DIAGNOSIS — D50.9 IRON DEFICIENCY ANEMIA, UNSPECIFIED IRON DEFICIENCY ANEMIA TYPE: ICD-10-CM

## 2021-05-03 DIAGNOSIS — C50.312 MALIGNANT NEOPLASM OF LOWER-INNER QUADRANT OF LEFT BREAST IN FEMALE, ESTROGEN RECEPTOR POSITIVE (HCC): Primary | ICD-10-CM

## 2021-05-03 DIAGNOSIS — Z17.0 MALIGNANT NEOPLASM OF LOWER-INNER QUADRANT OF LEFT BREAST IN FEMALE, ESTROGEN RECEPTOR POSITIVE (HCC): Primary | ICD-10-CM

## 2021-05-04 ENCOUNTER — LAB (OUTPATIENT)
Dept: LAB | Facility: HOSPITAL | Age: 56
End: 2021-05-04

## 2021-05-04 DIAGNOSIS — D50.0 IRON DEFICIENCY ANEMIA DUE TO CHRONIC BLOOD LOSS: Primary | ICD-10-CM

## 2021-05-04 DIAGNOSIS — Z17.0 MALIGNANT NEOPLASM OF LOWER-INNER QUADRANT OF LEFT BREAST IN FEMALE, ESTROGEN RECEPTOR POSITIVE (HCC): ICD-10-CM

## 2021-05-04 DIAGNOSIS — C50.312 MALIGNANT NEOPLASM OF LOWER-INNER QUADRANT OF LEFT BREAST IN FEMALE, ESTROGEN RECEPTOR POSITIVE (HCC): ICD-10-CM

## 2021-05-04 DIAGNOSIS — D50.9 IRON DEFICIENCY ANEMIA, UNSPECIFIED IRON DEFICIENCY ANEMIA TYPE: ICD-10-CM

## 2021-05-04 LAB
ALBUMIN SERPL-MCNC: 4 G/DL (ref 3.5–5.2)
ALBUMIN/GLOB SERPL: 1.3 G/DL
ALP SERPL-CCNC: 111 U/L (ref 39–117)
ALT SERPL W P-5'-P-CCNC: 21 U/L (ref 1–33)
ANION GAP SERPL CALCULATED.3IONS-SCNC: 10 MMOL/L (ref 5–15)
AST SERPL-CCNC: 20 U/L (ref 1–32)
BASOPHILS # BLD AUTO: 0.09 10*3/MM3 (ref 0–0.2)
BASOPHILS NFR BLD AUTO: 1 % (ref 0–1.5)
BILIRUB SERPL-MCNC: 0.5 MG/DL (ref 0–1.2)
BUN SERPL-MCNC: 14 MG/DL (ref 6–20)
BUN/CREAT SERPL: 14.4 (ref 7–25)
CALCIUM SPEC-SCNC: 9.2 MG/DL (ref 8.6–10.5)
CHLORIDE SERPL-SCNC: 102 MMOL/L (ref 98–107)
CO2 SERPL-SCNC: 28 MMOL/L (ref 22–29)
CREAT SERPL-MCNC: 0.97 MG/DL (ref 0.57–1)
DEPRECATED RDW RBC AUTO: 44.6 FL (ref 37–54)
EOSINOPHIL # BLD AUTO: 0.44 10*3/MM3 (ref 0–0.4)
EOSINOPHIL NFR BLD AUTO: 4.8 % (ref 0.3–6.2)
ERYTHROCYTE [DISTWIDTH] IN BLOOD BY AUTOMATED COUNT: 13.7 % (ref 12.3–15.4)
FERRITIN SERPL-MCNC: 709.4 NG/ML (ref 13–150)
GFR SERPL CREATININE-BSD FRML MDRD: 59 ML/MIN/1.73
GLOBULIN UR ELPH-MCNC: 3.2 GM/DL
GLUCOSE SERPL-MCNC: 91 MG/DL (ref 65–99)
HCT VFR BLD AUTO: 48.7 % (ref 34–46.6)
HGB BLD-MCNC: 15.7 G/DL (ref 12–15.9)
IMM GRANULOCYTES # BLD AUTO: 0.04 10*3/MM3 (ref 0–0.05)
IMM GRANULOCYTES NFR BLD AUTO: 0.4 % (ref 0–0.5)
IRON 24H UR-MRATE: 111 MCG/DL (ref 37–145)
IRON SATN MFR SERPL: 33 % (ref 20–50)
LYMPHOCYTES # BLD AUTO: 2.23 10*3/MM3 (ref 0.7–3.1)
LYMPHOCYTES NFR BLD AUTO: 24.4 % (ref 19.6–45.3)
MCH RBC QN AUTO: 28.7 PG (ref 26.6–33)
MCHC RBC AUTO-ENTMCNC: 32.2 G/DL (ref 31.5–35.7)
MCV RBC AUTO: 89 FL (ref 79–97)
MONOCYTES # BLD AUTO: 0.6 10*3/MM3 (ref 0.1–0.9)
MONOCYTES NFR BLD AUTO: 6.6 % (ref 5–12)
NEUTROPHILS NFR BLD AUTO: 5.73 10*3/MM3 (ref 1.7–7)
NEUTROPHILS NFR BLD AUTO: 62.8 % (ref 42.7–76)
NRBC BLD AUTO-RTO: 0 /100 WBC (ref 0–0.2)
PLATELET # BLD AUTO: 368 10*3/MM3 (ref 140–450)
PMV BLD AUTO: 10.2 FL (ref 6–12)
POTASSIUM SERPL-SCNC: 4.1 MMOL/L (ref 3.5–5.2)
PROT SERPL-MCNC: 7.2 G/DL (ref 6–8.5)
RBC # BLD AUTO: 5.47 10*6/MM3 (ref 3.77–5.28)
SODIUM SERPL-SCNC: 140 MMOL/L (ref 136–145)
TIBC SERPL-MCNC: 337 MCG/DL (ref 298–536)
TRANSFERRIN SERPL-MCNC: 226 MG/DL (ref 200–360)
WBC # BLD AUTO: 9.13 10*3/MM3 (ref 3.4–10.8)

## 2021-05-04 PROCEDURE — 85025 COMPLETE CBC W/AUTO DIFF WBC: CPT

## 2021-05-04 PROCEDURE — 36415 COLL VENOUS BLD VENIPUNCTURE: CPT

## 2021-05-04 PROCEDURE — 80053 COMPREHEN METABOLIC PANEL: CPT

## 2021-05-04 PROCEDURE — 84466 ASSAY OF TRANSFERRIN: CPT

## 2021-05-04 PROCEDURE — 82728 ASSAY OF FERRITIN: CPT

## 2021-05-04 PROCEDURE — 83540 ASSAY OF IRON: CPT

## 2021-05-11 ENCOUNTER — OFFICE VISIT (OUTPATIENT)
Dept: ONCOLOGY | Facility: CLINIC | Age: 56
End: 2021-05-11

## 2021-05-11 VITALS
RESPIRATION RATE: 18 BRPM | DIASTOLIC BLOOD PRESSURE: 76 MMHG | TEMPERATURE: 98 F | WEIGHT: 183.2 LBS | HEIGHT: 62 IN | BODY MASS INDEX: 33.71 KG/M2 | HEART RATE: 112 BPM | SYSTOLIC BLOOD PRESSURE: 138 MMHG | OXYGEN SATURATION: 98 %

## 2021-05-11 DIAGNOSIS — C50.312 MALIGNANT NEOPLASM OF LOWER-INNER QUADRANT OF LEFT BREAST IN FEMALE, ESTROGEN RECEPTOR POSITIVE (HCC): Primary | ICD-10-CM

## 2021-05-11 DIAGNOSIS — Z17.0 MALIGNANT NEOPLASM OF LOWER-INNER QUADRANT OF LEFT BREAST IN FEMALE, ESTROGEN RECEPTOR POSITIVE (HCC): Primary | ICD-10-CM

## 2021-05-11 PROCEDURE — 99213 OFFICE O/P EST LOW 20 MIN: CPT | Performed by: INTERNAL MEDICINE

## 2021-05-28 RX ORDER — POTASSIUM CHLORIDE 1500 MG/1
20 TABLET, FILM COATED, EXTENDED RELEASE ORAL DAILY
Qty: 90 TABLET | Refills: 3 | Status: SHIPPED | OUTPATIENT
Start: 2021-05-28 | End: 2021-08-18 | Stop reason: SDUPTHER

## 2021-06-02 ENCOUNTER — OFFICE VISIT (OUTPATIENT)
Dept: INTERNAL MEDICINE | Facility: CLINIC | Age: 56
End: 2021-06-02

## 2021-06-02 VITALS
HEART RATE: 83 BPM | BODY MASS INDEX: 33.68 KG/M2 | SYSTOLIC BLOOD PRESSURE: 146 MMHG | HEIGHT: 62 IN | TEMPERATURE: 97.5 F | DIASTOLIC BLOOD PRESSURE: 82 MMHG | OXYGEN SATURATION: 99 % | WEIGHT: 183 LBS

## 2021-06-02 DIAGNOSIS — I10 ESSENTIAL HYPERTENSION: Primary | ICD-10-CM

## 2021-06-02 DIAGNOSIS — E03.4 HYPOTHYROIDISM DUE TO ACQUIRED ATROPHY OF THYROID: ICD-10-CM

## 2021-06-02 DIAGNOSIS — K21.9 GASTROESOPHAGEAL REFLUX DISEASE WITHOUT ESOPHAGITIS: ICD-10-CM

## 2021-06-02 DIAGNOSIS — E78.00 ELEVATED CHOLESTEROL: ICD-10-CM

## 2021-06-02 PROCEDURE — 99214 OFFICE O/P EST MOD 30 MIN: CPT | Performed by: NURSE PRACTITIONER

## 2021-06-02 NOTE — PROGRESS NOTES
Subjective   Luna Cruz is a 55 y.o. female.   Chief Complaint   Patient presents with   • Hypertension     6 month follow up       Luna presents today for 6 month follow up on hypertension, elevated cholesterol, and hypothyroidism.  She reports she is doing well but is still having some trouble with her Asthma since having COVID-19 in December.  She continues on multiple medications to help manage this as well as getting established with pulmonology.  She has a follow up in 2 months with them and plans to have PFT completed.  She wakes up about every other night with asthma symptoms, needing to use her rescue inhaler.  She denies chest pain or palpitations. She is tolerating activity well and denies anything other than mild shortness of breath with walking into stores from the parking lot or coming into the office today.  She does monitor her BP at home and reports getting readings around 130/80 consistently.   She is trying to make better diet choices and lose weight.  Methods have included calorie restrictions.  She has GERD but denies any abdominal pain or difficulty eating.       The following portions of the patient's history were reviewed and updated as appropriate: allergies, current medications, past family history, past medical history, past social history, past surgical history and problem list.    Review of Systems   Constitutional: Negative for activity change, appetite change, fatigue, fever, unexpected weight gain and unexpected weight loss.   HENT: Negative for swollen glands, trouble swallowing and voice change.    Eyes: Negative for blurred vision and visual disturbance.   Respiratory: Positive for shortness of breath. Negative for cough.    Cardiovascular: Negative for chest pain, palpitations and leg swelling.   Gastrointestinal: Negative for abdominal pain, constipation, diarrhea, nausea, vomiting and indigestion.   Endocrine: Negative for cold intolerance, heat intolerance, polydipsia  and polyphagia.   Genitourinary: Negative for dysuria and frequency.   Musculoskeletal: Negative for arthralgias, back pain, joint swelling and neck pain.   Skin: Negative for color change, rash and skin lesions.   Neurological: Negative for dizziness, weakness, headache, memory problem and confusion.   Hematological: Does not bruise/bleed easily.   Psychiatric/Behavioral: Negative for agitation, hallucinations and suicidal ideas. The patient is not nervous/anxious.        Objective   Past Medical History:   Diagnosis Date   • Abnormal uterine bleeding due to endometrial polyp    • Arthritis    • Asthma    • Chronic back pain    • Disease of thyroid gland    • Drug therapy    • Family history of colonic polyps    • GERD (gastroesophageal reflux disease)    • History of breast cancer    • History of colon polyps    • Hx of radiation therapy    • Hyperlipidemia    • Hypertension    • Malignant neoplasm of upper-inner quadrant of right female breast (CMS/HCC) 9/26/2016   • Osteoporosis    • PONV (postoperative nausea and vomiting)    • Scoliosis       Past Surgical History:   Procedure Laterality Date   • ADENOIDECTOMY     • APPENDECTOMY  1994   • BREAST BIOPSY     • BREAST LUMPECTOMY Right 2008    right sentinel lymph nodes were also removed for biopsy   • CARDIAC CATHETERIZATION     • CHOLECYSTECTOMY  1993   • COLONOSCOPY  05/03/2013    Erythematous mucosa in the rectum-biopsied; Repeat 4 years    • COLONOSCOPY N/A 6/14/2017    Normal; Repeat 5 years   • COLONOSCOPY  04/23/2010    Dr. Arzola-Extremely poorly prepped colon   • COLONOSCOPY N/A 7/24/2020    Hemorrhoids found on perianal exam; One 4mm hyperplastic polyp in the transverse colon; Normal mucosa in the entire examined colon-biopsied; Non-bleeding internal hemorrhoids; Repeat 5 years   • D & C HYSTEROSCOPY  1993   • D & C HYSTEROSCOPY MYOSURE N/A 1/13/2021    Procedure: DILATATION AND CURETTAGE HYSTEROSCOPY WITH MYOSURE, polypectomy;  Surgeon: Josue  Marcello CANADA MD;  Location: [facility] OR;  Service: Obstetrics/Gynecology;  Laterality: N/A;   • DILATATION AND CURETTAGE     • ENDOSCOPY N/A 10/5/2016    Medium-sized HH; Widely patent and non-obstructing Schatzki ring-dilated; Procedure: ESOPHAGOGASTRODUODENOSCOPY WITH ANESTHESIA;  Surgeon: Ksenia Fox MD;  Location: [facility] ENDOSCOPY;  Service:    • ENDOSCOPY N/A 2/27/2019    Normal 1st portion of the duodenum and 2 portion of the duodenum; A few gastric polyps-biopsied; Small HH; Widely patent Schatzki ring-dilated; Abnormal esophageal motility, suspicious for presbyesophagus; Arrange for barium swallow to assess for dysmotility   • ENDOSCOPY N/A 7/24/2020    Medium-sized HH; LA Grade A reflux esophagitis; No endoscopic esophageal abnormality to explain patient's dysphagia-Esophagus dilated; Normal stomach; Normal examined duodenum; No specimens collected   • HYSTEROSCOPY     • KNEE ARTHROSCOPY Left 11/13/2018    Procedure: LEFT KNEE ARTHROSCOPIC PARTIAL MEDIAL MENISCECTOMY;  Surgeon: Matt Maki MD;  Location: [facility] OR;  Service: Orthopedics   • MEDIPORT INSERTION, SINGLE  2008   • MEDIPORT REMOVAL     • PARATHYROIDECTOMY  2011   • REPLACEMENT TOTAL KNEE Left 05/23/2019   • TONSILLECTOMY AND ADENOIDECTOMY  1970   • TUBAL ABDOMINAL LIGATION          Current Outpatient Medications:   •  Advair Diskus 250-50 MCG/DOSE DISKUS, USE 1 INHALATION TWICE A DAY (Patient taking differently: Inhale 1 puff 2 (Two) Times a Day.), Disp: 3 each, Rfl: 3  •  albuterol (PROVENTIL) (2.5 MG/3ML) 0.083% nebulizer solution, Take 2.5 mg by nebulization As Needed., Disp: , Rfl:   •  alendronate (FOSAMAX) 70 MG tablet, Take 70 mg by mouth Every 7 (Seven) Days. Sunday's, Disp: , Rfl:   •  atorvastatin (LIPITOR) 40 MG tablet, TAKE 1 TABLET DAILY, Disp: 90 tablet, Rfl: 3  •  calcitriol (ROCALTROL) 0.5 MCG capsule, Take 1 capsule by mouth Daily., Disp: 90 capsule, Rfl: 3  •  Calcium Citrate-Vitamin D (CALCIUM + D PO), Take 1 tablet  by mouth Daily., Disp: , Rfl:   •  Diclofenac Sodium (PENNSAID TD), Apply 1 application topically to the appropriate area as directed 2 (Two) Times a Day As Needed (BACK PAIN). For back pain , Disp: , Rfl:   •  docusate sodium (COLACE) 100 MG capsule, Take 100 mg by mouth As Needed., Disp: , Rfl:   •  HYDROcodone-acetaminophen (NORCO) 5-325 MG per tablet, Take 1 tablet by mouth 2 (Two) Times a Day As Needed for Moderate Pain ., Disp: , Rfl:   •  ibuprofen (ADVIL,MOTRIN) 400 MG tablet, Take 400 mg by mouth Every 8 (Eight) Hours As Needed for Mild Pain ., Disp: , Rfl:   •  levothyroxine (SYNTHROID, LEVOTHROID) 50 MCG tablet, Take 1 tablet by mouth Daily., Disp: 90 tablet, Rfl: 3  •  loratadine (CLARITIN) 10 MG tablet, Take 10 mg by mouth Daily As Needed for Allergies., Disp: , Rfl:   •  losartan (COZAAR) 100 MG tablet, Take 1 tablet by mouth Daily., Disp: 90 tablet, Rfl: 3  •  pantoprazole (PROTONIX) 40 MG EC tablet, Take 1 tablet by mouth Daily., Disp: 90 tablet, Rfl: 3  •  potassium chloride ER (K-TAB) 20 MEQ tablet controlled-release ER tablet, Take 1 tablet by mouth Daily. Pt breaks them in half and takes 10 meq daily, Disp: 90 tablet, Rfl: 3  •  rOPINIRole (REQUIP) 0.25 MG tablet, TAKE 1 TABLET EVERY NIGHT. TAKE ONE HOUR BEFORE BEDTIME., Disp: 90 tablet, Rfl: 3  •  theophylline (UNIPHYL) 400 MG 24 hr tablet, TAKE 1 TABLET DAILY (Patient taking differently: Take 400 mg by mouth Daily.), Disp: 90 tablet, Rfl: 3  •  tiZANidine (ZANAFLEX) 4 MG tablet, Take 4 mg by mouth Every 8 (Eight) Hours As Needed for Muscle Spasms., Disp: , Rfl:   •  zafirlukast (ACCOLATE) 20 MG tablet, Take 1 tablet by mouth 2 (Two) Times a Day., Disp: 180 tablet, Rfl: 3  No current facility-administered medications for this visit.    Facility-Administered Medications Ordered in Other Visits:   •  heparin flush (porcine) 100 UNIT/ML injection 500 Units, 500 Units, Intracatheter, PRN, Ki Manriquez MD, 500 Units at 09/27/16 1108  •   heparin flush (porcine) 100 UNIT/ML injection 500 Units, 500 Units, Intravenous, PRN, Malathi Brantley APRN, 500 Units at 05/05/17 1009  •  sodium chloride 0.9 % flush 10 mL, 10 mL, Intravenous, PRN, Ki Manriquez MD, 10 mL at 09/27/16 1107  •  sodium chloride 0.9 % flush 10 mL, 10 mL, Intravenous, PRN, Malathi Brantley APRN, 10 mL at 05/05/17 1009     Vitals:    06/02/21 0803   BP: 146/82   Pulse: 83   Temp: 97.5 °F (36.4 °C)   SpO2: 99%         06/02/21  0803   Weight: 83 kg (183 lb)         Physical Exam  HENT:      Right Ear: Tympanic membrane and external ear normal.      Left Ear: External ear normal. Tympanic membrane is bulging. Tympanic membrane is not erythematous.      Nose: Nose normal.      Mouth/Throat:      Mouth: Mucous membranes are moist. No oral lesions.      Pharynx: Oropharynx is clear.   Eyes:      Conjunctiva/sclera: Conjunctivae normal.      Pupils: Pupils are equal, round, and reactive to light.   Neck:      Thyroid: No thyromegaly.   Cardiovascular:      Rate and Rhythm: Normal rate and regular rhythm.      Pulses: Normal pulses.           Radial pulses are 2+ on the right side and 2+ on the left side.      Heart sounds: Normal heart sounds.   Pulmonary:      Effort: Pulmonary effort is normal.      Breath sounds: Normal breath sounds.   Musculoskeletal:      Cervical back: Normal range of motion.      Right lower leg: No edema.      Left lower leg: No edema.   Lymphadenopathy:      Cervical: No cervical adenopathy.   Skin:     General: Skin is warm and dry.   Neurological:      Mental Status: She is alert and oriented to person, place, and time.      Gait: Gait normal.   Psychiatric:         Mood and Affect: Mood normal.         Speech: Speech normal.         Behavior: Behavior normal.         Thought Content: Thought content normal.               Assessment/Plan   Diagnoses and all orders for this visit:    1. Essential hypertension (Primary)    2. Elevated  cholesterol  -     Lipid panel    3. Hypothyroidism due to acquired atrophy of thyroid  -     TSH  -     T3, free  -     T4, free    4. Gastroesophageal reflux disease without esophagitis      I have requested she continue to monitor her BP at home.  If above 130/80, will need to notify the office for additional adjustments of medication.  I've discussed watching sodium intake and avoiding processed foods.  Eat more whole foods, fresh fruits and vegetables.  I have discussed water exercises to keep strain off of her chronic back pain.  I have also recommended a pedal machine she can use while sitting at home.    Needs to follow up with pulmonology as scheduled.  Sooner, if symptoms worsen.    I have reviewed past labs.  She is due to have Lipid and TSH checked.  Will get these completed today.

## 2021-06-03 ENCOUNTER — TELEPHONE (OUTPATIENT)
Dept: INTERNAL MEDICINE | Facility: CLINIC | Age: 56
End: 2021-06-03

## 2021-06-03 LAB
CHOLEST SERPL-MCNC: 175 MG/DL (ref 0–200)
HDLC SERPL-MCNC: 51 MG/DL (ref 40–60)
LDLC SERPL CALC-MCNC: 100 MG/DL (ref 0–100)
T3FREE SERPL-MCNC: 2.9 PG/ML (ref 2–4.4)
T4 FREE SERPL-MCNC: 1.09 NG/DL (ref 0.93–1.7)
TRIGL SERPL-MCNC: 139 MG/DL (ref 0–150)
TSH SERPL DL<=0.005 MIU/L-ACNC: 3.35 UIU/ML (ref 0.27–4.2)
VLDLC SERPL CALC-MCNC: 24 MG/DL (ref 5–40)

## 2021-06-03 NOTE — TELEPHONE ENCOUNTER
Patient was called and informed of results. She will call back with any further questions or concerns.

## 2021-06-23 ENCOUNTER — HOSPITAL ENCOUNTER (OUTPATIENT)
Dept: WOMENS IMAGING | Age: 56
Discharge: HOME | End: 2021-06-23
Payer: MEDICARE

## 2021-06-23 ENCOUNTER — OFFICE VISIT (OUTPATIENT)
Dept: SURGERY | Age: 56
End: 2021-06-23
Payer: MEDICARE

## 2021-06-23 VITALS
SYSTOLIC BLOOD PRESSURE: 138 MMHG | BODY MASS INDEX: 33.49 KG/M2 | DIASTOLIC BLOOD PRESSURE: 82 MMHG | HEIGHT: 62 IN | HEART RATE: 86 BPM | WEIGHT: 182 LBS | TEMPERATURE: 98.3 F

## 2021-06-23 DIAGNOSIS — Z85.3 PERSONAL HISTORY OF MALIGNANT NEOPLASM OF BREAST: ICD-10-CM

## 2021-06-23 DIAGNOSIS — R92.8 ABNORMAL MAMMOGRAM: ICD-10-CM

## 2021-06-23 DIAGNOSIS — R92.8 ABNORMAL MAMMOGRAM: Primary | ICD-10-CM

## 2021-06-23 PROCEDURE — G8427 DOCREV CUR MEDS BY ELIG CLIN: HCPCS | Performed by: PHYSICIAN ASSISTANT

## 2021-06-23 PROCEDURE — G9899 SCRN MAM PERF RSLTS DOC: HCPCS | Performed by: PHYSICIAN ASSISTANT

## 2021-06-23 PROCEDURE — G0279 TOMOSYNTHESIS, MAMMO: HCPCS

## 2021-06-23 PROCEDURE — G8417 CALC BMI ABV UP PARAM F/U: HCPCS | Performed by: PHYSICIAN ASSISTANT

## 2021-06-23 PROCEDURE — 3017F COLORECTAL CA SCREEN DOC REV: CPT | Performed by: PHYSICIAN ASSISTANT

## 2021-06-23 PROCEDURE — 1036F TOBACCO NON-USER: CPT | Performed by: PHYSICIAN ASSISTANT

## 2021-06-23 PROCEDURE — 76642 ULTRASOUND BREAST LIMITED: CPT

## 2021-06-23 PROCEDURE — 99213 OFFICE O/P EST LOW 20 MIN: CPT | Performed by: PHYSICIAN ASSISTANT

## 2021-06-23 RX ORDER — CYANOCOBALAMIN (VITAMIN B-12) 1000 MCG
1 TABLET, EXTENDED RELEASE ORAL DAILY
COMMUNITY

## 2021-06-23 RX ORDER — LEVOTHYROXINE SODIUM 0.05 MG/1
50 TABLET ORAL DAILY
COMMUNITY
Start: 2020-08-19

## 2021-06-25 ENCOUNTER — HOSPITAL ENCOUNTER (OUTPATIENT)
Dept: WOMENS IMAGING | Age: 56
Discharge: HOME | End: 2021-06-25
Payer: MEDICARE

## 2021-06-25 DIAGNOSIS — R92.8 ABNORMAL MAMMOGRAM: ICD-10-CM

## 2021-06-25 PROCEDURE — 88305 TISSUE EXAM BY PATHOLOGIST: CPT

## 2021-06-25 PROCEDURE — 2709999900 US BREAST BIOPSY W LOC DEVICE 1ST LESION RIGHT

## 2021-06-25 PROCEDURE — 88341 IMHCHEM/IMCYTCHM EA ADD ANTB: CPT

## 2021-06-25 PROCEDURE — 77065 DX MAMMO INCL CAD UNI: CPT

## 2021-06-25 PROCEDURE — 88342 IMHCHEM/IMCYTCHM 1ST ANTB: CPT

## 2021-06-26 DIAGNOSIS — J45.998 OTHER ASTHMA: ICD-10-CM

## 2021-06-28 RX ORDER — THEOPHYLLINE 400 MG/1
400 TABLET, EXTENDED RELEASE ORAL DAILY
Qty: 90 TABLET | Refills: 3 | Status: SHIPPED | OUTPATIENT
Start: 2021-06-28 | End: 2022-08-31

## 2021-06-28 NOTE — TELEPHONE ENCOUNTER
Pharmacy sent a request for refills on theophylline. Patient was last seen on 10/27/20 and has a follow up on 11/9/21.  Refill request sent to Dr. Aguirre.

## 2021-07-01 ENCOUNTER — TELEPHONE (OUTPATIENT)
Dept: SURGERY | Age: 56
End: 2021-07-01

## 2021-07-01 NOTE — PROGRESS NOTES
HISTORY OF PRESENT ILLNESS:   Mrs. Jf Boone is a 63year old female who is status post 8-7-2008 Right Partial Mastectomy with re-excision and right axillary node dissection with a total of 1 of 11 nodes positive for metastatic malignancy. Grade 1, ER/MN +   She has no new complaints today. She denies weight loss or any other systemic symptoms. EXAMINATION: Diagnostic bilateral mammogram 6/23/2021   FINDINGS: The patient is a 48-year-old female who has undergone right   breast lumpectomy for breast carcinoma. Digital mammography is   performed of both breasts in the CC and MLO projections including   3-dimensional breast tomosynthesis and compared to previous studies   dated 6/17/2020, 5/8/2019 and 5/7/2018. There are scattered   fibroglandular densities consistent with a type B parenchymal pattern. Computer-aided detection was also utilized for assessment of the   mammogram.   There has been interval development of a small lobular nodule within   the retroareolar aspect of the right breast just above the nipple   line. Subsequent ultrasound demonstrates a solid nodule which is   bilobed in appearance and is I feel suspicious for potential breast   neoplasia. Ultrasound-guided biopsy is recommended. There is also   stable architectural distortion within the right breast related to   prior lumpectomy. The left breast is stable.       Impression   1.. Developing small solid nodule upper outer quadrant right breast   just posterior to the nipple. I feel this should be considered a   primary breast neoplasm until otherwise proven and follow-up with   biopsy is recommended under ultrasound guidance. 2. Left breast is stable. 3. ACR BI-RADS category 4 suspicious abnormality. Biopsy recommended. PHYSICAL EXAM:   The right lumpectomy incision is looking good. There are moderate fibrocystic changes throughout both breasts. There are no dominant masses, no skin or nipple changes, and no axillary adenopathy.  There is nothing suspicious for new carcinoma and no evidence of local tumor recurrence. ASSESSMENT: Abnormal mammogram    PLAN:  This will require stereotactic/US guided mammotome biopsy, which will be done in the hospital under local anesthesia. Further procedures will be done as indicated. CONSENT:  The risks, benefits and options of stereotactic biopsy/US were discussed with her including but not limited to bleeding, infection, hematoma, missing the lesion, and scarring. She expresses good understanding and is agreeable to proceed. 20 minutes spent, which includes face to face with patient, record review, evaluation, planning, and education.

## 2021-07-01 NOTE — TELEPHONE ENCOUNTER
Spoke with patient and let her know her biopsy showed an intraductal papilloma with atypia and excision was recommended. Please reschedule her follow up appt to Dr. Fragoso Lesser schedule so he can discuss surgery with her.

## 2021-07-14 NOTE — PROGRESS NOTES
HISTORY OF PRESENT ILLNESS:    Ms. Mattie Singh comes in today for 2 week follow-up after uncomplicated Right  ultrasound guided breast biopsy. She would like to discuss excision of this area. Pathology demonstrated intraductal papilloma with atypia. She has appropriate postoperative discomfort, and no significant complaints. This is on the same side as her right breast cancer treated with lumpectomy and radiation in 2008. She has had enough and wishes to consider bilateral mastectomies and reconstruction    PHYSICAL EXAM:  Her wound is well healed with no evidence of infection or hematoma. IMPRESSION:  Intraductal papilloma with atypia  Status post right lumpectomy with postoperative radiation in 2008      PLAN: I will have her get an MRI Breast and see me back in the office for results. At that time we will discuss details of bilateral mastectomies and reconstruction. I have seen, examined and reviewed this patient medication list, appropriate labs and imaging studies. I reviewed relevant medical records and others physicians notes. I discussed the plans of care with the patient. I answered all the questions to the patients satisfaction. I, Dr Blayne Nava, personally performed the services described in this documentation as scribed by Jeannette Kaplan MA in my presence and is both accurate and complete. (Please note that portions of this note were completed with a voice recognition program. Efforts were made to edit the dictations but occasionally words are mis-transcribed.)  Over 50% of the total visit time of 20 minutes in face to face encounter with the patient, out of which more than 50% of the time was spent in counseling patient or family and coordination of care. Counseling included but was not limited to time spent reviewing labs, imaging studies/ treatment plan and answering questions.

## 2021-07-15 ENCOUNTER — OFFICE VISIT (OUTPATIENT)
Dept: SURGERY | Age: 56
End: 2021-07-15
Payer: MEDICARE

## 2021-07-15 VITALS — HEART RATE: 76 BPM | SYSTOLIC BLOOD PRESSURE: 118 MMHG | DIASTOLIC BLOOD PRESSURE: 84 MMHG

## 2021-07-15 DIAGNOSIS — Z98.890 S/P LUMPECTOMY, RIGHT BREAST: ICD-10-CM

## 2021-07-15 DIAGNOSIS — Z85.3 PERSONAL HISTORY OF MALIGNANT NEOPLASM OF BREAST: Primary | ICD-10-CM

## 2021-07-15 PROCEDURE — 99213 OFFICE O/P EST LOW 20 MIN: CPT | Performed by: SURGERY

## 2021-07-15 PROCEDURE — 1036F TOBACCO NON-USER: CPT | Performed by: SURGERY

## 2021-07-15 PROCEDURE — G8417 CALC BMI ABV UP PARAM F/U: HCPCS | Performed by: SURGERY

## 2021-07-15 PROCEDURE — 3017F COLORECTAL CA SCREEN DOC REV: CPT | Performed by: SURGERY

## 2021-07-15 PROCEDURE — G8427 DOCREV CUR MEDS BY ELIG CLIN: HCPCS | Performed by: SURGERY

## 2021-07-15 PROCEDURE — G9899 SCRN MAM PERF RSLTS DOC: HCPCS | Performed by: SURGERY

## 2021-07-15 NOTE — PROGRESS NOTES
Keith Serrano presents for genetic counseling. She has a personal history of breast cancer diagnosed at age 36. She has a family history as well of breast cancer in her sister diagnosed at age 55. She meets NCCN and ASBS criteria for testing. We discussed the testing and what it means and how it can potentially impact her care and her family's health. She would like to proceed with testing.

## 2021-07-20 DIAGNOSIS — Z85.3 PERSONAL HISTORY OF MALIGNANT NEOPLASM OF BREAST: Primary | ICD-10-CM

## 2021-07-23 ENCOUNTER — HOSPITAL ENCOUNTER (OUTPATIENT)
Dept: MRI IMAGING | Age: 56
Discharge: HOME | End: 2021-07-23
Payer: MEDICARE

## 2021-07-23 DIAGNOSIS — Z85.3 PERSONAL HISTORY OF MALIGNANT NEOPLASM OF BREAST: ICD-10-CM

## 2021-07-23 PROCEDURE — A9577 INJ MULTIHANCE: HCPCS | Performed by: SURGERY

## 2021-07-23 PROCEDURE — 77049 MRI BREAST C-+ W/CAD BI: CPT

## 2021-07-23 PROCEDURE — 6360000004 HC RX CONTRAST MEDICATION: Performed by: SURGERY

## 2021-07-23 RX ADMIN — GADOBENATE DIMEGLUMINE 16 ML: 529 INJECTION, SOLUTION INTRAVENOUS at 08:41

## 2021-08-09 NOTE — PROGRESS NOTES
HISTORY OF PRESENT ILLNESS:    Ms. Bing Watt present today to follow up from imaging to discuss   details of bilateral mastectomies and reconstruction. She underwent a Right ultrasound guided breast biopsy. Pathology demonstrated intraductal papilloma with atypia. She has appropriate postoperative discomfort, and no significant complaints. This is on the same side as her right breast cancer treated with lumpectomy and radiation in 2008. She has had enough and wishes to consider bilateral mastectomies and reconstruction    PHYSICAL EXAM:  Her wound is well healed with no evidence of infection or hematoma. She has marked radiation and postsurgical changes on the right. The right breast is much smaller than the left. IMPRESSION:  Intraductal papilloma with atypia  Status post right lumpectomy with postoperative radiation in 2008      PLAN: Bilateral skin sparing mastectomies with immediate tissue expander reconstruction. We will plan for September 14    I have seen, examined and reviewed this patient medication list, appropriate labs and imaging studies. I reviewed relevant medical records and others physicians notes. I discussed the plans of care with the patient. I answered all the questions to the patients satisfaction. I, Dr Shahnaz Felix, personally performed the services described in this documentation as scribed by Michael Robertson MA in my presence and is both accurate and complete. (Please note that portions of this note were completed with a voice recognition program. Efforts were made to edit the dictations but occasionally words are mis-transcribed.)  Over 50% of the total visit time of 25 minutes in face to face encounter with the patient, out of which more than 50% of the time was spent in counseling patient or family and coordination of care. Counseling included but was not limited to time spent reviewing labs, imaging studies/ treatment plan and answering questions.

## 2021-08-11 ENCOUNTER — OFFICE VISIT (OUTPATIENT)
Dept: SURGERY | Age: 56
End: 2021-08-11
Payer: MEDICARE

## 2021-08-11 VITALS — SYSTOLIC BLOOD PRESSURE: 124 MMHG | HEART RATE: 80 BPM | DIASTOLIC BLOOD PRESSURE: 70 MMHG

## 2021-08-11 DIAGNOSIS — Z98.890 S/P LUMPECTOMY, RIGHT BREAST: ICD-10-CM

## 2021-08-11 DIAGNOSIS — Z85.3 PERSONAL HISTORY OF MALIGNANT NEOPLASM OF BREAST: Primary | ICD-10-CM

## 2021-08-11 PROCEDURE — 3017F COLORECTAL CA SCREEN DOC REV: CPT | Performed by: SURGERY

## 2021-08-11 PROCEDURE — G8427 DOCREV CUR MEDS BY ELIG CLIN: HCPCS | Performed by: SURGERY

## 2021-08-11 PROCEDURE — 99214 OFFICE O/P EST MOD 30 MIN: CPT | Performed by: SURGERY

## 2021-08-11 PROCEDURE — 1036F TOBACCO NON-USER: CPT | Performed by: SURGERY

## 2021-08-11 PROCEDURE — G8417 CALC BMI ABV UP PARAM F/U: HCPCS | Performed by: SURGERY

## 2021-08-11 PROCEDURE — G9899 SCRN MAM PERF RSLTS DOC: HCPCS | Performed by: SURGERY

## 2021-08-18 NOTE — TELEPHONE ENCOUNTER
Caller: Luna Cruz    Relationship: Self    Best call back number: [phone]    Medication needed:   Requested Prescriptions     Pending Prescriptions Disp Refills   • potassium chloride ER (K-TAB) 20 MEQ tablet controlled-release ER tablet 90 tablet 3     Sig: Take 1 tablet by mouth Daily. Pt breaks them in half and takes 10 meq daily       When do you need the refill by: ASAP    Does the patient have less than a 3 day supply:  [] Yes  [x] No    What is the patient's preferred pharmacy: [facility] - [address] [phone] [facility] [phone]

## 2021-08-19 DIAGNOSIS — Z85.3 PERSONAL HISTORY OF MALIGNANT NEOPLASM OF BREAST: Primary | ICD-10-CM

## 2021-08-19 RX ORDER — POTASSIUM CHLORIDE 1500 MG/1
20 TABLET, FILM COATED, EXTENDED RELEASE ORAL DAILY
Qty: 90 TABLET | Refills: 3 | Status: SHIPPED | OUTPATIENT
Start: 2021-08-19 | End: 2021-08-30 | Stop reason: SDUPTHER

## 2021-08-27 ENCOUNTER — TELEPHONE (OUTPATIENT)
Dept: INTERNAL MEDICINE | Facility: CLINIC | Age: 56
End: 2021-08-27

## 2021-08-27 NOTE — TELEPHONE ENCOUNTER
PATIENT CALLED AND STATED THAT THE SCRIPT THAT THE PHARMACY RECEIVED WAS INCOMPLETE. PLEASE ADVISE AND SEND NEW COMPLETED SCRIPTS    CALLBACK: [phone]

## 2021-08-30 RX ORDER — POTASSIUM CHLORIDE 1500 MG/1
20 TABLET, FILM COATED, EXTENDED RELEASE ORAL DAILY
Qty: 90 TABLET | Refills: 3 | Status: SHIPPED | OUTPATIENT
Start: 2021-08-30 | End: 2022-09-13 | Stop reason: SDUPTHER

## 2021-08-30 NOTE — TELEPHONE ENCOUNTER
Patient was called and asked about the medication that was pended incorrectly. It is her potassium rx. A new rx was pended to Che to sign.

## 2021-09-13 ENCOUNTER — HOSPITAL ENCOUNTER (OUTPATIENT)
Dept: WOMENS IMAGING | Age: 56
Discharge: HOME | End: 2021-09-13
Payer: MEDICARE

## 2021-09-13 ENCOUNTER — HOSPITAL ENCOUNTER (OUTPATIENT)
Dept: PREADMISSION TESTING | Age: 56
Discharge: HOME | End: 2021-09-17
Payer: MEDICARE

## 2021-09-13 VITALS — BODY MASS INDEX: 33.13 KG/M2 | HEIGHT: 62 IN | WEIGHT: 180 LBS

## 2021-09-13 DIAGNOSIS — D36.9 INTRADUCTAL PAPILLOMA: ICD-10-CM

## 2021-09-13 LAB
ANION GAP SERPL CALCULATED.3IONS-SCNC: 12 MMOL/L (ref 7–19)
BASOPHILS ABSOLUTE: 0.1 K/UL (ref 0–0.2)
BASOPHILS RELATIVE PERCENT: 0.7 % (ref 0–1)
BUN BLDV-MCNC: 11 MG/DL (ref 6–20)
CALCIUM SERPL-MCNC: 9.4 MG/DL (ref 8.6–10)
CHLORIDE BLD-SCNC: 106 MMOL/L (ref 98–111)
CO2: 23 MMOL/L (ref 22–29)
CREAT SERPL-MCNC: 1 MG/DL (ref 0.5–0.9)
EKG P AXIS: 49 DEGREES
EKG P-R INTERVAL: 164 MS
EKG Q-T INTERVAL: 374 MS
EKG QRS DURATION: 88 MS
EKG QTC CALCULATION (BAZETT): 400 MS
EKG T AXIS: 29 DEGREES
EOSINOPHILS ABSOLUTE: 0.2 K/UL (ref 0–0.6)
EOSINOPHILS RELATIVE PERCENT: 2.5 % (ref 0–5)
GFR AFRICAN AMERICAN: >59
GFR NON-AFRICAN AMERICAN: 57
GLUCOSE BLD-MCNC: 90 MG/DL (ref 74–109)
HCT VFR BLD CALC: 49.4 % (ref 37–47)
HEMOGLOBIN: 15.3 G/DL (ref 12–16)
IMMATURE GRANULOCYTES #: 0.1 K/UL
LYMPHOCYTES ABSOLUTE: 1.4 K/UL (ref 1.1–4.5)
LYMPHOCYTES RELATIVE PERCENT: 17.8 % (ref 20–40)
MCH RBC QN AUTO: 28.6 PG (ref 27–31)
MCHC RBC AUTO-ENTMCNC: 31 G/DL (ref 33–37)
MCV RBC AUTO: 92.3 FL (ref 81–99)
MONOCYTES ABSOLUTE: 0.5 K/UL (ref 0–0.9)
MONOCYTES RELATIVE PERCENT: 6.7 % (ref 0–10)
NEUTROPHILS ABSOLUTE: 5.8 K/UL (ref 1.5–7.5)
NEUTROPHILS RELATIVE PERCENT: 71.7 % (ref 50–65)
PDW BLD-RTO: 13.5 % (ref 11.5–14.5)
PLATELET # BLD: 325 K/UL (ref 130–400)
PMV BLD AUTO: 10.4 FL (ref 9.4–12.3)
POTASSIUM SERPL-SCNC: 4.4 MMOL/L (ref 3.5–5)
RBC # BLD: 5.35 M/UL (ref 4.2–5.4)
SARS-COV-2, PCR: NOT DETECTED
SODIUM BLD-SCNC: 141 MMOL/L (ref 136–145)
WBC # BLD: 8 K/UL (ref 4.8–10.8)

## 2021-09-13 PROCEDURE — 80048 BASIC METABOLIC PNL TOTAL CA: CPT

## 2021-09-13 PROCEDURE — 93005 ELECTROCARDIOGRAM TRACING: CPT | Performed by: SURGERY

## 2021-09-13 PROCEDURE — 76642 ULTRASOUND BREAST LIMITED: CPT

## 2021-09-13 PROCEDURE — 93010 ELECTROCARDIOGRAM REPORT: CPT | Performed by: INTERNAL MEDICINE

## 2021-09-13 PROCEDURE — U0003 INFECTIOUS AGENT DETECTION BY NUCLEIC ACID (DNA OR RNA); SEVERE ACUTE RESPIRATORY SYNDROME CORONAVIRUS 2 (SARS-COV-2) (CORONAVIRUS DISEASE [COVID-19]), AMPLIFIED PROBE TECHNIQUE, MAKING USE OF HIGH THROUGHPUT TECHNOLOGIES AS DESCRIBED BY CMS-2020-01-R: HCPCS

## 2021-09-13 PROCEDURE — 85025 COMPLETE CBC W/AUTO DIFF WBC: CPT

## 2021-09-13 PROCEDURE — U0005 INFEC AGEN DETEC AMPLI PROBE: HCPCS

## 2021-09-13 RX ORDER — CELECOXIB 200 MG/1
200 CAPSULE ORAL ONCE
Status: CANCELLED | OUTPATIENT
Start: 2021-09-14

## 2021-09-13 RX ORDER — GABAPENTIN 300 MG/1
300 CAPSULE ORAL ONCE
Status: CANCELLED | OUTPATIENT
Start: 2021-09-14

## 2021-09-13 RX ORDER — ACETAMINOPHEN 325 MG/1
975 TABLET ORAL ONCE
Status: CANCELLED | OUTPATIENT
Start: 2021-09-14

## 2021-09-13 RX ORDER — DICLOFENAC SODIUM 20 MG/G
SOLUTION TOPICAL PRN
COMMUNITY

## 2021-09-14 ENCOUNTER — ANESTHESIA EVENT (OUTPATIENT)
Dept: OPERATING ROOM | Age: 56
End: 2021-09-14
Payer: MEDICARE

## 2021-09-14 ENCOUNTER — HOSPITAL ENCOUNTER (OUTPATIENT)
Age: 56
Setting detail: OBSERVATION
Discharge: HOME | End: 2021-09-15
Attending: SURGERY | Admitting: SURGERY
Payer: MEDICARE

## 2021-09-14 ENCOUNTER — ANESTHESIA (OUTPATIENT)
Dept: OPERATING ROOM | Age: 56
End: 2021-09-14
Payer: MEDICARE

## 2021-09-14 VITALS
DIASTOLIC BLOOD PRESSURE: 61 MMHG | OXYGEN SATURATION: 95 % | SYSTOLIC BLOOD PRESSURE: 126 MMHG | TEMPERATURE: 97 F | RESPIRATION RATE: 7 BRPM

## 2021-09-14 DIAGNOSIS — Z85.3 PERSONAL HISTORY OF MALIGNANT NEOPLASM OF BREAST: ICD-10-CM

## 2021-09-14 DIAGNOSIS — Z98.890 POST-OPERATIVE STATE: Primary | ICD-10-CM

## 2021-09-14 PROBLEM — N60.99 ATYPICAL DUCTAL HYPERPLASIA OF BREAST: Status: ACTIVE | Noted: 2021-09-14

## 2021-09-14 PROCEDURE — 3700000001 HC ADD 15 MINUTES (ANESTHESIA): Performed by: SURGERY

## 2021-09-14 PROCEDURE — 19303 MAST SIMPLE COMPLETE: CPT | Performed by: PHYSICIAN ASSISTANT

## 2021-09-14 PROCEDURE — 2580000003 HC RX 258: Performed by: SURGERY

## 2021-09-14 PROCEDURE — 6360000002 HC RX W HCPCS: Performed by: SURGERY

## 2021-09-14 PROCEDURE — 3700000000 HC ANESTHESIA ATTENDED CARE: Performed by: SURGERY

## 2021-09-14 PROCEDURE — 2500000003 HC RX 250 WO HCPCS: Performed by: ANESTHESIOLOGY

## 2021-09-14 PROCEDURE — 6360000002 HC RX W HCPCS: Performed by: ANESTHESIOLOGY

## 2021-09-14 PROCEDURE — 3600000004 HC SURGERY LEVEL 4 BASE: Performed by: SURGERY

## 2021-09-14 PROCEDURE — 6360000002 HC RX W HCPCS

## 2021-09-14 PROCEDURE — 6370000000 HC RX 637 (ALT 250 FOR IP): Performed by: ANESTHESIOLOGY

## 2021-09-14 PROCEDURE — 6370000000 HC RX 637 (ALT 250 FOR IP): Performed by: SURGERY

## 2021-09-14 PROCEDURE — 2580000003 HC RX 258: Performed by: NURSE ANESTHETIST, CERTIFIED REGISTERED

## 2021-09-14 PROCEDURE — 88307 TISSUE EXAM BY PATHOLOGIST: CPT

## 2021-09-14 PROCEDURE — 2500000003 HC RX 250 WO HCPCS: Performed by: SURGERY

## 2021-09-14 PROCEDURE — 2709999900 HC NON-CHARGEABLE SUPPLY: Performed by: SURGERY

## 2021-09-14 PROCEDURE — C1789 PROSTHESIS, BREAST, IMP: HCPCS | Performed by: SURGERY

## 2021-09-14 PROCEDURE — 19357 TISS XPNDR PLMT BRST RCNSTJ: CPT | Performed by: PHYSICIAN ASSISTANT

## 2021-09-14 PROCEDURE — 15777 ACELLULAR DERM MATRIX IMPLT: CPT | Performed by: SURGERY

## 2021-09-14 PROCEDURE — 7100000000 HC PACU RECOVERY - FIRST 15 MIN: Performed by: SURGERY

## 2021-09-14 PROCEDURE — 94640 AIRWAY INHALATION TREATMENT: CPT

## 2021-09-14 PROCEDURE — 7100000001 HC PACU RECOVERY - ADDTL 15 MIN: Performed by: SURGERY

## 2021-09-14 PROCEDURE — 6360000002 HC RX W HCPCS: Performed by: NURSE ANESTHETIST, CERTIFIED REGISTERED

## 2021-09-14 PROCEDURE — 2580000003 HC RX 258: Performed by: ANESTHESIOLOGY

## 2021-09-14 PROCEDURE — 3600000014 HC SURGERY LEVEL 4 ADDTL 15MIN: Performed by: SURGERY

## 2021-09-14 PROCEDURE — 19303 MAST SIMPLE COMPLETE: CPT | Performed by: SURGERY

## 2021-09-14 PROCEDURE — 2720000010 HC SURG SUPPLY STERILE: Performed by: SURGERY

## 2021-09-14 PROCEDURE — G0378 HOSPITAL OBSERVATION PER HR: HCPCS

## 2021-09-14 PROCEDURE — 19357 TISS XPNDR PLMT BRST RCNSTJ: CPT | Performed by: SURGERY

## 2021-09-14 DEVICE — EXPANDER TISS GEL 5.6 CM PROJCT 13X12 CM 400 CC 133S-MV-T: Type: IMPLANTABLE DEVICE | Site: BREAST | Status: FUNCTIONAL

## 2021-09-14 DEVICE — GRAFT HUM TISS M THK12 2MM L PERF CNTOUR ACELLULAR DERM: Type: IMPLANTABLE DEVICE | Site: BREAST | Status: FUNCTIONAL

## 2021-09-14 RX ORDER — ROPINIROLE 0.25 MG/1
0.25 TABLET, FILM COATED ORAL NIGHTLY
Status: DISCONTINUED | OUTPATIENT
Start: 2021-09-14 | End: 2021-09-15 | Stop reason: HOSPADM

## 2021-09-14 RX ORDER — ALBUTEROL SULFATE 2.5 MG/3ML
2.5 SOLUTION RESPIRATORY (INHALATION)
Status: DISCONTINUED | OUTPATIENT
Start: 2021-09-14 | End: 2021-09-14 | Stop reason: HOSPADM

## 2021-09-14 RX ORDER — LOSARTAN POTASSIUM 100 MG/1
100 TABLET ORAL DAILY
Status: DISCONTINUED | OUTPATIENT
Start: 2021-09-14 | End: 2021-09-15 | Stop reason: HOSPADM

## 2021-09-14 RX ORDER — MORPHINE SULFATE 2 MG/ML
2 INJECTION, SOLUTION INTRAMUSCULAR; INTRAVENOUS
Status: DISCONTINUED | OUTPATIENT
Start: 2021-09-14 | End: 2021-09-15 | Stop reason: HOSPADM

## 2021-09-14 RX ORDER — ACETAMINOPHEN 325 MG/1
975 TABLET ORAL ONCE
Status: COMPLETED | OUTPATIENT
Start: 2021-09-14 | End: 2021-09-14

## 2021-09-14 RX ORDER — SODIUM CHLORIDE, SODIUM LACTATE, POTASSIUM CHLORIDE, CALCIUM CHLORIDE 600; 310; 30; 20 MG/100ML; MG/100ML; MG/100ML; MG/100ML
INJECTION, SOLUTION INTRAVENOUS CONTINUOUS PRN
Status: DISCONTINUED | OUTPATIENT
Start: 2021-09-14 | End: 2021-09-14 | Stop reason: SDUPTHER

## 2021-09-14 RX ORDER — MIDAZOLAM HYDROCHLORIDE 1 MG/ML
INJECTION INTRAMUSCULAR; INTRAVENOUS PRN
Status: DISCONTINUED | OUTPATIENT
Start: 2021-09-14 | End: 2021-09-14 | Stop reason: SDUPTHER

## 2021-09-14 RX ORDER — CELECOXIB 200 MG/1
200 CAPSULE ORAL ONCE
Status: COMPLETED | OUTPATIENT
Start: 2021-09-14 | End: 2021-09-14

## 2021-09-14 RX ORDER — SODIUM CHLORIDE, SODIUM LACTATE, POTASSIUM CHLORIDE, CALCIUM CHLORIDE 600; 310; 30; 20 MG/100ML; MG/100ML; MG/100ML; MG/100ML
INJECTION, SOLUTION INTRAVENOUS CONTINUOUS
Status: DISCONTINUED | OUTPATIENT
Start: 2021-09-14 | End: 2021-09-14

## 2021-09-14 RX ORDER — ASPIRIN 81 MG/1
81 TABLET ORAL 2 TIMES DAILY
Status: DISCONTINUED | OUTPATIENT
Start: 2021-09-14 | End: 2021-09-15 | Stop reason: HOSPADM

## 2021-09-14 RX ORDER — CLINDAMYCIN PHOSPHATE 900 MG/50ML
900 INJECTION INTRAVENOUS
Status: DISCONTINUED | OUTPATIENT
Start: 2021-09-14 | End: 2021-09-14

## 2021-09-14 RX ORDER — SODIUM CHLORIDE 0.9 % (FLUSH) 0.9 %
5-40 SYRINGE (ML) INJECTION EVERY 12 HOURS SCHEDULED
Status: DISCONTINUED | OUTPATIENT
Start: 2021-09-14 | End: 2021-09-15 | Stop reason: HOSPADM

## 2021-09-14 RX ORDER — SODIUM CHLORIDE 0.9 % (FLUSH) 0.9 %
5-40 SYRINGE (ML) INJECTION PRN
Status: DISCONTINUED | OUTPATIENT
Start: 2021-09-14 | End: 2021-09-15 | Stop reason: HOSPADM

## 2021-09-14 RX ORDER — SODIUM CHLORIDE 0.9 % (FLUSH) 0.9 %
10 SYRINGE (ML) INJECTION EVERY 12 HOURS SCHEDULED
Status: DISCONTINUED | OUTPATIENT
Start: 2021-09-14 | End: 2021-09-14 | Stop reason: HOSPADM

## 2021-09-14 RX ORDER — ATORVASTATIN CALCIUM 40 MG/1
40 TABLET, FILM COATED ORAL DAILY
Status: DISCONTINUED | OUTPATIENT
Start: 2021-09-14 | End: 2021-09-15 | Stop reason: HOSPADM

## 2021-09-14 RX ORDER — SODIUM CHLORIDE 9 MG/ML
25 INJECTION, SOLUTION INTRAVENOUS PRN
Status: DISCONTINUED | OUTPATIENT
Start: 2021-09-14 | End: 2021-09-15 | Stop reason: HOSPADM

## 2021-09-14 RX ORDER — PROPOFOL 10 MG/ML
INJECTION, EMULSION INTRAVENOUS CONTINUOUS PRN
Status: DISCONTINUED | OUTPATIENT
Start: 2021-09-14 | End: 2021-09-14 | Stop reason: SDUPTHER

## 2021-09-14 RX ORDER — ONDANSETRON 2 MG/ML
4 INJECTION INTRAMUSCULAR; INTRAVENOUS
Status: DISCONTINUED | OUTPATIENT
Start: 2021-09-14 | End: 2021-09-14 | Stop reason: HOSPADM

## 2021-09-14 RX ORDER — DEXAMETHASONE SODIUM PHOSPHATE 4 MG/ML
4 INJECTION, SOLUTION INTRA-ARTICULAR; INTRALESIONAL; INTRAMUSCULAR; INTRAVENOUS; SOFT TISSUE ONCE
Status: COMPLETED | OUTPATIENT
Start: 2021-09-14 | End: 2021-09-14

## 2021-09-14 RX ORDER — PANTOPRAZOLE SODIUM 40 MG/1
40 TABLET, DELAYED RELEASE ORAL DAILY
Status: DISCONTINUED | OUTPATIENT
Start: 2021-09-14 | End: 2021-09-15 | Stop reason: HOSPADM

## 2021-09-14 RX ORDER — FENTANYL CITRATE 50 UG/ML
INJECTION, SOLUTION INTRAMUSCULAR; INTRAVENOUS PRN
Status: DISCONTINUED | OUTPATIENT
Start: 2021-09-14 | End: 2021-09-14 | Stop reason: SDUPTHER

## 2021-09-14 RX ORDER — ALBUTEROL SULFATE 2.5 MG/3ML
2.5 SOLUTION RESPIRATORY (INHALATION) EVERY 4 HOURS PRN
Status: DISCONTINUED | OUTPATIENT
Start: 2021-09-14 | End: 2021-09-15 | Stop reason: HOSPADM

## 2021-09-14 RX ORDER — ZAFIRLUKAST 20 MG/1
20 TABLET, FILM COATED ORAL 2 TIMES DAILY
Status: DISCONTINUED | OUTPATIENT
Start: 2021-09-14 | End: 2021-09-15 | Stop reason: HOSPADM

## 2021-09-14 RX ORDER — SODIUM CHLORIDE 0.9 % (FLUSH) 0.9 %
10 SYRINGE (ML) INJECTION PRN
Status: DISCONTINUED | OUTPATIENT
Start: 2021-09-14 | End: 2021-09-14 | Stop reason: HOSPADM

## 2021-09-14 RX ORDER — DOCUSATE SODIUM 100 MG/1
100 CAPSULE, LIQUID FILLED ORAL DAILY
Status: DISCONTINUED | OUTPATIENT
Start: 2021-09-14 | End: 2021-09-15 | Stop reason: HOSPADM

## 2021-09-14 RX ORDER — HYDROMORPHONE HYDROCHLORIDE 1 MG/ML
0.5 INJECTION, SOLUTION INTRAMUSCULAR; INTRAVENOUS; SUBCUTANEOUS EVERY 5 MIN PRN
Status: DISCONTINUED | OUTPATIENT
Start: 2021-09-14 | End: 2021-09-14 | Stop reason: HOSPADM

## 2021-09-14 RX ORDER — CALCITRIOL 0.25 UG/1
0.5 CAPSULE, LIQUID FILLED ORAL DAILY
Status: DISCONTINUED | OUTPATIENT
Start: 2021-09-14 | End: 2021-09-15 | Stop reason: HOSPADM

## 2021-09-14 RX ORDER — CLINDAMYCIN PHOSPHATE 900 MG/50ML
900 INJECTION INTRAVENOUS EVERY 8 HOURS
Status: COMPLETED | OUTPATIENT
Start: 2021-09-14 | End: 2021-09-15

## 2021-09-14 RX ORDER — HYDROMORPHONE HYDROCHLORIDE 1 MG/ML
0.25 INJECTION, SOLUTION INTRAMUSCULAR; INTRAVENOUS; SUBCUTANEOUS EVERY 5 MIN PRN
Status: DISCONTINUED | OUTPATIENT
Start: 2021-09-14 | End: 2021-09-14 | Stop reason: HOSPADM

## 2021-09-14 RX ORDER — ONDANSETRON 2 MG/ML
INJECTION INTRAMUSCULAR; INTRAVENOUS PRN
Status: DISCONTINUED | OUTPATIENT
Start: 2021-09-14 | End: 2021-09-14 | Stop reason: SDUPTHER

## 2021-09-14 RX ORDER — LEVOTHYROXINE SODIUM 0.05 MG/1
50 TABLET ORAL DAILY
Status: DISCONTINUED | OUTPATIENT
Start: 2021-09-14 | End: 2021-09-15 | Stop reason: HOSPADM

## 2021-09-14 RX ORDER — MORPHINE SULFATE 4 MG/ML
4 INJECTION, SOLUTION INTRAMUSCULAR; INTRAVENOUS
Status: DISCONTINUED | OUTPATIENT
Start: 2021-09-14 | End: 2021-09-15 | Stop reason: HOSPADM

## 2021-09-14 RX ORDER — TIZANIDINE 4 MG/1
4 TABLET ORAL NIGHTLY
Status: DISCONTINUED | OUTPATIENT
Start: 2021-09-14 | End: 2021-09-15 | Stop reason: HOSPADM

## 2021-09-14 RX ORDER — CYANOCOBALAMIN (VITAMIN B-12) 1000 MCG
1 TABLET, EXTENDED RELEASE ORAL DAILY
Status: DISCONTINUED | OUTPATIENT
Start: 2021-09-14 | End: 2021-09-15 | Stop reason: HOSPADM

## 2021-09-14 RX ORDER — SODIUM CHLORIDE 9 MG/ML
INJECTION, SOLUTION INTRAVENOUS CONTINUOUS
Status: DISCONTINUED | OUTPATIENT
Start: 2021-09-14 | End: 2021-09-15 | Stop reason: HOSPADM

## 2021-09-14 RX ORDER — FENTANYL CITRATE 50 UG/ML
50 INJECTION, SOLUTION INTRAMUSCULAR; INTRAVENOUS EVERY 5 MIN PRN
Status: DISCONTINUED | OUTPATIENT
Start: 2021-09-14 | End: 2021-09-14 | Stop reason: HOSPADM

## 2021-09-14 RX ORDER — SODIUM CHLORIDE 9 MG/ML
25 INJECTION, SOLUTION INTRAVENOUS PRN
Status: DISCONTINUED | OUTPATIENT
Start: 2021-09-14 | End: 2021-09-14 | Stop reason: HOSPADM

## 2021-09-14 RX ORDER — GABAPENTIN 300 MG/1
300 CAPSULE ORAL ONCE
Status: COMPLETED | OUTPATIENT
Start: 2021-09-14 | End: 2021-09-14

## 2021-09-14 RX ORDER — SCOLOPAMINE TRANSDERMAL SYSTEM 1 MG/1
1 PATCH, EXTENDED RELEASE TRANSDERMAL
Status: DISCONTINUED | OUTPATIENT
Start: 2021-09-14 | End: 2021-09-14

## 2021-09-14 RX ADMIN — ONDANSETRON HYDROCHLORIDE 4 MG: 2 INJECTION, SOLUTION INTRAMUSCULAR; INTRAVENOUS at 13:08

## 2021-09-14 RX ADMIN — ASPIRIN 81 MG: 81 TABLET, COATED ORAL at 21:45

## 2021-09-14 RX ADMIN — ALBUTEROL SULFATE 2.5 MG: 2.5 SOLUTION RESPIRATORY (INHALATION) at 09:22

## 2021-09-14 RX ADMIN — LEVOTHYROXINE SODIUM 50 MCG: 50 TABLET ORAL at 18:20

## 2021-09-14 RX ADMIN — CLINDAMYCIN PHOSPHATE 900 MG: 900 INJECTION, SOLUTION INTRAVENOUS at 18:17

## 2021-09-14 RX ADMIN — CELECOXIB 200 MG: 200 CAPSULE ORAL at 09:59

## 2021-09-14 RX ADMIN — PROPOFOL 100 MCG/KG/MIN: 10 INJECTION, EMULSION INTRAVENOUS at 10:27

## 2021-09-14 RX ADMIN — HYDROMORPHONE HYDROCHLORIDE 0.5 MG: 1 INJECTION, SOLUTION INTRAMUSCULAR; INTRAVENOUS; SUBCUTANEOUS at 16:01

## 2021-09-14 RX ADMIN — SODIUM CHLORIDE: 9 INJECTION, SOLUTION INTRAVENOUS at 18:17

## 2021-09-14 RX ADMIN — FENTANYL CITRATE 50 MCG: 50 INJECTION, SOLUTION INTRAMUSCULAR; INTRAVENOUS at 10:27

## 2021-09-14 RX ADMIN — ATORVASTATIN CALCIUM 40 MG: 40 TABLET, FILM COATED ORAL at 18:20

## 2021-09-14 RX ADMIN — PANTOPRAZOLE SODIUM 40 MG: 40 TABLET, DELAYED RELEASE ORAL at 18:20

## 2021-09-14 RX ADMIN — CALCITRIOL CAPSULES 0.25 MCG 0.5 MCG: 0.25 CAPSULE ORAL at 18:20

## 2021-09-14 RX ADMIN — SODIUM CHLORIDE, POTASSIUM CHLORIDE, SODIUM LACTATE AND CALCIUM CHLORIDE: 600; 310; 30; 20 INJECTION, SOLUTION INTRAVENOUS at 16:08

## 2021-09-14 RX ADMIN — SODIUM CHLORIDE, SODIUM LACTATE, POTASSIUM CHLORIDE, AND CALCIUM CHLORIDE: 600; 310; 30; 20 INJECTION, SOLUTION INTRAVENOUS at 12:42

## 2021-09-14 RX ADMIN — FENTANYL CITRATE 50 MCG: 50 INJECTION, SOLUTION INTRAMUSCULAR; INTRAVENOUS at 11:26

## 2021-09-14 RX ADMIN — DOCUSATE SODIUM 100 MG: 100 CAPSULE, LIQUID FILLED ORAL at 18:20

## 2021-09-14 RX ADMIN — MIDAZOLAM 2 MG: 1 INJECTION INTRAMUSCULAR; INTRAVENOUS at 10:27

## 2021-09-14 RX ADMIN — ACETAMINOPHEN 975 MG: 325 TABLET ORAL at 09:59

## 2021-09-14 RX ADMIN — MIDAZOLAM 2 MG: 1 INJECTION INTRAMUSCULAR; INTRAVENOUS at 11:24

## 2021-09-14 RX ADMIN — ROPINIROLE HYDROCHLORIDE 0.25 MG: 0.25 TABLET, FILM COATED ORAL at 21:45

## 2021-09-14 RX ADMIN — TIZANIDINE 4 MG: 4 TABLET ORAL at 21:45

## 2021-09-14 RX ADMIN — DEXAMETHASONE SODIUM PHOSPHATE 4 MG: 4 INJECTION, SOLUTION INTRAMUSCULAR; INTRAVENOUS at 09:36

## 2021-09-14 RX ADMIN — LOSARTAN POTASSIUM 100 MG: 100 TABLET, FILM COATED ORAL at 18:20

## 2021-09-14 RX ADMIN — SODIUM CHLORIDE, SODIUM LACTATE, POTASSIUM CHLORIDE, AND CALCIUM CHLORIDE: 600; 310; 30; 20 INJECTION, SOLUTION INTRAVENOUS at 10:27

## 2021-09-14 RX ADMIN — FENTANYL CITRATE 50 MCG: 50 INJECTION, SOLUTION INTRAMUSCULAR; INTRAVENOUS at 11:56

## 2021-09-14 RX ADMIN — GABAPENTIN 300 MG: 300 CAPSULE ORAL at 09:59

## 2021-09-14 RX ADMIN — FENTANYL CITRATE 50 MCG: 50 INJECTION, SOLUTION INTRAMUSCULAR; INTRAVENOUS at 10:45

## 2021-09-14 RX ADMIN — MORPHINE SULFATE 2 MG: 2 INJECTION, SOLUTION INTRAMUSCULAR; INTRAVENOUS at 19:23

## 2021-09-14 RX ADMIN — FAMOTIDINE 20 MG: 10 INJECTION, SOLUTION INTRAVENOUS at 09:36

## 2021-09-14 ASSESSMENT — PAIN DESCRIPTION - PAIN TYPE
TYPE: SURGICAL PAIN

## 2021-09-14 ASSESSMENT — PAIN DESCRIPTION - FREQUENCY: FREQUENCY: CONTINUOUS

## 2021-09-14 ASSESSMENT — PAIN DESCRIPTION - PROGRESSION: CLINICAL_PROGRESSION: GRADUALLY WORSENING

## 2021-09-14 ASSESSMENT — PAIN DESCRIPTION - LOCATION
LOCATION: CHEST
LOCATION: BREAST
LOCATION: BREAST

## 2021-09-14 ASSESSMENT — PAIN DESCRIPTION - ORIENTATION: ORIENTATION: RIGHT;LEFT;ANTERIOR

## 2021-09-14 ASSESSMENT — PAIN SCALES - GENERAL
PAINLEVEL_OUTOF10: 5
PAINLEVEL_OUTOF10: 5
PAINLEVEL_OUTOF10: 8

## 2021-09-14 ASSESSMENT — PAIN DESCRIPTION - DESCRIPTORS
DESCRIPTORS: SORE
DESCRIPTORS: ACHING
DESCRIPTORS: ACHING

## 2021-09-14 ASSESSMENT — PAIN DESCRIPTION - ONSET: ONSET: ON-GOING

## 2021-09-14 ASSESSMENT — LIFESTYLE VARIABLES: SMOKING_STATUS: 0

## 2021-09-14 ASSESSMENT — PAIN - FUNCTIONAL ASSESSMENT: PAIN_FUNCTIONAL_ASSESSMENT: ACTIVITIES ARE NOT PREVENTED

## 2021-09-14 NOTE — BRIEF OP NOTE
Brief Postoperative Note      DATE OF PROCEDURE: 9/14/2021     SURGEON: Verónica Crump MD    PREOPERATIVE DIAGNOSIS:  D24.1  Z85.3    POSTOPERATIVE DIAGNOSIS: Same     OPERATION: Procedure(s):  BILATERAL SKIN SPARING MASTECTOMY VIA URBANO PATTERN WITH TISSUE EXPANDER RECONSTRUCTION (12-15/GURPREET) AND TELABIO (CONNOR) BILATERAL PEC BLOCK    ANESTHESIA: General    ESTIMATED BLOOD LOSS: Minimal    COMPLICATIONS: None. SPECIMENS:   ID Type Source Tests Collected by Time Destination   A : right breast tissue, stitch at axilla Tissue Breast SURGICAL PATHOLOGY Verónica Crump MD 9/14/2021 1145    B : left  breast tissue, stitch at axilla Tissue Breast Angelika Rios 90MD [place] 9/14/2021 1210        DRAINS: 4 JPs    The patient tolerated the procedure well.     Electronically signed by Verónica Crump MD  on 9/14/2021 at 3:04 PM

## 2021-09-14 NOTE — ANESTHESIA PRE PROCEDURE
Department of Anesthesiology  Preprocedure Note       Name:  Lalita Izquierdo   Age:  61 y.o.  :  1965                                          MRN:  [id number]         Date:  2021      Surgeon: Tata Marquez):  Anshu Bell MD    Procedure: Procedure(s):  BILATERAL SKIN SPARING MASTECTOMY VIA URBANO PATTERN WITH TISSUE EXPANDER RECONSTRUCTION (12-15/GURPREET) AND TELABIO (CONNOR) BILATERAL PEC BLOCK    Medications prior to admission:   Prior to Admission medications    Medication Sig Start Date End Date Taking? Authorizing Provider   Diclofenac Sodium (PENNSAID) 2 % SOLN Apply topically   Yes Historical Provider, MD   THEOPHYLLINE CR PO Take 40 mg by mouth daily   Yes Historical Provider, MD   levothyroxine (SYNTHROID) 50 MCG tablet Take 50 mcg by mouth daily 20  Yes Historical Provider, MD   calcium citrate-vitamin D (CITRICAL + D) 315-250 MG-UNIT TABS per tablet Take 1 tablet by mouth daily   Yes Historical Provider, MD   ibuprofen (ADVIL;MOTRIN) 400 MG tablet Take 1 tablet by mouth every 8 hours as needed for Pain 19  Yes Marilyn Mckinney MD   loratadine (CLARITIN) 10 MG tablet Take 10 mg by mouth   Yes Historical Provider, MD   pantoprazole (PROTONIX) 40 MG tablet Take 40 mg by mouth daily  16  Yes Historical Provider, MD   tiZANidine (ZANAFLEX) 4 MG tablet Take 4 mg by mouth nightly    Yes Historical Provider, MD   calcitRIOL (ROCALTROL) 0.5 MCG capsule Take 0.5 mcg by mouth daily  10/24/14  Yes Historical Provider, MD   rOPINIRole (REQUIP) 0.25 MG tablet Take 0.25 mg by mouth nightly  10/23/14  Yes Historical Provider, MD   fluticasone-salmeterol (ADVAIR) 500-50 MCG/DOSE diskus inhaler Inhale 1 puff into the lungs every 12 hours.    Yes Historical Provider, MD   losartan (COZAAR) 25 MG tablet Take 100 mg by mouth daily    Yes Historical Provider, MD   albuterol (PROVENTIL) (2.5 MG/3ML) 0.083% nebulizer solution Take 2.5 mg by nebulization every 4 hours as needed  10/23/11  Yes Historical ductal carcinoma, grade 1, Focus of low grade ductal carcinoma in-situ    CHOLECYSTECTOMY  1993    COLONOSCOPY  2008    Dr Maris Huffman  02/2021    ECTOPIC PREGNANCY SURGERY      EXCISION OF PARATHYROID MASS      x 3    JOINT REPLACEMENT Left 2018    knee    KNEE ARTHROPLASTY Left 5/23/2019    LEFT KNEE UNI VERSUS performed by Leyla Shaver MD at [address], PARTIAL  7/3/2008    Rt Breast Lumpectomy & [place] lymph Node Biopsy displaying esidual infiltrating ductal carcinoma grade 1 with foci of low grade DCIS involving the margin. 1 of 2 sentinel lymph nodes positive for . 56mm micrometastisis.  MASTECTOMY, PARTIAL  8/7/2008    Right Partial Mastectomy Re-excision Right axillary node dissection 9 out of 9 lymph nodes negative for a total of 1 of 11 nodes positive for metastatic malignancy.  [place] RMVL MARIELLA CTR VAD W/SUBQ PORT/ CTR/PRPH INSJ N/A 5/18/2017    PORT REMOVAL performed by Christopher Yeh MD at [address] Left 5/23/2019    LEFT TOTAL KNEE REPLACEMENT performed by Leyla Shaver MD at [address] [facility]  2008    Dr Carla Singh Right 6/25/2021     BREAST NEEDLE [facility]       Social History:    Social History     Tobacco Use    Smoking status: Never Smoker    Smokeless tobacco: Never Used   Substance Use Topics    Alcohol use:  No                                Counseling given: Not Answered      Vital Signs (Current):   Vitals:    09/14/21 0817   BP: (!) 154/90   Pulse: 93   Resp: 12   Temp: 99.3 °F (37.4 °C)   TempSrc: Temporal   SpO2: 100%   Weight: 190 lb (86.2 kg)   Height: 5' 2\" (1.575 m)                                              BP Readings from Last 3 Encounters:   09/14/21 (!) 154/90   08/11/21 124/70   07/15/21 118/84       NPO Status: Time of last liquid consumption: 2000 Cardiovascular:  Exercise tolerance: good (>4 METS),   (+) hypertension:,     (-) pacemaker, past MI, CABG/stent and  angina    ECG reviewed  Rhythm: regular  Rate: normal           Beta Blocker:  Not on Beta Blocker         Neuro/Psych:      (-) seizures, TIA and CVA           GI/Hepatic/Renal:   (+) GERD: well controlled,      (-) liver disease and no renal disease       Endo/Other:    (+) hypothyroidism::., malignancy/cancer (Breast). (-) diabetes mellitus, hyperthyroidism               Abdominal:             Vascular:     - DVT. Other Findings:           Anesthesia Plan      MAC and TIVA     ASA 3     (Preop famotidine, dexamethasone, scopolamine  Discussed conversion to GETA if necessary  If conversion to GETA necessary, will still perform under TIVA due to PONV)  Induction: intravenous. MIPS: Postoperative opioids intended and Prophylactic antiemetics administered. Anesthetic plan and risks discussed with patient. Use of blood products discussed with patient whom consented to blood products.                  Jermaine Soto MD   9/14/2021

## 2021-09-14 NOTE — H&P
Carole Monique MD (Physician)   General Surgery     HISTORY OF PRESENT ILLNESS:     Ms. Latricia Murry present today to follow up from imaging to discuss   details of bilateral mastectomies and reconstruction.     She underwent a Right ultrasound guided breast biopsy. Pathology demonstrated intraductal papilloma with atypia.     She has appropriate postoperative discomfort, and no significant complaints.     This is on the same side as her right breast cancer treated with lumpectomy and radiation in 2008.   She has had enough and wishes to consider bilateral mastectomies and reconstruction    Jose Alberto Dougherty is a 61 y.o. female with the following history as recorded in [facility]:  Patient Active Problem List    Diagnosis Date Noted    Primary osteoarthritis of left knee 05/23/2019    S/P lumpectomy, right breast 8/2008 11/15/2012    Breast cancer metastasized to axillary lymph node 1 of 11 11/15/2012    Family history of malignant neoplasm of breast, sister 11/18/2011    Personal history of malignant neoplasm of breast 06/06/2008     Current Facility-Administered Medications   Medication Dose Route Frequency Provider Last Rate Last Admin    HYDROmorphone HCl PF (DILAUDID) injection 0.25 mg  0.25 mg IntraVENous Q5 Min PRN [place] MD        HYDROmorphone HCl PF (DILAUDID) injection 0.5 mg  0.5 mg IntraVENous Q5 Min PRN [place] MD        fentaNYL (SUBLIMAZE) injection 50 mcg  50 mcg IntraVENous Q5 Min PRN [place] MD        ondansetron [facility]) injection 4 mg  4 mg IntraVENous Once PRN [place] MD        lactated ringers infusion   IntraVENous Continuous [place] MD        sodium chloride flush 0.9 % injection 10 mL  10 mL IntraVENous 2 times per day [place] MD        sodium chloride flush 0.9 % injection 10 mL  10 mL IntraVENous PRN [place] MD        0.9 % sodium chloride infusion  25 mL IntraVENous PRN [place] MD        scopolamine (TRANSDERM-SCOP) transdermal patch 1 patch  1 patch TransDERmal Q72H Colton Penny MD   1 patch at 09/14/21 0936    albuterol (PROVENTIL) nebulizer solution 2.5 mg  2.5 mg Nebulization Q2H PRN Colton Penny MD   2.5 mg at 09/14/21 0726     Allergies: Cephalexin  Past Medical History:   Diagnosis Date    Asthma     Cancer ([address]     Breast Cancer    GERD (gastroesophageal reflux disease)     History of blood transfusion     1993    History of therapeutic radiation     Hx antineoplastic chemo     Hyperlipidemia     Hypertension     Osteoporosis     PONV (postoperative nausea and vomiting)     Thyroid disease     Wears glasses      Past Surgical History:   Procedure Laterality Date    APPENDECTOMY      BREAST BIOPSY  6/6/2008    Rt Breast Stereotactic Biopsy, Infiltrating ductal carcinoma, grade 1, Focus of low grade ductal carcinoma in-situ    CHOLECYSTECTOMY  1993    COLONOSCOPY  2008    Dr Maggi Rangel  02/2021    ECTOPIC PREGNANCY SURGERY      EXCISION OF PARATHYROID MASS      x 3    JOINT REPLACEMENT Left 2018    knee    KNEE ARTHROPLASTY Left 5/23/2019    LEFT KNEE UNI VERSUS performed by Carrie Real MD at [address], PARTIAL  7/3/2008    Rt Breast Lumpectomy & [place] lymph Node Biopsy displaying esidual infiltrating ductal carcinoma grade 1 with foci of low grade DCIS involving the margin. 1 of 2 sentinel lymph nodes positive for . 56mm micrometastisis.  MASTECTOMY, PARTIAL  8/7/2008    Right Partial Mastectomy Re-excision Right axillary node dissection 9 out of 9 lymph nodes negative for a total of 1 of 11 nodes positive for metastatic malignancy.       [place] RMVL MARIELLA CTR VAD W/SUBQ PORT/ CTR/PRPH INSJ N/A 5/18/2017    PORT REMOVAL performed by Rian Monique MD at [address] Left 5/23/2019    LEFT TOTAL KNEE REPLACEMENT performed by Carrie Real MD at [facility] GASTROINTESTINAL ENDOSCOPY  2008    Dr Emil Osman Right 6/25/2021     BREAST NEEDLE BIOPSY RIGHT 6/25/2021 [address] [employer]     Family History   Problem Relation Age of Onset    Diabetes Mother     Colon Polyps Mother     Breast Cancer Sister 52     Social History     Tobacco Use    Smoking status: Never Smoker    Smokeless tobacco: Never Used   Substance Use Topics    Alcohol use: No       ROS:  14 point review of systems is negative except for the above. PHYSICAL EXAM:    The patient is a 61 y.o. female  in no acute distress. She is alert oriented and cooperative. Mood and affect are appropriate. Skin is warm and dry without rashes. BP (!) 154/90   Pulse 93   Temp 99.3 °F (37.4 °C) (Temporal)   Resp 12   Ht 5' 2\" (1.575 m)   Wt 190 lb (86.2 kg)   LMP 05/20/2008   SpO2 100%   BMI 34.75 kg/m²       HEENT: Normocephalic and atraumatic. EOMs intact. Pupils equal and round and reactive to light and accommodation. External ears and nose are normal.  Sclera nonicteric. Conjunctiva normal  Oropharynx without masses or lesions. Neck: Neck is supple without masses or thyromegaly    Chest: Lungs are clear to auscultation. Respiratory effort normal    Cardiac: Regular rate and rhythm without rubs, murmurs, or gallops    Breasts: Her wound is well healed with no evidence of infection or hematoma.     She has marked radiation and postsurgical changes on the right. The right breast is much smaller than the left. Abdomen: The abdomen is soft and nontender with no hepatosplenomegaly. There are no abdominal hernias noted. Extremities: The extremities are normal. There are no signs of clubbing, cyanosis, or edema. IMPRESSION:  Intraductal papilloma with atypia  Status post right lumpectomy with postoperative radiation in 2008        PLAN: Bilateral skin sparing mastectomies with immediate tissue expander reconstruction.   We will plan for September 14     I have seen, examined and reviewed this patient medication list, appropriate labs and imaging studies. I reviewed relevant medical records and others physicians notes. I discussed the plans of care with the patient. I answered all the questions to the patients satisfaction. I, Dr Zelda Trinidad, personally performed the services described in this documentation as scribed by Maco Mosley MA in my presence and is both accurate and complete.     (Please note that portions of this note were completed with a voice recognition program. Efforts were made to edit the dictations but occasionally words are mis-transcribed.)  Over 50% of the total visit time of 25 minutes in face to face encounter with the patient, out of which more than 50% of the time was spent in counseling patient or family and coordination of care.  Counseling included but was not limited to time spent reviewing labs, imaging studies/ treatment plan and answering questions.

## 2021-09-14 NOTE — ANESTHESIA POSTPROCEDURE EVALUATION
Department of Anesthesiology  Postprocedure Note    Patient: Bayron Henry  MRN: [id number]  YOB: 1965  Date of evaluation: 9/14/2021  Time:  2:43 PM     Procedure Summary     Date: 09/14/21 Room / Location: [facility] OR [address] [place] [address]    Anesthesia Start: 1027 Anesthesia Stop: 5788    Procedure: BILATERAL SKIN SPARING MASTECTOMY VIA URBANO PATTERN WITH TISSUE EXPANDER RECONSTRUCTION (12-15/GURPREET) AND TELABIO (CONNOR) BILATERAL PEC BLOCK (Bilateral ) Diagnosis:       (D24.1)      (Z85.3)    Surgeons: Jacob Austin MD Responsible Provider: DAVID Carrillo CRNA    Anesthesia Type: MAC, TIVA ASA Status: 3          Anesthesia Type: MAC, TIVA    Karen Phase I: Karen Score: 10    Karen Phase II:      Last vitals: Reviewed and per EMR flowsheets.        Anesthesia Post Evaluation    Patient location during evaluation: PACU  Patient participation: complete - patient participated  Level of consciousness: awake  Pain score: 0  Airway patency: patent  Nausea & Vomiting: no nausea and no vomiting  Complications: no  Cardiovascular status: hemodynamically stable  Respiratory status: acceptable, face mask and oral airway  Hydration status: euvolemic  Comments: Temp 96.8F

## 2021-09-15 ENCOUNTER — TELEPHONE (OUTPATIENT)
Dept: SURGERY | Age: 56
End: 2021-09-15

## 2021-09-15 ENCOUNTER — TELEPHONE (OUTPATIENT)
Dept: INTERNAL MEDICINE | Facility: CLINIC | Age: 56
End: 2021-09-15

## 2021-09-15 ENCOUNTER — READMISSION MANAGEMENT (OUTPATIENT)
Dept: CALL CENTER | Facility: HOSPITAL | Age: 56
End: 2021-09-15

## 2021-09-15 VITALS
BODY MASS INDEX: 34.96 KG/M2 | WEIGHT: 190 LBS | DIASTOLIC BLOOD PRESSURE: 72 MMHG | RESPIRATION RATE: 20 BRPM | TEMPERATURE: 96.8 F | HEART RATE: 72 BPM | OXYGEN SATURATION: 96 % | SYSTOLIC BLOOD PRESSURE: 128 MMHG | HEIGHT: 62 IN

## 2021-09-15 PROCEDURE — 2500000003 HC RX 250 WO HCPCS: Performed by: SURGERY

## 2021-09-15 PROCEDURE — 2580000003 HC RX 258: Performed by: SURGERY

## 2021-09-15 PROCEDURE — 99024 POSTOP FOLLOW-UP VISIT: CPT | Performed by: SURGERY

## 2021-09-15 PROCEDURE — 6370000000 HC RX 637 (ALT 250 FOR IP): Performed by: SURGERY

## 2021-09-15 PROCEDURE — 6360000002 HC RX W HCPCS: Performed by: SURGERY

## 2021-09-15 PROCEDURE — 99024 POSTOP FOLLOW-UP VISIT: CPT | Performed by: PHYSICIAN ASSISTANT

## 2021-09-15 PROCEDURE — 96374 THER/PROPH/DIAG INJ IV PUSH: CPT

## 2021-09-15 PROCEDURE — G0378 HOSPITAL OBSERVATION PER HR: HCPCS

## 2021-09-15 RX ORDER — LEVOFLOXACIN 500 MG/1
500 TABLET, FILM COATED ORAL DAILY
Qty: 10 TABLET | Refills: 0 | Status: SHIPPED | OUTPATIENT
Start: 2021-09-15 | End: 2021-09-25

## 2021-09-15 RX ORDER — HYDROCODONE BITARTRATE AND ACETAMINOPHEN 5; 325 MG/1; MG/1
1 TABLET ORAL EVERY 6 HOURS PRN
Qty: 15 TABLET | Refills: 0 | Status: SHIPPED | OUTPATIENT
Start: 2021-09-15 | End: 2022-09-15

## 2021-09-15 RX ORDER — HYDROCODONE BITARTRATE AND ACETAMINOPHEN 5; 325 MG/1; MG/1
1 TABLET ORAL EVERY 6 HOURS PRN
Status: DISCONTINUED | OUTPATIENT
Start: 2021-09-15 | End: 2021-09-15 | Stop reason: HOSPADM

## 2021-09-15 RX ADMIN — SODIUM CHLORIDE, PRESERVATIVE FREE 10 ML: 5 INJECTION INTRAVENOUS at 08:07

## 2021-09-15 RX ADMIN — DOCUSATE SODIUM 100 MG: 100 CAPSULE, LIQUID FILLED ORAL at 08:05

## 2021-09-15 RX ADMIN — CLINDAMYCIN PHOSPHATE 900 MG: 900 INJECTION, SOLUTION INTRAVENOUS at 02:46

## 2021-09-15 RX ADMIN — ASPIRIN 81 MG: 81 TABLET, COATED ORAL at 08:03

## 2021-09-15 RX ADMIN — LOSARTAN POTASSIUM 100 MG: 100 TABLET, FILM COATED ORAL at 08:03

## 2021-09-15 RX ADMIN — NITROGLYCERIN 1 INCH: 20 OINTMENT TOPICAL at 14:00

## 2021-09-15 RX ADMIN — ATORVASTATIN CALCIUM 40 MG: 40 TABLET, FILM COATED ORAL at 08:03

## 2021-09-15 RX ADMIN — LEVOTHYROXINE SODIUM 50 MCG: 50 TABLET ORAL at 08:03

## 2021-09-15 RX ADMIN — HYDROCODONE BITARTRATE AND ACETAMINOPHEN 1 TABLET: 5; 325 TABLET ORAL at 13:54

## 2021-09-15 RX ADMIN — ENOXAPARIN SODIUM 40 MG: 40 INJECTION SUBCUTANEOUS at 08:03

## 2021-09-15 RX ADMIN — PANTOPRAZOLE SODIUM 40 MG: 40 TABLET, DELAYED RELEASE ORAL at 08:03

## 2021-09-15 RX ADMIN — MORPHINE SULFATE 2 MG: 2 INJECTION, SOLUTION INTRAMUSCULAR; INTRAVENOUS at 00:29

## 2021-09-15 RX ADMIN — CALCITRIOL CAPSULES 0.25 MCG 0.5 MCG: 0.25 CAPSULE ORAL at 08:03

## 2021-09-15 RX ADMIN — MORPHINE SULFATE 2 MG: 2 INJECTION, SOLUTION INTRAMUSCULAR; INTRAVENOUS at 08:04

## 2021-09-15 ASSESSMENT — PAIN SCALES - GENERAL
PAINLEVEL_OUTOF10: 4
PAINLEVEL_OUTOF10: 10
PAINLEVEL_OUTOF10: 10
PAINLEVEL_OUTOF10: 6

## 2021-09-15 NOTE — TELEPHONE ENCOUNTER
Caller: [facility]    Relationship to patient:     Best call back number:     New or established patient?  [] New  [x] Established    Date of discharge: 09/15/2021    Facility discharged from: [facility]    Diagnosis/Symptoms: Mastectomy    Length of stay (If applicable): 1 day    Specialty Only: Did you see a [facility] provider?    [] Yes  [x] No

## 2021-09-15 NOTE — OUTREACH NOTE
Prep Survey      Responses   [facility] facility patient discharged from?  Non-BH   Is LACE score < 7 ?  Non-BH Discharge   Emergency Room discharge w/ pulse ox?  No   Eligibility  [facility]   Date of Discharge  09/15/21   Discharge diagnosis  mastectomy   Does the patient have one of the following disease processes/diagnoses(primary or secondary)?  General Surgery   Prep survey completed?  Yes          Maria C Leal RN

## 2021-09-15 NOTE — TELEPHONE ENCOUNTER
Spoke with [email or web address]. PAtient also fills norco 5 from pain management. I told him Dr. Amanda Caballero normally gives them 1 prescription and then lets them know they will not receive any additional refills since they get pain med from another provider.

## 2021-09-15 NOTE — TELEPHONE ENCOUNTER
Brayan or Milton called and stated the patient has duplicate prescriptions called in for [place]. One from Dr. Curtis Espinal  For post op pain and the other rx is from a different provider. The Pharmacist would like to know what  would like to do about filling the RX. Please call and speak with either Brayan or Milton and advise. Thank you   [phone]  GITA Smith.

## 2021-09-15 NOTE — OP NOTE
Decadron, we injected the intercostal perforators on either side of  the sternum. We then injected the inframammary crease bilaterally and  the infraclavicular area bilaterally. We then, on the left, injected  the interpectoral space with ultrasound guidance and the lateral  intercostal perforators again with ultrasound guidance. We repeated  this on the right side, and then re-prepped, re-draped. We had  previously marked our Wise pattern incisions on both sides. Once we  re-prepped and re-draped, we de-epithelialized a _____ portion of our  inferior flap on the right, then opened our Wise pattern incision and  elevated the skin and subcutaneous tissue off the breast parenchyma  using the Bovie electrocautery, the facelift scissors and the Enseal  device. We then did the same thing inferiorly where we had  de-epithelialized and then swept the breast off the chest wall. This  all went quite smoothly. We did place a nylon suture in the axillary  tail. We then irrigated copiously and packed this with  Kefzol-bacitracin soaked lap sponges. We did measure and it appeared  that a 13 cm expander would be appropriate. We utilized on the right  side the reference number [id number], serial number [id number] and  utilized for soft tissue support two pieces of the large contoured  AlloDerm that were sutured together and then wrapped around our expander  the two pieces of AlloDerm, one was lot number [id number], reference  number [id number], the second piece that was utilized was [id number],  reference number [id number]. We then proceeded with the left side, again  de-epithelialized a large inferior portion, this breast was much larger  than the right due to the right prior radiation and once this was all  de-epithelialized, we raised our superior and inferior flaps using Bovie  electrocautery, the facelift scissors and the Enseal device. We then  swept the breast off the chest wall again.   The inferior flap that had  been de-epithelialized was extensive on the left side, so we did not  require any soft tissue support. We utilized on the left side tissue  expander reference number [id number], serial number [id number]. We then  turned our attention back to the right side. We sutured our expander,  placed 2-0 PDS sutures. We began with the 12 o'clock suture at  approximately the midclavicular line and then sutured the inferior one  as well. Then we sutured the other tabs in place and it all fit in  there quite nicely. We then repeated this on the left side taking care  to try to match them up as best we could. We then closed the soft  tissue flaps with 2-0 and 3-0 Vicryl and 4-0 Stratafix for the skin. We  utilized some topical Nitrobid on the right side because it had been  previously radiated and felt that putting any volume in this would be  stressful on the tissues at this point. We then placed the second  expander on the left side and sutured into place and closed with 2-0 and  3-0 Vicryl and Stratafix for the skin as well. Prineo dressings were  applied. She did have 2 drains on each side. Estimated blood loss,  minimal.  Complications, none. She tolerated all this quite well.         Fer Moore MD    D: 09/14/2021 16:32:21      T: 09/15/2021 3:36:26     [email or web address]  Job#: [id number]     Doc#: [id number]    CC:

## 2021-09-16 ENCOUNTER — TRANSITIONAL CARE MANAGEMENT TELEPHONE ENCOUNTER (OUTPATIENT)
Dept: CALL CENTER | Facility: HOSPITAL | Age: 56
End: 2021-09-16

## 2021-09-16 NOTE — OUTREACH NOTE
Call Center TCM Note      Responses   [facility] patient discharged from?  Non-   Does the patient have one of the following disease processes/diagnoses(primary or secondary)?  General Surgery   TCM attempt successful?  Yes   Call start time  1339   Call end time  1341   Discharge diagnosis  mastectomy   Meds reviewed with patient/caregiver?  Yes   Is the patient having any side effects they believe may be caused by any medication additions or changes?  No   Does the patient have all medications ordered at discharge?  Yes   Prescription comments  Spouse is planning on picking up the antibiotic shortly    Is the patient taking all medications as directed (includes completed medication regime)?  Yes   Does the patient have a primary care provider?   Yes   Does the patient have an appointment with their PCP within 7 days of discharge?  Yes   Comments regarding PCP  [facility] on 9/21/21 with PCP    Has the patient kept scheduled appointments due by today?  N/A   Psychosocial issues?  No   Did the patient receive a copy of their discharge instructions?  Yes   Nursing interventions  Reviewed instructions with patient   What is the patient's perception of their health status since discharge?  Improving   Is the patient/caregiver able to teach back signs and symptoms related to disease process for when to call PCP?  Yes   Is the patient/caregiver able to teach back signs and symptoms related to disease process for when to call 911?  Yes   Is the patient/caregiver able to teach back the hierarchy of who to call/visit for symptoms/problems? PCP, Specialist, Home health nurse, Urgent Care, ED, 911  Yes   If the patient is a current smoker, are they able to teach back resources for cessation?  Not a smoker   TCM call completed?  Yes   Wrap up additional comments  pt reports area looks good (no s/s of infection)  just a little sore           Brooklyn Collins RN    9/16/2021, 13:41 EDT

## 2021-09-16 NOTE — DISCHARGE SUMMARY
Physician Discharge Summary         Patient ID:  Young De La Cruz  [id number]  61 y.o. Admit date: 9/14/2021    Discharge date and time: 9/15/2021  3:10 PM     Admitting Physician: Andrés Gonzáles MD     Discharge Diagnoses:   Prior history of breast cancer with a right lumpectomy and chemotherapy and radiation approximately 10 to 12 years ago and the diagnosis of atypical ductal hyperplasia. Final pathology report revealing:   A. Right breast, mastectomy:   Ductal carcinoma in situ, papillary and solid subtype. Maximum tumor diameter is 0.6 cm. The surgical margins and nipple are free of tumor. The see synoptic. B.  Left breast, mastectomy:   No malignancy identified. Benign breast tissue. The surgical margins are free of tumor. DCIS of the Breast: Complete Excision (Less than total mastectomy,   Including specimens designated biopsy, lumpectomy, quadrectomy,  and partial mastectomy; with or without axillary contents) and mastectomy (Total, modified radical, radical; with or without axillary contents). Patient Active Problem List   Diagnosis    Personal history of malignant neoplasm of breast    Family history of malignant neoplasm of breast, sister    S/P lumpectomy, right breast 8/2008    Breast cancer metastasized to axillary lymph node 1 of 11    Primary osteoarthritis of left knee    Atypical ductal hyperplasia of breast    Post-operative state       Procedure:  1. Bilateral pectoral blocks. 2.  Bilateral skin-sparing mastectomies. 3.  Bilateral tissue expander reconstruction with utilization of  acellular dermal matrix on the right side for soft tissue support. Discharged Condition: good    Hospital Course:   Please see HPI    Consults: none    Disposition: home    Patient Instructions: Activity and wound care: per mastectomy instruction sheet  Diet: regular diet      Follow up:   Follow up with general surgery service in one week       Medication List      START taking these medications    HYDROcodone-acetaminophen 5-325 MG per tablet  Commonly known as: Norco  Take 1 tablet by mouth every 6 hours as needed for Pain.     levoFLOXacin 500 MG tablet  Commonly known as: LEVAQUIN  Take 1 tablet by mouth daily for 10 days     nitroglycerin 2 % ointment  Commonly known as: NITRO-BID  Place 0.5 inches onto the skin 2 times daily        CONTINUE taking these medications    albuterol (2.5 MG/3ML) 0.083% nebulizer solution  Commonly known as: PROVENTIL     alendronate 70 MG tablet  Commonly known as: FOSAMAX     aspirin EC 81 MG EC tablet  Take 1 tablet by mouth 2 times daily     calcitRIOL 0.5 MCG capsule  Commonly known as: ROCALTROL     calcium citrate-vitamin D 315-250 MG-UNIT Tabs per tablet  Commonly known as: CITRICAL + D     Cozaar 25 MG tablet  Generic drug: losartan     docusate sodium 100 MG capsule  Commonly known as: Colace  Take 1 capsule by mouth daily To prevent constipation     fluticasone-salmeterol 500-50 MCG/DOSE diskus inhaler  Commonly known as: ADVAIR     ibuprofen 400 MG tablet  Commonly known as: ADVIL;MOTRIN  Take 1 tablet by mouth every 8 hours as needed for Pain     levothyroxine 50 MCG tablet  Commonly known as: SYNTHROID     Lipitor 40 MG tablet  Generic drug: atorvastatin     loratadine 10 MG tablet  Commonly known as: CLARITIN     pantoprazole 40 MG tablet  Commonly known as: PROTONIX     Pennsaid 2 % Soln  Generic drug: Diclofenac Sodium     rOPINIRole 0.25 MG tablet  Commonly known as: REQUIP     THEOPHYLLINE CR PO     tiZANidine 4 MG tablet  Commonly known as: ZANAFLEX     zafirlukast 20 MG tablet  Commonly known as: ACCOLATE           Where to Get Your Medications      These medications were sent to [address], [address]  [address] [facility], [address]    Phone: [phone]   · HYDROcodone-acetaminophen 5-325 MG per tablet  · levoFLOXacin 500 MG tablet  · nitroglycerin 2 %

## 2021-09-17 ENCOUNTER — TELEPHONE (OUTPATIENT)
Dept: SURGERY | Age: 56
End: 2021-09-17

## 2021-09-18 DIAGNOSIS — J45.998 OTHER ASTHMA: ICD-10-CM

## 2021-09-18 DIAGNOSIS — K21.9 GASTROESOPHAGEAL REFLUX DISEASE: ICD-10-CM

## 2021-09-18 DIAGNOSIS — R13.19 ESOPHAGEAL DYSPHAGIA: ICD-10-CM

## 2021-09-20 ENCOUNTER — OFFICE VISIT (OUTPATIENT)
Dept: SURGERY | Age: 56
End: 2021-09-20

## 2021-09-20 VITALS — HEART RATE: 88 BPM | DIASTOLIC BLOOD PRESSURE: 72 MMHG | SYSTOLIC BLOOD PRESSURE: 110 MMHG

## 2021-09-20 DIAGNOSIS — Z90.13 S/P BILATERAL MASTECTOMY: ICD-10-CM

## 2021-09-20 PROCEDURE — 99024 POSTOP FOLLOW-UP VISIT: CPT | Performed by: SURGERY

## 2021-09-20 RX ORDER — HYDROCODONE BITARTRATE AND ACETAMINOPHEN 5; 325 MG/1; MG/1
1 TABLET ORAL EVERY 6 HOURS PRN
Qty: 10 TABLET | Refills: 0 | Status: SHIPPED | OUTPATIENT
Start: 2021-09-20 | End: 2021-09-23

## 2021-09-20 RX ORDER — LEVOTHYROXINE SODIUM 50 MCG
TABLET ORAL
Qty: 90 TABLET | Refills: 3 | Status: SHIPPED | OUTPATIENT
Start: 2021-09-20 | End: 2022-11-17 | Stop reason: SDUPTHER

## 2021-09-20 RX ORDER — ZAFIRLUKAST 20 MG/1
TABLET, FILM COATED ORAL
Qty: 180 TABLET | Refills: 3 | Status: SHIPPED | OUTPATIENT
Start: 2021-09-20 | End: 2022-09-22 | Stop reason: SDUPTHER

## 2021-09-20 RX ORDER — PANTOPRAZOLE SODIUM 40 MG/1
TABLET, DELAYED RELEASE ORAL
Qty: 90 TABLET | Refills: 0 | Status: SHIPPED | OUTPATIENT
Start: 2021-09-20 | End: 2021-10-18 | Stop reason: SDUPTHER

## 2021-09-20 NOTE — PROGRESS NOTES
HISTORY OF PRESENT ILLNESS:    Ms. Mayelin Wilkerson present today to follow up from imaging to discuss   details of bilateral mastectomies and reconstruction. She underwent a Right ultrasound guided breast biopsy. Pathology demonstrated intraductal papilloma with atypia. She has appropriate postoperative discomfort, and no significant complaints. This is on the same side as her right breast cancer treated with lumpectomy and radiation in 2008. She has had enough and wishes to consider bilateral mastectomies and reconstruction      She is now status post  Bilateral mastectomy and immediate reconstruction with expanders and AlloDerm on 9/17/2021    PATHOLOGY REVEALS:  Pending    She called yesterday concerned about swelling and pain in the left breast and I had her come in for evaluation    PHYSICAL EXAM:  The  wounds look good with no evidence of infection, fluid accumulation, or skin necrosis. Ultrasound the office demonstrated no significant fluid accumulation    2  GILMAR drains were removed without incident    IMPRESSION:    Doing well s/p bilateral mastectomy and reconstruction  I think her problems were related to the pectoral block wearing off. PLAN: Follow-up in 1 week    I have seen, examined and reviewed this patient medication list, appropriate labs and imaging studies. I reviewed relevant medical records and others physicians notes. I discussed the plans of care with the patient. I answered all the questions to the patients satisfaction. I, Dr Cheko Guillermo, personally performed the services described in this documentation as scribed by Gwendolyn Norwood MA in my presence and is both accurate and complete.      (Please note that portions of this note were completed with a voice recognition program. Efforts were made to edit the dictations but occasionally words are mis-transcribed.)  Over 50% of the total visit time of 15 minutes in face to face encounter with the patient, out of which more than 50% of the time was spent in counseling patient or family and coordination of care. Counseling included but was not limited to time spent reviewing labs, imaging studies/ treatment plan and answering questions.

## 2021-09-20 NOTE — TELEPHONE ENCOUNTER
Rx Refill Note  Requested Prescriptions     Pending Prescriptions Disp Refills   • Accolate 20 MG tablet [Pharmacy Med Name: ACCOLATE TABS 60'S 20MG] 180 tablet 3     Sig: TAKE 1 TABLET TWICE A DAY      Last office visit with prescribing clinician: 10/27/2020      Next office visit with prescribing clinician: 11/09/2021           Sara Xavier CMA  09/20/21, 09:20 CDT

## 2021-09-20 NOTE — TELEPHONE ENCOUNTER
Rx Refill Note  Requested Prescriptions     Pending Prescriptions Disp Refills   • pantoprazole (PROTONIX) 40 MG EC tablet [Pharmacy Med Name: PANTOPRAZOLE SODIUM DR TABS 40MG] 90 tablet 0     Sig: TAKE 1 TABLET DAILY      Last office visit with prescribing clinician: 1/7/2021      Next office visit with prescribing clinician: Visit date not found     Pt due yearly OV 1/2022-pended enough refills to Dr. Fox to last until that time. I also placed note to pharmacy to have pt schedule appt before running out of this set of refills.            Monica Quan MA  09/20/21, 08:06 CDT

## 2021-09-21 ENCOUNTER — OFFICE VISIT (OUTPATIENT)
Dept: INTERNAL MEDICINE | Facility: CLINIC | Age: 56
End: 2021-09-21

## 2021-09-21 VITALS
HEIGHT: 62 IN | WEIGHT: 177 LBS | TEMPERATURE: 97.3 F | HEART RATE: 80 BPM | SYSTOLIC BLOOD PRESSURE: 156 MMHG | BODY MASS INDEX: 32.57 KG/M2 | DIASTOLIC BLOOD PRESSURE: 82 MMHG | OXYGEN SATURATION: 100 %

## 2021-09-21 DIAGNOSIS — K59.03 DRUG-INDUCED CONSTIPATION: ICD-10-CM

## 2021-09-21 DIAGNOSIS — N60.99 ATYPICAL DUCTAL HYPERPLASIA OF BREAST: ICD-10-CM

## 2021-09-21 DIAGNOSIS — Z90.13 S/P BILATERAL MASTECTOMY: Primary | ICD-10-CM

## 2021-09-21 DIAGNOSIS — M81.0 OSTEOPOROSIS WITHOUT CURRENT PATHOLOGICAL FRACTURE, UNSPECIFIED OSTEOPOROSIS TYPE: ICD-10-CM

## 2021-09-21 PROBLEM — Z98.890 POST-OPERATIVE STATE: Status: ACTIVE | Noted: 2021-09-14

## 2021-09-21 PROCEDURE — 99214 OFFICE O/P EST MOD 30 MIN: CPT | Performed by: NURSE PRACTITIONER

## 2021-09-21 RX ORDER — LEVOFLOXACIN 500 MG/1
500 TABLET, FILM COATED ORAL DAILY
COMMUNITY
Start: 2021-09-15 | End: 2021-09-25

## 2021-09-21 RX ORDER — ALENDRONATE SODIUM 70 MG/1
70 TABLET ORAL
Qty: 12 TABLET | Refills: 3 | Status: SHIPPED | OUTPATIENT
Start: 2021-09-21 | End: 2023-01-10 | Stop reason: SDUPTHER

## 2021-09-21 NOTE — PROGRESS NOTES
Subjective   Luna Cruz is a 56 y.o. female.   Chief Complaint   Patient presents with   • Hospital Follow Up Visit     [facility], Bilateral Mastectomy 9/14/2021       Luna presents today S/P bilateral mastectomy performed on 9/17/21.  She was found to have intraductal papilloma with atypia to the right breast and opted to have mastectomy.  She reports the surgery went well and without complication.  She followed up with her surgeon on 9/20/21 and had 2 drains removed.  2 drains remain in place.  She follows up again with general surgery on 9/27/21.  She states her pain is managed with Nocro.  Her only complaint today is constipation.  She reports it has been about 1 week since she has gone.  She is passing gas and denies abdominal pain.  She feels bloated. She reports eating and drinking without difficulty.        The following portions of the patient's history were reviewed and updated as appropriate: allergies, current medications, past family history, past medical history, past social history, past surgical history and problem list.    Review of Systems   Constitutional: Negative for activity change, appetite change, fatigue, fever, unexpected weight gain and unexpected weight loss.   HENT: Negative for swollen glands, trouble swallowing and voice change.    Eyes: Negative for blurred vision and visual disturbance.   Respiratory: Negative for cough and shortness of breath.    Cardiovascular: Negative for chest pain, palpitations and leg swelling.   Gastrointestinal: Positive for constipation. Negative for abdominal pain, diarrhea, nausea, vomiting and indigestion.   Endocrine: Negative for cold intolerance, heat intolerance, polydipsia and polyphagia.   Genitourinary: Negative for dysuria and frequency.   Musculoskeletal: Negative for arthralgias, back pain, joint swelling and neck pain.   Skin: Negative for color change, rash and skin lesions.   Neurological: Negative for dizziness, weakness, headache,  memory problem and confusion.   Hematological: Does not bruise/bleed easily.   Psychiatric/Behavioral: Negative for agitation, hallucinations and suicidal ideas. The patient is not nervous/anxious.        Objective   Past Medical History:   Diagnosis Date   • Abnormal uterine bleeding due to endometrial polyp    • Arthritis    • Asthma    • Chronic back pain    • Disease of thyroid gland    • Drug therapy    • Family history of colonic polyps    • GERD (gastroesophageal reflux disease)    • History of breast cancer    • History of colon polyps    • Hx of radiation therapy    • Hyperlipidemia    • Hypertension    • Malignant neoplasm of upper-inner quadrant of right female breast (CMS/HCC) 9/26/2016   • Osteoporosis    • PONV (postoperative nausea and vomiting)    • Scoliosis       Past Surgical History:   Procedure Laterality Date   • ADENOIDECTOMY     • APPENDECTOMY  1994   • BREAST BIOPSY     • BREAST LUMPECTOMY Right 2008    right sentinel lymph nodes were also removed for biopsy   • CARDIAC CATHETERIZATION     • CHOLECYSTECTOMY  1993   • COLONOSCOPY  05/03/2013    Erythematous mucosa in the rectum-biopsied; Repeat 4 years    • COLONOSCOPY N/A 6/14/2017    Normal; Repeat 5 years   • COLONOSCOPY  04/23/2010    Dr. Arzola-Extremely poorly prepped colon   • COLONOSCOPY N/A 7/24/2020    Hemorrhoids found on perianal exam; One 4mm hyperplastic polyp in the transverse colon; Normal mucosa in the entire examined colon-biopsied; Non-bleeding internal hemorrhoids; Repeat 5 years   • D & C HYSTEROSCOPY  1993   • D & C HYSTEROSCOPY MYOSURE N/A 1/13/2021    Procedure: DILATATION AND CURETTAGE HYSTEROSCOPY WITH MYOSURE, polypectomy;  Surgeon: Marcello Salas MD;  Location: [facility];  Service: Obstetrics/Gynecology;  Laterality: N/A;   • DILATATION AND CURETTAGE     • ENDOSCOPY N/A 10/5/2016    Medium-sized HH; Widely patent and non-obstructing Schatzki ring-dilated; Procedure: ESOPHAGOGASTRODUODENOSCOPY WITH  ANESTHESIA;  Surgeon: Ksenia Fox MD;  Location: [facility] ENDOSCOPY;  Service:    • ENDOSCOPY N/A 2/27/2019    Normal 1st portion of the duodenum and 2 portion of the duodenum; A few gastric polyps-biopsied; Small HH; Widely patent Schatzki ring-dilated; Abnormal esophageal motility, suspicious for presbyesophagus; Arrange for barium swallow to assess for dysmotility   • ENDOSCOPY N/A 7/24/2020    Medium-sized HH; LA Grade A reflux esophagitis; No endoscopic esophageal abnormality to explain patient's dysphagia-Esophagus dilated; Normal stomach; Normal examined duodenum; No specimens collected   • HYSTEROSCOPY     • KNEE ARTHROSCOPY Left 11/13/2018    Procedure: LEFT KNEE ARTHROSCOPIC PARTIAL MEDIAL MENISCECTOMY;  Surgeon: Matt Maki MD;  Location: [facility] OR;  Service: Orthopedics   • MASTECTOMY Bilateral 09/14/2021   • MEDIPORT INSERTION, SINGLE  2008   • MEDIPORT REMOVAL     • PARATHYROIDECTOMY  2011   • REPLACEMENT TOTAL KNEE Left 05/23/2019   • TONSILLECTOMY AND ADENOIDECTOMY  1970   • TUBAL ABDOMINAL LIGATION          Current Outpatient Medications:   •  Accolate 20 MG tablet, TAKE 1 TABLET TWICE A DAY, Disp: 180 tablet, Rfl: 3  •  Advair Diskus 250-50 MCG/DOSE DISKUS, USE 1 INHALATION TWICE A DAY (Patient taking differently: Inhale 1 puff 2 (Two) Times a Day.), Disp: 3 each, Rfl: 3  •  albuterol (PROVENTIL) (2.5 MG/3ML) 0.083% nebulizer solution, Take 2.5 mg by nebulization As Needed., Disp: , Rfl:   •  alendronate (FOSAMAX) 70 MG tablet, Take 1 tablet by mouth Every 7 (Seven) Days. Sunday's, Disp: 12 tablet, Rfl: 3  •  atorvastatin (LIPITOR) 40 MG tablet, TAKE 1 TABLET DAILY, Disp: 90 tablet, Rfl: 3  •  calcitriol (ROCALTROL) 0.5 MCG capsule, Take 1 capsule by mouth Daily., Disp: 90 capsule, Rfl: 3  •  Calcium Citrate-Vitamin D (CALCIUM + D PO), Take 1 tablet by mouth Daily., Disp: , Rfl:   •  Diclofenac Sodium (PENNSAID TD), Apply 1 application topically to the appropriate area as directed 2  (Two) Times a Day As Needed (BACK PAIN). For back pain , Disp: , Rfl:   •  docusate sodium (COLACE) 100 MG capsule, Take 100 mg by mouth As Needed., Disp: , Rfl:   •  HYDROcodone-acetaminophen (NORCO) 5-325 MG per tablet, Take 1 tablet by mouth 2 (Two) Times a Day As Needed for Moderate Pain ., Disp: , Rfl:   •  ibuprofen (ADVIL,MOTRIN) 400 MG tablet, Take 400 mg by mouth Every 8 (Eight) Hours As Needed for Mild Pain ., Disp: , Rfl:   •  levoFLOXacin (LEVAQUIN) 500 MG tablet, Take 500 mg by mouth Daily., Disp: , Rfl:   •  loratadine (CLARITIN) 10 MG tablet, Take 10 mg by mouth Daily As Needed for Allergies., Disp: , Rfl:   •  losartan (COZAAR) 100 MG tablet, Take 1 tablet by mouth Daily., Disp: 90 tablet, Rfl: 3  •  pantoprazole (PROTONIX) 40 MG EC tablet, TAKE 1 TABLET DAILY, Disp: 90 tablet, Rfl: 0  •  potassium chloride ER (K-TAB) 20 MEQ tablet controlled-release ER tablet, Take 1 tablet by mouth Daily., Disp: 90 tablet, Rfl: 3  •  rOPINIRole (REQUIP) 0.25 MG tablet, TAKE 1 TABLET EVERY NIGHT. TAKE ONE HOUR BEFORE BEDTIME., Disp: 90 tablet, Rfl: 3  •  Synthroid 50 MCG tablet, TAKE 1 TABLET DAILY, Disp: 90 tablet, Rfl: 3  •  theophylline (UNIPHYL) 400 MG 24 hr tablet, Take 1 tablet by mouth Daily., Disp: 90 tablet, Rfl: 3  •  tiZANidine (ZANAFLEX) 4 MG tablet, Take 4 mg by mouth Every 8 (Eight) Hours As Needed for Muscle Spasms., Disp: , Rfl:   No current facility-administered medications for this visit.    Facility-Administered Medications Ordered in Other Visits:   •  heparin flush (porcine) 100 UNIT/ML injection 500 Units, 500 Units, Intracatheter, PRN, Ki Manriquez MD, 500 Units at 09/27/16 1108  •  heparin flush (porcine) 100 UNIT/ML injection 500 Units, 500 Units, Intravenous, PRN, Malathi Brantley APRN, 500 Units at 05/05/17 1009  •  sodium chloride 0.9 % flush 10 mL, 10 mL, Intravenous, PRN, Ki Manriquez MD, 10 mL at 09/27/16 1107  •  sodium chloride 0.9 % flush 10 mL, 10  mL, Intravenous, PRN, Malathi Brantley, APRN, 10 mL at 05/05/17 1009      Vitals:    09/21/21 1117   BP: 156/82   Pulse: 80   Temp: 97.3 °F (36.3 °C)   SpO2: 100%         09/21/21  1117   Weight: 80.3 kg (177 lb)       Body mass index is 32.37 kg/m².    Physical Exam  Constitutional:       General: She is not in acute distress.     Appearance: She is well-developed.   HENT:      Head: Normocephalic.      Right Ear: Tympanic membrane and external ear normal.      Left Ear: Tympanic membrane and external ear normal.      Mouth/Throat:      Mouth: Mucous membranes are moist. No oral lesions.      Pharynx: Oropharynx is clear.   Eyes:      Conjunctiva/sclera: Conjunctivae normal.   Cardiovascular:      Rate and Rhythm: Normal rate and regular rhythm.      Pulses: Normal pulses.      Heart sounds: Normal heart sounds.   Pulmonary:      Effort: Pulmonary effort is normal.      Breath sounds: Normal breath sounds.   Chest:      Comments: Surgical incisions to previous site of breast tissue bilaterally. Steri strips in place, well approximated.  REHAN drain present bilaterally with thin sanguinous fluid present.   Skin:     General: Skin is warm and dry.   Neurological:      Mental Status: She is alert and oriented to person, place, and time.      Gait: Gait normal.   Psychiatric:         Mood and Affect: Mood normal.         Speech: Speech normal.         Behavior: Behavior normal.         Thought Content: Thought content normal.               Assessment/Plan   Diagnoses and all orders for this visit:    1. S/P bilateral mastectomy (Primary)    2. Atypical ductal hyperplasia of breast    3. Osteoporosis without current pathological fracture, unspecified osteoporosis type  -     alendronate (FOSAMAX) 70 MG tablet; Take 1 tablet by mouth Every 7 (Seven) Days. Sunday's  Dispense: 12 tablet; Refill: 3    4. Drug-induced constipation      I have discussed increasing fiber intake and avoiding processed foods.  Advised milk of  magnesia 45ml x 1, 30ml x 1, 15ml x 1 to help move bowels.  Then recommend daily miralax while on pain medication.    Continue with follows up with general surgeon  Discussed taking deep breaths and coughing to help keep lungs clear and prevent pneumonia.    I have reviewed labs and discharge summary

## 2021-09-21 NOTE — PATIENT INSTRUCTIONS
Milk of Magnesia 45ml x 1 dose today, then 30ml x 1 one dose tomorrow, then 15ml x 1 dose Thursday.  Then start miralax daily.

## 2021-09-23 ENCOUNTER — TELEPHONE (OUTPATIENT)
Dept: INTERNAL MEDICINE | Facility: CLINIC | Age: 56
End: 2021-09-23

## 2021-09-23 RX ORDER — FLUCONAZOLE 150 MG/1
150 TABLET ORAL ONCE
Qty: 2 TABLET | Refills: 0 | Status: SHIPPED | OUTPATIENT
Start: 2021-09-23 | End: 2021-09-23

## 2021-09-23 NOTE — TELEPHONE ENCOUNTER
Patient has c/o vaginal yeast infection x 2 days, itching and burning. She would like rx pended to [facility].

## 2021-09-23 NOTE — TELEPHONE ENCOUNTER
Patient was called and informed of medication being sent. Patient will call back with further questions or concerns.

## 2021-09-23 NOTE — TELEPHONE ENCOUNTER
Caller: Luna Cruz    Relationship: Self    Best call back number: [phone]    What medication are you requesting: SOMETHING FOR YEAST INFECTION     What are your current symptoms: RED ITCHING     How long have you been experiencing symptoms: 2 DAYS     Have you had these symptoms before:    [x] Yes  [] No    Have you been treated for these symptoms before:   [x] Yes  [] No    If a prescription is needed, what is your preferred pharmacy and phone number: [facility] [address] [place] [address] [address] [facility] [phone] [facility] [phone]

## 2021-09-23 NOTE — TELEPHONE ENCOUNTER
Please find out what part of the body is affected, then forward to Che, if she is still working from home today.

## 2021-09-24 NOTE — PROGRESS NOTES
HISTORY OF PRESENT ILLNESS:    Ms. Hannah Stanford present today for a post op breast check. She underwent a Right ultrasound guided breast biopsy. Pathology demonstrated intraductal papilloma with atypia. She has appropriate postoperative discomfort, and no significant complaints. This is on the same side as her right breast cancer treated with lumpectomy and radiation in 2008. She has had enough and wishes to consider bilateral mastectomies and reconstruction      She is now status post  Bilateral mastectomy and immediate reconstruction with expanders and AlloDerm on 9/14/2021    PATHOLOGY REVEALS:  FINAL DIAGNOSIS:    A. Right breast, mastectomy:   Ductal carcinoma in situ, papillary and solid subtype. Maximum tumor diameter is 0.6 cm. The surgical margins and nipple are free of tumor. The see synoptic. B.  Left breast, mastectomy:   No malignancy identified. Benign breast tissue. The surgical margins are free of tumor. She called last weekend concerned about swelling and pain in the left breast and I had her come in for evaluation. I think it was just the block wearing off    PHYSICAL EXAM:  The  wounds look good with no evidence of infection, fluid accumulation, or skin necrosis. Ultrasound the office that day demonstrated no significant fluid accumulation    Remaining (2) GILMAR Drains were removed without incident. IMPRESSION:    Doing well s/p bilateral mastectomy and reconstruction  I think her problems were related to the pectoral block wearing off. PLAN: See Dr. Liane Cadet in 2-3 weeks. See Sandra Jasso in one week for a fluid check. I will see her back in 1 month and if things look good we will potentially start fills      I have seen, examined and reviewed this patient medication list, appropriate labs and imaging studies. I reviewed relevant medical records and others physicians notes. I discussed the plans of care with the patient.  I answered all the questions to the patients satisfaction. I, Dr Karen Swarzt, personally performed the services described in this documentation as scribed by Erika Tan MA in my presence and is both accurate and complete. (Please note that portions of this note were completed with a voice recognition program. Efforts were made to edit the dictations but occasionally words are mis-transcribed.)  Over 50% of the total visit time of 15 minutes in face to face encounter with the patient, out of which more than 50% of the time was spent in counseling patient or family and coordination of care. Counseling included but was not limited to time spent reviewing labs, imaging studies/ treatment plan and answering questions.

## 2021-09-27 ENCOUNTER — OFFICE VISIT (OUTPATIENT)
Dept: SURGERY | Age: 56
End: 2021-09-27

## 2021-09-27 VITALS — HEART RATE: 80 BPM | SYSTOLIC BLOOD PRESSURE: 128 MMHG | DIASTOLIC BLOOD PRESSURE: 80 MMHG

## 2021-09-27 DIAGNOSIS — Z90.13 S/P BILATERAL MASTECTOMY: Primary | ICD-10-CM

## 2021-09-27 PROCEDURE — 99024 POSTOP FOLLOW-UP VISIT: CPT | Performed by: SURGERY

## 2021-10-04 ENCOUNTER — OFFICE VISIT (OUTPATIENT)
Dept: SURGERY | Age: 56
End: 2021-10-04

## 2021-10-04 VITALS
BODY MASS INDEX: 32.24 KG/M2 | WEIGHT: 175.2 LBS | DIASTOLIC BLOOD PRESSURE: 78 MMHG | TEMPERATURE: 98 F | HEIGHT: 62 IN | SYSTOLIC BLOOD PRESSURE: 130 MMHG

## 2021-10-04 DIAGNOSIS — Z90.13 S/P BILATERAL MASTECTOMY: Primary | ICD-10-CM

## 2021-10-04 PROCEDURE — 99024 POSTOP FOLLOW-UP VISIT: CPT | Performed by: PHYSICIAN ASSISTANT

## 2021-10-11 ENCOUNTER — OFFICE VISIT (OUTPATIENT)
Dept: SURGERY | Age: 56
End: 2021-10-11

## 2021-10-11 VITALS
BODY MASS INDEX: 32.24 KG/M2 | TEMPERATURE: 98 F | SYSTOLIC BLOOD PRESSURE: 130 MMHG | DIASTOLIC BLOOD PRESSURE: 78 MMHG | HEIGHT: 62 IN | WEIGHT: 175.2 LBS

## 2021-10-11 DIAGNOSIS — Z90.13 S/P BILATERAL MASTECTOMY: Primary | ICD-10-CM

## 2021-10-11 PROCEDURE — 99024 POSTOP FOLLOW-UP VISIT: CPT | Performed by: PHYSICIAN ASSISTANT

## 2021-10-12 ENCOUNTER — LAB (OUTPATIENT)
Dept: LAB | Facility: HOSPITAL | Age: 56
End: 2021-10-12

## 2021-10-12 ENCOUNTER — OFFICE VISIT (OUTPATIENT)
Dept: ONCOLOGY | Facility: CLINIC | Age: 56
End: 2021-10-12

## 2021-10-12 VITALS
WEIGHT: 175 LBS | HEIGHT: 62 IN | RESPIRATION RATE: 18 BRPM | TEMPERATURE: 98 F | SYSTOLIC BLOOD PRESSURE: 148 MMHG | BODY MASS INDEX: 32.2 KG/M2 | HEART RATE: 112 BPM | OXYGEN SATURATION: 98 % | DIASTOLIC BLOOD PRESSURE: 86 MMHG

## 2021-10-12 DIAGNOSIS — C50.312 MALIGNANT NEOPLASM OF LOWER-INNER QUADRANT OF LEFT BREAST IN FEMALE, ESTROGEN RECEPTOR POSITIVE (HCC): Primary | ICD-10-CM

## 2021-10-12 DIAGNOSIS — Z17.0 MALIGNANT NEOPLASM OF LOWER-INNER QUADRANT OF LEFT BREAST IN FEMALE, ESTROGEN RECEPTOR POSITIVE (HCC): Primary | ICD-10-CM

## 2021-10-12 LAB
BASOPHILS # BLD AUTO: 0.06 10*3/MM3 (ref 0–0.2)
BASOPHILS NFR BLD AUTO: 0.8 % (ref 0–1.5)
DEPRECATED RDW RBC AUTO: 46 FL (ref 37–54)
EOSINOPHIL # BLD AUTO: 0.32 10*3/MM3 (ref 0–0.4)
EOSINOPHIL NFR BLD AUTO: 4.1 % (ref 0.3–6.2)
ERYTHROCYTE [DISTWIDTH] IN BLOOD BY AUTOMATED COUNT: 13.8 % (ref 12.3–15.4)
HCT VFR BLD AUTO: 45.4 % (ref 34–46.6)
HGB BLD-MCNC: 14.1 G/DL (ref 12–15.9)
HOLD SPECIMEN: NORMAL
IMM GRANULOCYTES # BLD AUTO: 0.03 10*3/MM3 (ref 0–0.05)
IMM GRANULOCYTES NFR BLD AUTO: 0.4 % (ref 0–0.5)
LYMPHOCYTES # BLD AUTO: 1.56 10*3/MM3 (ref 0.7–3.1)
LYMPHOCYTES NFR BLD AUTO: 20.2 % (ref 19.6–45.3)
MCH RBC QN AUTO: 28.3 PG (ref 26.6–33)
MCHC RBC AUTO-ENTMCNC: 31.1 G/DL (ref 31.5–35.7)
MCV RBC AUTO: 91 FL (ref 79–97)
MONOCYTES # BLD AUTO: 0.76 10*3/MM3 (ref 0.1–0.9)
MONOCYTES NFR BLD AUTO: 9.8 % (ref 5–12)
NEUTROPHILS NFR BLD AUTO: 5 10*3/MM3 (ref 1.7–7)
NEUTROPHILS NFR BLD AUTO: 64.7 % (ref 42.7–76)
NRBC BLD AUTO-RTO: 0 /100 WBC (ref 0–0.2)
PLATELET # BLD AUTO: 380 10*3/MM3 (ref 140–450)
PMV BLD AUTO: 9.9 FL (ref 6–12)
RBC # BLD AUTO: 4.99 10*6/MM3 (ref 3.77–5.28)
WBC # BLD AUTO: 7.73 10*3/MM3 (ref 3.4–10.8)

## 2021-10-12 PROCEDURE — 90686 IIV4 VACC NO PRSV 0.5 ML IM: CPT | Performed by: INTERNAL MEDICINE

## 2021-10-12 PROCEDURE — 36415 COLL VENOUS BLD VENIPUNCTURE: CPT

## 2021-10-12 PROCEDURE — 99214 OFFICE O/P EST MOD 30 MIN: CPT | Performed by: INTERNAL MEDICINE

## 2021-10-12 PROCEDURE — G0008 ADMIN INFLUENZA VIRUS VAC: HCPCS | Performed by: INTERNAL MEDICINE

## 2021-10-12 PROCEDURE — 85025 COMPLETE CBC W/AUTO DIFF WBC: CPT

## 2021-10-14 PROBLEM — Z98.890 POST-OPERATIVE STATE: Status: RESOLVED | Noted: 2021-09-14 | Resolved: 2021-10-14

## 2021-10-18 ENCOUNTER — OFFICE VISIT (OUTPATIENT)
Dept: SURGERY | Age: 56
End: 2021-10-18

## 2021-10-18 ENCOUNTER — OFFICE VISIT (OUTPATIENT)
Dept: GASTROENTEROLOGY | Facility: CLINIC | Age: 56
End: 2021-10-18

## 2021-10-18 VITALS
HEART RATE: 86 BPM | DIASTOLIC BLOOD PRESSURE: 74 MMHG | WEIGHT: 175 LBS | TEMPERATURE: 97.4 F | SYSTOLIC BLOOD PRESSURE: 106 MMHG | OXYGEN SATURATION: 99 % | BODY MASS INDEX: 32.2 KG/M2 | HEIGHT: 62 IN

## 2021-10-18 VITALS
BODY MASS INDEX: 32.17 KG/M2 | DIASTOLIC BLOOD PRESSURE: 78 MMHG | HEIGHT: 62 IN | SYSTOLIC BLOOD PRESSURE: 128 MMHG | WEIGHT: 174.8 LBS | TEMPERATURE: 97.4 F

## 2021-10-18 DIAGNOSIS — R12 HEARTBURN: ICD-10-CM

## 2021-10-18 DIAGNOSIS — Z87.19 HISTORY OF ESOPHAGITIS: Primary | ICD-10-CM

## 2021-10-18 DIAGNOSIS — Z90.13 S/P BILATERAL MASTECTOMY: Primary | ICD-10-CM

## 2021-10-18 DIAGNOSIS — R13.19 ESOPHAGEAL DYSPHAGIA: ICD-10-CM

## 2021-10-18 DIAGNOSIS — K21.9 GASTROESOPHAGEAL REFLUX DISEASE, UNSPECIFIED WHETHER ESOPHAGITIS PRESENT: ICD-10-CM

## 2021-10-18 DIAGNOSIS — K21.9 GASTROESOPHAGEAL REFLUX DISEASE: ICD-10-CM

## 2021-10-18 PROCEDURE — 99024 POSTOP FOLLOW-UP VISIT: CPT | Performed by: PHYSICIAN ASSISTANT

## 2021-10-18 PROCEDURE — 99213 OFFICE O/P EST LOW 20 MIN: CPT | Performed by: NURSE PRACTITIONER

## 2021-10-18 RX ORDER — PANTOPRAZOLE SODIUM 40 MG/1
40 TABLET, DELAYED RELEASE ORAL DAILY
Qty: 90 TABLET | Refills: 3 | Status: SHIPPED | OUTPATIENT
Start: 2021-10-18 | End: 2022-08-22 | Stop reason: SDUPTHER

## 2021-10-18 NOTE — PROGRESS NOTES
Chief Complaint:   Chief Complaint   Patient presents with   • Med Refill     Pt presents today for yearly OV for GERD-Needs refills on Pantoprazole; Pt states she is still having heartburn at night sometimes but otherwise is doing well          Patient ID: Luna Cruz is a 56 y.o. female     History of Present Illness: This is a very pleasant 56-year-old female who is here for her yearly office visit for GERD.    The patient carries a history of GERD and heartburn along with esophagitis.  The patient is treated with Protonix 40 mg daily.  The patient states the medication works very well but on occasion she has some heartburn at night.The patient denies any nausea, vomiting, epigastric pain, dysphagia, or hematemesis.  The patient denies any fever or chills.  Denies any melena or hematochezia.  Denies any unintentional weight loss or loss of appetite.    The patient's last EGD was performed on 7/24/2024 for dysphagia findings of a medium sized hiatal hernia.  LA grade A reflux esophagitis.  There was no endoscopic esophageal abnormality to explain patient's dysphagia.  Esophagus was dilated.  Patient was treated with Carafate and continued on PPI.    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 Arzola-Extremely poorly prepped colon   • COLONOSCOPY N/A 7/24/2020    Hemorrhoids found on perianal exam; One 4mm hyperplastic polyp in the transverse colon; Normal mucosa in the entire examined colon-biopsied; Non-bleeding internal hemorrhoids; Repeat 5 years   • D & C HYSTEROSCOPY  1993   • D & C HYSTEROSCOPY MYOSURE N/A 1/13/2021    Procedure: DILATATION AND CURETTAGE HYSTEROSCOPY WITH MYOSURE, polypectomy;  Surgeon: Marcello Salas MD;  Location: [facility] OR;  Service: Obstetrics/Gynecology;  Laterality: N/A;   • DILATATION AND CURETTAGE     • ENDOSCOPY N/A 10/5/2016    Medium-sized HH; Widely patent and non-obstructing Schatzki ring-dilated; Procedure: ESOPHAGOGASTRODUODENOSCOPY WITH ANESTHESIA;  Surgeon: Ksenia Fox MD;  Location: [facility] ENDOSCOPY;  Service:    • ENDOSCOPY N/A 2/27/2019    Normal 1st portion of the duodenum and 2 portion of the duodenum; A few gastric polyps-biopsied; Small HH; Widely patent Schatzki ring-dilated; Abnormal esophageal motility, suspicious for presbyesophagus; Arrange for barium swallow to assess for dysmotility   • ENDOSCOPY N/A 7/24/2020    Medium-sized HH; LA Grade A reflux esophagitis; No endoscopic esophageal abnormality to explain patient's dysphagia-Esophagus dilated; Normal stomach; Normal examined duodenum; No specimens collected   • HYSTEROSCOPY     • KNEE ARTHROSCOPY Left 11/13/2018    Procedure: LEFT KNEE ARTHROSCOPIC PARTIAL MEDIAL MENISCECTOMY;  Surgeon: Matt Maki MD;  Location: [facility] OR;  Service: Orthopedics   • MASTECTOMY Bilateral 09/14/2021   • MEDIPORT INSERTION, SINGLE  2008   • MEDIPORT REMOVAL     • PARATHYROIDECTOMY  2011   • REPLACEMENT TOTAL KNEE Left 05/23/2019   • TONSILLECTOMY AND  ADENOIDECTOMY  1970   • TUBAL ABDOMINAL LIGATION           Current Outpatient Medications:   •  Accolate 20 MG tablet, TAKE 1 TABLET TWICE A DAY (Patient taking differently: Take 20 mg by mouth 2 (Two) Times a Day.), Disp: 180 tablet, Rfl: 3  •  Advair Diskus 250-50 MCG/DOSE DISKUS, USE 1 INHALATION TWICE A DAY (Patient taking differently: Inhale 1 puff 2 (Two) Times a Day.), Disp: 3 each, Rfl: 3  •  albuterol (PROVENTIL) (2.5 MG/3ML) 0.083% nebulizer solution, Take 2.5 mg by nebulization As Needed., Disp: , Rfl:   •  alendronate (FOSAMAX) 70 MG tablet, Take 1 tablet by mouth Every 7 (Seven) Days. Sunday's, Disp: 12 tablet, Rfl: 3  •  atorvastatin (LIPITOR) 40 MG tablet, TAKE 1 TABLET DAILY (Patient taking differently: Take 40 mg by mouth Daily.), Disp: 90 tablet, Rfl: 3  •  calcitriol (ROCALTROL) 0.5 MCG capsule, Take 1 capsule by mouth Daily., Disp: 90 capsule, Rfl: 3  •  Calcium Citrate-Vitamin D (CALCIUM + D PO), Take 1 tablet by mouth Daily., Disp: , Rfl:   •  Diclofenac Sodium (PENNSAID TD), Apply 1 application topically to the appropriate area as directed 2 (Two) Times a Day As Needed (BACK PAIN). For back pain , Disp: , Rfl:   •  docusate sodium (COLACE) 100 MG capsule, Take 100 mg by mouth As Needed., Disp: , Rfl:   •  HYDROcodone-acetaminophen (NORCO) 5-325 MG per tablet, Take 1 tablet by mouth 2 (Two) Times a Day As Needed for Moderate Pain ., Disp: , Rfl:   •  ibuprofen (ADVIL,MOTRIN) 400 MG tablet, Take 400 mg by mouth Every 8 (Eight) Hours As Needed for Mild Pain ., Disp: , Rfl:   •  loratadine (CLARITIN) 10 MG tablet, Take 10 mg by mouth Daily As Needed for Allergies., Disp: , Rfl:   •  losartan (COZAAR) 100 MG tablet, Take 1 tablet by mouth Daily., Disp: 90 tablet, Rfl: 3  •  pantoprazole (PROTONIX) 40 MG EC tablet, Take 1 tablet by mouth Daily., Disp: 90 tablet, Rfl: 3  •  potassium chloride ER (K-TAB) 20 MEQ tablet controlled-release ER tablet, Take 1 tablet by mouth Daily., Disp: 90 tablet, Rfl:  3  •  rOPINIRole (REQUIP) 0.25 MG tablet, TAKE 1 TABLET EVERY NIGHT. TAKE ONE HOUR BEFORE BEDTIME. (Patient taking differently: Take 0.25 mg by mouth Every Night.), Disp: 90 tablet, Rfl: 3  •  Synthroid 50 MCG tablet, TAKE 1 TABLET DAILY (Patient taking differently: Take 50 mcg by mouth Daily.), Disp: 90 tablet, Rfl: 3  •  theophylline (UNIPHYL) 400 MG 24 hr tablet, Take 1 tablet by mouth Daily., Disp: 90 tablet, Rfl: 3  •  tiZANidine (ZANAFLEX) 4 MG tablet, Take 4 mg by mouth Every 8 (Eight) Hours As Needed for Muscle Spasms., Disp: , Rfl:   No current facility-administered medications for this visit.    Facility-Administered Medications Ordered in Other Visits:   •  heparin flush (porcine) 100 UNIT/ML injection 500 Units, 500 Units, Intracatheter, PRN, Ki Manriquez MD, 500 Units at 09/27/16 1108  •  heparin flush (porcine) 100 UNIT/ML injection 500 Units, 500 Units, Intravenous, PRN, Malathi Brantley APRN, 500 Units at 05/05/17 1009  •  sodium chloride 0.9 % flush 10 mL, 10 mL, Intravenous, PRN, Ki Manriquez MD, 10 mL at 09/27/16 1107  •  sodium chloride 0.9 % flush 10 mL, 10 mL, Intravenous, PRN, Malathi Brantley APRN, 10 mL at 05/05/17 1009    Allergies   Allergen Reactions   • Cephalosporins Hives   • Keflex [Cephalexin] Rash       Social History     Socioeconomic History   • Marital status:    Tobacco Use   • Smoking status: Never Smoker   • Smokeless tobacco: Never Used   Vaping Use   • Vaping Use: Never used   Substance and Sexual Activity   • Alcohol use: Not Currently   • Drug use: No   • Sexual activity: Yes     Partners: Male     Birth control/protection: None       Family History   Problem Relation Age of Onset   • Colon polyps Mother         < 60 years of age    • Cirrhosis Mother    • Colon polyps Sister 54        < 60 years of age    • Breast cancer Sister    • No Known Problems Father    • No Known Problems Brother    • No Known Problems Daughter    •  "No Known Problems Son    • No Known Problems Maternal Grandmother    • No Known Problems Paternal Grandmother    • No Known Problems Maternal Aunt    • No Known Problems Paternal Aunt    • Colon cancer Neg Hx    • Crohn's disease Neg Hx    • Irritable bowel syndrome Neg Hx    • Liver cancer Neg Hx    • Liver disease Neg Hx    • Rectal cancer Neg Hx    • Stomach cancer Neg Hx    • BRCA 1/2 Neg Hx    • Endometrial cancer Neg Hx    • Ovarian cancer Neg Hx    • Uterine cancer Neg Hx    • Esophageal cancer Neg Hx        Vitals:    10/18/21 0841   BP: 106/74   BP Location: Left arm   Patient Position: Sitting   Cuff Size: Adult   Pulse: 86   Temp: 97.4 °F (36.3 °C)   TempSrc: Infrared   SpO2: 99%   Weight: 79.4 kg (175 lb)   Height: 157.5 cm (62\")       Review of Systems:    General:    Present -feeling well   Skin:    Not Present-Rash   HEENT:     Not Present-Acute visual changes or Acute hearing changes   Neck :    Not Present- swollen glands   Genitourinary:      Not Present- burning, frequency, urgency hematuria, dysuria,   Cardiovascular:   Not Present-chest pain, palpitations, or pressure   Respiratory:   Not Present- shortness of breath or cough   Gastrointestinal:  Musculoskeletal:  Neurological:  Psychiatric:   Present as mentioned in the HP    Not Present. Recent gait disturbances.    Not Present-Seizures and weakness in extremities.    Not Present- Anxiety or Depression.       Physical Exam:    General Appearance:    Alert, cooperative, in no acute distress   Psych:    Mood appropriate    Eyes:          conjunctivae and sclerae normal, no   icterus, no pallor   ENMT:    Ears appear intact with no abnormalities noted oral mucosa moist   Neck:   No adenopathy, supple, trachea midline, no thyromegaly, no   carotid bruit, no JVD    Cardiovascular:    Regular rhythm and normal rate, normal S1 and S2, no            murmur, no gallop, no rub, no click   Gastrointestinal:     Inspection normal.  Normal bowel sounds, no " masses, no organomegaly, soft round non-tender, non-distended, no guarding, no rebound or tenderness. No hepatosplenomegaly.   Skin:   No bleeding, bruising or rash   Neurologic:   nonfocal       Lab Results - Last 18 Months   Lab Units 09/13/21 1025 05/04/21 1026 01/07/21  1025 12/09/20  1522 12/02/20  0825 12/02/20  0822 07/07/20  0957 05/11/20  1406   GLUCOSE mg/dL 90 91 97 102*  --   --   --  104*   BUN mg/dL 11 14 12 12 9  --   --  9   CREATININE mg/dL 1* 0.97 1.11* 1.12* 1.01*  --  1.20 0.95   SODIUM mmol/L 141 140 140 136 138  --   --  141   POTASSIUM mmol/L 4.4 4.1 4.0 3.7 4.5  --   --  4.3   CHLORIDE mmol/L 106 102 105 103 102  --   --  105   TOTAL CO2 mmol/L 23  --   --   --  25.2  --   --   --    CO2 mmol/L  --  28.0 24.0 23.0  --   --   --  24.0   TOTAL PROTEIN g/dL  --  7.2  --  6.7  --   --   --  6.9   ALBUMIN g/dL  --  4.00  --  4.00  --   --   --  4.10   ALT (SGPT) U/L  --  21  --  21  --  20  --  16   AST (SGOT) U/L  --  20  --  22  --  21  --  19   ALK PHOS U/L  --  111  --  110  --   --   --  107   BILIRUBIN mg/dL  --  0.5  --  0.2  --   --   --  0.2   GLOBULIN gm/dL  --  3.2  --  2.7  --   --   --  2.8   CRP mg/dL  --   --   --  0.77*  --   --   --   --        Lab Results - Last 18 Months   Lab Units 10/12/21  1514 09/13/21 1025 05/04/21  1026 01/07/21  1025 12/09/20  1522 05/11/20  1406   HEMOGLOBIN g/dL 14.1 15.3 15.7 15.1 14.7 14.8   HEMATOCRIT % 45.4 49.4* 48.7* 44.6 42.9 45.9   MCV fL 91.0 92.3 89.0 88.1 86.1 87.4   WBC 10*3/mm3 7.73 8.0 9.13 7.88 5.93 7.56   RDW % 13.8 13.5 13.7 13.2 13.1 13.6   MPV fL 9.9 10.4 10.2 10.3 10.0 10.0   PLATELETS 10*3/mm3 380 325 368 309 310 352       Lab Results - Last 18 Months   Lab Units 06/02/21  0736 05/04/21  1026 12/09/20  1522 06/18/20  0829 05/11/20  1406   IRON mcg/dL  --  111  --   --  57   TIBC mcg/dL  --  337  --   --  295*   IRON SATURATION %  --  33  --   --  19*   FERRITIN ng/mL  --  709.40* 636.60*  --  161.50*   TSH uIU/mL 3.350  --   --   1.570  --         Lab Results - Last 18 Months   Lab Units 05/04/21  1026   FERRITIN ng/mL 709.40*           Assessment and Plan:  Assessment/Plan   Diagnoses and all orders for this visit:    1. History of esophagitis (Primary)    2. Heartburn    3. Gastroesophageal reflux disease, unspecified whether esophagitis present    4. Gastroesophageal reflux disease  -     pantoprazole (PROTONIX) 40 MG EC tablet; Take 1 tablet by mouth Daily.  Dispense: 90 tablet; Refill: 3    5. Esophageal dysphagia  -     pantoprazole (PROTONIX) 40 MG EC tablet; Take 1 tablet by mouth Daily.  Dispense: 90 tablet; Refill: 3      Patient instructed to continue on Protonix 40 mg daily refill sent to pharmacy.  I did discuss with patient she could use over-the-counter Pepcid as needed with breakthrough heartburn or to follow-up with me sooner if needed.  Otherwise follow-up 1 year GERD.       There are no Patient Instructions on file for this visit.    Next follow-up appointment        EMR Dragon/Transcription disclaimer:  Much of this encounter note is an electronic transcription/translation of spoken language to printed text. The electronic translation of spoken language may permit erroneous, or at times, nonsensical words or phrases to be inadvertently transcribed; although I have reviewed the note for such errors, some may still exist.

## 2021-10-21 ENCOUNTER — HOSPITAL ENCOUNTER (OUTPATIENT)
Dept: CT IMAGING | Facility: HOSPITAL | Age: 56
Discharge: HOME | End: 2021-10-21
Admitting: INTERNAL MEDICINE

## 2021-10-21 DIAGNOSIS — J98.4 PULMONARY SCARRING: ICD-10-CM

## 2021-10-21 PROCEDURE — 71250 CT THORAX DX C-: CPT

## 2021-10-25 ENCOUNTER — OFFICE VISIT (OUTPATIENT)
Dept: SURGERY | Age: 56
End: 2021-10-25

## 2021-10-25 VITALS
TEMPERATURE: 97.8 F | WEIGHT: 174 LBS | BODY MASS INDEX: 32.02 KG/M2 | SYSTOLIC BLOOD PRESSURE: 150 MMHG | HEIGHT: 62 IN | DIASTOLIC BLOOD PRESSURE: 84 MMHG

## 2021-10-25 DIAGNOSIS — Z90.13 S/P BILATERAL MASTECTOMY: Primary | ICD-10-CM

## 2021-10-25 PROCEDURE — 99024 POSTOP FOLLOW-UP VISIT: CPT | Performed by: PHYSICIAN ASSISTANT

## 2021-10-31 NOTE — PROGRESS NOTES
Raysa Mckenzie comes today for her follow-up bilateral mastectomy with immediate reconstruction and tissue expanders with AlloDerm. She is doing well. She comes today for fluid check. She has no new complaints. Patient Active Problem List    Diagnosis Date Noted    S/P bilateral mastectomy and reconstruction with expanders 9/17/2021 09/20/2021    Atypical ductal hyperplasia of breast 09/14/2021    Primary osteoarthritis of left knee 05/23/2019    S/P lumpectomy, right breast 8/2008 11/15/2012    Breast cancer metastasized to axillary lymph node 1 of 11 11/15/2012    Family history of malignant neoplasm of breast, sister 11/18/2011    Personal history of malignant neoplasm of breast 06/06/2008       Current Outpatient Medications   Medication Sig Dispense Refill    HYDROcodone-acetaminophen (NORCO) 5-325 MG per tablet Take 1 tablet by mouth every 6 hours as needed for Pain.  15 tablet 0    nitroglycerin (NITRO-BID) 2 % ointment Place 0.5 inches onto the skin 2 times daily 1 each 3    Diclofenac Sodium (PENNSAID) 2 % SOLN Apply topically      THEOPHYLLINE CR PO Take 40 mg by mouth daily      levothyroxine (SYNTHROID) 50 MCG tablet Take 50 mcg by mouth daily      calcium citrate-vitamin D (CITRICAL + D) 315-250 MG-UNIT TABS per tablet Take 1 tablet by mouth daily      aspirin EC 81 MG EC tablet Take 1 tablet by mouth 2 times daily 60 tablet 0    docusate sodium (COLACE) 100 MG capsule Take 1 capsule by mouth daily To prevent constipation 20 capsule 0    ibuprofen (ADVIL;MOTRIN) 400 MG tablet Take 1 tablet by mouth every 8 hours as needed for Pain 30 tablet 0    loratadine (CLARITIN) 10 MG tablet Take 10 mg by mouth      pantoprazole (PROTONIX) 40 MG tablet Take 40 mg by mouth daily       tiZANidine (ZANAFLEX) 4 MG tablet Take 4 mg by mouth nightly       alendronate (FOSAMAX) 70 MG tablet Take 70 mg by mouth every 7 days Indications: Sunday   3    calcitRIOL (ROCALTROL) 0.5 MCG capsule Take 0.5 mcg by mouth daily   6    rOPINIRole (REQUIP) 0.25 MG tablet Take 0.25 mg by mouth nightly   3    fluticasone-salmeterol (ADVAIR) 500-50 MCG/DOSE diskus inhaler Inhale 1 puff into the lungs every 12 hours.  losartan (COZAAR) 25 MG tablet Take 100 mg by mouth daily       albuterol (PROVENTIL) (2.5 MG/3ML) 0.083% nebulizer solution Take 2.5 mg by nebulization every 4 hours as needed       LIPITOR 40 MG tablet Take 40 mg by mouth daily.  zafirlukast (ACCOLATE) 20 MG tablet Take 20 mg by mouth 2 times daily        No current facility-administered medications for this visit.        Allergies: Cephalexin    Past Medical History:   Diagnosis Date    Asthma     Cancer ([facility] [address]     Breast Cancer    GERD (gastroesophageal reflux disease)     History of blood transfusion     1993    History of therapeutic radiation     Hx antineoplastic chemo     Hyperlipidemia     Hypertension     Osteoporosis     PONV (postoperative nausea and vomiting)     Thyroid disease     Wears glasses        Past Surgical History:   Procedure Laterality Date    APPENDECTOMY      BREAST BIOPSY  6/6/2008    Rt Breast Stereotactic Biopsy, Infiltrating ductal carcinoma, grade 1, Focus of low grade ductal carcinoma in-situ    BREAST RECONSTRUCTION Bilateral 9/14/2021    BILATERAL SKIN SPARING MASTECTOMY VIA URBANO PATTERN WITH TISSUE EXPANDER RECONSTRUCTION (12-15/GURPREET) AND TELABIO (CONNOR) BILATERAL PEC BLOCK performed by Brett Martinez MD at [address] [facility]  2008    Dr Zara Peacock  02/2021    ECTOPIC Donnella Oka      x 3    JOINT REPLACEMENT Left 2018    knee    KNEE ARTHROPLASTY Left 5/23/2019    LEFT KNEE UNI VERSUS performed by Markus Minaya MD at [address], PARTIAL  7/3/2008    Rt Breast Lumpectomy & [place] lymph Node Biopsy displaying esidual infiltrating ductal carcinoma grade 1 with foci of low grade DCIS involving the margin. 1 of 2 sentinel lymph nodes positive for . 56mm micrometastisis.  MASTECTOMY, PARTIAL  8/7/2008    Right Partial Mastectomy Re-excision Right axillary node dissection 9 out of 9 lymph nodes negative for a total of 1 of 11 nodes positive for metastatic malignancy.  [place] RMVL MARIELLA CTR VAD W/SUBQ PORT/ CTR/PRPH INSJ N/A 5/18/2017    PORT REMOVAL performed by Skyler Lane MD at [address] Left 5/23/2019    LEFT TOTAL KNEE REPLACEMENT performed by Arnulfo Yanes MD at [address] ENDOSCOPY  2008    Dr Pual Maldonado Right 6/25/2021    US BREAST NEEDLE BIOPSY RIGHT 6/25/2021 [facility] [address] [address]       Family History   Problem Relation Age of Onset    Diabetes Mother     Colon Polyps Mother     Breast Cancer Sister 52       Social History     Tobacco Use    Smoking status: Never Smoker    Smokeless tobacco: Never Used   Substance Use Topics    Alcohol use: No          ROS:   review of system reviewed and positive for the above all other systems noted to be negative      Physical Exam  Blood pressure 130/78, temperature 98 °F (36.7 °C), temperature source Temporal, height 5' 2\" (1.575 m), weight 175 lb 3.2 oz (79.5 kg), last menstrual period 05/20/2008. Constitutional:  This is a 61 y. o.female that appears to be in no acute distress. She is pleasant and answers questions appropriately. Breast:  On examination to her incisions, they are healing well. There is no evidence of seroma. There is no evidence of infection. Impression  Doing well status post bilateral mastectomy with immediate reconstruction, tissue expanders with [facility]. Plan   We will see her back next week for fill of the tissue expanders.

## 2021-11-01 ENCOUNTER — OFFICE VISIT (OUTPATIENT)
Dept: SURGERY | Age: 56
End: 2021-11-01

## 2021-11-01 VITALS
HEIGHT: 62 IN | DIASTOLIC BLOOD PRESSURE: 78 MMHG | TEMPERATURE: 97.8 F | SYSTOLIC BLOOD PRESSURE: 130 MMHG | BODY MASS INDEX: 32.87 KG/M2 | WEIGHT: 178.6 LBS

## 2021-11-01 DIAGNOSIS — Z90.13 S/P BILATERAL MASTECTOMY: Primary | ICD-10-CM

## 2021-11-01 PROCEDURE — 99024 POSTOP FOLLOW-UP VISIT: CPT | Performed by: PHYSICIAN ASSISTANT

## 2021-11-01 NOTE — PROGRESS NOTES
Hilario Steven comes today for in follow-up bilateral tissue expander reconstruction. She comes today for filling of her tissue expanders. Has no new complaints. Patient Active Problem List    Diagnosis Date Noted    S/P bilateral mastectomy and reconstruction with expanders 9/17/2021 09/20/2021    Atypical ductal hyperplasia of breast 09/14/2021    Primary osteoarthritis of left knee 05/23/2019    S/P lumpectomy, right breast 8/2008 11/15/2012    Breast cancer metastasized to axillary lymph node 1 of 11 11/15/2012    Family history of malignant neoplasm of breast, sister 11/18/2011    Personal history of malignant neoplasm of breast 06/06/2008     Current Outpatient Medications   Medication Sig Dispense Refill    HYDROcodone-acetaminophen (NORCO) 5-325 MG per tablet Take 1 tablet by mouth every 6 hours as needed for Pain.  15 tablet 0    nitroglycerin (NITRO-BID) 2 % ointment Place 0.5 inches onto the skin 2 times daily 1 each 3    Diclofenac Sodium (PENNSAID) 2 % SOLN Apply topically      THEOPHYLLINE CR PO Take 40 mg by mouth daily      levothyroxine (SYNTHROID) 50 MCG tablet Take 50 mcg by mouth daily      calcium citrate-vitamin D (CITRICAL + D) 315-250 MG-UNIT TABS per tablet Take 1 tablet by mouth daily      aspirin EC 81 MG EC tablet Take 1 tablet by mouth 2 times daily 60 tablet 0    docusate sodium (COLACE) 100 MG capsule Take 1 capsule by mouth daily To prevent constipation 20 capsule 0    ibuprofen (ADVIL;MOTRIN) 400 MG tablet Take 1 tablet by mouth every 8 hours as needed for Pain 30 tablet 0    loratadine (CLARITIN) 10 MG tablet Take 10 mg by mouth      pantoprazole (PROTONIX) 40 MG tablet Take 40 mg by mouth daily       tiZANidine (ZANAFLEX) 4 MG tablet Take 4 mg by mouth nightly       alendronate (FOSAMAX) 70 MG tablet Take 70 mg by mouth every 7 days Indications: Sunday   3    calcitRIOL (ROCALTROL) 0.5 MCG capsule Take 0.5 mcg by mouth daily   6    rOPINIRole (REQUIP) 0.25 MG tablet Take 0.25 mg by mouth nightly   3    fluticasone-salmeterol (ADVAIR) 500-50 MCG/DOSE diskus inhaler Inhale 1 puff into the lungs every 12 hours.  losartan (COZAAR) 25 MG tablet Take 100 mg by mouth daily       albuterol (PROVENTIL) (2.5 MG/3ML) 0.083% nebulizer solution Take 2.5 mg by nebulization every 4 hours as needed       LIPITOR 40 MG tablet Take 40 mg by mouth daily.  zafirlukast (ACCOLATE) 20 MG tablet Take 20 mg by mouth 2 times daily        No current facility-administered medications for this visit. Allergies: Cephalexin  Past Medical History:   Diagnosis Date    Asthma     Cancer ([facility] [address]     Breast Cancer    GERD (gastroesophageal reflux disease)     History of blood transfusion     1993    History of therapeutic radiation     Hx antineoplastic chemo     Hyperlipidemia     Hypertension     Osteoporosis     PONV (postoperative nausea and vomiting)     Thyroid disease     Wears glasses      Past Surgical History:   Procedure Laterality Date    APPENDECTOMY      BREAST BIOPSY  6/6/2008    Rt Breast Stereotactic Biopsy, Infiltrating ductal carcinoma, grade 1, Focus of low grade ductal carcinoma in-situ    BREAST RECONSTRUCTION Bilateral 9/14/2021    BILATERAL SKIN SPARING MASTECTOMY VIA URBANO PATTERN WITH TISSUE EXPANDER RECONSTRUCTION (12-15/GURPREET) AND TELABIO (CONNOR) BILATERAL PEC BLOCK performed by Tammy Good MD at [address] [facility]  2008    Dr Yonas Gabriel  02/2021    ECTOPIC Lc Kim      x 3    JOINT REPLACEMENT Left 2018    knee    KNEE ARTHROPLASTY Left 5/23/2019    LEFT KNEE UNI VERSUS performed by Buzzy Bernheim, MD at [address], PARTIAL  7/3/2008    Rt Breast Lumpectomy & [place] lymph Node Biopsy displaying esidual infiltrating ductal carcinoma grade 1 with foci of low grade DCIS involving the margin.   1 of 2 sentinel lymph nodes positive for . 56mm micrometastisis.  MASTECTOMY, PARTIAL  8/7/2008    Right Partial Mastectomy Re-excision Right axillary node dissection 9 out of 9 lymph nodes negative for a total of 1 of 11 nodes positive for metastatic malignancy.  [place] RMVL MARIELLA CTR VAD W/SUBQ PORT/ CTR/PRPH INSJ N/A 5/18/2017    PORT REMOVAL performed by Jenny Hess MD at [address] Left 5/23/2019    LEFT TOTAL KNEE REPLACEMENT performed by Wolf Soares MD at [place] [address]    Dr Mark Rojas Right 6/25/2021     BREAST NEEDLE [facility]SY [facility] [address]     Family History   Problem Relation Age of Onset    Diabetes Mother     Colon Polyps Mother     Breast Cancer Sister 52     Social History     Tobacco Use    Smoking status: Never Smoker    Smokeless tobacco: Never Used   Substance Use Topics    Alcohol use: No       Review of systems  No fevers    Exam  Incision healing well. There is no evidence of infection. Procedure  Following alcohol prep 60 cc of fluid was placed in both expanders she tolerated the procedure well. Assessment  Status post bilateral mastectomy with tissue expander reconstruction    Plan  We will plan to see the patient back next week for repeat fill.

## 2021-11-04 NOTE — PROGRESS NOTES
Asif Shah comes today for in follow-up bilateral tissue expander reconstruction. She comes today for filling of her tissue expanders. Patient Active Problem List    Diagnosis Date Noted    S/P bilateral mastectomy and reconstruction with expanders 9/17/2021 09/20/2021    Atypical ductal hyperplasia of breast 09/14/2021    Primary osteoarthritis of left knee 05/23/2019    S/P lumpectomy, right breast 8/2008 11/15/2012    Breast cancer metastasized to axillary lymph node 1 of 11 11/15/2012    Family history of malignant neoplasm of breast, sister 11/18/2011    Personal history of malignant neoplasm of breast 06/06/2008     Current Outpatient Medications   Medication Sig Dispense Refill    HYDROcodone-acetaminophen (NORCO) 5-325 MG per tablet Take 1 tablet by mouth every 6 hours as needed for Pain.  15 tablet 0    nitroglycerin (NITRO-BID) 2 % ointment Place 0.5 inches onto the skin 2 times daily 1 each 3    Diclofenac Sodium (PENNSAID) 2 % SOLN Apply topically      THEOPHYLLINE CR PO Take 40 mg by mouth daily      levothyroxine (SYNTHROID) 50 MCG tablet Take 50 mcg by mouth daily      calcium citrate-vitamin D (CITRICAL + D) 315-250 MG-UNIT TABS per tablet Take 1 tablet by mouth daily      aspirin EC 81 MG EC tablet Take 1 tablet by mouth 2 times daily 60 tablet 0    docusate sodium (COLACE) 100 MG capsule Take 1 capsule by mouth daily To prevent constipation 20 capsule 0    ibuprofen (ADVIL;MOTRIN) 400 MG tablet Take 1 tablet by mouth every 8 hours as needed for Pain 30 tablet 0    loratadine (CLARITIN) 10 MG tablet Take 10 mg by mouth      pantoprazole (PROTONIX) 40 MG tablet Take 40 mg by mouth daily       tiZANidine (ZANAFLEX) 4 MG tablet Take 4 mg by mouth nightly       alendronate (FOSAMAX) 70 MG tablet Take 70 mg by mouth every 7 days Indications: Sunday   3    calcitRIOL (ROCALTROL) 0.5 MCG capsule Take 0.5 mcg by mouth daily   6    rOPINIRole (REQUIP) 0.25 MG tablet Take 0.25 mg by mouth nightly   3    fluticasone-salmeterol (ADVAIR) 500-50 MCG/DOSE diskus inhaler Inhale 1 puff into the lungs every 12 hours.  losartan (COZAAR) 25 MG tablet Take 100 mg by mouth daily       albuterol (PROVENTIL) (2.5 MG/3ML) 0.083% nebulizer solution Take 2.5 mg by nebulization every 4 hours as needed       LIPITOR 40 MG tablet Take 40 mg by mouth daily.  zafirlukast (ACCOLATE) 20 MG tablet Take 20 mg by mouth 2 times daily        No current facility-administered medications for this visit. Allergies: Cephalexin  Past Medical History:   Diagnosis Date    Asthma     Cancer ([facility] [address]     Breast Cancer    GERD (gastroesophageal reflux disease)     History of blood transfusion     1993    History of therapeutic radiation     Hx antineoplastic chemo     Hyperlipidemia     Hypertension     Osteoporosis     PONV (postoperative nausea and vomiting)     Thyroid disease     Wears glasses      Past Surgical History:   Procedure Laterality Date    APPENDECTOMY      BREAST BIOPSY  6/6/2008    Rt Breast Stereotactic Biopsy, Infiltrating ductal carcinoma, grade 1, Focus of low grade ductal carcinoma in-situ    BREAST RECONSTRUCTION Bilateral 9/14/2021    BILATERAL SKIN SPARING MASTECTOMY VIA URBANO PATTERN WITH TISSUE EXPANDER RECONSTRUCTION (12-15/GURPREET) AND TELABIO (CONNOR) BILATERAL PEC BLOCK performed by Garth Stein MD at [address] [facility]  2008    Dr Fatimah Vitale  02/2021    [facility]      x 3    JOINT REPLACEMENT Left 2018    knee    KNEE ARTHROPLASTY Left 5/23/2019    LEFT KNEE UNI VERSUS performed by Sharon Rolon MD at [address], PARTIAL  7/3/2008    Rt Breast Lumpectomy & [place] lymph Node Biopsy displaying esidual infiltrating ductal carcinoma grade 1 with foci of low grade DCIS involving the margin.   1 of 2 sentinel lymph nodes positive for .56mm micrometastisis.  MASTECTOMY, PARTIAL  8/7/2008    Right Partial Mastectomy Re-excision Right axillary node dissection 9 out of 9 lymph nodes negative for a total of 1 of 11 nodes positive for metastatic malignancy.  [place] [facility] MARIELLA CTR VAD W/SUBQ PORT/ CTR/PRPH INSJ N/A 5/18/2017    PORT REMOVAL performed by Domenico Adames MD at [address] Left 5/23/2019    LEFT TOTAL KNEE REPLACEMENT performed by Adelia Siemens, MD at [address] [facility] ENDOSCOPY  2008    Dr Shu Gunderson Right 6/25/2021    [facility] BRE[facility]     Family History   Problem Relation Age of Onset    Diabetes Mother     Colon Polyps Mother     Breast Cancer Sister 52     Social History     Tobacco Use    Smoking status: Never Smoker    Smokeless tobacco: Never Used   Substance Use Topics    Alcohol use: No       Review of systems  No fevers    Exam  Incision healing well. There is no evidence of infection. Procedure  Following alcohol prep 60 cc of fluid was placed in both expanders she tolerated the procedure well. Assessment  Status post bilateral mastectomy with tissue expander reconstruction    Plan  We will plan to see the patient back next week for repeat fill.

## 2021-11-06 ENCOUNTER — LAB (OUTPATIENT)
Dept: LAB | Facility: HOSPITAL | Age: 56
End: 2021-11-06

## 2021-11-06 DIAGNOSIS — Z01.812 ENCOUNTER FOR PREOPERATIVE SCREENING LABORATORY TESTING FOR COVID-19 VIRUS: ICD-10-CM

## 2021-11-06 DIAGNOSIS — Z20.822 ENCOUNTER FOR PREOPERATIVE SCREENING LABORATORY TESTING FOR COVID-19 VIRUS: ICD-10-CM

## 2021-11-06 LAB — SARS-COV-2 ORF1AB RESP QL NAA+PROBE: NOT DETECTED

## 2021-11-06 PROCEDURE — U0004 COV-19 TEST NON-CDC HGH THRU: HCPCS

## 2021-11-06 PROCEDURE — C9803 HOPD COVID-19 SPEC COLLECT: HCPCS

## 2021-11-06 PROCEDURE — U0005 INFEC AGEN DETEC AMPLI PROBE: HCPCS

## 2021-11-08 ENCOUNTER — OFFICE VISIT (OUTPATIENT)
Dept: SURGERY | Age: 56
End: 2021-11-08

## 2021-11-08 VITALS — BODY MASS INDEX: 32.39 KG/M2 | HEIGHT: 62 IN | TEMPERATURE: 97.7 F | WEIGHT: 176 LBS

## 2021-11-08 DIAGNOSIS — Z90.13 S/P BILATERAL MASTECTOMY: Primary | ICD-10-CM

## 2021-11-08 PROCEDURE — 99024 POSTOP FOLLOW-UP VISIT: CPT | Performed by: PHYSICIAN ASSISTANT

## 2021-11-09 ENCOUNTER — OFFICE VISIT (OUTPATIENT)
Dept: PULMONOLOGY | Facility: CLINIC | Age: 56
End: 2021-11-09

## 2021-11-09 VITALS
OXYGEN SATURATION: 96 % | HEIGHT: 60 IN | BODY MASS INDEX: 34.95 KG/M2 | DIASTOLIC BLOOD PRESSURE: 72 MMHG | HEART RATE: 94 BPM | SYSTOLIC BLOOD PRESSURE: 124 MMHG | WEIGHT: 178 LBS

## 2021-11-09 DIAGNOSIS — J45.20 MILD INTERMITTENT ASTHMA WITHOUT COMPLICATION: Primary | ICD-10-CM

## 2021-11-09 DIAGNOSIS — U09.9 POST-COVID-19 CONDITION: ICD-10-CM

## 2021-11-09 DIAGNOSIS — Z23 COVID-19 VACCINE ADMINISTERED: ICD-10-CM

## 2021-11-09 DIAGNOSIS — J45.20 MILD INTERMITTENT ASTHMA WITHOUT COMPLICATION: ICD-10-CM

## 2021-11-09 DIAGNOSIS — G25.81 RESTLESS LEGS SYNDROME (RLS): ICD-10-CM

## 2021-11-09 DIAGNOSIS — Z01.812 ENCOUNTER FOR PREOPERATIVE SCREENING LABORATORY TESTING FOR COVID-19 VIRUS: Primary | ICD-10-CM

## 2021-11-09 DIAGNOSIS — Z20.822 ENCOUNTER FOR PREOPERATIVE SCREENING LABORATORY TESTING FOR COVID-19 VIRUS: ICD-10-CM

## 2021-11-09 DIAGNOSIS — J45.40 MODERATE PERSISTENT ASTHMA WITHOUT COMPLICATION: ICD-10-CM

## 2021-11-09 DIAGNOSIS — Z78.9 NON-SMOKER: ICD-10-CM

## 2021-11-09 DIAGNOSIS — J30.89 NON-SEASONAL ALLERGIC RHINITIS, UNSPECIFIED TRIGGER: ICD-10-CM

## 2021-11-09 DIAGNOSIS — Z01.812 ENCOUNTER FOR PREOPERATIVE SCREENING LABORATORY TESTING FOR COVID-19 VIRUS: ICD-10-CM

## 2021-11-09 DIAGNOSIS — Z20.822 ENCOUNTER FOR PREOPERATIVE SCREENING LABORATORY TESTING FOR COVID-19 VIRUS: Primary | ICD-10-CM

## 2021-11-09 PROCEDURE — 94729 DIFFUSING CAPACITY: CPT | Performed by: INTERNAL MEDICINE

## 2021-11-09 PROCEDURE — 94727 GAS DIL/WSHOT DETER LNG VOL: CPT | Performed by: INTERNAL MEDICINE

## 2021-11-09 PROCEDURE — 94010 BREATHING CAPACITY TEST: CPT | Performed by: INTERNAL MEDICINE

## 2021-11-09 PROCEDURE — 99214 OFFICE O/P EST MOD 30 MIN: CPT | Performed by: INTERNAL MEDICINE

## 2021-11-09 RX ORDER — LORATADINE 10 MG/1
10 TABLET ORAL DAILY PRN
Qty: 90 TABLET | Refills: 3 | Status: SHIPPED | OUTPATIENT
Start: 2021-11-09 | End: 2023-02-02

## 2021-11-09 RX ORDER — ALBUTEROL SULFATE 2.5 MG/3ML
2.5 SOLUTION RESPIRATORY (INHALATION) EVERY 4 HOURS PRN
Qty: 360 ML | Refills: 5 | Status: SHIPPED | OUTPATIENT
Start: 2021-11-09 | End: 2023-03-10

## 2021-11-09 RX ORDER — AZELASTINE HYDROCHLORIDE 137 UG/1
2 SPRAY, METERED NASAL 2 TIMES DAILY
Qty: 30 ML | Refills: 5 | Status: SHIPPED | OUTPATIENT
Start: 2021-11-09 | End: 2022-05-10 | Stop reason: SDUPTHER

## 2021-11-09 RX ORDER — FLUTICASONE PROPIONATE 50 MCG
2 SPRAY, SUSPENSION (ML) NASAL DAILY
Qty: 16 G | Refills: 5 | Status: SHIPPED | OUTPATIENT
Start: 2021-11-09 | End: 2022-05-10 | Stop reason: SDUPTHER

## 2021-11-09 RX ORDER — ALBUTEROL SULFATE 90 UG/1
2 AEROSOL, METERED RESPIRATORY (INHALATION) EVERY 4 HOURS PRN
Qty: 6.7 G | Refills: 5 | Status: SHIPPED | OUTPATIENT
Start: 2021-11-09

## 2021-11-09 NOTE — PROCEDURES
Pulmonary Function Test  Performed by: Sara Cramer, RRT  Authorized by: Kevin Aguirre MD      Pre Drug % Predicted    FVC: 69%   FEV1: 53%   FEF 25-75%: 24%   FEV1/FVC: 62.94%   T%   RV: 120%   DLCO: 88%   D/VAsb: 134%    Interpretation   Overall comments:   The above test results were acceptable and reproducible by ATS criteria.  From analysis of the above test results the patient showed evidence of moderate to severe obstructive airway dysfunction.  No bronchodilator challenge was done.  There was mild restrictive dysfunction noted from decreased TLC  Air trapping noted from increased residual volume  Patient capacity corrected for single breath was within normal limits  No prior test result was available for comparison  Clinical correlation was indicated.    Kevin Aguirre MD  Pulmonologist/Intensivist  2021 14:37 CST

## 2021-11-09 NOTE — PROGRESS NOTES
[facility] OUTPATIENT PROGRESS NOTE    Patient: Luna Cruz  : 1965  Age: 56 y.o.  Date of Service: 2021    REASON FOR CLINIC VISIT:  Chief Complaint   Patient presents with   • Mild intermittent asthma without complication     CT , PFT TODAY       Subjective:    History of Present Illness:  Luna Cruz is a 56 y.o. female who presents to the office today to be seen for    Diagnosis Plan   1. Encounter for preoperative screening laboratory testing for COVID-19 virus     2. Mild intermittent asthma without complication  Pulmonary Function Test   3. Moderate persistent asthma without complication     4. Non-smoker     5. Post-COVID-19 condition     6. Non-seasonal allergic rhinitis, unspecified trigger     7. COVID-19 vaccine administered     8. Restless legs syndrome (RLS)     .  Other problems per record.  Patient is a very pleasant elderly  female who was seen in the pulmonary clinic as a follow-up visit.  She came to the clinic with her .    She had recent bilateral mastectomy done for breast cancer.  She is a lifelong non-smoker.  She had asthma and is currently using Advair and albuterol nebulizer and rescue inhaler for the asthma.  She had a pulmonary function test done today which showed she had moderate to severe obstructive airway dysfunction with FVC 69%.  Her FEV1 is 53% of predicted.  She has some restrictive lung dysfunction noted with decrease total lung capacity and residual volume is increased suggesting some air trapping.  The patient's diffusion capacity corrected for alveolar volume is normal.    She is doing well overall.  She is currently vaccinated for COVID-19 and had COVID-19 in December and she received out patient treatment with antibody infusion but did not need hospitalization.  Her  is also vaccinated for Covid.  She uses fluticasone nasal spray, and loratadine for nasal allergy.  She is on theophylline as well for  the asthma.    She had no recent hospitalizations and ER visit or urgent care visit.  She is getting the influenza vaccine as well.  She did not have any other acute complaints.  She has some joint pain and difficulty in mobility.  She is getting regular follow-up with oncology and getting CT scan ordered by oncology.  The recent imaging study shows clear lung fields a large hiatal hernia no other acute changes.      PFT done today:  Done today.    PFT Values        Some values may be hidden. Unless noted otherwise, only the newest values recorded on each date are displayed.         Old Values PFT Results 21   No data to display.      Pre Drug PFT Results 21   FVC 69   FEV1 53   FEF 25-75% 24   FEV1/FVC 62.94      Post Drug PFT Results 21   No data to display.      Other Tests PFT Results 21   TLC 87      DLCO 88   D/VAsb 134           Results for orders placed in visit on 21    Pulmonary Function Test    Narrative  Pulmonary Function Test  Performed by: Sara Cramer, RRT  Authorized by: Kevin Aguirre MD    Pre Drug % Predicted  FVC: 69%  FEV1: 53%  FEF 25-75%: 24%  FEV1/FVC: 62.94%  T%  RV: 120%  DLCO: 88%  D/VAsb: 134%    Interpretation  Overall comments:  The above test results were acceptable and reproducible by ATS criteria.  From analysis of the above test results the patient showed evidence of moderate to severe obstructive airway dysfunction.  No bronchodilator challenge was done.  There was mild restrictive dysfunction noted from decreased TLC  Air trapping noted from increased residual volume  Patient capacity corrected for single breath was within normal limits  No prior test result was available for comparison  Clinical correlation was indicated.    Kevin Aguirre MD  Pulmonologist/Intensivist  2021 14:37 CST      Results for orders placed in visit on 10/29/19    Pulmonary Function Test    Narrative  Pulmonary Function Test  Performed by:  Klaus Wagner APRN  Authorized by: Klaus Wagner APRN    Pre Drug  FVC: 62%  FEV1: 52%  FEF 25-75%: 31%  FEV1/FVC: 69.23%         Bronchodilator therapy: Advair, albuterol nebulizer and rescue inhaler and theophylline.    Smoking Status:   Social History     Tobacco Use   Smoking Status Never Smoker   Smokeless Tobacco Never Used     Pulm Rehab: no  Sleep: yes    Support System: lives with their spouse    Code Status:   There are no questions and answers to display.        Review of Systems:  A complete review of systems is performed and all other systems were reviewed and negative as note above in the HPI.  Review of Systems   Constitutional: Negative.    HENT: Positive for congestion and postnasal drip.    Eyes: Negative.    Respiratory: Positive for shortness of breath.    Cardiovascular: Negative.    Gastrointestinal: Negative.    Endocrine: Negative.    Genitourinary: Negative.    Musculoskeletal: Negative.    Skin: Negative.    Allergic/Immunologic: Positive for environmental allergies.   Neurological: Negative.    Hematological: Negative.    Psychiatric/Behavioral: Negative.        CAT/ACT Score:  Not done today    Medications:  Outpatient Encounter Medications as of 11/9/2021   Medication Sig Dispense Refill   • Accolate 20 MG tablet TAKE 1 TABLET TWICE A DAY (Patient taking differently: Take 20 mg by mouth 2 (Two) Times a Day.) 180 tablet 3   • albuterol (PROVENTIL) (2.5 MG/3ML) 0.083% nebulizer solution Take 2.5 mg by nebulization As Needed.     • alendronate (FOSAMAX) 70 MG tablet Take 1 tablet by mouth Every 7 (Seven) Days. Sunday's 12 tablet 3   • atorvastatin (LIPITOR) 40 MG tablet TAKE 1 TABLET DAILY (Patient taking differently: Take 40 mg by mouth Daily.) 90 tablet 3   • calcitriol (ROCALTROL) 0.5 MCG capsule Take 1 capsule by mouth Daily. 90 capsule 3   • Calcium Citrate-Vitamin D (CALCIUM + D PO) Take 1 tablet by mouth Daily.     • Diclofenac Sodium (PENNSAID TD) Apply 1  application topically to the appropriate area as directed 2 (Two) Times a Day As Needed (BACK PAIN). For back pain      • docusate sodium (COLACE) 100 MG capsule Take 100 mg by mouth As Needed.     • fluticasone-salmeterol (Advair Diskus) 250-50 MCG/DOSE DISKUS Inhale 1 puff 2 (Two) Times a Day. 3 each 3   • HYDROcodone-acetaminophen (NORCO) 5-325 MG per tablet Take 1 tablet by mouth 2 (Two) Times a Day As Needed for Moderate Pain .     • ibuprofen (ADVIL,MOTRIN) 400 MG tablet Take 400 mg by mouth Every 8 (Eight) Hours As Needed for Mild Pain .     • loratadine (CLARITIN) 10 MG tablet Take 1 tablet by mouth Daily As Needed for Allergies. 90 tablet 3   • losartan (COZAAR) 100 MG tablet Take 1 tablet by mouth Daily. 90 tablet 3   • pantoprazole (PROTONIX) 40 MG EC tablet Take 1 tablet by mouth Daily. 90 tablet 3   • potassium chloride ER (K-TAB) 20 MEQ tablet controlled-release ER tablet Take 1 tablet by mouth Daily. 90 tablet 3   • rOPINIRole (REQUIP) 0.25 MG tablet TAKE 1 TABLET EVERY NIGHT. TAKE ONE HOUR BEFORE BEDTIME. (Patient taking differently: Take 0.25 mg by mouth Every Night.) 90 tablet 3   • Synthroid 50 MCG tablet TAKE 1 TABLET DAILY (Patient taking differently: Take 50 mcg by mouth Daily.) 90 tablet 3   • theophylline (UNIPHYL) 400 MG 24 hr tablet Take 1 tablet by mouth Daily. 90 tablet 3   • tiZANidine (ZANAFLEX) 4 MG tablet Take 4 mg by mouth Every 8 (Eight) Hours As Needed for Muscle Spasms.     • [DISCONTINUED] Advair Diskus 250-50 MCG/DOSE DISKUS USE 1 INHALATION TWICE A DAY (Patient taking differently: Inhale 1 puff 2 (Two) Times a Day.) 3 each 3   • [DISCONTINUED] loratadine (CLARITIN) 10 MG tablet Take 10 mg by mouth Daily As Needed for Allergies.     • albuterol (PROVENTIL) (2.5 MG/3ML) 0.083% nebulizer solution Take 2.5 mg by nebulization Every 4 (Four) Hours As Needed for Wheezing. 360 mL 5   • albuterol sulfate  (90 Base) MCG/ACT inhaler Inhale 2 puffs Every 4 (Four) Hours As Needed for  "Wheezing. 6.7 g 5   • Azelastine HCl 137 MCG/SPRAY solution 2 sprays into the nostril(s) as directed by provider 2 (Two) Times a Day. 30 mL 5   • fluticasone (Flonase Allergy Relief) 50 MCG/ACT nasal spray 2 sprays into the nostril(s) as directed by provider Daily. 16 g 5   • [DISCONTINUED] pantoprazole (PROTONIX) 40 MG EC tablet TAKE 1 TABLET DAILY (Patient taking differently: Take 40 mg by mouth Daily.) 90 tablet 0     Facility-Administered Encounter Medications as of 11/9/2021   Medication Dose Route Frequency Provider Last Rate Last Admin   • heparin flush (porcine) 100 UNIT/ML injection 500 Units  500 Units Intracatheter PRN Ki Manriquez MD   500 Units at 09/27/16 1108   • heparin flush (porcine) 100 UNIT/ML injection 500 Units  500 Units Intravenous PRN Malathi Brantley APRN   500 Units at 05/05/17 1009   • sodium chloride 0.9 % flush 10 mL  10 mL Intravenous PRN Ki Manriquez MD   10 mL at 09/27/16 1107   • sodium chloride 0.9 % flush 10 mL  10 mL Intravenous PRN Malathi Brantley APRN   10 mL at 05/05/17 1009       Allergies:  Allergies   Allergen Reactions   • Cephalosporins Hives   • Keflex [Cephalexin] Rash       Immunizations:  Immunization History   Administered Date(s) Administered   • COVID-19 (PFIZER) 03/24/2021, 04/14/2021   • Flu Vaccine Intradermal Quad 18-64YR 09/28/2018, 10/29/2019, 09/29/2020   • FluLaval/Fluarix/Fluzone >6 10/10/2016, 09/30/2020, 10/12/2021   • Pneumococcal Polysaccharide (PPSV23) 10/28/2014   • Shingrix 12/02/2020, 02/16/2021   • flucelvax quad pfs =>4 YRS 09/28/2018, 10/29/2019       Objective:    Vitals:  /72   Pulse 94   Ht 152.4 cm (60\")   Wt 80.7 kg (178 lb)   LMP 06/14/2008   SpO2 96%   BMI 34.76 kg/m²     Physical Exam:  General: Patient is a 56 y.o. middle aged  female. Looks stated age. Appears to be in no acute distress.  Eyes: EOMI. PERRLA. Vision intact. No scleral icterus.  Ear, Nose, Mouth and Throat: " Hearing is grossly intact. No Leukoplakia, pharyngitis, stomatitis or thrush. Swollen nasal mucosa with post nasal drop.  Neck: Range of motion of neck normal. No thyromegaly or masses. Mallampati Class 3  Respiratory: Clear to auscultation bilaterally. No use of accessory muscles. Decreased breath sounds.  Cardiovascular: Normal heart sounds. Regularly regular rhythm without murmur.  Gastrointestinal: Non tender, non distended, soft. Bowel sounds positive in all four quadrants. No organomegaly.  Skin: No obvious rashes, lesions, ulcers or large amount of bruising. No edema.   Neurological: No new motor deficits. Cranial nerves appear intact.  Psychiatric: Patient is alert and oriented to person, place and time.    Chest Imaging:    Non contrast CT scan of chest     IMPRESSION:  1. Mild subpleural scarring in the right upper middle lobe suspected as  previous described, stable.  2. Changes from mastectomies.  3. Large hiatal hernia. No acute cardiopulmonary process.    This report was finalized on 10/21/2021 08:52 by Dr. Efrem Avalos MD.      Assessment:  1. Encounter for preoperative screening laboratory testing for COVID-19 virus    2. Mild intermittent asthma without complication    3. Moderate persistent asthma without complication    4. Non-smoker    5. Post-COVID-19 condition    6. Non-seasonal allergic rhinitis, unspecified trigger    7. COVID-19 vaccine administered    8. Restless legs syndrome (RLS)        Plan/Recommendations:    1.  I explained the PFT results to the patient and the chest x-ray used with the patient and her .  2.  She has moderate to severe asthma and she should continue seeing Advair and albuterol nebulizer and rescue inhaler as before.  Although her pulmonary function test shows moderate to severe asthma her symptoms are well controlled with the current regimen.  3.  She has allergic rhinosinusitis and the symptoms are well controlled with on Flonase nasal spray, routine and  Singulair and she was also started on Astelin for better control allergy symptoms.  4.  She will continue ropinirole for restless leg syndrome.  Prescription refills were given.  Patient already vaccinated for Covid and will get the booster vaccine she will also get influenza vaccine.  Continue all other treatment as before and return to pulmonary clinic in 6 months time for a follow-up visit or earlier if needed.  Continue follow-up with oncologist and imaging study is well ordered by the oncologist will be followed during her next clinic visit..    Follow up:  6 Months    Time Spent:  30 minutes    I appreciate the opportunity of participating in this patient's care. I would like to thank the PCP for the referral.  Please feel free to contact me with any other questions.    Kevin Aguirre MD   Pulmonologist/Intensivist     Electronically signed by: Kevin Aguirre MD, 11/9/2021 14:37 CST

## 2021-11-10 DIAGNOSIS — I10 ESSENTIAL HYPERTENSION: ICD-10-CM

## 2021-11-10 RX ORDER — CALCITRIOL 0.5 UG/1
CAPSULE, LIQUID FILLED ORAL
Qty: 90 CAPSULE | Refills: 3 | Status: SHIPPED | OUTPATIENT
Start: 2021-11-10 | End: 2023-01-09 | Stop reason: SDUPTHER

## 2021-11-10 RX ORDER — LOSARTAN POTASSIUM 100 MG/1
TABLET ORAL
Qty: 90 TABLET | Refills: 3 | Status: SHIPPED | OUTPATIENT
Start: 2021-11-10 | End: 2023-01-05 | Stop reason: SDUPTHER

## 2021-11-13 NOTE — PROGRESS NOTES
Dalton Sood comes today for in follow-up bilateral tissue expander reconstruction. She comes today for filling of her tissue expanders. Patient Active Problem List    Diagnosis Date Noted    S/P bilateral mastectomy and reconstruction with expanders 9/17/2021 09/20/2021    Atypical ductal hyperplasia of breast 09/14/2021    Primary osteoarthritis of left knee 05/23/2019    S/P lumpectomy, right breast 8/2008 11/15/2012    Breast cancer metastasized to axillary lymph node 1 of 11 11/15/2012    Family history of malignant neoplasm of breast, sister 11/18/2011    Personal history of malignant neoplasm of breast 06/06/2008     Current Outpatient Medications   Medication Sig Dispense Refill    HYDROcodone-acetaminophen (NORCO) 5-325 MG per tablet Take 1 tablet by mouth every 6 hours as needed for Pain.  15 tablet 0    nitroglycerin (NITRO-BID) 2 % ointment Place 0.5 inches onto the skin 2 times daily 1 each 3    Diclofenac Sodium (PENNSAID) 2 % SOLN Apply topically      THEOPHYLLINE CR PO Take 40 mg by mouth daily      levothyroxine (SYNTHROID) 50 MCG tablet Take 50 mcg by mouth daily      calcium citrate-vitamin D (CITRICAL + D) 315-250 MG-UNIT TABS per tablet Take 1 tablet by mouth daily      aspirin EC 81 MG EC tablet Take 1 tablet by mouth 2 times daily 60 tablet 0    docusate sodium (COLACE) 100 MG capsule Take 1 capsule by mouth daily To prevent constipation 20 capsule 0    ibuprofen (ADVIL;MOTRIN) 400 MG tablet Take 1 tablet by mouth every 8 hours as needed for Pain 30 tablet 0    loratadine (CLARITIN) 10 MG tablet Take 10 mg by mouth      pantoprazole (PROTONIX) 40 MG tablet Take 40 mg by mouth daily       tiZANidine (ZANAFLEX) 4 MG tablet Take 4 mg by mouth nightly       alendronate (FOSAMAX) 70 MG tablet Take 70 mg by mouth every 7 days Indications: Sunday   3    calcitRIOL (ROCALTROL) 0.5 MCG capsule Take 0.5 mcg by mouth daily   6    rOPINIRole (REQUIP) 0.25 MG tablet Take 0.25 mg by mouth nightly   3    fluticasone-salmeterol (ADVAIR) 500-50 MCG/DOSE diskus inhaler Inhale 1 puff into the lungs every 12 hours.  losartan (COZAAR) 25 MG tablet Take 100 mg by mouth daily       albuterol (PROVENTIL) (2.5 MG/3ML) 0.083% nebulizer solution Take 2.5 mg by nebulization every 4 hours as needed       LIPITOR 40 MG tablet Take 40 mg by mouth daily.  zafirlukast (ACCOLATE) 20 MG tablet Take 20 mg by mouth 2 times daily        No current facility-administered medications for this visit. Allergies: Cephalexin  Past Medical History:   Diagnosis Date    Asthma     Cancer ([facility] [address]     Breast Cancer    GERD (gastroesophageal reflux disease)     History of blood transfusion     1993    History of therapeutic radiation     Hx antineoplastic chemo     Hyperlipidemia     Hypertension     Osteoporosis     PONV (postoperative nausea and vomiting)     Thyroid disease     Wears glasses      Past Surgical History:   Procedure Laterality Date    APPENDECTOMY      BREAST BIOPSY  6/6/2008    Rt Breast Stereotactic Biopsy, Infiltrating ductal carcinoma, grade 1, Focus of low grade ductal carcinoma in-situ    BREAST RECONSTRUCTION Bilateral 9/14/2021    BILATERAL SKIN SPARING MASTECTOMY VIA URBANO PATTERN WITH TISSUE EXPANDER RECONSTRUCTION (12-15/GURPREET) AND TELABIO (CONNOR) BILATERAL PEC BLOCK performed by Angi Benson MD at [address]  2008    Dr Yonny Morse  02/2021    ECTOPIC Thiago Ards      x 3    JOINT REPLACEMENT Left 2018    knee    KNEE ARTHROPLASTY Left 5/23/2019    LEFT KNEE UNI VERSUS performed by Tereso Cruz MD at [address], PARTIAL  7/3/2008    Rt Breast Lumpectomy & [place] lymph Node Biopsy displaying esidual infiltrating ductal carcinoma grade 1 with foci of low grade DCIS involving the margin.   1 of 2 sentinel lymph nodes positive for .56mm micrometastisis.  MASTECTOMY, PARTIAL  8/7/2008    Right Partial Mastectomy Re-excision Right axillary node dissection 9 out of 9 lymph nodes negative for a total of 1 of 11 nodes positive for metastatic malignancy.  [place] RMVL MARIELLA CTR VAD W/SUBQ PORT/ CTR/PRPH INSJ N/A 5/18/2017    PORT REMOVAL performed by Petra Plunkett MD at [address] [place] [address] Left 5/23/2019    LEFT TOTAL KNEE REPLACEMENT performed by Paty Martinez MD at [place] [address]    Dr Donn Santos Right 6/25/2021     BREAST NEEDLE [facility]     Family History   Problem Relation Age of Onset    Diabetes Mother     Colon Polyps Mother     Breast Cancer Sister 52     Social History     Tobacco Use    Smoking status: Never Smoker    Smokeless tobacco: Never Used   Substance Use Topics    Alcohol use: No       Review of systems  No fevers    Exam  Incision healing well. There is no evidence of infection. Procedure  Following alcohol prep 60 cc of fluid was placed in both expanders she tolerated the procedure well. Assessment  Status post bilateral mastectomy with tissue expander reconstruction    Plan  We will plan to see the patient back next week for repeat fill.

## 2021-11-15 ENCOUNTER — OFFICE VISIT (OUTPATIENT)
Dept: SURGERY | Age: 56
End: 2021-11-15

## 2021-11-15 VITALS
WEIGHT: 177 LBS | SYSTOLIC BLOOD PRESSURE: 110 MMHG | HEIGHT: 62 IN | TEMPERATURE: 97.7 F | BODY MASS INDEX: 32.57 KG/M2 | DIASTOLIC BLOOD PRESSURE: 70 MMHG

## 2021-11-15 DIAGNOSIS — C77.3 CARCINOMA OF RIGHT BREAST METASTATIC TO AXILLARY LYMPH NODE (HCC): Primary | ICD-10-CM

## 2021-11-15 DIAGNOSIS — C50.911 CARCINOMA OF RIGHT BREAST METASTATIC TO AXILLARY LYMPH NODE (HCC): Primary | ICD-10-CM

## 2021-11-15 PROCEDURE — 99024 POSTOP FOLLOW-UP VISIT: CPT | Performed by: PHYSICIAN ASSISTANT

## 2021-11-15 NOTE — PROGRESS NOTES
Joseph Mendiola comes today for in follow-up bilateral tissue expander reconstruction. She comes today for filling of her tissue expanders. Patient Active Problem List    Diagnosis Date Noted    S/P bilateral mastectomy and reconstruction with expanders 9/17/2021 09/20/2021    Atypical ductal hyperplasia of breast 09/14/2021    Primary osteoarthritis of left knee 05/23/2019    S/P lumpectomy, right breast 8/2008 11/15/2012    Breast cancer metastasized to axillary lymph node 1 of 11 11/15/2012    Family history of malignant neoplasm of breast, sister 11/18/2011    Personal history of malignant neoplasm of breast 06/06/2008     Current Outpatient Medications   Medication Sig Dispense Refill    HYDROcodone-acetaminophen (NORCO) 5-325 MG per tablet Take 1 tablet by mouth every 6 hours as needed for Pain.  15 tablet 0    nitroglycerin (NITRO-BID) 2 % ointment Place 0.5 inches onto the skin 2 times daily 1 each 3    Diclofenac Sodium (PENNSAID) 2 % SOLN Apply topically      THEOPHYLLINE CR PO Take 40 mg by mouth daily      levothyroxine (SYNTHROID) 50 MCG tablet Take 50 mcg by mouth daily      calcium citrate-vitamin D (CITRICAL + D) 315-250 MG-UNIT TABS per tablet Take 1 tablet by mouth daily      aspirin EC 81 MG EC tablet Take 1 tablet by mouth 2 times daily 60 tablet 0    docusate sodium (COLACE) 100 MG capsule Take 1 capsule by mouth daily To prevent constipation 20 capsule 0    ibuprofen (ADVIL;MOTRIN) 400 MG tablet Take 1 tablet by mouth every 8 hours as needed for Pain 30 tablet 0    loratadine (CLARITIN) 10 MG tablet Take 10 mg by mouth      pantoprazole (PROTONIX) 40 MG tablet Take 40 mg by mouth daily       tiZANidine (ZANAFLEX) 4 MG tablet Take 4 mg by mouth nightly       alendronate (FOSAMAX) 70 MG tablet Take 70 mg by mouth every 7 days Indications: Sunday   3    calcitRIOL (ROCALTROL) 0.5 MCG capsule Take 0.5 mcg by mouth daily   6    rOPINIRole (REQUIP) 0.25 MG tablet Take 0.25 for .56mm micrometastisis.  MASTECTOMY, PARTIAL  8/7/2008    Right Partial Mastectomy Re-excision Right axillary node dissection 9 out of 9 lymph nodes negative for a total of 1 of 11 nodes positive for metastatic malignancy.  [place] RMVL MARIELLA CTR VAD W/SUBQ PORT/ CTR/PRPH INSJ N/A 5/18/2017    PORT REMOVAL performed by Domenico Adames MD at [address] Left 5/23/2019    LEFT TOTAL KNEE REPLACEMENT performed by Adelia Siemens, MD at [facility] ENDOSCOPY  [facility]    Dr Shu Gunderson Right 6/25/2021     BREAST NEEDLE [facility]     Family History   Problem Relation Age of Onset    Diabetes Mother     Colon Polyps Mother     Breast Cancer Sister 52     Social History     Tobacco Use    Smoking status: Never Smoker    Smokeless tobacco: Never Used   Substance Use Topics    Alcohol use: No       Review of systems  No fevers    Exam  Incision healing well. There is no evidence of infection. Procedure  Following alcohol prep 60 cc of fluid was placed in both expanders she tolerated the procedure well. Assessment  Status post bilateral mastectomy with tissue expander reconstruction    Plan  We will plan to see the patient back next week for repeat fill.

## 2021-11-22 ENCOUNTER — OFFICE VISIT (OUTPATIENT)
Dept: SURGERY | Age: 56
End: 2021-11-22

## 2021-11-22 VITALS
HEIGHT: 62 IN | HEART RATE: 93 BPM | BODY MASS INDEX: 32.39 KG/M2 | SYSTOLIC BLOOD PRESSURE: 130 MMHG | OXYGEN SATURATION: 100 % | WEIGHT: 176 LBS | TEMPERATURE: 97.7 F | DIASTOLIC BLOOD PRESSURE: 82 MMHG

## 2021-11-22 DIAGNOSIS — Z90.13 S/P BILATERAL MASTECTOMY: Primary | ICD-10-CM

## 2021-11-22 PROCEDURE — 99024 POSTOP FOLLOW-UP VISIT: CPT | Performed by: PHYSICIAN ASSISTANT

## 2021-11-22 NOTE — PROGRESS NOTES
Subjective  Ty Jones comes then follow-up for tissue expander fill. She is doing well. I had her try her bra on that she had prior to surgery and she has some room for further expansion. She denies any pain. We have noticed that on the radiated side, the expander expands upward. There is not full expansion. Objective  Patient Active Problem List    Diagnosis Date Noted    S/P bilateral mastectomy and reconstruction with expanders 9/17/2021 09/20/2021    Atypical ductal hyperplasia of breast 09/14/2021    Primary osteoarthritis of left knee 05/23/2019    S/P lumpectomy, right breast 8/2008 11/15/2012    Breast cancer metastasized to axillary lymph node 1 of 11 11/15/2012    Family history of malignant neoplasm of breast, sister 11/18/2011    Personal history of malignant neoplasm of breast 06/06/2008       Current Outpatient Medications   Medication Sig Dispense Refill    HYDROcodone-acetaminophen (NORCO) 5-325 MG per tablet Take 1 tablet by mouth every 6 hours as needed for Pain.  15 tablet 0    nitroglycerin (NITRO-BID) 2 % ointment Place 0.5 inches onto the skin 2 times daily 1 each 3    Diclofenac Sodium (PENNSAID) 2 % SOLN Apply topically      THEOPHYLLINE CR PO Take 40 mg by mouth daily      levothyroxine (SYNTHROID) 50 MCG tablet Take 50 mcg by mouth daily      calcium citrate-vitamin D (CITRICAL + D) 315-250 MG-UNIT TABS per tablet Take 1 tablet by mouth daily      aspirin EC 81 MG EC tablet Take 1 tablet by mouth 2 times daily 60 tablet 0    docusate sodium (COLACE) 100 MG capsule Take 1 capsule by mouth daily To prevent constipation 20 capsule 0    ibuprofen (ADVIL;MOTRIN) 400 MG tablet Take 1 tablet by mouth every 8 hours as needed for Pain 30 tablet 0    loratadine (CLARITIN) 10 MG tablet Take 10 mg by mouth      pantoprazole (PROTONIX) 40 MG tablet Take 40 mg by mouth daily       tiZANidine (ZANAFLEX) 4 MG tablet Take 4 mg by mouth nightly       alendronate (FOSAMAX) 70 MG tablet Take 70 mg by mouth every 7 days Indications: Sunday   3    calcitRIOL (ROCALTROL) 0.5 MCG capsule Take 0.5 mcg by mouth daily   6    rOPINIRole (REQUIP) 0.25 MG tablet Take 0.25 mg by mouth nightly   3    fluticasone-salmeterol (ADVAIR) 500-50 MCG/DOSE diskus inhaler Inhale 1 puff into the lungs every 12 hours.  losartan (COZAAR) 25 MG tablet Take 100 mg by mouth daily       albuterol (PROVENTIL) (2.5 MG/3ML) 0.083% nebulizer solution Take 2.5 mg by nebulization every 4 hours as needed       LIPITOR 40 MG tablet Take 40 mg by mouth daily.  zafirlukast (ACCOLATE) 20 MG tablet Take 20 mg by mouth 2 times daily        No current facility-administered medications for this visit.        Allergies: Cephalexin    Past Medical History:   Diagnosis Date    Asthma     Cancer ([facility] [address]     Breast Cancer    GERD (gastroesophageal reflux disease)     History of blood transfusion     1993    History of therapeutic radiation     Hx antineoplastic chemo     Hyperlipidemia     Hypertension     Osteoporosis     PONV (postoperative nausea and vomiting)     Thyroid disease     Wears glasses        Past Surgical History:   Procedure Laterality Date    APPENDECTOMY      BREAST BIOPSY  6/6/2008    Rt Breast Stereotactic Biopsy, Infiltrating ductal carcinoma, grade 1, Focus of low grade ductal carcinoma in-situ    BREAST RECONSTRUCTION Bilateral 9/14/2021    BILATERAL SKIN SPARING MASTECTOMY VIA URBANO PATTERN WITH TISSUE EXPANDER RECONSTRUCTION (12-15/GURPREET) AND TELABIO (CONNOR) BILATERAL PEC BLOCK performed by Mark Trinidad MD at [address] [facility]  2008    Dr Devin Aguirre  02/2021    ECTOPIC Hali Lav      x 3    JOINT REPLACEMENT Left 2018    knee    KNEE ARTHROPLASTY Left 5/23/2019    LEFT KNEE UNI VERSUS performed by Duane Sin, MD at [address] [facility], PARTIAL  7/3/2008 Rt Breast Lumpectomy & [place] lymph Node Biopsy displaying esidual infiltrating ductal carcinoma grade 1 with foci of low grade DCIS involving the margin. 1 of 2 sentinel lymph nodes positive for . 56mm micrometastisis.  MASTECTOMY, PARTIAL  8/7/2008    Right Partial Mastectomy Re-excision Right axillary node dissection 9 out of 9 lymph nodes negative for a total of 1 of 11 nodes positive for metastatic malignancy.  [place] RMVL MARIELLA CTR VAD W/SUBQ PORT/ CTR/PRPH INSJ N/A 5/18/2017    PORT REMOVAL performed by Eunice Randolph MD at . [address] Left 5/23/2019    LEFT TOTAL KNEE REPLACEMENT performed by Bryce Buck MD at [place] [id number]    Dr Ruben Marino Right 6/25/2021     BREAST NEEDLE BIOPSY RIGHT 6/25/2021 [facility] [address] [place] [address]       Family History   Problem Relation Age of Onset    Diabetes Mother     Colon Polyps Mother     Breast Cancer Sister 52       Social History     Tobacco Use    Smoking status: Never Smoker    Smokeless tobacco: Never Used   Substance Use Topics    Alcohol use: No        Review of systems  Reviewed and positive for the above although system noted to be negative    Exam  Blood pressure 130/82, pulse 93, temperature 97.7 °F (36.5 °C), height 5' 2\" (1.575 m), weight 176 lb (79.8 kg), last menstrual period 05/20/2008, SpO2 100 %. On examination to her chest wall, the right breast area there is some contracture from previous radiation. The left side there is no contracture. Assessment  Status post bilateral tissue expander placement  History radiation therapy to the right chest wall  Skin contracture    Plan  I discussed care with Dr. Alix Soriano and he has evaluated patient. He has recommended she see a massage therapist that may help some with contracture release. She may need a latissimus flap on the right side.   I like to see her back in 2 weeks to discuss.

## 2021-11-30 NOTE — PROGRESS NOTES
Zhen Ta comes today for in follow-up bilateral tissue expander reconstruction. She comes today for filling of her tissue expanders. Patient Active Problem List    Diagnosis Date Noted    S/P bilateral mastectomy and reconstruction with expanders 9/17/2021 09/20/2021    Atypical ductal hyperplasia of breast 09/14/2021    Primary osteoarthritis of left knee 05/23/2019    S/P lumpectomy, right breast 8/2008 11/15/2012    Breast cancer metastasized to axillary lymph node 1 of 11 11/15/2012    Family history of malignant neoplasm of breast, sister 11/18/2011    Personal history of malignant neoplasm of breast 06/06/2008     Current Outpatient Medications   Medication Sig Dispense Refill    HYDROcodone-acetaminophen (NORCO) 5-325 MG per tablet Take 1 tablet by mouth every 6 hours as needed for Pain.  15 tablet 0    nitroglycerin (NITRO-BID) 2 % ointment Place 0.5 inches onto the skin 2 times daily 1 each 3    Diclofenac Sodium (PENNSAID) 2 % SOLN Apply topically      THEOPHYLLINE CR PO Take 40 mg by mouth daily      levothyroxine (SYNTHROID) 50 MCG tablet Take 50 mcg by mouth daily      calcium citrate-vitamin D (CITRICAL + D) 315-250 MG-UNIT TABS per tablet Take 1 tablet by mouth daily      aspirin EC 81 MG EC tablet Take 1 tablet by mouth 2 times daily 60 tablet 0    docusate sodium (COLACE) 100 MG capsule Take 1 capsule by mouth daily To prevent constipation 20 capsule 0    ibuprofen (ADVIL;MOTRIN) 400 MG tablet Take 1 tablet by mouth every 8 hours as needed for Pain 30 tablet 0    loratadine (CLARITIN) 10 MG tablet Take 10 mg by mouth      pantoprazole (PROTONIX) 40 MG tablet Take 40 mg by mouth daily       tiZANidine (ZANAFLEX) 4 MG tablet Take 4 mg by mouth nightly       alendronate (FOSAMAX) 70 MG tablet Take 70 mg by mouth every 7 days Indications: Sunday   3    calcitRIOL (ROCALTROL) 0.5 MCG capsule Take 0.5 mcg by mouth daily   6    rOPINIRole (REQUIP) 0.25 MG tablet Take 0.25 mg by mouth nightly   3    fluticasone-salmeterol (ADVAIR) 500-50 MCG/DOSE diskus inhaler Inhale 1 puff into the lungs every 12 hours.  losartan (COZAAR) 25 MG tablet Take 100 mg by mouth daily       albuterol (PROVENTIL) (2.5 MG/3ML) 0.083% nebulizer solution Take 2.5 mg by nebulization every 4 hours as needed       LIPITOR 40 MG tablet Take 40 mg by mouth daily.  zafirlukast (ACCOLATE) 20 MG tablet Take 20 mg by mouth 2 times daily        No current facility-administered medications for this visit. Allergies: Cephalexin  Past Medical History:   Diagnosis Date    Asthma     Cancer ([facility] [address]     Breast Cancer    GERD (gastroesophageal reflux disease)     History of blood transfusion     1993    History of therapeutic radiation     Hx antineoplastic chemo     Hyperlipidemia     Hypertension     Osteoporosis     PONV (postoperative nausea and vomiting)     Thyroid disease     Wears glasses      Past Surgical History:   Procedure Laterality Date    APPENDECTOMY      BREAST BIOPSY  6/6/2008    Rt Breast Stereotactic Biopsy, Infiltrating ductal carcinoma, grade 1, Focus of low grade ductal carcinoma in-situ    BREAST RECONSTRUCTION Bilateral 9/14/2021    BILATERAL SKIN SPARING MASTECTOMY VIA URBANO PATTERN WITH TISSUE EXPANDER RECONSTRUCTION (12-15/GURPREET) AND TELABIO (CONNOR) BILATERAL PEC BLOCK performed by Eunice Randolph MD at [address] [facility]  2008    Dr Lv Jacob  02/2021    [facility] Xiao [place]      x 3    JOINT REPLACEMENT Left 2018    knee    KNEE ARTHROPLASTY Left 5/23/2019    LEFT KNEE UNI VERSUS performed by Bryce Buck MD at [facility]  7/3/2008    Rt Breast Lumpectomy & [place] lymph Node Biopsy displaying esidual infiltrating ductal carcinoma grade 1 with foci of low grade DCIS involving the margin.   1 of 2 sentinel lymph nodes positive for .56mm micrometastisis.  MASTECTOMY, PARTIAL  8/7/2008    Right Partial Mastectomy Re-excision Right axillary node dissection 9 out of 9 lymph nodes negative for a total of 1 of 11 nodes positive for metastatic malignancy.  [place] RMVL MARIELLA CTR VAD W/SUBQ PORT/ CTR/PRPH INSJ N/A 5/18/2017    PORT REMOVAL performed by Eunice Randolph MD at [address] Left 5/23/2019    LEFT TOTAL KNEE REPLACEMENT performed by Bryce Buck MD at [facility] ENDOSCOPY  [facility]    Dr Ruben Marino Right 6/25/2021     BREAST NEEDLE [facility]     Family History   Problem Relation Age of Onset    Diabetes Mother     Colon Polyps Mother     Breast Cancer Sister 52     Social History     Tobacco Use    Smoking status: Never Smoker    Smokeless tobacco: Never Used   Substance Use Topics    Alcohol use: No       Review of systems  No fevers    Exam  Incision healing well. There is no evidence of infection. Procedure  Following alcohol prep 60 cc of fluid was placed in both expanders she tolerated the procedure well. Assessment  Status post bilateral mastectomy with tissue expander reconstruction    Plan  We will plan to see the patient back next week for repeat fill.

## 2021-11-30 NOTE — PROGRESS NOTES
Tyrone Green comes today for in follow-up bilateral tissue expander reconstruction. She comes today for filling of her tissue expanders. Patient Active Problem List    Diagnosis Date Noted    S/P bilateral mastectomy and reconstruction with expanders 9/17/2021 09/20/2021    Atypical ductal hyperplasia of breast 09/14/2021    Primary osteoarthritis of left knee 05/23/2019    S/P lumpectomy, right breast 8/2008 11/15/2012    Breast cancer metastasized to axillary lymph node 1 of 11 11/15/2012    Family history of malignant neoplasm of breast, sister 11/18/2011    Personal history of malignant neoplasm of breast 06/06/2008     Current Outpatient Medications   Medication Sig Dispense Refill    HYDROcodone-acetaminophen (NORCO) 5-325 MG per tablet Take 1 tablet by mouth every 6 hours as needed for Pain.  15 tablet 0    nitroglycerin (NITRO-BID) 2 % ointment Place 0.5 inches onto the skin 2 times daily 1 each 3    Diclofenac Sodium (PENNSAID) 2 % SOLN Apply topically      THEOPHYLLINE CR PO Take 40 mg by mouth daily      levothyroxine (SYNTHROID) 50 MCG tablet Take 50 mcg by mouth daily      calcium citrate-vitamin D (CITRICAL + D) 315-250 MG-UNIT TABS per tablet Take 1 tablet by mouth daily      aspirin EC 81 MG EC tablet Take 1 tablet by mouth 2 times daily 60 tablet 0    docusate sodium (COLACE) 100 MG capsule Take 1 capsule by mouth daily To prevent constipation 20 capsule 0    ibuprofen (ADVIL;MOTRIN) 400 MG tablet Take 1 tablet by mouth every 8 hours as needed for Pain 30 tablet 0    loratadine (CLARITIN) 10 MG tablet Take 10 mg by mouth      pantoprazole (PROTONIX) 40 MG tablet Take 40 mg by mouth daily       tiZANidine (ZANAFLEX) 4 MG tablet Take 4 mg by mouth nightly       alendronate (FOSAMAX) 70 MG tablet Take 70 mg by mouth every 7 days Indications: Sunday   3    calcitRIOL (ROCALTROL) 0.5 MCG capsule Take 0.5 mcg by mouth daily   6    rOPINIRole (REQUIP) 0.25 MG tablet Take 0.25 for .56mm micrometastisis.  MASTECTOMY, PARTIAL  8/7/2008    Right Partial Mastectomy Re-excision Right axillary node dissection 9 out of 9 lymph nodes negative for a total of 1 of 11 nodes positive for metastatic malignancy.  [place] RMVL MARIELLA CTR VAD W/SUBQ PORT/ CTR/PRPH INSJ N/A 5/18/2017    PORT REMOVAL performed by Jessica Deng MD at . [address] Left 5/23/2019    LEFT TOTAL KNEE REPLACEMENT performed by Candida Muller MD at [facility] ENDOSCOPY  [facility]    Dr Denise Cordero Right 6/25/2021    US BREAST NEEDLE BIOPSY RIGHT [facility]/20[facility] [address]     Family History   Problem Relation Age of Onset    Diabetes Mother     Colon Polyps Mother     Breast Cancer Sister 52     Social History     Tobacco Use    Smoking status: Never Smoker    Smokeless tobacco: Never Used   Substance Use Topics    Alcohol use: No       Review of systems  No fevers    Exam  Incision healing well. There is no evidence of infection. Procedure  Following alcohol prep 60 cc of fluid was placed in both expanders she tolerated the procedure well. Assessment  Status post bilateral mastectomy with tissue expander reconstruction    Plan  We will plan to see the patient back next week for repeat fill.

## 2021-12-06 ENCOUNTER — OFFICE VISIT (OUTPATIENT)
Dept: SURGERY | Age: 56
End: 2021-12-06

## 2021-12-06 ENCOUNTER — OFFICE VISIT (OUTPATIENT)
Dept: OBSTETRICS AND GYNECOLOGY | Facility: CLINIC | Age: 56
End: 2021-12-06

## 2021-12-06 VITALS
HEIGHT: 60 IN | BODY MASS INDEX: 34.75 KG/M2 | WEIGHT: 177 LBS | DIASTOLIC BLOOD PRESSURE: 90 MMHG | SYSTOLIC BLOOD PRESSURE: 144 MMHG

## 2021-12-06 VITALS
HEART RATE: 88 BPM | BODY MASS INDEX: 33.13 KG/M2 | OXYGEN SATURATION: 98 % | HEIGHT: 62 IN | TEMPERATURE: 97.6 F | WEIGHT: 180 LBS

## 2021-12-06 DIAGNOSIS — Z85.3 HISTORY OF MALIGNANT NEOPLASM OF BREAST: ICD-10-CM

## 2021-12-06 DIAGNOSIS — Z78.9 NON-SMOKER: ICD-10-CM

## 2021-12-06 DIAGNOSIS — Z90.13 S/P BILATERAL MASTECTOMY: Primary | ICD-10-CM

## 2021-12-06 DIAGNOSIS — Z01.419 ENCOUNTER FOR GYNECOLOGICAL EXAMINATION WITHOUT ABNORMAL FINDING: Primary | ICD-10-CM

## 2021-12-06 DIAGNOSIS — I10 ESSENTIAL HYPERTENSION: ICD-10-CM

## 2021-12-06 DIAGNOSIS — E66.9 OBESITY (BMI 30.0-34.9): ICD-10-CM

## 2021-12-06 PROCEDURE — 99024 POSTOP FOLLOW-UP VISIT: CPT | Performed by: PHYSICIAN ASSISTANT

## 2021-12-06 PROCEDURE — 99396 PREV VISIT EST AGE 40-64: CPT | Performed by: NURSE PRACTITIONER

## 2021-12-06 PROCEDURE — 87624 HPV HI-RISK TYP POOLED RSLT: CPT | Performed by: NURSE PRACTITIONER

## 2021-12-06 PROCEDURE — G0123 SCREEN CERV/VAG THIN LAYER: HCPCS | Performed by: NURSE PRACTITIONER

## 2021-12-06 NOTE — PATIENT INSTRUCTIONS
"BMI for Adults  What is BMI?  Body mass index (BMI) is a number that is calculated from a person's weight and height. BMI can help estimate how much of a person's weight is composed of fat. BMI does not measure body fat directly. Rather, it is an alternative to procedures that directly measure body fat, which can be difficult and expensive.  BMI can help identify people who may be at higher risk for certain medical problems.  What are BMI measurements used for?  BMI is used as a screening tool to identify possible weight problems. It helps determine whether a person is obese, overweight, a healthy weight, or underweight.  BMI is useful for:  · Identifying a weight problem that may be related to a medical condition or may increase the risk for medical problems.  · Promoting changes, such as changes in diet and exercise, to help reach a healthy weight. BMI screening can be repeated to see if these changes are working.  How is BMI calculated?  BMI involves measuring your weight in relation to your height. Both height and weight are measured, and the BMI is calculated from those numbers. This can be done either in English ([place] or metric measurements. Note that charts and online BMI calculators are available to help you find your BMI quickly and easily without having to do these calculations yourself.  To calculate your BMI in English ([place] measurements:    1. Measure your weight in pounds (lb).  2. Multiply the number of pounds by 703.  ? For example, for a person who weighs 180 lb, multiply that number by 703, which equals 126,540.  3. Measure your height in inches. Then multiply that number by itself to get a measurement called \"inches squared.\"  ? For example, for a person who is 70 inches tall, the \"inches squared\" measurement is 70 inches x 70 inches, which equals 4,900 inches squared.  4. Divide the total from step 2 (number of lb x 703) by the total from step 3 (inches squared): 126,540 ÷ 4,900 = 25.8. This is " "your BMI.    To calculate your BMI in metric measurements:  1. Measure your weight in kilograms (kg).  2. Measure your height in meters (m). Then multiply that number by itself to get a measurement called \"meters squared.\"  ? For example, for a person who is 1.75 m tall, the \"meters squared\" measurement is 1.75 m x 1.75 m, which is equal to 3.1 meters squared.  3. Divide the number of kilograms (your weight) by the meters squared number. In this example: 70 ÷ 3.1 = 22.6. This is your BMI.  What do the results mean?  BMI charts are used to identify whether you are underweight, normal weight, overweight, or obese. The following guidelines will be used:  · Underweight: BMI less than 18.5.  · Normal weight: BMI between 18.5 and 24.9.  · Overweight: BMI between 25 and 29.9.  · Obese: BMI of 30 or above.  Keep these notes in mind:  · Weight includes both fat and muscle, so someone with a muscular build, such as an athlete, may have a BMI that is higher than 24.9. In cases like these, BMI is not an accurate measure of body fat.  · To determine if excess body fat is the cause of a BMI of 25 or higher, further assessments may need to be done by a health care provider.  · BMI is usually interpreted in the same way for men and women.  Where to find more information  For more information about BMI, including tools to quickly calculate your BMI, go to these websites:  · Centers for Disease Control and Prevention: www.cdc.gov  · American Heart Association: www.heart.org  · National Heart, Lung, and Blood [place]: www.nhlbi.nih.gov  Summary  · Body mass index (BMI) is a number that is calculated from a person's weight and height.  · BMI may help estimate how much of a person's weight is composed of fat. BMI can help identify those who may be at higher risk for certain medical problems.  · BMI can be measured using English measurements or metric measurements.  · BMI charts are used to identify whether you are underweight, normal " weight, overweight, or obese.  This information is not intended to replace advice given to you by your health care provider. Make sure you discuss any questions you have with your health care provider.  Document Revised: 09/09/2020 Document Reviewed: 07/17/2020  Elsevier Patient Education © 2021 [employer]

## 2021-12-06 NOTE — PROGRESS NOTES
"Subjective   Luna Cruz is a 56 y.o. female.     Annual exam      The following portions of the patient's history were reviewed and updated as appropriate: allergies, current medications, past family history, past medical history, past social history, past surgical history and problem list.    /90   Ht 152.4 cm (60\")   Wt 80.3 kg (177 lb)   LMP 06/14/2008   BMI 34.57 kg/m²     Review of Systems   Constitutional: Negative for activity change, appetite change, fatigue and fever.        Dx with 2nd breast cancer  Bilateral mastectomy  Undergoing reconstruction.    HENT: Negative for congestion, sore throat and trouble swallowing.    Eyes: Negative for pain, discharge and visual disturbance.   Respiratory: Negative for apnea, shortness of breath and wheezing.    Cardiovascular: Negative for chest pain, palpitations and leg swelling.   Gastrointestinal: Negative for abdominal pain, constipation and diarrhea.   Genitourinary: Negative for frequency, pelvic pain, urgency and vaginal discharge.        Yeast infection after last surgery, better now   Musculoskeletal: Negative for back pain and gait problem.   Skin: Negative for color change and rash.   Neurological: Negative for dizziness, weakness and numbness.   Psychiatric/Behavioral: Negative for confusion and sleep disturbance.       Objective   Physical Exam  Vitals and nursing note reviewed. Exam conducted with a chaperone present.   Constitutional:       General: She is not in acute distress.     Appearance: She is well-developed. She is not diaphoretic.   HENT:      Head: Normocephalic.      Right Ear: External ear normal.      Left Ear: External ear normal.      Nose: Nose normal.   Eyes:      General: No scleral icterus.        Right eye: No discharge.         Left eye: No discharge.      Conjunctiva/sclera: Conjunctivae normal.      Pupils: Pupils are equal, round, and reactive to light.   Neck:      Thyroid: No thyromegaly.      Vascular: No " carotid bruit.      Trachea: No tracheal deviation.   Cardiovascular:      Rate and Rhythm: Normal rate and regular rhythm.      Heart sounds: Normal heart sounds. No murmur heard.      Pulmonary:      Effort: Pulmonary effort is normal. No respiratory distress.      Breath sounds: Normal breath sounds. No wheezing.   Chest:   Breasts:      Right: No bleeding, mass, axillary adenopathy or supraclavicular adenopathy.      Left: No bleeding, mass, axillary adenopathy or supraclavicular adenopathy.        Comments: Bilateral mastectomy scarring    Abdominal:      General: There is no distension.      Palpations: Abdomen is soft. There is no mass.      Tenderness: There is no abdominal tenderness. There is no right CVA tenderness, left CVA tenderness or guarding.      Hernia: No hernia is present. There is no hernia in the left inguinal area or right inguinal area.   Genitourinary:     General: Normal vulva.      Exam position: Lithotomy position.      Labia:         Right: No rash, tenderness, lesion or injury.         Left: No rash, tenderness, lesion or injury.       Vagina: Normal. No signs of injury and foreign body. No vaginal discharge, erythema, tenderness or bleeding.      Cervix: Normal.      Uterus: Normal. Not enlarged, not fixed and not tender.       Adnexa: Right adnexa normal and left adnexa normal.        Right: No mass, tenderness or fullness.          Left: No mass, tenderness or fullness.        Rectum: Normal. No mass.      Comments:   BSU normal  Urethral meatus  Normal  Perineum  Normal  Musculoskeletal:         General: No tenderness. Normal range of motion.      Cervical back: Normal range of motion and neck supple.   Lymphadenopathy:      Head:      Right side of head: No submental, submandibular, tonsillar, preauricular, posterior auricular or occipital adenopathy.      Left side of head: No submental, submandibular, tonsillar, preauricular, posterior auricular or occipital adenopathy.       Cervical: No cervical adenopathy.      Right cervical: No superficial, deep or posterior cervical adenopathy.     Left cervical: No superficial, deep or posterior cervical adenopathy.      Upper Body:      Right upper body: No supraclavicular, axillary or pectoral adenopathy.      Left upper body: No supraclavicular, axillary or pectoral adenopathy.      Lower Body: No right inguinal adenopathy. No left inguinal adenopathy.   Skin:     General: Skin is warm and dry.      Findings: No bruising, erythema or rash.   Neurological:      Mental Status: She is alert and oriented to person, place, and time.      Coordination: Coordination normal.   Psychiatric:         Mood and Affect: Mood normal.         Behavior: Behavior normal.         Thought Content: Thought content normal.         Judgment: Judgment normal.         Assessment/Plan   Well woman GYN exam.   Pap smear done per [facility]P guidelines.   Will have lab work at PCP.     Encouraged SBE, pt is aware how to do self breast exam and the importance of same.   Discussed weight management and importance of maintaining a healthy weight.   Discussed Vitamin D intake and the importance of adequate vitamin D for both Bone Health and a healthy immune system.    Discussed Daily exercise and the importance of same, in regards to a healthy heart as well as helping to maintain her weight and improving her mental health.     Colonoscopy is up to date.     Bone density followed by Dr. Shaffer.     Discussed STD prevention and testing.   Pt declines STD testing.     Mammogram followed by Dr. Houston.     Patient's Body mass index is 34.57 kg/m². indicating that she is obese (BMI >30). Obesity-related health conditions include the following: hypertension, dyslipidemias and GERD. Obesity is unchanged. BMI is is above average; no BMI management plan is appropriate. We discussed portion control and increasing exercise..    RV annual exam/prn.   Diagnoses and all orders for this  visit:    1. Encounter for gynecological examination without abnormal finding (Primary)  -     Liquid-based Pap Smear, Screening    2. Non-smoker    3. Obesity (BMI 30.0-34.9)    4. History of malignant neoplasm of breast    5. Essential hypertension

## 2021-12-08 ENCOUNTER — OFFICE VISIT (OUTPATIENT)
Dept: INTERNAL MEDICINE | Facility: CLINIC | Age: 56
End: 2021-12-08

## 2021-12-08 VITALS
DIASTOLIC BLOOD PRESSURE: 80 MMHG | HEIGHT: 62 IN | HEART RATE: 105 BPM | TEMPERATURE: 97.7 F | OXYGEN SATURATION: 99 % | BODY MASS INDEX: 32.83 KG/M2 | SYSTOLIC BLOOD PRESSURE: 124 MMHG | WEIGHT: 178.4 LBS

## 2021-12-08 DIAGNOSIS — Z00.00 MEDICARE ANNUAL WELLNESS VISIT, SUBSEQUENT: Primary | ICD-10-CM

## 2021-12-08 DIAGNOSIS — I10 ESSENTIAL HYPERTENSION: ICD-10-CM

## 2021-12-08 DIAGNOSIS — E03.4 HYPOTHYROIDISM DUE TO ACQUIRED ATROPHY OF THYROID: ICD-10-CM

## 2021-12-08 DIAGNOSIS — E78.00 ELEVATED CHOLESTEROL: ICD-10-CM

## 2021-12-08 PROCEDURE — 1125F AMNT PAIN NOTED PAIN PRSNT: CPT | Performed by: NURSE PRACTITIONER

## 2021-12-08 PROCEDURE — 1159F MED LIST DOCD IN RCRD: CPT | Performed by: NURSE PRACTITIONER

## 2021-12-08 PROCEDURE — 90471 IMMUNIZATION ADMIN: CPT | Performed by: NURSE PRACTITIONER

## 2021-12-08 PROCEDURE — G0439 PPPS, SUBSEQ VISIT: HCPCS | Performed by: NURSE PRACTITIONER

## 2021-12-08 PROCEDURE — 1170F FXNL STATUS ASSESSED: CPT | Performed by: NURSE PRACTITIONER

## 2021-12-08 PROCEDURE — 90715 TDAP VACCINE 7 YRS/> IM: CPT | Performed by: NURSE PRACTITIONER

## 2021-12-08 NOTE — PROGRESS NOTES
QUICK REFERENCE INFORMATION:  The ABCs of the Annual Wellness Visit    Subsequent Medicare Wellness Visit    Luna presents today for annual wellness visit.  She denies concerns to address today.  She is currently under treated for breast CA and has had mastectomy.  She continues follow ups with oncology.  She is UTD on mammogram and pap smear.  She is not currently on radiation or chemotherapy.   She is treated for hypertension.  She denies chest pain or palpitations.  She does monitor at home and reports readings similar to the office reading today.  124/80.    She has a history of left carotid bruit and past Carotid U/S shows 50-69% stenosis to the right carotid but less than 50% to the right.  There is no bruit heard on exam today.  She denies syncopal episodes.  She states she gets a little dizzy when she first stands up.  She reports this has been going on about 1 year and has discussed with a provider before.  It was thought it was related to her ear due to her report of feeling her heartbeat in the left ear.  She states this has improved and has recently started on a new nasal spray.   She is UTD on her pap  Would like to update her Tdap today.     HEALTH RISK ASSESSMENT    : 1965    Recent Hospitalizations:  Recently treated at the following:  Other: [facility].  [facility]      Current Medical Providers:  Patient Care Team:  Che Calvert APRN as PCP - General (Family Medicine)  Klaus Wagner APRN as Nurse Practitioner (Pulmonary Disease)  Ksenia Fox MD as Consulting Physician (Gastroenterology)        Smoking Status:  Social History     Tobacco Use   Smoking Status Never Smoker   Smokeless Tobacco Never Used       Alcohol Consumption:  Social History     Substance and Sexual Activity   Alcohol Use Not Currently       Depression Screen:   PHQ-2/PHQ-9 Depression Screening 2021   Little interest or pleasure in doing things 0   Feeling down, depressed, or hopeless 0   Trouble  falling or staying asleep, or sleeping too much -   Feeling tired or having little energy -   Poor appetite or overeating -   Feeling bad about yourself - or that you are a failure or have let yourself or your family down -   Trouble concentrating on things, such as reading the newspaper or watching television -   Moving or speaking so slowly that other people could have noticed. Or the opposite - being so fidgety or restless that you have been moving around a lot more than usual -   Thoughts that you would be better off dead, or of hurting yourself in some way -   Total Score 0   If you checked off any problems, how difficult have these problems made it for you to do your work, take care of things at home, or get along with other people? -       Health Habits and Functional and Cognitive Screening:  Functional & Cognitive Status 12/8/2021   Do you have difficulty preparing food and eating? No   Do you have difficulty bathing yourself, getting dressed or grooming yourself? No   Do you have difficulty using the toilet? No   Do you have difficulty moving around from place to place? No   Do you have trouble with steps or getting out of a bed or a chair? No   Current Diet Well Balanced Diet        Current Diet Comment To some extent   Dental Exam Up to date   Eye Exam Up to date   Exercise (times per week) 7 times per week   Current Exercises Include Walking   Current Exercise Activities Include -   Do you need help using the phone?  No   Are you deaf or do you have serious difficulty hearing?  No   Do you need help with transportation? No   Do you need help shopping? No   Do you need help preparing meals?  No   Do you need help with housework?  No   Do you need help with laundry? No   Do you need help taking your medications? No   Do you need help managing money? No   Do you ever drive or ride in a car without wearing a seat belt? No   Have you felt unusual stress, anger or loneliness in the last month? No   Who do you  live with? Spouse   If you need help, do you have trouble finding someone available to you? No   Have you been bothered in the last four weeks by sexual problems? No   Do you have difficulty concentrating, remembering or making decisions? No       Visual Acuity:  No exam data present    Does the patient have evidence of cognitive impairment? No    Asiprin use counseling: Start ASA 81 mg daily       Recent Lab Results:          Lab Results   Component Value Date    TRIG 139 06/02/2021    HDL 51 06/02/2021    VLDL 24 06/02/2021    LDLHDL 1.7 05/17/2014           Age-appropriate Screening Schedule:  Refer to the list below for future screening recommendations based on patient's age, sex and/or medical conditions. Orders for these recommended tests are listed in the plan section. The patient has been provided with a written plan.    Health Maintenance   Topic Date Due   • TDAP/TD VACCINES (1 - Tdap) Never done   • LIPID PANEL  06/02/2022   • DXA SCAN  07/06/2022   • MAMMOGRAM  09/13/2023   • PAP SMEAR  12/06/2024   • INFLUENZA VACCINE  Completed   • ZOSTER VACCINE  Completed        Subjective   History of Present Illness    Luna Cruz is a 56 y.o. female who presents for an Annual Wellness Visit.    The following portions of the patient's history were reviewed and updated as appropriate: allergies, current medications, past family history, past medical history, past social history, past surgical history and problem list.    Outpatient Medications Prior to Visit   Medication Sig Dispense Refill   • Accolate 20 MG tablet TAKE 1 TABLET TWICE A DAY (Patient taking differently: Take 20 mg by mouth 2 (Two) Times a Day.) 180 tablet 3   • albuterol (PROVENTIL) (2.5 MG/3ML) 0.083% nebulizer solution Take 2.5 mg by nebulization As Needed.     • albuterol (PROVENTIL) (2.5 MG/3ML) 0.083% nebulizer solution Take 2.5 mg by nebulization Every 4 (Four) Hours As Needed for Wheezing. 360 mL 5   • albuterol sulfate  (90  Base) MCG/ACT inhaler Inhale 2 puffs Every 4 (Four) Hours As Needed for Wheezing. 6.7 g 5   • alendronate (FOSAMAX) 70 MG tablet Take 1 tablet by mouth Every 7 (Seven) Days. Sunday's 12 tablet 3   • atorvastatin (LIPITOR) 40 MG tablet TAKE 1 TABLET DAILY (Patient taking differently: Take 40 mg by mouth Daily.) 90 tablet 3   • Azelastine HCl 137 MCG/SPRAY solution 2 sprays into the nostril(s) as directed by provider 2 (Two) Times a Day. 30 mL 5   • calcitriol (ROCALTROL) 0.5 MCG capsule TAKE 1 CAPSULE DAILY 90 capsule 3   • Calcium Citrate-Vitamin D (CALCIUM + D PO) Take 1 tablet by mouth Daily.     • Diclofenac Sodium (PENNSAID TD) Apply 1 application topically to the appropriate area as directed 2 (Two) Times a Day As Needed (BACK PAIN). For back pain      • docusate sodium (COLACE) 100 MG capsule Take 100 mg by mouth As Needed.     • fluticasone (Flonase Allergy Relief) 50 MCG/ACT nasal spray 2 sprays into the nostril(s) as directed by provider Daily. 16 g 5   • fluticasone-salmeterol (Advair Diskus) 250-50 MCG/DOSE DISKUS Inhale 1 puff 2 (Two) Times a Day. 3 each 3   • HYDROcodone-acetaminophen (NORCO) 5-325 MG per tablet Take 1 tablet by mouth 2 (Two) Times a Day As Needed for Moderate Pain .     • ibuprofen (ADVIL,MOTRIN) 400 MG tablet Take 400 mg by mouth Every 8 (Eight) Hours As Needed for Mild Pain .     • loratadine (CLARITIN) 10 MG tablet Take 1 tablet by mouth Daily As Needed for Allergies. 90 tablet 3   • losartan (COZAAR) 100 MG tablet TAKE 1 TABLET DAILY 90 tablet 3   • pantoprazole (PROTONIX) 40 MG EC tablet Take 1 tablet by mouth Daily. 90 tablet 3   • potassium chloride ER (K-TAB) 20 MEQ tablet controlled-release ER tablet Take 1 tablet by mouth Daily. 90 tablet 3   • rOPINIRole (REQUIP) 0.25 MG tablet TAKE 1 TABLET EVERY NIGHT. TAKE ONE HOUR BEFORE BEDTIME. (Patient taking differently: Take 0.25 mg by mouth Every Night.) 90 tablet 3   • Synthroid 50 MCG tablet TAKE 1 TABLET DAILY (Patient taking  differently: Take 50 mcg by mouth Daily.) 90 tablet 3   • theophylline (UNIPHYL) 400 MG 24 hr tablet Take 1 tablet by mouth Daily. 90 tablet 3   • tiZANidine (ZANAFLEX) 4 MG tablet Take 4 mg by mouth Every 8 (Eight) Hours As Needed for Muscle Spasms.       Facility-Administered Medications Prior to Visit   Medication Dose Route Frequency Provider Last Rate Last Admin   • heparin flush (porcine) 100 UNIT/ML injection 500 Units  500 Units Intracatheter PRN Ki Manriquez MD   500 Units at 09/27/16 1108   • heparin flush (porcine) 100 UNIT/ML injection 500 Units  500 Units Intravenous PRN Malathi Brantley APRN   500 Units at 05/05/17 1009   • sodium chloride 0.9 % flush 10 mL  10 mL Intravenous PRN Ki Manriquez MD   10 mL at 09/27/16 1107   • sodium chloride 0.9 % flush 10 mL  10 mL Intravenous PRN Malathi Brantley APRN   10 mL at 05/05/17 1009       Patient Active Problem List   Diagnosis   • Essential hypertension   • Gastroesophageal reflux disease with esophagitis   • Heartburn   • Cough   • Esophageal dysphagia   • Family history of polyps in the colon   • Family history of malignant neoplasm of breast   • History of malignant neoplasm of breast   • Hyperplastic polyp of transverse colon   • Overweight (BMI 25.0-29.9)   • Iron deficiency anemia   • Traumatic tear of medial meniscus of knee, left, initial encounter   • Chronic asthma with acute exacerbation   • Malignant neoplasm of lower-inner quadrant of left breast in female, estrogen receptor positive (HCC)   • Elevated cholesterol   • Left carotid bruit   • Myxedema heart disease   • Osteoporosis   • Postmenopausal status   • Vitamin D deficiency   • Breast cancer (HCC)   • Fatty liver   • Rectal bleeding   • Non-smoker   • Non-seasonal allergic rhinitis   • Restless legs syndrome (RLS)   • Pulmonary scarring   • Acute URI   • Atypical ductal hyperplasia of breast   • Post-operative state   • S/P bilateral mastectomy   •  Moderate persistent asthma without complication   • Post-COVID-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 Patient does not have an advance directive, information provided.    Identification of Risk Factors:  Risk factors include: Advance Directive Discussion  Breast Cancer/Mammogram Screening  Cardiovascular risk  Chronic Pain   Colon Cancer Screening  Dementia/Memory   Depression/Dysphoria  Diabetic Lab Screening   Fall Risk  Immunizations Discussed/Encouraged (specific immunizations; Tdap, Influenza, Pneumococcal 23, Shingrix and COVID19 )  Inadequate Social Support, Isolation, Loneliness, Lack of Transportation, Financial Difficulties, or Caregiver Stress   Inactivity/Sedentary  Obesity/Overweight   Osteoporosis Risk  Urinary Incontinence.    Review of Systems   Constitutional: Negative for activity change, appetite change, fatigue, fever and unexpected weight change.   HENT: Negative for trouble swallowing and voice change.    Eyes: Negative for visual disturbance.   Respiratory: Negative for cough and shortness of breath.    Cardiovascular: Negative for chest pain, palpitations and leg swelling.   Gastrointestinal: Negative for abdominal pain, constipation, diarrhea, nausea and vomiting.   Endocrine: Negative for cold intolerance, heat intolerance, polydipsia and polyphagia.   Genitourinary: Negative for dysuria and frequency.   Musculoskeletal: Negative for arthralgias, back pain, joint swelling and neck pain.   Skin: Negative for color change and rash.   Neurological: Positive for dizziness (ongoing issue; states it is better). Negative for headaches.   Hematological: Does not bruise/bleed easily.   Psychiatric/Behavioral: Negative for agitation, hallucinations and suicidal ideas. The patient is not nervous/anxious.        Compared to one year ago, the patient feels her physical health is the same.  Compared to one  "year ago, the patient feels her mental health is the same.    Objective     Physical Exam  Constitutional:       Appearance: She is well-developed.   HENT:      Head: Normocephalic.      Right Ear: Tympanic membrane and external ear normal.      Left Ear: Tympanic membrane and external ear normal.      Nose: Nose normal.      Mouth/Throat:      Mouth: Mucous membranes are moist. No oral lesions.      Pharynx: Oropharynx is clear.   Eyes:      Conjunctiva/sclera: Conjunctivae normal.      Pupils: Pupils are equal, round, and reactive to light.   Neck:      Thyroid: No thyromegaly.      Vascular: No carotid bruit.   Cardiovascular:      Rate and Rhythm: Normal rate and regular rhythm.      Pulses: Normal pulses.           Radial pulses are 2+ on the right side and 2+ on the left side.      Heart sounds: Normal heart sounds. No murmur heard.      Pulmonary:      Effort: Pulmonary effort is normal.      Breath sounds: Normal breath sounds.   Chest:      Comments: deferred breast exam  Abdominal:      General: Bowel sounds are normal.      Palpations: Abdomen is soft.      Tenderness: There is no abdominal tenderness.   Musculoskeletal:      Cervical back: Normal range of motion.      Right lower le+ Edema present.      Left lower le+ Edema present.   Skin:     General: Skin is warm and dry.   Neurological:      Mental Status: She is alert and oriented to person, place, and time.      Gait: Gait normal.   Psychiatric:         Mood and Affect: Mood normal.         Speech: Speech normal.         Behavior: Behavior normal.         Thought Content: Thought content normal.         Vitals:    21 0817   BP: 124/80   BP Location: Right arm   Patient Position: Sitting   Cuff Size: Adult   Pulse: 105   Temp: 97.7 °F (36.5 °C)   TempSrc: Temporal   SpO2: 99%   Weight: 80.9 kg (178 lb 6.4 oz)   Height: 157.5 cm (62\")   PainSc:   6   PainLoc: Back       Patient's Body mass index is 32.63 kg/m². indicating that she is " obese (BMI >30). Obesity-related health conditions include the following: hypertension and dyslipidemias. Obesity is unchanged. BMI is is above average; BMI management plan is completed. We discussed portion control and increasing exercise..      Procedure   Procedures       Assessment/Plan   Patient Self-Management and Personalized Health Advice  The patient has been provided with information about: diet, exercise and weight management.    Visit Diagnoses:    ICD-10-CM ICD-9-CM   1. Medicare annual wellness visit, subsequent  Z00.00 V70.0   2. Essential hypertension  I10 401.9   3. Hypothyroidism due to acquired atrophy of thyroid  E03.4 244.8     246.8   4. Elevated cholesterol  E78.00 272.0       Orders Placed This Encounter   Procedures   • Comprehensive Metabolic Panel     Order Specific Question:   Release to patient     Answer:   Immediate   • Lipid Panel   • Uric Acid     Order Specific Question:   Release to patient     Answer:   Immediate   • TSH     Order Specific Question:   Release to patient     Answer:   Immediate   • CBC & Differential     Order Specific Question:   Manual Differential     Answer:   No   • Urinalysis With Microscopic - Urine, Clean Catch     Order Specific Question:   Release to patient     Answer:   Immediate       Outpatient Encounter Medications as of 12/8/2021   Medication Sig Dispense Refill   • Accolate 20 MG tablet TAKE 1 TABLET TWICE A DAY (Patient taking differently: Take 20 mg by mouth 2 (Two) Times a Day.) 180 tablet 3   • albuterol (PROVENTIL) (2.5 MG/3ML) 0.083% nebulizer solution Take 2.5 mg by nebulization As Needed.     • albuterol (PROVENTIL) (2.5 MG/3ML) 0.083% nebulizer solution Take 2.5 mg by nebulization Every 4 (Four) Hours As Needed for Wheezing. 360 mL 5   • albuterol sulfate  (90 Base) MCG/ACT inhaler Inhale 2 puffs Every 4 (Four) Hours As Needed for Wheezing. 6.7 g 5   • alendronate (FOSAMAX) 70 MG tablet Take 1 tablet by mouth Every 7 (Seven) Days.  Sunday's 12 tablet 3   • atorvastatin (LIPITOR) 40 MG tablet TAKE 1 TABLET DAILY (Patient taking differently: Take 40 mg by mouth Daily.) 90 tablet 3   • Azelastine HCl 137 MCG/SPRAY solution 2 sprays into the nostril(s) as directed by provider 2 (Two) Times a Day. 30 mL 5   • calcitriol (ROCALTROL) 0.5 MCG capsule TAKE 1 CAPSULE DAILY 90 capsule 3   • Calcium Citrate-Vitamin D (CALCIUM + D PO) Take 1 tablet by mouth Daily.     • Diclofenac Sodium (PENNSAID TD) Apply 1 application topically to the appropriate area as directed 2 (Two) Times a Day As Needed (BACK PAIN). For back pain      • docusate sodium (COLACE) 100 MG capsule Take 100 mg by mouth As Needed.     • fluticasone (Flonase Allergy Relief) 50 MCG/ACT nasal spray 2 sprays into the nostril(s) as directed by provider Daily. 16 g 5   • fluticasone-salmeterol (Advair Diskus) 250-50 MCG/DOSE DISKUS Inhale 1 puff 2 (Two) Times a Day. 3 each 3   • HYDROcodone-acetaminophen (NORCO) 5-325 MG per tablet Take 1 tablet by mouth 2 (Two) Times a Day As Needed for Moderate Pain .     • ibuprofen (ADVIL,MOTRIN) 400 MG tablet Take 400 mg by mouth Every 8 (Eight) Hours As Needed for Mild Pain .     • loratadine (CLARITIN) 10 MG tablet Take 1 tablet by mouth Daily As Needed for Allergies. 90 tablet 3   • losartan (COZAAR) 100 MG tablet TAKE 1 TABLET DAILY 90 tablet 3   • pantoprazole (PROTONIX) 40 MG EC tablet Take 1 tablet by mouth Daily. 90 tablet 3   • potassium chloride ER (K-TAB) 20 MEQ tablet controlled-release ER tablet Take 1 tablet by mouth Daily. 90 tablet 3   • rOPINIRole (REQUIP) 0.25 MG tablet TAKE 1 TABLET EVERY NIGHT. TAKE ONE HOUR BEFORE BEDTIME. (Patient taking differently: Take 0.25 mg by mouth Every Night.) 90 tablet 3   • Synthroid 50 MCG tablet TAKE 1 TABLET DAILY (Patient taking differently: Take 50 mcg by mouth Daily.) 90 tablet 3   • theophylline (UNIPHYL) 400 MG 24 hr tablet Take 1 tablet by mouth Daily. 90 tablet 3   • tiZANidine (ZANAFLEX) 4 MG  tablet Take 4 mg by mouth Every 8 (Eight) Hours As Needed for Muscle Spasms.       Facility-Administered Encounter Medications as of 12/8/2021   Medication Dose Route Frequency Provider Last Rate Last Admin   • heparin flush (porcine) 100 UNIT/ML injection 500 Units  500 Units Intracatheter PRN Ki Mnariquez MD   500 Units at 09/27/16 1108   • heparin flush (porcine) 100 UNIT/ML injection 500 Units  500 Units Intravenous PRN Malathi Brantley APRN   500 Units at 05/05/17 1009   • sodium chloride 0.9 % flush 10 mL  10 mL Intravenous PRN Ki Manriquez MD   10 mL at 09/27/16 1107   • sodium chloride 0.9 % flush 10 mL  10 mL Intravenous PRN Malathi Brnatley APRN   10 mL at 05/05/17 1009       Reviewed use of high risk medication in the elderly: yes  Reviewed for potential of harmful drug interactions in the elderly: yes    Follow Up:  Return in about 6 months (around 6/8/2022) for Recheck.     An After Visit Summary and PPPS with all of these plans were given to the patient.       BP is stable.  Will continue on current regimen.   She does report her asthma is stable.  She recently followed up with her pulmonologist who placed her on a new nasal spray.   Will hold on repeat carotid scan today.  I do not hear a bruit on exam today and she reports her dizziness is improved.  I have recommended recheck in about 1 year but if she starts to become symptomatic will need to evaluate sooner.   Continue follow ups with oncology.   Pap smear and mammogram UTD  Colonoscopy UTD  Dexa UTD.   Will update Tdap today.   Fasting lab work completed in office today.

## 2021-12-09 LAB
ALBUMIN SERPL-MCNC: 4.1 G/DL (ref 3.5–5.2)
ALBUMIN/GLOB SERPL: 1.6 G/DL
ALP SERPL-CCNC: 120 U/L (ref 39–117)
ALT SERPL-CCNC: 15 U/L (ref 1–33)
APPEARANCE UR: ABNORMAL
AST SERPL-CCNC: 15 U/L (ref 1–32)
BACTERIA #/AREA URNS HPF: ABNORMAL /HPF
BASOPHILS # BLD AUTO: 0.04 10*3/MM3 (ref 0–0.2)
BASOPHILS NFR BLD AUTO: 0.6 % (ref 0–1.5)
BILIRUB SERPL-MCNC: 0.3 MG/DL (ref 0–1.2)
BILIRUB UR QL STRIP: NEGATIVE
BUN SERPL-MCNC: 11 MG/DL (ref 6–20)
BUN/CREAT SERPL: 11 (ref 7–25)
CALCIUM SERPL-MCNC: 8.6 MG/DL (ref 8.6–10.5)
CHLORIDE SERPL-SCNC: 104 MMOL/L (ref 98–107)
CHOLEST SERPL-MCNC: 158 MG/DL (ref 0–200)
CO2 SERPL-SCNC: 24.6 MMOL/L (ref 22–29)
COLOR UR: YELLOW
CREAT SERPL-MCNC: 1 MG/DL (ref 0.57–1)
EOSINOPHIL # BLD AUTO: 0.18 10*3/MM3 (ref 0–0.4)
EOSINOPHIL NFR BLD AUTO: 2.7 % (ref 0.3–6.2)
EPI CELLS #/AREA URNS HPF: ABNORMAL /HPF
ERYTHROCYTE [DISTWIDTH] IN BLOOD BY AUTOMATED COUNT: 13.3 % (ref 12.3–15.4)
GLOBULIN SER CALC-MCNC: 2.5 GM/DL
GLUCOSE SERPL-MCNC: 90 MG/DL (ref 65–99)
GLUCOSE UR QL: NEGATIVE
HCT VFR BLD AUTO: 45.1 % (ref 34–46.6)
HDLC SERPL-MCNC: 47 MG/DL (ref 40–60)
HGB BLD-MCNC: 14.8 G/DL (ref 12–15.9)
HGB UR QL STRIP: NEGATIVE
IMM GRANULOCYTES # BLD AUTO: 0.03 10*3/MM3 (ref 0–0.05)
IMM GRANULOCYTES NFR BLD AUTO: 0.4 % (ref 0–0.5)
KETONES UR QL STRIP: NEGATIVE
LDLC SERPL CALC-MCNC: 89 MG/DL (ref 0–100)
LEUKOCYTE ESTERASE UR QL STRIP: ABNORMAL
LYMPHOCYTES # BLD AUTO: 1.26 10*3/MM3 (ref 0.7–3.1)
LYMPHOCYTES NFR BLD AUTO: 18.6 % (ref 19.6–45.3)
MCH RBC QN AUTO: 28.7 PG (ref 26.6–33)
MCHC RBC AUTO-ENTMCNC: 32.8 G/DL (ref 31.5–35.7)
MCV RBC AUTO: 87.6 FL (ref 79–97)
MONOCYTES # BLD AUTO: 0.69 10*3/MM3 (ref 0.1–0.9)
MONOCYTES NFR BLD AUTO: 10.2 % (ref 5–12)
NEUTROPHILS # BLD AUTO: 4.58 10*3/MM3 (ref 1.7–7)
NEUTROPHILS NFR BLD AUTO: 67.5 % (ref 42.7–76)
NITRITE UR QL STRIP: NEGATIVE
NRBC BLD AUTO-RTO: 0 /100 WBC (ref 0–0.2)
PH UR STRIP: 5.5 [PH] (ref 5–8)
PLATELET # BLD AUTO: 307 10*3/MM3 (ref 140–450)
POTASSIUM SERPL-SCNC: 4.1 MMOL/L (ref 3.5–5.2)
PROT SERPL-MCNC: 6.6 G/DL (ref 6–8.5)
PROT UR QL STRIP: NEGATIVE
RBC # BLD AUTO: 5.15 10*6/MM3 (ref 3.77–5.28)
RBC #/AREA URNS HPF: ABNORMAL /HPF
SODIUM SERPL-SCNC: 141 MMOL/L (ref 136–145)
SP GR UR: 1.02 (ref 1–1.03)
TRIGL SERPL-MCNC: 120 MG/DL (ref 0–150)
TSH SERPL DL<=0.005 MIU/L-ACNC: 2.82 UIU/ML (ref 0.27–4.2)
URATE SERPL-MCNC: 6.6 MG/DL (ref 2.4–5.7)
UROBILINOGEN UR STRIP-MCNC: ABNORMAL MG/DL
VLDLC SERPL CALC-MCNC: 22 MG/DL (ref 5–40)
WBC # BLD AUTO: 6.78 10*3/MM3 (ref 3.4–10.8)
WBC #/AREA URNS HPF: ABNORMAL /HPF

## 2021-12-10 ENCOUNTER — PATIENT ROUNDING (BHMG ONLY) (OUTPATIENT)
Dept: INTERNAL MEDICINE | Facility: CLINIC | Age: 56
End: 2021-12-10

## 2021-12-10 NOTE — PROGRESS NOTES
December 10, 2021    Hello, may I speak with Luna Cruz?    My name is May Nye      I am  with [facility]  [address] [place] [address] [phone].    Before we get started may I verify your date of birth? 1965    I am calling to officially welcome you to our practice and ask about your recent visit. Is this a good time to talk? Yes - spoke to patient's spouse     Tell me about your visit with us. What things went well? Everything went well - Che was very to the point and took care of everything (per )        We're always looking for ways to make our patients' experiences even better. Do you have recommendations on ways we may improve?  no    Overall were you satisfied with your first visit to our practice? yes       I appreciate you taking the time to speak with me today. Is there anything else I can do for you? no      Thank you, and have a great day.

## 2021-12-13 ENCOUNTER — OFFICE VISIT (OUTPATIENT)
Dept: SURGERY | Age: 56
End: 2021-12-13

## 2021-12-13 VITALS
DIASTOLIC BLOOD PRESSURE: 88 MMHG | SYSTOLIC BLOOD PRESSURE: 161 MMHG | HEART RATE: 90 BPM | WEIGHT: 176 LBS | OXYGEN SATURATION: 98 % | HEIGHT: 62 IN | BODY MASS INDEX: 32.39 KG/M2

## 2021-12-13 DIAGNOSIS — Z90.13 S/P BILATERAL MASTECTOMY: ICD-10-CM

## 2021-12-13 DIAGNOSIS — C50.911 CARCINOMA OF RIGHT BREAST METASTATIC TO AXILLARY LYMPH NODE (HCC): Primary | ICD-10-CM

## 2021-12-13 DIAGNOSIS — C77.3 CARCINOMA OF RIGHT BREAST METASTATIC TO AXILLARY LYMPH NODE (HCC): Primary | ICD-10-CM

## 2021-12-13 PROCEDURE — 99024 POSTOP FOLLOW-UP VISIT: CPT | Performed by: PHYSICIAN ASSISTANT

## 2021-12-13 NOTE — PROGRESS NOTES
HISTORY OF PRESENT ILLNESS:  Silverio Velazquez comes today status post bilateral skin sparing mastectomies with tissue expander reconstruction. She has had a history of radiation therapy on the right side. She comes today to discuss tissue expander removal and placement of permanent implants. She has had some contracture due to previous radiation therapy on the right side. We have been able to do expansion to 360 cc bilaterally. Patient Active Problem List    Diagnosis Date Noted    S/P bilateral mastectomy and reconstruction with expanders 9/17/2021 09/20/2021    Atypical ductal hyperplasia of breast 09/14/2021    Primary osteoarthritis of left knee 05/23/2019    S/P lumpectomy, right breast 8/2008 11/15/2012    Breast cancer metastasized to axillary lymph node 1 of 11 11/15/2012    Family history of malignant neoplasm of breast, sister 11/18/2011    Personal history of malignant neoplasm of breast 06/06/2008     Current Outpatient Medications   Medication Sig Dispense Refill    HYDROcodone-acetaminophen (NORCO) 5-325 MG per tablet Take 1 tablet by mouth every 6 hours as needed for Pain.  15 tablet 0    nitroglycerin (NITRO-BID) 2 % ointment Place 0.5 inches onto the skin 2 times daily 1 each 3    Diclofenac Sodium (PENNSAID) 2 % SOLN Apply topically      THEOPHYLLINE CR PO Take 40 mg by mouth daily      levothyroxine (SYNTHROID) 50 MCG tablet Take 50 mcg by mouth daily      calcium citrate-vitamin D (CITRICAL + D) 315-250 MG-UNIT TABS per tablet Take 1 tablet by mouth daily      aspirin EC 81 MG EC tablet Take 1 tablet by mouth 2 times daily 60 tablet 0    docusate sodium (COLACE) 100 MG capsule Take 1 capsule by mouth daily To prevent constipation 20 capsule 0    ibuprofen (ADVIL;MOTRIN) 400 MG tablet Take 1 tablet by mouth every 8 hours as needed for Pain 30 tablet 0    loratadine (CLARITIN) 10 MG tablet Take 10 mg by mouth      pantoprazole (PROTONIX) 40 MG tablet Take 40 mg by mouth daily       tiZANidine (ZANAFLEX) 4 MG tablet Take 4 mg by mouth nightly       alendronate (FOSAMAX) 70 MG tablet Take 70 mg by mouth every 7 days Indications: Sunday   3    calcitRIOL (ROCALTROL) 0.5 MCG capsule Take 0.5 mcg by mouth daily   6    rOPINIRole (REQUIP) 0.25 MG tablet Take 0.25 mg by mouth nightly   3    fluticasone-salmeterol (ADVAIR) 500-50 MCG/DOSE diskus inhaler Inhale 1 puff into the lungs every 12 hours.  losartan (COZAAR) 25 MG tablet Take 100 mg by mouth daily       albuterol (PROVENTIL) (2.5 MG/3ML) 0.083% nebulizer solution Take 2.5 mg by nebulization every 4 hours as needed       LIPITOR 40 MG tablet Take 40 mg by mouth daily.  zafirlukast (ACCOLATE) 20 MG tablet Take 20 mg by mouth 2 times daily        No current facility-administered medications for this visit.      Allergies: Cephalexin  Past Medical History:   Diagnosis Date    Asthma     Cancer ([facility] [address]     Breast Cancer    GERD (gastroesophageal reflux disease)     History of blood transfusion     1993    History of therapeutic radiation     Hx antineoplastic chemo     Hyperlipidemia     Hypertension     Osteoporosis     PONV (postoperative nausea and vomiting)     Thyroid disease     Wears glasses      Past Surgical History:   Procedure Laterality Date    APPENDECTOMY      BREAST BIOPSY  6/6/2008    Rt Breast Stereotactic Biopsy, Infiltrating ductal carcinoma, grade 1, Focus of low grade ductal carcinoma in-situ    BREAST RECONSTRUCTION Bilateral 9/14/2021    BILATERAL SKIN SPARING MASTECTOMY VIA URBANO PATTERN WITH TISSUE EXPANDER RECONSTRUCTION (12-15/GURPREET) AND TELABIO (CONNOR) BILATERAL PEC BLOCK performed by Pepper Lynn MD at [address] [facility]  2008    Dr Saul Lubin  02/2021    ECTOPIC Gena Gironberkley      x 3    JOINT REPLACEMENT Left 2018    knee    KNEE ARTHROPLASTY Left 5/23/2019    LEFT KNEE UNI VERSUS performed by Candida Muller MD at [address], PARTIAL  7/3/2008    Rt Breast Lumpectomy & [place] lymph Node Biopsy displaying esidual infiltrating ductal carcinoma grade 1 with foci of low grade DCIS involving the margin. 1 of 2 sentinel lymph nodes positive for . 56mm micrometastisis.  MASTECTOMY, PARTIAL  8/7/2008    Right Partial Mastectomy Re-excision Right axillary node dissection 9 out of 9 lymph nodes negative for a total of 1 of 11 nodes positive for metastatic malignancy.  [place] RMVL MARIELLA CTR VAD W/SUBQ PORT/ CTR/PRPH INSJ N/A 5/18/2017    PORT REMOVAL performed by Jessica Deng MD at [address] Left 5/23/2019    LEFT TOTAL KNEE REPLACEMENT performed by Candida Muller MD at [place] [address]    Dr Denise Cordero Right 6/25/2021    US BREAST NEEDLE BIOPSY RIGHT 6/25/2021 [facility] Lilli Jaquez [facility] [address]79     Family History   Problem Relation Age of Onset    Diabetes Mother     Colon Polyps Mother     Breast Cancer Sister 52     Social History     Tobacco Use    Smoking status: Never Smoker    Smokeless tobacco: Never Used   Substance Use Topics    Alcohol use: No       Review of Systems   Reviewed and positive for the above. All other system noted to be negative. Physical Exam  Blood pressure (!) 161/88, pulse 90, height 5' 2\" (1.575 m), weight 176 lb (79.8 kg), last menstrual period 05/20/2008, SpO2 98 %. GENERAL:  Reveals a 61 y.o. female that  appears to be in no acute distress. HEENT:  Head is normocephalic and atraumatic. NECK:  Neck is supple without masses     CHEST:  Patient has normal respiratory effort. Chest is clear bilaterally with good thoracic expansion. Tissue expander reconstruction with some contracture on the right breast.  There is no evidence of infection.     HEART:  Heart demonstrated a regular rhythm and rate with no cardiac murmurs, gallops or rubs noted to auscultation. ABDOMEN:  The abdomen is soft and nontender      EXTREMITIES:  Extremities demonstrated no cyanosis or pitting edema bilaterally. PSYCHIATRIC:  Patient is oriented to time, place and person. The patient's mood and affect are normal.      IMPRESSION:  Breast cancer status post radiation and chemotherapy  Status post bilateral mastectomy with tissue expander reconstruction    PLAN:  The risks, benefits, and options permanent implants were discussed with the patient. She  is willing to proceed with surgery.

## 2021-12-15 ENCOUNTER — HOSPITAL ENCOUNTER (OUTPATIENT)
Dept: PREADMISSION TESTING | Age: 56
Discharge: HOME | End: 2021-12-19
Payer: MEDICARE

## 2021-12-15 VITALS — WEIGHT: 178 LBS | BODY MASS INDEX: 32.76 KG/M2 | HEIGHT: 62 IN

## 2021-12-15 LAB
EKG P AXIS: 37 DEGREES
EKG P-R INTERVAL: 162 MS
EKG Q-T INTERVAL: 364 MS
EKG QRS DURATION: 84 MS
EKG QTC CALCULATION (BAZETT): 392 MS
EKG T AXIS: 33 DEGREES

## 2021-12-15 PROCEDURE — 93005 ELECTROCARDIOGRAM TRACING: CPT | Performed by: SURGERY

## 2021-12-15 RX ORDER — DOCUSATE SODIUM 100 MG/1
100 CAPSULE, LIQUID FILLED ORAL DAILY PRN
COMMUNITY

## 2021-12-15 RX ORDER — GABAPENTIN 300 MG/1
300 CAPSULE ORAL ONCE
Status: CANCELLED | OUTPATIENT
Start: 2021-12-28

## 2021-12-15 RX ORDER — ACETAMINOPHEN 325 MG/1
975 TABLET ORAL ONCE
Status: CANCELLED | OUTPATIENT
Start: 2021-12-28

## 2021-12-15 RX ORDER — CELECOXIB 200 MG/1
200 CAPSULE ORAL ONCE
Status: CANCELLED | OUTPATIENT
Start: 2021-12-28

## 2021-12-27 ENCOUNTER — OFFICE VISIT (OUTPATIENT)
Dept: SURGERY | Age: 56
End: 2021-12-27

## 2021-12-27 VITALS
WEIGHT: 177.6 LBS | TEMPERATURE: 97.6 F | HEART RATE: 83 BPM | BODY MASS INDEX: 32.68 KG/M2 | SYSTOLIC BLOOD PRESSURE: 159 MMHG | DIASTOLIC BLOOD PRESSURE: 88 MMHG | OXYGEN SATURATION: 99 % | HEIGHT: 62 IN

## 2021-12-27 DIAGNOSIS — Z90.13 S/P BILATERAL MASTECTOMY: Primary | ICD-10-CM

## 2021-12-27 PROCEDURE — 99024 POSTOP FOLLOW-UP VISIT: CPT | Performed by: PHYSICIAN ASSISTANT

## 2021-12-28 ENCOUNTER — HOSPITAL ENCOUNTER (OUTPATIENT)
Age: 56
Setting detail: OUTPATIENT SURGERY
Discharge: HOME | End: 2021-12-28
Attending: SURGERY | Admitting: SURGERY
Payer: MEDICARE

## 2021-12-28 ENCOUNTER — ANESTHESIA (OUTPATIENT)
Dept: OPERATING ROOM | Age: 56
End: 2021-12-28
Payer: MEDICARE

## 2021-12-28 ENCOUNTER — ANESTHESIA EVENT (OUTPATIENT)
Dept: OPERATING ROOM | Age: 56
End: 2021-12-28
Payer: MEDICARE

## 2021-12-28 VITALS
OXYGEN SATURATION: 97 % | RESPIRATION RATE: 8 BRPM | SYSTOLIC BLOOD PRESSURE: 143 MMHG | TEMPERATURE: 97 F | DIASTOLIC BLOOD PRESSURE: 65 MMHG

## 2021-12-28 VITALS
RESPIRATION RATE: 18 BRPM | BODY MASS INDEX: 32.39 KG/M2 | HEART RATE: 80 BPM | DIASTOLIC BLOOD PRESSURE: 86 MMHG | OXYGEN SATURATION: 95 % | TEMPERATURE: 97.9 F | WEIGHT: 176 LBS | SYSTOLIC BLOOD PRESSURE: 158 MMHG | HEIGHT: 62 IN

## 2021-12-28 DIAGNOSIS — Z90.13 S/P BILATERAL MASTECTOMY: Primary | ICD-10-CM

## 2021-12-28 PROCEDURE — 7100000011 HC PHASE II RECOVERY - ADDTL 15 MIN: Performed by: SURGERY

## 2021-12-28 PROCEDURE — 6360000002 HC RX W HCPCS: Performed by: ANESTHESIOLOGY

## 2021-12-28 PROCEDURE — 11970 RPLCMT TISS XPNDR PERM IMPLT: CPT | Performed by: SURGERY

## 2021-12-28 PROCEDURE — 6360000002 HC RX W HCPCS: Performed by: NURSE ANESTHETIST, CERTIFIED REGISTERED

## 2021-12-28 PROCEDURE — 6360000002 HC RX W HCPCS: Performed by: SURGERY

## 2021-12-28 PROCEDURE — 2500000003 HC RX 250 WO HCPCS: Performed by: ANESTHESIOLOGY

## 2021-12-28 PROCEDURE — 2720000010 HC SURG SUPPLY STERILE: Performed by: SURGERY

## 2021-12-28 PROCEDURE — 3600000005 HC SURGERY LEVEL 5 BASE: Performed by: SURGERY

## 2021-12-28 PROCEDURE — 2500000003 HC RX 250 WO HCPCS: Performed by: NURSE ANESTHETIST, CERTIFIED REGISTERED

## 2021-12-28 PROCEDURE — 3600000015 HC SURGERY LEVEL 5 ADDTL 15MIN: Performed by: SURGERY

## 2021-12-28 PROCEDURE — C1789 PROSTHESIS, BREAST, IMP: HCPCS | Performed by: SURGERY

## 2021-12-28 PROCEDURE — 3700000000 HC ANESTHESIA ATTENDED CARE: Performed by: SURGERY

## 2021-12-28 PROCEDURE — 2580000003 HC RX 258: Performed by: SURGERY

## 2021-12-28 PROCEDURE — 19370 REVJ PERI-IMPLT CAPSULE BRST: CPT | Performed by: SURGERY

## 2021-12-28 PROCEDURE — 6370000000 HC RX 637 (ALT 250 FOR IP): Performed by: SURGERY

## 2021-12-28 PROCEDURE — 7100000000 HC PACU RECOVERY - FIRST 15 MIN: Performed by: SURGERY

## 2021-12-28 PROCEDURE — 6370000000 HC RX 637 (ALT 250 FOR IP): Performed by: ANESTHESIOLOGY

## 2021-12-28 PROCEDURE — 7100000001 HC PACU RECOVERY - ADDTL 15 MIN: Performed by: SURGERY

## 2021-12-28 PROCEDURE — 94640 AIRWAY INHALATION TREATMENT: CPT

## 2021-12-28 PROCEDURE — 2580000003 HC RX 258: Performed by: NURSE ANESTHETIST, CERTIFIED REGISTERED

## 2021-12-28 PROCEDURE — 3700000001 HC ADD 15 MINUTES (ANESTHESIA): Performed by: SURGERY

## 2021-12-28 PROCEDURE — 2709999900 HC NON-CHARGEABLE SUPPLY: Performed by: SURGERY

## 2021-12-28 PROCEDURE — 7100000010 HC PHASE II RECOVERY - FIRST 15 MIN: Performed by: SURGERY

## 2021-12-28 DEVICE — IMPLANTABLE DEVICE: Type: IMPLANTABLE DEVICE | Site: BREAST | Status: FUNCTIONAL

## 2021-12-28 RX ORDER — PROPOFOL 10 MG/ML
INJECTION, EMULSION INTRAVENOUS PRN
Status: DISCONTINUED | OUTPATIENT
Start: 2021-12-28 | End: 2021-12-28 | Stop reason: SDUPTHER

## 2021-12-28 RX ORDER — ONDANSETRON 2 MG/ML
4 INJECTION INTRAMUSCULAR; INTRAVENOUS
Status: DISCONTINUED | OUTPATIENT
Start: 2021-12-28 | End: 2021-12-28 | Stop reason: HOSPADM

## 2021-12-28 RX ORDER — LABETALOL HYDROCHLORIDE 5 MG/ML
5 INJECTION, SOLUTION INTRAVENOUS EVERY 10 MIN PRN
Status: DISCONTINUED | OUTPATIENT
Start: 2021-12-28 | End: 2021-12-28 | Stop reason: HOSPADM

## 2021-12-28 RX ORDER — MEPERIDINE HYDROCHLORIDE 25 MG/ML
12.5 INJECTION INTRAMUSCULAR; INTRAVENOUS; SUBCUTANEOUS EVERY 5 MIN PRN
Status: DISCONTINUED | OUTPATIENT
Start: 2021-12-28 | End: 2021-12-28 | Stop reason: HOSPADM

## 2021-12-28 RX ORDER — SCOLOPAMINE TRANSDERMAL SYSTEM 1 MG/1
1 PATCH, EXTENDED RELEASE TRANSDERMAL
Status: DISCONTINUED | OUTPATIENT
Start: 2021-12-28 | End: 2021-12-28 | Stop reason: HOSPADM

## 2021-12-28 RX ORDER — GABAPENTIN 300 MG/1
300 CAPSULE ORAL ONCE
Status: COMPLETED | OUTPATIENT
Start: 2021-12-28 | End: 2021-12-28

## 2021-12-28 RX ORDER — PROCHLORPERAZINE EDISYLATE 5 MG/ML
5 INJECTION INTRAMUSCULAR; INTRAVENOUS
Status: DISCONTINUED | OUTPATIENT
Start: 2021-12-28 | End: 2021-12-28 | Stop reason: HOSPADM

## 2021-12-28 RX ORDER — MIDAZOLAM HYDROCHLORIDE 1 MG/ML
2 INJECTION INTRAMUSCULAR; INTRAVENOUS
Status: DISCONTINUED | OUTPATIENT
Start: 2021-12-28 | End: 2021-12-28 | Stop reason: HOSPADM

## 2021-12-28 RX ORDER — HYDROMORPHONE HYDROCHLORIDE 1 MG/ML
0.5 INJECTION, SOLUTION INTRAMUSCULAR; INTRAVENOUS; SUBCUTANEOUS EVERY 5 MIN PRN
Status: DISCONTINUED | OUTPATIENT
Start: 2021-12-28 | End: 2021-12-28 | Stop reason: HOSPADM

## 2021-12-28 RX ORDER — HYDROCODONE BITARTRATE AND ACETAMINOPHEN 5; 325 MG/1; MG/1
1 TABLET ORAL
Status: COMPLETED | OUTPATIENT
Start: 2021-12-28 | End: 2021-12-28

## 2021-12-28 RX ORDER — ALBUTEROL SULFATE 2.5 MG/3ML
2.5 SOLUTION RESPIRATORY (INHALATION) ONCE
Status: COMPLETED | OUTPATIENT
Start: 2021-12-28 | End: 2021-12-28

## 2021-12-28 RX ORDER — HYDROMORPHONE HYDROCHLORIDE 1 MG/ML
0.25 INJECTION, SOLUTION INTRAMUSCULAR; INTRAVENOUS; SUBCUTANEOUS EVERY 5 MIN PRN
Status: DISCONTINUED | OUTPATIENT
Start: 2021-12-28 | End: 2021-12-28 | Stop reason: HOSPADM

## 2021-12-28 RX ORDER — ONDANSETRON 2 MG/ML
INJECTION INTRAMUSCULAR; INTRAVENOUS PRN
Status: DISCONTINUED | OUTPATIENT
Start: 2021-12-28 | End: 2021-12-28 | Stop reason: SDUPTHER

## 2021-12-28 RX ORDER — SODIUM CHLORIDE, SODIUM LACTATE, POTASSIUM CHLORIDE, CALCIUM CHLORIDE 600; 310; 30; 20 MG/100ML; MG/100ML; MG/100ML; MG/100ML
INJECTION, SOLUTION INTRAVENOUS CONTINUOUS PRN
Status: DISCONTINUED | OUTPATIENT
Start: 2021-12-28 | End: 2021-12-28 | Stop reason: SDUPTHER

## 2021-12-28 RX ORDER — HYDRALAZINE HYDROCHLORIDE 20 MG/ML
5 INJECTION INTRAMUSCULAR; INTRAVENOUS EVERY 10 MIN PRN
Status: DISCONTINUED | OUTPATIENT
Start: 2021-12-28 | End: 2021-12-28 | Stop reason: HOSPADM

## 2021-12-28 RX ORDER — FENTANYL CITRATE 50 UG/ML
INJECTION, SOLUTION INTRAMUSCULAR; INTRAVENOUS PRN
Status: DISCONTINUED | OUTPATIENT
Start: 2021-12-28 | End: 2021-12-28 | Stop reason: SDUPTHER

## 2021-12-28 RX ORDER — LIDOCAINE HYDROCHLORIDE 10 MG/ML
1 INJECTION, SOLUTION EPIDURAL; INFILTRATION; INTRACAUDAL; PERINEURAL
Status: DISCONTINUED | OUTPATIENT
Start: 2021-12-28 | End: 2021-12-28 | Stop reason: HOSPADM

## 2021-12-28 RX ORDER — FENTANYL CITRATE 50 UG/ML
50 INJECTION, SOLUTION INTRAMUSCULAR; INTRAVENOUS
Status: DISCONTINUED | OUTPATIENT
Start: 2021-12-28 | End: 2021-12-28 | Stop reason: HOSPADM

## 2021-12-28 RX ORDER — SODIUM CHLORIDE, SODIUM LACTATE, POTASSIUM CHLORIDE, CALCIUM CHLORIDE 600; 310; 30; 20 MG/100ML; MG/100ML; MG/100ML; MG/100ML
INJECTION, SOLUTION INTRAVENOUS CONTINUOUS
Status: DISCONTINUED | OUTPATIENT
Start: 2021-12-28 | End: 2021-12-28 | Stop reason: HOSPADM

## 2021-12-28 RX ORDER — FENTANYL CITRATE 50 UG/ML
25 INJECTION, SOLUTION INTRAMUSCULAR; INTRAVENOUS EVERY 5 MIN PRN
Status: DISCONTINUED | OUTPATIENT
Start: 2021-12-28 | End: 2021-12-28 | Stop reason: HOSPADM

## 2021-12-28 RX ORDER — SODIUM CHLORIDE 0.9 % (FLUSH) 0.9 %
5-40 SYRINGE (ML) INJECTION EVERY 12 HOURS SCHEDULED
Status: DISCONTINUED | OUTPATIENT
Start: 2021-12-28 | End: 2021-12-28 | Stop reason: HOSPADM

## 2021-12-28 RX ORDER — CELECOXIB 200 MG/1
200 CAPSULE ORAL ONCE
Status: COMPLETED | OUTPATIENT
Start: 2021-12-28 | End: 2021-12-28

## 2021-12-28 RX ORDER — LEVOFLOXACIN 5 MG/ML
750 INJECTION, SOLUTION INTRAVENOUS
Status: COMPLETED | OUTPATIENT
Start: 2021-12-28 | End: 2021-12-28

## 2021-12-28 RX ORDER — LIDOCAINE HYDROCHLORIDE 10 MG/ML
INJECTION, SOLUTION INFILTRATION; PERINEURAL PRN
Status: DISCONTINUED | OUTPATIENT
Start: 2021-12-28 | End: 2021-12-28 | Stop reason: SDUPTHER

## 2021-12-28 RX ORDER — FENTANYL CITRATE 50 UG/ML
50 INJECTION, SOLUTION INTRAMUSCULAR; INTRAVENOUS EVERY 5 MIN PRN
Status: DISCONTINUED | OUTPATIENT
Start: 2021-12-28 | End: 2021-12-28 | Stop reason: HOSPADM

## 2021-12-28 RX ORDER — SODIUM CHLORIDE 0.9 % (FLUSH) 0.9 %
5-40 SYRINGE (ML) INJECTION PRN
Status: DISCONTINUED | OUTPATIENT
Start: 2021-12-28 | End: 2021-12-28 | Stop reason: HOSPADM

## 2021-12-28 RX ORDER — DIPHENHYDRAMINE HYDROCHLORIDE 50 MG/ML
12.5 INJECTION INTRAMUSCULAR; INTRAVENOUS
Status: DISCONTINUED | OUTPATIENT
Start: 2021-12-28 | End: 2021-12-28 | Stop reason: HOSPADM

## 2021-12-28 RX ORDER — APREPITANT 40 MG/1
40 CAPSULE ORAL ONCE
Status: COMPLETED | OUTPATIENT
Start: 2021-12-28 | End: 2021-12-28

## 2021-12-28 RX ORDER — SODIUM CHLORIDE 9 MG/ML
25 INJECTION, SOLUTION INTRAVENOUS PRN
Status: DISCONTINUED | OUTPATIENT
Start: 2021-12-28 | End: 2021-12-28 | Stop reason: HOSPADM

## 2021-12-28 RX ORDER — HYDROCODONE BITARTRATE AND ACETAMINOPHEN 5; 325 MG/1; MG/1
1 TABLET ORAL EVERY 6 HOURS PRN
Qty: 12 TABLET | Refills: 0 | Status: SHIPPED | OUTPATIENT
Start: 2021-12-28 | End: 2022-12-28

## 2021-12-28 RX ORDER — FENTANYL CITRATE 50 UG/ML
25 INJECTION, SOLUTION INTRAMUSCULAR; INTRAVENOUS
Status: DISCONTINUED | OUTPATIENT
Start: 2021-12-28 | End: 2021-12-28 | Stop reason: HOSPADM

## 2021-12-28 RX ORDER — ACETAMINOPHEN 325 MG/1
975 TABLET ORAL ONCE
Status: COMPLETED | OUTPATIENT
Start: 2021-12-28 | End: 2021-12-28

## 2021-12-28 RX ORDER — SODIUM CHLORIDE 9 MG/ML
INJECTION, SOLUTION INTRAVENOUS CONTINUOUS
Status: DISCONTINUED | OUTPATIENT
Start: 2021-12-28 | End: 2021-12-28 | Stop reason: HOSPADM

## 2021-12-28 RX ORDER — ENALAPRILAT 2.5 MG/2ML
1.25 INJECTION INTRAVENOUS
Status: DISCONTINUED | OUTPATIENT
Start: 2021-12-28 | End: 2021-12-28 | Stop reason: HOSPADM

## 2021-12-28 RX ADMIN — PROPOFOL 180 MCG/KG/MIN: 10 INJECTION, EMULSION INTRAVENOUS at 10:57

## 2021-12-28 RX ADMIN — CELECOXIB 200 MG: 200 CAPSULE ORAL at 08:46

## 2021-12-28 RX ADMIN — PROPOFOL 20 MG: 10 INJECTION, EMULSION INTRAVENOUS at 11:07

## 2021-12-28 RX ADMIN — HYDROMORPHONE HYDROCHLORIDE 0.5 MG: 1 INJECTION, SOLUTION INTRAMUSCULAR; INTRAVENOUS; SUBCUTANEOUS at 14:12

## 2021-12-28 RX ADMIN — ONDANSETRON 4 MG: 2 INJECTION INTRAMUSCULAR; INTRAVENOUS at 12:15

## 2021-12-28 RX ADMIN — LIDOCAINE HYDROCHLORIDE 50 MG: 10 INJECTION, SOLUTION INFILTRATION; PERINEURAL at 10:55

## 2021-12-28 RX ADMIN — HYDROCODONE BITARTRATE AND ACETAMINOPHEN 1 TABLET: 5; 325 TABLET ORAL at 15:28

## 2021-12-28 RX ADMIN — SODIUM CHLORIDE, SODIUM LACTATE, POTASSIUM CHLORIDE, AND CALCIUM CHLORIDE: 600; 310; 30; 20 INJECTION, SOLUTION INTRAVENOUS at 11:43

## 2021-12-28 RX ADMIN — ALBUTEROL SULFATE 2.5 MG: 2.5 SOLUTION RESPIRATORY (INHALATION) at 09:11

## 2021-12-28 RX ADMIN — FENTANYL CITRATE 25 MCG: 50 INJECTION, SOLUTION INTRAMUSCULAR; INTRAVENOUS at 11:03

## 2021-12-28 RX ADMIN — HYDROMORPHONE HYDROCHLORIDE 0.25 MG: 1 INJECTION, SOLUTION INTRAMUSCULAR; INTRAVENOUS; SUBCUTANEOUS at 14:02

## 2021-12-28 RX ADMIN — FENTANYL CITRATE 25 MCG: 50 INJECTION, SOLUTION INTRAMUSCULAR; INTRAVENOUS at 12:41

## 2021-12-28 RX ADMIN — LEVOFLOXACIN 500 MG: 5 INJECTION, SOLUTION INTRAVENOUS at 11:05

## 2021-12-28 RX ADMIN — PROPOFOL 20 MG: 10 INJECTION, EMULSION INTRAVENOUS at 11:01

## 2021-12-28 RX ADMIN — ACETAMINOPHEN 975 MG: 325 TABLET ORAL at 08:46

## 2021-12-28 RX ADMIN — HYDROMORPHONE HYDROCHLORIDE 0.25 MG: 1 INJECTION, SOLUTION INTRAMUSCULAR; INTRAVENOUS; SUBCUTANEOUS at 13:53

## 2021-12-28 RX ADMIN — PHENYLEPHRINE HYDROCHLORIDE 100 MCG: 10 INJECTION INTRAVENOUS at 11:20

## 2021-12-28 RX ADMIN — APREPITANT 40 MG: 40 CAPSULE ORAL at 09:14

## 2021-12-28 RX ADMIN — PROPOFOL 20 MG: 10 INJECTION, EMULSION INTRAVENOUS at 10:59

## 2021-12-28 RX ADMIN — FENTANYL CITRATE 25 MCG: 50 INJECTION, SOLUTION INTRAMUSCULAR; INTRAVENOUS at 11:34

## 2021-12-28 RX ADMIN — FENTANYL CITRATE 25 MCG: 50 INJECTION, SOLUTION INTRAMUSCULAR; INTRAVENOUS at 11:53

## 2021-12-28 RX ADMIN — FAMOTIDINE 20 MG: 10 INJECTION, SOLUTION INTRAVENOUS at 09:14

## 2021-12-28 RX ADMIN — PROPOFOL 20 MG: 10 INJECTION, EMULSION INTRAVENOUS at 11:04

## 2021-12-28 RX ADMIN — SODIUM CHLORIDE, SODIUM LACTATE, POTASSIUM CHLORIDE, AND CALCIUM CHLORIDE: 600; 310; 30; 20 INJECTION, SOLUTION INTRAVENOUS at 13:08

## 2021-12-28 RX ADMIN — PROPOFOL 60 MG: 10 INJECTION, EMULSION INTRAVENOUS at 10:56

## 2021-12-28 RX ADMIN — SODIUM CHLORIDE, SODIUM LACTATE, POTASSIUM CHLORIDE, AND CALCIUM CHLORIDE: 600; 310; 30; 20 INJECTION, SOLUTION INTRAVENOUS at 10:50

## 2021-12-28 RX ADMIN — GABAPENTIN 300 MG: 300 CAPSULE ORAL at 08:46

## 2021-12-28 ASSESSMENT — PAIN SCALES - GENERAL
PAINLEVEL_OUTOF10: 0
PAINLEVEL_OUTOF10: 4
PAINLEVEL_OUTOF10: 7
PAINLEVEL_OUTOF10: 5
PAINLEVEL_OUTOF10: 7
PAINLEVEL_OUTOF10: 5
PAINLEVEL_OUTOF10: 7

## 2021-12-28 ASSESSMENT — PAIN DESCRIPTION - DESCRIPTORS
DESCRIPTORS: ACHING
DESCRIPTORS: ACHING
DESCRIPTORS: ACHING;SORE
DESCRIPTORS: ACHING
DESCRIPTORS: ACHING;SORE

## 2021-12-28 ASSESSMENT — PAIN DESCRIPTION - PROGRESSION
CLINICAL_PROGRESSION: GRADUALLY WORSENING
CLINICAL_PROGRESSION: GRADUALLY IMPROVING

## 2021-12-28 ASSESSMENT — PAIN DESCRIPTION - LOCATION
LOCATION: BREAST

## 2021-12-28 ASSESSMENT — PAIN DESCRIPTION - FREQUENCY
FREQUENCY: CONTINUOUS

## 2021-12-28 ASSESSMENT — LIFESTYLE VARIABLES: SMOKING_STATUS: 0

## 2021-12-28 ASSESSMENT — PAIN DESCRIPTION - PAIN TYPE
TYPE: SURGICAL PAIN

## 2021-12-28 ASSESSMENT — PAIN DESCRIPTION - ONSET
ONSET: ON-GOING

## 2021-12-28 ASSESSMENT — PAIN DESCRIPTION - ORIENTATION
ORIENTATION: RIGHT;LEFT

## 2021-12-28 NOTE — H&P
HISTORY OF PRESENT ILLNESS:  Sebas Lane comes today status post bilateral skin sparing mastectomies with tissue expander reconstruction. She has had a history of radiation therapy on the right side. She comes today to discuss tissue expander removal and placement of permanent implants. She has had some contracture due to previous radiation therapy on the right side. We have been able to do expansion to 360 cc bilaterally.           Patient Active Problem List     Diagnosis Date Noted    S/P bilateral mastectomy and reconstruction with expanders 9/17/2021 09/20/2021    Atypical ductal hyperplasia of breast 09/14/2021    Primary osteoarthritis of left knee 05/23/2019    S/P lumpectomy, right breast 8/2008 11/15/2012    Breast cancer metastasized to axillary lymph node 1 of 11 11/15/2012    Family history of malignant neoplasm of breast, sister 11/18/2011    Personal history of malignant neoplasm of breast 06/06/2008      Current Facility-Administered Medications          Current Outpatient Medications   Medication Sig Dispense Refill    HYDROcodone-acetaminophen (NORCO) 5-325 MG per tablet Take 1 tablet by mouth every 6 hours as needed for Pain.  15 tablet 0    nitroglycerin (NITRO-BID) 2 % ointment Place 0.5 inches onto the skin 2 times daily 1 each 3    Diclofenac Sodium (PENNSAID) 2 % SOLN Apply topically        THEOPHYLLINE CR PO Take 40 mg by mouth daily        levothyroxine (SYNTHROID) 50 MCG tablet Take 50 mcg by mouth daily        calcium citrate-vitamin D (CITRICAL + D) 315-250 MG-UNIT TABS per tablet Take 1 tablet by mouth daily        aspirin EC 81 MG EC tablet Take 1 tablet by mouth 2 times daily 60 tablet 0    docusate sodium (COLACE) 100 MG capsule Take 1 capsule by mouth daily To prevent constipation 20 capsule 0    ibuprofen (ADVIL;MOTRIN) 400 MG tablet Take 1 tablet by mouth every 8 hours as needed for Pain 30 tablet 0    loratadine (CLARITIN) 10 MG tablet Take 10 mg by mouth        pantoprazole (PROTONIX) 40 MG tablet Take 40 mg by mouth daily         tiZANidine (ZANAFLEX) 4 MG tablet Take 4 mg by mouth nightly         alendronate (FOSAMAX) 70 MG tablet Take 70 mg by mouth every 7 days Indications: Sunday    3    calcitRIOL (ROCALTROL) 0.5 MCG capsule Take 0.5 mcg by mouth daily    6    rOPINIRole (REQUIP) 0.25 MG tablet Take 0.25 mg by mouth nightly    3    fluticasone-salmeterol (ADVAIR) 500-50 MCG/DOSE diskus inhaler Inhale 1 puff into the lungs every 12 hours.        losartan (COZAAR) 25 MG tablet Take 100 mg by mouth daily         albuterol (PROVENTIL) (2.5 MG/3ML) 0.083% nebulizer solution Take 2.5 mg by nebulization every 4 hours as needed         LIPITOR 40 MG tablet Take 40 mg by mouth daily.        zafirlukast (ACCOLATE) 20 MG tablet Take 20 mg by mouth 2 times daily           No current facility-administered medications for this visit.         Allergies: Cephalexin  Past Medical History        Past Medical History:   Diagnosis Date    Asthma      Cancer (HCC)       Breast Cancer    GERD (gastroesophageal reflux disease)      History of blood transfusion       1993    History of therapeutic radiation      Hx antineoplastic chemo      Hyperlipidemia      Hypertension      Osteoporosis      PONV (postoperative nausea and vomiting)      Thyroid disease      Wears glasses           Past Surgical History         Past Surgical History:   Procedure Laterality Date    APPENDECTOMY        BREAST BIOPSY   6/6/2008     Rt Breast Stereotactic Biopsy, Infiltrating ductal carcinoma, grade 1, Focus of low grade ductal carcinoma in-situ    BREAST RECONSTRUCTION Bilateral 9/14/2021     BILATERAL SKIN SPARING MASTECTOMY VIA URBANO PATTERN WITH TISSUE EXPANDER RECONSTRUCTION (12-15/GURPREET) AND TELABIO (CONNOR) BILATERAL PEC BLOCK performed by Brett Martinez MD at [facility]   2008     Dr Karol Marc 02/2021    ECTOPIC PREGNANCY SURGERY        EXCISION OF PARATHYROID MASS         x 3    JOINT REPLACEMENT Left 2018     knee    KNEE ARTHROPLASTY Left 5/23/2019     LEFT KNEE UNI VERSUS performed by Jena Lazcano MD at [address], PARTIAL   7/3/2008     Rt Breast Lumpectomy & [place] lymph Node Biopsy displaying esidual infiltrating ductal carcinoma grade 1 with foci of low grade DCIS involving the margin. 1 of 2 sentinel lymph nodes positive for . 56mm micrometastisis.  MASTECTOMY, PARTIAL   8/7/2008     Right Partial Mastectomy Re-excision Right axillary node dissection 9 out of 9 lymph nodes negative for a total of 1 of 11 nodes positive for metastatic malignancy.  [place] RMVL MARIELLA CTR VAD W/SUBQ PORT/ CTR/PRPH INSJ N/A 5/18/2017     PORT REMOVAL performed by Ciara Alexis MD [address] Left 5/23/2019     LEFT TOTAL KNEE REPLACEMENT performed by Jena Lazcano MD at [facility]   2008     Dr Angelica Lopez Right 6/25/2021      [facility]         Family History         Family History   Problem Relation Age of Onset    Diabetes Mother      Colon Polyps Mother      Breast Cancer Sister 52         Social History           Tobacco Use    Smoking status: Never Smoker    Smokeless tobacco: Never Used   Substance Use Topics    Alcohol use: No         Review of Systems   Reviewed and positive for the above. All other system noted to be negative.        Physical Exam  Blood pressure (!) 161/88, pulse 90, height 5' 2\" (1.575 m), weight 176 lb (79.8 kg), last menstrual period 05/20/2008, SpO2 98 %.     GENERAL:  Reveals a 61 y.o. female that  appears to be in no acute distress.     HEENT:  Head is normocephalic and atraumatic.     NECK:  Neck is supple without masses      CHEST:  Patient has normal respiratory effort.  Chest is clear bilaterally with good thoracic expansion. Tissue expander reconstruction with some contracture on the right breast.  There is no evidence of infection.     HEART:  Heart demonstrated a regular rhythm and rate with no cardiac murmurs, gallops or rubs noted to auscultation.     ABDOMEN:  The abdomen is soft and nontender        EXTREMITIES:  Extremities demonstrated no cyanosis or pitting edema bilaterally.     PSYCHIATRIC:  Patient is oriented to time, place and person. The patient's mood and affect are normal.        IMPRESSION:  Breast cancer status post radiation and chemotherapy  Status post bilateral mastectomy with tissue expander reconstruction     PLAN:  The risks, benefits, and options permanent implants were discussed with the patient. She  is willing to proceed with surgery.

## 2021-12-28 NOTE — ANESTHESIA PRE PROCEDURE
Department of Anesthesiology  Preprocedure Note       Name:  Raysa Mckenzie   Age:  61 y.o.  :  1965                                          MRN:  [id number]         Date:  2021      Surgeon: Cesia Hernández):  Emma Bell MD    Procedure: Procedure(s):  BILATERAL IMPLANT EXCHANGE (300-500, EXTRA PROJECTION & COHESIVE)    Medications prior to admission:   Prior to Admission medications    Medication Sig Start Date End Date Taking? Authorizing Provider   docusate sodium (COLACE) 100 MG capsule Take 100 mg by mouth daily as needed for Constipation   Yes Historical Provider, MD   mupirocin (BACTROBAN NASAL) 2 % nasal ointment Take by Nasal route 2 times daily for 5 days prior to surgery 21  Yes Carole Murray PA-C   HYDROcodone-acetaminophen (NORCO) 5-325 MG per tablet Take 1 tablet by mouth every 6 hours as needed for Pain. 9/15/21 9/15/22 Yes Emma Bell MD   Diclofenac Sodium (PENNSAID) 2 % SOLN Apply topically as needed    Yes Historical Provider, MD   THEOPHYLLINE CR PO Take 40 mg by mouth daily   Yes Historical Provider, MD   levothyroxine (SYNTHROID) 50 MCG tablet Take 50 mcg by mouth daily 20  Yes Historical Provider, MD   calcium citrate-vitamin D (CITRICAL + D) 315-250 MG-UNIT TABS per tablet Take 1 tablet by mouth daily   Yes Historical Provider, MD   loratadine (CLARITIN) 10 MG tablet Take 10 mg by mouth   Yes Historical Provider, MD   pantoprazole (PROTONIX) 40 MG tablet Take 40 mg by mouth daily  16  Yes Historical Provider, MD   tiZANidine (ZANAFLEX) 4 MG tablet Take 4 mg by mouth nightly    Yes Historical Provider, MD   calcitRIOL (ROCALTROL) 0.5 MCG capsule Take 0.5 mcg by mouth daily  10/24/14  Yes Historical Provider, MD   rOPINIRole (REQUIP) 0.25 MG tablet Take 0.25 mg by mouth nightly  10/23/14  Yes Historical Provider, MD   fluticasone-salmeterol (ADVAIR) 500-50 MCG/DOSE diskus inhaler Inhale 1 puff into the lungs every 12 hours.    Yes Historical Provider, MD losartan (COZAAR) 25 MG tablet Take 100 mg by mouth daily    Yes Historical Provider, MD   albuterol (PROVENTIL) (2.5 MG/3ML) 0.083% nebulizer solution Take 2.5 mg by nebulization every 4 hours as needed for Wheezing  10/23/11  Yes Historical Provider, MD   LIPITOR 40 MG tablet Take 40 mg by mouth daily. 11/17/11  Yes Historical Provider, MD   zafirlukast (ACCOLATE) 20 MG tablet Take 20 mg by mouth 2 times daily  10/14/11  Yes Historical Provider, MD   nitroglycerin (NITRO-BID) 2 % ointment Place 0.5 inches onto the skin 2 times daily 9/15/21   Naya Tello MD   aspirin EC 81 MG EC tablet Take 1 tablet by mouth 2 times daily 5/24/19   Samuel Russell MD   ibuprofen (ADVIL;MOTRIN) 400 MG tablet Take 1 tablet by mouth every 8 hours as needed for Pain 5/24/19   Samuel Russell MD   alendronate (FOSAMAX) 70 MG tablet Take 70 mg by mouth every 7 days Indications: [place]  10/23/14   Historical Provider, MD       Current medications:    Current Facility-Administered Medications   Medication Dose Route Frequency Provider Last Rate Last Admin    acetaminophen (TYLENOL) tablet 975 mg  975 mg Oral Once Naya Tello MD        celecoxib (CELEBREX) capsule 200 mg  200 mg Oral Once Naya Tello MD        gabapentin (NEURONTIN) capsule 300 mg  300 mg Oral Once Naya Tello MD        lactated ringers infusion   IntraVENous Continuous Tiera Bradley MD           Allergies:     Allergies   Allergen Reactions    Cephalexin Rash       Problem List:    Patient Active Problem List   Diagnosis Code    Personal history of malignant neoplasm of breast Z85.3    Family history of malignant neoplasm of breast, sister Z80.2    S/P lumpectomy, right breast 8/2008 Z98.890    Breast cancer metastasized to axillary lymph node 1 of 11 C50.919, C77.3    Primary osteoarthritis of left knee M17.12    Atypical ductal hyperplasia of breast N60.99    S/P bilateral mastectomy and reconstruction with expanders 9/17/2021 Z90.13       Past Medical History:        Diagnosis Date    Asthma     Cancer ([facility] [address]     Breast Cancer    GERD (gastroesophageal reflux disease)     History of blood transfusion     1993    History of therapeutic radiation     Hx antineoplastic chemo     Hyperlipidemia     Hypertension     Osteoporosis     PONV (postoperative nausea and vomiting)     Post-menopausal     Thyroid disease     Wears glasses        Past Surgical History:        Procedure Laterality Date    APPENDECTOMY      BREAST BIOPSY  6/6/2008    Rt Breast Stereotactic Biopsy, Infiltrating ductal carcinoma, grade 1, Focus of low grade ductal carcinoma in-situ    BREAST RECONSTRUCTION Bilateral 9/14/2021    BILATERAL SKIN SPARING MASTECTOMY VIA URBANO PATTERN WITH TISSUE EXPANDER RECONSTRUCTION (12-15/GURPREET) AND TELABIO (CONNOR) BILATERAL PEC BLOCK performed by Manuel Kelly MD at [facility]  2008    Dr Adeline Shaver  02/2021    ECTOPIC PREGNANCY SURGERY      ENDOSCOPY, COLON, DIAGNOSTIC      EXCISION OF PARATHYROID MASS      x 3    JOINT REPLACEMENT Left 2018    knee    KNEE ARTHROPLASTY Left 5/23/2019    LEFT KNEE UNI VERSUS performed by Naomi Chung MD at [facility], PARTIAL  7/3/2008    Rt Breast Lumpectomy & [place] lymph Node Biopsy displaying esidual infiltrating ductal carcinoma grade 1 with foci of low grade DCIS involving the margin. 1 of 2 sentinel lymph nodes positive for . 56mm micrometastisis.  MASTECTOMY, PARTIAL  8/7/2008    Right Partial Mastectomy Re-excision Right axillary node dissection 9 out of 9 lymph nodes negative for a total of 1 of 11 nodes positive for metastatic malignancy.       [place] RMVL MARIELLA CTR VAD W/SUBQ PORT/ CTR/PRPH INSJ N/A 5/18/2017    PORT REMOVAL performed by Manuel Kelly MD [address] Left 5/23/2019    LEFT TOTAL KNEE REPLACEMENT performed by Eileen Wise MD at [facility] ENDOSCOPY  2008    Dr Nikolas Polanco Right 6/25/2021     BREAST NEEDLE BIOPSY RIGHT 6/25/2021 [facility] L[address] [place] [address]       Social History:    Social History     Tobacco Use    Smoking status: Never Smoker    Smokeless tobacco: Never Used   Substance Use Topics    Alcohol use: No                                Counseling given: Not Answered      Vital Signs (Current):   Vitals:    12/28/21 0814   BP: (!) 179/91   Pulse: 104   Resp: 16   Temp: 99.7 °F (37.6 °C)   TempSrc: Temporal   SpO2: 100%   Weight: 176 lb (79.8 kg)   Height: 5' 2\" (1.575 m)                                              BP Readings from Last 3 Encounters:   12/28/21 (!) 179/91   12/27/21 (!) 159/88   12/13/21 (!) 161/88       NPO Status:                                                                                 BMI:   Wt Readings from Last 3 Encounters:   12/28/21 176 lb (79.8 kg)   12/27/21 177 lb 9.6 oz (80.6 kg)   12/15/21 178 lb (80.7 kg)     Body mass index is 32.19 kg/m². CBC:   Lab Results   Component Value Date    WBC 8.0 09/13/2021    RBC 5.35 09/13/2021    HGB 15.3 09/13/2021    HCT 49.4 09/13/2021    MCV 92.3 09/13/2021    RDW 13.5 09/13/2021     09/13/2021       CMP:   Lab Results   Component Value Date     09/13/2021    K 4.4 09/13/2021    K 4.2 05/24/2019     09/13/2021    CO2 23 09/13/2021    BUN 11 09/13/2021    CREATININE 1.0 09/13/2021    GFRAA >59 09/13/2021    LABGLOM 57 09/13/2021    GLUCOSE 90 09/13/2021    CALCIUM 9.4 09/13/2021       POC Tests: No results for input(s): POCGLU, POCNA, POCK, POCCL, POCBUN, POCHEMO, POCHCT in the last 72 hours.     Coags:   Lab Results   Component Value Date    PROTIME 12.0 05/20/2019    INR 0.94 05/20/2019    APTT 28.4 05/20/2019       HCG (If Applicable): No results found for: PREGTESTUR, PREGSERUM, HCG, HCGQUANT     ABGs: No results found for: PHART, PO2ART, ROU3VPX, PVL8TSM, BEART, T7TCZUSJ     Type & Screen (If Applicable):  No results found for: LABABO, LABRH    Drug/Infectious Status (If Applicable):  No results found for: HIV, HEPCAB    COVID-19 Screening (If Applicable):   Lab Results   Component Value Date    COVID19 Not Detected 09/13/2021           Anesthesia Evaluation  Patient summary reviewed   history of anesthetic complications: PONV. Airway: Mallampati: I  TM distance: >3 FB   Neck ROM: full  Mouth opening: > = 3 FB Dental: normal exam         Pulmonary:normal exam  breath sounds clear to auscultation  (+) asthma (Takes medications daily, uses rescue 2-3 times weekly):     (-) recent URI, sleep apnea and not a current smoker                           Cardiovascular:  Exercise tolerance: good (>4 METS),   (+) hypertension:,     (-) pacemaker, past MI, CABG/stent and  angina    ECG reviewed  Rhythm: regular  Rate: normal           Beta Blocker:  Not on Beta Blocker         Neuro/Psych:      (-) seizures, TIA and CVA           GI/Hepatic/Renal:   (+) GERD: well controlled,      (-) liver disease and no renal disease       Endo/Other:    (+) hypothyroidism::., malignancy/cancer (Breast). (-) diabetes mellitus, hyperthyroidism               Abdominal:             Vascular:     - DVT. Other Findings:               Anesthesia Plan      TIVA and general     ASA 3     (Scop, emend, and pepcid. Alubuterol neb preop. TIVA for ponv control.)  Induction: intravenous. MIPS: Postoperative opioids intended and Prophylactic antiemetics administered. Anesthetic plan and risks discussed with patient. Use of blood products discussed with patient whom consented to blood products.                    Timmy Bradshaw DO   12/28/2021

## 2021-12-28 NOTE — BRIEF OP NOTE
Brief Postoperative Note      DATE OF PROCEDURE: 12/28/2021     SURGEON: Hilario Landry MD    PREOPERATIVE DIAGNOSIS: HX RIGHT BREAST CANCER    POSTOPERATIVE DIAGNOSIS: Same     OPERATION: Procedure(s):  BILATERAL IMPLANT EXCHANGE (300-500, EXTRA PROJECTION & COHESIVE),SURGERY OF THE LEFT CAPSULE surgery of right breast capsule    ANESTHESIA: MAC/pec block    ESTIMATED BLOOD LOSS: Minimal    COMPLICATIONS: None. SPECIMENS: * No specimens in log *    DRAINS: None    The patient tolerated the procedure well.     Electronically signed by Hilario Landry MD  on 12/28/2021 at 1:42 PM

## 2021-12-28 NOTE — ANESTHESIA POSTPROCEDURE EVALUATION
Department of Anesthesiology  Postprocedure Note    Patient: Issa Ochoa  MRN: [id number]  YOB: 1965  Date of evaluation: 12/28/2021  Time:  1:44 PM     Procedure Summary     Date: 12/28/21 Room / Location: [facility] OR [address] [place] [address]    Anesthesia Start: 1050 Anesthesia Stop: 1763    Procedure: BILATERAL IMPLANT EXCHANGE (300-500, EXTRA PROJECTION & COHESIVE),SURGERY OF THE LEFT CAPSULE (Bilateral ) Diagnosis: (HX RIGHT BREAST CANCER)    Surgeons: Tyrell Xiong MD Responsible Provider: DAVID Leahy CRNA    Anesthesia Type: TIVA, general ASA Status: 3          Anesthesia Type: TIVA, general    Karen Phase I: Karen Score: 10    Karen Phase II:      Last vitals: Reviewed and per EMR flowsheets.        Anesthesia Post Evaluation    Patient location during evaluation: PACU  Patient participation: complete - patient participated  Level of consciousness: sleepy but conscious  Pain score: 0  Airway patency: patent  Nausea & Vomiting: no nausea and no vomiting  Complications: no  Cardiovascular status: hemodynamically stable and blood pressure returned to baseline  Respiratory status: acceptable, spontaneous ventilation, oral airway, nasal airway and face mask  Hydration status: euvolemic

## 2021-12-29 NOTE — OP NOTE
[facility] [employer] [facility] MARIELA Camacho [address], [address] [facility]                                OPERATIVE REPORT    PATIENT NAME: Braden Duncan                    :        1965  MED REC NO:   [id number]                              ROOM:  ACCOUNT NO:   [de-identified]                           ADMIT DATE: 2021  PROVIDER:     Rojelio Dubon MD    DATE OF PROCEDURE:  2021    PREOPERATIVE DIAGNOSIS:  Status post bilateral mastectomy with tissue  expander reconstruction for implant exchange. POSTOPERATIVE DIAGNOSIS:  Status post bilateral mastectomy with tissue  expander reconstruction for implant exchange. PROCEDURES:  1.  Bilateral pectoral block. 2.  Bilateral implant exchange. 3.  Bilateral surgery of the breast capsule. SURGEON:  Rojelio Dubon MD    ASSISTANT:  Joseph Del Castillo PA-C. He was present for all portions of  the case including all critical portions as well as closure. ANESTHESIA:  Pec block with MAC. INDICATIONS:  The patient is a 65-year-old lady who approximately 10  years ago underwent right lumpectomy and postoperative radiation. She  received chemotherapy at that time and did very well. She more recently  had a biopsy that showed atypical ductal hyperplasia and elected to  proceed with bilateral mastectomy and reconstruction. Because of  radiation on the right side, this has all been very challenging. She  has had her fills and has about 350 in each side. We discussed risks  and benefits and trying to match the two sides up as best we could, and  she understands and is agreeable. OPERATIVE PROCEDURE:  Today, she was brought to the operating room,  adequately sedated, and prepped with chlorhexidine. We then proceeded  with our bilateral pectoral block. We utilized a solution of 100 mL of  0.2% ropivacaine, 16 mg of Decadron, and 100 mL of saline.   We used  ultrasound guidance and then injected both lateral sternal borders  getting the intercostals approximately 5 on each side. We then injected  the inframammary crease bilaterally, the infraclavicular area  bilaterally. We then injected the interpectoral space bilaterally again  under ultrasound guidance. We then went laterally and injected  approximately 6 intercostals again with ultrasound guidance and then we  repeated this on the other side. Once this was done, we re-prepped,  re-draped, began on the left side, reopened the lateral portion of our  incision and dissected down to the capsule. We withdrew the intact  expander which was not tight. We irrigated copiously. The previous  AlloDerm was well incorporated. We then placed a 400 mL sizer and it  was far too small. We then went up to a 495 sizer and it actually fit  in there fairly nicely. The cavity was a little too big for it, so we  did a lateral capsulorrhaphy both with popcorning and 2-0 PDS sutures  closing down the lateral aspect to move her implant more medially. We  then packed this wound open with antibiotic-soaked gauze. We then  turned our attention to the right side. This side had been radiated and  was quite scarred. We reopened the lateral portion of the incision,  again dissected down to the implant and removed it. It had not expanded  nearly as well and was riding a little high. We irrigated copiously. The AlloDerm again was well incorporated and on the inferior aspect of  this, we scored the capsule using a Bovie electrocautery both  horizontally and vertically to allow for some stretching on the inferior  aspect of this. We also released some of the capsule laterally to allow  the implant to fit nicer. We then tried multiple sizers and actually  ended up with the same size seemed to go nicely on the right.   We  irrigated copiously, made sure good hemostasis, everything was in good  order and then we obtained our left implant which has reference number  [id number], serial number [id number], which is a 495 mL cohesive  extra-projection implant. We placed it using no-touch technique with a  little Betadine. It fit in quite nicely. We closed with 2-0 and 3-0  Vicryl for the capsule and then 4-0 Stratafix for the skin. A Prineo  dressing was applied. We then turned to the right side in a similar  fashion using no-touch technique placed a Jacobe Inspira cohesive  breast implant, reference number [id number], serial number [id number]. She  was relatively symmetric when lying supine. When we raised her up, the  right side still rode a little high which hopefully by scoring the  capsule this will improve with time. Again, closed with 2-0 and 3-0  Vicryl and 4-0 Stratafix. Then, another Prineo dressing was applied. Estimated blood loss, minimal.  Complications, none. She tolerated this  quite well.         Tracy Mckeon MD    D: 12/28/2021 15:05:18      T: 12/28/2021 16:24:22     [email or web address]  Job#: [id number]     Doc#: [id number]    CC:

## 2022-01-04 NOTE — PROGRESS NOTES
HISTORY OF PRESENT ILLNESS:    Ms. Cristian See present today for a post op breast check. She is status post bilateral implant exchange on 12/28/2021. She had placement of bilateral SCX-495 cohesive gel implants    She underwent a Right ultrasound guided breast biopsy. Pathology demonstrated intraductal papilloma with atypia. She has appropriate postoperative discomfort, and no significant complaints. This is on the same side as her right breast cancer treated with lumpectomy and radiation in 2008. She has had enough and wishes to consider bilateral mastectomies and reconstruction      She is now status post  Bilateral mastectomy and immediate reconstruction with expanders and AlloDerm on 9/14/2021    PATHOLOGY REVEALS:  FINAL DIAGNOSIS:    A. Right breast, mastectomy:   Ductal carcinoma in situ, papillary and solid subtype. Maximum tumor diameter is 0.6 cm. The surgical margins and nipple are free of tumor. B.  Left breast, mastectomy:   No malignancy identified. Benign breast tissue. The surgical margins are free of tumor. She called last weekend concerned about swelling and pain in the left breast and I had her come in for evaluation. I think it was just the block wearing off    PHYSICAL EXAM:  The  wounds look good with no evidence of infection, fluid accumulation, or skin necrosis. The right side still rides a little high but it better than anticipated. IMPRESSION:    Doing well s/p bilateral implant exchange 12/28/2021    PLAN:   I will see her back in 1 month     ADDENDUM: 1/7/2022     She called with some concern about some redness on the right. I examined her and op care one. It looks more like rubbing from her bra but I did start her on Augmentin and we will see her in the office on Monday    I have seen, examined and reviewed this patient medication list, appropriate labs and imaging studies. I reviewed relevant medical records and others physicians notes.  I discussed the plans of care with the patient. I answered all the questions to the patients satisfaction. I, Dr Sid Hollingsworth, personally performed the services described in this documentation as scribed by Manny Smith MA in my presence and is both accurate and complete. (Please note that portions of this note were completed with a voice recognition program. Efforts were made to edit the dictations but occasionally words are mis-transcribed.)  Over 50% of the total visit time of 15 minutes in face to face encounter with the patient, out of which more than 50% of the time was spent in counseling patient or family and coordination of care. Counseling included but was not limited to time spent reviewing labs, imaging studies/ treatment plan and answering questions.

## 2022-01-05 ENCOUNTER — OFFICE VISIT (OUTPATIENT)
Dept: SURGERY | Age: 57
End: 2022-01-05

## 2022-01-05 VITALS
HEIGHT: 62 IN | WEIGHT: 178 LBS | HEART RATE: 107 BPM | BODY MASS INDEX: 32.76 KG/M2 | OXYGEN SATURATION: 99 % | TEMPERATURE: 98.8 F

## 2022-01-05 DIAGNOSIS — Z90.13 S/P BILATERAL MASTECTOMY: Primary | ICD-10-CM

## 2022-01-05 PROCEDURE — 99024 POSTOP FOLLOW-UP VISIT: CPT | Performed by: SURGERY

## 2022-01-10 ENCOUNTER — OFFICE VISIT (OUTPATIENT)
Dept: SURGERY | Age: 57
End: 2022-01-10

## 2022-01-10 VITALS — HEIGHT: 62 IN | BODY MASS INDEX: 32.76 KG/M2 | TEMPERATURE: 97.9 F | WEIGHT: 178 LBS

## 2022-01-10 DIAGNOSIS — Z90.13 S/P BILATERAL MASTECTOMY: Primary | ICD-10-CM

## 2022-01-10 PROCEDURE — 99024 POSTOP FOLLOW-UP VISIT: CPT | Performed by: SURGERY

## 2022-01-10 RX ORDER — AMOXICILLIN AND CLAVULANATE POTASSIUM 875; 125 MG/1; MG/1
TABLET, FILM COATED ORAL
COMMUNITY
Start: 2022-01-07 | End: 2022-01-24 | Stop reason: ALTCHOICE

## 2022-01-10 NOTE — PROGRESS NOTES
HISTORY OF PRESENT ILLNESS:    Ms. Eugenio Leiva present today for a post op breast check. She is status post bilateral implant exchange on 12/28/2021. She had placement of bilateral SCX-495 cohesive gel implants    She underwent a Right ultrasound guided breast biopsy. Pathology demonstrated intraductal papilloma with atypia. She has appropriate postoperative discomfort, and no significant complaints. This is on the same side as her right breast cancer treated with lumpectomy and radiation in 2008. She has had enough and wishes to consider bilateral mastectomies and reconstruction      She is now status post  Bilateral mastectomy and immediate reconstruction with expanders and AlloDerm on 9/14/2021    PATHOLOGY REVEALS:  FINAL DIAGNOSIS:    A. Right breast, mastectomy:   Ductal carcinoma in situ, papillary and solid subtype. Maximum tumor diameter is 0.6 cm. The surgical margins and nipple are free of tumor. B.  Left breast, mastectomy:   No malignancy identified. Benign breast tissue. The surgical margins are free of tumor. She called last weekend concerned about swelling and pain in the left breast and I had her come in for evaluation. I think it was just the block wearing off    PHYSICAL EXAM:  The  wounds look good with no evidence of infection, fluid accumulation, or skin necrosis. The right side still rides a little high but it better than anticipated. IMPRESSION:    Doing well s/p bilateral implant exchange 12/28/2021    PLAN:   I will see her back in 1 month     ADDENDUM: 1/7/2022     She called with some concern about some redness on the right. I examined her and op care one. It looks more like rubbing from her bra but I did start her on Augmentin and we will see her in the office on Monday    Today she comes in after 3 days of Augmentin. The redness is much improved. Ultrasound in the office demonstrates some fluid laterally.   We attempted aspiration and appeared to be all old blood. There is no evidence of pus. Impression: Much improved over 3 days.     Plan: Continue Augmentin and follow-up in 3 weeks as previously scheduled

## 2022-01-12 ENCOUNTER — TELEPHONE (OUTPATIENT)
Dept: INTERNAL MEDICINE | Facility: CLINIC | Age: 57
End: 2022-01-12

## 2022-01-12 RX ORDER — FLUCONAZOLE 150 MG/1
150 TABLET ORAL ONCE
Qty: 2 TABLET | Refills: 0 | Status: SHIPPED | OUTPATIENT
Start: 2022-01-12 | End: 2022-01-12

## 2022-01-12 NOTE — TELEPHONE ENCOUNTER
Caller: Luna Cruz    Relationship: Self        What medication are you requesting: ANABIOTIC     What are your current symptoms: VAGINAL COTTEGE CHEESE DISCHARGE ITCHING    How long have you been experiencing symptoms: 2 DAYS   Have you had these symptoms before:    [x] Yes  [] No    Have you been treated for these symptoms before:   [x] Yes  [] No    If a prescription is needed, what is your preferred pharmacy and phone number:    [id number] - [place], [place] - [address] AT [facility] [address] - [phone] [facility] [phone]   [phone]  Associate Signed OrdersPatient EstimateProvidersCurrent Interactions

## 2022-01-13 NOTE — PROGRESS NOTES
HISTORY OF PRESENT ILLNESS:  Yuriy Sanchez is a 61 y.o. female who is status post bilateral mastectomy with tissue expander reconstruction. She has had a history of breast cancer status post radiation and chemotherapy on right breast.  She more recently had a biopsy to reveal atypical ductal hyperplasia. She elected to proceed with bilateral mastectomy with reconstruction. This was performed by Dr. Didier Miramontes with tissue expander reconstruction. The tissue has been expanded and patient now wishes to proceed with permanent implant placement. The right side which has been radiated was difficult to expand. She is aware that she may need a latissimus flap at some point. Again, the risk benefits and alternatives of surgery were discussed and she wished to proceed. Patient Active Problem List    Diagnosis Date Noted    S/P bilateral mastectomy and reconstruction with expanders 9/17/2021 09/20/2021    Atypical ductal hyperplasia of breast 09/14/2021    Primary osteoarthritis of left knee 05/23/2019    S/P lumpectomy, right breast 8/2008 11/15/2012    Breast cancer metastasized to axillary lymph node 1 of 11 11/15/2012    Family history of malignant neoplasm of breast, sister 11/18/2011    Personal history of malignant neoplasm of breast 06/06/2008     Current Outpatient Medications   Medication Sig Dispense Refill    docusate sodium (COLACE) 100 MG capsule Take 100 mg by mouth daily as needed for Constipation      mupirocin (BACTROBAN NASAL) 2 % nasal ointment Take by Nasal route 2 times daily for 5 days prior to surgery 1 each 0    HYDROcodone-acetaminophen (NORCO) 5-325 MG per tablet Take 1 tablet by mouth every 6 hours as needed for Pain.  15 tablet 0    nitroglycerin (NITRO-BID) 2 % ointment Place 0.5 inches onto the skin 2 times daily 1 each 3    Diclofenac Sodium (PENNSAID) 2 % SOLN Apply topically as needed       THEOPHYLLINE CR PO Take 40 mg by mouth daily      levothyroxine (SYNTHROID) 50 MCG tablet Take 50 mcg by mouth daily      calcium citrate-vitamin D (CITRICAL + D) 315-250 MG-UNIT TABS per tablet Take 1 tablet by mouth daily      aspirin EC 81 MG EC tablet Take 1 tablet by mouth 2 times daily 60 tablet 0    ibuprofen (ADVIL;MOTRIN) 400 MG tablet Take 1 tablet by mouth every 8 hours as needed for Pain 30 tablet 0    loratadine (CLARITIN) 10 MG tablet Take 10 mg by mouth      pantoprazole (PROTONIX) 40 MG tablet Take 40 mg by mouth daily       tiZANidine (ZANAFLEX) 4 MG tablet Take 4 mg by mouth nightly       alendronate (FOSAMAX) 70 MG tablet Take 70 mg by mouth every 7 days Indications: Sunday   3    calcitRIOL (ROCALTROL) 0.5 MCG capsule Take 0.5 mcg by mouth daily   6    rOPINIRole (REQUIP) 0.25 MG tablet Take 0.25 mg by mouth nightly   3    fluticasone-salmeterol (ADVAIR) 500-50 MCG/DOSE diskus inhaler Inhale 1 puff into the lungs every 12 hours       losartan (COZAAR) 25 MG tablet Take 100 mg by mouth daily       albuterol (PROVENTIL) (2.5 MG/3ML) 0.083% nebulizer solution Take 2.5 mg by nebulization every 4 hours as needed for Wheezing       LIPITOR 40 MG tablet Take 40 mg by mouth daily.  zafirlukast (ACCOLATE) 20 MG tablet Take 20 mg by mouth 2 times daily       amoxicillin-clavulanate (AUGMENTIN) 875-125 MG per tablet TAKE 1 TABLET BY MOUTH TWICE DAILY      HYDROcodone-acetaminophen (NORCO) 5-325 MG per tablet Take 1 tablet by mouth every 6 hours as needed for Pain. 12 tablet 0     No current facility-administered medications for this visit.      Allergies: Cephalexin  Past Medical History:   Diagnosis Date    Asthma     Cancer ([facility] [address]     Breast Cancer    GERD (gastroesophageal reflux disease)     History of blood transfusion     1993    History of therapeutic radiation     Hx antineoplastic chemo     Hyperlipidemia     Hypertension     Osteoporosis     PONV (postoperative nausea and vomiting)     Post-menopausal     Thyroid disease     Wears glasses Past Surgical History:   Procedure Laterality Date    APPENDECTOMY      BREAST BIOPSY  6/6/2008    Rt Breast Stereotactic Biopsy, Infiltrating ductal carcinoma, grade 1, Focus of low grade ductal carcinoma in-situ    BREAST ENHANCEMENT SURGERY Bilateral 12/28/2021    BILATERAL IMPLANT EXCHANGE (300-500, EXTRA PROJECTION & COHESIVE),SURGERY OF THE LEFT CAPSULE performed by Ryan Medley MD at [address] Bilateral 9/14/2021    BILATERAL SKIN SPARING MASTECTOMY VIA URBANO PATTERN WITH TISSUE EXPANDER RECONSTRUCTION (12-15/GURPREET) AND TELABIO (CONNOR) BILATERAL PEC BLOCK performed by Ryan Medley MD at [facility]  2008    Dr Callie Oden  02/2021    ECTOPIC PREGNANCY SURGERY      ENDOSCOPY, COLON, DIAGNOSTIC      EXCISION OF PARATHYROID MASS      x 3    JOINT REPLACEMENT Left 2018    knee    KNEE ARTHROPLASTY Left 5/23/2019    LEFT KNEE UNI VERSUS performed by Jenny Carrillo MD at [address], PARTIAL  7/3/2008    Rt Breast Lumpectomy & [place] lymph Node Biopsy displaying esidual infiltrating ductal carcinoma grade 1 with foci of low grade DCIS involving the margin. 1 of 2 sentinel lymph nodes positive for . 56mm micrometastisis.  MASTECTOMY, PARTIAL  8/7/2008    Right Partial Mastectomy Re-excision Right axillary node dissection 9 out of 9 lymph nodes negative for a total of 1 of 11 nodes positive for metastatic malignancy.       NV RMVL MARIELLA CTR VAD W/SUBQ PORT/ CTR/PRPH INSJ N/A 5/18/2017    PORT REMOVAL performed by Ryan Medley MD at [address] Left 5/23/2019    LEFT TOTAL KNEE REPLACEMENT performed by Jenny Carrillo MD at [address]  2008    Dr Jeannie Paiz Right 6/25/2021    US BREAST NEEDLE BIOPSY RIGHT 6/25/2021 [facility] [address]     Family History   Problem Relation Age of Onset    Diabetes Mother     Colon Polyps Mother     Breast Cancer Sister 52       Review of Systems  Reviewed and positive for the above. All other systems noted to be negative. Physical Exam  Blood pressure (!) 159/88, pulse 83, temperature 97.6 °F (36.4 °C), height 5' 2\" (1.575 m), weight 177 lb 9.6 oz (80.6 kg), last menstrual period 05/20/2008, SpO2 99 %. GENERAL:  Reveals a 61 y.o. female that  appears to be in no acute distress. HEENT:  Head is normocephalic and atraumatic. NECK:  Neck is supple without masses     CHEST:  Patient has normal respiratory effort. Chest is clear bilaterally with good thoracic expansion. HEART:  Heart demonstrated a regular rhythm and rate with no cardiac murmurs, gallops or rubs noted to auscultation. BREAST:  Post surgical mastectomy/reconstruction changes to both breasts. There are some changes due to radiation on the right. The tissue implants have been expanded. There is no evidence of infection. ABDOMEN:  Inspection of the abdomen demonstrated the patient to have normal bowel sounds present. The abdomen is soft and nontender. EXTREMITIES:  Extremities demonstrated no cyanosis or pitting edema bilaterally. PSYCHIATRIC:  Patient is oriented to time, place and person. The patient's mood and affect are normal.      IMPRESSION:  History of right breast cancer  Status post bilateral mastectomy with tissue expander reconstruction    PLAN:  The risks, benefits, and options of implant exchange were discussed with the patient. She  is willing to proceed with surgery.

## 2022-01-19 ENCOUNTER — TELEPHONE (OUTPATIENT)
Dept: SURGERY | Age: 57
End: 2022-01-19

## 2022-01-19 ENCOUNTER — OFFICE VISIT (OUTPATIENT)
Dept: SURGERY | Age: 57
End: 2022-01-19

## 2022-01-19 VITALS — HEIGHT: 62 IN | TEMPERATURE: 98.2 F | BODY MASS INDEX: 32.76 KG/M2 | WEIGHT: 178 LBS

## 2022-01-19 DIAGNOSIS — N61.0 BREAST INFECTION: Primary | ICD-10-CM

## 2022-01-19 DIAGNOSIS — Z90.13 S/P BILATERAL MASTECTOMY: ICD-10-CM

## 2022-01-19 DIAGNOSIS — N61.0 BREAST INFECTION: ICD-10-CM

## 2022-01-19 DIAGNOSIS — L30.9 DERMATITIS: Primary | ICD-10-CM

## 2022-01-19 PROCEDURE — 99024 POSTOP FOLLOW-UP VISIT: CPT | Performed by: SURGERY

## 2022-01-19 RX ORDER — DIPHENHYDRAMINE HYDROCHLORIDE 50 MG/ML
50 INJECTION INTRAMUSCULAR; INTRAVENOUS ONCE
Status: CANCELLED
Start: 2022-01-20 | End: 2022-01-20

## 2022-01-19 RX ORDER — CLOTRIMAZOLE AND BETAMETHASONE DIPROPIONATE 10; .64 MG/G; MG/G
CREAM TOPICAL
Qty: 1 EACH | Refills: 0 | Status: SHIPPED | OUTPATIENT
Start: 2022-01-19

## 2022-01-20 ENCOUNTER — HOSPITAL ENCOUNTER (OUTPATIENT)
Dept: INFUSION THERAPY | Age: 57
Setting detail: INFUSION SERIES
Discharge: HOME | End: 2022-01-20
Payer: MEDICARE

## 2022-01-20 VITALS
WEIGHT: 178 LBS | OXYGEN SATURATION: 100 % | BODY MASS INDEX: 32.76 KG/M2 | TEMPERATURE: 97.8 F | DIASTOLIC BLOOD PRESSURE: 84 MMHG | RESPIRATION RATE: 17 BRPM | SYSTOLIC BLOOD PRESSURE: 150 MMHG | HEIGHT: 62 IN | HEART RATE: 103 BPM

## 2022-01-20 DIAGNOSIS — N61.0 BREAST INFECTION: Primary | ICD-10-CM

## 2022-01-20 PROCEDURE — 6360000002 HC RX W HCPCS

## 2022-01-20 PROCEDURE — 6360000002 HC RX W HCPCS: Performed by: SURGERY

## 2022-01-20 PROCEDURE — 96365 THER/PROPH/DIAG IV INF INIT: CPT

## 2022-01-20 PROCEDURE — 96366 THER/PROPH/DIAG IV INF ADDON: CPT

## 2022-01-20 PROCEDURE — 2580000003 HC RX 258: Performed by: SURGERY

## 2022-01-20 RX ORDER — SODIUM CHLORIDE 9 MG/ML
INJECTION, SOLUTION INTRAVENOUS CONTINUOUS
Status: ACTIVE | OUTPATIENT
Start: 2022-01-20 | End: 2022-01-20

## 2022-01-20 RX ORDER — DIPHENHYDRAMINE HYDROCHLORIDE 50 MG/ML
INJECTION INTRAMUSCULAR; INTRAVENOUS
Status: COMPLETED
Start: 2022-01-20 | End: 2022-01-20

## 2022-01-20 RX ORDER — DIPHENHYDRAMINE HYDROCHLORIDE 50 MG/ML
50 INJECTION INTRAMUSCULAR; INTRAVENOUS ONCE
Status: DISCONTINUED | OUTPATIENT
Start: 2022-01-20 | End: 2022-01-22 | Stop reason: HOSPADM

## 2022-01-20 RX ORDER — DIPHENHYDRAMINE HYDROCHLORIDE 50 MG/ML
50 INJECTION INTRAMUSCULAR; INTRAVENOUS ONCE
Status: CANCELLED
Start: 2022-01-20 | End: 2022-01-20

## 2022-01-20 RX ADMIN — ORITAVANCIN 1200 MG: 400 INJECTION, POWDER, LYOPHILIZED, FOR SOLUTION INTRAVENOUS at 13:34

## 2022-01-20 RX ADMIN — DIPHENHYDRAMINE HYDROCHLORIDE 50 MG: 50 INJECTION, SOLUTION INTRAMUSCULAR; INTRAVENOUS at 15:43

## 2022-01-20 NOTE — PROGRESS NOTES
Pt c/o itching on her trunk/back and in her arms. No rash noted. Orbactiv paused. Benadryl 50mg IVP given per therapy plan. Normal saline initiated 100ml/hr. Orbactiv resumed. Patient states improvement.   Electronically signed by Cathie Mooney RN on 1/20/2022 at 3:44 PM

## 2022-01-21 NOTE — PROGRESS NOTES
old blood. There is no evidence of pus. ADDENDUM: 1/19/2022  She is much more red and angry today and we will have her get an infusion of Orbactiv and see her back in one week.

## 2022-01-24 ENCOUNTER — OFFICE VISIT (OUTPATIENT)
Dept: SURGERY | Age: 57
End: 2022-01-24

## 2022-01-24 VITALS
BODY MASS INDEX: 32.06 KG/M2 | TEMPERATURE: 97.9 F | HEIGHT: 62 IN | DIASTOLIC BLOOD PRESSURE: 78 MMHG | SYSTOLIC BLOOD PRESSURE: 132 MMHG | WEIGHT: 174.2 LBS

## 2022-01-24 DIAGNOSIS — Z90.13 S/P BILATERAL MASTECTOMY: Primary | ICD-10-CM

## 2022-01-24 PROCEDURE — 99024 POSTOP FOLLOW-UP VISIT: CPT | Performed by: SURGERY

## 2022-01-25 NOTE — PROGRESS NOTES
HISTORY OF PRESENT ILLNESS:    Ms. Chad Mosquera present today for a post op breast check. She is status post bilateral implant exchange on 12/28/2021. She had placement of bilateral SCX-495 cohesive gel implants    She underwent a Right ultrasound guided breast biopsy. Pathology demonstrated intraductal papilloma with atypia. She has appropriate postoperative discomfort, and no significant complaints. This is on the same side as her right breast cancer treated with lumpectomy and radiation in 2008. She has had enough and wishes to consider bilateral mastectomies and reconstruction      She is now status post  Bilateral mastectomy and immediate reconstruction with expanders and AlloDerm on 9/14/2021    PATHOLOGY REVEALS:  FINAL DIAGNOSIS:    A. Right breast, mastectomy:   Ductal carcinoma in situ, papillary and solid subtype. Maximum tumor diameter is 0.6 cm. The surgical margins and nipple are free of tumor. B.  Left breast, mastectomy:   No malignancy identified. Benign breast tissue. The surgical margins are free of tumor. She called last weekend concerned about swelling and pain in the left breast and I had her come in for evaluation. I think it was just the block wearing off    PHYSICAL EXAM:  The  wounds look good with no evidence of infection, fluid accumulation, or skin necrosis. The right side still rides a little high but it better than anticipated. IMPRESSION:    Doing well s/p bilateral implant exchange 12/28/2021    PLAN:   I will see her back in 1 month     ADDENDUM: 1/7/2022     She called with some concern about some redness on the right. I examined her and op care one. It looks more like rubbing from her bra but I did start her on Augmentin and we will see her in the office on Monday    Today she comes in after 3 days of Augmentin. The redness is much improved. Ultrasound in the office demonstrates some fluid laterally.   We attempted aspiration and appeared to be all old blood. There is no evidence of pus. ADDENDUM: 1/19/2022  She is much more red and angry today and we will have her get an infusion of Orbactiv and see her back in one week. 1/24/2022  She is less red today but is very tight. Office ultrasound did not demonstrate any fluid around her implants. She appears to be having some serous drainage. PLAN: Add Bactroban 3 times daily alternating with her Nitro-Bid  Use sterile gauze for the drainage.   And follow-up in 1 week

## 2022-02-07 ENCOUNTER — OFFICE VISIT (OUTPATIENT)
Dept: SURGERY | Age: 57
End: 2022-02-07

## 2022-02-07 VITALS
HEIGHT: 62 IN | TEMPERATURE: 98.2 F | BODY MASS INDEX: 31.98 KG/M2 | SYSTOLIC BLOOD PRESSURE: 138 MMHG | WEIGHT: 173.8 LBS | DIASTOLIC BLOOD PRESSURE: 78 MMHG

## 2022-02-07 DIAGNOSIS — Z90.13 S/P BILATERAL MASTECTOMY: Primary | ICD-10-CM

## 2022-02-07 PROCEDURE — 99024 POSTOP FOLLOW-UP VISIT: CPT | Performed by: SURGERY

## 2022-02-27 ENCOUNTER — HOSPITAL ENCOUNTER (EMERGENCY)
Facility: HOSPITAL | Age: 57
Discharge: HOME | End: 2022-02-27
Admitting: EMERGENCY MEDICINE

## 2022-02-27 ENCOUNTER — APPOINTMENT (OUTPATIENT)
Dept: GENERAL RADIOLOGY | Facility: HOSPITAL | Age: 57
End: 2022-02-27

## 2022-02-27 VITALS
SYSTOLIC BLOOD PRESSURE: 160 MMHG | HEART RATE: 87 BPM | OXYGEN SATURATION: 100 % | WEIGHT: 180 LBS | DIASTOLIC BLOOD PRESSURE: 83 MMHG | HEIGHT: 62 IN | RESPIRATION RATE: 20 BRPM | BODY MASS INDEX: 33.13 KG/M2 | TEMPERATURE: 98.1 F

## 2022-02-27 DIAGNOSIS — K29.70 ESOPHAGITIS WITH GASTRITIS: Primary | ICD-10-CM

## 2022-02-27 DIAGNOSIS — K20.90 ESOPHAGITIS WITH GASTRITIS: Primary | ICD-10-CM

## 2022-02-27 DIAGNOSIS — J18.9 PNEUMONIA DUE TO INFECTIOUS ORGANISM, UNSPECIFIED LATERALITY, UNSPECIFIED PART OF LUNG: ICD-10-CM

## 2022-02-27 LAB
ALBUMIN SERPL-MCNC: 4 G/DL (ref 3.5–5.2)
ALBUMIN/GLOB SERPL: 1.5 G/DL
ALP SERPL-CCNC: 110 U/L (ref 39–117)
ALT SERPL W P-5'-P-CCNC: 16 U/L (ref 1–33)
ANION GAP SERPL CALCULATED.3IONS-SCNC: 11 MMOL/L (ref 5–15)
AST SERPL-CCNC: 19 U/L (ref 1–32)
BASOPHILS # BLD AUTO: 0.05 10*3/MM3 (ref 0–0.2)
BASOPHILS NFR BLD AUTO: 0.7 % (ref 0–1.5)
BILIRUB SERPL-MCNC: 0.3 MG/DL (ref 0–1.2)
BUN SERPL-MCNC: 15 MG/DL (ref 6–20)
BUN/CREAT SERPL: 13.2 (ref 7–25)
CALCIUM SPEC-SCNC: 9.2 MG/DL (ref 8.6–10.5)
CHLORIDE SERPL-SCNC: 105 MMOL/L (ref 98–107)
CO2 SERPL-SCNC: 24 MMOL/L (ref 22–29)
CREAT SERPL-MCNC: 1.14 MG/DL (ref 0.57–1)
DEPRECATED RDW RBC AUTO: 45.1 FL (ref 37–54)
EOSINOPHIL # BLD AUTO: 0.24 10*3/MM3 (ref 0–0.4)
EOSINOPHIL NFR BLD AUTO: 3.4 % (ref 0.3–6.2)
ERYTHROCYTE [DISTWIDTH] IN BLOOD BY AUTOMATED COUNT: 14.1 % (ref 12.3–15.4)
GFR SERPL CREATININE-BSD FRML MDRD: 49 ML/MIN/1.73
GLOBULIN UR ELPH-MCNC: 2.6 GM/DL
GLUCOSE SERPL-MCNC: 102 MG/DL (ref 65–99)
HCT VFR BLD AUTO: 44.2 % (ref 34–46.6)
HGB BLD-MCNC: 14.3 G/DL (ref 12–15.9)
IMM GRANULOCYTES # BLD AUTO: 0.05 10*3/MM3 (ref 0–0.05)
IMM GRANULOCYTES NFR BLD AUTO: 0.7 % (ref 0–0.5)
INR PPP: 0.88 (ref 0.91–1.09)
LYMPHOCYTES # BLD AUTO: 1.47 10*3/MM3 (ref 0.7–3.1)
LYMPHOCYTES NFR BLD AUTO: 20.6 % (ref 19.6–45.3)
MCH RBC QN AUTO: 28.7 PG (ref 26.6–33)
MCHC RBC AUTO-ENTMCNC: 32.4 G/DL (ref 31.5–35.7)
MCV RBC AUTO: 88.8 FL (ref 79–97)
MONOCYTES # BLD AUTO: 0.47 10*3/MM3 (ref 0.1–0.9)
MONOCYTES NFR BLD AUTO: 6.6 % (ref 5–12)
NEUTROPHILS NFR BLD AUTO: 4.86 10*3/MM3 (ref 1.7–7)
NEUTROPHILS NFR BLD AUTO: 68 % (ref 42.7–76)
NRBC BLD AUTO-RTO: 0 /100 WBC (ref 0–0.2)
PLATELET # BLD AUTO: 346 10*3/MM3 (ref 140–450)
PMV BLD AUTO: 9.8 FL (ref 6–12)
POTASSIUM SERPL-SCNC: 3.9 MMOL/L (ref 3.5–5.2)
PROT SERPL-MCNC: 6.6 G/DL (ref 6–8.5)
PROTHROMBIN TIME: 11.6 SECONDS (ref 11.9–14.6)
RBC # BLD AUTO: 4.98 10*6/MM3 (ref 3.77–5.28)
SODIUM SERPL-SCNC: 140 MMOL/L (ref 136–145)
TROPONIN T SERPL-MCNC: <0.01 NG/ML (ref 0–0.03)
WBC NRBC COR # BLD: 7.14 10*3/MM3 (ref 3.4–10.8)

## 2022-02-27 PROCEDURE — 71045 X-RAY EXAM CHEST 1 VIEW: CPT

## 2022-02-27 PROCEDURE — 80053 COMPREHEN METABOLIC PANEL: CPT | Performed by: NURSE PRACTITIONER

## 2022-02-27 PROCEDURE — 96374 THER/PROPH/DIAG INJ IV PUSH: CPT

## 2022-02-27 PROCEDURE — 93005 ELECTROCARDIOGRAM TRACING: CPT | Performed by: NURSE PRACTITIONER

## 2022-02-27 PROCEDURE — 85025 COMPLETE CBC W/AUTO DIFF WBC: CPT | Performed by: NURSE PRACTITIONER

## 2022-02-27 PROCEDURE — 93010 ELECTROCARDIOGRAM REPORT: CPT | Performed by: INTERNAL MEDICINE

## 2022-02-27 PROCEDURE — 85610 PROTHROMBIN TIME: CPT | Performed by: NURSE PRACTITIONER

## 2022-02-27 PROCEDURE — 84484 ASSAY OF TROPONIN QUANT: CPT | Performed by: NURSE PRACTITIONER

## 2022-02-27 PROCEDURE — 99284 EMERGENCY DEPT VISIT MOD MDM: CPT

## 2022-02-27 RX ORDER — LIDOCAINE HYDROCHLORIDE 20 MG/ML
15 SOLUTION OROPHARYNGEAL ONCE
Status: COMPLETED | OUTPATIENT
Start: 2022-02-27 | End: 2022-02-27

## 2022-02-27 RX ORDER — SUCRALFATE 1 G/1
1 TABLET ORAL 4 TIMES DAILY
Qty: 30 TABLET | Refills: 0 | Status: SHIPPED | OUTPATIENT
Start: 2022-02-27 | End: 2022-06-13

## 2022-02-27 RX ORDER — SODIUM CHLORIDE 0.9 % (FLUSH) 0.9 %
10 SYRINGE (ML) INJECTION AS NEEDED
Status: DISCONTINUED | OUTPATIENT
Start: 2022-02-27 | End: 2022-02-27 | Stop reason: HOSPADM

## 2022-02-27 RX ORDER — FAMOTIDINE 10 MG/ML
20 INJECTION, SOLUTION INTRAVENOUS ONCE
Status: COMPLETED | OUTPATIENT
Start: 2022-02-27 | End: 2022-02-27

## 2022-02-27 RX ORDER — ALUMINA, MAGNESIA, AND SIMETHICONE 2400; 2400; 240 MG/30ML; MG/30ML; MG/30ML
15 SUSPENSION ORAL ONCE
Status: COMPLETED | OUTPATIENT
Start: 2022-02-27 | End: 2022-02-27

## 2022-02-27 RX ORDER — AZITHROMYCIN 250 MG/1
TABLET, FILM COATED ORAL
Qty: 6 TABLET | Refills: 0 | Status: SHIPPED | OUTPATIENT
Start: 2022-02-27 | End: 2022-03-08

## 2022-02-27 RX ORDER — PANTOPRAZOLE SODIUM 40 MG/1
40 TABLET, DELAYED RELEASE ORAL DAILY
Qty: 30 TABLET | Refills: 0 | Status: SHIPPED | OUTPATIENT
Start: 2022-02-27 | End: 2022-03-08 | Stop reason: SDUPTHER

## 2022-02-27 RX ADMIN — LIDOCAINE HYDROCHLORIDE 15 ML: 20 SOLUTION OROPHARYNGEAL at 13:53

## 2022-02-27 RX ADMIN — FAMOTIDINE 20 MG: 10 INJECTION INTRAVENOUS at 13:14

## 2022-02-27 RX ADMIN — ALUMINUM HYDROXIDE, MAGNESIUM HYDROXIDE, AND DIMETHICONE 15 ML: 400; 400; 40 SUSPENSION ORAL at 13:53

## 2022-02-28 LAB
QT INTERVAL: 350 MS
QTC INTERVAL: 442 MS

## 2022-03-08 ENCOUNTER — OFFICE VISIT (OUTPATIENT)
Dept: INTERNAL MEDICINE | Facility: CLINIC | Age: 57
End: 2022-03-08

## 2022-03-08 VITALS
SYSTOLIC BLOOD PRESSURE: 130 MMHG | WEIGHT: 175 LBS | OXYGEN SATURATION: 98 % | HEIGHT: 62 IN | DIASTOLIC BLOOD PRESSURE: 86 MMHG | TEMPERATURE: 97.1 F | BODY MASS INDEX: 32.2 KG/M2 | HEART RATE: 85 BPM

## 2022-03-08 DIAGNOSIS — J18.9 PNEUMONIA DUE TO INFECTIOUS ORGANISM, UNSPECIFIED LATERALITY, UNSPECIFIED PART OF LUNG: ICD-10-CM

## 2022-03-08 DIAGNOSIS — K21.9 GASTROESOPHAGEAL REFLUX DISEASE WITHOUT ESOPHAGITIS: Primary | ICD-10-CM

## 2022-03-08 PROCEDURE — 99213 OFFICE O/P EST LOW 20 MIN: CPT | Performed by: NURSE PRACTITIONER

## 2022-03-08 NOTE — PROGRESS NOTES
Subjective   Luna Cruz is a 56 y.o. female.   Chief Complaint   Patient presents with   • Hospital Follow Up Visit     Aspirated on potassium,pneumonia       Luna presents today for Er follow up after aspirating her potassium pill.  She was given a GI cocktail in the ER and send home on Carafate which she states helped a lot.  She denies any issues with her stomach today.  She is eating and drinking ok.  No N/V/D.  She was also found to have some RUL pneumonia and was sent home on Zpack which she has finished.  She denies fever and states breathing is stable.         The following portions of the patient's history were reviewed and updated as appropriate: allergies, current medications, past family history, past medical history, past social history, past surgical history and problem list.    Review of Systems   Constitutional: Negative for activity change, appetite change, fatigue, fever, unexpected weight gain and unexpected weight loss.   HENT: Negative for swollen glands, trouble swallowing and voice change.    Eyes: Negative for blurred vision and visual disturbance.   Respiratory: Negative for cough and shortness of breath.    Cardiovascular: Negative for chest pain, palpitations and leg swelling.   Gastrointestinal: Negative for abdominal pain, constipation, diarrhea, nausea, vomiting and indigestion.   Endocrine: Negative for cold intolerance, heat intolerance, polydipsia and polyphagia.   Genitourinary: Negative for dysuria and frequency.   Musculoskeletal: Negative for arthralgias, back pain, joint swelling and neck pain.   Skin: Negative for color change, rash and skin lesions.   Neurological: Negative for dizziness, weakness, headache, memory problem and confusion.   Hematological: Does not bruise/bleed easily.   Psychiatric/Behavioral: Negative for agitation, hallucinations and suicidal ideas. The patient is not nervous/anxious.        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 Arzola-Extremely poorly prepped colon   • COLONOSCOPY N/A 7/24/2020    Hemorrhoids found on perianal exam; One 4mm hyperplastic polyp in the transverse colon; Normal mucosa in the entire examined colon-biopsied; Non-bleeding internal hemorrhoids; Repeat 5 years   • D & C HYSTEROSCOPY  1993   • D & C HYSTEROSCOPY MYOSURE N/A 1/13/2021    Procedure: DILATATION AND CURETTAGE HYSTEROSCOPY WITH MYOSURE, polypectomy;  Surgeon: Marcello Salas MD;  Location: [facility] OR;  Service: Obstetrics/Gynecology;  Laterality: N/A;   • DILATATION AND CURETTAGE     • ENDOSCOPY N/A 10/5/2016    Medium-sized HH; Widely patent and non-obstructing Schatzki ring-dilated; Procedure: ESOPHAGOGASTRODUODENOSCOPY WITH ANESTHESIA;  Surgeon: Ksenia Fox MD;  Location: [facility] ENDOSCOPY;  Service:    • ENDOSCOPY N/A 2/27/2019    Normal 1st portion of the duodenum and 2 portion of the duodenum; A few gastric polyps-biopsied; Small HH; Widely patent Schatzki ring-dilated; Abnormal  esophageal motility, suspicious for presbyesophagus; Arrange for barium swallow to assess for dysmotility   • ENDOSCOPY N/A 7/24/2020    Medium-sized HH; LA Grade A reflux esophagitis; No endoscopic esophageal abnormality to explain patient's dysphagia-Esophagus dilated; Normal stomach; Normal examined duodenum; No specimens collected   • HYSTEROSCOPY     • KNEE ARTHROSCOPY Left 11/13/2018    Procedure: LEFT KNEE ARTHROSCOPIC PARTIAL MEDIAL MENISCECTOMY;  Surgeon: Matt Maki MD;  Location: [facility];  Service: Orthopedics   • MASTECTOMY Bilateral 09/14/2021   • MEDIPORT INSERTION, SINGLE  2008   • MEDIPORT REMOVAL     • PARATHYROIDECTOMY  2011   • REPLACEMENT TOTAL KNEE Left 05/23/2019   • TONSILLECTOMY AND ADENOIDECTOMY  1970   • TUBAL ABDOMINAL LIGATION          Current Outpatient Medications:   •  atorvastatin (LIPITOR) 40 MG tablet, TAKE 1 TABLET DAILY (Patient taking differently: Take 40 mg by mouth Daily.), Disp: 90 tablet, Rfl: 3  •  Azelastine HCl 137 MCG/SPRAY solution, 2 sprays into the nostril(s) as directed by provider 2 (Two) Times a Day., Disp: 30 mL, Rfl: 5  •  calcitriol (ROCALTROL) 0.5 MCG capsule, TAKE 1 CAPSULE DAILY, Disp: 90 capsule, Rfl: 3  •  Calcium Citrate-Vitamin D (CALCIUM + D PO), Take 1 tablet by mouth Daily., Disp: , Rfl:   •  Diclofenac Sodium (PENNSAID TD), Apply 1 application topically to the appropriate area as directed 2 (Two) Times a Day As Needed (BACK PAIN). For back pain, Disp: , Rfl:   •  docusate sodium (COLACE) 100 MG capsule, Take 100 mg by mouth As Needed., Disp: , Rfl:   •  fluticasone (Flonase Allergy Relief) 50 MCG/ACT nasal spray, 2 sprays into the nostril(s) as directed by provider Daily., Disp: 16 g, Rfl: 5  •  fluticasone-salmeterol (Advair Diskus) 250-50 MCG/DOSE DISKUS, Inhale 1 puff 2 (Two) Times a Day., Disp: 3 each, Rfl: 3  •  HYDROcodone-acetaminophen (NORCO) 5-325 MG per tablet, Take 1 tablet by mouth 2 (Two) Times a Day As Needed for Moderate  Pain ., Disp: , Rfl:   •  ibuprofen (ADVIL,MOTRIN) 400 MG tablet, Take 400 mg by mouth Every 8 (Eight) Hours As Needed for Mild Pain ., Disp: , Rfl:   •  loratadine (CLARITIN) 10 MG tablet, Take 1 tablet by mouth Daily As Needed for Allergies., Disp: 90 tablet, Rfl: 3  •  losartan (COZAAR) 100 MG tablet, TAKE 1 TABLET DAILY, Disp: 90 tablet, Rfl: 3  •  pantoprazole (PROTONIX) 40 MG EC tablet, Take 1 tablet by mouth Daily., Disp: 90 tablet, Rfl: 3  •  potassium chloride ER (K-TAB) 20 MEQ tablet controlled-release ER tablet, Take 1 tablet by mouth Daily., Disp: 90 tablet, Rfl: 3  •  rOPINIRole (REQUIP) 0.25 MG tablet, TAKE 1 TABLET EVERY NIGHT. TAKE ONE HOUR BEFORE BEDTIME. (Patient taking differently: Take 0.25 mg by mouth Every Night.), Disp: 90 tablet, Rfl: 3  •  sucralfate (CARAFATE) 1 g tablet, Take 1 tablet by mouth 4 (Four) Times a Day., Disp: 30 tablet, Rfl: 0  •  Synthroid 50 MCG tablet, TAKE 1 TABLET DAILY (Patient taking differently: Take 50 mcg by mouth Daily.), Disp: 90 tablet, Rfl: 3  •  tiZANidine (ZANAFLEX) 4 MG tablet, Take 4 mg by mouth Every 8 (Eight) Hours As Needed for Muscle Spasms., Disp: , Rfl:   •  Accolate 20 MG tablet, TAKE 1 TABLET TWICE A DAY (Patient taking differently: Take 20 mg by mouth 2 (Two) Times a Day.), Disp: 180 tablet, Rfl: 3  •  albuterol (PROVENTIL) (2.5 MG/3ML) 0.083% nebulizer solution, Take 2.5 mg by nebulization As Needed., Disp: , Rfl:   •  albuterol (PROVENTIL) (2.5 MG/3ML) 0.083% nebulizer solution, Take 2.5 mg by nebulization Every 4 (Four) Hours As Needed for Wheezing., Disp: 360 mL, Rfl: 5  •  albuterol sulfate  (90 Base) MCG/ACT inhaler, Inhale 2 puffs Every 4 (Four) Hours As Needed for Wheezing., Disp: 6.7 g, Rfl: 5  •  alendronate (FOSAMAX) 70 MG tablet, Take 1 tablet by mouth Every 7 (Seven) Days. Sunday's, Disp: 12 tablet, Rfl: 3  •  azithromycin (Zithromax Z-Jaime) 250 MG tablet, Take 2 tablets the first day, then 1 tablet daily for 4 days., Disp: 6  tablet, Rfl: 0  •  theophylline (UNIPHYL) 400 MG 24 hr tablet, Take 1 tablet by mouth Daily., Disp: 90 tablet, Rfl: 3  No current facility-administered medications for this visit.    Facility-Administered Medications Ordered in Other Visits:   •  heparin flush (porcine) 100 UNIT/ML injection 500 Units, 500 Units, Intracatheter, PRN, Ki Manriquez MD, 500 Units at 09/27/16 1108  •  heparin flush (porcine) 100 UNIT/ML injection 500 Units, 500 Units, Intravenous, PRN, SasseMalathi casillas APRN, 500 Units at 05/05/17 1009  •  sodium chloride 0.9 % flush 10 mL, 10 mL, Intravenous, PRN, Ki Manriquez MD, 10 mL at 09/27/16 1107  •  sodium chloride 0.9 % flush 10 mL, 10 mL, Intravenous, PRN, Malathi Brantley APRN, 10 mL at 05/05/17 1009      Vitals:    03/08/22 1029   BP: 130/86   Pulse: 85   Temp: 97.1 °F (36.2 °C)   SpO2: 98%         03/08/22  1029   Weight: 79.4 kg (175 lb)       Body mass index is 32.01 kg/m².    Physical Exam  Constitutional:       General: She is not in acute distress.     Appearance: She is well-developed.   HENT:      Head: Normocephalic.      Right Ear: Tympanic membrane and external ear normal.      Left Ear: Tympanic membrane and external ear normal.      Mouth/Throat:      Mouth: Mucous membranes are moist. No oral lesions.      Pharynx: Oropharynx is clear.   Eyes:      Conjunctiva/sclera: Conjunctivae normal.   Cardiovascular:      Rate and Rhythm: Normal rate and regular rhythm.      Pulses: Normal pulses.      Heart sounds: Normal heart sounds.   Pulmonary:      Effort: Pulmonary effort is normal.      Breath sounds: Normal breath sounds.   Skin:     General: Skin is warm and dry.   Neurological:      Mental Status: She is alert and oriented to person, place, and time.      Gait: Gait normal.   Psychiatric:         Mood and Affect: Mood normal.         Speech: Speech normal.         Behavior: Behavior normal.         Thought Content: Thought content normal.                Assessment/Plan   Diagnoses and all orders for this visit:    1. Gastroesophageal reflux disease without esophagitis (Primary)    2. Pneumonia due to infectious organism, unspecified laterality, unspecified part of lung      Luna is doing well since her discharge from the ER.  She has completed antibiotic and denies concerns with her breathing.  Lungs are clear today on exam.    She does have some carafate left to complete but GI symptoms have resolved.  She states she is only having her normal reflux that she typically experiences.    Discussed taking Potassium with pudding or applesauce, trying a thicker vehicle and seeing if this helps.  If she continues to have trouble with her potassium, may need to look into liquid formulation.

## 2022-03-08 NOTE — PROGRESS NOTES
HISTORY OF PRESENT ILLNESS:    Ms. Jessica Owen present today for a 1-month breast check. She is status post bilateral implant exchange on 12/28/2021. She had placement of bilateral [id number] cohesive gel implants    She underwent a Right ultrasound guided breast biopsy. Pathology demonstrated intraductal papilloma with atypia. She has appropriate postoperative discomfort, and no significant complaints. This is on the same side as her right breast cancer treated with lumpectomy and radiation in 2008. She has had enough and wishes to consider bilateral mastectomies and reconstruction      She is now status post  Bilateral mastectomy and immediate reconstruction with expanders and AlloDerm on 9/14/2021    PATHOLOGY REVEALS:  FINAL DIAGNOSIS:    A. Right breast, mastectomy:   Ductal carcinoma in situ, papillary and solid subtype. Maximum tumor diameter is 0.6 cm. The surgical margins and nipple are free of tumor. B.  Left breast, mastectomy:   No malignancy identified. Benign breast tissue. The surgical margins are free of tumor. She called last weekend concerned about swelling and pain in the left breast and I had her come in for evaluation. I think it was just the block wearing off    PHYSICAL EXAM:  The  wounds look good with no evidence of infection, fluid accumulation, or skin necrosis. The right side still rides a little high but it better than anticipated. IMPRESSION:    Doing well s/p bilateral implant exchange 12/28/2021    PLAN: July for a physical exam. She will call with any new concerns. ADDENDUM: 1/7/2022     She called with some concern about some redness on the right. I examined her and op care one. It looks more like rubbing from her bra but I did start her on Augmentin and we will see her in the office on Monday    Today she comes in after 3 days of Augmentin. The redness is much improved. Ultrasound in the office demonstrates some fluid laterally.   We attempted

## 2022-03-10 ENCOUNTER — OFFICE VISIT (OUTPATIENT)
Dept: SURGERY | Age: 57
End: 2022-03-10

## 2022-03-10 VITALS
WEIGHT: 176.4 LBS | DIASTOLIC BLOOD PRESSURE: 74 MMHG | HEIGHT: 62 IN | TEMPERATURE: 97.8 F | SYSTOLIC BLOOD PRESSURE: 130 MMHG | BODY MASS INDEX: 32.46 KG/M2

## 2022-03-10 DIAGNOSIS — Z90.13 S/P BILATERAL MASTECTOMY: Primary | ICD-10-CM

## 2022-03-10 PROCEDURE — 99024 POSTOP FOLLOW-UP VISIT: CPT | Performed by: SURGERY

## 2022-03-17 RX ORDER — ROPINIROLE 0.25 MG/1
0.25 TABLET, FILM COATED ORAL NIGHTLY
Qty: 90 TABLET | Refills: 1 | Status: SHIPPED | OUTPATIENT
Start: 2022-03-17 | End: 2022-09-22

## 2022-04-01 PROBLEM — D05.11 DUCTAL CARCINOMA IN SITU (DCIS) OF RIGHT BREAST: Status: ACTIVE | Noted: 2022-04-01

## 2022-04-01 NOTE — PROGRESS NOTES
[facility]AT[facility] & [facility]  [address]  [facility] [address] [phone]    Patient Name: Luna Cruz  Encounter Date: 04/12/2022  YOB: 1965  Patient Number: [id number]      REASON FOR FOLLOW-UP:Luna Cruz is a pleasant 56 y.o.  female who is seen on follow-up for stage IIA right breast cancer.  She had completed 10 years of adjuvant tamoxifen 40.5 months ago.  She is seen with spouse, Derek.  She is a reliable historian.        Oncology/Hematology History Overview Note   Ms. Luna Cruz is a pleasant 49 year old female with diagnosis of pT1pN1(mi)M0 right breast cancer, ER positive, ME positive, HER2/  leonila normal diagnosed in August 2008. She has passed the 3 yearmark.  Back then, the patient underwent lumpectomy. Tumor was  located in the lower inner quadrant of the right breast. [place] lymph node dissection found to have residual DCIS. She went on to receive  4 cycles of non anthracycline based chemotherapy with Taxotere and Cytoxan. This was followed by radiation therapy and has been  tamoxifen since 11/25/08.  She is here today for regular scheduled followup. . We will request sestamibi scan anyway. Her tumor markers have been stable. No  evidence of effusion or lymphadenopathy. Thyroid ultrasound on 07/25/11 ordered by Dr. Villasenor for hyperthyroidism study show small  thyroid, no lesions identified.  INTERVAL HISTORY  This patient had a lumpectomy for a stage IIA, estrogen receptorpositive,  HER2/  neunegative  breast cancer in 2008. She received  adjuvant interventions that included 5 years of tamoxifen administration.      DIAGNOSTIC ABNORMALITIES:DCIS:  Mammogram 06/25/2021.  New solid nodule within the upper outer quadrant of the right breast anterior depth. She was admitted 09/14/2021 and discharged 09/15/2021.  Right breast mastectomy 09/14/2021: Ductal carcinoma in situ, papillary  and solid type.  Maximum tumor diameter 0.6 cm.  The surgical margins and nipple are free of tumor.Left breast mastectomy 09/14/2021: No malignancy identified.  Benign breast tissue.  The surgical margins are free of tumor.      PREVIOUS INTERVENTIONS:  Bilateral mastectomies 09/14/2021.         Ductal carcinoma in situ (DCIS) of right breast   4/1/2022 Initial Diagnosis    Ductal carcinoma in situ (DCIS) of right breast         PAST MEDICAL HISTORY:  ALLERGIES:  Allergies   Allergen Reactions   • Cephalosporins Hives   • Keflex [Cephalexin] Rash     CURRENT MEDICATIONS:  Outpatient Encounter Medications as of 4/12/2022   Medication Sig Dispense Refill   • Accolate 20 MG tablet TAKE 1 TABLET TWICE A DAY (Patient taking differently: Take 20 mg by mouth 2 (Two) Times a Day.) 180 tablet 3   • albuterol (PROVENTIL) (2.5 MG/3ML) 0.083% nebulizer solution Take 2.5 mg by nebulization As Needed.     • albuterol (PROVENTIL) (2.5 MG/3ML) 0.083% nebulizer solution Take 2.5 mg by nebulization Every 4 (Four) Hours As Needed for Wheezing. 360 mL 5   • albuterol sulfate  (90 Base) MCG/ACT inhaler Inhale 2 puffs Every 4 (Four) Hours As Needed for Wheezing. 6.7 g 5   • alendronate (FOSAMAX) 70 MG tablet Take 1 tablet by mouth Every 7 (Seven) Days. Sunday's 12 tablet 3   • atorvastatin (LIPITOR) 40 MG tablet Take 1 tablet by mouth Daily. 90 tablet 3   • Azelastine HCl 137 MCG/SPRAY solution 2 sprays into the nostril(s) as directed by provider 2 (Two) Times a Day. 30 mL 5   • calcitriol (ROCALTROL) 0.5 MCG capsule TAKE 1 CAPSULE DAILY 90 capsule 3   • Calcium Citrate-Vitamin D (CALCIUM + D PO) Take 1 tablet by mouth Daily.     • Diclofenac Sodium (PENNSAID TD) Apply 1 application topically to the appropriate area as directed 2 (Two) Times a Day As Needed (BACK PAIN). For back pain     • docusate sodium (COLACE) 100 MG capsule Take 100 mg by mouth As Needed.     • fluticasone (Flonase Allergy Relief) 50 MCG/ACT nasal spray 2  sprays into the nostril(s) as directed by provider Daily. 16 g 5   • fluticasone-salmeterol (Advair Diskus) 250-50 MCG/DOSE DISKUS Inhale 1 puff 2 (Two) Times a Day. 3 each 3   • HYDROcodone-acetaminophen (NORCO) 5-325 MG per tablet Take 1 tablet by mouth 2 (Two) Times a Day As Needed for Moderate Pain .     • ibuprofen (ADVIL,MOTRIN) 400 MG tablet Take 400 mg by mouth Every 8 (Eight) Hours As Needed for Mild Pain .     • loratadine (CLARITIN) 10 MG tablet Take 1 tablet by mouth Daily As Needed for Allergies. 90 tablet 3   • losartan (COZAAR) 100 MG tablet TAKE 1 TABLET DAILY 90 tablet 3   • pantoprazole (PROTONIX) 40 MG EC tablet Take 1 tablet by mouth Daily. 90 tablet 3   • potassium chloride ER (K-TAB) 20 MEQ tablet controlled-release ER tablet Take 1 tablet by mouth Daily. 90 tablet 3   • rOPINIRole (REQUIP) 0.25 MG tablet Take 1 tablet by mouth Every Night. 90 tablet 1   • sucralfate (CARAFATE) 1 g tablet Take 1 tablet by mouth 4 (Four) Times a Day. 30 tablet 0   • Synthroid 50 MCG tablet TAKE 1 TABLET DAILY (Patient taking differently: Take 50 mcg by mouth Daily.) 90 tablet 3   • theophylline (UNIPHYL) 400 MG 24 hr tablet Take 1 tablet by mouth Daily. 90 tablet 3   • tiZANidine (ZANAFLEX) 4 MG tablet Take 4 mg by mouth Every 8 (Eight) Hours As Needed for Muscle Spasms.     • [DISCONTINUED] atorvastatin (LIPITOR) 40 MG tablet TAKE 1 TABLET DAILY (Patient taking differently: Take 40 mg by mouth Daily.) 90 tablet 3     Facility-Administered Encounter Medications as of 4/12/2022   Medication Dose Route Frequency Provider Last Rate Last Admin   • heparin flush (porcine) 100 UNIT/ML injection 500 Units  500 Units Intracatheter PRN Ki Manriquez MD   500 Units at 09/27/16 1108   • heparin flush (porcine) 100 UNIT/ML injection 500 Units  500 Units Intravenous PRN Malathi Brantley APRN   500 Units at 05/05/17 1009   • sodium chloride 0.9 % flush 10 mL  10 mL Intravenous PRN Ki Manriquez  MD   10 mL at 09/27/16 1107   • sodium chloride 0.9 % flush 10 mL  10 mL Intravenous PRN Malathi Brantley APRN   10 mL at 05/05/17 1009     ADULT ILLNESSES:  Patient Active Problem List   Diagnosis Code   • Essential hypertension I10   • Gastroesophageal reflux disease with esophagitis K21.00   • Heartburn R12   • Cough R05.9   • Esophageal dysphagia R13.19   • Family history of polyps in the colon Z83.71   • Family history of malignant neoplasm of breast Z80.3   • History of malignant neoplasm of breast Z85.3   • Hyperplastic polyp of transverse colon K63.5   • Overweight (BMI 25.0-29.9) E66.3   • Iron deficiency anemia D50.9   • Traumatic tear of medial meniscus of knee, left, initial encounter S83.242A   • Chronic asthma with acute exacerbation J45.901   • Malignant neoplasm of lower-inner quadrant of left breast in female, estrogen receptor positive (HCC) C50.312, Z17.0   • Elevated cholesterol E78.00   • Left carotid bruit R09.89   • 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, 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   • Osteoporosis M81.0   • Postmenopausal status Z78.0   • Vitamin D deficiency E55.9   • Breast cancer (HCC) C50.919   • Fatty liver K76.0   • Rectal bleeding K62.5   • Non-smoker Z78.9   • Non-seasonal allergic rhinitis J30.89   • Restless legs syndrome (RLS) G25.81   • Pulmonary scarring J98.4   • Acute URI J06.9   • Atypical ductal hyperplasia of breast N60.99   • Post-operative state Z98.890   • S/P bilateral mastectomy Z90.13   • Moderate persistent asthma without complication J45.40   • 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 Arzola-Extremely poorly prepped colon   • COLONOSCOPY N/A 7/24/2020    Hemorrhoids found on perianal exam; One 4mm hyperplastic polyp in the transverse colon; Normal mucosa in the entire examined colon-biopsied; Non-bleeding internal hemorrhoids; Repeat 5 years   • D & C HYSTEROSCOPY  1993   • D & C HYSTEROSCOPY MYOSURE N/A 1/13/2021    Procedure: DILATATION AND CURETTAGE HYSTEROSCOPY WITH MYOSURE, polypectomy;  Surgeon: Marcello Salas MD;  Location: [facility] OR;  Service: Obstetrics/Gynecology;  Laterality: N/A;   • DILATATION AND CURETTAGE     • ENDOSCOPY N/A 10/5/2016    Medium-sized HH; Widely patent and non-obstructing Schatzki ring-dilated; Procedure: ESOPHAGOGASTRODUODENOSCOPY WITH ANESTHESIA;  Surgeon: Ksenia Fox MD;  Location: [facility] ENDOSCOPY;  Service:    • ENDOSCOPY N/A 2/27/2019    Normal 1st portion of the duodenum and 2 portion of the duodenum; A few gastric polyps-biopsied; Small HH; Widely patent Schatzki ring-dilated; Abnormal esophageal motility, suspicious for presbyesophagus; Arrange for barium swallow to assess for dysmotility   • ENDOSCOPY N/A 7/24/2020    Medium-sized HH; LA Grade A reflux esophagitis; No endoscopic esophageal abnormality to explain patient's dysphagia-Esophagus dilated; Normal stomach; Normal examined duodenum; No specimens collected   • HYSTEROSCOPY     • KNEE ARTHROSCOPY Left 11/13/2018    Procedure: LEFT KNEE ARTHROSCOPIC PARTIAL MEDIAL MENISCECTOMY;  Surgeon: Matt Maki MD;  Location: [facility];  Service: Orthopedics   • MASTECTOMY Bilateral 09/14/2021   • MEDIPORT INSERTION, SINGLE  2008   • MEDIPORT REMOVAL     • PARATHYROIDECTOMY  2011   • REPLACEMENT TOTAL KNEE Left 05/23/2019   • TONSILLECTOMY AND ADENOIDECTOMY  1970   • TUBAL ABDOMINAL LIGATION       HEALTH MAINTENANCE ITEMS:  Health Maintenance Due   Topic Date  Due   • Pneumococcal Vaccine 0-64 (2 - PCV) 10/28/2015       <no information>  Last Completed Colonoscopy          COLORECTAL CANCER SCREENING (COLONOSCOPY - Every 5 Years) Next due on 7/24/2025 07/24/2020  COLONOSCOPY    07/24/2020  Surgical Procedure: COLONOSCOPY    06/14/2017  COLONOSCOPY    06/14/2017  Surgical Procedure: COLONOSCOPY    05/16/2014  POCT occult blood stool    Only the first 5 history entries have been loaded, but more history exists.              Immunization History   Administered Date(s) Administered   • COVID-19 (PFIZER) PURPLE CAP 03/24/2021, 04/14/2021, 11/09/2021   • Flu Vaccine Intradermal Quad 18-64YR 09/28/2018, 10/29/2019, 09/29/2020   • FluLaval/Fluarix/Fluzone >6 10/10/2016, 09/30/2020, 10/12/2021   • Pneumococcal Polysaccharide (PPSV23) 10/28/2014   • Shingrix 12/02/2020, 02/16/2021   • Tdap 12/08/2021   • flucelvax quad pfs =>4 YRS 09/28/2018, 10/29/2019     Last Completed Mammogram          MAMMOGRAM (Every 2 Years) Next due on 9/13/2023 09/13/2021  Outside Procedure: [facility] US BREAST UNILATERAL LIMITED    06/25/2021  Outside Claim: [facility] MAMMOGRAM DIAGNOSTIC UNILAT DIGITAL W CAD    06/23/2021  SCANNED - MAMMO    12/21/2020  Mammo Additional View Left    06/17/2020  SCANNED - MAMMO    Only the first 5 history entries have been loaded, but more history exists.                  FAMILY HISTORY:  Family History   Problem Relation Age of Onset   • Colon polyps Mother         < 60 years of age    • Cirrhosis Mother    • Colon polyps Sister 54        < 60 years of age    • Breast cancer Sister    • No Known Problems Father    • No Known Problems Brother    • No Known Problems Daughter    • No Known Problems Son    • No Known Problems Maternal Grandmother    • No Known Problems Paternal Grandmother    • No Known Problems Maternal Aunt    • No Known Problems Paternal Aunt    • Colon cancer Neg Hx    • Crohn's disease Neg Hx    • Irritable bowel syndrome Neg Hx    • Liver cancer Neg Hx    •  "Liver disease Neg Hx    • Rectal cancer Neg Hx    • Stomach cancer Neg Hx    • BRCA 1/2 Neg Hx    • Endometrial cancer Neg Hx    • Ovarian cancer Neg Hx    • Uterine cancer Neg Hx    • Esophageal cancer Neg Hx      SOCIAL HISTORY:  Social History     Socioeconomic History   • Marital status:    Tobacco Use   • Smoking status: Never Smoker   • Smokeless tobacco: Never Used   Vaping Use   • Vaping Use: Never used   Substance and Sexual Activity   • Alcohol use: Not Currently   • Drug use: No   • Sexual activity: Yes     Partners: Male     Birth control/protection: None       REVIEW OF SYSTEMS:    Review of Systems   Constitutional: Positive for fatigue. Negative for chills and fever.        \"I am just tired.\"   Respiratory: Negative for cough, shortness of breath and wheezing.    Cardiovascular: Negative for chest pain and palpitations.   Gastrointestinal: Negative for constipation, diarrhea, nausea and vomiting.   Genitourinary: Negative for difficulty urinating, flank pain and hematuria.   Skin: Negative for pallor.   Neurological: Negative for dizziness, speech difficulty and weakness.   Psychiatric/Behavioral: Negative for agitation, confusion and hallucinations.       VITAL SIGNS: /80   Pulse 111   Temp 97.7 °F (36.5 °C)   Resp 18   Ht 157.5 cm (62\")   Wt 79.5 kg (175 lb 3.2 oz)   LMP 06/14/2008 (Exact Date)   SpO2 98%   Breastfeeding No   BMI 32.04 kg/m²  \"I just took my BP medication an hour ago.\"  Pain Score    04/12/22 1356   PainSc: 2  Comment: Just my back       PHYSICAL EXAMINATION:     Physical Exam  Vitals reviewed.   Constitutional:       General: She is not in acute distress.  Cardiovascular:      Rate and Rhythm: Normal rate.   Pulmonary:      Breath sounds: No wheezing or rales.   Abdominal:      General: Bowel sounds are normal.      Palpations: Abdomen is soft.      Tenderness: There is no abdominal tenderness.   Musculoskeletal:         General: No swelling.   Skin:     " General: Skin is warm.      Coloration: Skin is not pale.   Neurological:      Mental Status: She is alert and oriented to person, place, and time.   Psychiatric:         Mood and Affect: Mood normal.         Behavior: Behavior normal.         Thought Content: Thought content normal.         Judgment: Judgment normal.         LABS    Lab Results - Last 18 Months   Lab Units 04/05/22  1019 02/27/22  1315 12/08/21  0805 10/12/21  1514 09/13/21  1025 05/04/21  1026 01/07/21  1025 12/09/20  1522   HEMOGLOBIN g/dL 14.4 14.3 14.8 14.1 15.3 15.7 15.1 14.7   HEMATOCRIT % 45.3 44.2 45.1 45.4 49.4* 48.7* 44.6 42.9   MCV fL 89.0 88.8 87.6 91.0 92.3 89.0 88.1 86.1   WBC 10*3/mm3 8.18 7.14 6.78 7.73 8.0 9.13 7.88 5.93   RDW % 13.8 14.1 13.3 13.8 13.5 13.7 13.2 13.1   MPV fL 9.9 9.8  --  9.9 10.4 10.2 10.3 10.0   PLATELETS 10*3/mm3 346 346 307 380 325 368 309 310   IMM GRAN % % 0.5 0.7*  --  0.4  --  0.4  --  0.3   NEUTROS ABS 10*3/mm3 5.31 4.86 4.58 5.00 5.8 5.73  --  4.78   LYMPHS ABS 10*3/mm3 1.86 1.47 1.26 1.56 1.4 2.23  --  0.38*   MONOS ABS 10*3/mm3 0.54 0.47 0.69 0.76 0.50 0.60  --  0.65   EOS ABS 10*3/mm3 0.36 0.24 0.18 0.32 0.20 0.44*  --  0.06   BASOS ABS 10*3/mm3 0.07 0.05 0.04 0.06 0.10 0.09  --  0.04   IMMATURE GRANS (ABS) 10*3/mm3 0.04 0.05  --  0.03 0.1 0.04  --  0.02   NRBC /100 WBC 0.0 0.0 0.0 0.0  --  0.0  --  0.0       Lab Results - Last 18 Months   Lab Units 04/05/22  1019 02/27/22  1315 12/08/21  0805 09/13/21  1025 05/04/21  1026 01/07/21  1025 12/09/20  1522 12/02/20  0825 12/02/20  0822   GLUCOSE mg/dL 94 102* 90 90 91 97 102*   < >  --    SODIUM mmol/L 140 140 141 141 140 140 136   < >  --    POTASSIUM mmol/L 4.1 3.9 4.1 4.4 4.1 4.0 3.7   < >  --    TOTAL CO2 mmol/L  --   --  24.6 23  --   --   --    < >  --    CO2 mmol/L 21.0* 24.0  --   --  28.0 24.0 23.0   < >  --    CHLORIDE mmol/L 106 105 104 106 102 105 103   < >  --    ANION GAP mmol/L 13.0 11.0  --  12 10.0 11.0 10.0  --   --    CREATININE mg/dL  0.96 1.14* 1.00 1* 0.97 1.11* 1.12*   < >  --    BUN mg/dL 11 15 11 11 14 12 12   < >  --    BUN / CREAT RATIO  11.5 13.2 11.0  --  14.4 10.8 10.7   < >  --    CALCIUM mg/dL 8.9 9.2 8.6 9.4 9.2 9.4 8.8   < >  --    EGFR IF NONAFRICN AM mL/min/1.73  --  49* 57* 57* 59* 51* 50*   < >  --    ALK PHOS U/L 108 110 120*  --  111  --  110  --   --    TOTAL PROTEIN g/dL 6.5 6.6  --   --  7.2  --  6.7  --   --    ALT (SGPT) U/L 16 16 15  --  21  --  21  --  20   AST (SGOT) U/L 18 19 15  --  20  --  22  --  21   BILIRUBIN mg/dL 0.4 0.3 0.3  --  0.5  --  0.2  --   --    ALBUMIN g/dL 4.00 4.00 4.10  --  4.00  --  4.00  --   --    GLOBULIN gm/dL 2.5 2.6  --   --  3.2  --  2.7  --   --     < > = values in this interval not displayed.       Lab Results - Last 18 Months   Lab Units 12/08/21  0805 12/09/20  1522   URIC ACID mg/dL 6.6*  --    LDH U/L  --  213       Lab Results - Last 18 Months   Lab Units 12/08/21  0805 06/02/21  0736 05/04/21  1026 12/09/20  1522   IRON mcg/dL  --   --  111  --    TIBC mcg/dL  --   --  337  --    IRON SATURATION %  --   --  33  --    FERRITIN ng/mL  --   --  709.40* 636.60*   TSH uIU/mL 2.820 3.350  --   --        Luna Cruz reports a pain score of 2.  Given her pain assessment as noted, treatment options were discussed and the following options were decided upon as a follow-up plan to address the patient's pain: continuation of current treatment plan for pain.        ASSESSMENT:  1.   Right breast cancer.  Lower inner quadrant.  AJCC stage:IIA (pT1, pN1mi, cM0).  Receptor status: Estrogen positive, progesterone positive and HER-2/leonila negative.  Treatment status: Post lumpectomy, post 4 cycles of adjuvant Taxotere and Cytoxan.  Post adjuvant radiation.  Post adjuvant tamoxifen 11/25/2008 through 11/24/2018.  2.   Right breast ductal carcinoma in situ.  Tumor size 0.6 cm.  AJCC stage: 0 (pTis, cN0, cM0, G2)  Treatment status: Post skin sparing mastectomy, right and left simple mastectomy.  3.    Performance status of 1.  4.   Anemia from iron deficiency post Venofer 05/14/2018 through 06/08/2018, 8 doses.  Post Injectafer 05/15/2020 and 05/22/2020.           PLAN:  1.   Re: Heme status. Hemoglobin 14.4 and hematocrit 45.3.   2.   Re: CMP.  GFR 69.2 ml/minute.   3.   Re: DCIS.  Stable for observation.  No prophylactic tamoxifen, she had bilateral mastectomies.  4.   Re: No mammogram, she had bilateral mastectomy.   5  Continue care per primary care physician and other specialists.  6.  Plan of care discussed with patient and spouse.  Understanding expressed.  Patient able to proceed.  7.  Return to office in 12 months with preoffice CBC with differential and CMP.        I have reviewed the assessment and plan and verified the accuracy of it. No changes to assessment and plan since the information was documented. Salvador Zuleta MD 04/12/22           I spent 20 total minutes, face-to-face, caring for Luna today.  Greater than 50% of this time involved counseling and/or coordination of care as documented within this note regarding the patient's illness(es), pros and cons of various treatment options, instructions and/or risk reduction.       (Shen [place] MD)  (Ksenia Fox MD)  (Kevin Aguirre MD)  (Marcello Salas MD)  HEATHER Thrasher

## 2022-04-05 ENCOUNTER — LAB (OUTPATIENT)
Dept: LAB | Facility: HOSPITAL | Age: 57
End: 2022-04-05

## 2022-04-05 DIAGNOSIS — C50.312 MALIGNANT NEOPLASM OF LOWER-INNER QUADRANT OF LEFT BREAST IN FEMALE, ESTROGEN RECEPTOR POSITIVE: ICD-10-CM

## 2022-04-05 DIAGNOSIS — Z17.0 MALIGNANT NEOPLASM OF LOWER-INNER QUADRANT OF LEFT BREAST IN FEMALE, ESTROGEN RECEPTOR POSITIVE: ICD-10-CM

## 2022-04-05 LAB
ALBUMIN SERPL-MCNC: 4 G/DL (ref 3.5–5.2)
ALBUMIN/GLOB SERPL: 1.6 G/DL
ALP SERPL-CCNC: 108 U/L (ref 39–117)
ALT SERPL W P-5'-P-CCNC: 16 U/L (ref 1–33)
ANION GAP SERPL CALCULATED.3IONS-SCNC: 13 MMOL/L (ref 5–15)
AST SERPL-CCNC: 18 U/L (ref 1–32)
BASOPHILS # BLD AUTO: 0.07 10*3/MM3 (ref 0–0.2)
BASOPHILS NFR BLD AUTO: 0.9 % (ref 0–1.5)
BILIRUB SERPL-MCNC: 0.4 MG/DL (ref 0–1.2)
BUN SERPL-MCNC: 11 MG/DL (ref 6–20)
BUN/CREAT SERPL: 11.5 (ref 7–25)
CALCIUM SPEC-SCNC: 8.9 MG/DL (ref 8.6–10.5)
CHLORIDE SERPL-SCNC: 106 MMOL/L (ref 98–107)
CO2 SERPL-SCNC: 21 MMOL/L (ref 22–29)
CREAT SERPL-MCNC: 0.96 MG/DL (ref 0.57–1)
DEPRECATED RDW RBC AUTO: 45.2 FL (ref 37–54)
EGFRCR SERPLBLD CKD-EPI 2021: 69.2 ML/MIN/1.73
EOSINOPHIL # BLD AUTO: 0.36 10*3/MM3 (ref 0–0.4)
EOSINOPHIL NFR BLD AUTO: 4.4 % (ref 0.3–6.2)
ERYTHROCYTE [DISTWIDTH] IN BLOOD BY AUTOMATED COUNT: 13.8 % (ref 12.3–15.4)
GLOBULIN UR ELPH-MCNC: 2.5 GM/DL
GLUCOSE SERPL-MCNC: 94 MG/DL (ref 65–99)
HCT VFR BLD AUTO: 45.3 % (ref 34–46.6)
HGB BLD-MCNC: 14.4 G/DL (ref 12–15.9)
IMM GRANULOCYTES # BLD AUTO: 0.04 10*3/MM3 (ref 0–0.05)
IMM GRANULOCYTES NFR BLD AUTO: 0.5 % (ref 0–0.5)
LYMPHOCYTES # BLD AUTO: 1.86 10*3/MM3 (ref 0.7–3.1)
LYMPHOCYTES NFR BLD AUTO: 22.7 % (ref 19.6–45.3)
MCH RBC QN AUTO: 28.3 PG (ref 26.6–33)
MCHC RBC AUTO-ENTMCNC: 31.8 G/DL (ref 31.5–35.7)
MCV RBC AUTO: 89 FL (ref 79–97)
MONOCYTES # BLD AUTO: 0.54 10*3/MM3 (ref 0.1–0.9)
MONOCYTES NFR BLD AUTO: 6.6 % (ref 5–12)
NEUTROPHILS NFR BLD AUTO: 5.31 10*3/MM3 (ref 1.7–7)
NEUTROPHILS NFR BLD AUTO: 64.9 % (ref 42.7–76)
NRBC BLD AUTO-RTO: 0 /100 WBC (ref 0–0.2)
PLATELET # BLD AUTO: 346 10*3/MM3 (ref 140–450)
PMV BLD AUTO: 9.9 FL (ref 6–12)
POTASSIUM SERPL-SCNC: 4.1 MMOL/L (ref 3.5–5.2)
PROT SERPL-MCNC: 6.5 G/DL (ref 6–8.5)
RBC # BLD AUTO: 5.09 10*6/MM3 (ref 3.77–5.28)
SODIUM SERPL-SCNC: 140 MMOL/L (ref 136–145)
WBC NRBC COR # BLD: 8.18 10*3/MM3 (ref 3.4–10.8)

## 2022-04-05 PROCEDURE — 85025 COMPLETE CBC W/AUTO DIFF WBC: CPT

## 2022-04-05 PROCEDURE — 80053 COMPREHEN METABOLIC PANEL: CPT

## 2022-04-05 PROCEDURE — 36415 COLL VENOUS BLD VENIPUNCTURE: CPT

## 2022-04-06 RX ORDER — ATORVASTATIN CALCIUM 40 MG/1
40 TABLET, FILM COATED ORAL DAILY
Qty: 90 TABLET | Refills: 3 | Status: SHIPPED | OUTPATIENT
Start: 2022-04-06

## 2022-04-12 ENCOUNTER — OFFICE VISIT (OUTPATIENT)
Dept: ONCOLOGY | Facility: CLINIC | Age: 57
End: 2022-04-12

## 2022-04-12 VITALS
WEIGHT: 175.2 LBS | BODY MASS INDEX: 32.24 KG/M2 | OXYGEN SATURATION: 98 % | TEMPERATURE: 97.7 F | DIASTOLIC BLOOD PRESSURE: 80 MMHG | HEART RATE: 111 BPM | SYSTOLIC BLOOD PRESSURE: 156 MMHG | RESPIRATION RATE: 18 BRPM | HEIGHT: 62 IN

## 2022-04-12 DIAGNOSIS — D05.11 DUCTAL CARCINOMA IN SITU (DCIS) OF RIGHT BREAST: Primary | ICD-10-CM

## 2022-04-12 PROCEDURE — 99213 OFFICE O/P EST LOW 20 MIN: CPT | Performed by: INTERNAL MEDICINE

## 2022-05-10 ENCOUNTER — OFFICE VISIT (OUTPATIENT)
Dept: PULMONOLOGY | Facility: CLINIC | Age: 57
End: 2022-05-10

## 2022-05-10 VITALS
BODY MASS INDEX: 32.54 KG/M2 | HEIGHT: 62 IN | HEART RATE: 80 BPM | DIASTOLIC BLOOD PRESSURE: 74 MMHG | SYSTOLIC BLOOD PRESSURE: 132 MMHG | OXYGEN SATURATION: 98 % | WEIGHT: 176.8 LBS

## 2022-05-10 DIAGNOSIS — J45.40 MODERATE PERSISTENT ASTHMA WITHOUT COMPLICATION: Primary | ICD-10-CM

## 2022-05-10 DIAGNOSIS — J30.89 NON-SEASONAL ALLERGIC RHINITIS, UNSPECIFIED TRIGGER: ICD-10-CM

## 2022-05-10 DIAGNOSIS — Z78.9 NON-SMOKER: ICD-10-CM

## 2022-05-10 DIAGNOSIS — J45.901 MODERATE ASTHMA WITH EXACERBATION, UNSPECIFIED WHETHER PERSISTENT: ICD-10-CM

## 2022-05-10 DIAGNOSIS — Z86.16 HISTORY OF COVID-19: ICD-10-CM

## 2022-05-10 DIAGNOSIS — G25.81 RESTLESS LEGS SYNDROME (RLS): ICD-10-CM

## 2022-05-10 PROBLEM — U09.9 POST-COVID-19 CONDITION: Status: RESOLVED | Noted: 2021-11-09 | Resolved: 2022-05-10

## 2022-05-10 PROCEDURE — 99214 OFFICE O/P EST MOD 30 MIN: CPT | Performed by: INTERNAL MEDICINE

## 2022-05-10 RX ORDER — DICLOFENAC SODIUM 20 MG/G
SOLUTION TOPICAL
COMMUNITY
End: 2022-06-13 | Stop reason: SDUPTHER

## 2022-05-10 RX ORDER — PREDNISONE 10 MG/1
TABLET ORAL
Qty: 31 TABLET | Refills: 0 | Status: SHIPPED | OUTPATIENT
Start: 2022-05-10 | End: 2022-06-13

## 2022-05-10 RX ORDER — CLOTRIMAZOLE AND BETAMETHASONE DIPROPIONATE 10; .64 MG/G; MG/G
CREAM TOPICAL
COMMUNITY
Start: 2022-01-19 | End: 2022-06-13

## 2022-05-10 RX ORDER — AZELASTINE HYDROCHLORIDE 137 UG/1
2 SPRAY, METERED NASAL 2 TIMES DAILY
Qty: 30 ML | Refills: 5 | Status: SHIPPED | OUTPATIENT
Start: 2022-05-10

## 2022-05-10 RX ORDER — FLUTICASONE PROPIONATE 50 MCG
2 SPRAY, SUSPENSION (ML) NASAL DAILY
Qty: 16 G | Refills: 5 | Status: SHIPPED | OUTPATIENT
Start: 2022-05-10

## 2022-05-10 NOTE — PROGRESS NOTES
[facility] OUTPATIENT PROGRESS NOTE    Patient: Luna Cruz  : 1965  Age: 56 y.o.  Date of Service: May 10, 2022    REASON FOR CLINIC VISIT:  Chief Complaint   Patient presents with   • Mild intermittent asthma without complication      chest xray and had pneumonia; no new issues per patient         Subjective:    History of Present Illness:  Luna Cruz is a 56 y.o. female who presents to the office today to be seen for    Diagnosis Plan   1. Moderate persistent asthma without complication     2. Non-smoker     3. Non-seasonal allergic rhinitis, unspecified trigger     4. Restless legs syndrome (RLS)     5. History of COVID-19     6. Moderate asthma with exacerbation, unspecified whether persistent     .  Other problems per record.  It is a very pleasant middle aged  female.  She attends the pulmonary clinic with her  for a follow-up visit.    Patient is a non-smoker and has allergic rhinitis with moderate to severe asthma but her symptoms are well controlled with Advair and albuterol rescue inhaler.  He has albuterol nebulizer at home but she does not use it very often.  She is also on theophylline SR.  The patient uses allergy medications including fluticasone nasal spray Astelin nasal spray and loratadine.  She is not on montelukast.  She is using ropinirole for restless leg syndrome.  Overall she is doing well and is getting more activity in the summertime and gets out of the house more often.  She is not on home oxygen.  She is noticing any CPAP machine and did not have a diagnosis of sleep apnea.    Patient is fully vaccinated for COVID and did not have any COVID infection any recent hospitalizations and ER visit or urgent care visit.  In February this year she was seen in the ER for chest pain and work-up was done and was noted to have some infiltrate in the imaging studies.  She was treated for pneumonia with a course of azithromycin after discharge  but did not need hospitalization.  She had no other new complaints at this time.  However in the last few days she has some wheezing and some shortness of breath on exertion.  She is requesting refill for fluticasone nasal spray and Astelin nasal spray.  She has history of breast cancer and follows up with Dr. Zuleta and apparently she is cancer free.    PFT done today:  Not done today    PFT Values        Some values may be hidden. Unless noted otherwise, only the newest values recorded on each date are displayed.         Old Values PFT Results 21   No data to display.      Pre Drug PFT Results 21   FVC 69   FEV1 53   FEF 25-75% 24   FEV1/FVC 62.94      Post Drug PFT Results 21   No data to display.      Other Tests PFT Results 21   TLC 87      DLCO 88   D/VAsb 134           Results for orders placed in visit on 21    Pulmonary Function Test    Narrative  Pulmonary Function Test  Performed by: Sara Cramer, RRT  Authorized by: Kevin Aguirre MD    Pre Drug % Predicted  FVC: 69%  FEV1: 53%  FEF 25-75%: 24%  FEV1/FVC: 62.94%  T%  RV: 120%  DLCO: 88%  D/VAsb: 134%    Interpretation  Overall comments:  The above test results were acceptable and reproducible by ATS criteria.  From analysis of the above test results the patient showed evidence of moderate to severe obstructive airway dysfunction.  No bronchodilator challenge was done.  There was mild restrictive dysfunction noted from decreased TLC  Air trapping noted from increased residual volume  Patient capacity corrected for single breath was within normal limits  No prior test result was available for comparison  Clinical correlation was indicated.    Kevin Aguirre MD  Pulmonologist/Intensivist  2021 14:37 CST      Results for orders placed in visit on 10/29/19    Pulmonary Function Test    Narrative  Pulmonary Function Test  Performed by: Klaus Wagner APRN  Authorized by: Bernard  HEATHER Enrique    Pre Drug  FVC: 62%  FEV1: 52%  FEF 25-75%: 31%  FEV1/FVC: 69.23%         Bronchodilator therapy: Advair , Albuterol nebulizer and inhaler. Also on Theophylline ER.     Smoking Status:   Social History     Tobacco Use   Smoking Status Never Smoker   Smokeless Tobacco Never Used     Pulm Rehab: no  Sleep: yes    Support System: lives with their spouse    Code Status:   There are no questions and answers to display.        Review of Systems:  A complete review of systems is performed and all other systems were reviewed and negative as note above in the HPI.  Review of Systems   Constitutional: Negative.    HENT: Positive for congestion and sinus pressure.    Eyes: Negative.    Respiratory: Positive for chest tightness and shortness of breath.    Cardiovascular: Negative.    Gastrointestinal: Negative.    Endocrine: Negative.    Genitourinary: Negative.    Musculoskeletal: Positive for arthralgias and back pain.   Skin: Negative.    Allergic/Immunologic: Positive for environmental allergies.   Neurological: Negative.    Hematological: Negative.    Psychiatric/Behavioral: Negative.        CAT/ACT Score:  Not done today    Medications:  Outpatient Encounter Medications as of 5/10/2022   Medication Sig Dispense Refill   • Accolate 20 MG tablet TAKE 1 TABLET TWICE A DAY (Patient taking differently: Take 20 mg by mouth 2 (Two) Times a Day.) 180 tablet 3   • albuterol (PROVENTIL) (2.5 MG/3ML) 0.083% nebulizer solution Take 2.5 mg by nebulization As Needed.     • albuterol (PROVENTIL) (2.5 MG/3ML) 0.083% nebulizer solution Take 2.5 mg by nebulization Every 4 (Four) Hours As Needed for Wheezing. 360 mL 5   • albuterol sulfate  (90 Base) MCG/ACT inhaler Inhale 2 puffs Every 4 (Four) Hours As Needed for Wheezing. 6.7 g 5   • alendronate (FOSAMAX) 70 MG tablet Take 1 tablet by mouth Every 7 (Seven) Days. Sunday's 12 tablet 3   • atorvastatin (LIPITOR) 40 MG tablet Take 1 tablet by mouth Daily. 90 tablet 3    • Azelastine HCl 137 MCG/SPRAY solution 2 sprays into the nostril(s) as directed by provider 2 (Two) Times a Day. 30 mL 5   • calcitriol (ROCALTROL) 0.5 MCG capsule TAKE 1 CAPSULE DAILY 90 capsule 3   • Calcium Citrate-Vitamin D (CALCIUM + D PO) Take 1 tablet by mouth Daily.     • clotrimazole-betamethasone (LOTRISONE) 1-0.05 % cream Apply topically 2 times daily.     • Diclofenac Sodium (PENNSAID TD) Apply 1 application topically to the appropriate area as directed 2 (Two) Times a Day As Needed (BACK PAIN). For back pain     • Diclofenac Sodium (Pennsaid) 2 % solution Apply  topically to the appropriate area as directed.     • docusate sodium (COLACE) 100 MG capsule Take 100 mg by mouth As Needed.     • fluticasone (Flonase Allergy Relief) 50 MCG/ACT nasal spray 2 sprays into the nostril(s) as directed by provider Daily. 16 g 5   • fluticasone-salmeterol (Advair Diskus) 250-50 MCG/DOSE DISKUS Inhale 1 puff 2 (Two) Times a Day. 3 each 3   • HYDROcodone-acetaminophen (NORCO) 5-325 MG per tablet Take 1 tablet by mouth 2 (Two) Times a Day As Needed for Moderate Pain .     • ibuprofen (ADVIL,MOTRIN) 400 MG tablet Take 400 mg by mouth Every 8 (Eight) Hours As Needed for Mild Pain .     • loratadine (CLARITIN) 10 MG tablet Take 1 tablet by mouth Daily As Needed for Allergies. 90 tablet 3   • losartan (COZAAR) 100 MG tablet TAKE 1 TABLET DAILY 90 tablet 3   • mupirocin (BACTROBAN) 2 % nasal ointment Take by Nasal route 2 times daily for 5 days prior to surgery     • pantoprazole (PROTONIX) 40 MG EC tablet Take 1 tablet by mouth Daily. 90 tablet 3   • potassium chloride ER (K-TAB) 20 MEQ tablet controlled-release ER tablet Take 1 tablet by mouth Daily. 90 tablet 3   • rOPINIRole (REQUIP) 0.25 MG tablet Take 1 tablet by mouth Every Night. 90 tablet 1   • sucralfate (CARAFATE) 1 g tablet Take 1 tablet by mouth 4 (Four) Times a Day. 30 tablet 0   • Synthroid 50 MCG tablet TAKE 1 TABLET DAILY (Patient taking differently: Take  50 mcg by mouth Daily.) 90 tablet 3   • theophylline (UNIPHYL) 400 MG 24 hr tablet Take 1 tablet by mouth Daily. 90 tablet 3   • tiZANidine (ZANAFLEX) 4 MG tablet Take 4 mg by mouth Every 8 (Eight) Hours As Needed for Muscle Spasms.     • [DISCONTINUED] Azelastine HCl 137 MCG/SPRAY solution 2 sprays into the nostril(s) as directed by provider 2 (Two) Times a Day. 30 mL 5   • [DISCONTINUED] fluticasone (Flonase Allergy Relief) 50 MCG/ACT nasal spray 2 sprays into the nostril(s) as directed by provider Daily. 16 g 5   • predniSONE (DELTASONE) 10 MG tablet Take 4 tabs daily x 3 days, then take 3 tabs daily x 3 days, then take 2 tabs daily x 3 days, then take 1 tab daily x 3 days 31 tablet 0     Facility-Administered Encounter Medications as of 5/10/2022   Medication Dose Route Frequency Provider Last Rate Last Admin   • heparin flush (porcine) 100 UNIT/ML injection 500 Units  500 Units Intracatheter PRN Ki Manriquez MD   500 Units at 09/27/16 1108   • heparin flush (porcine) 100 UNIT/ML injection 500 Units  500 Units Intravenous PRN Malathi Brantley APRN   500 Units at 05/05/17 1009   • sodium chloride 0.9 % flush 10 mL  10 mL Intravenous PRN Ki Manriquez MD   10 mL at 09/27/16 1107   • sodium chloride 0.9 % flush 10 mL  10 mL Intravenous PRN Malathi Brantley APRN   10 mL at 05/05/17 1009       Allergies:  Allergies   Allergen Reactions   • Cephalosporins Hives   • Keflex [Cephalexin] Rash       Immunizations:  Immunization History   Administered Date(s) Administered   • COVID-19 (PFIZER) PURPLE CAP 03/24/2021, 04/14/2021, 11/09/2021   • Flu Vaccine Intradermal Quad 18-64YR 09/28/2018, 10/29/2019, 09/29/2020   • FluLaval/Fluarix/Fluzone >6 10/10/2016, 09/30/2020, 10/12/2021   • Pneumococcal Conjugate 13-Valent (PCV13) 01/01/2012   • Pneumococcal Polysaccharide (PPSV23) 10/28/2014   • Shingrix 12/02/2020, 02/16/2021   • Tdap 12/08/2021   • flucelvax quad pfs =>4 YRS 09/28/2018,  "10/29/2019       Objective:    Vitals:  /74   Pulse 80   Ht 157.5 cm (62\")   Wt 80.2 kg (176 lb 12.8 oz)   LMP 06/14/2008 (Exact Date)   SpO2 98%   Breastfeeding No   BMI 32.34 kg/m²     Physical Exam:  General: Patient is a 56 y.o. obese middle aged  female. Looks stated age. Appears to be in no acute distress.  Eyes: EOMI. PERRLA. Vision intact. No scleral icterus.  Ear, Nose, Mouth and Throat: Hearing is grossly intact. No Leukoplakia, pharyngitis, stomatitis or thrush. Swollen nasal mucosa with post nasal drop.  Neck: Range of motion of neck normal. No thyromegaly or masses. Mallampati Class 3  Respiratory: Clear to auscultation bilaterally. No use of accessory muscles. Decreased breath sounds.  Cardiovascular: Normal heart sounds. Regularly regular rhythm without murmur.  Gastrointestinal: Non tender, non distended, soft. Bowel sounds positive in all four quadrants. No organomegaly.  Skin: No obvious rashes, lesions, ulcers or large amount of bruising. No edema.   Neurological: No new motor deficits. Cranial nerves appear intact.  Psychiatric: Patient is alert and oriented to person, place and time.    Chest Imaging:    Study Result  Narrative & Impression   EXAM: XR CHEST 1 VW- 2/27/2022 1:11 PM CST     HISTORY: cough       COMPARISON: June 18, 2020.     TECHNIQUE: Frontal radiograph of the chest     FINDINGS:   Left lung is clear. There is a vague groundglass appearance to the right  upper lung suspicious for pneumonia.. The cardiomediastinal silhouette  and pulmonary vascularity are within normal limits.      The osseous structures and surrounding soft tissues demonstrate no acute  abnormality.        IMPRESSION:  1. Vague right upper lobe opacity suspicious for pneumonia.     This report was finalized on 02/27/2022 13:42 by Dr. Walt Kang MD.         Assessment:  1. Moderate persistent asthma without complication    2. Non-smoker    3. Non-seasonal allergic rhinitis, unspecified " trigger    4. Restless legs syndrome (RLS)    5. History of COVID-19    6. Moderate asthma with exacerbation, unspecified whether persistent        Plan/Recommendations:    1.  Patient is doing well from the pulmonary standpoint.  Patient had course of azithromycin few months ago and currently having some acute exacerbation.  I had prescribed her a course of prednisone.  2.  For her asthma she should continue using Advair and albuterol rescue inhaler and albuterol nebulizer as needed.  No refill was needed.  We will continue theophylline she is taking it for many years without a problem  3.  For her allergic rhinitis she should continues in fluticasone nasal spray Astelin nasal spray and loratadine.  Prescription refill was done as requested.  For her restless leg syndrome she should continue ropinirole as before.  She does not have sleep apnea but has restless leg syndrome only.  4.  Patient is vaccinated for COVID and did not have COVID infection.  She should continue follow-up with the primary care provider for other medical issues.  Return to pulmonary clinic for follow-up visit in 6 months time or earlier if needed.    Follow up:  6 Months    Time Spent:  30 minutes    I appreciate the opportunity of participating in this patient's care. I would like to thank the PCP for the referral.  Please feel free to contact me with any other questions.    Kevin Aguirre MD   Pulmonologist/Intensivist     Electronically signed by: Kevin Aguirre MD, 5/10/2022 13:42 CDT

## 2022-06-13 ENCOUNTER — OFFICE VISIT (OUTPATIENT)
Dept: INTERNAL MEDICINE | Facility: CLINIC | Age: 57
End: 2022-06-13

## 2022-06-13 VITALS
HEART RATE: 99 BPM | OXYGEN SATURATION: 97 % | BODY MASS INDEX: 32.57 KG/M2 | DIASTOLIC BLOOD PRESSURE: 80 MMHG | TEMPERATURE: 97.3 F | WEIGHT: 177 LBS | SYSTOLIC BLOOD PRESSURE: 136 MMHG | HEIGHT: 62 IN

## 2022-06-13 DIAGNOSIS — R60.9 PERIPHERAL EDEMA: ICD-10-CM

## 2022-06-13 DIAGNOSIS — R42 DIZZINESS: ICD-10-CM

## 2022-06-13 DIAGNOSIS — E78.00 ELEVATED CHOLESTEROL: Primary | ICD-10-CM

## 2022-06-13 DIAGNOSIS — I65.21 STENOSIS OF RIGHT CAROTID ARTERY: ICD-10-CM

## 2022-06-13 DIAGNOSIS — I10 ESSENTIAL HYPERTENSION: ICD-10-CM

## 2022-06-13 PROCEDURE — 99213 OFFICE O/P EST LOW 20 MIN: CPT | Performed by: NURSE PRACTITIONER

## 2022-06-13 NOTE — PROGRESS NOTES
Subjective     Chief Complaint:  6 month follow-up. Ankle swelling.    HPI:  Ms. Cruz presents today for a 6-month follow-up on chronic health problems hypertension and hyperlipidemia.  Patient is taking losartan for blood pressure management and states that her blood pressure is normally well controlled at home with systolic blood pressures in the 130s.  She does check this often but not daily.  She is compliant with her atorvastatin.  Last lipid panel checked in December 2021 shows good control of cholesterol.  Recent CMP checked on 4/5/2022 shows normal LFTs.    The patient complains today of bilateral ankle edema, which she feels started after breast surgeries last year.  She does have a history of breast cancer and had a bilateral mastectomy in September.  In December, she did have implants placed, however the right implant will have to be revised.  Her weight has been steady compared to previous office visits.  She has noted some indentation of her socks at her ankles.  She does not feel this has worsened recently.  She has orthopnea and shortness of breath at baseline with history of asthma and already sleeps with the head of her bed raised.      The patient denies any chest pain.  She does have intermittent palpitations, which she has noted this usually after having to use her albuterol inhaler.  She does complain of dizziness which is usually worse with position changes and turning her head too quickly.  I did take orthostatic blood pressures from a seated and standing position.  Her blood pressure seated was 132/88 and standing was 120/90.  Although not truly orthostatic, this may be contributory to some of her symptoms.    Patient's PMR from outside medical facility reviewed and noted.    Past Medical History:   Past Medical History:   Diagnosis Date   • Abnormal uterine bleeding due to endometrial polyp    • Arthritis    • Asthma    • Chronic back pain    • Disease of thyroid gland    • Drug  therapy    • Family history of colonic polyps    • GERD (gastroesophageal reflux disease)    • History of breast cancer    • History of colon polyps    • Hx of radiation therapy    • Hyperlipidemia    • Hypertension    • Malignant neoplasm of upper-inner quadrant of right female breast (HCC) 9/26/2016   • Osteoporosis    • PONV (postoperative nausea and vomiting)    • Scoliosis      Past Surgical History:  Past Surgical History:   Procedure Laterality Date   • ADENOIDECTOMY     • APPENDECTOMY  1994   • BREAST BIOPSY     • BREAST LUMPECTOMY Right 2008    right sentinel lymph nodes were also removed for biopsy   • CARDIAC CATHETERIZATION     • CHOLECYSTECTOMY  1993   • COLONOSCOPY  05/03/2013    Erythematous mucosa in the rectum-biopsied; Repeat 4 years    • COLONOSCOPY N/A 6/14/2017    Normal; Repeat 5 years   • COLONOSCOPY  04/23/2010    Dr. Arzola-Extremely poorly prepped colon   • COLONOSCOPY N/A 7/24/2020    Hemorrhoids found on perianal exam; One 4mm hyperplastic polyp in the transverse colon; Normal mucosa in the entire examined colon-biopsied; Non-bleeding internal hemorrhoids; Repeat 5 years   • D & C HYSTEROSCOPY  1993   • D & C HYSTEROSCOPY MYOSURE N/A 1/13/2021    Procedure: DILATATION AND CURETTAGE HYSTEROSCOPY WITH MYOSURE, polypectomy;  Surgeon: Marcello Salas MD;  Location: [facility] OR;  Service: Obstetrics/Gynecology;  Laterality: N/A;   • DILATATION AND CURETTAGE     • ENDOSCOPY N/A 10/5/2016    Medium-sized HH; Widely patent and non-obstructing Schatzki ring-dilated; Procedure: ESOPHAGOGASTRODUODENOSCOPY WITH ANESTHESIA;  Surgeon: Ksenia Fox MD;  Location: [facility] ENDOSCOPY;  Service:    • ENDOSCOPY N/A 2/27/2019    Normal 1st portion of the duodenum and 2 portion of the duodenum; A few gastric polyps-biopsied; Small HH; Widely patent Schatzki ring-dilated; Abnormal esophageal motility, suspicious for presbyesophagus; Arrange for barium swallow to assess for dysmotility   • ENDOSCOPY N/A  7/24/2020    Medium-sized HH; LA Grade A reflux esophagitis; No endoscopic esophageal abnormality to explain patient's dysphagia-Esophagus dilated; Normal stomach; Normal examined duodenum; No specimens collected   • HYSTEROSCOPY     • KNEE ARTHROSCOPY Left 11/13/2018    Procedure: LEFT KNEE ARTHROSCOPIC PARTIAL MEDIAL MENISCECTOMY;  Surgeon: Matt Maki MD;  Location: [facility];  Service: Orthopedics   • MASTECTOMY Bilateral 09/14/2021   • MEDIPORT INSERTION, SINGLE  2008   • MEDIPORT REMOVAL     • PARATHYROIDECTOMY  2011   • REPLACEMENT TOTAL KNEE Left 05/23/2019   • TONSILLECTOMY AND ADENOIDECTOMY  1970   • TUBAL ABDOMINAL LIGATION       Social History:  reports that she has never smoked. She has never used smokeless tobacco. She reports previous alcohol use. She reports that she does not use drugs.    Family History: family history includes Breast cancer in her sister; Cirrhosis in her mother; Colon polyps in her mother; Colon polyps (age of onset: 54) in her sister; No Known Problems in her brother, daughter, father, maternal aunt, maternal grandmother, paternal aunt, paternal grandmother, and son.      Allergies:  Allergies   Allergen Reactions   • Cephalosporins Hives   • Keflex [Cephalexin] Rash     Medications:  Prior to Admission medications    Medication Sig Start Date End Date Taking? Authorizing Provider   Accolate 20 MG tablet TAKE 1 TABLET TWICE A DAY  Patient taking differently: Take 20 mg by mouth 2 (Two) Times a Day. 9/20/21  Yes Klaus Wagner APRN   albuterol (PROVENTIL) (2.5 MG/3ML) 0.083% nebulizer solution Take 2.5 mg by nebulization As Needed. 10/23/11  Yes ProviderBenji MD   albuterol (PROVENTIL) (2.5 MG/3ML) 0.083% nebulizer solution Take 2.5 mg by nebulization Every 4 (Four) Hours As Needed for Wheezing. 11/9/21  Yes Kevin Aguirre MD   albuterol sulfate  (90 Base) MCG/ACT inhaler Inhale 2 puffs Every 4 (Four) Hours As Needed for Wheezing. 11/9/21   Yes Kevin Aguirre MD   alendronate (FOSAMAX) 70 MG tablet Take 1 tablet by mouth Every 7 (Seven) Days. Sunday's 9/21/21  Yes Che Calvert APRN   atorvastatin (LIPITOR) 40 MG tablet Take 1 tablet by mouth Daily. 4/6/22  Yes Che Calvert APRN   Azelastine HCl 137 MCG/SPRAY solution 2 sprays into the nostril(s) as directed by provider 2 (Two) Times a Day. 5/10/22  Yes Kevin Aguirre MD   calcitriol (ROCALTROL) 0.5 MCG capsule TAKE 1 CAPSULE DAILY 11/10/21  Yes Che Calvert APRN   Calcium Citrate-Vitamin D (CALCIUM + D PO) Take 1 tablet by mouth Daily.   Yes Benji Duenas MD   Diclofenac Sodium (PENNSAID TD) Apply 1 application topically to the appropriate area as directed 2 (Two) Times a Day As Needed (BACK PAIN). For back pain   Yes Benji Duenas MD   docusate sodium (COLACE) 100 MG capsule Take 100 mg by mouth As Needed.   Yes ProviderBenji MD   fluticasone (Flonase Allergy Relief) 50 MCG/ACT nasal spray 2 sprays into the nostril(s) as directed by provider Daily. 5/10/22  Yes Kevin Aguirre MD   fluticasone-salmeterol (Advair Diskus) 250-50 MCG/DOSE DISKUS Inhale 1 puff 2 (Two) Times a Day. 11/9/21  Yes Kevin Aguirre MD   HYDROcodone-acetaminophen (NORCO) 5-325 MG per tablet Take 1 tablet by mouth 2 (Two) Times a Day As Needed for Moderate Pain . 5/20/20  Yes Benji Duenas MD   ibuprofen (ADVIL,MOTRIN) 400 MG tablet Take 400 mg by mouth Every 8 (Eight) Hours As Needed for Mild Pain .   Yes Benji Duenas MD   loratadine (CLARITIN) 10 MG tablet Take 1 tablet by mouth Daily As Needed for Allergies. 11/9/21  Yes Kevin Aguirre MD   losartan (COZAAR) 100 MG tablet TAKE 1 TABLET DAILY 11/10/21  Yes Cesar Shaffer MD   pantoprazole (PROTONIX) 40 MG EC tablet Take 1 tablet by mouth Daily. 10/18/21  Yes Amy Lloyd APRN   potassium chloride ER (K-TAB) 20 MEQ tablet controlled-release ER tablet Take 1 tablet by mouth Daily. 8/30/21  Yes Ladi Whelan  "HEATHER Tyson   rOPINIRole (REQUIP) 0.25 MG tablet Take 1 tablet by mouth Every Night. 3/17/22  Yes Leslye Posada DO   Synthroid 50 MCG tablet TAKE 1 TABLET DAILY  Patient taking differently: Take 50 mcg by mouth Daily. 9/20/21  Yes Che Calvert APRN   theophylline (UNIPHYL) 400 MG 24 hr tablet Take 1 tablet by mouth Daily. 6/28/21  Yes Klaus Wagner APRN   tiZANidine (ZANAFLEX) 4 MG tablet Take 4 mg by mouth Every 8 (Eight) Hours As Needed for Muscle Spasms.   Yes Benji Duenas MD   clotrimazole-betamethasone (LOTRISONE) 1-0.05 % cream Apply topically 2 times daily. 1/19/22   Benji Duenas MD   Diclofenac Sodium (Pennsaid) 2 % solution Apply  topically to the appropriate area as directed.    Benji Duenas MD   mupirocin (BACTROBAN) 2 % nasal ointment Take by Nasal route 2 times daily for 5 days prior to surgery 12/13/21   ProviderBenji MD   predniSONE (DELTASONE) 10 MG tablet Take 4 tabs daily x 3 days, then take 3 tabs daily x 3 days, then take 2 tabs daily x 3 days, then take 1 tab daily x 3 days 5/10/22   Kevin Aguirre MD   sucralfate (CARAFATE) 1 g tablet Take 1 tablet by mouth 4 (Four) Times a Day. 2/27/22   Kim Melendez APRN       Objective     Vital Signs: /80 (BP Location: Left arm, Patient Position: Sitting, Cuff Size: Adult)   Pulse 99   Temp 97.3 °F (36.3 °C) (Temporal)   Ht 157.5 cm (62\")   Wt 80.3 kg (177 lb)   LMP 06/14/2008 (Exact Date)   SpO2 97%   BMI 32.37 kg/m²   Physical Exam  Vitals and nursing note reviewed.   Constitutional:       General: She is not in acute distress.     Appearance: She is not ill-appearing or toxic-appearing.   HENT:      Head: Normocephalic and atraumatic.      Mouth/Throat:      Mouth: Mucous membranes are moist.      Pharynx: Oropharynx is clear.   Cardiovascular:      Rate and Rhythm: Normal rate and regular rhythm.      Pulses: Normal pulses.      Heart sounds: Normal heart sounds.     "  Comments: Spider veins noted to distal lower extremities.  Pulmonary:      Effort: Pulmonary effort is normal.      Breath sounds: No wheezing, rhonchi or rales.   Abdominal:      General: Bowel sounds are normal. There is no distension.      Palpations: Abdomen is soft.      Tenderness: There is no abdominal tenderness.   Musculoskeletal:         General: No tenderness. Normal range of motion.      Cervical back: Normal range of motion and neck supple. No tenderness.      Comments: Trace bilateral ankle edema with indentation of sock line   Skin:     General: Skin is warm and dry.      Findings: No erythema or rash.   Neurological:      General: No focal deficit present.      Mental Status: She is alert and oriented to person, place, and time.   Psychiatric:         Mood and Affect: Mood normal.         Behavior: Behavior normal.         Thought Content: Thought content normal.         Judgment: Judgment normal.       Results Reviewed:  Reviewed recent outpatient labs from 4/5/2022, CMP and CBC with differential- essentially within normal limits.    Assessment / Plan     Assessment/Plan:  Diagnoses and all orders for this visit:    1. Elevated cholesterol (Primary)  -     Lipid Panel    2. Peripheral edema  -     Compression Stockings    3. Stenosis of right carotid artery  -     US Carotid Bilateral; Future    4. Dizziness    5. Essential hypertension       Patient presents today for 6-month follow-up.  Her blood pressure seems to be well controlled on current medication.  She does complain of dizziness which is likely multifactorial.  She is not found to have true positive orthostatic vital signs, although did seem to become a little dizzy with position change.  Given her complaints of worsening dizziness with neck extension, she likely has some element of vertigo.  This only seems to be an intermittent issue so would not be inclined to treat with any medication at this point in time.  Should this become more  bothersome, we can refer to physical therapy for vestibular therapy.  I will prescribe knee-high compression stockings at this time, which I feel may help somewhat with her dizziness and also with her lower extremity edema.  I have informed her to let me know if neither of these issues improve with compression stockings, at which point we can pursue further work-up.  She requests a carotid ultrasound.  We did discuss that carotid stenosis would likely not cause dizziness, however her last carotid ultrasound in 2017 did show 50-69 percent stenosis on the right, so we will reassess this.  I do not hear any bruit on physical examination.    Reassess lipid panel.    Return in about 6 months (around 12/13/2022). unless patient needs to be seen sooner or acute issues arise.    I have discussed the patient results/orders and and plan/recommendation with them at today's visit.      Mojgan Black, APRN   06/13/2022

## 2022-06-14 LAB
CHOLEST SERPL-MCNC: 181 MG/DL (ref 0–200)
HDLC SERPL-MCNC: 46 MG/DL (ref 40–60)
LDLC SERPL CALC-MCNC: 111 MG/DL (ref 0–100)
TRIGL SERPL-MCNC: 133 MG/DL (ref 0–150)
VLDLC SERPL CALC-MCNC: 24 MG/DL (ref 5–40)

## 2022-06-17 ENCOUNTER — HOSPITAL ENCOUNTER (OUTPATIENT)
Dept: ULTRASOUND IMAGING | Facility: HOSPITAL | Age: 57
Discharge: HOME | End: 2022-06-17
Admitting: NURSE PRACTITIONER

## 2022-06-17 DIAGNOSIS — I65.21 STENOSIS OF RIGHT CAROTID ARTERY: ICD-10-CM

## 2022-06-17 PROCEDURE — 93880 EXTRACRANIAL BILAT STUDY: CPT

## 2022-06-20 ENCOUNTER — TELEPHONE (OUTPATIENT)
Dept: INTERNAL MEDICINE | Facility: CLINIC | Age: 57
End: 2022-06-20

## 2022-07-01 NOTE — PROGRESS NOTES
HISTORY OF PRESENT ILLNESS:    Ms. Yovany Ron present today for a breast exam. This is followed by bilateral mastectomy and immediate reconstruction with expanders and AlloDerm on 9/14/2021    She is status post bilateral implant exchange on 12/28/2021. She had placement of bilateral [id number] cohesive gel implants    She underwent a Right ultrasound guided breast biopsy. Pathology demonstrated intraductal papilloma with atypia. She has appropriate postoperative discomfort, and no significant complaints. This is on the same side as her right breast cancer treated with lumpectomy and radiation in 2008. She has had enough and wishes to consider bilateral mastectomies and reconstruction    PATHOLOGY REVEALS:  FINAL DIAGNOSIS:    A. Right breast, mastectomy:   Ductal carcinoma in situ, papillary and solid subtype. Maximum tumor diameter is 0.6 cm. The surgical margins and nipple are free of tumor. B. Left breast, mastectomy:   No malignancy identified. Benign breast tissue. The surgical margins are free of tumor. She called last weekend concerned about swelling and pain in the left breast and I had her come in for evaluation. I think it was just the block wearing off    PHYSICAL EXAM:  The  wounds look good with no evidence of infection, fluid accumulation, or skin necrosis. The right side still rides a little high but it better than anticipated. She also has some tightness on that side and cannot use her arm as well as she would like. I think she may benefit from a latissimus flap. She has a small palpable nodule that I suspect is fat necrosis lateral to the left Srivastava pattern incision and I will want to look at this with ultrasound in the next few weeks      IMPRESSION:    Doing well s/p bilateral implant exchange 12/28/2021    PLAN: Refer to Dr. Ruddy Morse in [place] for consideration of latissimus flap on the right. I discussed her with him today.     Appointment 2 to 3 weeks for left ultrasound    ADDENDUM: Unilateral left ultrasound 8/1/2022 demonstrates an area of fat necrosis under her lateral aspect of her Wise pattern incision. There is nothing suspicious for malignancy and nothing worrisome at all. It is not as palpable as it was a few weeks ago. ADDENDUM: 8/1/2022-she has seen Dr. Irene Gregory and is planning for surgery sometime in the next several weeks. I have seen, examined and reviewed this patient medication list, appropriate labs and imaging studies. I reviewed relevant medical records and others physicians notes. I discussed the plans of care with the patient. I answered all the questions to the patients satisfaction. I, Dr Rojelio Dubon, personally performed the services described in this documentation as scribed by Janna Macdonald MA in my presence and is both accurate and complete. (Please note that portions of this note were completed with a voice recognition program. Efforts were made to edit the dictations but occasionally words are mis-transcribed.)  Over 50% of the total visit time of 20 minutes in face to face encounter with the patient, out of which more than 50% of the time was spent in counseling patient or family and coordination of care.  Counseling included but was not limited to time spent reviewing labs, imaging studies/ treatment plan and answering questions. '

## 2022-07-11 ENCOUNTER — OFFICE VISIT (OUTPATIENT)
Dept: SURGERY | Age: 57
End: 2022-07-11
Payer: MEDICARE

## 2022-07-11 VITALS — DIASTOLIC BLOOD PRESSURE: 86 MMHG | SYSTOLIC BLOOD PRESSURE: 138 MMHG | HEART RATE: 80 BPM

## 2022-07-11 DIAGNOSIS — Z90.13 S/P BILATERAL MASTECTOMY: Primary | ICD-10-CM

## 2022-07-11 DIAGNOSIS — Z98.890 S/P LUMPECTOMY, RIGHT BREAST: ICD-10-CM

## 2022-07-11 PROCEDURE — G8417 CALC BMI ABV UP PARAM F/U: HCPCS | Performed by: SURGERY

## 2022-07-11 PROCEDURE — 99213 OFFICE O/P EST LOW 20 MIN: CPT | Performed by: SURGERY

## 2022-07-11 PROCEDURE — 1036F TOBACCO NON-USER: CPT | Performed by: SURGERY

## 2022-07-11 PROCEDURE — G8427 DOCREV CUR MEDS BY ELIG CLIN: HCPCS | Performed by: SURGERY

## 2022-07-11 PROCEDURE — 3017F COLORECTAL CA SCREEN DOC REV: CPT | Performed by: SURGERY

## 2022-07-12 DIAGNOSIS — Z71.9 ENCOUNTER FOR CONSULTATION: Primary | ICD-10-CM

## 2022-07-13 DIAGNOSIS — N63.0 BREAST NODULE: ICD-10-CM

## 2022-07-13 DIAGNOSIS — N63.0 LUMP OR MASS IN BREAST: ICD-10-CM

## 2022-07-13 DIAGNOSIS — N64.4 BREAST PAIN, LEFT: Primary | ICD-10-CM

## 2022-08-01 ENCOUNTER — PROCEDURE VISIT (OUTPATIENT)
Dept: SURGERY | Age: 57
End: 2022-08-01

## 2022-08-01 DIAGNOSIS — N64.4 BREAST PAIN, LEFT: ICD-10-CM

## 2022-08-22 ENCOUNTER — OFFICE VISIT (OUTPATIENT)
Dept: GASTROENTEROLOGY | Facility: CLINIC | Age: 57
End: 2022-08-22

## 2022-08-22 VITALS
WEIGHT: 178 LBS | DIASTOLIC BLOOD PRESSURE: 78 MMHG | BODY MASS INDEX: 32.76 KG/M2 | HEART RATE: 83 BPM | OXYGEN SATURATION: 100 % | TEMPERATURE: 97.7 F | HEIGHT: 62 IN | SYSTOLIC BLOOD PRESSURE: 128 MMHG

## 2022-08-22 DIAGNOSIS — K21.00 GASTROESOPHAGEAL REFLUX DISEASE WITH ESOPHAGITIS, UNSPECIFIED WHETHER HEMORRHAGE: ICD-10-CM

## 2022-08-22 DIAGNOSIS — R13.19 ESOPHAGEAL DYSPHAGIA: ICD-10-CM

## 2022-08-22 DIAGNOSIS — K76.0 FATTY LIVER: ICD-10-CM

## 2022-08-22 DIAGNOSIS — K63.5 HYPERPLASTIC POLYP OF TRANSVERSE COLON: ICD-10-CM

## 2022-08-22 DIAGNOSIS — K21.9 GASTROESOPHAGEAL REFLUX DISEASE: Primary | ICD-10-CM

## 2022-08-22 DIAGNOSIS — Z85.3 HISTORY OF MALIGNANT NEOPLASM OF BREAST: ICD-10-CM

## 2022-08-22 DIAGNOSIS — Z01.818 PREOPERATIVE TESTING: Primary | ICD-10-CM

## 2022-08-22 PROCEDURE — 99214 OFFICE O/P EST MOD 30 MIN: CPT | Performed by: NURSE PRACTITIONER

## 2022-08-22 RX ORDER — PANTOPRAZOLE SODIUM 40 MG/1
40 TABLET, DELAYED RELEASE ORAL DAILY
Qty: 90 TABLET | Refills: 3 | Status: SHIPPED | OUTPATIENT
Start: 2022-08-22

## 2022-08-22 NOTE — PROGRESS NOTES
Primary Physician: Mojgan Black APRN    Chief Complaint   Patient presents with   • Med Refill     Pt presents today for yearly OV for GERD-Needs refills on Pantoprazole; Pt states for the most part she feels good but does have occasional issues with reflex/epigastric pain that seem worse at night        Subjective     Luna Cruz is a 56 y.o. female.    HPI   Personal Hx Colon Polyps-  Removed hyperplastic polyp  7/2020.  Due for repeat colonoscopy in 7/2025.  No change in bowels.  No blood in stool. No current GI issues.    GERD-  LA A esopahgitis seen on endoscopy 7/2020.      Anti-reflux measures were reviewed and discussed with patient.  They were advised to refrain from chocolate, alcohol, smoking, peppermint and caffeine.  They were advised to limit fatty foods, large meals, and eating late at nighttime.  They were advised to reach an ideal body mass index.    She is also on Fosamax.  I advised her to drink a large glass of water after taking this pill as this can cause esophagitis.    Currently taking Pantoprazole 40mg once daily.  She will have occasional breakthrough reflux and heartburn symptoms at night.  Occurs about once a week.    Dysphagia-  She has a Potasium pill get stuck in her esophagus in Feb 2022 which led her to the ER.  The pill finally dissolved or passed on its own in the ER.  They added Carafate for 2-4 weeks.   She has Schatzki's ring that was dilated in 2016.  Repeat endoscopy 2/2019 showed what looked like dysmotility.  Dilation to 20mm was done.  BaS 3/2019 showed a transient delay in the pill traversing the GEJ.  Endoscopy 7/2020 unremarkable--dilated up to 18mm.      Fatty Liver-  This was seen on CT imaging July 2020.  She is not known to be diabetic.  No hemoglobin A1c on file.  Urinalysis June 2020- for glucose.  She is overweight and does have hypertension.  I have asked her to work on weight reduction.  She drinks 3 cokes daily--advised to stop--she hasn't.  She  bought kitchen scale, but really isn't using it.  She forgot about my rec to use [employer].    ultrasound liver good and elastography = F1 7/2020.  LFTs were normal in May 2020 and again in 12/2020.    LFT's April 2022 normal limits.  Platelets normal.  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 (HCC) 09/26/2016   • Osteoporosis    • PONV (postoperative nausea and vomiting)    • RLS (restless legs syndrome)    • Scoliosis        Past Surgical History:   Procedure Laterality Date   • ADENOIDECTOMY     • APPENDECTOMY  1994   • BREAST AUGMENTATION  12/2021   • BREAST BIOPSY     • BREAST LUMPECTOMY Right 2008    right sentinel lymph nodes were also removed for biopsy   • CARDIAC CATHETERIZATION     • CHOLECYSTECTOMY  1993   • COLONOSCOPY  05/03/2013    Erythematous mucosa in the rectum-biopsied; Repeat 4 years    • COLONOSCOPY N/A 06/14/2017    Normal; Repeat 5 years   • COLONOSCOPY  04/23/2010    Dr. Arzola-Extremely poorly prepped colon   • COLONOSCOPY N/A 07/24/2020    Hemorrhoids found on perianal exam; One 4mm hyperplastic polyp in the transverse colon; Normal mucosa in the entire examined colon-biopsied; Non-bleeding internal hemorrhoids; Repeat 5 years   • D & C HYSTEROSCOPY  1993   • D & C HYSTEROSCOPY MYOSURE N/A 01/13/2021    Procedure: DILATATION AND CURETTAGE HYSTEROSCOPY WITH MYOSURE, polypectomy;  Surgeon: Marcello Salas MD;  Location: [facility] OR;  Service: Obstetrics/Gynecology;   Laterality: N/A;   • DILATATION AND CURETTAGE     • ENDOSCOPY N/A 10/05/2016    Medium-sized HH; Widely patent and non-obstructing Schatzki ring-dilated; Procedure: ESOPHAGOGASTRODUODENOSCOPY WITH ANESTHESIA;  Surgeon: Ksenia Fox MD;  Location: [facility] ENDOSCOPY;  Service:    • ENDOSCOPY N/A 02/27/2019    Normal 1st portion of the duodenum and 2 portion of the duodenum; A few gastric polyps-biopsied; Small HH; Widely patent Schatzki ring-dilated; Abnormal esophageal motility, suspicious for presbyesophagus; Arrange for barium swallow to assess for dysmotility   • ENDOSCOPY N/A 07/24/2020    Medium-sized HH; LA Grade A reflux esophagitis; No endoscopic esophageal abnormality to explain patient's dysphagia-Esophagus dilated; Normal stomach; Normal examined duodenum; No specimens collected   • HYSTEROSCOPY     • KNEE ARTHROSCOPY Left 11/13/2018    Procedure: LEFT KNEE ARTHROSCOPIC PARTIAL MEDIAL MENISCECTOMY;  Surgeon: Matt Maki MD;  Location: [facility] OR;  Service: Orthopedics   • MASTECTOMY Bilateral 09/14/2021   • MEDIPORT INSERTION, SINGLE  2008   • MEDIPORT REMOVAL     • PARATHYROIDECTOMY  2011   • REPLACEMENT TOTAL KNEE Left 05/23/2019   • TONSILLECTOMY AND ADENOIDECTOMY  1970   • TUBAL ABDOMINAL LIGATION          Current Outpatient Medications:   •  Accolate 20 MG tablet, TAKE 1 TABLET TWICE A DAY (Patient taking differently: Take 20 mg by mouth 2 (Two) Times a Day.), Disp: 180 tablet, Rfl: 3  •  albuterol (PROVENTIL) (2.5 MG/3ML) 0.083% nebulizer solution, Take 2.5 mg by nebulization As Needed., Disp: , Rfl:   •  albuterol (PROVENTIL) (2.5 MG/3ML) 0.083% nebulizer solution, Take 2.5 mg by nebulization Every 4 (Four) Hours As Needed for Wheezing., Disp: 360 mL, Rfl: 5  •  albuterol sulfate  (90 Base) MCG/ACT inhaler, Inhale 2 puffs Every 4 (Four) Hours As Needed for Wheezing., Disp: 6.7 g, Rfl: 5  •  alendronate (FOSAMAX) 70 MG tablet, Take 1 tablet by mouth Every 7 (Seven) Days. Sunday's,  Disp: 12 tablet, Rfl: 3  •  atorvastatin (LIPITOR) 40 MG tablet, Take 1 tablet by mouth Daily., Disp: 90 tablet, Rfl: 3  •  Azelastine HCl 137 MCG/SPRAY solution, 2 sprays into the nostril(s) as directed by provider 2 (Two) Times a Day., Disp: 30 mL, Rfl: 5  •  calcitriol (ROCALTROL) 0.5 MCG capsule, TAKE 1 CAPSULE DAILY, Disp: 90 capsule, Rfl: 3  •  Calcium Citrate-Vitamin D (CALCIUM + D PO), Take 1 tablet by mouth Daily., Disp: , Rfl:   •  Diclofenac Sodium (PENNSAID TD), Apply 1 application topically to the appropriate area as directed 2 (Two) Times a Day As Needed (BACK PAIN). For back pain, Disp: , Rfl:   •  docusate sodium (COLACE) 100 MG capsule, Take 100 mg by mouth As Needed., Disp: , Rfl:   •  fluticasone (Flonase Allergy Relief) 50 MCG/ACT nasal spray, 2 sprays into the nostril(s) as directed by provider Daily., Disp: 16 g, Rfl: 5  •  fluticasone-salmeterol (Advair Diskus) 250-50 MCG/DOSE DISKUS, Inhale 1 puff 2 (Two) Times a Day., Disp: 3 each, Rfl: 3  •  HYDROcodone-acetaminophen (NORCO) 5-325 MG per tablet, Take 1 tablet by mouth 2 (Two) Times a Day As Needed for Moderate Pain ., Disp: , Rfl:   •  ibuprofen (ADVIL,MOTRIN) 400 MG tablet, Take 400 mg by mouth Every 8 (Eight) Hours As Needed for Mild Pain ., Disp: , Rfl:   •  loratadine (CLARITIN) 10 MG tablet, Take 1 tablet by mouth Daily As Needed for Allergies., Disp: 90 tablet, Rfl: 3  •  losartan (COZAAR) 100 MG tablet, TAKE 1 TABLET DAILY, Disp: 90 tablet, Rfl: 3  •  pantoprazole (PROTONIX) 40 MG EC tablet, Take 1 tablet by mouth Daily., Disp: 90 tablet, Rfl: 3  •  potassium chloride ER (K-TAB) 20 MEQ tablet controlled-release ER tablet, Take 1 tablet by mouth Daily., Disp: 90 tablet, Rfl: 3  •  rOPINIRole (REQUIP) 0.25 MG tablet, Take 1 tablet by mouth Every Night., Disp: 90 tablet, Rfl: 1  •  Synthroid 50 MCG tablet, TAKE 1 TABLET DAILY (Patient taking differently: Take 50 mcg by mouth Daily.), Disp: 90 tablet, Rfl: 3  •  theophylline (UNIPHYL)  400 MG 24 hr tablet, Take 1 tablet by mouth Daily., Disp: 90 tablet, Rfl: 3  •  tiZANidine (ZANAFLEX) 4 MG tablet, Take 4 mg by mouth Every 8 (Eight) Hours As Needed for Muscle Spasms., Disp: , Rfl:   No current facility-administered medications for this visit.    Facility-Administered Medications Ordered in Other Visits:   •  heparin flush (porcine) 100 UNIT/ML injection 500 Units, 500 Units, Intracatheter, PRN, Ki Manriquez MD, 500 Units at 09/27/16 1108  •  heparin flush (porcine) 100 UNIT/ML injection 500 Units, 500 Units, Intravenous, PRN, Malathi Brantley APRN, 500 Units at 05/05/17 1009  •  sodium chloride 0.9 % flush 10 mL, 10 mL, Intravenous, PRN, Ki Manriquez MD, 10 mL at 09/27/16 1107  •  sodium chloride 0.9 % flush 10 mL, 10 mL, Intravenous, PRN, Malathi Brantley APRN, 10 mL at 05/05/17 1009    Allergies   Allergen Reactions   • Cephalosporins Hives   • Keflex [Cephalexin] Rash       Social History     Socioeconomic History   • Marital status:    Tobacco Use   • Smoking status: Never Smoker   • Smokeless tobacco: Never Used   Vaping Use   • Vaping Use: Never used   Substance and Sexual Activity   • Alcohol use: Not Currently   • Drug use: No   • Sexual activity: Yes     Partners: Male     Birth control/protection: None       Family History   Problem Relation Age of Onset   • Colon polyps Mother         < 60 years of age    • Cirrhosis Mother    • Colon polyps Sister 54        < 60 years of age    • Breast cancer Sister    • No Known Problems Father    • No Known Problems Brother    • No Known Problems Daughter    • No Known Problems Son    • No Known Problems Maternal Grandmother    • No Known Problems Paternal Grandmother    • No Known Problems Maternal Aunt    • No Known Problems Paternal Aunt    • Colon cancer Neg Hx    • Crohn's disease Neg Hx    • Irritable bowel syndrome Neg Hx    • Liver cancer Neg Hx    • Liver disease Neg Hx    • Rectal cancer Neg  "Hx    • Stomach cancer Neg Hx    • BRCA 1/2 Neg Hx    • Endometrial cancer Neg Hx    • Ovarian cancer Neg Hx    • Uterine cancer Neg Hx    • Esophageal cancer Neg Hx        Review of Systems   Respiratory: Negative for shortness of breath.    Cardiovascular: Negative for chest pain.       Objective     /78 (BP Location: Left arm, Patient Position: Sitting, Cuff Size: Adult)   Pulse 83   Temp 97.7 °F (36.5 °C) (Infrared)   Ht 157.5 cm (62\")   Wt 80.7 kg (178 lb)   LMP 06/14/2008 (Exact Date)   SpO2 100%   Breastfeeding No   BMI 32.56 kg/m²     Physical Exam  Vitals reviewed.   Constitutional:       Appearance: Normal appearance.   Cardiovascular:      Rate and Rhythm: Normal rate and regular rhythm.      Pulses: Normal pulses.      Heart sounds: Normal heart sounds.   Pulmonary:      Effort: Pulmonary effort is normal.      Breath sounds: Normal breath sounds.   Abdominal:      General: Abdomen is flat. Bowel sounds are normal.      Palpations: Abdomen is soft.   Neurological:      Mental Status: She is alert.         Lab Results - Last 18 Months   Lab Units 04/05/22  1019 02/27/22  1315 12/08/21  0805 09/13/21  1025 05/04/21  1026   GLUCOSE mg/dL 94 102* 90 90 91   BUN mg/dL 11 15 11 11 14   CREATININE mg/dL 0.96 1.14* 1.00 1* 0.97   SODIUM mmol/L 140 140 141 141 140   POTASSIUM mmol/L 4.1 3.9 4.1 4.4 4.1   CHLORIDE mmol/L 106 105 104 106 102   TOTAL CO2 mmol/L  --   --  24.6 23  --    CO2 mmol/L 21.0* 24.0  --   --  28.0   TOTAL PROTEIN g/dL 6.5 6.6  --   --  7.2   ALBUMIN g/dL 4.00 4.00 4.10  --  4.00   ALT (SGPT) U/L 16 16 15  --  21   AST (SGOT) U/L 18 19 15  --  20   ALK PHOS U/L 108 110 120*  --  111   BILIRUBIN mg/dL 0.4 0.3 0.3  --  0.5   GLOBULIN gm/dL 2.5 2.6  --   --  3.2       Lab Results - Last 18 Months   Lab Units 04/05/22  1019 02/27/22  1315 12/08/21  0805 10/12/21  1514 09/13/21  1025 05/04/21  1026   HEMOGLOBIN g/dL 14.4 14.3 14.8 14.1 15.3 15.7   HEMATOCRIT % 45.3 44.2 45.1 45.4 " 49.4* 48.7*   MCV fL 89.0 88.8 87.6 91.0 92.3 89.0   WBC 10*3/mm3 8.18 7.14 6.78 7.73 8.0 9.13   RDW % 13.8 14.1 13.3 13.8 13.5 13.7   MPV fL 9.9 9.8  --  9.9 10.4 10.2   PLATELETS 10*3/mm3 346 346 307 380 325 368   INR   --  0.88*  --   --   --   --        Lab Results - Last 18 Months   Lab Units 12/08/21  0805 06/02/21  0736 05/04/21  1026   IRON mcg/dL  --   --  111   TIBC mcg/dL  --   --  337   IRON SATURATION %  --   --  33   FERRITIN ng/mL  --   --  709.40*   TSH uIU/mL 2.820 3.350  --         Lab Results - Last 18 Months   Lab Units 05/04/21  1026   FERRITIN ng/mL 709.40*     US LIVER- 7/31/2020 9:48 AM CDT     HISTORY: Cirrhosis; K76.0-Fatty (change of) liver, not elsewhere  classified      COMPARISON: None     TECHNIQUE: Transverse and longitudinal sonographic images of the liver  and right upper quadrant were obtained using a [employer] E9 sonography  system and a C1-6 curved array probe. Elastography was also performed.  Images are obtained in the usual manner with right arm above shoulder  rolled 30 degrees. A median of 10 measurements was calculated. An  interquartile range IQR/median <0.3 considered reliable data.     FINDINGS:  The liver is coarsened in echotexture. Contour is normal. No focal  lesion is seen. The gallbladder has been removed. The common bile duct  measures 0.3 cm.     10 shear wave measurements were acquired and demonstrate a median  velocity of 1.59 m/s. The IQR/median velocity ratio is 0.15.     Metavir Score F1 -- Portal fibrosis without septa.     IMPRESSION:  1. Mildly coarsened echotexture of the liver. No focal liver lesion.  2. Metavir score F1 -- Portal fibrosis without septa.     Shear wave measurements may be affected by hepatic congestion,  steatosis, and inflammation. Shear wave speed measurements should be  interpreted in conjunction with other available clinical data, and they  should not be considered interchangeable with MRI-derived stiffness  measurements at this  time.      IMPRESSION/PLAN:    Assessment & Plan      Problem List Items Addressed This Visit        Gastrointestinal Abdominal     Gastroesophageal reflux disease with esophagitis    Overview     LA A esopahgitis seen on endoscopy 7/2020.      Anti-reflux measures were reviewed and discussed with patient.  They were advised to refrain from chocolate, alcohol, smoking, peppermint and caffeine.  They were advised to limit fatty foods, large meals, and eating late at nighttime.  They were advised to reach an ideal body mass index.    She is also on Fosamax.  I advised her to drink a large glass of water after taking this pill as this can cause esophagitis.           Current Assessment & Plan     Has breakthrough reflux and heartburn about once a week at night seems to be the worse time.  Will use occasional TUMS or Pepcid prn.         Relevant Medications    pantoprazole (PROTONIX) 40 MG EC tablet    Esophageal dysphagia    Overview     Difficulty swallowing solids and large pills.  She has Schatzki's ring that was dilated in 2016.  Repeat endoscopy 2/2019 showed what looked like dysmotility.  Dilation to 20mm was done.  BaS 3/2019 showed a transient delay in the pill traversing the GEJ.  Endoscopy 7/2020 unremarkable--dilated up to 18mm.    She was advised to drink a large glass of water after taking Fosamax, diclofenac, potassium. This has been helpful.  No longer having dysphagia.         Relevant Medications    pantoprazole (PROTONIX) 40 MG EC tablet    Other Relevant Orders    Case Request (Completed)    Hyperplastic polyp of transverse colon    Overview     Removed hyperplastic polyp  7/2020.  Due for repeat colonoscopy in 7/2025.         Current Assessment & Plan     No change in bowels.  No blood in stool.         Fatty liver    Overview     This was seen on CT imaging July 2020.  She is not known to be diabetic.  No hemoglobin A1c on file.  Urinalysis June 2020- for glucose.  She is overweight and does have  hypertension.  I have asked her to work on weight reduction.  She drinks 3 cokes daily--advised to stop--she hasn't.  She bought kitchen scale, but really isn't using it.  She forgot about my rec to use [employer].    ultrasound liver good and elastography = F1 7/2020.  LFTs were normal in May 2020 and again in 12/2020.           Current Assessment & Plan     Will repeat Liver Ultrasound for continued management.         Relevant Orders    US Liver    US Elastography Paranchyma       Hematology and Neoplasia    History of malignant neoplasm of breast    Overview     Had XRT in 2009 Chemo in 2008.  Pt having right breast surgery with removal of scar tissue and reconstruction next month.           Other Visit Diagnoses     Gastroesophageal reflux disease    -  Primary    Relevant Medications    pantoprazole (PROTONIX) 40 MG EC tablet    Other Relevant Orders    Case Request (Completed)        Benefits, risks, alternatives for endoscopy reviewed.  Risks to include but not limited to infection, bleeding, tear of the esophagus, or other life-threatening complications.  Patient verbalizes understanding and wishes to proceed.      Await Endoscopy and continue PPI therapy.  Await Liver US for continued management of her fatty liver.              Lovely Brito, APRN  08/22/22  10:40 CDT    Much of this encounter note is an electronic transcription/translation of spoken language to printed text. The electronic translation of spoken language may permit erroneous, or at times, nonsensical words or phrases to be inadvertently transcribed; although I have reviewed the note for such errors, some may still exist.

## 2022-08-22 NOTE — ASSESSMENT & PLAN NOTE
Has breakthrough reflux and heartburn about once a week at night seems to be the worse time.  Will use occasional TUMS or Pepcid prn.

## 2022-08-31 ENCOUNTER — APPOINTMENT (OUTPATIENT)
Dept: ULTRASOUND IMAGING | Facility: HOSPITAL | Age: 57
End: 2022-08-31

## 2022-08-31 ENCOUNTER — HOSPITAL ENCOUNTER (OUTPATIENT)
Dept: ULTRASOUND IMAGING | Facility: HOSPITAL | Age: 57
Discharge: HOME | End: 2022-08-31

## 2022-08-31 ENCOUNTER — TELEPHONE (OUTPATIENT)
Dept: GASTROENTEROLOGY | Facility: CLINIC | Age: 57
End: 2022-08-31

## 2022-08-31 DIAGNOSIS — K76.0 FATTY LIVER: Primary | ICD-10-CM

## 2022-08-31 DIAGNOSIS — K76.0 FATTY LIVER: ICD-10-CM

## 2022-08-31 DIAGNOSIS — J45.998 OTHER ASTHMA: ICD-10-CM

## 2022-08-31 PROCEDURE — 76981 USE PARENCHYMA: CPT

## 2022-08-31 PROCEDURE — 76705 ECHO EXAM OF ABDOMEN: CPT

## 2022-08-31 RX ORDER — THEOPHYLLINE 400 MG/1
TABLET, EXTENDED RELEASE ORAL
Qty: 90 TABLET | Refills: 3 | Status: SHIPPED | OUTPATIENT
Start: 2022-08-31

## 2022-08-31 NOTE — TELEPHONE ENCOUNTER
Rx Refill Note  Requested Prescriptions     Pending Prescriptions Disp Refills   • theophylline (UNIPHYL) 400 MG 24 hr tablet [Pharmacy Med Name: THEOPHYLLINE ER TABS 400MG] 90 tablet 3     Sig: TAKE 1 TABLET DAILY      Last office visit with prescribing clinician: 05/10/2022    Next office visit with prescribing clinician: 11/10/2022           Sara Xavier CMA  08/31/22, 11:32 CDT

## 2022-08-31 NOTE — TELEPHONE ENCOUNTER
I have pended orders to [facility] for pt to have done in Jan. I called and got pt on the phone-transferred her to [facility] to schedule f/u in Jan. Pt knows to go by lab 1 week before her OV so that we have those results by her OV. Pt advised to call me back with any further questions/problems.

## 2022-08-31 NOTE — TELEPHONE ENCOUNTER
I spoke with the patient regarding Liver US and explained the fatty liver has worsened a bit.  F2 Metavir Score.  We discussed her getting a weight loss plan such as weight watchers, [employer], [employer] etc...  She will think about what might work best for her.    I want to see her back in office Jan 2023 with labs prior (CBC, CMP, AFP) for Dx: fatty Liver

## 2022-09-06 PROBLEM — K21.9 GASTROESOPHAGEAL REFLUX DISEASE: Status: ACTIVE | Noted: 2022-09-06

## 2022-09-07 ENCOUNTER — ANESTHESIA (OUTPATIENT)
Dept: GASTROENTEROLOGY | Facility: HOSPITAL | Age: 57
End: 2022-09-07

## 2022-09-07 ENCOUNTER — HOSPITAL ENCOUNTER (OUTPATIENT)
Facility: HOSPITAL | Age: 57
Setting detail: HOSPITAL OUTPATIENT SURGERY
Discharge: HOME | End: 2022-09-07
Attending: INTERNAL MEDICINE | Admitting: INTERNAL MEDICINE

## 2022-09-07 ENCOUNTER — ANESTHESIA EVENT (OUTPATIENT)
Dept: GASTROENTEROLOGY | Facility: HOSPITAL | Age: 57
End: 2022-09-07

## 2022-09-07 VITALS
BODY MASS INDEX: 33.13 KG/M2 | WEIGHT: 180 LBS | TEMPERATURE: 98.1 F | HEART RATE: 106 BPM | RESPIRATION RATE: 12 BRPM | DIASTOLIC BLOOD PRESSURE: 69 MMHG | OXYGEN SATURATION: 95 % | HEIGHT: 62 IN | SYSTOLIC BLOOD PRESSURE: 145 MMHG

## 2022-09-07 DIAGNOSIS — R13.19 ESOPHAGEAL DYSPHAGIA: ICD-10-CM

## 2022-09-07 DIAGNOSIS — K21.9 GASTROESOPHAGEAL REFLUX DISEASE: ICD-10-CM

## 2022-09-07 PROCEDURE — C1726 CATH, BAL DIL, NON-VASCULAR: HCPCS | Performed by: INTERNAL MEDICINE

## 2022-09-07 PROCEDURE — 43249 ESOPH EGD DILATION <30 MM: CPT | Performed by: INTERNAL MEDICINE

## 2022-09-07 PROCEDURE — 25010000002 PROPOFOL 10 MG/ML EMULSION: Performed by: NURSE ANESTHETIST, CERTIFIED REGISTERED

## 2022-09-07 RX ORDER — SODIUM CHLORIDE 9 MG/ML
500 INJECTION, SOLUTION INTRAVENOUS CONTINUOUS PRN
Status: DISCONTINUED | OUTPATIENT
Start: 2022-09-07 | End: 2022-09-07 | Stop reason: HOSPADM

## 2022-09-07 RX ORDER — LIDOCAINE HYDROCHLORIDE 10 MG/ML
0.5 INJECTION, SOLUTION EPIDURAL; INFILTRATION; INTRACAUDAL; PERINEURAL ONCE AS NEEDED
Status: DISCONTINUED | OUTPATIENT
Start: 2022-09-07 | End: 2022-09-07 | Stop reason: HOSPADM

## 2022-09-07 RX ORDER — SODIUM CHLORIDE 0.9 % (FLUSH) 0.9 %
10 SYRINGE (ML) INJECTION AS NEEDED
Status: DISCONTINUED | OUTPATIENT
Start: 2022-09-07 | End: 2022-09-07 | Stop reason: HOSPADM

## 2022-09-07 RX ORDER — PROPOFOL 10 MG/ML
VIAL (ML) INTRAVENOUS AS NEEDED
Status: DISCONTINUED | OUTPATIENT
Start: 2022-09-07 | End: 2022-09-07 | Stop reason: SURG

## 2022-09-07 RX ORDER — LIDOCAINE HYDROCHLORIDE 20 MG/ML
INJECTION, SOLUTION EPIDURAL; INFILTRATION; INTRACAUDAL; PERINEURAL AS NEEDED
Status: DISCONTINUED | OUTPATIENT
Start: 2022-09-07 | End: 2022-09-07 | Stop reason: SURG

## 2022-09-07 RX ADMIN — PROPOFOL 40 MG: 10 INJECTION, EMULSION INTRAVENOUS at 08:12

## 2022-09-07 RX ADMIN — SODIUM CHLORIDE 500 ML: 9 INJECTION, SOLUTION INTRAVENOUS at 07:28

## 2022-09-07 RX ADMIN — PROPOFOL 30 MG: 10 INJECTION, EMULSION INTRAVENOUS at 08:15

## 2022-09-07 RX ADMIN — PROPOFOL 70 MG: 10 INJECTION, EMULSION INTRAVENOUS at 08:11

## 2022-09-07 RX ADMIN — LIDOCAINE HYDROCHLORIDE 60 MG: 20 INJECTION, SOLUTION EPIDURAL; INFILTRATION; INTRACAUDAL; PERINEURAL at 08:11

## 2022-09-07 RX ADMIN — PROPOFOL 30 MG: 10 INJECTION, EMULSION INTRAVENOUS at 08:17

## 2022-09-07 NOTE — ANESTHESIA PREPROCEDURE EVALUATION
Anesthesia Evaluation     Patient summary reviewed   history of anesthetic complications: PONV  NPO Solid Status: > 8 hours  NPO Liquid Status: > 8 hours           Airway   Mallampati: I  TM distance: >3 FB  Neck ROM: full  No difficulty expected  Dental - normal exam   (+) implants    Pulmonary - normal exam   (+) asthma,recent URI resolved,   Cardiovascular - normal exam  Exercise tolerance: poor (<4 METS)    (+) hypertension well controlled less than 2 medications, hyperlipidemia,       Neuro/Psych- negative ROS  GI/Hepatic/Renal/Endo    (+)  GERD poorly controlled, GI bleeding lower resolved, liver disease fatty liver disease, thyroid problem hypothyroidism    Musculoskeletal (-) negative ROS    Abdominal  - normal exam   Substance History - negative use     OB/GYN          Other      history of cancer remission                    Anesthesia Plan    ASA 2     MAC     intravenous induction     Anesthetic plan, risks, benefits, and alternatives have been provided, discussed and informed consent has been obtained with: patient.        CODE STATUS:

## 2022-09-07 NOTE — ANESTHESIA POSTPROCEDURE EVALUATION
Patient: Luna Cruz    Procedure Summary     Date: 09/07/22 Room / Location: [facility] ENDOSCOPY 2 / [facility] ENDOSCOPY    Anesthesia Start: 0809 Anesthesia Stop: 0823    Procedure: ESOPHAGOGASTRODUODENOSCOPY WITH ANESTHESIA (N/A ) Diagnosis:       Esophageal dysphagia      Gastroesophageal reflux disease      (Esophageal dysphagia [R13.19])      (Gastroesophageal reflux disease [K21.9])    Surgeons: Ksenia Fox MD Provider: Job Cody CRNA    Anesthesia Type: MAC ASA Status: 2          Anesthesia Type: MAC    Vitals  Vitals Value Taken Time   BP     Temp     Pulse 100 09/07/22 0823   Resp     SpO2 92 % 09/07/22 0823   Vitals shown include unvalidated device data.        Post Anesthesia Care and Evaluation    Patient location during evaluation: PHASE II  Patient participation: complete - patient participated  Level of consciousness: awake  Pain score: 0  Pain management: adequate    Airway patency: patent  Anesthetic complications: No anesthetic complications  PONV Status: none  Cardiovascular status: acceptable and stable  Respiratory status: acceptable and room air  Hydration status: acceptable

## 2022-09-13 RX ORDER — POTASSIUM CHLORIDE 1500 MG/1
20 TABLET, FILM COATED, EXTENDED RELEASE ORAL DAILY
Qty: 90 TABLET | Refills: 3 | Status: SHIPPED | OUTPATIENT
Start: 2022-09-13

## 2022-09-22 DIAGNOSIS — J45.998 OTHER ASTHMA: ICD-10-CM

## 2022-09-22 RX ORDER — ZAFIRLUKAST 20 MG/1
20 TABLET, FILM COATED ORAL DAILY
Qty: 90 TABLET | Refills: 3 | Status: SHIPPED | OUTPATIENT
Start: 2022-09-22 | End: 2022-09-23

## 2022-09-22 RX ORDER — ROPINIROLE 0.25 MG/1
TABLET, FILM COATED ORAL
Qty: 90 TABLET | Refills: 3 | Status: SHIPPED | OUTPATIENT
Start: 2022-09-22

## 2022-09-22 NOTE — TELEPHONE ENCOUNTER
Rx Refill Note  Requested Prescriptions     Pending Prescriptions Disp Refills   • zafirlukast (Accolate) 20 MG tablet 90 tablet 3     Sig: Take 1 tablet by mouth Daily.      Last office visit with prescribing clinician: Visit date not found      Next office visit with prescribing clinician: Visit date not found            Sara Xavier CMA  09/22/22, 12:28 CDT

## 2022-09-23 RX ORDER — ZAFIRLUKAST 20 MG/1
20 TABLET, FILM COATED ORAL 2 TIMES DAILY
Qty: 180 TABLET | Refills: 3 | Status: SHIPPED | OUTPATIENT
Start: 2022-09-23

## 2022-10-17 ENCOUNTER — OFFICE VISIT (OUTPATIENT)
Dept: SURGERY | Age: 57
End: 2022-10-17

## 2022-10-17 VITALS
HEART RATE: 86 BPM | TEMPERATURE: 97.7 F | DIASTOLIC BLOOD PRESSURE: 88 MMHG | SYSTOLIC BLOOD PRESSURE: 130 MMHG | HEIGHT: 62 IN | BODY MASS INDEX: 32.2 KG/M2 | WEIGHT: 175 LBS

## 2022-10-17 DIAGNOSIS — Z09 POSTOPERATIVE EXAMINATION: Primary | ICD-10-CM

## 2022-10-17 PROCEDURE — 99024 POSTOP FOLLOW-UP VISIT: CPT | Performed by: PHYSICIAN ASSISTANT

## 2022-11-01 NOTE — PROGRESS NOTES
Ms. Bran Godfrey presents for drain removal at the request of Dr. Yudelka West office. Pt is recently s/p breast reconstruction. Her wounds are healing well. The drain was removed without incident. She will keep her follow up appt with them.

## 2022-11-10 ENCOUNTER — OFFICE VISIT (OUTPATIENT)
Dept: PULMONOLOGY | Facility: CLINIC | Age: 57
End: 2022-11-10

## 2022-11-10 VITALS
DIASTOLIC BLOOD PRESSURE: 70 MMHG | SYSTOLIC BLOOD PRESSURE: 118 MMHG | OXYGEN SATURATION: 97 % | WEIGHT: 172.8 LBS | BODY MASS INDEX: 39.99 KG/M2 | HEART RATE: 76 BPM | HEIGHT: 55 IN

## 2022-11-10 DIAGNOSIS — Z78.9 NON-SMOKER: ICD-10-CM

## 2022-11-10 DIAGNOSIS — J98.4 PULMONARY SCARRING: ICD-10-CM

## 2022-11-10 DIAGNOSIS — J30.89 NON-SEASONAL ALLERGIC RHINITIS, UNSPECIFIED TRIGGER: ICD-10-CM

## 2022-11-10 DIAGNOSIS — G25.81 RESTLESS LEGS SYNDROME (RLS): ICD-10-CM

## 2022-11-10 DIAGNOSIS — Z86.16 HISTORY OF COVID-19: ICD-10-CM

## 2022-11-10 DIAGNOSIS — J45.40 MODERATE PERSISTENT ASTHMA WITHOUT COMPLICATION: Primary | ICD-10-CM

## 2022-11-10 PROCEDURE — 99214 OFFICE O/P EST MOD 30 MIN: CPT | Performed by: INTERNAL MEDICINE

## 2022-11-10 RX ORDER — ASPIRIN 81 MG/1
81 TABLET, CHEWABLE ORAL DAILY
Qty: 30 TABLET | Refills: 3 | COMMUNITY
Start: 2022-09-30 | End: 2022-12-30

## 2022-11-10 RX ORDER — ONDANSETRON 4 MG/1
4 TABLET, ORALLY DISINTEGRATING ORAL AS NEEDED
COMMUNITY
Start: 2022-09-29

## 2022-11-10 NOTE — PROGRESS NOTES
[facility] OUTPATIENT PROGRESS NOTE    Patient: Luna Cruz  : 1965  Age: 57 y.o.  Date of Service: November 10, 2022    REASON FOR CLINIC VISIT:  Chief Complaint   Patient presents with   • Moderate persistent asthma without complication     Complaining of wheezing since oct        Subjective:    History of Present Illness:  Luna Cruz is a 57 y.o. female who presents to the office today to be seen for    Diagnosis Plan   1. Moderate persistent asthma without complication  XR Chest 1 View    Pulmonary Function Test      2. Pulmonary scarring        3. Non-smoker        4. History of COVID-19        5. Non-seasonal allergic rhinitis, unspecified trigger        6. Restless legs syndrome (RLS)        .  Other problems per record.  Patient is a very pleasant middle aged  female who was seen in the pulmonary clinic for follow-up visit.  She returns to the pulmonary clinic for a follow-up visit with her .    Patient is a non-smoker and has a history of moderate asthma.  She also had history of breast cancer which is treated and she is currently not getting any chemotherapy or radiation treatment.  For her asthma she is using Advair and sometimes uses albuterol rescue inhaler.  For nasal allergy she is using fluticasone nasal spray Astelin nasal spray and loratadine.  She has restless leg syndrome for which she takes Requip at home.  She does not use any CPAP or oxygen at home.  She is already vaccinated for COVID but did not take the booster vaccine.  She is going to get her influenza vaccine.    She had history of knee surgeries in the past.  She had no new complaints and doing well overall.  She did not have any recent hospitalizations and ER visit any urgent care visit or any other new complaints except for the routine surgeries.  She did not need any medication refill.  No recent PFT or chest x-rays done.    PFT done today:  Not done today    PFT Values        Some  values may be hidden. Unless noted otherwise, only the newest values recorded on each date are displayed.         Old Values PFT Results 21   No data to display.      Pre Drug PFT Results 21   FVC 69   FEV1 53   FEF 25-75% 24   FEV1/FVC 62.94      Post Drug PFT Results 21   No data to display.      Other Tests PFT Results 21   TLC 87      DLCO 88   D/VAsb 134           Results for orders placed in visit on 21    Pulmonary Function Test    Narrative  Pulmonary Function Test  Performed by: Sara Cramer, RRT  Authorized by: Kevin Aguirre MD    Pre Drug % Predicted  FVC: 69%  FEV1: 53%  FEF 25-75%: 24%  FEV1/FVC: 62.94%  T%  RV: 120%  DLCO: 88%  D/VAsb: 134%    Interpretation  Overall comments:  The above test results were acceptable and reproducible by ATS criteria.  From analysis of the above test results the patient showed evidence of moderate to severe obstructive airway dysfunction.  No bronchodilator challenge was done.  There was mild restrictive dysfunction noted from decreased TLC  Air trapping noted from increased residual volume  Patient capacity corrected for single breath was within normal limits  No prior test result was available for comparison  Clinical correlation was indicated.    Kevin Aguirre MD  Pulmonologist/Intensivist  2021 14:37 CST      Results for orders placed in visit on 10/29/19    Pulmonary Function Test    Narrative  Pulmonary Function Test  Performed by: Klaus Wagner APRN  Authorized by: Klaus Wagner APRN    Pre Drug  FVC: 62%  FEV1: 52%  FEF 25-75%: 31%  FEV1/FVC: 69.23%         Bronchodilator therapy: Advair and albuterol nebulizer and rescue inhaler as needed.     Smoking Status:   Social History     Tobacco Use   Smoking Status Never   Smokeless Tobacco Never     Pulm Rehab: no  Sleep: yes    Support System: lives with their spouse    Code Status:   There are no questions and answers to display.         Review of Systems:  A complete review of systems is performed and all other systems were reviewed and negative as note above in the HPI.  Review of Systems   Constitutional: Negative.  Negative for fatigue.   HENT: Positive for congestion, postnasal drip and sinus pressure.    Eyes: Negative.    Respiratory: Positive for chest tightness and shortness of breath.    Cardiovascular: Negative.    Gastrointestinal: Negative.    Endocrine: Negative.    Genitourinary: Negative.    Musculoskeletal: Positive for arthralgias and back pain.   Skin: Negative.    Allergic/Immunologic: Positive for environmental allergies.   Neurological: Negative.    Hematological: Negative.    Psychiatric/Behavioral: Negative.        CAT/ACT Score:  Not done today    Medications:  Outpatient Encounter Medications as of 11/10/2022   Medication Sig Dispense Refill   • albuterol (PROVENTIL) (2.5 MG/3ML) 0.083% nebulizer solution Take 2.5 mg by nebulization Every 4 (Four) Hours As Needed for Wheezing. 360 mL 5   • albuterol sulfate  (90 Base) MCG/ACT inhaler Inhale 2 puffs Every 4 (Four) Hours As Needed for Wheezing. 6.7 g 5   • alendronate (FOSAMAX) 70 MG tablet Take 1 tablet by mouth Every 7 (Seven) Days. Sunday's 12 tablet 3   • aspirin 81 MG chewable tablet Chew 1 tablet Daily. 30 tablet 3   • atorvastatin (LIPITOR) 40 MG tablet Take 1 tablet by mouth Daily. 90 tablet 3   • Azelastine HCl 137 MCG/SPRAY solution 2 sprays into the nostril(s) as directed by provider 2 (Two) Times a Day. 30 mL 5   • calcitriol (ROCALTROL) 0.5 MCG capsule TAKE 1 CAPSULE DAILY 90 capsule 3   • Calcium Citrate-Vitamin D (CALCIUM + D PO) Take 1 tablet by mouth Daily.     • Diclofenac Sodium (PENNSAID TD) Apply 1 application topically to the appropriate area as directed 2 (Two) Times a Day As Needed (BACK PAIN). For back pain     • docusate sodium (COLACE) 100 MG capsule Take 100 mg by mouth As Needed.     • fluticasone (Flonase Allergy Relief) 50 MCG/ACT  nasal spray 2 sprays into the nostril(s) as directed by provider Daily. 16 g 5   • fluticasone-salmeterol (Advair Diskus) 250-50 MCG/DOSE DISKUS Inhale 1 puff 2 (Two) Times a Day. 3 each 3   • HYDROcodone-acetaminophen (NORCO) 5-325 MG per tablet Take 1 tablet by mouth 2 (Two) Times a Day As Needed for Moderate Pain .     • ibuprofen (ADVIL,MOTRIN) 400 MG tablet Take 400 mg by mouth Every 8 (Eight) Hours As Needed for Mild Pain .     • loratadine (CLARITIN) 10 MG tablet Take 1 tablet by mouth Daily As Needed for Allergies. 90 tablet 3   • losartan (COZAAR) 100 MG tablet TAKE 1 TABLET DAILY 90 tablet 3   • ondansetron ODT (ZOFRAN-ODT) 4 MG disintegrating tablet      • pantoprazole (PROTONIX) 40 MG EC tablet Take 1 tablet by mouth Daily. 90 tablet 3   • potassium chloride ER (K-TAB) 20 MEQ tablet controlled-release ER tablet Take 1 tablet by mouth Daily. 90 tablet 3   • rOPINIRole (REQUIP) 0.25 MG tablet TAKE 1 TABLET EVERY NIGHT 90 tablet 3   • Synthroid 50 MCG tablet TAKE 1 TABLET DAILY (Patient taking differently: Take 1 tablet by mouth Daily.) 90 tablet 3   • theophylline (UNIPHYL) 400 MG 24 hr tablet TAKE 1 TABLET DAILY 90 tablet 3   • tiZANidine (ZANAFLEX) 4 MG tablet Take 4 mg by mouth Every 8 (Eight) Hours As Needed for Muscle Spasms.     • zafirlukast (Accolate) 20 MG tablet Take 1 tablet by mouth 2 (Two) Times a Day. 180 tablet 3   • [DISCONTINUED] albuterol (PROVENTIL) (2.5 MG/3ML) 0.083% nebulizer solution Take 2.5 mg by nebulization As Needed.       Facility-Administered Encounter Medications as of 11/10/2022   Medication Dose Route Frequency Provider Last Rate Last Admin   • heparin flush (porcine) 100 UNIT/ML injection 500 Units  500 Units Intracatheter PRN Ki Manriquez MD   500 Units at 09/27/16 1108   • heparin flush (porcine) 100 UNIT/ML injection 500 Units  500 Units Intravenous PRN Malathi Brantley APRN   500 Units at 05/05/17 1009   • sodium chloride 0.9 % flush 10 mL  10 mL  "Intravenous PRN Ki Manriquez MD   10 mL at 09/27/16 1107   • sodium chloride 0.9 % flush 10 mL  10 mL Intravenous PRN Malathi Brantley APRN   10 mL at 05/05/17 1009       Allergies:  Allergies   Allergen Reactions   • Cephalosporins Hives   • Keflex [Cephalexin] Rash       Immunizations:  Immunization History   Administered Date(s) Administered   • COVID-19 (PFIZER) PURPLE CAP 03/24/2021, 04/14/2021, 11/09/2021, 07/02/2022   • Covid-19 (Pfizer) Gray Cap 07/02/2022   • Flu Vaccine Intradermal Quad 18-64YR 09/28/2018, 10/29/2019, 09/29/2020   • FluLaval/Fluzone >6mos 10/10/2016, 09/30/2020, 10/12/2021   • Pneumococcal Conjugate 13-Valent (PCV13) 01/01/2012   • Pneumococcal Polysaccharide (PPSV23) 10/28/2014   • Shingrix 12/02/2020, 02/16/2021   • Tdap 12/08/2021   • flucelvax quad pfs =>4 YRS 09/28/2018, 10/29/2019       Objective:    Vitals:  /70   Pulse 76   Ht 62 cm (24.41\")   Wt 78.4 kg (172 lb 12.8 oz)   LMP  (LMP Unknown)   SpO2 97%   Breastfeeding No   .91 kg/m²     Physical Exam:  General: Patient is a 57 y.o. pleasant middle aged  female. Looks stated age. Appears to be in no acute distress.  Eyes: EOMI. PERRLA. Vision intact. No scleral icterus.  Ear, Nose, Mouth and Throat: Hearing is grossly intact. No Leukoplakia, pharyngitis, stomatitis or thrush. Swollen nasal mucosa with post nasal drop.  Neck: Range of motion of neck normal. No thyromegaly or masses. Mallampati Class 3  Respiratory: Clear to auscultation bilaterally. No use of accessory muscles. Decreased breath sounds.  Cardiovascular: Normal heart sounds. Regularly regular rhythm without murmur.  Gastrointestinal: Non tender, non distended, soft. Bowel sounds positive in all four quadrants. No organomegaly.  Skin: No obvious rashes, lesions, ulcers or large amount of bruising. No edema.   Neurological: No new motor deficits. Cranial nerves appear intact.  Psychiatric: Patient is alert and oriented to " person, place and time.    Chest Imaging:    Study Result  Narrative & Impression   EXAM: XR CHEST 1 VW- 2/27/2022 1:11 PM CST     HISTORY: cough       COMPARISON: June 18, 2020.     TECHNIQUE: Frontal radiograph of the chest     FINDINGS:   Left lung is clear. There is a vague groundglass appearance to the right  upper lung suspicious for pneumonia.. The cardiomediastinal silhouette  and pulmonary vascularity are within normal limits.      The osseous structures and surrounding soft tissues demonstrate no acute  abnormality.        IMPRESSION:  1. Vague right upper lobe opacity suspicious for pneumonia.     This report was finalized on 02/27/2022 13:42 by Dr. Walt Kang MD.     Assessment:  1. Moderate persistent asthma without complication    2. Pulmonary scarring    3. Non-smoker    4. History of COVID-19    5. Non-seasonal allergic rhinitis, unspecified trigger    6. Restless legs syndrome (RLS)        Plan/Recommendations:    1.  Patient is doing well from the pulmonary standpoint.  Advised her to continue using Advair and albuterol rescue inhaler as before.  She also has albuterol nebulizer but she is not using it much.  2.  For nasal allergy she should continue using Astelin nasal spray, fluticasone nasal spray and loratadine.  3.  For restless leg syndrome she will continue ropinirole.  She does not have sleep apnea and is not requiring any CPAP or oxygen.  4.  Patient already had COVID-vaccine and already had pneumonia vaccine but she will need a booster dose of COVID-vaccine.  She will continue follow-up with the primary care provider and return to the pulmonary clinic in 6 months time.  I have ordered a pulmonary function test and a chest x-ray before the next clinic visit.      Follow up:  6 Months    Time Spent:  30 minutes    I appreciate the opportunity of participating in this patient's care. I would like to thank the PCP for the referral.  Please feel free to contact me with any other  questions.    Kevin Aguirre MD   Pulmonologist/Intensivist     Electronically signed by: Kevin Aguirre MD, 11/10/2022 13:05 CST

## 2022-11-17 RX ORDER — LEVOTHYROXINE SODIUM 0.05 MG/1
50 TABLET ORAL DAILY
Qty: 90 TABLET | Refills: 3 | Status: SHIPPED | OUTPATIENT
Start: 2022-11-17

## 2022-11-28 DIAGNOSIS — J45.40 MODERATE PERSISTENT ASTHMA WITHOUT COMPLICATION: Primary | ICD-10-CM

## 2022-11-28 RX ORDER — FLUTICASONE PROPIONATE AND SALMETEROL 250; 50 UG/1; UG/1
POWDER RESPIRATORY (INHALATION)
Qty: 180 EACH | Refills: 3 | Status: SHIPPED | OUTPATIENT
Start: 2022-11-28

## 2022-11-28 NOTE — TELEPHONE ENCOUNTER
Pharmacy sent a request for refills on Wixela. Refill passed protocol and sent to the pharmacy.   Rx Refill Note  Requested Prescriptions     Pending Prescriptions Disp Refills   • Wixela Inhub 250-50 MCG/ACT DISKUS [Pharmacy Med Name: WIXELA INHUB 60'S 250/50] 180 each 3     Sig: USE 1 INHALATION TWICE A DAY      Last office visit with prescribing clinician: 11/10/2022      Next office visit with prescribing clinician: 5/11/2023            Osmany Merino CMA  11/28/22, 10:35 CST

## 2022-12-13 ENCOUNTER — OFFICE VISIT (OUTPATIENT)
Dept: INTERNAL MEDICINE | Facility: CLINIC | Age: 57
End: 2022-12-13

## 2022-12-13 VITALS
BODY MASS INDEX: 31.83 KG/M2 | SYSTOLIC BLOOD PRESSURE: 130 MMHG | HEART RATE: 82 BPM | TEMPERATURE: 96.9 F | WEIGHT: 173 LBS | DIASTOLIC BLOOD PRESSURE: 80 MMHG | HEIGHT: 62 IN | OXYGEN SATURATION: 99 %

## 2022-12-13 DIAGNOSIS — Z00.00 ENCOUNTER FOR SUBSEQUENT ANNUAL WELLNESS VISIT (AWV) IN MEDICARE PATIENT: Primary | ICD-10-CM

## 2022-12-13 DIAGNOSIS — E55.9 VITAMIN D DEFICIENCY: ICD-10-CM

## 2022-12-13 DIAGNOSIS — I10 ESSENTIAL HYPERTENSION: ICD-10-CM

## 2022-12-13 DIAGNOSIS — M85.80 OSTEOPENIA, UNSPECIFIED LOCATION: ICD-10-CM

## 2022-12-13 DIAGNOSIS — Z85.3 HISTORY OF MALIGNANT NEOPLASM OF BREAST: ICD-10-CM

## 2022-12-13 DIAGNOSIS — E78.00 ELEVATED CHOLESTEROL: ICD-10-CM

## 2022-12-13 DIAGNOSIS — Z78.0 POST-MENOPAUSAL: ICD-10-CM

## 2022-12-13 DIAGNOSIS — J45.40 MODERATE PERSISTENT ASTHMA WITHOUT COMPLICATION: ICD-10-CM

## 2022-12-13 PROCEDURE — 1160F RVW MEDS BY RX/DR IN RCRD: CPT | Performed by: NURSE PRACTITIONER

## 2022-12-13 PROCEDURE — 1126F AMNT PAIN NOTED NONE PRSNT: CPT | Performed by: NURSE PRACTITIONER

## 2022-12-13 PROCEDURE — 1170F FXNL STATUS ASSESSED: CPT | Performed by: NURSE PRACTITIONER

## 2022-12-13 PROCEDURE — G0439 PPPS, SUBSEQ VISIT: HCPCS | Performed by: NURSE PRACTITIONER

## 2022-12-13 PROCEDURE — 1159F MED LIST DOCD IN RCRD: CPT | Performed by: NURSE PRACTITIONER

## 2022-12-13 NOTE — PROGRESS NOTES
The ABCs of the Annual Wellness Visit  Subsequent Medicare Wellness Visit    Subjective    Luna Cruz is a 57 y.o. female who presents for a Subsequent Medicare Wellness Visit.  She patient feels that she is doing well overall.  She did have breast reconstruction surgery in September at [facility].  She states she does have a wound on her abdomen from the surgery which has been very slow to heal.  She is performing wet-to-dry dressings on this.  She states that she has had some difficulty with numbness and tingling in her face and hands since having surgery in September.  She does have a history of parathyroidectomy and does take calcium supplementation daily.  Patient otherwise denies any acute complaints or concerns today.    The following portions of the patient's history were reviewed and   updated as appropriate: allergies, current medications, past family history, past medical history, past social history, past surgical history and problem list.    Compared to one year ago, the patient feels her physical   health is the same.    Compared to one year ago, the patient feels her mental   health is the same.    Recent Hospitalizations:  She was admitted within the past 365 days at [facility].  The patient was admitted from 9/26/2022 through 9/29/2022.  She underwent surgery for right breast reconstruction, removal of right breast implant and an incisional hernia repair with mesh.    Current Medical Providers:  Patient Care Team:  Chaz Gaines MD as PCP - General (Internal Medicine)  Kevin Aguirre MD as Consulting Physician (Pulmonary Disease)    Outpatient Medications Prior to Visit   Medication Sig Dispense Refill   • albuterol (PROVENTIL) (2.5 MG/3ML) 0.083% nebulizer solution Take 2.5 mg by nebulization Every 4 (Four) Hours As Needed for Wheezing. 360 mL 5   • albuterol sulfate  (90 Base) MCG/ACT inhaler Inhale 2 puffs Every 4 (Four) Hours As Needed for  Wheezing. 6.7 g 5   • alendronate (FOSAMAX) 70 MG tablet Take 1 tablet by mouth Every 7 (Seven) Days. Sunday's 12 tablet 3   • aspirin 81 MG chewable tablet Chew 1 tablet Daily. 30 tablet 3   • atorvastatin (LIPITOR) 40 MG tablet Take 1 tablet by mouth Daily. 90 tablet 3   • Azelastine HCl 137 MCG/SPRAY solution 2 sprays into the nostril(s) as directed by provider 2 (Two) Times a Day. 30 mL 5   • calcitriol (ROCALTROL) 0.5 MCG capsule TAKE 1 CAPSULE DAILY 90 capsule 3   • Calcium Citrate-Vitamin D (CALCIUM + D PO) Take 1 tablet by mouth Daily.     • docusate sodium (COLACE) 100 MG capsule Take 100 mg by mouth As Needed.     • fluticasone (Flonase Allergy Relief) 50 MCG/ACT nasal spray 2 sprays into the nostril(s) as directed by provider Daily. 16 g 5   • HYDROcodone-acetaminophen (NORCO) 5-325 MG per tablet Take 1 tablet by mouth 2 (Two) Times a Day As Needed for Moderate Pain .     • ibuprofen (ADVIL,MOTRIN) 400 MG tablet Take 400 mg by mouth Every 8 (Eight) Hours As Needed for Mild Pain .     • levothyroxine (Synthroid) 50 MCG tablet Take 1 tablet by mouth Daily. 90 tablet 3   • loratadine (CLARITIN) 10 MG tablet Take 1 tablet by mouth Daily As Needed for Allergies. 90 tablet 3   • losartan (COZAAR) 100 MG tablet TAKE 1 TABLET DAILY 90 tablet 3   • ondansetron ODT (ZOFRAN-ODT) 4 MG disintegrating tablet      • pantoprazole (PROTONIX) 40 MG EC tablet Take 1 tablet by mouth Daily. 90 tablet 3   • potassium chloride ER (K-TAB) 20 MEQ tablet controlled-release ER tablet Take 1 tablet by mouth Daily. 90 tablet 3   • rOPINIRole (REQUIP) 0.25 MG tablet TAKE 1 TABLET EVERY NIGHT 90 tablet 3   • theophylline (UNIPHYL) 400 MG 24 hr tablet TAKE 1 TABLET DAILY 90 tablet 3   • tiZANidine (ZANAFLEX) 4 MG tablet Take 4 mg by mouth Every 8 (Eight) Hours As Needed for Muscle Spasms.     • Wixela Inhub 250-50 MCG/ACT DISKUS USE 1 INHALATION TWICE A  each 3   • zafirlukast (Accolate) 20 MG tablet Take 1 tablet by mouth 2  (Two) Times a Day. 180 tablet 3   • Diclofenac Sodium (PENNSAID TD) Apply 1 application topically to the appropriate area as directed 2 (Two) Times a Day As Needed (BACK PAIN). For back pain       Facility-Administered Medications Prior to Visit   Medication Dose Route Frequency Provider Last Rate Last Admin   • heparin flush (porcine) 100 UNIT/ML injection 500 Units  500 Units Intracatheter PRN Ki Manriquez MD   500 Units at 09/27/16 1108   • heparin flush (porcine) 100 UNIT/ML injection 500 Units  500 Units Intravenous PRN Malathi Brantley, APRN   500 Units at 05/05/17 1009   • sodium chloride 0.9 % flush 10 mL  10 mL Intravenous PRN Ki Manriquez MD   10 mL at 09/27/16 1107   • sodium chloride 0.9 % flush 10 mL  10 mL Intravenous PRN Malathi Brantley, APRN   10 mL at 05/05/17 1009       Opioid medication/s are on active medication list.  and I have evaluated her active treatment plan and pain score trends (see table).  Vitals:    12/13/22 0726   PainSc: 0-No pain     I have reviewed the chart for potential of high risk medication and harmful drug interactions in the elderly.            Aspirin is on active medication list. Aspirin use is indicated based on review of current medical condition/s. Pros and cons of this therapy have been discussed today. Benefits of this medication outweigh potential harm.  Patient has been encouraged to continue taking this medication.  .      Patient Active Problem List   Diagnosis   • Essential hypertension   • Gastroesophageal reflux disease with esophagitis   • Heartburn   • Cough   • Esophageal dysphagia   • Family history of polyps in the colon   • Family history of malignant neoplasm of breast   • History of malignant neoplasm of breast   • Hyperplastic polyp of transverse colon   • Overweight (BMI 25.0-29.9)   • Iron deficiency anemia   • Traumatic tear of medial meniscus of knee, left, initial encounter   • Chronic asthma with acute  "exacerbation   • Malignant neoplasm of lower-inner quadrant of left breast in female, estrogen receptor positive (HCC)   • Elevated cholesterol   • Left carotid bruit   • Myxedema heart disease   • Osteoporosis   • Postmenopausal status   • Vitamin D deficiency   • Breast cancer (HCC)   • Fatty liver   • Rectal bleeding   • Non-smoker   • Non-seasonal allergic rhinitis   • Restless legs syndrome (RLS)   • Pulmonary scarring   • Acute URI   • Atypical ductal hyperplasia of breast   • Post-operative state   • S/P bilateral mastectomy   • Moderate persistent asthma without complication   • COVID-19 vaccine administered   • Ductal carcinoma in situ (DCIS) of right breast   • History of COVID-19   • Moderate asthma with exacerbation   • Gastroesophageal reflux disease     Advance Care Planning  Advance Directive is not on file.  ACP discussion was held with the patient during this visit. Patient does not have an advance directive, declines further assistance.     Objective    Vitals:    12/13/22 0726   BP: 130/80   BP Location: Left arm   Patient Position: Sitting   Cuff Size: Adult   Pulse: 82   Temp: 96.9 °F (36.1 °C)   TempSrc: Temporal   SpO2: 99%   Weight: 78.5 kg (173 lb)   Height: 157.5 cm (62\")   PainSc: 0-No pain     Estimated body mass index is 31.64 kg/m² as calculated from the following:    Height as of this encounter: 157.5 cm (62\").    Weight as of this encounter: 78.5 kg (173 lb).    BMI is >= 30 and <35. (Class 1 Obesity). The following options were offered after discussion;: exercise counseling/recommendations and nutrition counseling/recommendations      Does the patient have evidence of cognitive impairment?   No    Lab Results   Component Value Date    GLU 96 09/27/2022          HEALTH RISK ASSESSMENT    Smoking Status:  Social History     Tobacco Use   Smoking Status Never   Smokeless Tobacco Never     Alcohol Consumption:  Social History     Substance and Sexual Activity   Alcohol Use Never     Fall " Risk Screen:    [facility], and patient is at HIGH risk for falls. Assessment completed on:12/13/2022    Depression Screening:  PHQ-2/PHQ-9 Depression Screening 12/13/2022   Retired PHQ-9 Total Score -   Retired Total Score -   Little Interest or Pleasure in Doing Things 0-->not at all   Feeling Down, Depressed or Hopeless 0-->not at all   PHQ-9: Brief Depression Severity Measure Score 0       Health Habits and Functional and Cognitive Screening:  Functional & Cognitive Status 12/13/2022   Do you have difficulty preparing food and eating? No   Do you have difficulty bathing yourself, getting dressed or grooming yourself? No   Do you have difficulty using the toilet? No   Do you have difficulty moving around from place to place? No   Do you have trouble with steps or getting out of a bed or a chair? No   Current Diet Well Balanced Diet        Current Diet Comment -   Dental Exam Up to date   Eye Exam Up to date   Exercise (times per week) 2 times per week   Current Exercises Include Walking   Current Exercise Activities Include -   Do you need help using the phone?  No   Are you deaf or do you have serious difficulty hearing?  No   Do you need help with transportation? No   Do you need help shopping? No   Do you need help preparing meals?  No   Do you need help with housework?  No   Do you need help with laundry? No   Do you need help taking your medications? No   Do you need help managing money? No   Do you ever drive or ride in a car without wearing a seat belt? No   Have you felt unusual stress, anger or loneliness in the last month? No   Who do you live with? Spouse   If you need help, do you have trouble finding someone available to you? No   Have you been bothered in the last four weeks by sexual problems? No   Do you have difficulty concentrating, remembering or making decisions? No       Age-appropriate Screening Schedule:  Refer to the list below for future screening  recommendations based on patient's age, sex and/or medical conditions. Orders for these recommended tests are listed in the plan section. The patient has been provided with a written plan.    Health Maintenance   Topic Date Due   • DXA SCAN  07/06/2022   • LIPID PANEL  06/13/2023   • MAMMOGRAM  09/13/2023   • PAP SMEAR  12/06/2024   • TDAP/TD VACCINES (2 - Td or Tdap) 12/08/2031   • INFLUENZA VACCINE  Completed   • ZOSTER VACCINE  Completed              Physical Exam   Constitutional: She is oriented to person, place, and time.  Non-toxic appearance. No distress.   HENT:   Head: Normocephalic and atraumatic.   Mouth/Throat: Mucous membranes are moist. Oropharynx is clear.   Cardiovascular: Normal rate, regular rhythm, normal heart sounds and normal pulses.   Pulmonary/Chest: Effort normal. She has no wheezes. She has no rhonchi. She has no rales.   Abdominal: Soft. Bowel sounds are normal. She exhibits no distension. There is no abdominal tenderness.   Musculoskeletal: Normal range of motion. No swelling or tenderness.      Cervical back: She exhibits no tenderness.   Neurological: She is alert and oriented to person, place, and time.   Skin: Skin is warm and dry. No rash noted. No erythema.   Psychiatric: Her behavior is normal. Mood, judgment and thought content normal.   Vitals reviewed.    CMS Preventative Services Quick Reference  Risk Factors Identified During Encounter:    Inactivity/Sedentary: Patient was advised to exercise at least 150 minutes a week per CDC recommendations.    The above risks/problems have been discussed with the patient.  Pertinent information has been shared with the patient in the After Visit Summary.    Diagnoses and all orders for this visit:    1. Encounter for subsequent annual wellness visit (AWV) in Medicare patient (Primary)    2. Elevated cholesterol  -     Lipid Panel  -     TSH Rfx On Abnormal To Free T4    3. Essential hypertension  -     Comprehensive Metabolic Panel  -      CBC & Differential  -     Urinalysis With Microscopic If Indicated (No Culture) - Urine, Clean Catch    4. Vitamin D deficiency  -     Vitamin D,25-Hydroxy    5. Post-menopausal  -     DEXA Bone Density Axial; Future    6. History of malignant neoplasm of breast    7. Moderate persistent asthma without complication    8. Osteopenia, unspecified location      Patient presents to the office today for subsequent Medicare wellness exam.  She did have a breast reconstruction surgery in September of this year at a hospital in [place].  She does have history of breast cancer and follows with Dr. Houston yearly.  Her last mammogram was prior to bilateral mastectomy in September 2021.    The patient's last DEXA scan was in July 2020 which did show osteopenia.  Patient is on Fosamax and does take calcium and vitamin D supplement.  Repeat DEXA scan has been ordered.  Patient does have history of parathyroidectomy and has had some numbness and tingling around her lips and in her fingers since having surgery in September.  Her labs in September did indicate hypocalcemia of 8.1.  Her labs will be assessed today as above.  We will follow-up with these results and make changes to plan of care as necessary.    Patient is up-to-date on colonoscopy last performed in 2020.  This did show a polyp and internal hemorrhoids.  Patient denies any bowel concerns at this time.  She has noted no bleeding, no black or tarry stools.  Recommendations were to repeat colonoscopy in 5 years.  Patient's last Pap smear was in December 2021 which was negative.  She reports that she does have an appointment with gynecology either this month or next.  She denies any vaginal bleeding, discharge, or other concerns.    Blood pressure is reasonably well controlled in the office today 130/80.  We will continue present regimen with losartan.    Follow Up:   Next Medicare Wellness visit to be scheduled in 1 year.  We will follow-up in 6 months.    An After  Visit Summary and PPPS were made available to the patient.

## 2022-12-15 LAB
25(OH)D3+25(OH)D2 SERPL-MCNC: 51.4 NG/ML (ref 30–100)
ALBUMIN SERPL-MCNC: 4.2 G/DL (ref 3.8–4.9)
ALBUMIN/GLOB SERPL: 1.8 {RATIO} (ref 1.2–2.2)
ALP SERPL-CCNC: 105 IU/L (ref 44–121)
ALT SERPL-CCNC: 20 IU/L (ref 0–32)
APPEARANCE UR: CLEAR
AST SERPL-CCNC: 23 IU/L (ref 0–40)
BACTERIA #/AREA URNS HPF: ABNORMAL /[HPF]
BASOPHILS # BLD AUTO: 0.1 X10E3/UL (ref 0–0.2)
BASOPHILS NFR BLD AUTO: 1 %
BILIRUB SERPL-MCNC: 0.3 MG/DL (ref 0–1.2)
BILIRUB UR QL STRIP: NEGATIVE
BUN SERPL-MCNC: 11 MG/DL (ref 6–24)
BUN/CREAT SERPL: 11 (ref 9–23)
CALCIUM SERPL-MCNC: 10.1 MG/DL (ref 8.7–10.2)
CASTS URNS QL MICRO: ABNORMAL /LPF
CHLORIDE SERPL-SCNC: 101 MMOL/L (ref 96–106)
CHOLEST SERPL-MCNC: 183 MG/DL (ref 100–199)
CO2 SERPL-SCNC: 24 MMOL/L (ref 20–29)
COLOR UR: YELLOW
CREAT SERPL-MCNC: 1.03 MG/DL (ref 0.57–1)
EGFRCR SERPLBLD CKD-EPI 2021: 63 ML/MIN/1.73
EOSINOPHIL # BLD AUTO: 0.6 X10E3/UL (ref 0–0.4)
EOSINOPHIL NFR BLD AUTO: 8 %
EPI CELLS #/AREA URNS HPF: >10 /HPF (ref 0–10)
ERYTHROCYTE [DISTWIDTH] IN BLOOD BY AUTOMATED COUNT: 13.9 % (ref 11.7–15.4)
GLOBULIN SER CALC-MCNC: 2.3 G/DL (ref 1.5–4.5)
GLUCOSE SERPL-MCNC: 88 MG/DL (ref 70–99)
GLUCOSE UR QL STRIP: NEGATIVE
HCT VFR BLD AUTO: 46.4 % (ref 34–46.6)
HDLC SERPL-MCNC: 50 MG/DL
HGB BLD-MCNC: 14.9 G/DL (ref 11.1–15.9)
HGB UR QL STRIP: NEGATIVE
IMM GRANULOCYTES # BLD AUTO: 0 X10E3/UL (ref 0–0.1)
IMM GRANULOCYTES NFR BLD AUTO: 0 %
KETONES UR QL STRIP: NEGATIVE
LDLC SERPL CALC-MCNC: 104 MG/DL (ref 0–99)
LEUKOCYTE ESTERASE UR QL STRIP: ABNORMAL
LYMPHOCYTES # BLD AUTO: 1.9 X10E3/UL (ref 0.7–3.1)
LYMPHOCYTES NFR BLD AUTO: 28 %
MCH RBC QN AUTO: 28.2 PG (ref 26.6–33)
MCHC RBC AUTO-ENTMCNC: 32.1 G/DL (ref 31.5–35.7)
MCV RBC AUTO: 88 FL (ref 79–97)
MICRO URNS: ABNORMAL
MONOCYTES # BLD AUTO: 0.6 X10E3/UL (ref 0.1–0.9)
MONOCYTES NFR BLD AUTO: 9 %
NEUTROPHILS # BLD AUTO: 3.8 X10E3/UL (ref 1.4–7)
NEUTROPHILS NFR BLD AUTO: 54 %
NITRITE UR QL STRIP: NEGATIVE
PH UR STRIP: 5.5 [PH] (ref 5–7.5)
PLATELET # BLD AUTO: 399 X10E3/UL (ref 150–450)
POTASSIUM SERPL-SCNC: 4.5 MMOL/L (ref 3.5–5.2)
PROT SERPL-MCNC: 6.5 G/DL (ref 6–8.5)
PROT UR QL STRIP: NEGATIVE
RBC # BLD AUTO: 5.29 X10E6/UL (ref 3.77–5.28)
RBC #/AREA URNS HPF: ABNORMAL /HPF (ref 0–2)
SODIUM SERPL-SCNC: 139 MMOL/L (ref 134–144)
SP GR UR STRIP: 1.01 (ref 1–1.03)
TRIGL SERPL-MCNC: 164 MG/DL (ref 0–149)
TSH SERPL DL<=0.005 MIU/L-ACNC: 4.26 UIU/ML (ref 0.45–4.5)
UROBILINOGEN UR STRIP-MCNC: 0.2 MG/DL (ref 0.2–1)
VLDLC SERPL CALC-MCNC: 29 MG/DL (ref 5–40)
WBC # BLD AUTO: 7 X10E3/UL (ref 3.4–10.8)
WBC #/AREA URNS HPF: ABNORMAL /HPF (ref 0–5)

## 2022-12-27 ENCOUNTER — HOSPITAL ENCOUNTER (OUTPATIENT)
Dept: BONE DENSITY | Facility: HOSPITAL | Age: 57
Discharge: HOME | End: 2022-12-27
Admitting: NURSE PRACTITIONER

## 2022-12-27 DIAGNOSIS — Z78.0 POST-MENOPAUSAL: ICD-10-CM

## 2022-12-27 PROCEDURE — 77080 DXA BONE DENSITY AXIAL: CPT

## 2022-12-28 NOTE — PROGRESS NOTES
DEXA scan shows osteopenia, but has improved since her last test.  Would recommend to continue current medications.  We will repeat DEXA scan in 2 years.

## 2023-01-04 ENCOUNTER — LAB (OUTPATIENT)
Dept: LAB | Facility: HOSPITAL | Age: 58
End: 2023-01-04
Payer: MEDICARE

## 2023-01-04 DIAGNOSIS — K76.0 FATTY LIVER: ICD-10-CM

## 2023-01-04 LAB
ALBUMIN SERPL-MCNC: 4.1 G/DL (ref 3.5–5.2)
ALBUMIN/GLOB SERPL: 1.5 G/DL
ALP SERPL-CCNC: 103 U/L (ref 39–117)
ALT SERPL W P-5'-P-CCNC: 19 U/L (ref 1–33)
ANION GAP SERPL CALCULATED.3IONS-SCNC: 10 MMOL/L (ref 5–15)
AST SERPL-CCNC: 22 U/L (ref 1–32)
BASOPHILS # BLD AUTO: 0.09 10*3/MM3 (ref 0–0.2)
BASOPHILS NFR BLD AUTO: 1 % (ref 0–1.5)
BILIRUB SERPL-MCNC: 0.3 MG/DL (ref 0–1.2)
BUN SERPL-MCNC: 11 MG/DL (ref 6–20)
BUN/CREAT SERPL: 12.9 (ref 7–25)
CALCIUM SPEC-SCNC: 8.4 MG/DL (ref 8.6–10.5)
CHLORIDE SERPL-SCNC: 105 MMOL/L (ref 98–107)
CO2 SERPL-SCNC: 25 MMOL/L (ref 22–29)
CREAT SERPL-MCNC: 0.85 MG/DL (ref 0.57–1)
DEPRECATED RDW RBC AUTO: 46.7 FL (ref 37–54)
EGFRCR SERPLBLD CKD-EPI 2021: 79.5 ML/MIN/1.73
EOSINOPHIL # BLD AUTO: 0.5 10*3/MM3 (ref 0–0.4)
EOSINOPHIL NFR BLD AUTO: 5.7 % (ref 0.3–6.2)
ERYTHROCYTE [DISTWIDTH] IN BLOOD BY AUTOMATED COUNT: 14.5 % (ref 12.3–15.4)
GLOBULIN UR ELPH-MCNC: 2.7 GM/DL
GLUCOSE SERPL-MCNC: 90 MG/DL (ref 65–99)
HCT VFR BLD AUTO: 47.4 % (ref 34–46.6)
HGB BLD-MCNC: 14.6 G/DL (ref 12–15.9)
IMM GRANULOCYTES # BLD AUTO: 0.03 10*3/MM3 (ref 0–0.05)
IMM GRANULOCYTES NFR BLD AUTO: 0.3 % (ref 0–0.5)
LYMPHOCYTES # BLD AUTO: 2.08 10*3/MM3 (ref 0.7–3.1)
LYMPHOCYTES NFR BLD AUTO: 23.7 % (ref 19.6–45.3)
MCH RBC QN AUTO: 27.3 PG (ref 26.6–33)
MCHC RBC AUTO-ENTMCNC: 30.8 G/DL (ref 31.5–35.7)
MCV RBC AUTO: 88.6 FL (ref 79–97)
MONOCYTES # BLD AUTO: 0.71 10*3/MM3 (ref 0.1–0.9)
MONOCYTES NFR BLD AUTO: 8.1 % (ref 5–12)
NEUTROPHILS NFR BLD AUTO: 5.35 10*3/MM3 (ref 1.7–7)
NEUTROPHILS NFR BLD AUTO: 61.2 % (ref 42.7–76)
NRBC BLD AUTO-RTO: 0 /100 WBC (ref 0–0.2)
PLATELET # BLD AUTO: 404 10*3/MM3 (ref 140–450)
PMV BLD AUTO: 10 FL (ref 6–12)
POTASSIUM SERPL-SCNC: 3.9 MMOL/L (ref 3.5–5.2)
PROT SERPL-MCNC: 6.8 G/DL (ref 6–8.5)
RBC # BLD AUTO: 5.35 10*6/MM3 (ref 3.77–5.28)
SODIUM SERPL-SCNC: 140 MMOL/L (ref 136–145)
WBC NRBC COR # BLD: 8.76 10*3/MM3 (ref 3.4–10.8)

## 2023-01-04 PROCEDURE — 36415 COLL VENOUS BLD VENIPUNCTURE: CPT

## 2023-01-04 PROCEDURE — 80053 COMPREHEN METABOLIC PANEL: CPT

## 2023-01-04 PROCEDURE — 82105 ALPHA-FETOPROTEIN SERUM: CPT

## 2023-01-04 PROCEDURE — 85025 COMPLETE CBC W/AUTO DIFF WBC: CPT

## 2023-01-05 DIAGNOSIS — I10 ESSENTIAL HYPERTENSION: ICD-10-CM

## 2023-01-05 LAB — ALPHA-FETOPROTEIN: 4.46 NG/ML (ref 0–8.3)

## 2023-01-05 RX ORDER — LOSARTAN POTASSIUM 100 MG/1
100 TABLET ORAL DAILY
Qty: 90 TABLET | Refills: 3 | Status: SHIPPED | OUTPATIENT
Start: 2023-01-05

## 2023-01-09 ENCOUNTER — OFFICE VISIT (OUTPATIENT)
Dept: GASTROENTEROLOGY | Facility: CLINIC | Age: 58
End: 2023-01-09
Payer: MEDICARE

## 2023-01-09 VITALS
SYSTOLIC BLOOD PRESSURE: 124 MMHG | TEMPERATURE: 97.5 F | HEART RATE: 91 BPM | DIASTOLIC BLOOD PRESSURE: 78 MMHG | OXYGEN SATURATION: 99 % | WEIGHT: 170 LBS | BODY MASS INDEX: 31.28 KG/M2 | HEIGHT: 62 IN

## 2023-01-09 DIAGNOSIS — K21.9 GASTROESOPHAGEAL REFLUX DISEASE WITHOUT ESOPHAGITIS: ICD-10-CM

## 2023-01-09 DIAGNOSIS — K76.0 FATTY LIVER: Primary | ICD-10-CM

## 2023-01-09 PROCEDURE — 99213 OFFICE O/P EST LOW 20 MIN: CPT | Performed by: NURSE PRACTITIONER

## 2023-01-09 RX ORDER — CALCITRIOL 0.5 UG/1
0.5 CAPSULE, LIQUID FILLED ORAL DAILY
Qty: 90 CAPSULE | Refills: 3 | Status: SHIPPED | OUTPATIENT
Start: 2023-01-09

## 2023-01-09 RX ORDER — ASPIRIN 81 MG/1
81 TABLET ORAL DAILY
COMMUNITY

## 2023-01-09 NOTE — PROGRESS NOTES
Primary Physician: Chaz Gaines MD    Chief Complaint   Patient presents with   • Follow-up     Pt presents today for fatty liver follow up-had labs 1/4/2023       Subjective     Luna Cruz is a 57 y.o. female.    HPI   Fatty Liver  ultrasound liver good and elastography = F1 7/2020.  LFTs were normal in May 2020 and again in 12/2020.    LFT's April 2022 normal limits.  Platelets normal.  APRI score was 0.16   APRI ?0.3: Unlikely cirrhosis or significant fibrosis      Alpha-fetoprotein normal at 4.46 this was collected January 4, 2023  Remaining LFTs within normal limits on 1/4/2023  Liver ultrasound with elastography 8/31/2022: No liver masses with liver parenchyma somewhat coarsened echotexture, Metavir score F2    GERD  Last upper endoscopy 9/7/2022 with a medium size hiatal hernia, normal stomach, nonobstructing Schatzki's ring dilated up to 20 mm.  Pt reports acid reflux is under control with only occassional night time heartburn.  Patient up-to-date on her bone density study    Personal Hx Colon Polyps  Removed hyperplastic polyp  7/2020.  Due for repeat colonoscopy in 7/2025.  No change in bowels.  No blood in stool. No current GI issues.      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   • Obesity    • Osteopenia    • Osteoporosis    • PONV (postoperative nausea and vomiting)    • RLS (restless legs syndrome)    • Scoliosis        Past Surgical History:   Procedure Laterality Date   • ADENOIDECTOMY     • APPENDECTOMY  1994   • BREAST AUGMENTATION  12/2021   • BREAST  BIOPSY     • BREAST LUMPECTOMY Right 2008    right sentinel lymph nodes were also removed for biopsy   • BREAST RECONSTRUCTION Right 09/2022   • CARDIAC CATHETERIZATION     • CHOLECYSTECTOMY  1993   • COLONOSCOPY  05/03/2013    Erythematous mucosa in the rectum-biopsied; Repeat 4 years    • COLONOSCOPY N/A 06/14/2017    Normal; Repeat 5 years   • COLONOSCOPY  04/23/2010    Dr. Arzola-Extremely poorly prepped colon   • COLONOSCOPY N/A 07/24/2020    Hemorrhoids found on perianal exam; One 4mm hyperplastic polyp in the transverse colon; Normal mucosa in the entire examined colon-biopsied; Non-bleeding internal hemorrhoids; Repeat 5 years   • COSMETIC SURGERY  9-   • D & C HYSTEROSCOPY  1993   • D & C HYSTEROSCOPY MYOSURE N/A 01/13/2021    Procedure: DILATATION AND CURETTAGE HYSTEROSCOPY WITH MYOSURE, polypectomy;  Surgeon: Marcello Salas MD;  Location: [facility] OR;  Service: Obstetrics/Gynecology;  Laterality: N/A;   • DILATATION AND CURETTAGE     • ENDOSCOPY N/A 10/05/2016    Medium-sized HH; Widely patent and non-obstructing Schatzki ring-dilated; Procedure: ESOPHAGOGASTRODUODENOSCOPY WITH ANESTHESIA;  Surgeon: Ksenia Fox MD;  Location: [facility] ENDOSCOPY;  Service:    • ENDOSCOPY N/A 02/27/2019    Normal 1st portion of the duodenum and 2 portion of the duodenum; A few gastric polyps-biopsied; Small HH; Widely patent Schatzki ring-dilated; Abnormal esophageal motility, suspicious for presbyesophagus; Arrange for barium swallow to assess for dysmotility   • ENDOSCOPY N/A 07/24/2020    Medium-sized HH; LA Grade A reflux esophagitis; No endoscopic esophageal abnormality to explain patient's dysphagia-Esophagus dilated; Normal stomach; Normal examined duodenum; No specimens collected   • ENDOSCOPY N/A 09/07/2022    Medium-sized HH; Non-obstructing Schatzki ring-Dilated; Normal stomach; Normal examined duodenum; No specimens collected   • HERNIA REPAIR  9-   • HYSTEROSCOPY     • JOINT REPLACEMENT   2019    Left knee   • KNEE ARTHROSCOPY Left 11/13/2018    Procedure: LEFT KNEE ARTHROSCOPIC PARTIAL MEDIAL MENISCECTOMY;  Surgeon: Matt Maki MD;  Location: [facility];  Service: Orthopedics   • MASTECTOMY Bilateral 09/14/2021   • MEDIPORT INSERTION, SINGLE  2008   • MEDIPORT REMOVAL     • PARATHYROIDECTOMY  2011   • REPLACEMENT TOTAL KNEE Left 05/23/2019   • TONSILLECTOMY AND ADENOIDECTOMY  1970   • TUBAL ABDOMINAL LIGATION          Current Outpatient Medications:   •  albuterol (PROVENTIL) (2.5 MG/3ML) 0.083% nebulizer solution, Take 2.5 mg by nebulization Every 4 (Four) Hours As Needed for Wheezing., Disp: 360 mL, Rfl: 5  •  albuterol sulfate  (90 Base) MCG/ACT inhaler, Inhale 2 puffs Every 4 (Four) Hours As Needed for Wheezing., Disp: 6.7 g, Rfl: 5  •  alendronate (FOSAMAX) 70 MG tablet, Take 1 tablet by mouth Every 7 (Seven) Days. Sunday's, Disp: 12 tablet, Rfl: 3  •  aspirin 81 MG EC tablet, Take 81 mg by mouth Daily., Disp: , Rfl:   •  atorvastatin (LIPITOR) 40 MG tablet, Take 1 tablet by mouth Daily., Disp: 90 tablet, Rfl: 3  •  Azelastine HCl 137 MCG/SPRAY solution, 2 sprays into the nostril(s) as directed by provider 2 (Two) Times a Day., Disp: 30 mL, Rfl: 5  •  calcitriol (ROCALTROL) 0.5 MCG capsule, TAKE 1 CAPSULE DAILY (Patient taking differently: Take 0.5 mcg by mouth Daily.), Disp: 90 capsule, Rfl: 3  •  Calcium Citrate-Vitamin D (CALCIUM + D PO), Take 1 tablet by mouth Daily., Disp: , Rfl:   •  docusate sodium (COLACE) 100 MG capsule, Take 100 mg by mouth As Needed., Disp: , Rfl:   •  fluticasone (Flonase Allergy Relief) 50 MCG/ACT nasal spray, 2 sprays into the nostril(s) as directed by provider Daily., Disp: 16 g, Rfl: 5  •  HYDROcodone-acetaminophen (NORCO) 5-325 MG per tablet, Take 1 tablet by mouth 2 (Two) Times a Day As Needed for Moderate Pain ., Disp: , Rfl:   •  ibuprofen (ADVIL,MOTRIN) 400 MG tablet, Take 400 mg by mouth Every 8 (Eight) Hours As Needed for Mild Pain .,  Disp: , Rfl:   •  levothyroxine (Synthroid) 50 MCG tablet, Take 1 tablet by mouth Daily., Disp: 90 tablet, Rfl: 3  •  loratadine (CLARITIN) 10 MG tablet, Take 1 tablet by mouth Daily As Needed for Allergies., Disp: 90 tablet, Rfl: 3  •  losartan (COZAAR) 100 MG tablet, Take 1 tablet by mouth Daily., Disp: 90 tablet, Rfl: 3  •  ondansetron ODT (ZOFRAN-ODT) 4 MG disintegrating tablet, Place 4 mg on the tongue As Needed., Disp: , Rfl:   •  pantoprazole (PROTONIX) 40 MG EC tablet, Take 1 tablet by mouth Daily., Disp: 90 tablet, Rfl: 3  •  potassium chloride ER (K-TAB) 20 MEQ tablet controlled-release ER tablet, Take 1 tablet by mouth Daily., Disp: 90 tablet, Rfl: 3  •  rOPINIRole (REQUIP) 0.25 MG tablet, TAKE 1 TABLET EVERY NIGHT, Disp: 90 tablet, Rfl: 3  •  theophylline (UNIPHYL) 400 MG 24 hr tablet, TAKE 1 TABLET DAILY (Patient taking differently: Take 400 mg by mouth Daily.), Disp: 90 tablet, Rfl: 3  •  tiZANidine (ZANAFLEX) 4 MG tablet, Take 4 mg by mouth Every 8 (Eight) Hours As Needed for Muscle Spasms., Disp: , Rfl:   •  Wixela Inhub 250-50 MCG/ACT DISKUS, USE 1 INHALATION TWICE A DAY (Patient taking differently: Inhale 1 puff 2 (Two) Times a Day.), Disp: 180 each, Rfl: 3  •  zafirlukast (Accolate) 20 MG tablet, Take 1 tablet by mouth 2 (Two) Times a Day., Disp: 180 tablet, Rfl: 3  No current facility-administered medications for this visit.    Facility-Administered Medications Ordered in Other Visits:   •  heparin flush (porcine) 100 UNIT/ML injection 500 Units, 500 Units, Intracatheter, PRN, Ki Manriquez MD, 500 Units at 09/27/16 1108  •  heparin flush (porcine) 100 UNIT/ML injection 500 Units, 500 Units, Intravenous, PRN, Malathi Brantley APRN, 500 Units at 05/05/17 1009  •  sodium chloride 0.9 % flush 10 mL, 10 mL, Intravenous, PRN, Ki Manriquez MD, 10 mL at 09/27/16 1107  •  sodium chloride 0.9 % flush 10 mL, 10 mL, Intravenous, PRN, Malathi Brantley APRN, 10 mL at  05/05/17 1009    Allergies   Allergen Reactions   • Cephalosporins Hives   • Keflex [Cephalexin] Rash       Social History     Socioeconomic History   • Marital status:    Tobacco Use   • Smoking status: Never   • Smokeless tobacco: Never   Vaping Use   • Vaping Use: Never used   Substance and Sexual Activity   • Alcohol use: Never   • Drug use: No   • Sexual activity: Yes     Partners: Male     Birth control/protection: None       Family History   Problem Relation Age of Onset   • Colon polyps Mother         < 60 years of age    • Cirrhosis Mother    • Diabetes Mother    • Liver disease Mother    • Colon polyps Sister 54        < 60 years of age    • Breast cancer Sister    • Cancer Sister    • No Known Problems Father    • No Known Problems Brother    • No Known Problems Daughter    • No Known Problems Son    • Diabetes Maternal Grandmother    • Heart disease Maternal Grandmother    • No Known Problems Paternal Grandmother    • No Known Problems Maternal Aunt    • No Known Problems Paternal Aunt    • Hyperlipidemia Maternal Grandfather    • Stroke Maternal Grandfather    • Colon cancer Neg Hx    • Crohn's disease Neg Hx    • Irritable bowel syndrome Neg Hx    • Liver cancer Neg Hx    • Rectal cancer Neg Hx    • Stomach cancer Neg Hx    • BRCA 1/2 Neg Hx    • Endometrial cancer Neg Hx    • Ovarian cancer Neg Hx    • Uterine cancer Neg Hx    • Esophageal cancer Neg Hx        Review of Systems    Objective     /78 (BP Location: Left arm, Patient Position: Sitting, Cuff Size: Adult)   Pulse 91   Temp 97.5 °F (36.4 °C) (Infrared)   Ht 157.5 cm (62\")   Wt 77.1 kg (170 lb)   LMP  (LMP Unknown)   SpO2 99%   Breastfeeding No   BMI 31.09 kg/m²     Physical Exam    Lab Results - Last 18 Months   Lab Units 01/04/23  1605 12/14/22  0636 09/27/22  1023 09/14/22  1143 04/05/22  1019 02/27/22  1315 12/08/21  0805 09/13/21  1025   GLUCOSE mg/dL 90 88  --   --  94 102* 90 90   BUN mg/dL 11 11 20 16 11 15 11 11    CREATININE mg/dL 0.85 1.03* 1.3 1.1 0.96 1.14* 1.00 1*   SODIUM mmol/L 140 139 135* 142 140 140 141 141   POTASSIUM mmol/L 3.9 4.5 3.7 4.4 4.1 3.9 4.1 4.4   CHLORIDE mmol/L 105 101 105 106 106 105 104 106   TOTAL CO2 mmol/L  --  24 21 25  --   --  24.6 23   CO2 mmol/L 25.0  --   --   --  21.0* 24.0  --   --    TOTAL PROTEIN g/dL 6.8  --   --   --  6.5 6.6  --   --    ALBUMIN g/dL 4.1 4.2  --   --  4.00 4.00 4.10  --    ALT (SGPT) U/L 19 20  --   --  16 16 15  --    AST (SGOT) U/L 22 23  --   --  18 19 15  --    ALK PHOS U/L 103 105  --   --  108 110 120*  --    BILIRUBIN mg/dL 0.3 0.3  --   --  0.4 0.3 0.3  --    GLOBULIN gm/dL 2.7  --   --   --  2.5 2.6  --   --    ALPHA-FETOPROTEIN ng/mL 4.46  --   --   --   --   --   --   --        Lab Results - Last 18 Months   Lab Units 01/04/23  1605 12/14/22  0636 04/05/22  1019 02/27/22  1315 12/08/21  0805 10/12/21  1514 09/13/21  1025   HEMOGLOBIN g/dL 14.6 14.9 14.4 14.3 14.8 14.1 15.3   HEMATOCRIT % 47.4* 46.4 45.3 44.2 45.1 45.4 49.4*   MCV fL 88.6 88 89.0 88.8 87.6 91.0 92.3   WBC 10*3/mm3 8.76 7.0 8.18 7.14 6.78 7.73 8.0   RDW % 14.5 13.9 13.8 14.1 13.3 13.8 13.5   MPV fL 10.0  --  9.9 9.8  --  9.9 10.4   PLATELETS 10*3/mm3 404 399 346 346 307 380 325   INR   --   --   --  0.88*  --   --   --        Lab Results - Last 18 Months   Lab Units 12/14/22  0636 12/08/21  0805   TSH uIU/mL 4.260 2.820   VIT D 25 HYDROXY ng/mL 51.4  --         Lab Results - Last 18 Months   Lab Units 01/04/23  1605   ALPHA-FETOPROTEIN ng/mL 4.46           IMPRESSION/PLAN:    Assessment & Plan      Problem List Items Addressed This Visit        Gastrointestinal Abdominal     Fatty liver - Primary    Overview     Alpha-fetoprotein normal at 4.46 this was collected January 4, 2023  Remaining LFTs within normal limits on 1/4/2023  Liver ultrasound with elastography 8/31/2022: No liver masses with liver parenchyma somewhat coarsened echotexture, Metavir score F2    APRI 0.16 April 2022:  consistent with unlikely cirrhosis or significant fibrosis  APRI Jan 2023= 0.2         Gastroesophageal reflux disease    Overview     Last upper endoscopy 9/7/2022 with a medium size hiatal hernia, normal stomach, nonobstructing Schatzki's ring dilated up to 20 mm  Cont pantoprazole 40mg daily. As symptoms stable          Follow Up 1 year with labs and US related to fatty liver surveillance    ..The principles of management of steatohepatitis are weight loss through a structured diet and exercise as well as meticulous control of any component of metabolic syndrome, including blood glucose levels, cholesterol and blood pressure.   The patient should strive to lose 2-4 monthly until reaching 7-10% reduction in weight.      Coffee consumption has been shown to decrease the risk of HCC in patients with chronic liver disease.  In these patients, coffee consumption should be encouraged.    I have advised to avoid fructose containing food and drink.     Daily alcohol intake should strickly be below 30gm in men and 20 gm in women.    A physical activity program should in 150-200 min/week of moderate intensity in 3-5 sessions. Resistance training to promote musculoskeletal fitness and improve metabolic factors was reviewed.    Recall Colonoscopy 2025    ..Pt is instructed to avoid caffeine, chocolate, peppermint and nicotine.  They are to avoid eating within 2-3 hours prior to bedtime.                Lovely Brito, APRN  01/09/23  10:52 CST    Part of this note may be an electronic transcription/translation of spoken language to printed text.

## 2023-01-10 DIAGNOSIS — M81.0 OSTEOPOROSIS WITHOUT CURRENT PATHOLOGICAL FRACTURE, UNSPECIFIED OSTEOPOROSIS TYPE: ICD-10-CM

## 2023-01-10 RX ORDER — ALENDRONATE SODIUM 70 MG/1
70 TABLET ORAL
Qty: 12 TABLET | Refills: 3 | Status: SHIPPED | OUTPATIENT
Start: 2023-01-10

## 2023-01-13 ENCOUNTER — OFFICE VISIT (OUTPATIENT)
Dept: OBSTETRICS AND GYNECOLOGY | Facility: CLINIC | Age: 58
End: 2023-01-13
Payer: MEDICARE

## 2023-01-13 ENCOUNTER — TELEPHONE (OUTPATIENT)
Dept: PULMONOLOGY | Facility: CLINIC | Age: 58
End: 2023-01-13
Payer: MEDICARE

## 2023-01-13 VITALS
HEIGHT: 62 IN | SYSTOLIC BLOOD PRESSURE: 128 MMHG | BODY MASS INDEX: 31.47 KG/M2 | WEIGHT: 171 LBS | DIASTOLIC BLOOD PRESSURE: 88 MMHG

## 2023-01-13 DIAGNOSIS — Z85.3 HISTORY OF MALIGNANT NEOPLASM OF BREAST: ICD-10-CM

## 2023-01-13 DIAGNOSIS — Z01.419 ENCOUNTER FOR GYNECOLOGICAL EXAMINATION WITHOUT ABNORMAL FINDING: Primary | ICD-10-CM

## 2023-01-13 DIAGNOSIS — Z92.29 HISTORY OF TAMOXIFEN THERAPY: ICD-10-CM

## 2023-01-13 DIAGNOSIS — I10 ESSENTIAL HYPERTENSION: ICD-10-CM

## 2023-01-13 DIAGNOSIS — J45.40 MODERATE PERSISTENT ASTHMA WITHOUT COMPLICATION: Primary | ICD-10-CM

## 2023-01-13 DIAGNOSIS — Z78.9 NON-SMOKER: ICD-10-CM

## 2023-01-13 DIAGNOSIS — E66.9 OBESITY (BMI 30-39.9): ICD-10-CM

## 2023-01-13 PROCEDURE — 3015F CERV CANCER SCREEN DOCD: CPT | Performed by: NURSE PRACTITIONER

## 2023-01-13 PROCEDURE — 87624 HPV HI-RISK TYP POOLED RSLT: CPT | Performed by: NURSE PRACTITIONER

## 2023-01-13 PROCEDURE — G0123 SCREEN CERV/VAG THIN LAYER: HCPCS | Performed by: NURSE PRACTITIONER

## 2023-01-13 PROCEDURE — G0101 CA SCREEN;PELVIC/BREAST EXAM: HCPCS | Performed by: NURSE PRACTITIONER

## 2023-01-13 RX ORDER — GABAPENTIN 100 MG/1
100 CAPSULE ORAL 3 TIMES DAILY
COMMUNITY
Start: 2023-01-11

## 2023-01-13 RX ORDER — AZITHROMYCIN 250 MG/1
TABLET, FILM COATED ORAL
Qty: 6 TABLET | Refills: 0 | Status: SHIPPED | OUTPATIENT
Start: 2023-01-13 | End: 2023-02-03

## 2023-01-13 RX ORDER — PREDNISONE 20 MG/1
20 TABLET ORAL DAILY
Qty: 5 TABLET | Refills: 0 | Status: SHIPPED | OUTPATIENT
Start: 2023-01-13 | End: 2023-01-18

## 2023-01-13 NOTE — TELEPHONE ENCOUNTER
PER DR Chi Brizuela Chart reviewed; pt has hx asthma and moderate pulmonary dysfunction.  Will send in rx for antibiotic and small course of steroids.  If not better, call back Monday. If worse go to ED  Spoke with patient, stated understanding and education was given.

## 2023-01-13 NOTE — TELEPHONE ENCOUNTER
Complaining of upper respiratory infection signs and symptoms started two days ago, started as a stuffy nose and yesterday woke up chest congestion and wheezing, clear thick sputum. Afebrile. Has not been treated by primary care provider. Denies exposure to flu/covid. Not been tested. Taking her prescribed medications as ordered. If anything is sent in wants it sent to [facility] on [address]. Allergies to cephalosporins and keflex. Patient is aware Dr. Aguirre is not in office and message will be sent on call provider.

## 2023-01-13 NOTE — PATIENT INSTRUCTIONS

## 2023-01-13 NOTE — PROGRESS NOTES
"Chief Complaint  Gynecologic Exam (Patient here for annual, last pap 12/6/21 normal, last mammogram 6/23/21, last dexa 12/27/22, last colonoscopy 6/14/17, pt denies any problems or concerns)    Subjective          Luna Cruz presents to [facility]  History of Present Illness  Annual exam      Review of Systems   Constitutional: Negative for activity change, appetite change, fatigue and fever.        History of tamoxifen     HENT: Negative for congestion, sore throat and trouble swallowing.    Eyes: Negative for pain, discharge and visual disturbance.   Respiratory: Negative for apnea, shortness of breath and wheezing.    Cardiovascular: Negative for chest pain, palpitations and leg swelling.   Gastrointestinal: Negative for abdominal pain, constipation and diarrhea.   Genitourinary: Negative for frequency, pelvic pain, urgency and vaginal discharge.        Occasional stress incontinence.      Musculoskeletal: Negative for back pain and gait problem.   Skin: Negative for color change and rash.   Neurological: Negative for dizziness, weakness and numbness.   Psychiatric/Behavioral: Negative for confusion and sleep disturbance.        Objective   Vital Signs:   /88 (BP Location: Left arm, Patient Position: Sitting, Cuff Size: Large Adult)   Ht 157.5 cm (62\")   Wt 77.6 kg (171 lb)   BMI 31.28 kg/m²     Physical Exam  Vitals and nursing note reviewed. Exam conducted with a chaperone present.   Constitutional:       General: She is not in acute distress.     Appearance: She is well-developed. She is not diaphoretic.   HENT:      Head: Normocephalic.      Right Ear: External ear normal.      Left Ear: External ear normal.      Nose: Nose normal.   Eyes:      General: No scleral icterus.        Right eye: No discharge.         Left eye: No discharge.      Conjunctiva/sclera: Conjunctivae normal.      Pupils: Pupils are equal, round, and reactive to light.   Neck:      Thyroid: No " thyromegaly.      Vascular: No carotid bruit.      Trachea: No tracheal deviation.   Cardiovascular:      Rate and Rhythm: Normal rate and regular rhythm.      Heart sounds: Normal heart sounds. No murmur heard.  Pulmonary:      Effort: Pulmonary effort is normal. No respiratory distress.      Breath sounds: Normal breath sounds. No wheezing.   Chest:   Breasts:     Right: No swelling, bleeding, mass or tenderness.      Left: No swelling, bleeding, mass or tenderness.      Comments: Bilateral mastectomy and reconstruction scarring    Abdominal:      General: There is no distension.      Palpations: Abdomen is soft. There is no mass.      Tenderness: There is no abdominal tenderness. There is no right CVA tenderness, left CVA tenderness or guarding.      Hernia: No hernia is present. There is no hernia in the left inguinal area or right inguinal area.   Genitourinary:     General: Normal vulva.      Exam position: Lithotomy position.      Labia:         Right: No rash, tenderness, lesion or injury.         Left: No rash, tenderness, lesion or injury.       Vagina: Normal. No signs of injury and foreign body. No vaginal discharge, erythema, tenderness or bleeding.      Cervix: Normal.      Uterus: Normal. Not enlarged, not fixed and not tender.       Adnexa: Right adnexa normal and left adnexa normal.        Right: No mass, tenderness or fullness.          Left: No mass, tenderness or fullness.        Rectum: Normal. No mass.      Comments:   BSU normal  Urethral meatus  Normal  Perineum  Normal  Musculoskeletal:         General: No tenderness. Normal range of motion.      Cervical back: Normal range of motion and neck supple.   Lymphadenopathy:      Head:      Right side of head: No submental, submandibular, tonsillar, preauricular, posterior auricular or occipital adenopathy.      Left side of head: No submental, submandibular, tonsillar, preauricular, posterior auricular or occipital adenopathy.      Cervical: No  cervical adenopathy.      Right cervical: No superficial, deep or posterior cervical adenopathy.     Left cervical: No superficial, deep or posterior cervical adenopathy.      Upper Body:      Right upper body: No supraclavicular, axillary or pectoral adenopathy.      Left upper body: No supraclavicular, axillary or pectoral adenopathy.      Lower Body: No right inguinal adenopathy. No left inguinal adenopathy.   Skin:     General: Skin is warm and dry.      Findings: No bruising, erythema or rash.   Neurological:      Mental Status: She is alert and oriented to person, place, and time.      Coordination: Coordination normal.   Psychiatric:         Mood and Affect: Mood normal.         Behavior: Behavior normal.         Thought Content: Thought content normal.         Judgment: Judgment normal.         Result Review :   The following data was reviewed by: HEATHER Rachel on 01/13/2023:    Data reviewed: Radiologic studies mammogram/breast US and dexa scan              Assessment and Plan      Well woman GYN exam.   Pap smear done per ASCCP guidelines.   Will have lab work at PCP.     Encouraged SBE, pt is aware how to do self breast exam and the importance of same.   Discussed weight management and importance of maintaining a healthy weight.   Discussed Vitamin D intake and the importance of adequate vitamin D for both Bone Health and a healthy immune system.    Discussed Daily exercise and the importance of same, in regards to a healthy heart as well as helping to maintain her weight and improving her mental health.     Colonoscopy is up to date.    Bone density followed by HEATHER Black    Discussed STD prevention and testing.   Pt declines Chlamydia/Gonorrhea/Trichomonas, RPR, Hep panel and HIV testing.     Mammogram/Imaging followed by Dr. Houston.     Discussed hx of Tamoxifen and hx of procedure r/t endometrial polyp.   Pt to return for US and OV.     Discussed occasional stress incontinence.   Pt opts  to trial routine Kegel exercises to see if there is improvement.     Diagnoses and all orders for this visit:    1. Encounter for gynecological examination without abnormal finding (Primary)  -     Liquid-based Pap Smear, Screening    2. Obesity (BMI 30-39.9)    3. History of malignant neoplasm of breast    4. Non-smoker    5. Essential hypertension    6. History of tamoxifen therapy      I spent 35 minutes caring for Luna on this date of service. This time includes time spent by me in the following activities:preparing for the visit, reviewing tests, obtaining and/or reviewing a separately obtained history, performing a medically appropriate examination and/or evaluation , counseling and educating the patient/family/caregiver, ordering medications, tests, or procedures and documenting information in the medical record    BMI is >= 30 and <35. (Class 1 Obesity). The following options were offered after discussion;: weight loss educational material (shared in after visit summary)       Follow Up   Return for US and OV.    Patient was given instructions and counseling regarding her condition or for health maintenance advice. Please see specific information pulled into the AVS if appropriate.

## 2023-01-17 LAB
GEN CATEG CVX/VAG CYTO-IMP: NORMAL
HPV I/H RISK 4 DNA CVX QL PROBE+SIG AMP: NOT DETECTED
LAB AP CASE REPORT: NORMAL
LAB AP GYN ADDITIONAL INFORMATION: NORMAL
LAB AP GYN OTHER FINDINGS: NORMAL
Lab: NORMAL
PATH INTERP SPEC-IMP: NORMAL
STAT OF ADQ CVX/VAG CYTO-IMP: NORMAL

## 2023-01-17 NOTE — PROGRESS NOTES
HISTORY OF PRESENT ILLNESS:    Ms. Sherice Snyder present today for a breast exam. This is followed by bilateral mastectomy and immediate reconstruction with expanders and AlloDerm on 9/14/2021. She had her surgery by Dr. Katerin Reaves October of 2022. This was a Aspen flap on the right due to inability to expand the radiated right breast.    She is status post bilateral implant exchange on 12/28/2021. She had placement of bilateral SCX-[id number] cohesive gel implants    She underwent a Right ultrasound guided breast biopsy. Pathology demonstrated intraductal papilloma with atypia. She has appropriate postoperative discomfort, and no significant complaints. This is on the same side as her right breast cancer treated with lumpectomy and radiation in 2008. She has had enough and wishes to consider bilateral mastectomies and reconstruction    PATHOLOGY REVEALS:  FINAL DIAGNOSIS:    A. Right breast, mastectomy:   Ductal carcinoma in situ, papillary and solid subtype. Maximum tumor diameter is 0.6 cm. The surgical margins and nipple are free of tumor. B. Left breast, mastectomy:   No malignancy identified. Benign breast tissue. The surgical margins are free of tumor. She called last weekend concerned about swelling and pain in the left breast and I had her come in for evaluation. I think it was just the block wearing off    Unilateral Ultrasound-8/1/2022  Unilateral left ultrasound 8/1/2022 demonstrates an area of fat necrosis under her lateral aspect of her Wise pattern incision. There is nothing suspicious for malignancy and nothing worrisome at all. PHYSICAL EXAM:  The  wounds look good with no evidence of infection, fluid accumulation, or skin necrosis. The right side still rides a little high but it better than anticipated. She also has some tightness on that side and cannot use her arm as well as she would like. I think she may benefit from a latissimus flap.     She has a small palpable nodule that I suspect is fat necrosis lateral to the left Srivastava pattern incision and I will want to look at this with ultrasound in the next few weeks      IMPRESSION:    Doing well s/p bilateral implant exchange 12/28/2021  Doing well status post right Aspen flap by Dr. Asael Rodriguez in [facility] October 22    PLAN: Revision and touchup by Dr. Asael Rodriguez in June  Follow-up with me for physical exam in 1 year    I have seen, examined and reviewed this patient medication list, appropriate labs and imaging studies. I reviewed relevant medical records and others physicians notes. I discussed the plans of care with the patient. I answered all the questions to the patients satisfaction. I, Dr Laney Martinez, personally performed the services described in this documentation as scribed by Tiffanie Villargan MA in my presence and is both accurate and complete. (Please note that portions of this note were completed with a voice recognition program. Efforts were made to edit the dictations but occasionally words are mis-transcribed.)  Over 50% of the total visit time of 20 minutes in face to face encounter with the patient, out of which more than 50% of the time was spent in counseling patient or family and coordination of care.  Counseling included but was not limited to time spent reviewing labs, imaging studies/ treatment plan and answering questions. '

## 2023-01-18 ENCOUNTER — OFFICE VISIT (OUTPATIENT)
Dept: SURGERY | Age: 58
End: 2023-01-18

## 2023-01-18 VITALS — HEART RATE: 80 BPM | DIASTOLIC BLOOD PRESSURE: 80 MMHG | SYSTOLIC BLOOD PRESSURE: 124 MMHG

## 2023-01-18 DIAGNOSIS — Z90.13 S/P BILATERAL MASTECTOMY: Primary | ICD-10-CM

## 2023-01-18 DIAGNOSIS — C50.911 CARCINOMA OF RIGHT BREAST METASTATIC TO AXILLARY LYMPH NODE (HCC): ICD-10-CM

## 2023-01-18 DIAGNOSIS — C77.3 CARCINOMA OF RIGHT BREAST METASTATIC TO AXILLARY LYMPH NODE (HCC): ICD-10-CM

## 2023-02-02 DIAGNOSIS — J45.40 MODERATE PERSISTENT ASTHMA WITHOUT COMPLICATION: Primary | ICD-10-CM

## 2023-02-02 RX ORDER — LORATADINE 10 MG/1
TABLET ORAL
Qty: 90 TABLET | Refills: 3 | Status: SHIPPED | OUTPATIENT
Start: 2023-02-02

## 2023-02-02 NOTE — TELEPHONE ENCOUNTER
Pharmacy sent a request for refills on Claritin. Refill passed protocol and sent to the pharmacy.  Rx Refill Note  Requested Prescriptions     Pending Prescriptions Disp Refills   • loratadine (CLARITIN) 10 MG tablet [Pharmacy Med Name: LORATADINE TABS-OTC 10MG] 90 tablet 3     Sig: TAKE 1 TABLET DAILY AS NEEDED FOR ALLERGIES      Last office visit with prescribing clinician: 11/10/2022   Last telemedicine visit with prescribing clinician: 5/11/2023   Next office visit with prescribing clinician: 5/11/2023                         Would you like a call back once the refill request has been completed: [] Yes [] No    If the office needs to give you a call back, can they leave a voicemail: [] Yes [] No    Osmany Merino, [facility]  02/02/23, 14:19 CST

## 2023-02-03 ENCOUNTER — OFFICE VISIT (OUTPATIENT)
Dept: OBSTETRICS AND GYNECOLOGY | Facility: CLINIC | Age: 58
End: 2023-02-03
Payer: MEDICARE

## 2023-02-03 VITALS
HEIGHT: 62 IN | WEIGHT: 172 LBS | DIASTOLIC BLOOD PRESSURE: 90 MMHG | SYSTOLIC BLOOD PRESSURE: 138 MMHG | BODY MASS INDEX: 31.65 KG/M2

## 2023-02-03 DIAGNOSIS — Z85.3 HISTORY OF MALIGNANT NEOPLASM OF BREAST: ICD-10-CM

## 2023-02-03 DIAGNOSIS — Z92.29 HISTORY OF TAMOXIFEN THERAPY: Primary | ICD-10-CM

## 2023-02-03 DIAGNOSIS — E66.9 OBESITY (BMI 30-39.9): ICD-10-CM

## 2023-02-03 PROCEDURE — 99213 OFFICE O/P EST LOW 20 MIN: CPT | Performed by: NURSE PRACTITIONER

## 2023-02-03 NOTE — PATIENT INSTRUCTIONS

## 2023-02-03 NOTE — PROGRESS NOTES
"Chief Complaint  Gynecologic Exam (Pt is here for a f/u with US due to Tamoxifen use.  Pt has no complaints. )    Subjective          Luna Cruz presents to [facility]  History of Present Illness  US today r/t tamoxifen use        Review of Systems   Constitutional: Negative for activity change, appetite change, fatigue and fever.   Respiratory: Negative for apnea and shortness of breath.    Cardiovascular: Negative for chest pain and palpitations.   Gastrointestinal: Negative for abdominal distention, abdominal pain, constipation, diarrhea, nausea and vomiting.   Endocrine: Negative for cold intolerance and heat intolerance.   Genitourinary: Negative for difficulty urinating, frequency, menstrual problem, pelvic pain, vaginal bleeding, vaginal discharge and vaginal pain.   Neurological: Negative for headaches.   Psychiatric/Behavioral: Negative for agitation and sleep disturbance.         Objective   Vital Signs:   /90   Ht 157.5 cm (62\")   Wt 78 kg (172 lb)   BMI 31.46 kg/m²     Physical Exam  Vitals reviewed.   Constitutional:       Appearance: She is well-developed.   Eyes:      General:         Right eye: No discharge.         Left eye: No discharge.   Cardiovascular:      Rate and Rhythm: Normal rate and regular rhythm.   Pulmonary:      Effort: Pulmonary effort is normal.      Breath sounds: Normal breath sounds.   Skin:     General: Skin is warm.   Neurological:      Mental Status: She is alert and oriented to person, place, and time.   Psychiatric:         Behavior: Behavior normal.         Thought Content: Thought content normal.         Judgment: Judgment normal.         Result Review :   The following data was reviewed by: HEATHER Rachel on 02/03/2023:    Data reviewed: Radiologic studies trasvaginal US.                 Assessment and Plan      Reviewed US report and discussed with pt.   US report indicates uterus 4.92 cm, endometrial thickness 0.32 cm, " cyst adjacent to left ovary 1.74 cm.     Advised pt to return in 6 wks for US and OV.   When MD reviews images I may have pt return for labs if complex cyst is noted.   Pt voiced understanding.     Diagnoses and all orders for this visit:    1. History of tamoxifen therapy (Primary)    2. History of malignant neoplasm of breast    3. Obesity (BMI 30-39.9)          BMI is >= 30 and <35. (Class 1 Obesity). The following options were offered after discussion;: weight loss educational material (shared in after visit summary)       Follow Up   Return in about 6 weeks (around 3/17/2023) for US and OV.    Patient was given instructions and counseling regarding her condition or for health maintenance advice. Please see specific information pulled into the AVS if appropriate.

## 2023-03-10 DIAGNOSIS — J45.40 MODERATE PERSISTENT ASTHMA WITHOUT COMPLICATION: Primary | ICD-10-CM

## 2023-03-10 NOTE — TELEPHONE ENCOUNTER
[facility] requesting a refill.    Rx Refill Note  Requested Prescriptions     Pending Prescriptions Disp Refills   • albuterol (PROVENTIL) (2.5 MG/3ML) 0.083% nebulizer solution [Pharmacy Med Name: ALBUTEROL SULF INH SOLN 3ML 0.083%] 360 mL 3     Sig: USE 1 VIAL BY NEBULIZATION EVERY 4 HOURS AS NEEDED FOR WHEEZING      Last office visit with prescribing clinician: 11/10/2022   Last telemedicine visit with prescribing clinician: 5/11/2023   Next office visit with prescribing clinician: 5/11/2023                         Would you like a call back once the refill request has been completed: [] Yes [] No    If the office needs to give you a call back, can they leave a voicemail: [] Yes [] No    Holely Coates MA  03/10/23, 15:19 CST

## 2023-03-14 RX ORDER — ALBUTEROL SULFATE 2.5 MG/3ML
SOLUTION RESPIRATORY (INHALATION)
Qty: 360 ML | Refills: 5 | Status: SHIPPED | OUTPATIENT
Start: 2023-03-14

## 2023-03-15 ENCOUNTER — OFFICE VISIT (OUTPATIENT)
Dept: OBSTETRICS AND GYNECOLOGY | Facility: CLINIC | Age: 58
End: 2023-03-15
Payer: MEDICARE

## 2023-03-15 VITALS
SYSTOLIC BLOOD PRESSURE: 148 MMHG | DIASTOLIC BLOOD PRESSURE: 88 MMHG | WEIGHT: 176 LBS | HEIGHT: 62 IN | BODY MASS INDEX: 32.39 KG/M2

## 2023-03-15 DIAGNOSIS — Q50.5 PARA-OVARIAN CYST: Primary | ICD-10-CM

## 2023-03-15 DIAGNOSIS — E66.9 OBESITY (BMI 30-39.9): ICD-10-CM

## 2023-03-15 DIAGNOSIS — Z92.29 HISTORY OF TAMOXIFEN THERAPY: ICD-10-CM

## 2023-03-15 DIAGNOSIS — Z85.3 HISTORY OF MALIGNANT NEOPLASM OF BREAST: ICD-10-CM

## 2023-03-15 PROCEDURE — 1159F MED LIST DOCD IN RCRD: CPT | Performed by: NURSE PRACTITIONER

## 2023-03-15 PROCEDURE — 3077F SYST BP >= 140 MM HG: CPT | Performed by: NURSE PRACTITIONER

## 2023-03-15 PROCEDURE — 1160F RVW MEDS BY RX/DR IN RCRD: CPT | Performed by: NURSE PRACTITIONER

## 2023-03-15 PROCEDURE — 3079F DIAST BP 80-89 MM HG: CPT | Performed by: NURSE PRACTITIONER

## 2023-03-15 PROCEDURE — 99213 OFFICE O/P EST LOW 20 MIN: CPT | Performed by: NURSE PRACTITIONER

## 2023-03-16 NOTE — PATIENT INSTRUCTIONS

## 2023-04-11 NOTE — PROGRESS NOTES
[facility] H[facility]ATOLOGY & ONCOLOGY [address]  [facility] [address] [phone]    Patient Name: Luna Cruz  Encounter Date: 04/25/2023  YOB: 1965  Patient Number: [id number]      REASON FOR FOLLOW-UP: Luna Cruz is a pleasant 57 y.o.  female who is seen on follow-up for stage IIA right breast cancer.  She had completed 10 years of adjuvant tamoxifen 52.5 months ago. She is seen alone.  History taken from patient.  History is considered reliable.         Oncology/Hematology History Overview Note   Ms. Luna Cruz is a pleasant 49 year old female with diagnosis of pT1pN1(mi)M0 right breast cancer, ER positive, ME positive, HER2/  leonila normal diagnosed in August 2008. She has passed the 3 yearmark.  Back then, the patient underwent lumpectomy. Tumor was  located in the lower inner quadrant of the right breast. [place] lymph node dissection found to have residual DCIS. She went on to receive  4 cycles of non anthracycline based chemotherapy with Taxotere and Cytoxan. This was followed by radiation therapy and has been  tamoxifen since 11/25/08.  She is here today for regular scheduled followup. . We will request sestamibi scan anyway. Her tumor markers have been stable. No  evidence of effusion or lymphadenopathy. Thyroid ultrasound on 07/25/11 ordered by Dr. Villasenor for hyperthyroidism study show small  thyroid, no lesions identified.  INTERVAL HISTORY  This patient had a lumpectomy for a stage IIA, estrogen receptorpositive,  HER2/  neunegative  breast cancer in 2008. She received  adjuvant interventions that included 5 years of tamoxifen administration.      DIAGNOSTIC ABNORMALITIES:DCIS:  Mammogram 06/25/2021.  New solid nodule within the upper outer quadrant of the right breast anterior depth. She was admitted 09/14/2021 and discharged 09/15/2021.  Right breast mastectomy 09/14/2021: Ductal  carcinoma in situ, papillary and solid type.  Maximum tumor diameter 0.6 cm.  The surgical margins and nipple are free of tumor.Left breast mastectomy 09/14/2021: No malignancy identified.  Benign breast tissue.  The surgical margins are free of tumor.      PREVIOUS INTERVENTIONS:  Bilateral mastectomies 09/14/2021.         Ductal carcinoma in situ (DCIS) of right breast   4/1/2022 Initial Diagnosis    Ductal carcinoma in situ (DCIS) of right breast         PAST MEDICAL HISTORY:  ALLERGIES:  Allergies   Allergen Reactions   • Cephalosporins Hives   • Keflex [Cephalexin] Rash     CURRENT MEDICATIONS:  Outpatient Encounter Medications as of 4/25/2023   Medication Sig Dispense Refill   • albuterol (PROVENTIL) (2.5 MG/3ML) 0.083% nebulizer solution USE 1 VIAL BY NEBULIZATION EVERY 4 HOURS AS NEEDED FOR WHEEZING 360 mL 5   • albuterol sulfate  (90 Base) MCG/ACT inhaler USE 2 INHALATIONS EVERY 4 HOURS AS NEEDED FOR WHEEZING 25.5 g 3   • alendronate (FOSAMAX) 70 MG tablet Take 1 tablet by mouth Every 7 (Seven) Days. Sunday's 12 tablet 3   • aspirin 81 MG EC tablet Take 1 tablet by mouth Daily.     • atorvastatin (LIPITOR) 40 MG tablet Take 1 tablet by mouth Daily. 90 tablet 3   • azelastine (ASTELIN) 0.1 % nasal spray USE 2 SPRAYS INTO THE NOSTRIL(S)  TWICE A DAY AS DIRECTED BY PROVIDER 30 mL 13   • calcitriol (ROCALTROL) 0.5 MCG capsule Take 1 capsule by mouth Daily. 90 capsule 3   • Calcium Citrate-Vitamin D (CALCIUM + D PO) Take 1 tablet by mouth Daily.     • docusate sodium (COLACE) 100 MG capsule Take 1 capsule by mouth As Needed.     • fluticasone (FLONASE) 50 MCG/ACT nasal spray USE 2 SPRAYS INTO THE NOSTRIL(S) DAILY AS DIRECTED BY PROVIDER 16 g 11   • gabapentin (NEURONTIN) 100 MG capsule Take 1 capsule by mouth 3 (Three) Times a Day.     • HYDROcodone-acetaminophen (NORCO) 5-325 MG per tablet Take 1 tablet by mouth 2 (Two) Times a Day As Needed for Moderate Pain.     • ibuprofen (ADVIL,MOTRIN) 400 MG tablet  Take 1 tablet by mouth Every 8 (Eight) Hours As Needed for Mild Pain.     • levothyroxine (Synthroid) 50 MCG tablet Take 1 tablet by mouth Daily. 90 tablet 3   • loratadine (CLARITIN) 10 MG tablet TAKE 1 TABLET DAILY AS NEEDED FOR ALLERGIES 90 tablet 3   • losartan (COZAAR) 100 MG tablet Take 1 tablet by mouth Daily. 90 tablet 3   • ondansetron ODT (ZOFRAN-ODT) 4 MG disintegrating tablet Place 1 tablet on the tongue As Needed.     • pantoprazole (PROTONIX) 40 MG EC tablet Take 1 tablet by mouth Daily. 90 tablet 3   • potassium chloride ER (K-TAB) 20 MEQ tablet controlled-release ER tablet Take 1 tablet by mouth Daily. 90 tablet 3   • rOPINIRole (REQUIP) 0.25 MG tablet TAKE 1 TABLET EVERY NIGHT 90 tablet 3   • theophylline (UNIPHYL) 400 MG 24 hr tablet TAKE 1 TABLET DAILY (Patient taking differently: Take 1 tablet by mouth Daily.) 90 tablet 3   • tiZANidine (ZANAFLEX) 4 MG tablet Take 1 tablet by mouth Every 8 (Eight) Hours As Needed for Muscle Spasms.     • Wixela Inhub 250-50 MCG/ACT DISKUS USE 1 INHALATION TWICE A DAY (Patient taking differently: Inhale 1 puff 2 (Two) Times a Day.) 180 each 3   • zafirlukast (Accolate) 20 MG tablet Take 1 tablet by mouth 2 (Two) Times a Day. 180 tablet 3   • [DISCONTINUED] albuterol sulfate  (90 Base) MCG/ACT inhaler Inhale 2 puffs Every 4 (Four) Hours As Needed for Wheezing. 6.7 g 5   • [DISCONTINUED] atorvastatin (LIPITOR) 40 MG tablet Take 1 tablet by mouth Daily. 90 tablet 3   • [DISCONTINUED] Azelastine HCl 137 MCG/SPRAY solution 2 sprays into the nostril(s) as directed by provider 2 (Two) Times a Day. 30 mL 5   • [DISCONTINUED] fluticasone (Flonase Allergy Relief) 50 MCG/ACT nasal spray 2 sprays into the nostril(s) as directed by provider Daily. 16 g 5     Facility-Administered Encounter Medications as of 4/25/2023   Medication Dose Route Frequency Provider Last Rate Last Admin   • heparin flush (porcine) 100 UNIT/ML injection 500 Units  500 Units Intracatheter PRN  Ki Manriquez MD   500 Units at 09/27/16 1108   • heparin flush (porcine) 100 UNIT/ML injection 500 Units  500 Units Intravenous PRN Malathi Brantley APRN   500 Units at 05/05/17 1009   • sodium chloride 0.9 % flush 10 mL  10 mL Intravenous PRN Ki Manriquez MD   10 mL at 09/27/16 1107   • sodium chloride 0.9 % flush 10 mL  10 mL Intravenous PRN Malathi Brantley APRN   10 mL at 05/05/17 1009     ADULT ILLNESSES:  Patient Active Problem List   Diagnosis Code   • Essential hypertension I10   • Gastroesophageal reflux disease with esophagitis K21.00   • Heartburn R12   • Cough R05.9   • Esophageal dysphagia R13.19   • Family history of polyps in the colon Z83.71   • Family history of malignant neoplasm of breast Z80.3   • History of malignant neoplasm of breast Z85.3   • Hyperplastic polyp of transverse colon K63.5   • Overweight (BMI 25.0-29.9) E66.3   • Iron deficiency anemia D50.9   • Traumatic tear of medial meniscus of knee, left, initial encounter S83.242A   • Chronic asthma with acute exacerbation J45.901   • Malignant neoplasm of lower-inner quadrant of left breast in female, estrogen receptor positive C50.312, Z17.0   • Elevated cholesterol E78.00   • Left carotid bruit R09.89   • 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, 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   • Osteoporosis M81.0   • Postmenopausal status Z78.0   • Vitamin D deficiency E55.9   • Breast cancer C50.919   • Fatty liver K76.0   • Rectal bleeding K62.5   • Non-smoker Z78.9   • Non-seasonal allergic rhinitis J30.89   • Restless legs syndrome (RLS) G25.81   • Pulmonary scarring J98.4   • Acute URI J06.9   • Atypical ductal hyperplasia of breast N60.99   • Post-operative state Z98.890   • S/P bilateral mastectomy Z90.13   • Moderate persistent asthma without complication J45.40   • COVID-19 vaccine administered Z23   • Ductal carcinoma in situ (DCIS) of right breast D05.11   • History of COVID-19 Z86.16   • Moderate asthma with exacerbation J45.901   •  Gastroesophageal reflux disease K21.9     SURGERIES:  Past Surgical History:   Procedure Laterality Date   • ADENOIDECTOMY     • APPENDECTOMY  1994   • BREAST AUGMENTATION  12/2021   • BREAST BIOPSY     • BREAST LUMPECTOMY Right 2008    right sentinel lymph nodes were also removed for biopsy   • BREAST RECONSTRUCTION Right 09/2022   • CARDIAC CATHETERIZATION     • CHOLECYSTECTOMY  1993   • COLONOSCOPY  05/03/2013    Erythematous mucosa in the rectum-biopsied; Repeat 4 years    • COLONOSCOPY N/A 06/14/2017    Normal; Repeat 5 years   • COLONOSCOPY  04/23/2010    Dr. Arzola-Extremely poorly prepped colon   • COLONOSCOPY N/A 07/24/2020    Hemorrhoids found on perianal exam; One 4mm hyperplastic polyp in the transverse colon; Normal mucosa in the entire examined colon-biopsied; Non-bleeding internal hemorrhoids; Repeat 5 years   • COSMETIC SURGERY  9-   • D & C HYSTEROSCOPY  1993   • D & C HYSTEROSCOPY MYOSURE N/A 01/13/2021    Procedure: DILATATION AND CURETTAGE HYSTEROSCOPY WITH MYOSURE, polypectomy;  Surgeon: Marcello Salas MD;  Location: [facility] OR;  Service: Obstetrics/Gynecology;  Laterality: N/A;   • DILATATION AND CURETTAGE     • ENDOSCOPY N/A 10/05/2016    Medium-sized HH; Widely patent and non-obstructing Schatzki ring-dilated; Procedure: ESOPHAGOGASTRODUODENOSCOPY WITH ANESTHESIA;  Surgeon: Ksenia Fox MD;  Location: [facility] ENDOSCOPY;  Service:    • ENDOSCOPY N/A 02/27/2019    Normal 1st portion of the duodenum and 2 portion of the duodenum; A few gastric polyps-biopsied; Small HH; Widely patent Schatzki ring-dilated; Abnormal esophageal motility, suspicious for presbyesophagus; Arrange for barium swallow to assess for dysmotility   • ENDOSCOPY N/A 07/24/2020    Medium-sized HH; LA Grade A reflux esophagitis; No endoscopic esophageal abnormality to explain patient's dysphagia-Esophagus dilated; Normal stomach; Normal examined duodenum; No specimens collected   • ENDOSCOPY N/A 09/07/2022     Medium-sized HH; Non-obstructing Schatzki ring-Dilated; Normal stomach; Normal examined duodenum; No specimens collected   • HERNIA REPAIR  9-   • HYSTEROSCOPY     • JOINT REPLACEMENT  2019    Left knee   • KNEE ARTHROSCOPY Left 11/13/2018    Procedure: LEFT KNEE ARTHROSCOPIC PARTIAL MEDIAL MENISCECTOMY;  Surgeon: Matt Maki MD;  Location: [facility];  Service: Orthopedics   • MASTECTOMY Bilateral 09/14/2021   • MEDIPORT INSERTION, SINGLE  2008   • MEDIPORT REMOVAL     • PARATHYROIDECTOMY  2011   • REPLACEMENT TOTAL KNEE Left 05/23/2019   • TONSILLECTOMY AND ADENOIDECTOMY  1970   • TUBAL ABDOMINAL LIGATION       HEALTH MAINTENANCE ITEMS:  There are no preventive care reminders to display for this patient.    <no information>  Last Completed Colonoscopy          COLORECTAL CANCER SCREENING (COLONOSCOPY - Every 5 Years) Next due on 7/24/2025 07/24/2020  Surgical Procedure: COLONOSCOPY    07/24/2020  COLONOSCOPY    06/14/2017  Surgical Procedure: COLONOSCOPY    06/14/2017  COLONOSCOPY    05/16/2014  POCT occult blood stool    Only the first 5 history entries have been loaded, but more history exists.              Immunization History   Administered Date(s) Administered   • COVID-19 (PFIZER) BIVALENT 12+YRS 11/16/2022   • COVID-19 (PFIZER) Purple Cap Monovalent 03/24/2021, 04/14/2021, 11/09/2021, 07/02/2022   • Covid-19 (Pfizer) Gray Cap Monovalent 07/02/2022   • Flu Vaccine Intradermal Quad 18-64YR 09/28/2018, 10/29/2019, 09/29/2020   • FluLaval/Fluzone >6mos 10/10/2016, 09/30/2020, 10/12/2021   • Pneumococcal Conjugate 13-Valent (PCV13) 01/01/2012   • Pneumococcal Polysaccharide (PPSV23) 10/28/2014   • Shingrix 12/02/2020, 02/16/2021   • Tdap 12/08/2021   • flucelvax quad pfs =>4 YRS 09/28/2018, 10/29/2019     Last Completed Mammogram          MAMMOGRAM (Every 2 Years) Next due on 9/13/2023 09/13/2021  Outside Claim:  US BREAST UNILATERAL LIMITED    06/25/2021  Outside Claim:  MAMMOGRAM  "DIAGNOSTIC UNILAT DIGITAL W CAD    06/23/2021  SCANNED - MAMMO    12/21/2020  Mammo Additional View Left    06/17/2020  SCANNED - MAMMO    Only the first 5 history entries have been loaded, but more history exists.                  FAMILY HISTORY:  Family History   Problem Relation Age of Onset   • Colon polyps Mother         < 60 years of age    • Cirrhosis Mother    • Diabetes Mother    • Liver disease Mother    • Colon polyps Sister 54        < 60 years of age    • Breast cancer Sister    • Cancer Sister    • No Known Problems Father    • No Known Problems Brother    • No Known Problems Daughter    • No Known Problems Son    • Diabetes Maternal Grandmother    • Heart disease Maternal Grandmother    • No Known Problems Paternal Grandmother    • No Known Problems Maternal Aunt    • No Known Problems Paternal Aunt    • Hyperlipidemia Maternal Grandfather    • Stroke Maternal Grandfather    • Colon cancer Neg Hx    • Crohn's disease Neg Hx    • Irritable bowel syndrome Neg Hx    • Liver cancer Neg Hx    • Rectal cancer Neg Hx    • Stomach cancer Neg Hx    • BRCA 1/2 Neg Hx    • Endometrial cancer Neg Hx    • Ovarian cancer Neg Hx    • Uterine cancer Neg Hx    • Esophageal cancer Neg Hx      SOCIAL HISTORY:  Social History     Socioeconomic History   • Marital status:    Tobacco Use   • Smoking status: Never   • Smokeless tobacco: Never   Vaping Use   • Vaping Use: Never used   Substance and Sexual Activity   • Alcohol use: Never   • Drug use: No   • Sexual activity: Yes     Partners: Male     Birth control/protection: Post-menopausal       REVIEW OF SYSTEMS:    Review of Systems   Constitutional: Negative for fatigue, fever and unexpected weight change.        \"I feel pretty good.\"   Respiratory: Negative for cough, shortness of breath and wheezing.    Cardiovascular: Negative for chest pain and palpitations.   Gastrointestinal: Negative for abdominal pain, nausea and vomiting.   Genitourinary: Negative for " "difficulty urinating, dysuria and flank pain.   Skin: Negative for pallor.   Neurological: Negative for dizziness, speech difficulty and weakness.   Psychiatric/Behavioral: Negative for agitation, confusion and hallucinations.       VITAL SIGNS: /82   Pulse 96   Temp 98.5 °F (36.9 °C) (Tympanic)   Resp 18   Ht 157.5 cm (62\")   Wt 81.3 kg (179 lb 3.2 oz)   LMP  (LMP Unknown)   SpO2 92%   BMI 32.78 kg/m²  Gained 4 pounds.  \"I have not taken my BP medication yet.\"  Pain Score    04/25/23 1032   PainSc: 0-No pain       PHYSICAL EXAMINATION:     Physical Exam  Constitutional:       General: She is not in acute distress.  Cardiovascular:      Rate and Rhythm: Normal rate.   Pulmonary:      Effort: No respiratory distress.      Breath sounds: No wheezing or rales.   Abdominal:      General: Bowel sounds are normal.      Palpations: Abdomen is soft.      Tenderness: There is no abdominal tenderness.   Musculoskeletal:         General: No swelling.   Skin:     General: Skin is warm.      Coloration: Skin is not pale.   Neurological:      Mental Status: She is alert and oriented to person, place, and time.   Psychiatric:         Mood and Affect: Mood normal.         Behavior: Behavior normal.         Thought Content: Thought content normal.         Judgment: Judgment normal.         LABS    Lab Results - Last 18 Months   Lab Units 04/25/23  1021 01/04/23  1605 12/14/22  0636 04/05/22  1019 02/27/22  1315 12/08/21  0805   HEMOGLOBIN g/dL 14.2 14.6 14.9 14.4 14.3 14.8   HEMATOCRIT % 46.3 47.4* 46.4 45.3 44.2 45.1   MCV fL 90.8 88.6 88 89.0 88.8 87.6   WBC 10*3/mm3 12.82* 8.76 7.0 8.18 7.14 6.78   RDW % 14.4 14.5 13.9 13.8 14.1 13.3   MPV fL 9.9 10.0  --  9.9 9.8  --    PLATELETS 10*3/mm3 361 404 399 346 346 307   IMM GRAN % % 0.4 0.3  --  0.5 0.7*  --    NEUTROS ABS 10*3/mm3 10.36* 5.35 3.8 5.31 4.86 4.58   LYMPHS ABS 10*3/mm3 1.28 2.08 1.9 1.86 1.47 1.26   MONOS ABS 10*3/mm3 0.68 0.71 0.6 0.54 0.47 0.69   EOS ABS " 10*3/mm3 0.38 0.50* 0.6* 0.36 0.24 0.18   BASOS ABS 10*3/mm3 0.07 0.09 0.1 0.07 0.05 0.04   IMMATURE GRANS (ABS) 10*3/mm3 0.05 0.03  --  0.04 0.05  --    NRBC /100 WBC 0.0 0.0  --  0.0 0.0 0.0       Lab Results - Last 18 Months   Lab Units 01/04/23  1605 12/14/22  0636 09/27/22  1023 09/14/22  1143 04/05/22  1019 02/27/22  1315 12/08/21  0805   GLUCOSE mg/dL 90 88  --   --  94 102* 90   SODIUM mmol/L 140 139 135* 142 140 140 141   POTASSIUM mmol/L 3.9 4.5 3.7 4.4 4.1 3.9 4.1   TOTAL CO2 mmol/L  --  24 21 25  --   --  24.6   CO2 mmol/L 25.0  --   --   --  21.0* 24.0  --    CHLORIDE mmol/L 105 101 105 106 106 105 104   ANION GAP mmol/L 10.0  --  9 11 13.0 11.0  --    CREATININE mg/dL 0.85 1.03* 1.3 1.1 0.96 1.14* 1.00   BUN mg/dL 11 11 20 16 11 15 11   BUN / CREAT RATIO  12.9 11  --   --  11.5 13.2 11.0   CALCIUM mg/dL 8.4* 10.1 8.1* 9.0 8.9 9.2 8.6   EGFR IF NONAFRICN AM mL/min/1.73  --   --   --   --   --  49* 57*   ALK PHOS U/L 103 105  --   --  108 110 120*   TOTAL PROTEIN g/dL 6.8  --   --   --  6.5 6.6  --    ALT (SGPT) U/L 19 20  --   --  16 16 15   AST (SGOT) U/L 22 23  --   --  18 19 15   BILIRUBIN mg/dL 0.3 0.3  --   --  0.4 0.3 0.3   ALBUMIN g/dL 4.1 4.2  --   --  4.00 4.00 4.10   GLOBULIN gm/dL 2.7  --   --   --  2.5 2.6  --        Lab Results - Last 18 Months   Lab Units 12/08/21  0805   URIC ACID mg/dL 6.6*       Lab Results - Last 18 Months   Lab Units 12/14/22  0636 12/08/21  0805   TSH uIU/mL 4.260 2.820       Lunatati Monterroso Cruz reports a pain score of 0.          ASSESSMENT:  1.   Right breast cancer.  Lower inner quadrant.  AJCC stage:IIA (pT1, pN1mi, cM0).  Receptor status: Estrogen positive, progesterone positive and HER-2/leonila negative.  Treatment status: Post lumpectomy, post 4 cycles of adjuvant Taxotere and Cytoxan.  Post adjuvant radiation.  Post adjuvant tamoxifen 11/25/2008 through 11/24/2018.  2.   Right breast ductal carcinoma in situ.  Tumor size 0.6 cm.  AJCC stage: 0 (pTis, cN0, cM0,  G2)  Treatment status: Post skin sparing mastectomy, right and left simple mastectomy.  3.   Good performance status of 0.  4.   Anemia from iron deficiency post Venofer 05/14/2018 through 06/08/2018, 8 doses.  Post Injectafer 05/15/2020 and 05/22/2020.         PLAN:  1.   Re: Heme status.  WBC 12.8, ANC 10.3, hemoglobin 14.2, MCV 90.8 and platelets 361. On steroid inhaler.  2.   Re: CMP.  Pending.   3.   Re: Stable for observation, breast cancer.  4.   Re: No mammogram, she had bilateral mastectomy.   5  Continue care per primary care physician and other specialists.  6.  Plan of care discussed with patient.  Understanding expressed.  Patient able to proceed.  7.  Return to office in 12 months with preoffice CBC with differential and CMP.          I have reviewed the assessment and plan and verified the accuracy of it. No changes to assessment and plan since the information was documented. Salvador Zuleta MD 04/25/23       I spent 22 total minutes, face-to-face, caring for Luna today.  Greater than 50% of this time involved counseling and/or coordination of care as documented within this note regarding the patient's illness(es), pros and cons of various treatment options, instructions and/or risk reduction.          (Shen Houston MD)  (Ksenia Fox MD)  (Kevin Aguirre MD)  (Marcello Salas MD)  (Chaz Gaines MD)

## 2023-04-16 DIAGNOSIS — J45.40 MODERATE PERSISTENT ASTHMA WITHOUT COMPLICATION: Primary | ICD-10-CM

## 2023-04-17 RX ORDER — ALBUTEROL SULFATE 90 UG/1
AEROSOL, METERED RESPIRATORY (INHALATION)
Qty: 25.5 G | Refills: 3 | Status: SHIPPED | OUTPATIENT
Start: 2023-04-17

## 2023-04-17 NOTE — TELEPHONE ENCOUNTER
Pharmacy sent a request for refills on Albuterol HFA. Refill passed protocol and sent to the pharmacy.  Rx Refill Note  Requested Prescriptions     Pending Prescriptions Disp Refills   • albuterol sulfate  (90 Base) MCG/ACT inhaler [Pharmacy Med Name: ALBUTEROL(PA) HFA INH 8.5GM 90MCG] 8.5 g 20     Sig: USE 2 INHALATIONS EVERY 4 HOURS AS NEEDED FOR WHEEZING      Last office visit with prescribing clinician: 11/10/2022   Last telemedicine visit with prescribing clinician: 5/11/2023   Next office visit with prescribing clinician: 5/11/2023                         Would you like a call back once the refill request has been completed: [] Yes [] No    If the office needs to give you a call back, can they leave a voicemail: [] Yes [] No    Osmany Merino CMA  04/17/23, 09:17 CDT

## 2023-04-19 DIAGNOSIS — J30.89 NON-SEASONAL ALLERGIC RHINITIS, UNSPECIFIED TRIGGER: Primary | ICD-10-CM

## 2023-04-19 NOTE — TELEPHONE ENCOUNTER
Pharmacy sent a request for refills on Flonase and Astelin NS. Routed to Dr. Aguirre.  Rx Refill Note  Requested Prescriptions     Pending Prescriptions Disp Refills   • azelastine (ASTELIN) 0.1 % nasal spray [Pharmacy Med Name: AZELASTINE NASAL SPRAY 30ML 0.1% 137MCG] 30 mL 13     Sig: USE 2 SPRAYS INTO THE NOSTRIL(S)  TWICE A DAY AS DIRECTED BY PROVIDER   • fluticasone (FLONASE) 50 MCG/ACT nasal spray [Pharmacy Med Name: FLUTICASONE PROP NASAL SPRAY 16GM 50MCG] 16 g 11     Sig: USE 2 SPRAYS INTO THE NOSTRIL(S) DAILY AS DIRECTED BY PROVIDER      Last office visit with prescribing clinician: 11/10/2022   Last telemedicine visit with prescribing clinician: 5/11/2023   Next office visit with prescribing clinician: 5/11/2023                         Would you like a call back once the refill request has been completed: [] Yes [] No    If the office needs to give you a call back, can they leave a voicemail: [] Yes [] No    Osmany Merino CMA  04/19/23, 11:48 CDT  
Statement Selected

## 2023-04-20 RX ORDER — ATORVASTATIN CALCIUM 40 MG/1
40 TABLET, FILM COATED ORAL DAILY
Qty: 90 TABLET | Refills: 3 | Status: SHIPPED | OUTPATIENT
Start: 2023-04-20

## 2023-04-24 RX ORDER — AZELASTINE 1 MG/ML
SPRAY, METERED NASAL
Qty: 30 ML | Refills: 13 | Status: SHIPPED | OUTPATIENT
Start: 2023-04-24

## 2023-04-24 RX ORDER — FLUTICASONE PROPIONATE 50 MCG
SPRAY, SUSPENSION (ML) NASAL
Qty: 16 G | Refills: 11 | Status: SHIPPED | OUTPATIENT
Start: 2023-04-24

## 2023-04-25 ENCOUNTER — LAB (OUTPATIENT)
Dept: LAB | Facility: HOSPITAL | Age: 58
End: 2023-04-25
Payer: MEDICARE

## 2023-04-25 ENCOUNTER — OFFICE VISIT (OUTPATIENT)
Dept: ONCOLOGY | Facility: CLINIC | Age: 58
End: 2023-04-25
Payer: MEDICARE

## 2023-04-25 VITALS
WEIGHT: 179.2 LBS | TEMPERATURE: 98.5 F | RESPIRATION RATE: 18 BRPM | OXYGEN SATURATION: 92 % | SYSTOLIC BLOOD PRESSURE: 150 MMHG | BODY MASS INDEX: 32.97 KG/M2 | HEART RATE: 96 BPM | DIASTOLIC BLOOD PRESSURE: 82 MMHG | HEIGHT: 62 IN

## 2023-04-25 DIAGNOSIS — C50.312 MALIGNANT NEOPLASM OF LOWER-INNER QUADRANT OF LEFT BREAST IN FEMALE, ESTROGEN RECEPTOR POSITIVE: Primary | ICD-10-CM

## 2023-04-25 DIAGNOSIS — D05.11 DUCTAL CARCINOMA IN SITU (DCIS) OF RIGHT BREAST: Primary | ICD-10-CM

## 2023-04-25 DIAGNOSIS — D05.11 DUCTAL CARCINOMA IN SITU (DCIS) OF RIGHT BREAST: ICD-10-CM

## 2023-04-25 DIAGNOSIS — Z85.3 HISTORY OF MALIGNANT NEOPLASM OF BREAST: Primary | ICD-10-CM

## 2023-04-25 DIAGNOSIS — Z17.0 MALIGNANT NEOPLASM OF LOWER-INNER QUADRANT OF LEFT BREAST IN FEMALE, ESTROGEN RECEPTOR POSITIVE: Primary | ICD-10-CM

## 2023-04-25 LAB
ALBUMIN SERPL-MCNC: 4.4 G/DL (ref 3.5–5.2)
ALBUMIN/GLOB SERPL: 2.2 G/DL
ALP SERPL-CCNC: 100 U/L (ref 39–117)
ALT SERPL W P-5'-P-CCNC: 21 U/L (ref 1–33)
ANION GAP SERPL CALCULATED.3IONS-SCNC: 13 MMOL/L (ref 5–15)
AST SERPL-CCNC: 20 U/L (ref 1–32)
BASOPHILS # BLD AUTO: 0.07 10*3/MM3 (ref 0–0.2)
BASOPHILS NFR BLD AUTO: 0.5 % (ref 0–1.5)
BILIRUB SERPL-MCNC: 0.4 MG/DL (ref 0–1.2)
BUN SERPL-MCNC: 14 MG/DL (ref 6–20)
BUN/CREAT SERPL: 12.5 (ref 7–25)
CALCIUM SPEC-SCNC: 9.6 MG/DL (ref 8.6–10.5)
CHLORIDE SERPL-SCNC: 105 MMOL/L (ref 98–107)
CO2 SERPL-SCNC: 23 MMOL/L (ref 22–29)
CREAT SERPL-MCNC: 1.12 MG/DL (ref 0.57–1)
DEPRECATED RDW RBC AUTO: 47.6 FL (ref 37–54)
EGFRCR SERPLBLD CKD-EPI 2021: 57.1 ML/MIN/1.73
EOSINOPHIL # BLD AUTO: 0.38 10*3/MM3 (ref 0–0.4)
EOSINOPHIL NFR BLD AUTO: 3 % (ref 0.3–6.2)
ERYTHROCYTE [DISTWIDTH] IN BLOOD BY AUTOMATED COUNT: 14.4 % (ref 12.3–15.4)
GLOBULIN UR ELPH-MCNC: 2 GM/DL
GLUCOSE SERPL-MCNC: 90 MG/DL (ref 65–99)
HCT VFR BLD AUTO: 46.3 % (ref 34–46.6)
HGB BLD-MCNC: 14.2 G/DL (ref 12–15.9)
HOLD SPECIMEN: NORMAL
IMM GRANULOCYTES # BLD AUTO: 0.05 10*3/MM3 (ref 0–0.05)
IMM GRANULOCYTES NFR BLD AUTO: 0.4 % (ref 0–0.5)
LYMPHOCYTES # BLD AUTO: 1.28 10*3/MM3 (ref 0.7–3.1)
LYMPHOCYTES NFR BLD AUTO: 10 % (ref 19.6–45.3)
MCH RBC QN AUTO: 27.8 PG (ref 26.6–33)
MCHC RBC AUTO-ENTMCNC: 30.7 G/DL (ref 31.5–35.7)
MCV RBC AUTO: 90.8 FL (ref 79–97)
MONOCYTES # BLD AUTO: 0.68 10*3/MM3 (ref 0.1–0.9)
MONOCYTES NFR BLD AUTO: 5.3 % (ref 5–12)
NEUTROPHILS NFR BLD AUTO: 10.36 10*3/MM3 (ref 1.7–7)
NEUTROPHILS NFR BLD AUTO: 80.8 % (ref 42.7–76)
NRBC BLD AUTO-RTO: 0 /100 WBC (ref 0–0.2)
PLATELET # BLD AUTO: 361 10*3/MM3 (ref 140–450)
PMV BLD AUTO: 9.9 FL (ref 6–12)
POTASSIUM SERPL-SCNC: 4.5 MMOL/L (ref 3.5–5.2)
PROT SERPL-MCNC: 6.4 G/DL (ref 6–8.5)
RBC # BLD AUTO: 5.1 10*6/MM3 (ref 3.77–5.28)
SODIUM SERPL-SCNC: 141 MMOL/L (ref 136–145)
WBC NRBC COR # BLD: 12.82 10*3/MM3 (ref 3.4–10.8)

## 2023-04-25 PROCEDURE — 80053 COMPREHEN METABOLIC PANEL: CPT

## 2023-04-25 PROCEDURE — 36415 COLL VENOUS BLD VENIPUNCTURE: CPT

## 2023-04-25 PROCEDURE — 85025 COMPLETE CBC W/AUTO DIFF WBC: CPT

## 2023-05-05 ENCOUNTER — OFFICE VISIT (OUTPATIENT)
Dept: INTERNAL MEDICINE | Facility: CLINIC | Age: 58
End: 2023-05-05
Payer: MEDICARE

## 2023-05-05 ENCOUNTER — TELEPHONE (OUTPATIENT)
Dept: INTERNAL MEDICINE | Facility: CLINIC | Age: 58
End: 2023-05-05

## 2023-05-05 VITALS
OXYGEN SATURATION: 98 % | HEART RATE: 98 BPM | WEIGHT: 177 LBS | SYSTOLIC BLOOD PRESSURE: 124 MMHG | HEIGHT: 62 IN | TEMPERATURE: 98 F | DIASTOLIC BLOOD PRESSURE: 84 MMHG | BODY MASS INDEX: 32.57 KG/M2

## 2023-05-05 DIAGNOSIS — J06.9 UPPER RESPIRATORY INFECTION, ACUTE: Primary | ICD-10-CM

## 2023-05-05 LAB
EXPIRATION DATE: NORMAL
EXPIRATION DATE: NORMAL
FLUAV AG NPH QL: NEGATIVE
FLUBV AG NPH QL: NEGATIVE
INTERNAL CONTROL: NORMAL
INTERNAL CONTROL: NORMAL
Lab: NORMAL
Lab: NORMAL
S PYO AG THROAT QL: NEGATIVE

## 2023-05-05 RX ORDER — BENZONATATE 100 MG/1
100 CAPSULE ORAL 3 TIMES DAILY PRN
Qty: 30 CAPSULE | Refills: 0 | Status: SHIPPED | OUTPATIENT
Start: 2023-05-05

## 2023-05-05 RX ORDER — GUAIFENESIN 200 MG/10ML
200 LIQUID ORAL 3 TIMES DAILY PRN
Qty: 180 ML | Refills: 0 | Status: SHIPPED | OUTPATIENT
Start: 2023-05-05

## 2023-05-05 RX ORDER — TRIAMCINOLONE ACETONIDE 40 MG/ML
40 INJECTION, SUSPENSION INTRA-ARTICULAR; INTRAMUSCULAR ONCE
Status: COMPLETED | OUTPATIENT
Start: 2023-05-05 | End: 2023-05-05

## 2023-05-05 RX ORDER — METHYLPREDNISOLONE 4 MG/1
TABLET ORAL
Qty: 21 TABLET | Refills: 0 | Status: SHIPPED | OUTPATIENT
Start: 2023-05-06

## 2023-05-05 RX ORDER — AZITHROMYCIN 250 MG/1
250 TABLET, FILM COATED ORAL TAKE AS DIRECTED
Qty: 6 TABLET | Refills: 0 | Status: SHIPPED | OUTPATIENT
Start: 2023-05-05

## 2023-05-05 RX ADMIN — TRIAMCINOLONE ACETONIDE 40 MG: 40 INJECTION, SUSPENSION INTRA-ARTICULAR; INTRAMUSCULAR at 11:07

## 2023-05-05 NOTE — PROGRESS NOTES
Subjective     Chief Complaint:  Cough, sputum production, low grade fever    HPI:  Patient presents today with complaints of cough, low-grade fever, and sputum production.  She also complains of wheezing and sore throat.  She initially thought that she was just having an allergy flareup and her mucus was clear.  She reports that this started on Monday morning.  However, on Wednesday she became more congested and began coughing up yellow sputum.  She has also been wheezing and is sometimes short of air.  However, she reports that she has asthma.  She states that her chest hurts when she coughs.  She has had a low-grade temperature as well. She has continued using Astelin and Flonase nasal sprays along with Claritin.  She has some nebulizer treatments at home that she has tried using.  She denies body aches or persistent shortness of breath.  Rapid flu and strep in the clinic today were negative. Covid test is in process.    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    Obesity     Osteoarthritis     Osteopenia     Osteoporosis     Ovarian cyst 1994    PONV (postoperative nausea and vomiting)     RLS (restless legs syndrome)     Scoliosis     Varicella 1966     Past Surgical History:  Past Surgical History:   Procedure Laterality Date    ADENOIDECTOMY      APPENDECTOMY  1994    BREAST AUGMENTATION  12/2021    BREAST BIOPSY      BREAST LUMPECTOMY Right 2008    right sentinel lymph nodes were also removed for biopsy    BREAST RECONSTRUCTION Right 09/2022     CARDIAC CATHETERIZATION      CHOLECYSTECTOMY  1993    COLONOSCOPY  05/03/2013    Erythematous mucosa in the rectum-biopsied; Repeat 4 years     COLONOSCOPY N/A 06/14/2017    Normal; Repeat 5 years    COLONOSCOPY  04/23/2010    Dr. Arzola-Extremely poorly prepped colon    COLONOSCOPY N/A 07/24/2020    Hemorrhoids found on perianal exam; One 4mm hyperplastic polyp in the transverse colon; Normal mucosa in the entire examined colon-biopsied; Non-bleeding internal hemorrhoids; Repeat 5 years    COSMETIC SURGERY  9-    D & C HYSTEROSCOPY  1993    D & C HYSTEROSCOPY MYOSURE N/A 01/13/2021    Procedure: DILATATION AND CURETTAGE HYSTEROSCOPY WITH MYOSURE, polypectomy;  Surgeon: Marcello Salas MD;  Location: [facility] OR;  Service: Obstetrics/Gynecology;  Laterality: N/A;    DILATATION AND CURETTAGE      ENDOSCOPY N/A 10/05/2016    Medium-sized HH; Widely patent and non-obstructing Schatzki ring-dilated; Procedure: ESOPHAGOGASTRODUODENOSCOPY WITH ANESTHESIA;  Surgeon: Ksenia Fox MD;  Location: [facility] ENDOSCOPY;  Service:     ENDOSCOPY N/A 02/27/2019    Normal 1st portion of the duodenum and 2 portion of the duodenum; A few gastric polyps-biopsied; Small HH; Widely patent Schatzki ring-dilated; Abnormal esophageal motility, suspicious for presbyesophagus; Arrange for barium swallow to assess for dysmotility    ENDOSCOPY N/A 07/24/2020    Medium-sized HH; LA Grade A reflux esophagitis; No endoscopic esophageal abnormality to explain patient's dysphagia-Esophagus dilated; Normal stomach; Normal examined duodenum; No specimens collected    ENDOSCOPY N/A 09/07/2022    Medium-sized HH; Non-obstructing Schatzki ring-Dilated; Normal stomach; Normal examined duodenum; No specimens collected    HERNIA REPAIR  9-    HYSTEROSCOPY      JOINT REPLACEMENT  2019    Left knee    KNEE ARTHROSCOPY Left 11/13/2018    Procedure: LEFT KNEE ARTHROSCOPIC PARTIAL MEDIAL MENISCECTOMY;  Surgeon: Matt Maki MD;   Location: [facility] OR;  Service: Orthopedics    MASTECTOMY Bilateral 09/14/2021    MEDIPORT INSERTION, SINGLE  2008    MEDIPORT REMOVAL      PARATHYROIDECTOMY  2011    REPLACEMENT TOTAL KNEE Left 05/23/2019    TONSILLECTOMY AND ADENOIDECTOMY  1970    TUBAL ABDOMINAL LIGATION         Allergies:  Allergies   Allergen Reactions    Cephalosporins Hives    Keflex [Cephalexin] Rash     Medications:  Prior to Admission medications    Medication Sig Start Date End Date Taking? Authorizing Provider   albuterol (PROVENTIL) (2.5 MG/3ML) 0.083% nebulizer solution USE 1 VIAL BY NEBULIZATION EVERY 4 HOURS AS NEEDED FOR WHEEZING 3/14/23   Kevin Aguirre MD   albuterol sulfate  (90 Base) MCG/ACT inhaler USE 2 INHALATIONS EVERY 4 HOURS AS NEEDED FOR WHEEZING 4/17/23   Kevin Aguirre MD   alendronate (FOSAMAX) 70 MG tablet Take 1 tablet by mouth Every 7 (Seven) Days. [employer] 1/10/23   Chaz Gaines MD   aspirin 81 MG EC tablet Take 1 tablet by mouth Daily.    ProviderBenji MD   atorvastatin (LIPITOR) 40 MG tablet Take 1 tablet by mouth Daily. 4/20/23   Chaz Gaines MD   azelastine (ASTELIN) 0.1 % nasal spray USE 2 SPRAYS INTO THE NOSTRIL(S)  TWICE A DAY AS DIRECTED BY PROVIDER 4/24/23   Kevin Aguirre MD   calcitriol (ROCALTROL) 0.5 MCG capsule Take 1 capsule by mouth Daily. 1/9/23   Chaz Gaines MD   Calcium Citrate-Vitamin D (CALCIUM + D PO) Take 1 tablet by mouth Daily.    Benji Duenas MD   docusate sodium (COLACE) 100 MG capsule Take 1 capsule by mouth As Needed.    Benji Duenas MD   fluticasone (FLONASE) 50 MCG/ACT nasal spray USE 2 SPRAYS INTO THE NOSTRIL(S) DAILY AS DIRECTED BY PROVIDER 4/24/23   Kevin Aguirre MD   gabapentin (NEURONTIN) 100 MG capsule Take 1 capsule by mouth 3 (Three) Times a Day. 1/11/23   Benji Duenas MD   HYDROcodone-acetaminophen (NORCO) 5-325 MG per tablet Take 1 tablet by mouth 2 (Two) Times a Day As Needed for Moderate Pain.  "5/20/20   Benji Duenas MD   ibuprofen (ADVIL,MOTRIN) 400 MG tablet Take 1 tablet by mouth Every 8 (Eight) Hours As Needed for Mild Pain.    Benji Duenas MD   levothyroxine (Synthroid) 50 MCG tablet Take 1 tablet by mouth Daily. 11/17/22   Mojgan Black APRN   loratadine (CLARITIN) 10 MG tablet TAKE 1 TABLET DAILY AS NEEDED FOR ALLERGIES 2/2/23   Kevin Aguirre MD   losartan (COZAAR) 100 MG tablet Take 1 tablet by mouth Daily. 1/5/23   Mojgan Black APRN   ondansetron ODT (ZOFRAN-ODT) 4 MG disintegrating tablet Place 1 tablet on the tongue As Needed. 9/29/22   Benji Duenas MD   pantoprazole (PROTONIX) 40 MG EC tablet Take 1 tablet by mouth Daily. 8/22/22   Lovely Brito APRN   potassium chloride ER (K-TAB) 20 MEQ tablet controlled-release ER tablet Take 1 tablet by mouth Daily. 9/13/22   Mojgan Black APRN   rOPINIRole (REQUIP) 0.25 MG tablet TAKE 1 TABLET EVERY NIGHT 9/22/22   Leslye Posada   theophylline (UNIPHYL) 400 MG 24 hr tablet TAKE 1 TABLET DAILY  Patient taking differently: Take 1 tablet by mouth Daily. 8/31/22   Kevin Aguirre MD   tiZANidine (ZANAFLEX) 4 MG tablet Take 1 tablet by mouth Every 8 (Eight) Hours As Needed for Muscle Spasms.    Benji Duenas MD   Wixela Inhub 250-50 MCG/ACT DISKUS USE 1 INHALATION TWICE A DAY  Patient taking differently: Inhale 1 puff 2 (Two) Times a Day. 11/28/22   Kevin Aguirre MD   zafirlukast (Accolate) 20 MG tablet Take 1 tablet by mouth 2 (Two) Times a Day. 9/23/22   Klaus Wagner APRN       Objective     Vital Signs: /84 (BP Location: Left arm, Patient Position: Sitting, Cuff Size: Adult)   Pulse 98   Temp 98 °F (36.7 °C) (Oral)   Ht 157.5 cm (62\")   Wt 80.3 kg (177 lb)   LMP  (LMP Unknown)   SpO2 98%   BMI 32.37 kg/m²   Physical Exam  Vitals and nursing note reviewed.   Constitutional:       General: She is not in acute distress.     Appearance: Normal appearance.   HENT: "      Nose: No congestion.      Mouth/Throat:      Pharynx: Posterior oropharyngeal erythema present.   Cardiovascular:      Rate and Rhythm: Normal rate and regular rhythm.      Pulses: Normal pulses.      Heart sounds: Normal heart sounds. No murmur heard.  Pulmonary:      Effort: Pulmonary effort is normal. No tachypnea or respiratory distress.      Breath sounds: No decreased air movement. Examination of the right-middle field reveals wheezing. Examination of the left-middle field reveals wheezing. Examination of the right-lower field reveals wheezing. Examination of the left-lower field reveals wheezing. Wheezing (expiratory) present. No rales.   Abdominal:      General: Bowel sounds are normal. There is no distension.      Palpations: Abdomen is soft.      Tenderness: There is no abdominal tenderness.   Musculoskeletal:         General: No swelling or tenderness. Normal range of motion.   Skin:     General: Skin is warm and dry.      Capillary Refill: Capillary refill takes less than 2 seconds.      Findings: No bruising, erythema or rash.   Neurological:      Mental Status: She is alert and oriented to person, place, and time. Mental status is at baseline.      Motor: No weakness.     Results Reviewed:  Reviewed note from last office visit with HEATHER Cutler on 12/13/2022.  Reviewed renal function on CMP obtained on 4/25/2023.    Assessment / Plan     Assessment/Plan:  Diagnoses and all orders for this visit:    1. Upper respiratory infection, acute (Primary)  -     azithromycin (Zithromax Z-Jaime) 250 MG tablet; Take 1 tablet by mouth Take As Directed. Take 2 tablets by mouth the first day, then 1 tablet daily for 4 days.  Dispense: 6 tablet; Refill: 0  -     methylPREDNISolone (MEDROL) 4 MG dose pack; Take as directed on package instructions.  Dispense: 21 tablet; Refill: 0  -     guaifenesin (ROBITUSSIN) 100 MG/5ML liquid; Take 10 mL by mouth 3 (Three) Times a Day As Needed for Cough.  Dispense: 180 mL;  Refill: 0  -     benzonatate (Tessalon Perles) 100 MG capsule; Take 1 capsule by mouth 3 (Three) Times a Day As Needed for Cough.  Dispense: 30 capsule; Refill: 0  -     triamcinolone acetonide (KENALOG-40) injection 40 mg  -     POC Influenza A / B  -     POC Rapid Strep A  -     COVID-19,APTIMA PANTHER,PAD IN-HOUSE,NP/OP/NASAL SWAB IN UTM/VTM/SALINE/LIQUID AMIES TRANSPORT MEDIA/NP WASH OR ASPIRATE, 24 HR TAT - Swab, Nasopharynx       Since she has had yellow sputum production along with low-grade fever and wheezing, will begin azithromycin.  We will also give Kenalog injection in the clinic today and begin Medrol Dosepak tomorrow.  She is agreeable to take guaifenesin but states that she needs a liquid form because she is unable to tolerate swallowing large pills.  Will send in Tessalon Perles to be taken 3 times daily as needed.  We discussed that she may use OTC Delsym at night if her cough is persistent but she is aware that this medication can cause elevation in blood pressure and will need to be used sparingly.  She will continue using nebulizer treatments that she has at home along with albuterol inhaler.  She will also continue taking Claritin and using her nasal sprays.  Encouraged her to call early next week if her symptoms do not improve and chest x-ray will be ordered at that time.  We will call patient with results of COVID test next week.  She does have a follow-up appointment scheduled with Dr. Aguirre on 5/11/2023.    Return for Next scheduled follow up. unless patient needs to be seen sooner or acute issues arise.    I have discussed the patient results/orders and and plan/recommendation with them at today's visit.      Trisha Gray, APRN   05/05/2023

## 2023-05-05 NOTE — TELEPHONE ENCOUNTER
Caller: Luna Cruz    Relationship: Self    Best call back number: [phone]    What medication are you requesting:  ANTIBIOTICS     What are your current symptoms: LOW GRADE FEVER PRODUCTIVE COUGH MUCUS IS GREEN IN COLOR  IRRITATING ASTHMA CAUSING WHEEZING  SORE THROAT AND HOARSE     How long have you been experiencing symptoms:  3 DAYS NOW      Have you had these symptoms before:    [x] Yes  [] No    Have you been treated for these symptoms before:   [x] Yes  [] No    If a prescription is needed, what is your preferred pharmacy and phone number: [id number] - formerly [facility] - [id number] AT [facility] [address] [facility] [address] - [phone]  - [phone] FX

## 2023-05-06 LAB — SARS-COV-2 RNA RESP QL NAA+PROBE: NOT DETECTED

## 2023-05-17 ENCOUNTER — OFFICE VISIT (OUTPATIENT)
Dept: OBSTETRICS AND GYNECOLOGY | Facility: CLINIC | Age: 58
End: 2023-05-17

## 2023-05-17 VITALS — HEIGHT: 62 IN | BODY MASS INDEX: 32.39 KG/M2 | WEIGHT: 176 LBS

## 2023-05-17 DIAGNOSIS — N95.0 PMB (POSTMENOPAUSAL BLEEDING): Primary | ICD-10-CM

## 2023-05-17 DIAGNOSIS — E66.9 OBESITY (BMI 30-39.9): ICD-10-CM

## 2023-05-17 RX ORDER — NALOXONE HYDROCHLORIDE 4 MG/.1ML
1 SPRAY NASAL
COMMUNITY
Start: 2023-04-12

## 2023-05-17 RX ORDER — METHOCARBAMOL 750 MG/1
750 TABLET, FILM COATED ORAL
COMMUNITY
Start: 2023-04-12

## 2023-05-17 NOTE — PROGRESS NOTES
"Chief Complaint  Gynecologic Exam (Pt is here with c/o having some PMB.  Having bright red blood that started May 8th, was light and had stopped by that Friday.  Pt states this happened last month as well.  Last months bleeding episode was just enough that when she wiped there was some where as this month there was just enough to need a pad.  )    Subjective          Luna Cruz presents to [facility]  History of Present Illness     The patient presents to the office for follow-up.     The patient has had 2 different episodes of bleeding.   She bled 3 days the first time.     There has been no change in the patient's medications, including blood thinners.     Review of Systems   Constitutional:  Negative for activity change, appetite change, fatigue and fever.   Respiratory:  Negative for apnea and shortness of breath.    Cardiovascular:  Negative for chest pain and palpitations.   Gastrointestinal:  Negative for abdominal distention, abdominal pain, constipation, diarrhea, nausea and vomiting.   Endocrine: Negative for cold intolerance and heat intolerance.   Genitourinary:  Positive for vaginal bleeding. Negative for difficulty urinating, frequency, menstrual problem, pelvic pain, vaginal discharge and vaginal pain.   Neurological:  Negative for headaches.   Psychiatric/Behavioral:  Negative for agitation and sleep disturbance.        Objective   Vital Signs:   Ht 157.5 cm (62\")   Wt 79.8 kg (176 lb)   BMI 32.19 kg/m²     Physical Exam  Vitals reviewed.   Constitutional:       Appearance: She is well-developed.   Eyes:      General:         Right eye: No discharge.         Left eye: No discharge.   Cardiovascular:      Rate and Rhythm: Normal rate and regular rhythm.   Pulmonary:      Effort: Pulmonary effort is normal.      Breath sounds: Normal breath sounds.   Skin:     General: Skin is warm.   Neurological:      Mental Status: She is alert and oriented to person, place, and " time.   Psychiatric:         Behavior: Behavior normal.         Thought Content: Thought content normal.         Judgment: Judgment normal.       Result Review :                       Assessment and Plan      Reviewed US report and discussed with pt.   US report indicates uterus 4.58 cm, endometrial thickness 0.54 cm,     The patient has chosen to move forward with consultation for possible D & C.   The patient will be referred to Dr. Salas for consultation.   In the meantime, I will check with Dr. Salas to make sure he does not want a biopsy beforehand.       Diagnoses and all orders for this visit:    1. PMB (postmenopausal bleeding) (Primary)    2. Obesity (BMI 30-39.9)          BMI is >= 30 and <35. (Class 1 Obesity). The following options were offered after discussion;: weight loss educational material (shared in after visit summary)       Follow Up   Return for with MD, consult PMB.    Patient was given instructions and counseling regarding her condition or for health maintenance advice. Please see specific information pulled into the AVS if appropriate.     Transcribed from ambient dictation for HEATHER Rachel by Pinky Sol.  05/17/23   14:11 CDT    Patient or patient representative verbalized consent to the visit recording.  I have personally performed the services described in this document as transcribed by the above individual, and it is both accurate and complete.  HEATHER Rachel  5/30/2023  22:02 CDT

## 2023-05-25 ENCOUNTER — OFFICE VISIT (OUTPATIENT)
Dept: OBSTETRICS AND GYNECOLOGY | Facility: CLINIC | Age: 58
End: 2023-05-25
Payer: MEDICARE

## 2023-05-25 VITALS
BODY MASS INDEX: 32.2 KG/M2 | WEIGHT: 175 LBS | DIASTOLIC BLOOD PRESSURE: 98 MMHG | HEIGHT: 62 IN | SYSTOLIC BLOOD PRESSURE: 146 MMHG

## 2023-05-25 DIAGNOSIS — N95.0 POSTMENOPAUSAL BLEEDING: ICD-10-CM

## 2023-05-25 DIAGNOSIS — N95.0 PMB (POSTMENOPAUSAL BLEEDING): Primary | ICD-10-CM

## 2023-05-25 PROCEDURE — 99214 OFFICE O/P EST MOD 30 MIN: CPT | Performed by: OBSTETRICS & GYNECOLOGY

## 2023-05-25 PROCEDURE — 3080F DIAST BP >= 90 MM HG: CPT | Performed by: OBSTETRICS & GYNECOLOGY

## 2023-05-25 PROCEDURE — 3077F SYST BP >= 140 MM HG: CPT | Performed by: OBSTETRICS & GYNECOLOGY

## 2023-05-25 RX ORDER — SODIUM CHLORIDE 9 MG/ML
100 INJECTION, SOLUTION INTRAVENOUS CONTINUOUS
OUTPATIENT
Start: 2023-05-25

## 2023-05-25 RX ORDER — SODIUM CHLORIDE 0.9 % (FLUSH) 0.9 %
10 SYRINGE (ML) INJECTION EVERY 12 HOURS SCHEDULED
OUTPATIENT
Start: 2023-05-25

## 2023-05-25 RX ORDER — SODIUM CHLORIDE 9 MG/ML
40 INJECTION, SOLUTION INTRAVENOUS AS NEEDED
OUTPATIENT
Start: 2023-05-25

## 2023-05-25 RX ORDER — SODIUM CHLORIDE 0.9 % (FLUSH) 0.9 %
1-10 SYRINGE (ML) INJECTION AS NEEDED
OUTPATIENT
Start: 2023-05-25

## 2023-05-25 NOTE — H&P (VIEW-ONLY)
[place]  Luna Cruz  : 1965  MRN: [id number]  CSN: [id number]    History and Physical    Subjective   Luna Cruz is a 57 y.o. year old  who presents for consultation about surgery due to postmenopausal bleeding.  She had D&C for a polyp 2021.  Ultrasound May 17 shows the endometrium measured 5.4 mm.    Past Medical History:   Diagnosis Date    Abnormal uterine bleeding due to endometrial polyp     Arthritis     Asthma     Chronic back pain     Drug therapy     Ectopic pregnancy     Family history of colonic polyps     GERD (gastroesophageal reflux disease)     History of breast cancer     History of colon polyps     Hx of radiation therapy     Hyperlipidemia     Hypertension     Hypothyroidism     Malignant neoplasm of upper-inner quadrant of right female breast 2016    Obesity     Osteoarthritis     Osteopenia     Osteoporosis     Ovarian cyst     PONV (postoperative nausea and vomiting)     RLS (restless legs syndrome)     Scoliosis     Varicella 1966     Past Surgical History:   Procedure Laterality Date    ADENOIDECTOMY      APPENDECTOMY      BREAST AUGMENTATION  2021    BREAST BIOPSY      BREAST LUMPECTOMY Right     right sentinel lymph nodes were also removed for biopsy    BREAST RECONSTRUCTION Right 2022    CARDIAC CATHETERIZATION      CHOLECYSTECTOMY      COLONOSCOPY  2013    Erythematous mucosa in the rectum-biopsied; Repeat 4 years     COLONOSCOPY N/A 2017    Normal; Repeat 5 years    COLONOSCOPY  2010    Dr. Arzola-Extremely poorly prepped colon    COLONOSCOPY N/A 2020    Hemorrhoids found on perianal exam; One 4mm hyperplastic polyp in the transverse colon; Normal mucosa in the entire examined colon-biopsied; Non-bleeding internal hemorrhoids; Repeat 5 years    COSMETIC SURGERY  2023    D & C HYSTEROSCOPY      D & C HYSTEROSCOPY MYOSURE N/A 2021    Procedure: DILATATION AND CURETTAGE  HYSTEROSCOPY WITH MYOSURE, polypectomy;  Surgeon: Marcello Salas MD;  Location: [facility] OR;  Service: Obstetrics/Gynecology;  Laterality: N/A;    DILATATION AND CURETTAGE      ENDOSCOPY N/A 10/05/2016    Medium-sized HH; Widely patent and non-obstructing Schatzki ring-dilated; Procedure: ESOPHAGOGASTRODUODENOSCOPY WITH ANESTHESIA;  Surgeon: Ksenia Fox MD;  Location: [facility] ENDOSCOPY;  Service:     ENDOSCOPY N/A 02/27/2019    Normal 1st portion of the duodenum and 2 portion of the duodenum; A few gastric polyps-biopsied; Small HH; Widely patent Schatzki ring-dilated; Abnormal esophageal motility, suspicious for presbyesophagus; Arrange for barium swallow to assess for dysmotility    ENDOSCOPY N/A 07/24/2020    Medium-sized HH; LA Grade A reflux esophagitis; No endoscopic esophageal abnormality to explain patient's dysphagia-Esophagus dilated; Normal stomach; Normal examined duodenum; No specimens collected    ENDOSCOPY N/A 09/07/2022    Medium-sized HH; Non-obstructing Schatzki ring-Dilated; Normal stomach; Normal examined duodenum; No specimens collected    HERNIA REPAIR  9-    HYSTEROSCOPY      JOINT REPLACEMENT  2019    Left knee    KNEE ARTHROSCOPY Left 11/13/2018    Procedure: LEFT KNEE ARTHROSCOPIC PARTIAL MEDIAL MENISCECTOMY;  Surgeon: Matt Maki MD;  Location: [facility] OR;  Service: Orthopedics    MASTECTOMY Bilateral 09/14/2021    MEDIPORT INSERTION, SINGLE  2008    MEDIPORT REMOVAL      PARATHYROIDECTOMY  2011    REPLACEMENT TOTAL KNEE Left 05/23/2019    TONSILLECTOMY AND ADENOIDECTOMY  1970    TUBAL ABDOMINAL LIGATION       Social History    Tobacco Use      Smoking status: Never      Smokeless tobacco: Never      Current Outpatient Medications:     albuterol (PROVENTIL) (2.5 MG/3ML) 0.083% nebulizer solution, USE 1 VIAL BY NEBULIZATION EVERY 4 HOURS AS NEEDED FOR WHEEZING, Disp: 360 mL, Rfl: 5    albuterol sulfate  (90 Base) MCG/ACT inhaler, USE 2 INHALATIONS EVERY 4 HOURS  AS NEEDED FOR WHEEZING, Disp: 25.5 g, Rfl: 3    alendronate (FOSAMAX) 70 MG tablet, Take 1 tablet by mouth Every 7 (Seven) Days. Sunday's, Disp: 12 tablet, Rfl: 3    aspirin 81 MG EC tablet, Take 1 tablet by mouth Daily., Disp: , Rfl:     atorvastatin (LIPITOR) 40 MG tablet, Take 1 tablet by mouth Daily., Disp: 90 tablet, Rfl: 3    azelastine (ASTELIN) 0.1 % nasal spray, USE 2 SPRAYS INTO THE NOSTRIL(S)  TWICE A DAY AS DIRECTED BY PROVIDER, Disp: 30 mL, Rfl: 13    calcitriol (ROCALTROL) 0.5 MCG capsule, Take 1 capsule by mouth Daily., Disp: 90 capsule, Rfl: 3    Calcium Citrate-Vitamin D (CALCIUM + D PO), Take 1 tablet by mouth Daily., Disp: , Rfl:     docusate sodium (COLACE) 100 MG capsule, Take 1 capsule by mouth As Needed., Disp: , Rfl:     fluticasone (FLONASE) 50 MCG/ACT nasal spray, USE 2 SPRAYS INTO THE NOSTRIL(S) DAILY AS DIRECTED BY PROVIDER, Disp: 16 g, Rfl: 11    gabapentin (NEURONTIN) 100 MG capsule, Take 1 capsule by mouth 3 (Three) Times a Day., Disp: , Rfl:     HYDROcodone-acetaminophen (NORCO) 5-325 MG per tablet, Take 1 tablet by mouth 2 (Two) Times a Day As Needed for Moderate Pain., Disp: , Rfl:     ibuprofen (ADVIL,MOTRIN) 400 MG tablet, Take 1 tablet by mouth Every 8 (Eight) Hours As Needed for Mild Pain., Disp: , Rfl:     levothyroxine (Synthroid) 50 MCG tablet, Take 1 tablet by mouth Daily., Disp: 90 tablet, Rfl: 3    loratadine (CLARITIN) 10 MG tablet, TAKE 1 TABLET DAILY AS NEEDED FOR ALLERGIES, Disp: 90 tablet, Rfl: 3    losartan (COZAAR) 100 MG tablet, Take 1 tablet by mouth Daily., Disp: 90 tablet, Rfl: 3    methocarbamol (ROBAXIN) 750 MG tablet, Take 1 tablet by mouth., Disp: , Rfl:     naloxone (NARCAN) 4 MG/0.1ML nasal spray, 1 spray into the nostril(s) as directed by provider., Disp: , Rfl:     ondansetron ODT (ZOFRAN-ODT) 4 MG disintegrating tablet, Place 1 tablet on the tongue As Needed., Disp: , Rfl:     pantoprazole (PROTONIX) 40 MG EC tablet, Take 1 tablet by mouth Daily., Disp:  90 tablet, Rfl: 3    potassium chloride ER (K-TAB) 20 MEQ tablet controlled-release ER tablet, Take 1 tablet by mouth Daily. (Patient taking differently: Take 10 mEq by mouth Daily.), Disp: 90 tablet, Rfl: 3    rOPINIRole (REQUIP) 0.25 MG tablet, TAKE 1 TABLET EVERY NIGHT, Disp: 90 tablet, Rfl: 3    theophylline (UNIPHYL) 400 MG 24 hr tablet, TAKE 1 TABLET DAILY (Patient taking differently: Take 1 tablet by mouth Daily.), Disp: 90 tablet, Rfl: 3    tiZANidine (ZANAFLEX) 4 MG tablet, Take 1 tablet by mouth Every 8 (Eight) Hours As Needed for Muscle Spasms., Disp: , Rfl:     Wixela Inhub 250-50 MCG/ACT DISKUS, USE 1 INHALATION TWICE A DAY (Patient taking differently: Inhale 1 puff 2 (Two) Times a Day.), Disp: 180 each, Rfl: 3    zafirlukast (Accolate) 20 MG tablet, Take 1 tablet by mouth 2 (Two) Times a Day., Disp: 180 tablet, Rfl: 3  No current facility-administered medications for this visit.    Facility-Administered Medications Ordered in Other Visits:     heparin flush (porcine) 100 UNIT/ML injection 500 Units, 500 Units, Intracatheter, PRN, Ki Manriquez MD, 500 Units at 09/27/16 1108    heparin flush (porcine) 100 UNIT/ML injection 500 Units, 500 Units, Intravenous, PRN, Malathi Brantley APRN, 500 Units at 05/05/17 1009    sodium chloride 0.9 % flush 10 mL, 10 mL, Intravenous, PRN, Ki Manriquez MD, 10 mL at 09/27/16 1107    sodium chloride 0.9 % flush 10 mL, 10 mL, Intravenous, PRN, Malathi Brantley APRN, 10 mL at 05/05/17 1009    Allergies   Allergen Reactions    Cephalosporins Hives    Keflex [Cephalexin] Rash       Family History   Problem Relation Age of Onset    Colon polyps Mother         < 60 years of age     Cirrhosis Mother     Diabetes Mother     Liver disease Mother     Colon polyps Sister 54        < 60 years of age     Breast cancer Sister     Cancer Sister     No Known Problems Father     No Known Problems Brother     No Known Problems Daughter     No Known  "Problems Son     Diabetes Maternal Grandmother     Heart disease Maternal Grandmother     No Known Problems Paternal Grandmother     No Known Problems Maternal Aunt     No Known Problems Paternal Aunt     Hyperlipidemia Maternal Grandfather     Stroke Maternal Grandfather     Colon cancer Neg Hx     Crohn's disease Neg Hx     Irritable bowel syndrome Neg Hx     Liver cancer Neg Hx     Rectal cancer Neg Hx     Stomach cancer Neg Hx     BRCA 1/2 Neg Hx     Endometrial cancer Neg Hx     Ovarian cancer Neg Hx     Uterine cancer Neg Hx     Esophageal cancer Neg Hx      Review of Systems   Constitutional:  Negative for unexpected weight change.   HENT:  Negative for trouble swallowing.    Respiratory:  Negative for shortness of breath.    Cardiovascular:  Negative for chest pain.   Musculoskeletal:  Negative for neck stiffness.   Neurological:  Negative for seizures.   Hematological:  Does not bruise/bleed easily.       Objective   /98 (BP Location: Left arm, Patient Position: Sitting, Cuff Size: Large Adult)   Ht 157.5 cm (62\")   Wt 79.4 kg (175 lb)   LMP  (LMP Unknown)   Breastfeeding No   BMI 32.01 kg/m²     Physical Exam   Physical Exam  Vitals reviewed.   Constitutional:       Appearance: She is well-developed.   HENT:      Head: Normocephalic and atraumatic.   Eyes:      General: No scleral icterus.  Neck:      Trachea: No tracheal deviation.   Cardiovascular:      Rate and Rhythm: Normal rate and regular rhythm.   Pulmonary:      Effort: Pulmonary effort is normal.      Breath sounds: Normal breath sounds.   Abdominal:      General: Bowel sounds are normal. There is no distension.      Palpations: Abdomen is soft.      Tenderness: There is no abdominal tenderness.   Genitourinary:     Exam position: Supine.      Labia:         Right: No lesion.         Left: No lesion.       Vagina: No vaginal discharge or bleeding.      Cervix: No cervical motion tenderness.      Uterus: Not enlarged, not fixed and not " tender.       Adnexa:         Right: No mass.          Left: No mass.        Rectum: No external hemorrhoid.   Skin:     General: Skin is warm and dry.   Neurological:      Mental Status: She is alert and oriented to person, place, and time.       Labs  Lab Results   Component Value Date     04/25/2023    HGB 14.2 04/25/2023    HCT 46.3 04/25/2023    WBC 12.82 (H) 04/25/2023     04/25/2023    K 4.5 04/25/2023     04/25/2023    CO2 23.0 04/25/2023    BUN 14 04/25/2023    CREATININE 1.12 (H) 04/25/2023    GLUCOSE 90 04/25/2023    ALBUMIN 4.4 04/25/2023    CALCIUM 9.6 04/25/2023    AST 20 04/25/2023    ALT 21 04/25/2023    BILITOT 0.4 04/25/2023        Assessment & Plan    Diagnoses and all orders for this visit:    1. PMB (postmenopausal bleeding) (Primary)    2. Postmenopausal bleeding  -     Case Request  -     CBC & Differential; Future  -     Basic Metabolic Panel; Future  -     sodium chloride 0.9 % flush 10 mL  -     sodium chloride 0.9 % flush 1-10 mL  -     sodium chloride 0.9 % infusion 40 mL  -     sodium chloride 0.9 % infusion    Other orders  -     Follow Anesthesia Guidelines / Protocol; Future  -     Follow Anesthesia Guidelines / Protocol; Standing  -     Obtain Informed Consent; Future  -     Provide NPO Instructions to Patient; Future  -     Insert Peripheral IV; Standing  -     Saline Lock & Maintain IV Access; Standing    Risks, benefits, and alternatives of a D&C with hysteroscopy were discussed with the patient in detail. Intraoperative risks of bleeding and damage to surrounding organs, including but not limited to intestine, bladder and ureter, were explained. Management of these were also explained. Postoperative complications such as infection, pneumonia, DVT, and bleeding were explained. The importance of compliance with postoperative restrictions was discussed. The diagnostic nature of the procedure was explained.    Uterine perforation was discussed with the patient at  length.     All of the patient's questions were answered to her satisfaction. She was encouraged to return for an additional appointment if she had further questions. She verbalized understanding of the above and wished to proceed with the outlined plan.      Marcello Salas MD  5/25/2023  16:14 CDT

## 2023-05-25 NOTE — H&P
[place]  Luna Cruz  : 1965  MRN: [id number]  CSN: [id number]    History and Physical    Subjective   Luna Cruz is a 57 y.o. year old  who presents for consultation about surgery due to postmenopausal bleeding.  She had D&C for a polyp 2021.  Ultrasound May 17 shows the endometrium measured 5.4 mm.    Past Medical History:   Diagnosis Date    Abnormal uterine bleeding due to endometrial polyp     Arthritis     Asthma     Chronic back pain     Drug therapy     Ectopic pregnancy     Family history of colonic polyps     GERD (gastroesophageal reflux disease)     History of breast cancer     History of colon polyps     Hx of radiation therapy     Hyperlipidemia     Hypertension     Hypothyroidism     Malignant neoplasm of upper-inner quadrant of right female breast 2016    Obesity     Osteoarthritis     Osteopenia     Osteoporosis     Ovarian cyst     PONV (postoperative nausea and vomiting)     RLS (restless legs syndrome)     Scoliosis     Varicella 1966     Past Surgical History:   Procedure Laterality Date    ADENOIDECTOMY      APPENDECTOMY      BREAST AUGMENTATION  2021    BREAST BIOPSY      BREAST LUMPECTOMY Right     right sentinel lymph nodes were also removed for biopsy    BREAST RECONSTRUCTION Right 2022    CARDIAC CATHETERIZATION      CHOLECYSTECTOMY      COLONOSCOPY  2013    Erythematous mucosa in the rectum-biopsied; Repeat 4 years     COLONOSCOPY N/A 2017    Normal; Repeat 5 years    COLONOSCOPY  2010    Dr. Arzola-Extremely poorly prepped colon    COLONOSCOPY N/A 2020    Hemorrhoids found on perianal exam; One 4mm hyperplastic polyp in the transverse colon; Normal mucosa in the entire examined colon-biopsied; Non-bleeding internal hemorrhoids; Repeat 5 years    COSMETIC SURGERY  2023    D & C HYSTEROSCOPY      D & C HYSTEROSCOPY MYOSURE N/A 2021    Procedure: DILATATION AND CURETTAGE  HYSTEROSCOPY WITH MYOSURE, polypectomy;  Surgeon: Marcello Salas MD;  Location: [facility] OR;  Service: Obstetrics/Gynecology;  Laterality: N/A;    DILATATION AND CURETTAGE      ENDOSCOPY N/A 10/05/2016    Medium-sized HH; Widely patent and non-obstructing Schatzki ring-dilated; Procedure: ESOPHAGOGASTRODUODENOSCOPY WITH ANESTHESIA;  Surgeon: Ksenia Fox MD;  Location: [facility] ENDOSCOPY;  Service:     ENDOSCOPY N/A 02/27/2019    Normal 1st portion of the duodenum and 2 portion of the duodenum; A few gastric polyps-biopsied; Small HH; Widely patent Schatzki ring-dilated; Abnormal esophageal motility, suspicious for presbyesophagus; Arrange for barium swallow to assess for dysmotility    ENDOSCOPY N/A 07/24/2020    Medium-sized HH; LA Grade A reflux esophagitis; No endoscopic esophageal abnormality to explain patient's dysphagia-Esophagus dilated; Normal stomach; Normal examined duodenum; No specimens collected    ENDOSCOPY N/A 09/07/2022    Medium-sized HH; Non-obstructing Schatzki ring-Dilated; Normal stomach; Normal examined duodenum; No specimens collected    HERNIA REPAIR  9-    HYSTEROSCOPY      JOINT REPLACEMENT  2019    Left knee    KNEE ARTHROSCOPY Left 11/13/2018    Procedure: LEFT KNEE ARTHROSCOPIC PARTIAL MEDIAL MENISCECTOMY;  Surgeon: Matt Maki MD;  Location: [facility] OR;  Service: Orthopedics    MASTECTOMY Bilateral 09/14/2021    MEDIPORT INSERTION, SINGLE  2008    MEDIPORT REMOVAL      PARATHYROIDECTOMY  2011    REPLACEMENT TOTAL KNEE Left 05/23/2019    TONSILLECTOMY AND ADENOIDECTOMY  1970    TUBAL ABDOMINAL LIGATION       Social History    Tobacco Use      Smoking status: Never      Smokeless tobacco: Never      Current Outpatient Medications:     albuterol (PROVENTIL) (2.5 MG/3ML) 0.083% nebulizer solution, USE 1 VIAL BY NEBULIZATION EVERY 4 HOURS AS NEEDED FOR WHEEZING, Disp: 360 mL, Rfl: 5    albuterol sulfate  (90 Base) MCG/ACT inhaler, USE 2 INHALATIONS EVERY 4 HOURS  AS NEEDED FOR WHEEZING, Disp: 25.5 g, Rfl: 3    alendronate (FOSAMAX) 70 MG tablet, Take 1 tablet by mouth Every 7 (Seven) Days. Sunday's, Disp: 12 tablet, Rfl: 3    aspirin 81 MG EC tablet, Take 1 tablet by mouth Daily., Disp: , Rfl:     atorvastatin (LIPITOR) 40 MG tablet, Take 1 tablet by mouth Daily., Disp: 90 tablet, Rfl: 3    azelastine (ASTELIN) 0.1 % nasal spray, USE 2 SPRAYS INTO THE NOSTRIL(S)  TWICE A DAY AS DIRECTED BY PROVIDER, Disp: 30 mL, Rfl: 13    calcitriol (ROCALTROL) 0.5 MCG capsule, Take 1 capsule by mouth Daily., Disp: 90 capsule, Rfl: 3    Calcium Citrate-Vitamin D (CALCIUM + D PO), Take 1 tablet by mouth Daily., Disp: , Rfl:     docusate sodium (COLACE) 100 MG capsule, Take 1 capsule by mouth As Needed., Disp: , Rfl:     fluticasone (FLONASE) 50 MCG/ACT nasal spray, USE 2 SPRAYS INTO THE NOSTRIL(S) DAILY AS DIRECTED BY PROVIDER, Disp: 16 g, Rfl: 11    gabapentin (NEURONTIN) 100 MG capsule, Take 1 capsule by mouth 3 (Three) Times a Day., Disp: , Rfl:     HYDROcodone-acetaminophen (NORCO) 5-325 MG per tablet, Take 1 tablet by mouth 2 (Two) Times a Day As Needed for Moderate Pain., Disp: , Rfl:     ibuprofen (ADVIL,MOTRIN) 400 MG tablet, Take 1 tablet by mouth Every 8 (Eight) Hours As Needed for Mild Pain., Disp: , Rfl:     levothyroxine (Synthroid) 50 MCG tablet, Take 1 tablet by mouth Daily., Disp: 90 tablet, Rfl: 3    loratadine (CLARITIN) 10 MG tablet, TAKE 1 TABLET DAILY AS NEEDED FOR ALLERGIES, Disp: 90 tablet, Rfl: 3    losartan (COZAAR) 100 MG tablet, Take 1 tablet by mouth Daily., Disp: 90 tablet, Rfl: 3    methocarbamol (ROBAXIN) 750 MG tablet, Take 1 tablet by mouth., Disp: , Rfl:     naloxone (NARCAN) 4 MG/0.1ML nasal spray, 1 spray into the nostril(s) as directed by provider., Disp: , Rfl:     ondansetron ODT (ZOFRAN-ODT) 4 MG disintegrating tablet, Place 1 tablet on the tongue As Needed., Disp: , Rfl:     pantoprazole (PROTONIX) 40 MG EC tablet, Take 1 tablet by mouth Daily., Disp:  90 tablet, Rfl: 3    potassium chloride ER (K-TAB) 20 MEQ tablet controlled-release ER tablet, Take 1 tablet by mouth Daily. (Patient taking differently: Take 10 mEq by mouth Daily.), Disp: 90 tablet, Rfl: 3    rOPINIRole (REQUIP) 0.25 MG tablet, TAKE 1 TABLET EVERY NIGHT, Disp: 90 tablet, Rfl: 3    theophylline (UNIPHYL) 400 MG 24 hr tablet, TAKE 1 TABLET DAILY (Patient taking differently: Take 1 tablet by mouth Daily.), Disp: 90 tablet, Rfl: 3    tiZANidine (ZANAFLEX) 4 MG tablet, Take 1 tablet by mouth Every 8 (Eight) Hours As Needed for Muscle Spasms., Disp: , Rfl:     Wixela Inhub 250-50 MCG/ACT DISKUS, USE 1 INHALATION TWICE A DAY (Patient taking differently: Inhale 1 puff 2 (Two) Times a Day.), Disp: 180 each, Rfl: 3    zafirlukast (Accolate) 20 MG tablet, Take 1 tablet by mouth 2 (Two) Times a Day., Disp: 180 tablet, Rfl: 3  No current facility-administered medications for this visit.    Facility-Administered Medications Ordered in Other Visits:     heparin flush (porcine) 100 UNIT/ML injection 500 Units, 500 Units, Intracatheter, PRN, Ki Manriquez MD, 500 Units at 09/27/16 1108    heparin flush (porcine) 100 UNIT/ML injection 500 Units, 500 Units, Intravenous, PRN, Malathi Brantley APRN, 500 Units at 05/05/17 1009    sodium chloride 0.9 % flush 10 mL, 10 mL, Intravenous, PRN, Ki Manriquez MD, 10 mL at 09/27/16 1107    sodium chloride 0.9 % flush 10 mL, 10 mL, Intravenous, PRN, Malathi Brantley APRN, 10 mL at 05/05/17 1009    Allergies   Allergen Reactions    Cephalosporins Hives    Keflex [Cephalexin] Rash       Family History   Problem Relation Age of Onset    Colon polyps Mother         < 60 years of age     Cirrhosis Mother     Diabetes Mother     Liver disease Mother     Colon polyps Sister 54        < 60 years of age     Breast cancer Sister     Cancer Sister     No Known Problems Father     No Known Problems Brother     No Known Problems Daughter     No Known  "Problems Son     Diabetes Maternal Grandmother     Heart disease Maternal Grandmother     No Known Problems Paternal Grandmother     No Known Problems Maternal Aunt     No Known Problems Paternal Aunt     Hyperlipidemia Maternal Grandfather     Stroke Maternal Grandfather     Colon cancer Neg Hx     Crohn's disease Neg Hx     Irritable bowel syndrome Neg Hx     Liver cancer Neg Hx     Rectal cancer Neg Hx     Stomach cancer Neg Hx     BRCA 1/2 Neg Hx     Endometrial cancer Neg Hx     Ovarian cancer Neg Hx     Uterine cancer Neg Hx     Esophageal cancer Neg Hx      Review of Systems   Constitutional:  Negative for unexpected weight change.   HENT:  Negative for trouble swallowing.    Respiratory:  Negative for shortness of breath.    Cardiovascular:  Negative for chest pain.   Musculoskeletal:  Negative for neck stiffness.   Neurological:  Negative for seizures.   Hematological:  Does not bruise/bleed easily.       Objective   /98 (BP Location: Left arm, Patient Position: Sitting, Cuff Size: Large Adult)   Ht 157.5 cm (62\")   Wt 79.4 kg (175 lb)   LMP  (LMP Unknown)   Breastfeeding No   BMI 32.01 kg/m²     Physical Exam   Physical Exam  Vitals reviewed.   Constitutional:       Appearance: She is well-developed.   HENT:      Head: Normocephalic and atraumatic.   Eyes:      General: No scleral icterus.  Neck:      Trachea: No tracheal deviation.   Cardiovascular:      Rate and Rhythm: Normal rate and regular rhythm.   Pulmonary:      Effort: Pulmonary effort is normal.      Breath sounds: Normal breath sounds.   Abdominal:      General: Bowel sounds are normal. There is no distension.      Palpations: Abdomen is soft.      Tenderness: There is no abdominal tenderness.   Genitourinary:     Exam position: Supine.      Labia:         Right: No lesion.         Left: No lesion.       Vagina: No vaginal discharge or bleeding.      Cervix: No cervical motion tenderness.      Uterus: Not enlarged, not fixed and not " tender.       Adnexa:         Right: No mass.          Left: No mass.        Rectum: No external hemorrhoid.   Skin:     General: Skin is warm and dry.   Neurological:      Mental Status: She is alert and oriented to person, place, and time.       Labs  Lab Results   Component Value Date     04/25/2023    HGB 14.2 04/25/2023    HCT 46.3 04/25/2023    WBC 12.82 (H) 04/25/2023     04/25/2023    K 4.5 04/25/2023     04/25/2023    CO2 23.0 04/25/2023    BUN 14 04/25/2023    CREATININE 1.12 (H) 04/25/2023    GLUCOSE 90 04/25/2023    ALBUMIN 4.4 04/25/2023    CALCIUM 9.6 04/25/2023    AST 20 04/25/2023    ALT 21 04/25/2023    BILITOT 0.4 04/25/2023        Assessment & Plan    Diagnoses and all orders for this visit:    1. PMB (postmenopausal bleeding) (Primary)    2. Postmenopausal bleeding  -     Case Request  -     CBC & Differential; Future  -     Basic Metabolic Panel; Future  -     sodium chloride 0.9 % flush 10 mL  -     sodium chloride 0.9 % flush 1-10 mL  -     sodium chloride 0.9 % infusion 40 mL  -     sodium chloride 0.9 % infusion    Other orders  -     Follow Anesthesia Guidelines / Protocol; Future  -     Follow Anesthesia Guidelines / Protocol; Standing  -     Obtain Informed Consent; Future  -     Provide NPO Instructions to Patient; Future  -     Insert Peripheral IV; Standing  -     Saline Lock & Maintain IV Access; Standing    Risks, benefits, and alternatives of a D&C with hysteroscopy were discussed with the patient in detail. Intraoperative risks of bleeding and damage to surrounding organs, including but not limited to intestine, bladder and ureter, were explained. Management of these were also explained. Postoperative complications such as infection, pneumonia, DVT, and bleeding were explained. The importance of compliance with postoperative restrictions was discussed. The diagnostic nature of the procedure was explained.    Uterine perforation was discussed with the patient at  length.     All of the patient's questions were answered to her satisfaction. She was encouraged to return for an additional appointment if she had further questions. She verbalized understanding of the above and wished to proceed with the outlined plan.      Marcello Salas MD  5/25/2023  16:14 CDT

## 2023-05-25 NOTE — PROGRESS NOTES
"Luna Cruz is a 57 y.o. female here today for evaluation of postmenopausal bleeding.  She has a history of breast cancer, and also underwent hysteroscopy with polypectomy January 2021.  Over the last 2 months she has had 2 separate episodes of bright red vaginal bleeding which lasted 3 or 4 days each.  She is on 81 mg aspirin but no other anticoagulation.    Pelvic ultrasound in February and March both showed a 3 mm endometrium  Pelvic ultrasound May 17 showed the endometrium to measure 5.4 mm    Visit Vitals  /98 (BP Location: Left arm, Patient Position: Sitting, Cuff Size: Large Adult)   Ht 157.5 cm (62\")   Wt 79.4 kg (175 lb)   LMP  (LMP Unknown)   Breastfeeding No   BMI 32.01 kg/m²     Pleasant female no acute distress  Mood and affect normal  Breathing unlabored    Assessment: Postmenopausal bleeding    I suspect that she has a lower genital tract source of her bleeding, but she does not want to go through the discomfort of pelvic exam and endometrial biopsy in the office.  Instead she prefers to proceed with hysteroscopy D&C in case she has a recurrence of her polyp. We discussed a planned surgical procedure of hysteroscopy with D&C.  We discussed surgical risks of bleeding, infection, and damage to surrounding structures including bowel, bladder, ureters, uterine perforation, and other viscera.  All of her questions have been answered and she is scheduled for surgery on June 14.  Preoperative orders including CBC and BMP have been placed.  Call with questions or concerns.      "

## 2023-05-31 NOTE — PATIENT INSTRUCTIONS

## 2023-06-01 ENCOUNTER — HOSPITAL ENCOUNTER (OUTPATIENT)
Dept: GENERAL RADIOLOGY | Facility: HOSPITAL | Age: 58
Discharge: HOME | End: 2023-06-01
Admitting: INTERNAL MEDICINE

## 2023-06-01 DIAGNOSIS — J45.40 MODERATE PERSISTENT ASTHMA WITHOUT COMPLICATION: ICD-10-CM

## 2023-06-01 PROCEDURE — 71045 X-RAY EXAM CHEST 1 VIEW: CPT

## 2023-06-06 ENCOUNTER — OFFICE VISIT (OUTPATIENT)
Dept: PULMONOLOGY | Facility: CLINIC | Age: 58
End: 2023-06-06
Payer: MEDICARE

## 2023-06-06 ENCOUNTER — PATIENT OUTREACH (OUTPATIENT)
Dept: CASE MANAGEMENT | Facility: OTHER | Age: 58
End: 2023-06-06
Payer: MEDICARE

## 2023-06-06 VITALS
DIASTOLIC BLOOD PRESSURE: 74 MMHG | HEIGHT: 61 IN | HEART RATE: 74 BPM | WEIGHT: 178.6 LBS | BODY MASS INDEX: 33.72 KG/M2 | OXYGEN SATURATION: 97 % | SYSTOLIC BLOOD PRESSURE: 128 MMHG

## 2023-06-06 DIAGNOSIS — G25.81 RESTLESS LEGS SYNDROME (RLS): ICD-10-CM

## 2023-06-06 DIAGNOSIS — Z86.16 HISTORY OF COVID-19: ICD-10-CM

## 2023-06-06 DIAGNOSIS — J30.89 NON-SEASONAL ALLERGIC RHINITIS, UNSPECIFIED TRIGGER: ICD-10-CM

## 2023-06-06 DIAGNOSIS — Z78.9 NON-SMOKER: ICD-10-CM

## 2023-06-06 DIAGNOSIS — J98.4 PULMONARY SCARRING: ICD-10-CM

## 2023-06-06 DIAGNOSIS — J45.40 MODERATE PERSISTENT ASTHMA WITHOUT COMPLICATION: Primary | ICD-10-CM

## 2023-06-06 DIAGNOSIS — J45.40 MODERATE PERSISTENT ASTHMA WITHOUT COMPLICATION: ICD-10-CM

## 2023-06-06 NOTE — OUTREACH NOTE
[email or web address] Enrollment    Was the enrollment attempt to reach the patient successful?: yes  Date/Time of successful contact: 6/6/2023  4:15 PM  Patient response: interested  Enrolling provider: Chaz Gaines MD  Patient has/had own BP monitoring equipment?: yes       RN-ACM outreach to patient to offer enrollment in [employer] [facility] for assistance with education and management of hypertension.     Patient appt with Chaz Gaines MD on 06/12/23. Patient indicated she has a blood pressure cuff at home at works well.       Annita RANGEL  Ambulatory Case Management    6/6/2023, 16:16 CDT

## 2023-06-06 NOTE — PROGRESS NOTES
[facility] OUTPATIENT PROGRESS NOTE    Patient: Luna Cruz  : 1965  Age: 57 y.o.  Date of Service: 2023    REASON FOR CLINIC VISIT:  Chief Complaint   Patient presents with    Moderate persistent asthma without complication       Subjective:    History of Present Illness:  Luna Cruz is a 57 y.o. female who presents to the office today to be seen for    Diagnosis Plan   1. Moderate persistent asthma without complication        2. Pulmonary scarring        3. Non-smoker        4. Restless legs syndrome (RLS)        5. Non-seasonal allergic rhinitis, unspecified trigger        6. History of COVID-19        .  Other problems per record.    History:    Patient is a very pleasant middle aged  female was seen in the pulmonary clinic for a follow-up visit.    Patient is a non-smoker and has asthma and she is currently using her Wixela with albuterol nebulizer and rescue inhaler. She also uses theophylline. Her breathing is overall good but she still have some allergy problems and postnasal drainage.  She does not have any home oxygen.  She has a lot of anxiety and stress related issues.  She has chronic pain did not have any weight loss or weight gain since her last visit with me.  She has history of COVID infection in the past and she is already vaccinated for COVID and also had other vaccinations.    She has restless leg syndrome she is currently using ropinirole.  She has allergy problems and currently uses fluticasone nasal spray, loratadine, Astelin nasal spray and Accolate.  She did not have any recent hospitalizations and ER visit any urgent care visit or any other acute complaints and overall she is stable and at her baseline.  Pulmonary function test done today which shows moderate obstructive airway dysfunction and moderate restrictive dysfunction with normal diffusion corrected for alveolar volume.  He has a chest x-ray done recently which showed bibasilar  atelectasis but otherwise clear lung fields.    PFT done today:  Not done today    PFT Values          2021    11:00 2023    10:00   Pre Drug PFT Results   FVC 69 62   FEV1 53 57   FEF 25-75% 24 43   FEV1/FVC 62.94 74   Other Tests PFT Results   TLC 87 70    76   DLCO 88 84   D/VAsb 134 121     Results for orders placed in visit on 23    Pulmonary Function Test    Narrative  Pulmonary Function Test  Performed by: Sara Cramer, JHOAN  Authorized by: Kevin Aguirre MD    Pre Drug % Predicted  FVC: 62%  FEV1: 57%  FEF 25-75%: 43%  FEV1/FVC: 74%  T%  RV: 76%  DLCO: 84%  D/VAsb: 121%    Interpretation  Overall comments:  Above test results are acceptable and reproducible by ATS criteria.  From analysis of the above test results the patient showed evidence of moderate obstructive airway dysfunction.  No bronchodilator challenge was done.  The lung volumes are decreased supporting evidence of moderate restrictive dysfunction.  Diffusion capacity corrected for alveolar volume is above normal limits.    Comparison of the prior pulmonary function test done a few years ago there is no significant changes noted.  Clinical correlation was indicated.    Kevin Aguirre MD  Pulmonologist/Intensivist  2023 12:38 CDT      Results for orders placed in visit on 21    Pulmonary Function Test    Narrative  Pulmonary Function Test  Performed by: Sara Cramer, JHOAN  Authorized by: Kevin Aguirre MD    Pre Drug % Predicted  FVC: 69%  FEV1: 53%  FEF 25-75%: 24%  FEV1/FVC: 62.94%  T%  RV: 120%  DLCO: 88%  D/VAsb: 134%    Interpretation  Overall comments:  The above test results were acceptable and reproducible by ATS criteria.  From analysis of the above test results the patient showed evidence of moderate to severe obstructive airway dysfunction.  No bronchodilator challenge was done.  There was mild restrictive dysfunction noted from decreased TLC  Air trapping noted from  increased residual volume  Patient capacity corrected for single breath was within normal limits  No prior test result was available for comparison  Clinical correlation was indicated.    Kevin Aguirre MD  Pulmonologist/Intensivist  11/9/2021 14:37 CST      Results for orders placed in visit on 10/29/19    Pulmonary Function Test    Narrative  Pulmonary Function Test  Performed by: Klaus Wagner APRN  Authorized by: Klaus Wagner APRN    Pre Drug  FVC: 62%  FEV1: 52%  FEF 25-75%: 31%  FEV1/FVC: 69.23%         Bronchodilator therapy: Wixela and Albuterol rescue inhaler and Albuterol nebulizer.     Smoking Status:   Social History     Tobacco Use   Smoking Status Never   Smokeless Tobacco Never     Pulm Rehab: no  Sleep: yes    Support System: lives with their spouse    Code Status:   There are no questions and answers to display.        Review of Systems:  A complete review of systems is performed and all other systems were reviewed and negative as note above in the HPI.  Review of Systems   Constitutional:  Positive for fatigue.   HENT:  Positive for congestion, postnasal drip and sinus pressure.    Eyes: Negative.    Respiratory:  Positive for cough, chest tightness and shortness of breath.    Cardiovascular: Negative.    Gastrointestinal: Negative.    Endocrine: Negative.    Genitourinary: Negative.    Musculoskeletal:  Positive for arthralgias and back pain.   Skin: Negative.    Allergic/Immunologic: Positive for environmental allergies.   Neurological: Negative.    Hematological: Negative.    Psychiatric/Behavioral:  The patient is nervous/anxious.      CAT/ACT Score:  Not done today    Medications:  Outpatient Encounter Medications as of 6/6/2023   Medication Sig Dispense Refill    albuterol (PROVENTIL) (2.5 MG/3ML) 0.083% nebulizer solution USE 1 VIAL BY NEBULIZATION EVERY 4 HOURS AS NEEDED FOR WHEEZING 360 mL 5    albuterol sulfate  (90 Base) MCG/ACT inhaler USE 2 INHALATIONS  EVERY 4 HOURS AS NEEDED FOR WHEEZING 25.5 g 3    alendronate (FOSAMAX) 70 MG tablet Take 1 tablet by mouth Every 7 (Seven) Days. Sunday's 12 tablet 3    aspirin 81 MG EC tablet Take 1 tablet by mouth Daily.      atorvastatin (LIPITOR) 40 MG tablet Take 1 tablet by mouth Daily. 90 tablet 3    azelastine (ASTELIN) 0.1 % nasal spray USE 2 SPRAYS INTO THE NOSTRIL(S)  TWICE A DAY AS DIRECTED BY PROVIDER 30 mL 13    calcitriol (ROCALTROL) 0.5 MCG capsule Take 1 capsule by mouth Daily. 90 capsule 3    Calcium Citrate-Vitamin D (CALCIUM + D PO) Take 1 tablet by mouth Daily.      docusate sodium (COLACE) 100 MG capsule Take 1 capsule by mouth As Needed.      fluticasone (FLONASE) 50 MCG/ACT nasal spray USE 2 SPRAYS INTO THE NOSTRIL(S) DAILY AS DIRECTED BY PROVIDER 16 g 11    gabapentin (NEURONTIN) 100 MG capsule Take 1 capsule by mouth 3 (Three) Times a Day.      HYDROcodone-acetaminophen (NORCO) 5-325 MG per tablet Take 1 tablet by mouth 2 (Two) Times a Day As Needed for Moderate Pain.      ibuprofen (ADVIL,MOTRIN) 400 MG tablet Take 1 tablet by mouth Every 8 (Eight) Hours As Needed for Mild Pain.      levothyroxine (Synthroid) 50 MCG tablet Take 1 tablet by mouth Daily. 90 tablet 3    loratadine (CLARITIN) 10 MG tablet TAKE 1 TABLET DAILY AS NEEDED FOR ALLERGIES 90 tablet 3    losartan (COZAAR) 100 MG tablet Take 1 tablet by mouth Daily. 90 tablet 3    methocarbamol (ROBAXIN) 750 MG tablet Take 1 tablet by mouth.      naloxone (NARCAN) 4 MG/0.1ML nasal spray 1 spray into the nostril(s) as directed by provider.      ondansetron ODT (ZOFRAN-ODT) 4 MG disintegrating tablet Place 1 tablet on the tongue As Needed.      pantoprazole (PROTONIX) 40 MG EC tablet Take 1 tablet by mouth Daily. 90 tablet 3    potassium chloride ER (K-TAB) 20 MEQ tablet controlled-release ER tablet Take 1 tablet by mouth Daily. (Patient taking differently: Take 10 mEq by mouth Daily.) 90 tablet 3    rOPINIRole (REQUIP) 0.25 MG tablet TAKE 1 TABLET  EVERY NIGHT 90 tablet 3    theophylline (UNIPHYL) 400 MG 24 hr tablet TAKE 1 TABLET DAILY (Patient taking differently: Take 1 tablet by mouth Daily.) 90 tablet 3    tiZANidine (ZANAFLEX) 4 MG tablet Take 1 tablet by mouth Every 8 (Eight) Hours As Needed for Muscle Spasms.      Wixela Inhub 250-50 MCG/ACT DISKUS USE 1 INHALATION TWICE A DAY (Patient taking differently: Inhale 1 puff 2 (Two) Times a Day.) 180 each 3    zafirlukast (Accolate) 20 MG tablet Take 1 tablet by mouth 2 (Two) Times a Day. 180 tablet 3     Facility-Administered Encounter Medications as of 6/6/2023   Medication Dose Route Frequency Provider Last Rate Last Admin    heparin flush (porcine) 100 UNIT/ML injection 500 Units  500 Units Intracatheter PRN Ki Manriquez MD   500 Units at 09/27/16 1108    heparin flush (porcine) 100 UNIT/ML injection 500 Units  500 Units Intravenous PRN Malathi Brantley APRN   500 Units at 05/05/17 1009    sodium chloride 0.9 % flush 10 mL  10 mL Intravenous PRN Ki Manriquez MD   10 mL at 09/27/16 1107    sodium chloride 0.9 % flush 10 mL  10 mL Intravenous PRN Malathi Brantley APRN   10 mL at 05/05/17 1009       Allergies:  Allergies   Allergen Reactions    Cephalosporins Hives    Keflex [Cephalexin] Rash       Immunizations:  Immunization History   Administered Date(s) Administered    COVID-19 (PFIZER) BIVALENT 12+YRS 11/16/2022    COVID-19 (PFIZER) Purple Cap Monovalent 03/24/2021, 04/14/2021, 11/09/2021, 07/02/2022    Covid-19 (Pfizer) Gray Cap Monovalent 07/02/2022    Flu Vaccine Intradermal Quad 18-64YR 09/28/2018, 10/29/2019, 09/29/2020    FluLaval/Fluzone >6mos 10/10/2016, 09/30/2020, 10/12/2021    Flublok 18+yrs 11/16/2022    Pneumococcal Conjugate 13-Valent (PCV13) 01/01/2012    Pneumococcal Polysaccharide (PPSV23) 10/28/2014    Shingrix 12/02/2020, 02/16/2021    Tdap 12/08/2021    flucelvax quad pfs =>4 YRS 09/28/2018, 10/29/2019       Objective:    Vitals:  /74    "Pulse 74   Ht 154.9 cm (61\") Comment: measured  Wt 81 kg (178 lb 9.6 oz)   LMP  (LMP Unknown)   SpO2 97%   BMI 33.75 kg/m²     Physical Exam:  General: Patient is a 57 y.o. middle aged pleasant  female. Looks stated age. Appears to be in no acute distress.  Eyes: EOMI. PERRLA. Vision intact. No scleral icterus.  Ear, Nose, Mouth and Throat: Hearing is grossly intact. No Leukoplakia, pharyngitis, stomatitis or thrush. Swollen nasal mucosa with post nasal drop.  Neck: Range of motion of neck normal. No thyromegaly or masses. Mallampati Class 3  Respiratory: Clear to auscultation bilaterally. No use of accessory muscles. Decreased breath sounds.  Cardiovascular: Normal heart sounds. Regularly regular rhythm without murmur.  Gastrointestinal: Non tender, non distended, soft. Bowel sounds positive in all four quadrants. No organomegaly.  Skin: No obvious rashes, lesions, ulcers or large amount of bruising. No edema.   Neurological: No new motor deficits. Cranial nerves appear intact.  Psychiatric: Patient is alert and oriented to person, place and time.    Chest Imaging:    Study Result    Narrative & Impression   EXAMINATION: XR CHEST 1 VW- 6/1/2023 9:21 AM CDT     HISTORY: asthma; J45.40-Moderate persistent asthma, uncomplicated     REPORT: A frontal view of the chest was obtained.     COMPARISON: Chest x-ray 02/27/2022.     The lungs are hypoaerated and hazy opacities at the left base are  favored to represent atelectasis. There is no lung consolidation. Mild  interstitial prominence appears stable. Heart size is normal. No  pneumothorax or pleural effusion is identified. No acute osseous  abnormality, mild levoscoliosis and degenerative changes of the thoracic  spine are stable.     IMPRESSION:  Shallow inspiration with basilar atelectasis, greater on the  left. No acute cardiopulmonary abnormality.    This report was finalized on 06/01/2023 09:23 by Dr. J Luis Rose MD.       Assessment:  1. " Moderate persistent asthma without complication    2. Pulmonary scarring    3. Non-smoker    4. Restless legs syndrome (RLS)    5. Non-seasonal allergic rhinitis, unspecified trigger    6. History of COVID-19        Plan/Recommendations:    1.  I reviewed the chest x-ray results and current PFT findings and discussed the results with the patient.  2.  For the her asthma she will continue using Symbicort and albuterol nebulizer and rescue  She also uses theophylline which will be continued but her theophylline level needs to be monitored from time to time.  3.  She has restless leg syndrome but does not have an obstructive sleep apnea and she will continue using ropinirole.  Patient has severe allergic rhinitis for which she is using Astelin nasal spray, fluticasone nasal spray, loratadine and Accolate which will be continued.  4.  Patient already vaccinated for COVID and had other vaccinations.  She did not need any refill of her medications.  She will continue follow-up with the primary care provider and advised her to return to pulmonary clinic for follow-up visit in 6 months time or earlier if needed.    Follow up:  6 Months    Time Spent:  30 minutes    I appreciate the opportunity of participating in this patient's care. I would like to thank the PCP for the referral.  Please feel free to contact me with any other questions.    Kevin Aguirre MD   Pulmonologist/Intensivist     Electronically signed by: Kevin Aguirre MD, 6/6/2023 12:38 CDT

## 2023-06-06 NOTE — PROCEDURES
Pulmonary Function Test  Performed by: Sara Cramer, RRT  Authorized by: Kevin Aguirre MD      Pre Drug % Predicted    FVC: 62%   FEV1: 57%   FEF 25-75%: 43%   FEV1/FVC: 74%   T%   RV: 76%   DLCO: 84%   D/VAsb: 121%    Interpretation   Overall comments:   Above test results are acceptable and reproducible by ATS criteria.  From analysis of the above test results the patient showed evidence of moderate obstructive airway dysfunction.  No bronchodilator challenge was done.  The lung volumes are decreased supporting evidence of moderate restrictive dysfunction.  Diffusion capacity corrected for alveolar volume is above normal limits.    Comparison of the prior pulmonary function test done a few years ago there is no significant changes noted.  Clinical correlation was indicated.    Kevin Aguirre MD  Pulmonologist/Intensivist  2023 12:38 CDT

## 2023-06-07 ENCOUNTER — PRE-ADMISSION TESTING (OUTPATIENT)
Dept: PREADMISSION TESTING | Facility: HOSPITAL | Age: 58
End: 2023-06-07
Payer: MEDICARE

## 2023-06-07 VITALS
HEIGHT: 61 IN | OXYGEN SATURATION: 96 % | WEIGHT: 178.79 LBS | BODY MASS INDEX: 33.76 KG/M2 | DIASTOLIC BLOOD PRESSURE: 71 MMHG | HEART RATE: 80 BPM | RESPIRATION RATE: 16 BRPM | SYSTOLIC BLOOD PRESSURE: 180 MMHG

## 2023-06-07 PROCEDURE — 80048 BASIC METABOLIC PNL TOTAL CA: CPT | Performed by: OBSTETRICS & GYNECOLOGY

## 2023-06-07 PROCEDURE — 85025 COMPLETE CBC W/AUTO DIFF WBC: CPT | Performed by: OBSTETRICS & GYNECOLOGY

## 2023-06-07 NOTE — DISCHARGE INSTRUCTIONS
Before you come to the hospital        Arrival time: AS DIRECTED BY OFFICE     YOU MAY TAKE THE FOLLOWING MEDICATION(S) THE MORNING OF SURGERY WITH A SIP OF WATER: Hydrocodone (Norco)    Do NOT take your Losartan (Cozaar) within 24 hours of your surgery           ALL OTHER HOME MEDICATION CHECK WITH YOUR PHYSICIAN (especially if   you are taking diabetes medicines or blood thinners)      If you were given and instructed to use a germ- killing soap, use as directed the night before surgery and again the morning of surgery or as directed by your surgeon. (Use one-half of the bottle with each shower.)   See attached information for How to Use Chlorhexidine for Bathing if applicable.            Eating and drinking restrictions prior to scheduled arrival time    2 Hours before arrival time STOP   Drinking Clear liquids (water, black coffee-NO CREAM,  apple juice-no pulp)      8 Hours before arrival time STOP   All food, full liquids, and dairy products    (It is extremely important that you follow these guidelines to prevent delay or cancelation of your procedure)     Clear Liquids  Water and flavored water                                                                      Clear Fruit juices, such as cranberry juice and apple juice.  Black coffee (NO cream of any kind, including powdered).  Plain tea  Clear bouillon or broth.  Flavored gelatin.  Soda.  Gatorade or Powerade.  Full liquid examples  Juices that have pulp.  Frozen ice pops that contain fruit pieces.  Coffee with creamer  Milk.  Yogurt.                MANAGING PAIN AFTER SURGERY    We know you are probably wondering what your pain will be like after surgery.  Following surgery it is unrealistic to expect you will not have pain.   Pain is how our bodies let us know that something is wrong or cautions us to be careful.  That said, our goal is to make your pain tolerable.    Methods we may use to treat your pain include (oral or IV medications, PCAs,  epidurals, nerve blocks, etc.)   While some procedures require IV pain medications for a short time after surgery, transitioning to pain medications by mouth allows for better management of pain.   Your nurse will encourage you to take oral pain medications whenever possible.  IV medications work almost immediately, but only last a short while.  Taking medications by mouth allows for a more constant level of medication in your blood stream for a longer period of time.      Once your pain is out of control it is harder to get back under control.  It is important you are aware when your next dose of pain medication is due.  If you are admitted, your nurse may write the time of your next dose on the white board in your room to help you remember.      We are interested in your pain and encourage you to inform us about aggravating factors during your visit.   Many times a simple repositioning every few hours can make a big difference.    If your physician says it is okay, do not let your pain prevent you from getting out of bed. Be sure to call your nurse for assistance prior to getting up so you do not fall.      Before surgery, please decide your tolerable pain goal.  These faces help describe the pain ratings we use on a 0-10 scale.   Be prepared to tell us your goal and whether or not you take pain or anxiety medications at home.          Preparing for Surgery  Preparing for surgery is an important part of your care. It can make things go more smoothly and help you avoid complications. The steps leading up to surgery may vary among hospitals. Follow all instructions given to you by your health care providers. Ask questions if you do not understand something. Talk about any concerns that you have.  Here are some questions to consider asking before your surgery:  If my surgery is not an emergency (is elective), when would be the best time to have the surgery?  What arrangements do I need to make for work, home, or  school?  What will my recovery be like? How long will it be before I can return to normal activities?  Will I need to prepare my home? Will I need to arrange care for me or my children?  Should I expect to have pain after surgery? What are my pain management options? Are there nonmedical options that I can try for pain?  Tell a health care provider about:  Any allergies you have.  All medicines you are taking, including vitamins, herbs, eye drops, creams, and over-the-counter medicines.  Any problems you or family members have had with anesthetic medicines.  Any blood disorders you have.  Any surgeries you have had.  Any medical conditions you have.  Whether you are pregnant or may be pregnant.  What are the risks?  The risks and complications of surgery depend on the specific procedure that you have. Discuss all the risks with your health care providers before your surgery. Ask about common surgical complications, which may include:  Infection.  Bleeding or a need for blood replacement (transfusion).  Allergic reactions to medicines.  Damage to surrounding nerves, tissues, or structures.  A blood clot.  Scarring.  Failure of the surgery to correct the problem.  Follow these instructions before the procedure:  Several days or weeks before your procedure  You may have a physical exam by your primary health care provider to make sure it is safe for you to have surgery.  You may have testing. This may include a chest X-ray, blood and urine tests, electrocardiogram (ECG), or other testing.  Ask your health care provider about:  Changing or stopping your regular medicines. This is especially important if you are taking diabetes medicines or blood thinners.  Taking medicines such as aspirin and ibuprofen. These medicines can thin your blood. Do not take these medicines unless your health care provider tells you to take them.  Taking over-the-counter medicines, vitamins, herbs, and supplements.  Do not use any products  that contain nicotine or tobacco, such as cigarettes and e-cigarettes. If you need help quitting, ask your health care provider.  Avoid alcohol.  Ask your health care provider if there are exercises you can do to prepare for surgery.  Eat a healthy diet.   Plan to have someone 18 years of age or older to take you home from the hospital. We will need to verify your ride on the morning of surgery if you are being discharged home on the same day. Tell your ride to be expecting a call from the hospital prior to your procedure.   Plan to have a responsible adult care for you for at least 24 hours after you leave the hospital or clinic. This is important.  The day before your procedure  You may be given antibiotic medicine to take by mouth to help prevent infection. Take it as told by your health care provider.  You may be asked to shower with a germ-killing soap.  Follow instructions from your health care provider about eating and drinking restrictions. This includes gum, mints and hard candy.  Pack comfortable clothes according to your procedure.   The day of your procedure  You may need to take another shower with a germ-killing soap before you leave home in the morning.  With a small sip of water, take only the medicines that you are told to take.  Remove all jewelry including rings.   Leave anything you consider valuable at home except hearing aids if needed.  You do not need to bring your home medications into the hospital.   Do not wear any makeup, nail polish, powder, deodorant, lotion, hair accessories, or anything on your skin or body except your clothes.  If you will be staying in the hospital, bring a case to hold your glasses, contacts, or dentures. You may also want to bring your robe and non-skid footwear.       (Do not use denture adhesives since you will be asked to remove them during  surgery).   If you wear oxygen at home, bring it with you the day of surgery.  If instructed by your health care  provider, bring your sleep apnea device with you on the day of your surgery (if this applies to you).  You may want to leave your suitcase and sleep apnea device in the car until after surgery.   Arrive at the hospital as scheduled.  Bring a friend or family member with you who can help to answer questions and be present while you meet with your health care provider.  At the hospital  When you arrive at the hospital:  Go to registration located at the main entrance of the hospital. You will be registered and given a beeper and a sticker sheet. Take the stickers to the Outpatient nurses desk and place in the black tray. This is to notify staff that you have arrived. Then return to the lobby to wait.   When your beeper lights up and vibrates proceed through the double doors, under the stairs, and a member of the Outpatient Surgery staff will escort you to your preoperative room.  You may have to wear compression sleeves. These help to prevent blood clots and reduce swelling in your legs.  An IV may be inserted into one of your veins.              In the operating room, you may be given one or more of the following:        A medicine to help you relax (sedative).        A medicine to numb the area (local anesthetic).        A medicine to make you fall asleep (general anesthetic).        A medicine that is injected into an area of your body to numb everything below the                      injection site (regional anesthetic).  You may be given an antibiotic through your IV to help prevent infection.  Your surgical site will be marked or identified.    Contact a health care provider if you:  Develop a fever of more than 100.4°F (38°C) or other feelings of illness during the 48 hours before your surgery.  Have symptoms that get worse.  Have questions or concerns about your surgery.  Summary  Preparing for surgery can make the procedure go more smoothly and lower your risk of complications.  Before surgery, make a list of  questions and concerns to discuss with your surgeon. Ask about the risks and possible complications.  In the days or weeks before your surgery, follow all instructions from your health care provider. You may need to stop smoking, avoid alcohol, follow eating restrictions, and change or stop your regular medicines.  Contact your surgeon if you develop a fever or other signs of illness during the few days before your surgery.  This information is not intended to replace advice given to you by your health care provider. Make sure you discuss any questions you have with your health care provider.  Document Revised: 12/21/2018 Document Reviewed: 10/23/2018  Elsevier Patient Education © 2021 [employer]

## 2023-06-12 ENCOUNTER — OFFICE VISIT (OUTPATIENT)
Dept: INTERNAL MEDICINE | Facility: CLINIC | Age: 58
End: 2023-06-12
Payer: MEDICARE

## 2023-06-12 VITALS
SYSTOLIC BLOOD PRESSURE: 140 MMHG | HEIGHT: 61 IN | DIASTOLIC BLOOD PRESSURE: 82 MMHG | TEMPERATURE: 97.6 F | WEIGHT: 179 LBS | OXYGEN SATURATION: 100 % | HEART RATE: 88 BPM | BODY MASS INDEX: 33.79 KG/M2

## 2023-06-12 DIAGNOSIS — E66.09 CLASS 1 OBESITY DUE TO EXCESS CALORIES WITH SERIOUS COMORBIDITY AND BODY MASS INDEX (BMI) OF 33.0 TO 33.9 IN ADULT: ICD-10-CM

## 2023-06-12 DIAGNOSIS — D50.8 OTHER IRON DEFICIENCY ANEMIA: ICD-10-CM

## 2023-06-12 DIAGNOSIS — Z90.13 S/P BILATERAL MASTECTOMY: ICD-10-CM

## 2023-06-12 DIAGNOSIS — I10 ESSENTIAL HYPERTENSION: Primary | ICD-10-CM

## 2023-06-12 DIAGNOSIS — Z17.0 MALIGNANT NEOPLASM OF LOWER-INNER QUADRANT OF LEFT BREAST IN FEMALE, ESTROGEN RECEPTOR POSITIVE: ICD-10-CM

## 2023-06-12 DIAGNOSIS — C50.312 MALIGNANT NEOPLASM OF LOWER-INNER QUADRANT OF LEFT BREAST IN FEMALE, ESTROGEN RECEPTOR POSITIVE: ICD-10-CM

## 2023-06-12 DIAGNOSIS — Z85.3 HISTORY OF MALIGNANT NEOPLASM OF BREAST: ICD-10-CM

## 2023-06-12 DIAGNOSIS — E78.00 ELEVATED CHOLESTEROL: ICD-10-CM

## 2023-06-12 PROBLEM — J45.901 CHRONIC ASTHMA WITH ACUTE EXACERBATION: Status: RESOLVED | Noted: 2019-10-27 | Resolved: 2023-06-12

## 2023-06-12 PROBLEM — S83.242A TRAUMATIC TEAR OF MEDIAL MENISCUS OF KNEE, LEFT, INITIAL ENCOUNTER: Status: RESOLVED | Noted: 2018-11-11 | Resolved: 2023-06-12

## 2023-06-12 PROBLEM — J45.901 MODERATE ASTHMA WITH EXACERBATION: Status: RESOLVED | Noted: 2022-05-10 | Resolved: 2023-06-12

## 2023-06-12 PROBLEM — Z98.890 POST-OPERATIVE STATE: Status: RESOLVED | Noted: 2021-09-14 | Resolved: 2023-06-12

## 2023-06-12 PROBLEM — J06.9 ACUTE URI: Status: RESOLVED | Noted: 2020-12-09 | Resolved: 2023-06-12

## 2023-06-12 PROBLEM — Z23 COVID-19 VACCINE ADMINISTERED: Status: RESOLVED | Noted: 2021-11-09 | Resolved: 2023-06-12

## 2023-06-12 PROBLEM — E66.3 OVERWEIGHT (BMI 25.0-29.9): Status: RESOLVED | Noted: 2018-02-26 | Resolved: 2023-06-12

## 2023-06-12 PROBLEM — Z83.71 FAMILY HISTORY OF POLYPS IN THE COLON: Status: RESOLVED | Noted: 2017-05-04 | Resolved: 2023-06-12

## 2023-06-12 PROBLEM — Z83.719 FAMILY HISTORY OF POLYPS IN THE COLON: Status: RESOLVED | Noted: 2017-05-04 | Resolved: 2023-06-12

## 2023-06-12 PROBLEM — K21.9 GASTROESOPHAGEAL REFLUX DISEASE: Status: RESOLVED | Noted: 2022-09-06 | Resolved: 2023-06-12

## 2023-06-12 PROBLEM — E66.811 CLASS 1 OBESITY DUE TO EXCESS CALORIES WITH SERIOUS COMORBIDITY AND BODY MASS INDEX (BMI) OF 33.0 TO 33.9 IN ADULT: Status: ACTIVE | Noted: 2023-06-12

## 2023-06-12 PROBLEM — Z78.0 POSTMENOPAUSAL STATUS: Status: RESOLVED | Noted: 2020-06-18 | Resolved: 2023-06-12

## 2023-06-12 PROCEDURE — 1159F MED LIST DOCD IN RCRD: CPT | Performed by: INTERNAL MEDICINE

## 2023-06-12 PROCEDURE — 3079F DIAST BP 80-89 MM HG: CPT | Performed by: INTERNAL MEDICINE

## 2023-06-12 PROCEDURE — 3077F SYST BP >= 140 MM HG: CPT | Performed by: INTERNAL MEDICINE

## 2023-06-12 PROCEDURE — 1160F RVW MEDS BY RX/DR IN RCRD: CPT | Performed by: INTERNAL MEDICINE

## 2023-06-12 PROCEDURE — 99214 OFFICE O/P EST MOD 30 MIN: CPT | Performed by: INTERNAL MEDICINE

## 2023-06-12 NOTE — PROGRESS NOTES
"      Chief Complaint  Hypertension (6 month follow up )    Subjective        Luna Cruz presents to [facility]    HPI    Patient presents for follow-up.  Patient has a history of hypertension.  The patient overall is doing well.  The patient indicates that she does occasionally check her blood pressure at home.  She indicates that it is 120s and fluctuates often.  When she is at OB/GYN here recently she was noted to have a blood pressure of 180 systolic.    Patient does follow with oncology.  Patient had an original breast cancer, and then subsequently had a recurrence.  Patient had a double mastectomy, and is subsequently had reconstruction.  Patient follows with oncology and is doing well.  Oncology also follows the patient for iron deficiency anemia.  These labs were reviewed.    Review of Systems    Objective   Vital Signs:  /82 (BP Location: Left arm, Patient Position: Sitting, Cuff Size: Adult) Comment: has not had medication this morning  Pulse 88   Temp 97.6 °F (36.4 °C) (Temporal)   Ht 154.9 cm (61\")   Wt 81.2 kg (179 lb)   SpO2 100%   BMI 33.82 kg/m²   Estimated body mass index is 33.82 kg/m² as calculated from the following:    Height as of this encounter: 154.9 cm (61\").    Weight as of this encounter: 81.2 kg (179 lb).      Physical Exam  Vitals reviewed.   Constitutional:       Appearance: She is not ill-appearing.   HENT:      Head: Normocephalic and atraumatic.   Eyes:      General: No scleral icterus.  Cardiovascular:      Rate and Rhythm: Normal rate and regular rhythm.   Pulmonary:      Effort: Pulmonary effort is normal. No respiratory distress.   Skin:     General: Skin is warm and dry.      Coloration: Skin is not pale.   Neurological:      General: No focal deficit present.      Mental Status: She is alert and oriented to person, place, and time.   Psychiatric:         Mood and Affect: Mood normal.         Behavior: Behavior normal.    "                Assessment and Plan   Diagnoses and all orders for this visit:    1. Essential hypertension (Primary)    2. Class 1 obesity due to excess calories with serious comorbidity and body mass index (BMI) of 33.0 to 33.9 in adult    3. Elevated cholesterol    4. S/P bilateral mastectomy    5. Malignant neoplasm of lower-inner quadrant of left breast in female, estrogen receptor positive    6. Other iron deficiency anemia    7. History of malignant neoplasm of breast      BP a little up did not take medication this morning as of yet.  Will take when she gets home.      Patients weight stable.  Discussed portion control and exercise.  I think patient would benefit from continuing this.      Patient has a history of hyperlipidemia.  The patient is taking Lipitor, tolerating this medication well.    Patient is status post bilateral mastectomy.  Patient had breast cancer and then had recurrence of carcinoma in situ.  Patient has had bilateral mastectomy and then subsequent reconstruction.  The patient follows with neurology.  Patient also follows with oncology for her iron deficiency anemia.  Patient had 10 years of tamoxifen therapy.  Patient overall is stable and has close follow-up with oncology.     Result Review : First time seeing the patient, reviewed consultant notes as well as labs from last year.   The following data was reviewed by: Chaz Gaines MD on 06/12/2023:  CMP          1/4/2023    16:05 4/25/2023    10:21 6/7/2023    13:19   CMP   Glucose 90  90  83    BUN 11  14  13    Creatinine 0.85  1.12  0.96    EGFR 79.5  57.1  68.7    Sodium 140  141  138    Potassium 3.9  4.5  4.1    Chloride 105  105  102    Calcium 8.4  9.6  9.7    Total Protein 6.8  6.4     Albumin 4.1  4.4     Globulin 2.7  2.0     Total Bilirubin 0.3  0.4     Alkaline Phosphatase 103  100     AST (SGOT) 22  20     ALT (SGPT) 19  21     Albumin/Globulin Ratio 1.5  2.2     BUN/Creatinine Ratio 12.9  12.5  13.5    Anion Gap 10.0   13.0  11.0      CBC          1/4/2023    16:05 4/25/2023    10:21 6/7/2023    13:19   CBC   WBC 8.76  12.82  8.39    RBC 5.35  5.10  5.26    Hemoglobin 14.6  14.2  14.9    Hematocrit 47.4  46.3  46.9    MCV 88.6  90.8  89.2    MCH 27.3  27.8  28.3    MCHC 30.8  30.7  31.8    RDW 14.5  14.4  13.7    Platelets 404  361  389      CBC w/diff          1/4/2023    16:05 4/25/2023    10:21 6/7/2023    13:19   CBC w/Diff   WBC 8.76  12.82  8.39    RBC 5.35  5.10  5.26    Hemoglobin 14.6  14.2  14.9    Hematocrit 47.4  46.3  46.9    MCV 88.6  90.8  89.2    MCH 27.3  27.8  28.3    MCHC 30.8  30.7  31.8    RDW 14.5  14.4  13.7    Platelets 404  361  389    Neutrophil Rel % 61.2  80.8  67.2    Immature Granulocyte Rel % 0.3  0.4  0.5    Lymphocyte Rel % 23.7  10.0  22.8    Monocyte Rel % 8.1  5.3  5.8    Eosinophil Rel % 5.7  3.0  2.7    Basophil Rel % 1.0  0.5  1.0      Lipid Panel          12/14/2022    06:36   Lipid Panel   Total Cholesterol 183    Triglycerides 164    HDL Cholesterol 50    VLDL Cholesterol 29    LDL Cholesterol  104      TSH          12/14/2022    06:36   TSH   TSH 4.260      BMP          1/4/2023    16:05 4/25/2023    10:21 6/7/2023    13:19   BMP   BUN 11  14  13    Creatinine 0.85  1.12  0.96    Sodium 140  141  138    Potassium 3.9  4.5  4.1    Chloride 105  105  102    CO2 25.0  23.0  25.0    Calcium 8.4  9.6  9.7                          Follow Up   Return in about 6 months (around 12/13/2023), or if symptoms worsen or fail to improve, for Medicare Wellness, around 6 months.  Patient was given instructions and counseling regarding her condition or for health maintenance advice. Please see specific information pulled into the AVS if appropriate.       CELESTINE Gaines MD, FACP, [facility]      Electronically signed by Chaz Gaines MD, 06/12/23, 7:47 AM CDT.

## 2023-06-13 ENCOUNTER — ANESTHESIA EVENT (OUTPATIENT)
Dept: PERIOP | Facility: HOSPITAL | Age: 58
End: 2023-06-13
Payer: MEDICARE

## 2023-06-14 ENCOUNTER — HOSPITAL ENCOUNTER (OUTPATIENT)
Facility: HOSPITAL | Age: 58
Setting detail: HOSPITAL OUTPATIENT SURGERY
Discharge: HOME | End: 2023-06-14
Attending: OBSTETRICS & GYNECOLOGY | Admitting: OBSTETRICS & GYNECOLOGY
Payer: MEDICARE

## 2023-06-14 ENCOUNTER — ANESTHESIA (OUTPATIENT)
Dept: PERIOP | Facility: HOSPITAL | Age: 58
End: 2023-06-14
Payer: MEDICARE

## 2023-06-14 VITALS
TEMPERATURE: 97.4 F | SYSTOLIC BLOOD PRESSURE: 154 MMHG | DIASTOLIC BLOOD PRESSURE: 81 MMHG | OXYGEN SATURATION: 94 % | RESPIRATION RATE: 18 BRPM | HEART RATE: 67 BPM

## 2023-06-14 DIAGNOSIS — N95.0 POSTMENOPAUSAL BLEEDING: ICD-10-CM

## 2023-06-14 PROCEDURE — 88305 TISSUE EXAM BY PATHOLOGIST: CPT | Performed by: OBSTETRICS & GYNECOLOGY

## 2023-06-14 PROCEDURE — 25010000002 ONDANSETRON PER 1 MG: Performed by: NURSE ANESTHETIST, CERTIFIED REGISTERED

## 2023-06-14 PROCEDURE — 25010000002 FENTANYL CITRATE (PF) 100 MCG/2ML SOLUTION: Performed by: NURSE ANESTHETIST, CERTIFIED REGISTERED

## 2023-06-14 PROCEDURE — 58558 HYSTEROSCOPY BIOPSY: CPT | Performed by: OBSTETRICS & GYNECOLOGY

## 2023-06-14 PROCEDURE — 25010000002 MIDAZOLAM PER 1 MG: Performed by: NURSE ANESTHETIST, CERTIFIED REGISTERED

## 2023-06-14 PROCEDURE — 25010000002 PROPOFOL 10 MG/ML EMULSION: Performed by: NURSE ANESTHETIST, CERTIFIED REGISTERED

## 2023-06-14 PROCEDURE — 25010000002 DEXAMETHASONE PER 1 MG: Performed by: NURSE ANESTHETIST, CERTIFIED REGISTERED

## 2023-06-14 PROCEDURE — 25010000002 DROPERIDOL PER 5 MG: Performed by: NURSE ANESTHETIST, CERTIFIED REGISTERED

## 2023-06-14 RX ORDER — LIDOCAINE HYDROCHLORIDE 20 MG/ML
INJECTION, SOLUTION EPIDURAL; INFILTRATION; INTRACAUDAL; PERINEURAL AS NEEDED
Status: DISCONTINUED | OUTPATIENT
Start: 2023-06-14 | End: 2023-06-14 | Stop reason: SURG

## 2023-06-14 RX ORDER — ONDANSETRON 2 MG/ML
4 INJECTION INTRAMUSCULAR; INTRAVENOUS ONCE AS NEEDED
Status: DISCONTINUED | OUTPATIENT
Start: 2023-06-14 | End: 2023-06-14 | Stop reason: HOSPADM

## 2023-06-14 RX ORDER — DEXAMETHASONE SODIUM PHOSPHATE 4 MG/ML
4 INJECTION, SOLUTION INTRA-ARTICULAR; INTRALESIONAL; INTRAMUSCULAR; INTRAVENOUS; SOFT TISSUE ONCE AS NEEDED
Status: COMPLETED | OUTPATIENT
Start: 2023-06-14 | End: 2023-06-14

## 2023-06-14 RX ORDER — HYDROCODONE BITARTRATE AND ACETAMINOPHEN 10; 325 MG/1; MG/1
1 TABLET ORAL EVERY 4 HOURS PRN
Status: DISCONTINUED | OUTPATIENT
Start: 2023-06-14 | End: 2023-06-14 | Stop reason: HOSPADM

## 2023-06-14 RX ORDER — SODIUM CHLORIDE 0.9 % (FLUSH) 0.9 %
3 SYRINGE (ML) INJECTION EVERY 12 HOURS SCHEDULED
Status: DISCONTINUED | OUTPATIENT
Start: 2023-06-14 | End: 2023-06-14 | Stop reason: HOSPADM

## 2023-06-14 RX ORDER — SODIUM CHLORIDE 0.9 % (FLUSH) 0.9 %
1-10 SYRINGE (ML) INJECTION AS NEEDED
Status: DISCONTINUED | OUTPATIENT
Start: 2023-06-14 | End: 2023-06-14 | Stop reason: HOSPADM

## 2023-06-14 RX ORDER — IBUPROFEN 600 MG/1
600 TABLET ORAL ONCE AS NEEDED
Status: DISCONTINUED | OUTPATIENT
Start: 2023-06-14 | End: 2023-06-14 | Stop reason: HOSPADM

## 2023-06-14 RX ORDER — SODIUM CHLORIDE 0.9 % (FLUSH) 0.9 %
3 SYRINGE (ML) INJECTION AS NEEDED
Status: DISCONTINUED | OUTPATIENT
Start: 2023-06-14 | End: 2023-06-14 | Stop reason: HOSPADM

## 2023-06-14 RX ORDER — SODIUM CHLORIDE 0.9 % (FLUSH) 0.9 %
10 SYRINGE (ML) INJECTION EVERY 12 HOURS SCHEDULED
Status: DISCONTINUED | OUTPATIENT
Start: 2023-06-14 | End: 2023-06-14 | Stop reason: HOSPADM

## 2023-06-14 RX ORDER — LABETALOL HYDROCHLORIDE 5 MG/ML
5 INJECTION, SOLUTION INTRAVENOUS
Status: DISCONTINUED | OUTPATIENT
Start: 2023-06-14 | End: 2023-06-14 | Stop reason: HOSPADM

## 2023-06-14 RX ORDER — FENTANYL CITRATE 50 UG/ML
INJECTION, SOLUTION INTRAMUSCULAR; INTRAVENOUS AS NEEDED
Status: DISCONTINUED | OUTPATIENT
Start: 2023-06-14 | End: 2023-06-14 | Stop reason: SURG

## 2023-06-14 RX ORDER — PROPOFOL 10 MG/ML
VIAL (ML) INTRAVENOUS AS NEEDED
Status: DISCONTINUED | OUTPATIENT
Start: 2023-06-14 | End: 2023-06-14 | Stop reason: SURG

## 2023-06-14 RX ORDER — MIDAZOLAM HYDROCHLORIDE 1 MG/ML
1 INJECTION INTRAMUSCULAR; INTRAVENOUS
Status: DISCONTINUED | OUTPATIENT
Start: 2023-06-14 | End: 2023-06-14 | Stop reason: HOSPADM

## 2023-06-14 RX ORDER — OXYCODONE AND ACETAMINOPHEN 10; 325 MG/1; MG/1
1 TABLET ORAL ONCE AS NEEDED
Status: COMPLETED | OUTPATIENT
Start: 2023-06-14 | End: 2023-06-14

## 2023-06-14 RX ORDER — SCOLOPAMINE TRANSDERMAL SYSTEM 1 MG/1
1 PATCH, EXTENDED RELEASE TRANSDERMAL ONCE
Status: DISCONTINUED | OUTPATIENT
Start: 2023-06-14 | End: 2023-06-14 | Stop reason: HOSPADM

## 2023-06-14 RX ORDER — DROPERIDOL 2.5 MG/ML
0.62 INJECTION, SOLUTION INTRAMUSCULAR; INTRAVENOUS ONCE AS NEEDED
Status: COMPLETED | OUTPATIENT
Start: 2023-06-14 | End: 2023-06-14

## 2023-06-14 RX ORDER — SODIUM CHLORIDE 9 MG/ML
100 INJECTION, SOLUTION INTRAVENOUS CONTINUOUS
Status: DISCONTINUED | OUTPATIENT
Start: 2023-06-14 | End: 2023-06-14 | Stop reason: HOSPADM

## 2023-06-14 RX ORDER — FENTANYL CITRATE 50 UG/ML
25 INJECTION, SOLUTION INTRAMUSCULAR; INTRAVENOUS
Status: DISCONTINUED | OUTPATIENT
Start: 2023-06-14 | End: 2023-06-14 | Stop reason: HOSPADM

## 2023-06-14 RX ORDER — NALOXONE HCL 0.4 MG/ML
0.4 VIAL (ML) INJECTION AS NEEDED
Status: DISCONTINUED | OUTPATIENT
Start: 2023-06-14 | End: 2023-06-14 | Stop reason: HOSPADM

## 2023-06-14 RX ORDER — SODIUM CHLORIDE 0.9 % (FLUSH) 0.9 %
3-10 SYRINGE (ML) INJECTION AS NEEDED
Status: DISCONTINUED | OUTPATIENT
Start: 2023-06-14 | End: 2023-06-14 | Stop reason: HOSPADM

## 2023-06-14 RX ORDER — SODIUM CHLORIDE, SODIUM LACTATE, POTASSIUM CHLORIDE, CALCIUM CHLORIDE 600; 310; 30; 20 MG/100ML; MG/100ML; MG/100ML; MG/100ML
1000 INJECTION, SOLUTION INTRAVENOUS CONTINUOUS
Status: DISCONTINUED | OUTPATIENT
Start: 2023-06-14 | End: 2023-06-14 | Stop reason: HOSPADM

## 2023-06-14 RX ORDER — MAGNESIUM HYDROXIDE 1200 MG/15ML
LIQUID ORAL AS NEEDED
Status: DISCONTINUED | OUTPATIENT
Start: 2023-06-14 | End: 2023-06-14 | Stop reason: HOSPADM

## 2023-06-14 RX ORDER — SODIUM CHLORIDE, SODIUM LACTATE, POTASSIUM CHLORIDE, CALCIUM CHLORIDE 600; 310; 30; 20 MG/100ML; MG/100ML; MG/100ML; MG/100ML
100 INJECTION, SOLUTION INTRAVENOUS CONTINUOUS
Status: DISCONTINUED | OUTPATIENT
Start: 2023-06-14 | End: 2023-06-14 | Stop reason: HOSPADM

## 2023-06-14 RX ORDER — SODIUM CHLORIDE 9 MG/ML
40 INJECTION, SOLUTION INTRAVENOUS AS NEEDED
Status: DISCONTINUED | OUTPATIENT
Start: 2023-06-14 | End: 2023-06-14 | Stop reason: HOSPADM

## 2023-06-14 RX ORDER — FLUMAZENIL 0.1 MG/ML
0.2 INJECTION INTRAVENOUS AS NEEDED
Status: DISCONTINUED | OUTPATIENT
Start: 2023-06-14 | End: 2023-06-14 | Stop reason: HOSPADM

## 2023-06-14 RX ORDER — TRAMADOL HYDROCHLORIDE 50 MG/1
50 TABLET ORAL EVERY 6 HOURS PRN
Qty: 3 TABLET | Refills: 0 | Status: SHIPPED | OUTPATIENT
Start: 2023-06-14

## 2023-06-14 RX ORDER — ONDANSETRON 2 MG/ML
INJECTION INTRAMUSCULAR; INTRAVENOUS AS NEEDED
Status: DISCONTINUED | OUTPATIENT
Start: 2023-06-14 | End: 2023-06-14 | Stop reason: SURG

## 2023-06-14 RX ORDER — LIDOCAINE HYDROCHLORIDE 10 MG/ML
0.5 INJECTION, SOLUTION EPIDURAL; INFILTRATION; INTRACAUDAL; PERINEURAL ONCE AS NEEDED
Status: DISCONTINUED | OUTPATIENT
Start: 2023-06-14 | End: 2023-06-14 | Stop reason: HOSPADM

## 2023-06-14 RX ORDER — HYDROCODONE BITARTRATE AND ACETAMINOPHEN 5; 325 MG/1; MG/1
1 TABLET ORAL ONCE AS NEEDED
Status: DISCONTINUED | OUTPATIENT
Start: 2023-06-14 | End: 2023-06-14 | Stop reason: HOSPADM

## 2023-06-14 RX ADMIN — DEXAMETHASONE SODIUM PHOSPHATE 4 MG: 4 INJECTION INTRA-ARTICULAR; INTRALESIONAL; INTRAMUSCULAR; INTRAVENOUS; SOFT TISSUE at 08:15

## 2023-06-14 RX ADMIN — OXYCODONE AND ACETAMINOPHEN 1 TABLET: 10; 325 TABLET ORAL at 09:11

## 2023-06-14 RX ADMIN — FENTANYL CITRATE 50 MCG: 50 INJECTION, SOLUTION INTRAMUSCULAR; INTRAVENOUS at 08:37

## 2023-06-14 RX ADMIN — SCOPALAMINE 1 PATCH: 1 PATCH, EXTENDED RELEASE TRANSDERMAL at 08:15

## 2023-06-14 RX ADMIN — PROPOFOL INJECTABLE EMULSION 50 MG: 10 INJECTION, EMULSION INTRAVENOUS at 08:23

## 2023-06-14 RX ADMIN — LIDOCAINE HYDROCHLORIDE 80 MG: 20 INJECTION, SOLUTION EPIDURAL; INFILTRATION; INTRACAUDAL; PERINEURAL at 08:22

## 2023-06-14 RX ADMIN — MIDAZOLAM 1 MG: 1 INJECTION INTRAMUSCULAR; INTRAVENOUS at 08:14

## 2023-06-14 RX ADMIN — SODIUM CHLORIDE, POTASSIUM CHLORIDE, SODIUM LACTATE AND CALCIUM CHLORIDE 1000 ML: 600; 310; 30; 20 INJECTION, SOLUTION INTRAVENOUS at 06:39

## 2023-06-14 RX ADMIN — ONDANSETRON 4 MG: 2 INJECTION INTRAMUSCULAR; INTRAVENOUS at 08:33

## 2023-06-14 RX ADMIN — DROPERIDOL 0.62 MG: 2.5 INJECTION, SOLUTION INTRAMUSCULAR; INTRAVENOUS at 09:11

## 2023-06-14 RX ADMIN — PROPOFOL INJECTABLE EMULSION 150 MG: 10 INJECTION, EMULSION INTRAVENOUS at 08:22

## 2023-06-14 RX ADMIN — FENTANYL CITRATE 50 MCG: 50 INJECTION, SOLUTION INTRAMUSCULAR; INTRAVENOUS at 08:29

## 2023-06-14 NOTE — ANESTHESIA POSTPROCEDURE EVALUATION
Patient: Luna Cruz    Procedure Summary       Date: 06/14/23 Room / Location:  [facility] OR  /  [facility] OR    Anesthesia Start: 0820 Anesthesia Stop: 0851    Procedure: DILATATION AND CURETTAGE HYSTEROSCOPY (Vagina) Diagnosis:       Postmenopausal bleeding      (Postmenopausal bleeding [N95.0])    Surgeons: Marcello Salas MD Provider: Danni Simons CRNA    Anesthesia Type: general ASA Status: 3            Anesthesia Type: general    Vitals  Vitals Value Taken Time   /70 06/14/23 0929   Temp 97.4 °F (36.3 °C) 06/14/23 0849   Pulse 72 06/14/23 0929   Resp 18 06/14/23 0929   SpO2 94 % 06/14/23 0929           Post Anesthesia Care and Evaluation    Patient location during evaluation: PACU  Patient participation: complete - patient participated  Level of consciousness: awake and alert  Pain management: adequate    Airway patency: patent  Anesthetic complications: No anesthetic complications    Cardiovascular status: acceptable  Respiratory status: acceptable  Hydration status: acceptable    Comments: Blood pressure 129/64, pulse 67, temperature 97.4 °F (36.3 °C), temperature source Temporal, resp. rate 18, SpO2 94 %, not currently breastfeeding.    Pt discharged from PACU based on brown score >8

## 2023-06-14 NOTE — ANESTHESIA PREPROCEDURE EVALUATION
Anesthesia Evaluation     Patient summary reviewed and Nursing notes reviewed   history of anesthetic complications: PONV  NPO Solid Status: > 8 hours  NPO Liquid Status: > 8 hours           Airway   Mallampati: II  TM distance: >3 FB  Neck ROM: full  Possible difficult intubation  Dental          Pulmonary    (+) asthma (uses inhalers regularly),shortness of breath (asthma related),   (-) not a smoker  Cardiovascular   Exercise tolerance: poor (<4 METS)    (+) hypertension, hyperlipidemia,   (-) past MI, CAD    ROS comment: Low risk stress test 7/2020      Neuro/Psych  (-) seizures, TIA, CVA  GI/Hepatic/Renal/Endo    (+) obesity,  GERD well controlled,  liver disease fatty liver disease, thyroid problem   (-) no renal disease, diabetes    Musculoskeletal     Abdominal    Substance History      OB/GYN    (-)  Pregnant        Other   arthritis,    history of cancer (breast cancer) remission                      Anesthesia Plan    ASA 3     general     intravenous induction     Anesthetic plan, risks, benefits, and alternatives have been provided, discussed and informed consent has been obtained with: patient.    Plan discussed with CRNA.

## 2023-06-14 NOTE — ANESTHESIA PROCEDURE NOTES
Airway  Urgency: elective    Date/Time: 6/14/2023 8:25 AM  Airway not difficult    General Information and Staff    Patient location during procedure: OR  CRNA/CAA: Danni Simons CRNA    Indications and Patient Condition  Indications for airway management: airway protection    Preoxygenated: yes  Mask difficulty assessment: 1 - vent by mask    Final Airway Details  Final airway type: supraglottic airway      Successful airway: I-gel  Size 3     Number of attempts at approach: 1  Assessment: lips, teeth, and gum same as pre-op and atraumatic intubation

## 2023-06-14 NOTE — OP NOTE
[facility]  Luna Cruz  : 1965  MRN: [id number]  [facility]: [id number]  Date: 2023    Operative Note         Pre-op Diagnosis:  Postmenopausal bleeding [N95.0]   Post-op Diagnosis:  Post-Op Diagnosis Codes:     * Postmenopausal bleeding [N95.0]   Procedure: Procedure(s):  DILATATION AND CURETTAGE HYSTEROSCOPY   Surgeon: Surgeon(s):  Marcello Salas MD       Anesthesia: General      Estimated Blood Loss: Minimal   mls   Fluids: 200   mls   UOP: 50   mls   Drains: None   ABx: None     Specimens:  Endometrial curettings   Findings: Benign atrophic appearing endometrial cavity without lesions   Complications: None      INDICATION: Luna Cruz is a 57 y.o. female  with postmenopausal bleeding.  She has considered treatment options and has decided on surgery.    PROCEDURE IN DETAIL: After informed consent was obtained, the patient was taken to the operating room, where general anesthesia was administered. She was placed in the lithotomy position in [facility], and her perineum and vagina were prepped and draped in normal sterile fashion. An open sided speculum was placed in the vagina and her bladder drained with a metal catheter. The anterior lip of the cervix was visualized and grasped with a single-tooth tenaculum.  The cervix was dilated with Hegar dilators to #7, to accept a diagnostic hysteroscope.  Normal saline was used for distention and the cavity was inspected showing no polyp.  The tubal ostia were both identified.  The hysteroscope was removed.  A small size sharp curette was then placed in the uterus and a gritty texture was noted in all 4 quadrants.  The scant curettings were collected on a Telfa pad.  The cervix was then observed and no active bleeding was noted.  The tenaculum was removed and the cervix was made hemostatic with a silver nitrate stick.  All instruments were then removed from the vagina.   The patient was then awakened and taken to the recovery room in  stable condition.  She tolerated the procedure well and without complications.  All sponge needle and instrument counts were correct times 3 per the OR staff.    Marcello Salas MD   6/14/2023  09:07 CDT

## 2023-06-15 LAB
CYTO UR: NORMAL
LAB AP CASE REPORT: NORMAL
Lab: NORMAL
PATH REPORT.FINAL DX SPEC: NORMAL
PATH REPORT.GROSS SPEC: NORMAL

## 2023-08-09 NOTE — PROGRESS NOTES
"Chief Complaint  Gynecologic Exam (Pt is here for a f/u with US.  Pt has no complaints. )    Subjective          Luna Kriss Cruz presents to [facility]  History of Present Illness    The patient presents for follow-up on ultrasound ovarian cyst, repeat.    She did not take her blood pressure medication today.    She has had pain intermittently on the left side right near waist sometimes lasting for longer periods of time.  She had breast reconstruction and herniorrhaphy and still has intermittent pain from it.  She inquired if the cyst was causing her left-sided pain.  She admits to having a bowel movement before her visit.  Her bowels move every 2 days.  She denies hard stool.  She drinks more Pepsi than water.  She denies bloating, gas, and edema.  She denies any medication changes.  She takes pain medication for her back pain.    Review of Systems   Constitutional: Negative for activity change, appetite change, fatigue and fever.   Respiratory: Negative for apnea and shortness of breath.    Cardiovascular: Negative for chest pain and palpitations.   Gastrointestinal: Negative for abdominal distention, abdominal pain, constipation, diarrhea, nausea and vomiting.   Endocrine: Negative for cold intolerance and heat intolerance.   Genitourinary: Negative for difficulty urinating, frequency, menstrual problem, pelvic pain, vaginal discharge and vaginal pain.   Neurological: Negative for headaches.   Psychiatric/Behavioral: Negative for agitation and sleep disturbance.         Objective   Vital Signs:   /88 Comment: pt has not taken meds today  Ht 157.5 cm (62\")   Wt 79.8 kg (176 lb)   BMI 32.19 kg/m²     Physical Exam  Vitals reviewed.   Constitutional:       Appearance: She is well-developed.   Eyes:      General:         Right eye: No discharge.         Left eye: No discharge.   Cardiovascular:      Rate and Rhythm: Normal rate and regular rhythm.   Pulmonary:      Effort: " Pulmonary effort is normal.      Breath sounds: Normal breath sounds.   Skin:     General: Skin is warm.   Neurological:      Mental Status: She is alert and oriented to person, place, and time.   Psychiatric:         Behavior: Behavior normal.         Thought Content: Thought content normal.         Judgment: Judgment normal.         Result Review :                       Assessment and Plan        Reviewed US report and discussed with pt.   US report indicates uterus 5.50 cm, endometrial thickness 0.38 cm, simple left paraovarian cyst.     Ovarian cyst  Continue annual exam with repeat ultrasound    Reviewed case with Dr. Lopez.  Dr. Lopez reviewed images in the system, indicated no additional follow-up needed related to note on ultrasound report.  Pt denies questions or concerns regarding this.      Left-sided pain   Advised pt to follow up with primary care doctor.      Diagnoses and all orders for this visit:    1. Para-ovarian cyst (Primary)    2. History of tamoxifen therapy    3. History of malignant neoplasm of breast    4. Obesity (BMI 30-39.9)          BMI is >= 30 and <35. (Class 1 Obesity). The following options were offered after discussion;: weight loss educational material (shared in after visit summary)       Follow Up   Return for US and OV, Annual physical.    Patient was given instructions and counseling regarding her condition or for health maintenance advice. Please see specific information pulled into the AVS if appropriate.     Transcribed from ambient dictation for HEATHER Rachel by Annelise Armstrong.  03/15/23   13:57 CDT    Patient or patient representative verbalized consent to the visit recording.  I have personally performed the services described in this document as transcribed by the above individual, and it is both accurate and complete.  HEATHER Rachel  3/15/2023  21:09 CDT         no

## 2023-09-22 DIAGNOSIS — R13.19 ESOPHAGEAL DYSPHAGIA: ICD-10-CM

## 2023-09-22 DIAGNOSIS — K21.9 GASTROESOPHAGEAL REFLUX DISEASE: ICD-10-CM

## 2023-09-22 RX ORDER — ROPINIROLE 0.25 MG/1
TABLET, FILM COATED ORAL
Qty: 90 TABLET | Refills: 3 | Status: SHIPPED | OUTPATIENT
Start: 2023-09-22

## 2023-09-22 RX ORDER — POTASSIUM CHLORIDE 1500 MG/1
TABLET, EXTENDED RELEASE ORAL
Qty: 90 TABLET | Refills: 3 | Status: SHIPPED | OUTPATIENT
Start: 2023-09-22

## 2023-09-22 RX ORDER — PANTOPRAZOLE SODIUM 40 MG/1
40 TABLET, DELAYED RELEASE ORAL DAILY
Qty: 90 TABLET | Refills: 1 | Status: SHIPPED | OUTPATIENT
Start: 2023-09-22

## 2023-09-22 NOTE — TELEPHONE ENCOUNTER
Rx Refill Note  Requested Prescriptions     Pending Prescriptions Disp Refills    pantoprazole (PROTONIX) 40 MG EC tablet 90 tablet 1     Sig: Take 1 tablet by mouth Daily.      Last office visit with prescribing clinician: 2023     Last telemedicine visit with prescribing clinician: Visit date not found     Next office visit with prescribing clinician: Visit date not found     Pt left me a VM while I was at lunch stating she needs refills on her Pantoprazole to go to [facility]-the one they have on file is . I called her back and advised I would pend a script to Dr. Fox since Mayelin is already gone for the day-she only has a few days of medication left. I did remind her she was due for yearly OV after the  of the year-she wanted to go ahead and schedule that so I transferred her to [facility] to arrange. Pt advised to call me back with any further questions/problems.                            Would you like a call back once the refill request has been completed: [] Yes [] No    If the office needs to give you a call back, can they leave a voicemail: [] Yes [] No    Monica Quan MA  23, 12:53 CDT

## 2023-09-27 ENCOUNTER — TELEPHONE (OUTPATIENT)
Dept: PULMONOLOGY | Facility: CLINIC | Age: 58
End: 2023-09-27
Payer: MEDICARE

## 2023-09-27 RX ORDER — METHYLPREDNISOLONE 4 MG/1
TABLET ORAL
Qty: 21 TABLET | Refills: 0 | Status: SHIPPED | OUTPATIENT
Start: 2023-09-27

## 2023-09-27 RX ORDER — AZITHROMYCIN 250 MG/1
TABLET, FILM COATED ORAL
Qty: 6 TABLET | Refills: 0 | Status: SHIPPED | OUTPATIENT
Start: 2023-09-27

## 2023-09-27 NOTE — TELEPHONE ENCOUNTER
Patient states for the last 3 days she has had some head congestion and stuffy nose. She feels like it is draining down in to her chest. She is coughing and wheezing. Her mucus is clear to white with no fever or chills.   She is not aware of being around anyone that has been sick. She has not had any recent testing and has not been on any antibiotics or steroids for this problem.  She is on her routine Wixela twice a day, Albuterol 3-4 times a day, Azelastine NS, Flonase NS and Claritin.  Please advise.

## 2023-09-28 NOTE — TELEPHONE ENCOUNTER
Kevin Aguirre MD  You14 hours ago (6:27 PM)     SS  Please let her know I called in azithromycin or Z-Jaime and antibiotic with Medrol Dosepak for her in the pharmacy.  If she still continues to have symptoms after taking this medicine she should call us back and should get COVID testing done.  Thank you.       Left detailed voicemail for patient.

## 2023-10-01 DIAGNOSIS — J45.998 OTHER ASTHMA: ICD-10-CM

## 2023-10-02 RX ORDER — LEVOTHYROXINE SODIUM 0.05 MG/1
TABLET ORAL
Qty: 90 TABLET | Refills: 3 | Status: SHIPPED | OUTPATIENT
Start: 2023-10-02 | End: 2023-10-09 | Stop reason: SDUPTHER

## 2023-10-02 NOTE — TELEPHONE ENCOUNTER
Rx Refill Note  Requested Prescriptions     Pending Prescriptions Disp Refills    theophylline (UNIPHYL) 400 MG 24 hr tablet [Pharmacy Med Name: THEOPHYLLINE ER TABS 400MG] 90 tablet 3     Sig: TAKE 1 TABLET DAILY      Last office visit with prescribing clinician: 6/6/2023   Last telemedicine visit with prescribing clinician: Visit date not found   Next office visit with prescribing clinician: 12/5/2023                         Would you like a call back once the refill request has been completed: [] Yes [] No    If the office needs to give you a call back, can they leave a voicemail: [] Yes [] No    Naina Rosales MA  10/02/23, 09:15 CDT

## 2023-10-04 RX ORDER — LEVOTHYROXINE SODIUM 0.05 MG/1
50 TABLET ORAL DAILY
Qty: 90 TABLET | Refills: 3 | OUTPATIENT
Start: 2023-10-04

## 2023-10-04 RX ORDER — POTASSIUM CHLORIDE 1500 MG/1
20 TABLET, EXTENDED RELEASE ORAL DAILY
Qty: 90 TABLET | Refills: 3 | OUTPATIENT
Start: 2023-10-04

## 2023-10-04 NOTE — TELEPHONE ENCOUNTER
Caller: Luna Cruz Kriss    Relationship: Self    Best call back number: [phone]     Requested Prescriptions:   Requested Prescriptions     Pending Prescriptions Disp Refills    potassium chloride ER (K-TAB) 20 MEQ tablet controlled-release ER tablet 90 tablet 3     Sig: Take 1 tablet by mouth Daily.    levothyroxine (SYNTHROID, LEVOTHROID) 50 MCG tablet 90 tablet 3     Sig: Take 1 tablet by mouth Daily.        Pharmacy where request should be sent: [id number] [address] AT [facility] [address] - [phone] PH - [phone] FX     Last office visit with prescribing clinician: 6/12/2023   Last telemedicine visit with prescribing clinician: Visit date not found   Next office visit with prescribing clinician: 12/14/2023     Additional details provided by patient: PATIENT HAS ABOUT 5 DAYS LEFT OF MEDICATIONS    Does the patient have less than a 3 day supply:  [] Yes  [x] No    Would you like a call back once the refill request has been completed: [x] Yes [] No    If the office needs to give you a call back, can they leave a voicemail: [x] Yes [] No    Papito Patterson Rep   10/04/23 13:15 CDT

## 2023-10-09 RX ORDER — POTASSIUM CHLORIDE 1500 MG/1
20 TABLET, EXTENDED RELEASE ORAL DAILY
Qty: 90 TABLET | Refills: 0 | Status: SHIPPED | OUTPATIENT
Start: 2023-10-09

## 2023-10-09 RX ORDER — LEVOTHYROXINE SODIUM 0.05 MG/1
50 TABLET ORAL DAILY
Qty: 90 TABLET | Refills: 0 | Status: SHIPPED | OUTPATIENT
Start: 2023-10-09

## 2023-11-01 RX ORDER — THEOPHYLLINE 400 MG/1
TABLET, EXTENDED RELEASE ORAL
Qty: 90 TABLET | Refills: 3 | Status: SHIPPED | OUTPATIENT
Start: 2023-11-01

## 2023-11-01 NOTE — TELEPHONE ENCOUNTER
This refill request came through on 10/1/2023. Patient is just about out of refills and needs it sent to [facility]. Please sign order, thanks.   Rx Refill Note  Requested Prescriptions     Pending Prescriptions Disp Refills    theophylline (UNIPHYL) 400 MG 24 hr tablet [Pharmacy Med Name: THEOPHYLLINE ER TABS 400MG] 90 tablet 3     Sig: TAKE 1 TABLET DAILY      Last office visit with prescribing clinician: 6/6/2023   Last telemedicine visit with prescribing clinician: Visit date not found   Next office visit with prescribing clinician: 12/5/2023                         Would you like a call back once the refill request has been completed: [] Yes [] No    If the office needs to give you a call back, can they leave a voicemail: [] Yes [] No    Osmany Merino CMA  11/01/23, 09:56 CDT

## 2023-12-05 ENCOUNTER — OFFICE VISIT (OUTPATIENT)
Dept: PULMONOLOGY | Facility: CLINIC | Age: 58
End: 2023-12-05
Payer: MEDICARE

## 2023-12-05 VITALS
OXYGEN SATURATION: 98 % | HEART RATE: 104 BPM | DIASTOLIC BLOOD PRESSURE: 84 MMHG | WEIGHT: 175 LBS | BODY MASS INDEX: 33.04 KG/M2 | SYSTOLIC BLOOD PRESSURE: 120 MMHG | HEIGHT: 61 IN

## 2023-12-05 DIAGNOSIS — Z86.16 HISTORY OF COVID-19: ICD-10-CM

## 2023-12-05 DIAGNOSIS — Z78.9 NON-SMOKER: ICD-10-CM

## 2023-12-05 DIAGNOSIS — G25.81 RESTLESS LEGS SYNDROME (RLS): ICD-10-CM

## 2023-12-05 DIAGNOSIS — J45.40 MODERATE PERSISTENT ASTHMA WITHOUT COMPLICATION: Primary | ICD-10-CM

## 2023-12-05 DIAGNOSIS — J30.89 NON-SEASONAL ALLERGIC RHINITIS, UNSPECIFIED TRIGGER: ICD-10-CM

## 2023-12-05 DIAGNOSIS — J98.4 PULMONARY SCARRING: ICD-10-CM

## 2023-12-05 PROCEDURE — 99214 OFFICE O/P EST MOD 30 MIN: CPT | Performed by: INTERNAL MEDICINE

## 2023-12-05 PROCEDURE — 3074F SYST BP LT 130 MM HG: CPT | Performed by: INTERNAL MEDICINE

## 2023-12-05 PROCEDURE — 3079F DIAST BP 80-89 MM HG: CPT | Performed by: INTERNAL MEDICINE

## 2023-12-05 NOTE — PROGRESS NOTES
[facility] OUTPATIENT PROGRESS NOTE    Patient: Luna Cruz  : 1965  Age: 58 y.o.  Date of Service: 2023    REASON FOR CLINIC VISIT:  Chief Complaint   Patient presents with    Asthma       Subjective:    History of Present Illness:  Luna Cruz is a 58 y.o. female who presents to the office today to be seen for    Diagnosis Plan   1. Moderate persistent asthma without complication        2. Pulmonary scarring        3. Restless legs syndrome (RLS)        4. History of COVID-19        5. Non-smoker        6. Non-seasonal allergic rhinitis, unspecified trigger        .  Other problems per record.    History:  Patient is a very pleasant middle-aged  female was seen in the pulmonary clinic for a follow-up visit.    She is a non-smoker but had COVID infection in the past although she was vaccinated.  She did have FEV1 of 57% noted in the last PFT done a few months ago.  She is currently using Wixela and albuterol nebulizer and rescue inhaler for asthma and also she is using Astelin nasal spray fluticasone nasal spray loratadine and Accolate for her nasal allergy.  She has restless leg syndrome and she uses ropinirole or Requip.  She is also taking Theophylline.    She lives at home with her  and is currently disabled and retired.  She had no recent hospitalizations and ER visit and urgent care visit or any other new complaints.  She did not need any medication refills.  She is up-to-date on her vaccinations.      PFT done today:  Not done today    PFT Values          2023    10:00   Pre Drug PFT Results   FVC 62   FEV1 57   FEF 25-75% 43   FEV1/FVC 74   Other Tests PFT Results   TLC 70   RV 76   DLCO 84   D/VAsb 121     Results for orders placed in visit on 23    Pulmonary Function Test    Narrative  Pulmonary Function Test  Performed by: Sara Cramer, RRT  Authorized by: Kevin Aguirre MD    Pre Drug % Predicted  FVC: 62%  FEV1:  57%  FEF 25-75%: 43%  FEV1/FVC: 74%  T%  RV: 76%  DLCO: 84%  D/VAsb: 121%    Interpretation  Overall comments:  Above test results are acceptable and reproducible by ATS criteria.  From analysis of the above test results the patient showed evidence of moderate obstructive airway dysfunction.  No bronchodilator challenge was done.  The lung volumes are decreased supporting evidence of moderate restrictive dysfunction.  Diffusion capacity corrected for alveolar volume is above normal limits.    Comparison of the prior pulmonary function test done a few years ago there is no significant changes noted.  Clinical correlation was indicated.    Kevin Aguirre MD  Pulmonologist/Intensivist  2023 12:38 CDT      Results for orders placed in visit on 21    Pulmonary Function Test    Narrative  Pulmonary Function Test  Performed by: Sara Cramer, RRT  Authorized by: Kevin Aguirre MD    Pre Drug % Predicted  FVC: 69%  FEV1: 53%  FEF 25-75%: 24%  FEV1/FVC: 62.94%  T%  RV: 120%  DLCO: 88%  D/VAsb: 134%    Interpretation  Overall comments:  The above test results were acceptable and reproducible by ATS criteria.  From analysis of the above test results the patient showed evidence of moderate to severe obstructive airway dysfunction.  No bronchodilator challenge was done.  There was mild restrictive dysfunction noted from decreased TLC  Air trapping noted from increased residual volume  Patient capacity corrected for single breath was within normal limits  No prior test result was available for comparison  Clinical correlation was indicated.    Kevin Aguirre MD  Pulmonologist/Intensivist  2021 14:37 CST      Results for orders placed in visit on 10/29/19    Pulmonary Function Test    Narrative  Pulmonary Function Test  Performed by: Klaus Wagner APRN  Authorized by: Klaus Wagner APRN    Pre Drug  FVC: 62%  FEV1: 52%  FEF 25-75%: 31%  FEV1/FVC: 69.23%          Bronchodilator therapy: Wixela, Albuterol Nebulizer and rescue inhaler as needed . On Theophylline.    Smoking Status:   Social History     Tobacco Use   Smoking Status Never    Passive exposure: Never   Smokeless Tobacco Never     Pulm Rehab: no  Sleep: yes    Support System: lives with their spouse    Code Status:   There are no questions and answers to display.        Review of Systems:  A complete review of systems is performed and all other systems were reviewed and negative as note above in the HPI.  Review of Systems   Constitutional:  Positive for fatigue. Negative for fever.   HENT:  Positive for congestion, postnasal drip and sinus pressure.    Eyes: Negative.    Respiratory:  Positive for cough, chest tightness and shortness of breath.    Cardiovascular: Negative.    Gastrointestinal: Negative.    Endocrine: Negative.    Genitourinary: Negative.    Musculoskeletal:  Positive for arthralgias and back pain.   Allergic/Immunologic: Positive for environmental allergies.   Neurological: Negative.    Hematological: Negative.    Psychiatric/Behavioral: Negative.       CAT/ACT Score:  Not done today    Medications:  Outpatient Encounter Medications as of 12/5/2023   Medication Sig Dispense Refill    albuterol (PROVENTIL) (2.5 MG/3ML) 0.083% nebulizer solution USE 1 VIAL BY NEBULIZATION EVERY 4 HOURS AS NEEDED FOR WHEEZING 360 mL 5    albuterol sulfate  (90 Base) MCG/ACT inhaler USE 2 INHALATIONS EVERY 4 HOURS AS NEEDED FOR WHEEZING 25.5 g 3    alendronate (FOSAMAX) 70 MG tablet Take 1 tablet by mouth Every 7 (Seven) Days. Sunday's 12 tablet 3    aspirin 81 MG EC tablet Take 1 tablet by mouth Daily.      atorvastatin (LIPITOR) 40 MG tablet Take 1 tablet by mouth Daily. 90 tablet 3    azelastine (ASTELIN) 0.1 % nasal spray USE 2 SPRAYS INTO THE NOSTRIL(S)  TWICE A DAY AS DIRECTED BY PROVIDER 30 mL 13    azithromycin (ZITHROMAX) 250 MG tablet Take 2 by mouth today then 1 daily for 4 days 6 tablet 0     calcitriol (ROCALTROL) 0.5 MCG capsule Take 1 capsule by mouth Daily. 90 capsule 3    Calcium Citrate-Vitamin D (CALCIUM + D PO) Take 1 tablet by mouth Daily.      docusate sodium (COLACE) 100 MG capsule Take 1 capsule by mouth As Needed.      fluticasone (FLONASE) 50 MCG/ACT nasal spray USE 2 SPRAYS INTO THE NOSTRIL(S) DAILY AS DIRECTED BY PROVIDER 16 g 11    gabapentin (NEURONTIN) 100 MG capsule Take 1 capsule by mouth 3 (Three) Times a Day.      HYDROcodone-acetaminophen (NORCO) 5-325 MG per tablet Take 1 tablet by mouth 2 (Two) Times a Day As Needed for Moderate Pain.      ibuprofen (ADVIL,MOTRIN) 400 MG tablet Take 1 tablet by mouth Every 8 (Eight) Hours As Needed for Mild Pain.      levothyroxine (SYNTHROID, LEVOTHROID) 50 MCG tablet Take 1 tablet by mouth Daily. 90 tablet 0    loratadine (CLARITIN) 10 MG tablet TAKE 1 TABLET DAILY AS NEEDED FOR ALLERGIES 90 tablet 3    losartan (COZAAR) 100 MG tablet Take 1 tablet by mouth Daily. 90 tablet 3    methocarbamol (ROBAXIN) 750 MG tablet Take 1 tablet by mouth.      naloxone (NARCAN) 4 MG/0.1ML nasal spray 1 spray into the nostril(s) as directed by provider.      ondansetron ODT (ZOFRAN-ODT) 4 MG disintegrating tablet Place 1 tablet on the tongue As Needed.      pantoprazole (PROTONIX) 40 MG EC tablet Take 1 tablet by mouth Daily. 90 tablet 1    potassium chloride ER (K-TAB) 20 MEQ tablet controlled-release ER tablet Take 1 tablet by mouth Daily. 90 tablet 0    rOPINIRole (REQUIP) 0.25 MG tablet TAKE 1 TABLET EVERY NIGHT 90 tablet 3    theophylline (UNIPHYL) 400 MG 24 hr tablet TAKE 1 TABLET DAILY 90 tablet 3    tiZANidine (ZANAFLEX) 4 MG tablet Take 1 tablet by mouth Every 8 (Eight) Hours As Needed for Muscle Spasms.      traMADol (ULTRAM) 50 MG tablet Take 1 tablet by mouth Every 6 (Six) Hours As Needed for Moderate Pain. 3 tablet 0    Wixela Inhub 250-50 MCG/ACT DISKUS USE 1 INHALATION TWICE A DAY (Patient taking differently: Inhale 1 puff 2 (Two) Times a Day.)  "180 each 3    zafirlukast (Accolate) 20 MG tablet Take 1 tablet by mouth 2 (Two) Times a Day. 180 tablet 3    methylPREDNISolone (MEDROL) 4 MG dose pack Take as directed on package instructions. (Patient not taking: Reported on 12/5/2023) 21 tablet 0     Facility-Administered Encounter Medications as of 12/5/2023   Medication Dose Route Frequency Provider Last Rate Last Admin    heparin flush (porcine) 100 UNIT/ML injection 500 Units  500 Units Intracatheter PRN Ki Manriquez MD   500 Units at 09/27/16 1108    heparin flush (porcine) 100 UNIT/ML injection 500 Units  500 Units Intravenous PRN Malathi Brantley, APRN   500 Units at 05/05/17 1009    sodium chloride 0.9 % flush 10 mL  10 mL Intravenous PRN Ki Manriquez MD   10 mL at 09/27/16 1107    sodium chloride 0.9 % flush 10 mL  10 mL Intravenous PRN Malathi Brantley APRN   10 mL at 05/05/17 1009       Allergies:  Allergies   Allergen Reactions    Cephalosporins Hives    Keflex [Cephalexin] Rash       Immunizations:  Immunization History   Administered Date(s) Administered    COVID-19 (PFIZER) BIVALENT 12+YRS 11/16/2022    COVID-19 (PFIZER) Purple Cap Monovalent 03/24/2021, 04/14/2021, 11/09/2021, 07/02/2022    COVID-19 F23 (PFIZER) 12YRS+ (COMIRNATY) 11/24/2023    Covid-19 (Pfizer) Gray Cap Monovalent 07/02/2022    Flu Vaccine Intradermal Quad 18-64YR 09/28/2018, 10/29/2019, 09/29/2020    Flublok 18+yrs 11/16/2022    Fluzone (or Fluarix & Flulaval for VFC) >6mos 10/10/2016, 09/30/2020, 10/12/2021    Influenza Injectable Mdck Pf Quad 09/10/2023    Pneumococcal Conjugate 13-Valent (PCV13) 01/01/2012    Pneumococcal Polysaccharide (PPSV23) 10/28/2014    Shingrix 12/02/2020, 02/16/2021    Tdap 12/08/2021    flucelvax quad pfs =>4 YRS 09/28/2018, 10/29/2019       Objective:    Vitals:  /84   Pulse 104   Ht 154.9 cm (61\")   Wt 79.4 kg (175 lb)   LMP  (LMP Unknown)   SpO2 98% Comment: RA  Breastfeeding No   BMI 33.07 " kg/m²     Physical Exam:  General: Patient is a 58 y.o. pleasant middle aged  female. Looks stated age. Appears to be in no acute distress.  Eyes: EOMI. PERRLA. Vision intact. No scleral icterus.  Ear, Nose, Mouth and Throat: Hearing is grossly intact. No Leukoplakia, pharyngitis, stomatitis or thrush. Swollen nasal mucosa with post nasal drop.  Neck: Range of motion of neck normal. No thyromegaly or masses. Mallampati Class 3  Respiratory: Clear to auscultation bilaterally. No use of accessory muscles. Decreased breath sounds.  Cardiovascular: Normal heart sounds. Regularly regular rhythm without murmur.  Gastrointestinal: Non tender, non distended, soft. Bowel sounds positive in all four quadrants. No organomegaly.  Skin: No obvious rashes, lesions, ulcers or large amount of bruising. No edema.   Neurological: No new motor deficits. Cranial nerves appear intact.  Psychiatric: Patient is alert and oriented to person, place and time.    Chest Imaging:    Study Result    Narrative & Impression   EXAMINATION: XR CHEST 1 VW- 6/1/2023 9:21 AM CDT     HISTORY: asthma; J45.40-Moderate persistent asthma, uncomplicated     REPORT: A frontal view of the chest was obtained.     COMPARISON: Chest x-ray 02/27/2022.     The lungs are hypoaerated and hazy opacities at the left base are  favored to represent atelectasis. There is no lung consolidation. Mild  interstitial prominence appears stable. Heart size is normal. No  pneumothorax or pleural effusion is identified. No acute osseous  abnormality, mild levoscoliosis and single changes of the thoracic  spine are stable.     IMPRESSION:  Shallow inspiration with basilar atelectasis, greater on the  left. No acute cardiopulmonary abnormality.    This report was finalized on 06/01/2023 09:23 by Dr. J Luis Rose MD.     Assessment:  1. Moderate persistent asthma without complication    2. Pulmonary scarring    3. Restless legs syndrome (RLS)    4. History of COVID-19     5. Non-smoker    6. Non-seasonal allergic rhinitis, unspecified trigger        Plan/Recommendations:    1.  Patient is a non-smoker and has moderate obstructive airway dysfunction and some pulmonary scarring noted in the imaging studies in the past.  She is doing well with the current medications Wixela and albuterol nebulizer and rescue inhaler which will be continued for her asthma.  She also uses theophylline and the theophylline level needs to be checked from time to time.  2.  She has nasal allergy and will continue using fluticasone nasal spray, Astelin nasal spray, loratadine and also use Accolate.  She is using ropinirole for restless leg syndrome which will be continued.  She is not on any oxygen or CPAP.  3.  She is getting her regular vaccinations and will from Dr. Mitchell the primary care provider.  She will continue follow-up with her primary care provider and other physicians.  4.  Her last chest x-ray showed some pulm changes and atelectasis and a follow-up chest x-ray ordered in 6 months time before next clinic visit.  She can return to the pulmonary clinic earlier if needed.  Did not need any medication refill at this time.    Follow up:  6 Months    Time Spent:  30 minutes    I appreciate the opportunity of participating in this patient's care. I would like to thank the PCP for the referral.  Please feel free to contact me with any other questions.    Kevin Aguirre MD   Pulmonologist/Intensivist     Electronically signed by: Kevin Aguirre MD, 12/5/2023 10:57 CST

## 2023-12-11 ENCOUNTER — LAB (OUTPATIENT)
Dept: INTERNAL MEDICINE | Facility: CLINIC | Age: 58
End: 2023-12-11
Payer: MEDICARE

## 2023-12-11 DIAGNOSIS — I10 ESSENTIAL HYPERTENSION: ICD-10-CM

## 2023-12-11 DIAGNOSIS — D50.8 OTHER IRON DEFICIENCY ANEMIA: ICD-10-CM

## 2023-12-11 DIAGNOSIS — E55.9 VITAMIN D DEFICIENCY: Primary | ICD-10-CM

## 2023-12-11 DIAGNOSIS — M85.80 OSTEOPENIA, UNSPECIFIED LOCATION: ICD-10-CM

## 2023-12-11 DIAGNOSIS — E78.00 ELEVATED CHOLESTEROL: ICD-10-CM

## 2023-12-13 LAB
25(OH)D3+25(OH)D2 SERPL-MCNC: 55.4 NG/ML (ref 30–100)
ALBUMIN SERPL-MCNC: 4.3 G/DL (ref 3.5–5.2)
ALBUMIN/GLOB SERPL: 2.4 G/DL
ALP SERPL-CCNC: 106 U/L (ref 39–117)
ALT SERPL-CCNC: 12 U/L (ref 1–33)
APPEARANCE UR: ABNORMAL
AST SERPL-CCNC: 17 U/L (ref 1–32)
BACTERIA #/AREA URNS HPF: ABNORMAL /HPF
BASOPHILS # BLD AUTO: 0.08 10*3/MM3 (ref 0–0.2)
BASOPHILS NFR BLD AUTO: 1.1 % (ref 0–1.5)
BILIRUB SERPL-MCNC: 0.4 MG/DL (ref 0–1.2)
BILIRUB UR QL STRIP: NEGATIVE
BUN SERPL-MCNC: 12 MG/DL (ref 6–20)
BUN/CREAT SERPL: 11.3 (ref 7–25)
CALCIUM SERPL-MCNC: 9.7 MG/DL (ref 8.6–10.5)
CASTS URNS MICRO: ABNORMAL
CHLORIDE SERPL-SCNC: 105 MMOL/L (ref 98–107)
CHOLEST SERPL-MCNC: 160 MG/DL (ref 0–200)
CO2 SERPL-SCNC: 25.3 MMOL/L (ref 22–29)
COLOR UR: YELLOW
CREAT SERPL-MCNC: 1.06 MG/DL (ref 0.57–1)
EGFRCR SERPLBLD CKD-EPI 2021: 60.6 ML/MIN/1.73
EOSINOPHIL # BLD AUTO: 0.56 10*3/MM3 (ref 0–0.4)
EOSINOPHIL NFR BLD AUTO: 7.5 % (ref 0.3–6.2)
EPI CELLS #/AREA URNS HPF: ABNORMAL /HPF
ERYTHROCYTE [DISTWIDTH] IN BLOOD BY AUTOMATED COUNT: 13.7 % (ref 12.3–15.4)
GLOBULIN SER CALC-MCNC: 1.8 GM/DL
GLUCOSE SERPL-MCNC: 91 MG/DL (ref 65–99)
GLUCOSE UR QL STRIP: NEGATIVE
HCT VFR BLD AUTO: 42.9 % (ref 34–46.6)
HDLC SERPL-MCNC: 48 MG/DL (ref 40–60)
HGB BLD-MCNC: 14.2 G/DL (ref 12–15.9)
HGB UR QL STRIP: NEGATIVE
IMM GRANULOCYTES # BLD AUTO: 0.02 10*3/MM3 (ref 0–0.05)
IMM GRANULOCYTES NFR BLD AUTO: 0.3 % (ref 0–0.5)
KETONES UR QL STRIP: NEGATIVE
LDLC SERPL CALC-MCNC: 93 MG/DL (ref 0–100)
LEUKOCYTE ESTERASE UR QL STRIP: ABNORMAL
LYMPHOCYTES # BLD AUTO: 1.67 10*3/MM3 (ref 0.7–3.1)
LYMPHOCYTES NFR BLD AUTO: 22.5 % (ref 19.6–45.3)
MCH RBC QN AUTO: 29 PG (ref 26.6–33)
MCHC RBC AUTO-ENTMCNC: 33.1 G/DL (ref 31.5–35.7)
MCV RBC AUTO: 87.7 FL (ref 79–97)
MONOCYTES # BLD AUTO: 0.58 10*3/MM3 (ref 0.1–0.9)
MONOCYTES NFR BLD AUTO: 7.8 % (ref 5–12)
NEUTROPHILS # BLD AUTO: 4.52 10*3/MM3 (ref 1.7–7)
NEUTROPHILS NFR BLD AUTO: 60.8 % (ref 42.7–76)
NITRITE UR QL STRIP: NEGATIVE
NRBC BLD AUTO-RTO: 0 /100 WBC (ref 0–0.2)
PH UR STRIP: 5.5 [PH] (ref 5–8)
PLATELET # BLD AUTO: 338 10*3/MM3 (ref 140–450)
POTASSIUM SERPL-SCNC: 4.4 MMOL/L (ref 3.5–5.2)
PROT SERPL-MCNC: 6.1 G/DL (ref 6–8.5)
PROT UR QL STRIP: NEGATIVE
RBC # BLD AUTO: 4.89 10*6/MM3 (ref 3.77–5.28)
RBC #/AREA URNS HPF: ABNORMAL /HPF
SODIUM SERPL-SCNC: 142 MMOL/L (ref 136–145)
SP GR UR STRIP: 1.02 (ref 1–1.03)
TRIGL SERPL-MCNC: 106 MG/DL (ref 0–150)
TSH SERPL DL<=0.005 MIU/L-ACNC: 2.45 UIU/ML (ref 0.27–4.2)
UROBILINOGEN UR STRIP-MCNC: ABNORMAL MG/DL
VLDLC SERPL CALC-MCNC: 19 MG/DL (ref 5–40)
WBC # BLD AUTO: 7.43 10*3/MM3 (ref 3.4–10.8)
WBC #/AREA URNS HPF: ABNORMAL /HPF

## 2023-12-13 RX ORDER — NITROFURANTOIN 25; 75 MG/1; MG/1
100 CAPSULE ORAL 2 TIMES DAILY
Qty: 10 CAPSULE | Refills: 0 | Status: SHIPPED | OUTPATIENT
Start: 2023-12-13 | End: 2023-12-14

## 2023-12-14 ENCOUNTER — OFFICE VISIT (OUTPATIENT)
Dept: INTERNAL MEDICINE | Facility: CLINIC | Age: 58
End: 2023-12-14
Payer: MEDICARE

## 2023-12-14 VITALS
BODY MASS INDEX: 33.42 KG/M2 | OXYGEN SATURATION: 99 % | HEIGHT: 61 IN | DIASTOLIC BLOOD PRESSURE: 70 MMHG | TEMPERATURE: 97.8 F | SYSTOLIC BLOOD PRESSURE: 130 MMHG | HEART RATE: 90 BPM | WEIGHT: 177 LBS

## 2023-12-14 DIAGNOSIS — M81.0 OSTEOPOROSIS, UNSPECIFIED OSTEOPOROSIS TYPE, UNSPECIFIED PATHOLOGICAL FRACTURE PRESENCE: ICD-10-CM

## 2023-12-14 DIAGNOSIS — E78.00 ELEVATED CHOLESTEROL: ICD-10-CM

## 2023-12-14 DIAGNOSIS — I10 ESSENTIAL HYPERTENSION: ICD-10-CM

## 2023-12-14 DIAGNOSIS — Z90.13 S/P BILATERAL MASTECTOMY: ICD-10-CM

## 2023-12-14 DIAGNOSIS — J45.40 MODERATE PERSISTENT ASTHMA WITHOUT COMPLICATION: ICD-10-CM

## 2023-12-14 DIAGNOSIS — D50.8 OTHER IRON DEFICIENCY ANEMIA: ICD-10-CM

## 2023-12-14 DIAGNOSIS — Z12.31 SCREENING MAMMOGRAM, ENCOUNTER FOR: Primary | ICD-10-CM

## 2023-12-14 DIAGNOSIS — K21.00 GASTROESOPHAGEAL REFLUX DISEASE WITH ESOPHAGITIS, UNSPECIFIED WHETHER HEMORRHAGE: ICD-10-CM

## 2023-12-14 DIAGNOSIS — E66.09 CLASS 1 OBESITY DUE TO EXCESS CALORIES WITH SERIOUS COMORBIDITY AND BODY MASS INDEX (BMI) OF 33.0 TO 33.9 IN ADULT: ICD-10-CM

## 2023-12-14 NOTE — PROGRESS NOTES
The ABCs of the Annual Wellness Visit  Subsequent Medicare Wellness Visit    Chief Complaint   Patient presents with    Medicare Wellness-subsequent      Subjective    History of Present Illness:  Luna Cruz is a 58 y.o. female who presents for a Subsequent Medicare Wellness Visit.    The following portions of the patient's history were reviewed and   updated as appropriate: allergies, current medications, past family history, past medical history, past social history, past surgical history and problem list.    Compared to one year ago, the patient feels her physical   health is the same.    Compared to one year ago, the patient feels her mental   health is the same.    Recent Hospitalizations:  She was not admitted to the hospital during the last year.       Current Medical Providers:  Patient Care Team:  Chaz Gaines MD as PCP - General (Internal Medicine)  Kevin Aguirre MD as Consulting Physician (Pulmonary Disease)  Ksenia Fox MD as Consulting Physician (Gastroenterology)    Outpatient Medications Prior to Visit   Medication Sig Dispense Refill    albuterol (PROVENTIL) (2.5 MG/3ML) 0.083% nebulizer solution USE 1 VIAL BY NEBULIZATION EVERY 4 HOURS AS NEEDED FOR WHEEZING 360 mL 5    albuterol sulfate  (90 Base) MCG/ACT inhaler USE 2 INHALATIONS EVERY 4 HOURS AS NEEDED FOR WHEEZING 25.5 g 3    alendronate (FOSAMAX) 70 MG tablet Take 1 tablet by mouth Every 7 (Seven) Days. Sunday's 12 tablet 3    aspirin 81 MG EC tablet Take 1 tablet by mouth Daily.      atorvastatin (LIPITOR) 40 MG tablet Take 1 tablet by mouth Daily. 90 tablet 3    azelastine (ASTELIN) 0.1 % nasal spray USE 2 SPRAYS INTO THE NOSTRIL(S)  TWICE A DAY AS DIRECTED BY PROVIDER 30 mL 13    calcitriol (ROCALTROL) 0.5 MCG capsule Take 1 capsule by mouth Daily. 90 capsule 3    Calcium Citrate-Vitamin D (CALCIUM + D PO) Take 1 tablet by mouth Daily.      docusate sodium (COLACE) 100 MG capsule Take 1 capsule by mouth As  Needed.      fluticasone (FLONASE) 50 MCG/ACT nasal spray USE 2 SPRAYS INTO THE NOSTRIL(S) DAILY AS DIRECTED BY PROVIDER 16 g 11    gabapentin (NEURONTIN) 100 MG capsule Take 1 capsule by mouth 3 (Three) Times a Day.      HYDROcodone-acetaminophen (NORCO) 5-325 MG per tablet Take 1 tablet by mouth 2 (Two) Times a Day As Needed for Moderate Pain.      ibuprofen (ADVIL,MOTRIN) 400 MG tablet Take 1 tablet by mouth Every 8 (Eight) Hours As Needed for Mild Pain.      levothyroxine (SYNTHROID, LEVOTHROID) 50 MCG tablet Take 1 tablet by mouth Daily. 90 tablet 0    loratadine (CLARITIN) 10 MG tablet TAKE 1 TABLET DAILY AS NEEDED FOR ALLERGIES 90 tablet 3    losartan (COZAAR) 100 MG tablet Take 1 tablet by mouth Daily. 90 tablet 3    methocarbamol (ROBAXIN) 750 MG tablet Take 1 tablet by mouth.      naloxone (NARCAN) 4 MG/0.1ML nasal spray 1 spray into the nostril(s) as directed by provider.      pantoprazole (PROTONIX) 40 MG EC tablet Take 1 tablet by mouth Daily. 90 tablet 1    potassium chloride ER (K-TAB) 20 MEQ tablet controlled-release ER tablet Take 1 tablet by mouth Daily. 90 tablet 0    rOPINIRole (REQUIP) 0.25 MG tablet TAKE 1 TABLET EVERY NIGHT 90 tablet 3    theophylline (UNIPHYL) 400 MG 24 hr tablet TAKE 1 TABLET DAILY 90 tablet 3    tiZANidine (ZANAFLEX) 4 MG tablet Take 1 tablet by mouth Every 8 (Eight) Hours As Needed for Muscle Spasms.      Wixela Inhub 250-50 MCG/ACT DISKUS USE 1 INHALATION TWICE A DAY (Patient taking differently: Inhale 1 puff 2 (Two) Times a Day.) 180 each 3    zafirlukast (Accolate) 20 MG tablet Take 1 tablet by mouth 2 (Two) Times a Day. 180 tablet 3    azithromycin (ZITHROMAX) 250 MG tablet Take 2 by mouth today then 1 daily for 4 days (Patient not taking: Reported on 12/14/2023) 6 tablet 0    methylPREDNISolone (MEDROL) 4 MG dose pack Take as directed on package instructions. (Patient not taking: Reported on 12/5/2023) 21 tablet 0    nitrofurantoin, macrocrystal-monohydrate,  (Macrobid) 100 MG capsule Take 1 capsule by mouth 2 (Two) Times a Day for 5 days. (Patient not taking: Reported on 12/14/2023) 10 capsule 0    ondansetron ODT (ZOFRAN-ODT) 4 MG disintegrating tablet Place 1 tablet on the tongue As Needed. (Patient not taking: Reported on 12/14/2023)      traMADol (ULTRAM) 50 MG tablet Take 1 tablet by mouth Every 6 (Six) Hours As Needed for Moderate Pain. (Patient not taking: Reported on 12/14/2023) 3 tablet 0     Facility-Administered Medications Prior to Visit   Medication Dose Route Frequency Provider Last Rate Last Admin    heparin flush (porcine) 100 UNIT/ML injection 500 Units  500 Units Intracatheter PRN Ki Manriquez MD   500 Units at 09/27/16 1108    heparin flush (porcine) 100 UNIT/ML injection 500 Units  500 Units Intravenous PRN Malathi Brantley, APRN   500 Units at 05/05/17 1009    sodium chloride 0.9 % flush 10 mL  10 mL Intravenous PRN Ki Manriquez MD   10 mL at 09/27/16 1107    sodium chloride 0.9 % flush 10 mL  10 mL Intravenous PRN Malathi Brantley, APRN   10 mL at 05/05/17 1009       Opioid medication/s are on active medication list.  and I have evaluated her active treatment plan and pain score trends (see table).  Vitals:    12/14/23 0740   PainSc: 0-No pain     I have reviewed the chart for potential of high risk medication and harmful drug interactions in the elderly.          Aspirin is on active medication list. Aspirin use is indicated based on review of current medical condition/s. Pros and cons of this therapy have been discussed today. Benefits of this medication outweigh potential harm.  Patient has been encouraged to continue taking this medication.  .      Patient Active Problem List   Diagnosis    Essential hypertension    Gastroesophageal reflux disease with esophagitis    Family history of malignant neoplasm of breast    History of malignant neoplasm of breast    Hyperplastic polyp of transverse colon    Iron  "deficiency anemia    Malignant neoplasm of lower-inner quadrant of left breast in female, estrogen receptor positive    Elevated cholesterol    Left carotid bruit    Myxedema heart disease    Osteoporosis    Vitamin D deficiency    Breast cancer    Fatty liver    Rectal bleeding    Non-smoker    Non-seasonal allergic rhinitis    Restless legs syndrome (RLS)    Pulmonary scarring    Atypical ductal hyperplasia of breast    S/P bilateral mastectomy    Moderate persistent asthma without complication    Ductal carcinoma in situ (DCIS) of right breast    History of COVID-19    Class 1 obesity due to excess calories with serious comorbidity and body mass index (BMI) of 33.0 to 33.9 in adult     Advance Care Planning  Advance Directive is not on file.  ACP discussion was held with the patient during this visit. Patient does not have an advance directive, information provided.       Objective    Vitals:    12/14/23 0740   BP: 130/70   BP Location: Left arm   Patient Position: Sitting   Cuff Size: Adult   Pulse: 90   Temp: 97.8 °F (36.6 °C)   TempSrc: Temporal   SpO2: 99%   Weight: 80.3 kg (177 lb)   Height: 154.9 cm (61\")   PainSc: 0-No pain     Estimated body mass index is 33.44 kg/m² as calculated from the following:    Height as of this encounter: 154.9 cm (61\").    Weight as of this encounter: 80.3 kg (177 lb).           Does the patient have evidence of cognitive impairment? No      Lab Results   Component Value Date    CHLPL 160 12/11/2023    TRIG 106 12/11/2023    HDL 48 12/11/2023    LDL 93 12/11/2023    VLDL 19 12/11/2023            HEALTH RISK ASSESSMENT    Smoking Status:  Social History     Tobacco Use   Smoking Status Never    Passive exposure: Never   Smokeless Tobacco Never     Alcohol Consumption:  Social History     Substance and Sexual Activity   Alcohol Use Never     Fall Risk Screen:    STEADI Fall Risk Assessment was completed, and patient is at LOW risk for falls.Assessment completed " on:2023    Depression Screenin/14/2023     7:39 AM   PHQ-2/PHQ-9 Depression Screening   Little Interest or Pleasure in Doing Things 0-->not at all   Feeling Down, Depressed or Hopeless 0-->not at all   PHQ-9: Brief Depression Severity Measure Score 0       Health Habits and Functional and Cognitive Screenin/14/2023     7:39 AM   Functional & Cognitive Status   Do you have difficulty preparing food and eating? No   Do you have difficulty bathing yourself, getting dressed or grooming yourself? No   Do you have difficulty using the toilet? No   Do you have difficulty moving around from place to place? No   Do you have trouble with steps or getting out of a bed or a chair? No   Current Diet Well Balanced Diet   Dental Exam Up to date   Eye Exam Up to date   Exercise (times per week) 0 times per week   Current Exercises Include No Regular Exercise   Do you need help using the phone?  No   Are you deaf or do you have serious difficulty hearing?  No   Do you need help to go to places out of walking distance? No   Do you need help shopping? No   Do you need help preparing meals?  No   Do you need help with housework?  No   Do you need help with laundry? No   Do you need help taking your medications? No   Do you need help managing money? No   Do you ever drive or ride in a car without wearing a seat belt? No   Have you felt unusual stress, anger or loneliness in the last month? No   Who do you live with? Spouse   If you need help, do you have trouble finding someone available to you? No   Have you been bothered in the last four weeks by sexual problems? No   Do you have difficulty concentrating, remembering or making decisions? No       Age-appropriate Screening Schedule:  Refer to the list below for future screening recommendations based on patient's age, sex and/or medical conditions. Orders for these recommended tests are listed in the plan section. The patient has been provided with a written  plan.    Health Maintenance   Topic Date Due    BMI FOLLOWUP  05/17/2024    LIPID PANEL  12/11/2024    ANNUAL WELLNESS VISIT  12/14/2024    DXA SCAN  12/27/2024    COLORECTAL CANCER SCREENING  07/24/2025    PAP SMEAR  01/13/2026    Pneumococcal Vaccine 0-64 (3 - PPSV23 or PCV20) 09/14/2029    TDAP/TD VACCINES (2 - Td or Tdap) 12/08/2031    HEPATITIS C SCREENING  Completed    COVID-19 Vaccine  Completed    INFLUENZA VACCINE  Completed    ZOSTER VACCINE  Completed              Assessment & Plan   CMS Preventative Services Quick Reference  Risk Factors Identified During Encounter  Immunizations Discussed/Encouraged: RSV (Respiratory Syncytial Virus)  Dental Screening Recommended  Vision Screening Recommended  The above risks/problems have been discussed with the patient.  Follow up actions/plans if indicated are seen below in the Assessment/Plan Section.  Pertinent information has been shared with the patient in the After Visit Summary.    Diagnoses and all orders for this visit:    1. Screening mammogram, encounter for (Primary)    2. Essential hypertension    3. Other iron deficiency anemia    4. Moderate persistent asthma without complication    5. S/P bilateral mastectomy    6. Elevated cholesterol    7. Class 1 obesity due to excess calories with serious comorbidity and body mass index (BMI) of 33.0 to 33.9 in adult    8. Gastroesophageal reflux disease with esophagitis, unspecified whether hemorrhage    9. Osteoporosis, unspecified osteoporosis type, unspecified pathological fracture presence        Recommend at least annual dental and vision screening.  Recommend annual influenza vaccination  Recommend a varied diet and appropriate portion sizes.   CDC recommendations for physical activity:  At least 150 minutes a week (for example, 30 minutes a day, 5 days a week) of moderate-intensity activity such as brisk walking. Or can consider 75 minutes a week of vigorous-intensity activity such as hiking, jogging, or  running.  At least 2 days a week of activities that strengthen muscles.  Plus activities to improve balance.      Work on core muscles strength and weight loss.   Patient having some abdominal discomfort with coughing, sneezing, after his surgeries.      Patient having uti symptoms.  Sent in macrobid the other day.  Unable to culture as urine was collected previously.      Result Review :  The following data was reviewed by: Chaz Gaines MD on 12/14/2023:  CMP          4/25/2023    10:21 6/7/2023    13:19 12/11/2023    07:47   CMP   Glucose 90  83  91    BUN 14  13  12    Creatinine 1.12  0.96  1.06    EGFR 57.1  68.7     Sodium 141  138  142    Potassium 4.5  4.1  4.4    Chloride 105  102  105    Calcium 9.6  9.7  9.7  C   Total Protein   6.1    Total Protein 6.4      Albumin 4.4   4.3    Globulin   1.8    Globulin 2.0      Total Bilirubin 0.4   0.4    Alkaline Phosphatase 100   106    AST (SGOT) 20   17    ALT (SGPT) 21   12    Albumin/Globulin Ratio 2.2      BUN/Creatinine Ratio 12.5  13.5  11.3    Anion Gap 13.0  11.0        Details         C Corrected result             CBC w/diff          4/25/2023    10:21 6/7/2023    13:19 12/11/2023    07:47   CBC w/Diff   WBC 12.82  8.39  7.43    RBC 5.10  5.26  4.89    Hemoglobin 14.2  14.9  14.2    Hematocrit 46.3  46.9  42.9    MCV 90.8  89.2  87.7    MCH 27.8  28.3  29.0    MCHC 30.7  31.8  33.1    RDW 14.4  13.7  13.7    Platelets 361  389  338    Neutrophil Rel % 80.8  67.2  60.8    Immature Granulocyte Rel % 0.4  0.5     Lymphocyte Rel % 10.0  22.8  22.5    Monocyte Rel % 5.3  5.8  7.8    Eosinophil Rel % 3.0  2.7  7.5    Basophil Rel % 0.5  1.0  1.1      Lipid Panel          12/11/2023    07:47   Lipid Panel   Total Cholesterol 160    Triglycerides 106    HDL Cholesterol 48    VLDL Cholesterol 19    LDL Cholesterol  93      TSH          12/11/2023    07:47   TSH   TSH 2.450          UA          12/11/2023    07:47   Urinalysis   Blood, UA Negative     Leukocytes, UA See below:    Nitrite, UA Negative    RBC, UA 0-2    Bacteria, UA 4+           Follow Up:   Return in about 6 months (around 6/14/2024), or if symptoms worsen or fail to improve, for Recheck.     An After Visit Summary and PPPS were made available to the patient.                         CELESTINE Gaines MD, FACP, FHM      Electronically signed by Chaz Gaines MD, 12/14/23, 7:48 AM CST.

## 2024-01-05 RX ORDER — LEVOTHYROXINE SODIUM 0.05 MG/1
50 TABLET ORAL DAILY
Qty: 90 TABLET | Refills: 3 | Status: SHIPPED | OUTPATIENT
Start: 2024-01-05

## 2024-01-05 RX ORDER — POTASSIUM CHLORIDE 1500 MG/1
20 TABLET, EXTENDED RELEASE ORAL DAILY
Qty: 90 TABLET | Refills: 3 | Status: SHIPPED | OUTPATIENT
Start: 2024-01-05

## 2024-01-08 ENCOUNTER — OFFICE VISIT (OUTPATIENT)
Dept: GASTROENTEROLOGY | Facility: CLINIC | Age: 59
End: 2024-01-08
Payer: MEDICARE

## 2024-01-08 VITALS
TEMPERATURE: 98.4 F | WEIGHT: 176 LBS | BODY MASS INDEX: 33.23 KG/M2 | DIASTOLIC BLOOD PRESSURE: 76 MMHG | HEART RATE: 95 BPM | OXYGEN SATURATION: 99 % | SYSTOLIC BLOOD PRESSURE: 128 MMHG | HEIGHT: 61 IN

## 2024-01-08 DIAGNOSIS — I10 ESSENTIAL HYPERTENSION: ICD-10-CM

## 2024-01-08 DIAGNOSIS — K21.9 GASTROESOPHAGEAL REFLUX DISEASE: ICD-10-CM

## 2024-01-08 DIAGNOSIS — K21.9 GASTROESOPHAGEAL REFLUX DISEASE, UNSPECIFIED WHETHER ESOPHAGITIS PRESENT: Primary | ICD-10-CM

## 2024-01-08 DIAGNOSIS — R13.19 ESOPHAGEAL DYSPHAGIA: ICD-10-CM

## 2024-01-08 DIAGNOSIS — K76.0 FATTY LIVER: ICD-10-CM

## 2024-01-08 DIAGNOSIS — J45.998 OTHER ASTHMA: ICD-10-CM

## 2024-01-08 DIAGNOSIS — J45.40 MODERATE PERSISTENT ASTHMA WITHOUT COMPLICATION: ICD-10-CM

## 2024-01-08 PROCEDURE — 1159F MED LIST DOCD IN RCRD: CPT | Performed by: NURSE PRACTITIONER

## 2024-01-08 PROCEDURE — 3078F DIAST BP <80 MM HG: CPT | Performed by: NURSE PRACTITIONER

## 2024-01-08 PROCEDURE — 3074F SYST BP LT 130 MM HG: CPT | Performed by: NURSE PRACTITIONER

## 2024-01-08 PROCEDURE — 99214 OFFICE O/P EST MOD 30 MIN: CPT | Performed by: NURSE PRACTITIONER

## 2024-01-08 PROCEDURE — 1160F RVW MEDS BY RX/DR IN RCRD: CPT | Performed by: NURSE PRACTITIONER

## 2024-01-08 RX ORDER — CALCITRIOL 0.5 UG/1
0.5 CAPSULE, LIQUID FILLED ORAL DAILY
Qty: 90 CAPSULE | Refills: 3 | Status: SHIPPED | OUTPATIENT
Start: 2024-01-08

## 2024-01-08 RX ORDER — ZAFIRLUKAST 20 MG/1
20 TABLET, FILM COATED ORAL 2 TIMES DAILY
Qty: 180 TABLET | Refills: 3 | Status: SHIPPED | OUTPATIENT
Start: 2024-01-08

## 2024-01-08 RX ORDER — PANTOPRAZOLE SODIUM 40 MG/1
40 TABLET, DELAYED RELEASE ORAL DAILY
Qty: 90 TABLET | Refills: 3 | Status: SHIPPED | OUTPATIENT
Start: 2024-01-08

## 2024-01-08 RX ORDER — LOSARTAN POTASSIUM 100 MG/1
100 TABLET ORAL DAILY
Qty: 90 TABLET | Refills: 3 | Status: SHIPPED | OUTPATIENT
Start: 2024-01-08

## 2024-01-08 RX ORDER — FLUTICASONE PROPIONATE AND SALMETEROL 250; 50 UG/1; UG/1
1 POWDER RESPIRATORY (INHALATION)
Qty: 180 EACH | Refills: 3 | Status: SHIPPED | OUTPATIENT
Start: 2024-01-08

## 2024-01-08 NOTE — PROGRESS NOTES
Primary Physician: Chaz Gaines MD    Chief Complaint   Patient presents with    Med Refill     Pt presents today for yearly OV for GERD-Needs 90-day refills on Pantoprazole; Pt states she is feeling good for the most part-does have some occasional reflux but states it isn't as bad since she's on the Pantoprazole     Follow-up     Pt also presents today for yearly OV for fatty liver       Subjective     Luna Cruz is a 58 y.o. female.    HPI  Fatty liver  Patient here today for her yearly follow-up on her hepatic steatosis.  12/11/2023 labs normal LFT's, Fib-4 Score 0.86    .Fib-4 scoring system  Points <1.45:                      Cirrhosis less likely  Points ?1.45 and ?3.25:       Indeterminate  Points >3.25:                      Cirrhosis more likely    AFP normal at 4.46 on 1/4/2023  Liver ultrasound with elastography 8/31/2022: No liver masses with liver parenchyma somewhat coarsened echotexture, Metavir score F2     Pt does NOT drink any alcohol.    GERD  Last upper endoscopy 9/7/2022 with a medium size hiatal hernia, normal stomach, nonobstructing Schatzki's ring dilated up to 20 mm.  Pt reports acid reflux is under control with only occassional night time heartburn.  Patient up-to-date on her bone density study    No trouble swallowing on a regular basis.  Acid reflux at night from time to time and she will use Pepcid prn with complete relief.     Personal Hx Colon Polyps  Removed hyperplastic polyp  7/2020.  Due for repeat colonoscopy in 7/2025.  No change in bowels.  No blood in stool. No current GI issues.  NO family hx colon cancer.    Past Medical History:   Diagnosis Date    Abnormal uterine bleeding due to endometrial polyp     Arthritis     Asthma     Chronic back pain     Drug therapy     Ectopic pregnancy 1993    Family history of colonic polyps     Fatty liver     GERD (gastroesophageal reflux disease)     History of breast cancer     History of colon polyps     Hx of radiation  therapy     Hyperlipidemia     Hypertension     Hypothyroidism     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/26/2016    Obesity     Osteoarthritis     Osteopenia     Osteoporosis     Ovarian cyst 1994    PONV (postoperative nausea and vomiting)     RLS (restless legs syndrome)     Scoliosis     Varicella 1966       Past Surgical History:   Procedure Laterality Date    ADENOIDECTOMY      APPENDECTOMY  1994    BREAST AUGMENTATION  12/2021    BREAST BIOPSY      BREAST LUMPECTOMY Right 2008    right sentinel lymph nodes were also removed for biopsy    BREAST RECONSTRUCTION Right 09/2022    CARDIAC CATHETERIZATION      CHOLECYSTECTOMY  1993    COLONOSCOPY  05/03/2013    Erythematous mucosa in the rectum-biopsied; Repeat 4 years     COLONOSCOPY N/A 06/14/2017    Normal; Repeat 5 years    COLONOSCOPY  04/23/2010    Dr. Arzola-Extremely poorly prepped colon    COLONOSCOPY N/A 07/24/2020    Hemorrhoids found on perianal exam; One 4mm hyperplastic polyp in the transverse colon; Normal mucosa in the entire examined colon-biopsied; Non-bleeding internal hemorrhoids; Repeat 5 years    COSMETIC SURGERY  9-    D & C HYSTEROSCOPY  1993    D & C HYSTEROSCOPY MYOSURE N/A 01/13/2021    Procedure: DILATATION AND CURETTAGE HYSTEROSCOPY WITH MYOSURE, polypectomy;  Surgeon: Marcello Salas MD;  Location: [facility] OR;  Service: Obstetrics/Gynecology;  Laterality: N/A;    D & C HYSTEROSCOPY MYOSURE N/A 06/14/2023    Procedure: DILATATION AND CURETTAGE HYSTEROSCOPY;  Surgeon: Marcello Salas MD;  Location: [facility] OR;  Service: Obstetrics/Gynecology;  Laterality: N/A;    DILATATION AND CURETTAGE      ENDOSCOPY N/A 10/05/2016    Medium-sized HH; Widely patent and non-obstructing Schatzki ring-dilated; Procedure: ESOPHAGOGASTRODUODENOSCOPY WITH ANESTHESIA;  Surgeon: Ksenia Fox MD;  Location: [facility] ENDOSCOPY;  Service:     ENDOSCOPY N/A 02/27/2019    Normal 1st portion of the duodenum and 2  portion of the duodenum; A few gastric polyps-biopsied; Small HH; Widely patent Schatzki ring-dilated; Abnormal esophageal motility, suspicious for presbyesophagus; Arrange for barium swallow to assess for dysmotility    ENDOSCOPY N/A 07/24/2020    Medium-sized HH; LA Grade A reflux esophagitis; No endoscopic esophageal abnormality to explain patient's dysphagia-Esophagus dilated; Normal stomach; Normal examined duodenum; No specimens collected    ENDOSCOPY N/A 09/07/2022    Medium-sized HH; Non-obstructing Schatzki ring-Dilated; Normal stomach; Normal examined duodenum; No specimens collected    HERNIA REPAIR  09/26/2022    HYSTEROSCOPY      KNEE ARTHROSCOPY Left 11/13/2018    Procedure: LEFT KNEE ARTHROSCOPIC PARTIAL MEDIAL MENISCECTOMY;  Surgeon: Matt Maki MD;  Location: [facility] OR;  Service: Orthopedics    MASTECTOMY Bilateral 09/14/2021    MEDIPORT INSERTION, SINGLE  2008    MEDIPORT REMOVAL      PARATHYROIDECTOMY  2011    REPLACEMENT TOTAL KNEE Left 05/23/2019    TONSILLECTOMY AND ADENOIDECTOMY  1970    TUBAL ABDOMINAL LIGATION          Current Outpatient Medications:     albuterol (PROVENTIL) (2.5 MG/3ML) 0.083% nebulizer solution, USE 1 VIAL BY NEBULIZATION EVERY 4 HOURS AS NEEDED FOR WHEEZING, Disp: 360 mL, Rfl: 5    albuterol sulfate  (90 Base) MCG/ACT inhaler, USE 2 INHALATIONS EVERY 4 HOURS AS NEEDED FOR WHEEZING, Disp: 25.5 g, Rfl: 3    alendronate (FOSAMAX) 70 MG tablet, Take 1 tablet by mouth Every 7 (Seven) Days. Sunday's, Disp: 12 tablet, Rfl: 3    aspirin 81 MG EC tablet, Take 1 tablet by mouth Daily., Disp: , Rfl:     atorvastatin (LIPITOR) 40 MG tablet, Take 1 tablet by mouth Daily., Disp: 90 tablet, Rfl: 3    azelastine (ASTELIN) 0.1 % nasal spray, USE 2 SPRAYS INTO THE NOSTRIL(S)  TWICE A DAY AS DIRECTED BY PROVIDER, Disp: 30 mL, Rfl: 13    calcitriol (ROCALTROL) 0.5 MCG capsule, Take 1 capsule by mouth Daily., Disp: 90 capsule, Rfl: 3    Calcium Citrate-Vitamin D (CALCIUM +  D PO), Take 1 tablet by mouth Daily., Disp: , Rfl:     docusate sodium (COLACE) 100 MG capsule, Take 1 capsule by mouth As Needed., Disp: , Rfl:     fluticasone (FLONASE) 50 MCG/ACT nasal spray, USE 2 SPRAYS INTO THE NOSTRIL(S) DAILY AS DIRECTED BY PROVIDER, Disp: 16 g, Rfl: 11    gabapentin (NEURONTIN) 100 MG capsule, Take 1 capsule by mouth 3 (Three) Times a Day., Disp: , Rfl:     HYDROcodone-acetaminophen (NORCO) 5-325 MG per tablet, Take 1 tablet by mouth 2 (Two) Times a Day As Needed for Moderate Pain., Disp: , Rfl:     ibuprofen (ADVIL,MOTRIN) 400 MG tablet, Take 1 tablet by mouth Every 8 (Eight) Hours As Needed for Mild Pain., Disp: , Rfl:     levothyroxine (SYNTHROID, LEVOTHROID) 50 MCG tablet, TAKE 1 TABLET BY MOUTH DAILY, Disp: 90 tablet, Rfl: 3    loratadine (CLARITIN) 10 MG tablet, TAKE 1 TABLET DAILY AS NEEDED FOR ALLERGIES, Disp: 90 tablet, Rfl: 3    losartan (COZAAR) 100 MG tablet, Take 1 tablet by mouth Daily., Disp: 90 tablet, Rfl: 3    methocarbamol (ROBAXIN) 750 MG tablet, Take 1 tablet by mouth., Disp: , Rfl:     naloxone (NARCAN) 4 MG/0.1ML nasal spray, 1 spray into the nostril(s) as directed by provider., Disp: , Rfl:     pantoprazole (PROTONIX) 40 MG EC tablet, Take 1 tablet by mouth Daily., Disp: 90 tablet, Rfl: 3    potassium chloride ER (K-TAB) 20 MEQ tablet controlled-release ER tablet, TAKE 1 TABLET BY MOUTH DAILY, Disp: 90 tablet, Rfl: 3    rOPINIRole (REQUIP) 0.25 MG tablet, TAKE 1 TABLET EVERY NIGHT, Disp: 90 tablet, Rfl: 3    theophylline (UNIPHYL) 400 MG 24 hr tablet, TAKE 1 TABLET DAILY, Disp: 90 tablet, Rfl: 3    tiZANidine (ZANAFLEX) 4 MG tablet, Take 1 tablet by mouth Every 8 (Eight) Hours As Needed for Muscle Spasms., Disp: , Rfl:     Wixela Inhub 250-50 MCG/ACT DISKUS, USE 1 INHALATION TWICE A DAY (Patient taking differently: Inhale 1 puff 2 (Two) Times a Day.), Disp: 180 each, Rfl: 3    zafirlukast (Accolate) 20 MG tablet, Take 1 tablet by mouth 2 (Two) Times a Day., Disp: 180  tablet, Rfl: 3  No current facility-administered medications for this visit.    Facility-Administered Medications Ordered in Other Visits:     heparin flush (porcine) 100 UNIT/ML injection 500 Units, 500 Units, Intracatheter, PRN, Ki Manriquez MD, 500 Units at 09/27/16 1108    heparin flush (porcine) 100 UNIT/ML injection 500 Units, 500 Units, Intravenous, PRN, Malathi Brantley APRN, 500 Units at 05/05/17 1009    sodium chloride 0.9 % flush 10 mL, 10 mL, Intravenous, PRN, Ki Manriquez MD, 10 mL at 09/27/16 1107    sodium chloride 0.9 % flush 10 mL, 10 mL, Intravenous, PRN, Malathi Brantley APRN, 10 mL at 05/05/17 1009    Allergies   Allergen Reactions    Cephalosporins Hives    Keflex [Cephalexin] Rash       Social History     Socioeconomic History    Marital status:    Tobacco Use    Smoking status: Never     Passive exposure: Never    Smokeless tobacco: Never   Vaping Use    Vaping Use: Never used   Substance and Sexual Activity    Alcohol use: Never    Drug use: No    Sexual activity: Yes     Partners: Male     Birth control/protection: Post-menopausal       Family History   Problem Relation Age of Onset    Colon polyps Mother         < 60 years of age     Cirrhosis Mother     Diabetes Mother     Liver disease Mother     Colon polyps Sister 54        < 60 years of age     Breast cancer Sister     Cancer Sister     No Known Problems Father     No Known Problems Brother     No Known Problems Daughter     No Known Problems Son     Diabetes Maternal Grandmother     Heart disease Maternal Grandmother     No Known Problems Paternal Grandmother     No Known Problems Maternal Aunt     No Known Problems Paternal Aunt     Hyperlipidemia Maternal Grandfather     Stroke Maternal Grandfather     Colon cancer Neg Hx     Crohn's disease Neg Hx     Irritable bowel syndrome Neg Hx     Liver cancer Neg Hx     Rectal cancer Neg Hx     Stomach cancer Neg Hx     BRCA 1/2 Neg Hx      "Endometrial cancer Neg Hx     Ovarian cancer Neg Hx     Uterine cancer Neg Hx     Esophageal cancer Neg Hx        Review of Systems   Constitutional:  Negative for unexpected weight change.       Objective     /76 (BP Location: Left arm, Patient Position: Sitting, Cuff Size: Adult)   Pulse 95   Temp 98.4 °F (36.9 °C) (Infrared)   Ht 154.9 cm (61\")   Wt 79.8 kg (176 lb)   LMP  (LMP Unknown)   SpO2 99%   Breastfeeding No   BMI 33.25 kg/m²     Physical Exam  Vitals reviewed.   Constitutional:       Appearance: Normal appearance.   Neurological:      Mental Status: She is alert.         Lab Results - Last 18 Months   Lab Units 12/11/23  0747 06/07/23  1319 04/25/23  1021 01/04/23  1605 12/14/22  0636 09/27/22  1023   GLUCOSE mg/dL 91 83 90 90 88  --    BUN mg/dL 12 13 14 11 11 20   CREATININE mg/dL 1.06* 0.96 1.12* 0.85 1.03* 1.3   SODIUM mmol/L 142 138 141 140 139 135*   POTASSIUM mmol/L 4.4 4.1 4.5 3.9 4.5 3.7   CHLORIDE mmol/L 105 102 105 105 101 105   TOTAL CO2 mmol/L  --   --   --   --   --  21   CO2 mmol/L 25.3 25.0 23.0 25.0 24  --    TOTAL PROTEIN g/dL  --   --  6.4 6.8  --   --    ALBUMIN g/dL 4.3  --  4.4 4.1 4.2  --    ALT (SGPT) U/L 12  --  21 19 20  --    AST (SGOT) U/L 17  --  20 22 23  --    ALK PHOS U/L 106  --  100 103 105  --    BILIRUBIN mg/dL 0.4  --  0.4 0.3 0.3  --    GLOBULIN gm/dL  --   --  2.0 2.7  --   --    ALPHA-FETOPROTEIN ng/mL  --   --   --  4.46  --   --        Lab Results - Last 18 Months   Lab Units 12/11/23  0747 06/07/23  1319 04/25/23  1021 01/04/23  1605 12/14/22  0636   HEMOGLOBIN g/dL 14.2 14.9 14.2 14.6 14.9   HEMATOCRIT % 42.9 46.9* 46.3 47.4* 46.4   MCV fL 87.7 89.2 90.8 88.6 88   WBC 10*3/mm3 7.43 8.39 12.82* 8.76 7.0   RDW % 13.7 13.7 14.4 14.5 13.9   MPV fL  --  9.9 9.9 10.0  --    PLATELETS 10*3/mm3 338 389 361 404 399       Lab Results - Last 18 Months   Lab Units 12/11/23  0747 12/14/22  0636   TSH uIU/mL 2.450 4.260   VIT D 25 HYDROXY ng/mL 55.4 51.4    "     Lab Results - Last 18 Months   Lab Units 01/04/23  1605   ALPHA-FETOPROTEIN ng/mL 4.46     Narrative & Impression   EXAMINATION: US LIVER- 8/31/2022 12:38 PM CDT     HISTORY: fatty liver; K76.0-Fatty (change of) liver, not elsewhere  classified.     COMPARISON: The similar study performed on 7/31/2020.      TECHNIQUE: Real-time ultrasound performed with representative images  saved to PACS. Exam performed using Siemens pulse shear wave  elastography (pSWE) technology.     FINDINGS:  LIVER. The liver has slightly coarse parenchymal echogenicity, without  evidence of a mass. Color Doppler images demonstrate patency of the  portal vein, with normal flow direction.     10 shear wave measurements were acquired and demonstrate a median  velocity of 1.705 m/s. The IQR/median velocity ratio is 0.531100.     Metavir Score F2     BILE DUCTS AND GALLBLADDER. The gallbladder is surgically absent. The  common hepatic duct measures 5.9 mm in diameter, normal.     PANCREAS. Partially visualized pancreas within normal limits.     IVC. Visualized IVC within normal limits.     AORTA. Visualized abdominal aorta within normal limits.        IMPRESSION:  1. No liver mass is visualized, the liver parenchyma has somewhat coarse  echotexture.  2. Metavir Score F2           LIVER fibrosis staging      metavir               m/s     Normal                                 F0                     <1.35m/s     Normal -mild                        F1                     1.35m/s   -1.66m/s     Mild -moderate                    F2                     1.66m/s  -1.77m/s     Moderate-severe                 F3                     1.77m/s- 1.99m/s     Cirrhosis                               F4                      >1.99m/s     Shear wave measurements may be affected by hepatic congestion,  steatosis, and inflammation. Shear wave speed measurements should be  interpreted in conjunction with other available clinical data, and they  should not be  considered interchangeable with MRI-derived stiffness  measurements at this time.        This report was finalized on 08/31/2022 12:45 by Dr. J Luis Rose MD.         IMPRESSION/PLAN:    Assessment & Plan      Problem List Items Addressed This Visit       Esophageal reflux - Primary    Overview     Patient asymptomatic at this time.    LISA FUCHS esopahgitis seen on endoscopy 7/2020.      Anti-reflux measures were reviewed and discussed with patient.  They were advised to refrain from chocolate, alcohol, smoking, peppermint and caffeine.  They were advised to limit fatty foods, large meals, and eating late at nighttime.  They were advised to reach an ideal body mass index.    She is also on Fosamax.  I advised her to drink a large glass of water after taking this pill as this can cause esophagitis.  Patient up-to-date on her bone density study           Current Assessment & Plan     Refilled Pantoprazole daily. Pt instructed to call with any future problems swallowing and breakthrough acid reflux.         Relevant Medications    pantoprazole (PROTONIX) 40 MG EC tablet    Fatty liver    Overview     12/11/2023 labs normal LFT's, Fib-4 Score 0.86 (Cirrhosis unlikely)  Alpha-fetoprotein normal at 4.46 this was collected January 4, 2023  Remaining LFTs within normal limits on 1/4/2023  Liver ultrasound with elastography 8/31/2022: No liver masses with liver parenchyma somewhat coarsened echotexture, Metavir score F2    APRI 0.16 April 2022: consistent with unlikely cirrhosis or significant fibrosis  APRI Jan 2023= 0.2         Current Assessment & Plan     Would recommend routine liver ultrasound just for surveillance.  Last collected August 2022.    ..The principles of management of steatohepatitis are weight loss through a structured diet and exercise as well as meticulous control of any component of metabolic syndrome, including blood glucose levels, cholesterol and blood pressure.   The patient should strive to lose 2-4  monthly until reaching 7-10% reduction in weight.      Coffee consumption has been shown to decrease the risk of HCC in patients with chronic liver disease.  In these patients, coffee consumption should be encouraged.    I have advised to avoid fructose containing food and drink.     Daily alcohol intake should strickly be below 30gm in men and 20 gm in women.    A physical activity program should in 150-200 min/week of moderate intensity in 3-5 sessions. Resistance training to promote musculoskeletal fitness and improve metabolic factors was reviewed.           Relevant Orders    US Liver     Other Visit Diagnoses       Gastroesophageal reflux disease        Relevant Medications    pantoprazole (PROTONIX) 40 MG EC tablet    Esophageal dysphagia        Relevant Medications    pantoprazole (PROTONIX) 40 MG EC tablet          1 year follow up  Continue PPI                  Lovely Brito, APRN  01/08/24  09:49 CST    Part of this note may be an electronic transcription/translation of spoken language to printed text.

## 2024-01-08 NOTE — ASSESSMENT & PLAN NOTE
Refilled Pantoprazole daily. Pt instructed to call with any future problems swallowing and breakthrough acid reflux.  
Would recommend routine liver ultrasound just for surveillance.  Last collected August 2022.    ..The principles of management of steatohepatitis are weight loss through a structured diet and exercise as well as meticulous control of any component of metabolic syndrome, including blood glucose levels, cholesterol and blood pressure.   The patient should strive to lose 2-4 monthly until reaching 7-10% reduction in weight.      Coffee consumption has been shown to decrease the risk of HCC in patients with chronic liver disease.  In these patients, coffee consumption should be encouraged.    I have advised to avoid fructose containing food and drink.     Daily alcohol intake should strickly be below 30gm in men and 20 gm in women.    A physical activity program should in 150-200 min/week of moderate intensity in 3-5 sessions. Resistance training to promote musculoskeletal fitness and improve metabolic factors was reviewed.    
Yes

## 2024-01-08 NOTE — TELEPHONE ENCOUNTER
Rx Refill Note  Requested Prescriptions     Pending Prescriptions Disp Refills    zafirlukast (ACCOLATE) 20 MG tablet [Pharmacy Med Name: ZAFIRLUKAST TABS 60'S 20MG] 180 tablet 3     Sig: TAKE 1 TABLET TWICE A DAY      Last office visit with prescribing clinician: 12/05/2023  Last telemedicine visit with prescribing clinician: Visit date not found   Next office visit with prescribing clinician: 06/06/2024                        Would you like a call back once the refill request has been completed: [] Yes [] No    If the office needs to give you a call back, can they leave a voicemail: [] Yes [] No    Sara Xavier, HAN  01/08/24, 14:05 CST

## 2024-01-11 ENCOUNTER — HOSPITAL ENCOUNTER (OUTPATIENT)
Dept: ULTRASOUND IMAGING | Facility: HOSPITAL | Age: 59
Discharge: HOME | End: 2024-01-11
Admitting: NURSE PRACTITIONER
Payer: MEDICARE

## 2024-01-11 DIAGNOSIS — K76.0 FATTY LIVER: ICD-10-CM

## 2024-01-11 PROCEDURE — 76705 ECHO EXAM OF ABDOMEN: CPT

## 2024-01-11 NOTE — PROGRESS NOTES
Subjective  Sekou Duque comes then follow-up for tissue expander fill. She is doing well. Her right expander site she states is feeling better. Objective  Patient Active Problem List    Diagnosis Date Noted    S/P bilateral mastectomy and reconstruction with expanders 9/17/2021 09/20/2021    Atypical ductal hyperplasia of breast 09/14/2021    Primary osteoarthritis of left knee 05/23/2019    S/P lumpectomy, right breast 8/2008 11/15/2012    Breast cancer metastasized to axillary lymph node 1 of 11 11/15/2012    Family history of malignant neoplasm of breast, sister 11/18/2011    Personal history of malignant neoplasm of breast 06/06/2008       Current Outpatient Medications   Medication Sig Dispense Refill    HYDROcodone-acetaminophen (NORCO) 5-325 MG per tablet Take 1 tablet by mouth every 6 hours as needed for Pain.  15 tablet 0    nitroglycerin (NITRO-BID) 2 % ointment Place 0.5 inches onto the skin 2 times daily 1 each 3    Diclofenac Sodium (PENNSAID) 2 % SOLN Apply topically      THEOPHYLLINE CR PO Take 40 mg by mouth daily      levothyroxine (SYNTHROID) 50 MCG tablet Take 50 mcg by mouth daily      calcium citrate-vitamin D (CITRICAL + D) 315-250 MG-UNIT TABS per tablet Take 1 tablet by mouth daily      aspirin EC 81 MG EC tablet Take 1 tablet by mouth 2 times daily 60 tablet 0    docusate sodium (COLACE) 100 MG capsule Take 1 capsule by mouth daily To prevent constipation 20 capsule 0    ibuprofen (ADVIL;MOTRIN) 400 MG tablet Take 1 tablet by mouth every 8 hours as needed for Pain 30 tablet 0    loratadine (CLARITIN) 10 MG tablet Take 10 mg by mouth      pantoprazole (PROTONIX) 40 MG tablet Take 40 mg by mouth daily       tiZANidine (ZANAFLEX) 4 MG tablet Take 4 mg by mouth nightly       alendronate (FOSAMAX) 70 MG tablet Take 70 mg by mouth every 7 days Indications: Sunday   3    calcitRIOL (ROCALTROL) 0.5 MCG capsule Take 0.5 mcg by mouth daily   6    rOPINIRole (REQUIP) 0.25 MG tablet Take 0.25 mg by mouth nightly   3    fluticasone-salmeterol (ADVAIR) 500-50 MCG/DOSE diskus inhaler Inhale 1 puff into the lungs every 12 hours.  losartan (COZAAR) 25 MG tablet Take 100 mg by mouth daily       albuterol (PROVENTIL) (2.5 MG/3ML) 0.083% nebulizer solution Take 2.5 mg by nebulization every 4 hours as needed       LIPITOR 40 MG tablet Take 40 mg by mouth daily.  zafirlukast (ACCOLATE) 20 MG tablet Take 20 mg by mouth 2 times daily        No current facility-administered medications for this visit. Allergies: Cephalexin    Past Medical History:   Diagnosis Date    Asthma     Cancer ([facility] [address]     Breast Cancer    GERD (gastroesophageal reflux disease)     History of blood transfusion     1993    History of therapeutic radiation     Hx antineoplastic chemo     Hyperlipidemia     Hypertension     Osteoporosis     PONV (postoperative nausea and vomiting)     Thyroid disease     Wears glasses        Past Surgical History:   Procedure Laterality Date    APPENDECTOMY      BREAST BIOPSY  6/6/2008    Rt Breast Stereotactic Biopsy, Infiltrating ductal carcinoma, grade 1, Focus of low grade ductal carcinoma in-situ    BREAST RECONSTRUCTION Bilateral 9/14/2021    BILATERAL SKIN SPARING MASTECTOMY VIA URBANO PATTERN WITH TISSUE EXPANDER RECONSTRUCTION (12-15/GURPREET) AND TELABIO (CONNOR) BILATERAL PEC BLOCK performed by Valerie Pena MD at [address] [place]  2008    Dr Rosenbaum Marker  02/2021    ECTOPIC Claritza Cho      x 3    JOINT REPLACEMENT Left 2018    knee    KNEE ARTHROPLASTY Left 5/23/2019    LEFT KNEE UNI VERSUS performed by Herlinda Cook MD at [address], PARTIAL  7/3/2008    Rt Breast Lumpectomy & [place] lymph Node Biopsy displaying esidual infiltrating ductal carcinoma grade 1 with foci of low grade DCIS involving the margin.   1 of 2 sentinel lymph nodes positive for . 56mm micrometastisis.  MASTECTOMY, PARTIAL  8/7/2008    Right Partial Mastectomy Re-excision Right axillary node dissection 9 out of 9 lymph nodes negative for a total of 1 of 11 nodes positive for metastatic malignancy.  NE RMVL MARIELLA CTR VAD W/SUBQ PORT/ CTR/PRPH INSJ N/A 5/18/2017    PORT REMOVAL performed by Elva Jain MD at [address] Left 5/23/2019    LEFT TOTAL KNEE REPLACEMENT performed by Simi Brice MD at [place] [address]    Dr Ana Hu Right 6/25/2021     BREAST NEEDLE BIOPSY RIGHT 6/25/2021 [facility] [address]       Family History   Problem Relation Age of Onset    Diabetes Mother     Colon Polyps Mother     Breast Cancer Sister 52       Social History     Tobacco Use    Smoking status: Never Smoker    Smokeless tobacco: Never Used   Substance Use Topics    Alcohol use: No        Review of systems  Reviewed and positive for the above although system noted to be negative    Exam  Pulse 88, temperature 97.6 °F (36.4 °C), temperature source Temporal, height 5' 2\" (1.575 m), weight 180 lb (81.6 kg), last menstrual period 05/20/2008, SpO2 98 %. On examination to her chest wall, the right breast area there is some contracture from previous radiation. The left side there is no contracture. Assessment  Status post bilateral tissue expander placement  History radiation therapy to the right chest wall  Skin contracture    Plan  I placed 60 cc of fluid in the right tissue expander. We will plan to see her back in the office next week to discuss implant exchange with Dr. Giovani Muller. Attending Discharge Physical Examination:

## 2024-01-22 NOTE — PROGRESS NOTES
HISTORY OF PRESENT ILLNESS:    Ms. Gould present today for a breast exam followed by bilateral mastectomy and immediate reconstruction with expanders and AlloDerm on 9/14/2021. She had her surgery by Dr. Gonzalez October of 2022.  This was a Aspen flap on the right due to inability to expand the radiated right breast.    She is status post bilateral implant exchange on 12/28/2021.  She had placement of bilateral [id number] cohesive gel implants    She underwent a Right ultrasound guided breast biopsy.  Pathology demonstrated intraductal papilloma with atypia.    She has appropriate postoperative discomfort, and no significant complaints.    This is on the same side as her right breast cancer treated with lumpectomy and radiation in 2008.  She has had enough and wishes to consider bilateral mastectomies and reconstruction    PATHOLOGY REVEALS:  FINAL DIAGNOSIS:    A. Right breast, mastectomy:   Ductal carcinoma in situ, papillary and solid subtype.   Maximum tumor diameter is 0.6 cm.   The surgical margins and nipple are free of tumor.     B.  Left breast, mastectomy:   No malignancy identified.   Benign breast tissue.   The surgical margins are free of tumor.     She called last weekend concerned about swelling and pain in the left breast and I had her come in for evaluation.  I think it was just the block wearing off    Unilateral Ultrasound-8/1/2022  Unilateral left ultrasound 8/1/2022 demonstrates an area of fat necrosis under her lateral aspect of her Wise pattern incision.  There is nothing suspicious for malignancy and nothing worrisome at all.      PHYSICAL EXAM:  She looks quite good after her Aspen flap on the right.  She is symmetrical and soft.  She is ready for nipple tattooing.  There are no palpable masses, no other abnormalities.          IMPRESSION:    Doing well s/p bilateral implant exchange 12/28/2021  Doing well status post right Aspen flap by Dr. Gonzalez in [place] October 22    PLAN: Schedule

## 2024-01-31 ENCOUNTER — OFFICE VISIT (OUTPATIENT)
Dept: OBSTETRICS AND GYNECOLOGY | Age: 59
End: 2024-01-31
Payer: MEDICARE

## 2024-01-31 VITALS
HEIGHT: 61 IN | WEIGHT: 176 LBS | SYSTOLIC BLOOD PRESSURE: 118 MMHG | DIASTOLIC BLOOD PRESSURE: 86 MMHG | BODY MASS INDEX: 33.23 KG/M2

## 2024-01-31 DIAGNOSIS — Z92.29 HISTORY OF TAMOXIFEN THERAPY: ICD-10-CM

## 2024-01-31 DIAGNOSIS — Q50.5 PARA-OVARIAN CYST: ICD-10-CM

## 2024-01-31 DIAGNOSIS — N95.0 POSTMENOPAUSAL BLEEDING: Primary | ICD-10-CM

## 2024-01-31 DIAGNOSIS — Z78.9 NON-SMOKER: ICD-10-CM

## 2024-02-08 ENCOUNTER — OFFICE VISIT (OUTPATIENT)
Dept: SURGERY | Age: 59
End: 2024-02-08

## 2024-02-08 VITALS — WEIGHT: 176 LBS | HEART RATE: 76 BPM | HEIGHT: 62 IN | BODY MASS INDEX: 32.39 KG/M2

## 2024-02-08 DIAGNOSIS — Z85.3 PERSONAL HISTORY OF MALIGNANT NEOPLASM OF BREAST: ICD-10-CM

## 2024-02-08 DIAGNOSIS — Z90.13 S/P BILATERAL MASTECTOMY: Primary | ICD-10-CM

## 2024-02-08 DIAGNOSIS — Z98.890 S/P LUMPECTOMY, RIGHT BREAST: ICD-10-CM

## 2024-02-21 ENCOUNTER — TELEPHONE (OUTPATIENT)
Dept: SURGERY | Age: 59
End: 2024-02-21

## 2024-02-21 NOTE — TELEPHONE ENCOUNTER
Patient called to reschedule her nipple tatooing with celestino alex. Please return patient's call.

## 2024-03-07 DIAGNOSIS — M81.0 OSTEOPOROSIS WITHOUT CURRENT PATHOLOGICAL FRACTURE, UNSPECIFIED OSTEOPOROSIS TYPE: ICD-10-CM

## 2024-03-07 DIAGNOSIS — J45.40 MODERATE PERSISTENT ASTHMA WITHOUT COMPLICATION: ICD-10-CM

## 2024-03-07 RX ORDER — LORATADINE 10 MG/1
TABLET ORAL
Qty: 90 TABLET | Refills: 3 | Status: SHIPPED | OUTPATIENT
Start: 2024-03-07

## 2024-03-07 RX ORDER — ALENDRONATE SODIUM 70 MG/1
TABLET ORAL
Qty: 12 TABLET | Refills: 3 | Status: SHIPPED | OUTPATIENT
Start: 2024-03-07

## 2024-03-22 RX ORDER — ATORVASTATIN CALCIUM 40 MG/1
40 TABLET, FILM COATED ORAL DAILY
Qty: 90 TABLET | Refills: 3 | Status: SHIPPED | OUTPATIENT
Start: 2024-03-22

## 2024-04-15 NOTE — PROGRESS NOTES
[facility]  [address]  [place] [address] [phone]    Patient Name: Luna Cruz  Encounter Date: 04/29/2024  YOB: 1965  Patient Number: [id number]      REASON FOR FOLLOW-UP: Luna Cruz is a pleasant 58 y.o.  female who is seen on follow-up for stage IIA right breast carcinoma, lower inner quadrant, receptor positive and HER2/leonila negative.  She had completed 10 years of adjuvant tamoxifen 65 months ago. She is seen alone.  History taken from patient.  The patient is a reliable historian.          Oncology/Hematology History Overview Note   Ms. Luna Cruz is a pleasant 49 year old female with diagnosis of pT1pN1(mi)M0 right breast cancer, ER positive, ME positive, HER2/  leonila normal diagnosed in August 2008. She has passed the 3 yearmark.  Back then, the patient underwent lumpectomy. Tumor was  located in the lower inner quadrant of the right breast. [place] lymph node dissection found to have residual DCIS. She went on to receive  4 cycles of non anthracycline based chemotherapy with Taxotere and Cytoxan. This was followed by radiation therapy and has been  tamoxifen since 11/25/08.  She is here today for regular scheduled followup. . We will request sestamibi scan anyway. Her tumor markers have been stable. No  evidence of effusion or lymphadenopathy. Thyroid ultrasound on 07/25/11 ordered by Dr. Villasenor for hyperthyroidism study show small  thyroid, no lesions identified.  INTERVAL HISTORY  This patient had a lumpectomy for a stage IIA, estrogen receptorpositive,  HER2/  neunegative  breast cancer in 2008. She received  adjuvant interventions that included 5 years of tamoxifen administration.      DIAGNOSTIC ABNORMALITIES:DCIS:  Mammogram 06/25/2021.  New solid nodule within the upper outer quadrant of the right breast anterior depth. She was admitted 09/14/2021 and discharged  09/15/2021.  Right breast mastectomy 09/14/2021: Ductal carcinoma in situ, papillary and solid type.  Maximum tumor diameter 0.6 cm.  The surgical margins and nipple are free of tumor.Left breast mastectomy 09/14/2021: No malignancy identified.  Benign breast tissue.  The surgical margins are free of tumor.      PREVIOUS INTERVENTIONS:  Bilateral mastectomies 09/14/2021.         Ductal carcinoma in situ (DCIS) of right breast   4/1/2022 Initial Diagnosis    Ductal carcinoma in situ (DCIS) of right breast         PAST MEDICAL HISTORY:  ALLERGIES:  Allergies   Allergen Reactions    Cephalosporins Hives    Keflex [Cephalexin] Rash     CURRENT MEDICATIONS:  Outpatient Encounter Medications as of 4/29/2024   Medication Sig Dispense Refill    albuterol (PROVENTIL) (2.5 MG/3ML) 0.083% nebulizer solution USE 1 VIAL BY NEBULIZATION EVERY 4 HOURS AS NEEDED FOR WHEEZING 360 mL 5    albuterol sulfate  (90 Base) MCG/ACT inhaler USE 2 INHALATIONS EVERY 4 HOURS AS NEEDED FOR WHEEZING 25.5 g 3    alendronate (FOSAMAX) 70 MG tablet TAKE 1 TABLET ON SUNDAY EVERY 7 DAYS 12 tablet 3    aspirin 81 MG EC tablet Take 1 tablet by mouth Daily.      atorvastatin (LIPITOR) 40 MG tablet TAKE 1 TABLET DAILY 90 tablet 3    azelastine (ASTELIN) 0.1 % nasal spray USE 2 SPRAYS INTO THE NOSTRIL(S)  TWICE A DAY AS DIRECTED BY PROVIDER 30 mL 13    calcitriol (ROCALTROL) 0.5 MCG capsule TAKE 1 CAPSULE DAILY 90 capsule 3    Calcium Citrate-Vitamin D (CALCIUM + D PO) Take 1 tablet by mouth Daily.      docusate sodium (COLACE) 100 MG capsule Take 1 capsule by mouth As Needed.      fluticasone (FLONASE) 50 MCG/ACT nasal spray USE 2 SPRAYS INTO THE NOSTRIL(S) DAILY AS DIRECTED BY PROVIDER 16 g 11    Fluticasone-Salmeterol (Wixela Inhub) 250-50 MCG/ACT DISKUS Inhale 1 puff 2 (Two) Times a Day. 180 each 3    HYDROcodone-acetaminophen (NORCO) 5-325 MG per tablet Take 1 tablet by mouth 2 (Two) Times a Day As Needed for Moderate Pain.      ibuprofen  (ADVIL,MOTRIN) 400 MG tablet Take 1 tablet by mouth Every 8 (Eight) Hours As Needed for Mild Pain.      levothyroxine (SYNTHROID, LEVOTHROID) 50 MCG tablet TAKE 1 TABLET BY MOUTH DAILY 90 tablet 3    loratadine (CLARITIN) 10 MG tablet TAKE 1 TABLET DAILY AS NEEDED FOR ALLERGIES 90 tablet 3    losartan (COZAAR) 100 MG tablet TAKE 1 TABLET DAILY 90 tablet 3    methocarbamol (ROBAXIN) 750 MG tablet Take 1 tablet by mouth.      naloxone (NARCAN) 4 MG/0.1ML nasal spray 1 spray into the nostril(s) as directed by provider.      pantoprazole (PROTONIX) 40 MG EC tablet Take 1 tablet by mouth Daily. 90 tablet 3    potassium chloride ER (K-TAB) 20 MEQ tablet controlled-release ER tablet TAKE 1 TABLET BY MOUTH DAILY 90 tablet 3    rOPINIRole (REQUIP) 0.25 MG tablet TAKE 1 TABLET EVERY NIGHT 90 tablet 3    theophylline (UNIPHYL) 400 MG 24 hr tablet TAKE 1 TABLET DAILY 90 tablet 3    tiZANidine (ZANAFLEX) 4 MG tablet Take 1 tablet by mouth Every 8 (Eight) Hours As Needed for Muscle Spasms.      zafirlukast (ACCOLATE) 20 MG tablet TAKE 1 TABLET TWICE A  tablet 3     Facility-Administered Encounter Medications as of 4/29/2024   Medication Dose Route Frequency Provider Last Rate Last Admin    heparin flush (porcine) 100 UNIT/ML injection 500 Units  500 Units Intracatheter PRN Ki Manriquez MD   500 Units at 09/27/16 1108    heparin flush (porcine) 100 UNIT/ML injection 500 Units  500 Units Intravenous PRN Malathi Brantley APRN   500 Units at 05/05/17 1009    sodium chloride 0.9 % flush 10 mL  10 mL Intravenous PRN Ki Manriquez MD   10 mL at 09/27/16 1107    sodium chloride 0.9 % flush 10 mL  10 mL Intravenous PRN Malathi Brantley APRN   10 mL at 05/05/17 1009     ADULT ILLNESSES:  Patient Active Problem List   Diagnosis Code    Essential hypertension I10    Esophageal reflux K21.9    Family history of malignant neoplasm of breast Z80.3    History of malignant neoplasm of breast Z85.3     Hyperplastic polyp of transverse colon K63.5    Iron deficiency anemia D50.9    Malignant neoplasm of lower-inner quadrant of left breast in female, estrogen receptor positive C50.312, Z17.0    Elevated cholesterol E78.00    Left carotid bruit R09.89    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, 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    Osteoporosis M81.0    Vitamin D deficiency E55.9    Breast cancer C50.919    Fatty liver K76.0    Rectal bleeding K62.5    Non-smoker Z78.9    Non-seasonal allergic rhinitis J30.89    Restless legs syndrome (RLS) G25.81    Pulmonary scarring J98.4    Atypical ductal hyperplasia of breast N60.99    S/P bilateral mastectomy Z90.13    Moderate persistent asthma without complication J45.40    Ductal carcinoma in situ (DCIS) of right breast D05.11    History of COVID-19 Z86.16    Class 1 obesity due to excess calories with serious comorbidity and body mass index (BMI) of 33.0 to 33.9 in adult E66.09, Z68.33     SURGERIES:  Past Surgical History:   Procedure Laterality Date    ADENOIDECTOMY      APPENDECTOMY  1994    BREAST AUGMENTATION  12/2021    BREAST BIOPSY      BREAST LUMPECTOMY Right 2008    right sentinel lymph nodes were also removed for biopsy    BREAST RECONSTRUCTION Right 09/2022    CARDIAC CATHETERIZATION      CHOLECYSTECTOMY  1993    COLONOSCOPY  05/03/2013    Erythematous mucosa in the rectum-biopsied; Repeat 4 years     COLONOSCOPY N/A 06/14/2017    Normal; Repeat 5 years    COLONOSCOPY  04/23/2010    Dr. Arzola-Extremely poorly prepped colon    COLONOSCOPY N/A 07/24/2020    Hemorrhoids found on perianal exam; One 4mm hyperplastic polyp in the transverse colon; Normal mucosa in the entire examined colon-biopsied; Non-bleeding internal hemorrhoids; Repeat 5 years    COSMETIC SURGERY  9-    D & C HYSTEROSCOPY  1993    D & C HYSTEROSCOPY MYOSURE N/A 01/13/2021    Procedure: DILATATION AND CURETTAGE HYSTEROSCOPY WITH MYOSURE, polypectomy;  Surgeon: Marcello Salas MD;  Location: [facility] OR;  Service:  Obstetrics/Gynecology;  Laterality: N/A;    D & C HYSTEROSCOPY MYOSURE N/A 06/14/2023    Procedure: DILATATION AND CURETTAGE HYSTEROSCOPY;  Surgeon: Marcello Salas MD;  Location: [facility] OR;  Service: Obstetrics/Gynecology;  Laterality: N/A;    DILATATION AND CURETTAGE      ENDOSCOPY N/A 10/05/2016    Medium-sized HH; Widely patent and non-obstructing Schatzki ring-dilated; Procedure: ESOPHAGOGASTRODUODENOSCOPY WITH ANESTHESIA;  Surgeon: Ksenia Fox MD;  Location: [facility] ENDOSCOPY;  Service:     ENDOSCOPY N/A 02/27/2019    Normal 1st portion of the duodenum and 2 portion of the duodenum; A few gastric polyps-biopsied; Small HH; Widely patent Schatzki ring-dilated; Abnormal esophageal motility, suspicious for presbyesophagus; Arrange for barium swallow to assess for dysmotility    ENDOSCOPY N/A 07/24/2020    Medium-sized HH; LA Grade A reflux esophagitis; No endoscopic esophageal abnormality to explain patient's dysphagia-Esophagus dilated; Normal stomach; Normal examined duodenum; No specimens collected    ENDOSCOPY N/A 09/07/2022    Medium-sized HH; Non-obstructing Schatzki ring-Dilated; Normal stomach; Normal examined duodenum; No specimens collected    HERNIA REPAIR  09/26/2022    HYSTEROSCOPY      KNEE ARTHROSCOPY Left 11/13/2018    Procedure: LEFT KNEE ARTHROSCOPIC PARTIAL MEDIAL MENISCECTOMY;  Surgeon: Matt Maki MD;  Location: [facility] OR;  Service: Orthopedics    MASTECTOMY Bilateral 09/14/2021    MEDIPORT INSERTION, SINGLE  2008    MEDIPORT REMOVAL      PARATHYROIDECTOMY  2011    REPLACEMENT TOTAL KNEE Left 05/23/2019    TONSILLECTOMY AND ADENOIDECTOMY  1970    TUBAL ABDOMINAL LIGATION       HEALTH MAINTENANCE ITEMS:  There are no preventive care reminders to display for this patient.    <no information>  Last Completed Colonoscopy            COLORECTAL CANCER SCREENING (COLONOSCOPY - Every 5 Years) Next due on 7/24/2025 07/24/2020  COLONOSCOPY    07/24/2020  Surgical Procedure:  COLONOSCOPY    06/14/2017  Surgical Procedure: COLONOSCOPY    06/14/2017  COLONOSCOPY    05/16/2014  POCT occult blood stool    Only the first 5 history entries have been loaded, but more history exists.                  Immunization History   Administered Date(s) Administered    COVID-19 (PFIZER) BIVALENT 12+YRS 11/16/2022    COVID-19 (PFIZER) Purple Cap Monovalent 03/24/2021, 04/14/2021, 11/09/2021, 07/02/2022    COVID-19 F23 (PFIZER) 12YRS+ (COMIRNATY) 11/24/2023    Covid-19 (Pfizer) Gray Cap Monovalent 07/02/2022    Flu Vaccine Intradermal Quad 18-64YR 09/28/2018, 10/29/2019, 09/29/2020    Flublok 18+yrs 11/16/2022    Fluzone (or Fluarix & Flulaval for VFC) >6mos 10/10/2016, 09/30/2020, 10/12/2021    Influenza Injectable Mdck Pf Quad 09/10/2023    Pneumococcal Conjugate 13-Valent (PCV13) 01/01/2012    Pneumococcal Polysaccharide (PPSV23) 10/28/2014    Shingrix 12/02/2020, 02/16/2021    Tdap 12/08/2021    flucelvax quad pfs =>4 YRS 09/28/2018, 10/29/2019     Last Completed Mammogram       This patient has no relevant Health Maintenance data.              FAMILY HISTORY:  Family History   Problem Relation Age of Onset    Colon polyps Mother         < 60 years of age     Cirrhosis Mother     Diabetes Mother     Liver disease Mother     Colon polyps Sister 54        < 60 years of age     Breast cancer Sister     Cancer Sister     No Known Problems Father     No Known Problems Brother     No Known Problems Daughter     No Known Problems Son     Diabetes Maternal Grandmother     Heart disease Maternal Grandmother     No Known Problems Paternal Grandmother     No Known Problems Maternal Aunt     No Known Problems Paternal Aunt     Hyperlipidemia Maternal Grandfather     Stroke Maternal Grandfather     Colon cancer Neg Hx     Crohn's disease Neg Hx     Irritable bowel syndrome Neg Hx     Liver cancer Neg Hx     Rectal cancer Neg Hx     Stomach cancer Neg Hx     BRCA 1/2 Neg Hx     Endometrial cancer Neg Hx     Ovarian  "cancer Neg Hx     Uterine cancer Neg Hx     Esophageal cancer Neg Hx      SOCIAL HISTORY:  Social History     Socioeconomic History    Marital status:    Tobacco Use    Smoking status: Never     Passive exposure: Never    Smokeless tobacco: Never   Vaping Use    Vaping status: Never Used   Substance and Sexual Activity    Alcohol use: Never    Drug use: No    Sexual activity: Yes     Partners: Male     Birth control/protection: Post-menopausal       REVIEW OF SYSTEMS:    Review of Systems   Constitutional:         \"I feel good.\"   Respiratory:  Negative for shortness of breath and wheezing.    Cardiovascular:  Negative for chest pain and palpitations.   Gastrointestinal:  Negative for abdominal pain and nausea.   Genitourinary:  Negative for dysuria and hematuria.   Skin:  Negative for pallor.   Neurological:  Negative for dizziness, speech difficulty and weakness.   Psychiatric/Behavioral:  Negative for agitation, confusion and hallucinations.        VITAL SIGNS: /74   Pulse 73   Temp 97.1 °F (36.2 °C)   Resp 18   Ht 154.9 cm (61\")   Wt 81.2 kg (179 lb)   LMP  (LMP Unknown)   SpO2 97%   Breastfeeding No   BMI 33.82 kg/m²   Pain Score    04/29/24 1048   PainSc: 0-No pain       PHYSICAL EXAMINATION:     Physical Exam  Vitals reviewed.   Constitutional:       General: She is not in acute distress.  Cardiovascular:      Rate and Rhythm: Normal rate.   Pulmonary:      Effort: No respiratory distress.      Breath sounds: No wheezing.   Abdominal:      General: Bowel sounds are normal. There is no distension.      Palpations: Abdomen is soft.   Musculoskeletal:         General: No swelling.   Skin:     Coloration: Skin is not pale.   Neurological:      Mental Status: She is alert and oriented to person, place, and time.         LABS    Lab Results - Last 18 Months   Lab Units 04/29/24  1005 12/11/23  0747 06/07/23  1319 04/25/23  1021 01/04/23  1605 12/14/22  0636   HEMOGLOBIN g/dL 14.3 14.2 14.9 " "14.2 14.6 14.9   HEMATOCRIT % 44.7 42.9 46.9* 46.3 47.4* 46.4   MCV fL 89.0 87.7 89.2 90.8 88.6 88   WBC 10*3/mm3 7.97 7.43 8.39 12.82* 8.76 7.0   RDW % 13.9 13.7 13.7 14.4 14.5 13.9   MPV fL 10.0  --  9.9 9.9 10.0  --    PLATELETS 10*3/mm3 349 338 389 361 404 399   IMM GRAN % % 0.5  --  0.5 0.4 0.3  --    NEUTROS ABS 10*3/mm3 5.14 4.52 5.64 10.36* 5.35 3.8   LYMPHS ABS 10*3/mm3 1.68 1.67 1.91 1.28 2.08 1.9   MONOS ABS 10*3/mm3 0.55 0.58 0.49 0.68 0.71 0.6   EOS ABS 10*3/mm3 0.48* 0.56* 0.23 0.38 0.50* 0.6*   BASOS ABS 10*3/mm3 0.08 0.08 0.08 0.07 0.09 0.1   IMMATURE GRANS (ABS) 10*3/mm3 0.04  --  0.04 0.05 0.03  --    NRBC /100 WBC 0.0 0.0 0.0 0.0 0.0  --        Lab Results - Last 18 Months   Lab Units 04/29/24  1005 12/11/23  0747 06/07/23  1319 04/25/23  1021 01/04/23  1605 12/14/22  0636   GLUCOSE mg/dL 97 91 83 90 90 88   SODIUM mmol/L 141 142 138 141 140 139   POTASSIUM mmol/L 4.5 4.4 4.1 4.5 3.9 4.5   CO2 mmol/L 26.0 25.3 25.0 23.0 25.0 24   CHLORIDE mmol/L 105 105 102 105 105 101   ANION GAP mmol/L 10.0  --  11.0 13.0 10.0  --    CREATININE mg/dL 1.05* 1.06* 0.96 1.12* 0.85 1.03*   BUN mg/dL 14 12 13 14 11 11   BUN / CREAT RATIO  13.3 11.3 13.5 12.5 12.9 11   CALCIUM mg/dL 10.0 9.7 9.7 9.6 8.4* 10.1   ALK PHOS U/L 111 106  --  100 103 105   TOTAL PROTEIN g/dL 7.1  --   --  6.4 6.8  --    ALT (SGPT) U/L 12 12  --  21 19 20   AST (SGOT) U/L 14 17  --  20 22 23   BILIRUBIN mg/dL 0.3 0.4  --  0.4 0.3 0.3   ALBUMIN g/dL 4.2 4.3  --  4.4 4.1 4.2   GLOBULIN gm/dL 2.9  --   --  2.0 2.7  --        No results for input(s): \"MSPIKE\", \"KAPPALAMB\", \"IGLFLC\", \"URICACID\", \"FREEKAPPAL\", \"CEA\", \"LDH\", \"REFLABREPO\" in the last [id number] hours.    Lab Results - Last 18 Months   Lab Units 12/11/23  0747 12/14/22  0636   TSH uIU/mL 2.450 4.260       Luna Cruz reports a pain score of 0.        ASSESSMENT:  1.   Right breast cancer.  Lower inner quadrant.  AJCC stage:IIA (pT1, pN1mi, cM0).  Receptor status: Estrogen positive, " progesterone positive and HER-2/leonila negative.  Treatment status: Post lumpectomy, post 4 cycles of adjuvant docetaxel and Cytoxan.  Post adjuvant radiation.  Post adjuvant tamoxifen 11/25/2008 through 11/24/2018.  2.   Right breast ductal carcinoma in situ.  Tumor size 0.6 cm.  AJCC stage: 0 (pTis, cN0, cM0, G2)  Treatment status: Post skin sparing mastectomy, right and left simple mastectomy.  3.   Performance status of 0.  4.   Anemia from iron deficiency post Venofer 05/14/2018 through 06/08/2018, 8 doses.  Post Injectafer 05/15/2020 and 05/22/2020.             PLAN:  1.   Re: Heme status.  WBC 7.97, ANC 5.1, eosinophil 0.48 from 0.56, observe, hemoglobin 14.3, MCV 89 and platelets 349.   2.   Re: CMP.  GFR 61.3 from 68.7 from 57.1 from 79.5 ml/min.  3.   Re: Stable for observation, breast cancer.  4.   Re: No mammogram, she had bilateral mastectomy.   5  Continue care per primary care physician and other specialists.  6.  Plan of care discussed with patient.  Understanding expressed.  Patient able to proceed.  7.  Return to office in 12 months with preoffice CBC with differential and CMP.         I have reviewed the assessment and plan and verified the accuracy of it. No changes to assessment and plan since the information was documented. Salvador Zuleta MD 04/29/24             I spent 20 total minutes, face-to-face, caring for Luna today. Greater than 50% of this time involved counseling and/or coordination of care as documented within this note.            (Shen Houston MD)  (Ksenia Fox MD)  (Kevin Aguirre MD)  (Marcello Salas MD)  (Chaz Gaines MD)       Laceration  to  right  malar  aspect    No additional  injuries

## 2024-04-29 ENCOUNTER — OFFICE VISIT (OUTPATIENT)
Dept: ONCOLOGY | Facility: CLINIC | Age: 59
End: 2024-04-29
Payer: MEDICARE

## 2024-04-29 ENCOUNTER — LAB (OUTPATIENT)
Dept: LAB | Facility: HOSPITAL | Age: 59
End: 2024-04-29
Payer: MEDICARE

## 2024-04-29 VITALS
WEIGHT: 179 LBS | RESPIRATION RATE: 18 BRPM | TEMPERATURE: 97.1 F | HEART RATE: 73 BPM | HEIGHT: 61 IN | SYSTOLIC BLOOD PRESSURE: 132 MMHG | DIASTOLIC BLOOD PRESSURE: 74 MMHG | BODY MASS INDEX: 33.79 KG/M2 | OXYGEN SATURATION: 97 %

## 2024-04-29 DIAGNOSIS — C50.919 MALIGNANT NEOPLASM OF FEMALE BREAST, UNSPECIFIED ESTROGEN RECEPTOR STATUS, UNSPECIFIED LATERALITY, UNSPECIFIED SITE OF BREAST: Primary | ICD-10-CM

## 2024-04-29 DIAGNOSIS — D05.11 DUCTAL CARCINOMA IN SITU (DCIS) OF RIGHT BREAST: Primary | ICD-10-CM

## 2024-04-29 DIAGNOSIS — Z85.3 HISTORY OF MALIGNANT NEOPLASM OF BREAST: Primary | ICD-10-CM

## 2024-04-29 LAB
ALBUMIN SERPL-MCNC: 4.2 G/DL (ref 3.5–5.2)
ALBUMIN/GLOB SERPL: 1.4 G/DL
ALP SERPL-CCNC: 111 U/L (ref 39–117)
ALT SERPL W P-5'-P-CCNC: 12 U/L (ref 1–33)
ANION GAP SERPL CALCULATED.3IONS-SCNC: 10 MMOL/L (ref 5–15)
AST SERPL-CCNC: 14 U/L (ref 1–32)
BASOPHILS # BLD AUTO: 0.08 10*3/MM3 (ref 0–0.2)
BASOPHILS NFR BLD AUTO: 1 % (ref 0–1.5)
BILIRUB SERPL-MCNC: 0.3 MG/DL (ref 0–1.2)
BUN SERPL-MCNC: 14 MG/DL (ref 6–20)
BUN/CREAT SERPL: 13.3 (ref 7–25)
CALCIUM SPEC-SCNC: 10 MG/DL (ref 8.6–10.5)
CHLORIDE SERPL-SCNC: 105 MMOL/L (ref 98–107)
CO2 SERPL-SCNC: 26 MMOL/L (ref 22–29)
CREAT SERPL-MCNC: 1.05 MG/DL (ref 0.57–1)
DEPRECATED RDW RBC AUTO: 45.1 FL (ref 37–54)
EGFRCR SERPLBLD CKD-EPI 2021: 61.3 ML/MIN/1.73
EOSINOPHIL # BLD AUTO: 0.48 10*3/MM3 (ref 0–0.4)
EOSINOPHIL NFR BLD AUTO: 6 % (ref 0.3–6.2)
ERYTHROCYTE [DISTWIDTH] IN BLOOD BY AUTOMATED COUNT: 13.9 % (ref 12.3–15.4)
GLOBULIN UR ELPH-MCNC: 2.9 GM/DL
GLUCOSE SERPL-MCNC: 97 MG/DL (ref 65–99)
HCT VFR BLD AUTO: 44.7 % (ref 34–46.6)
HGB BLD-MCNC: 14.3 G/DL (ref 12–15.9)
HOLD SPECIMEN: NORMAL
IMM GRANULOCYTES # BLD AUTO: 0.04 10*3/MM3 (ref 0–0.05)
IMM GRANULOCYTES NFR BLD AUTO: 0.5 % (ref 0–0.5)
LYMPHOCYTES # BLD AUTO: 1.68 10*3/MM3 (ref 0.7–3.1)
LYMPHOCYTES NFR BLD AUTO: 21.1 % (ref 19.6–45.3)
MCH RBC QN AUTO: 28.5 PG (ref 26.6–33)
MCHC RBC AUTO-ENTMCNC: 32 G/DL (ref 31.5–35.7)
MCV RBC AUTO: 89 FL (ref 79–97)
MONOCYTES # BLD AUTO: 0.55 10*3/MM3 (ref 0.1–0.9)
MONOCYTES NFR BLD AUTO: 6.9 % (ref 5–12)
NEUTROPHILS NFR BLD AUTO: 5.14 10*3/MM3 (ref 1.7–7)
NEUTROPHILS NFR BLD AUTO: 64.5 % (ref 42.7–76)
NRBC BLD AUTO-RTO: 0 /100 WBC (ref 0–0.2)
PLATELET # BLD AUTO: 349 10*3/MM3 (ref 140–450)
PMV BLD AUTO: 10 FL (ref 6–12)
POTASSIUM SERPL-SCNC: 4.5 MMOL/L (ref 3.5–5.2)
PROT SERPL-MCNC: 7.1 G/DL (ref 6–8.5)
RBC # BLD AUTO: 5.02 10*6/MM3 (ref 3.77–5.28)
SODIUM SERPL-SCNC: 141 MMOL/L (ref 136–145)
WBC NRBC COR # BLD AUTO: 7.97 10*3/MM3 (ref 3.4–10.8)
WHOLE BLOOD HOLD SPECIMEN: NORMAL

## 2024-04-29 PROCEDURE — 80053 COMPREHEN METABOLIC PANEL: CPT | Performed by: INTERNAL MEDICINE

## 2024-04-29 PROCEDURE — 3075F SYST BP GE 130 - 139MM HG: CPT | Performed by: INTERNAL MEDICINE

## 2024-04-29 PROCEDURE — 36415 COLL VENOUS BLD VENIPUNCTURE: CPT

## 2024-04-29 PROCEDURE — 1159F MED LIST DOCD IN RCRD: CPT | Performed by: INTERNAL MEDICINE

## 2024-04-29 PROCEDURE — 99213 OFFICE O/P EST LOW 20 MIN: CPT | Performed by: INTERNAL MEDICINE

## 2024-04-29 PROCEDURE — 3078F DIAST BP <80 MM HG: CPT | Performed by: INTERNAL MEDICINE

## 2024-04-29 PROCEDURE — 85025 COMPLETE CBC W/AUTO DIFF WBC: CPT | Performed by: INTERNAL MEDICINE

## 2024-04-29 PROCEDURE — 1126F AMNT PAIN NOTED NONE PRSNT: CPT | Performed by: INTERNAL MEDICINE

## 2024-05-28 ENCOUNTER — PROCEDURE VISIT (OUTPATIENT)
Dept: SURGERY | Age: 59
End: 2024-05-28
Payer: MEDICARE

## 2024-05-28 VITALS
WEIGHT: 183 LBS | DIASTOLIC BLOOD PRESSURE: 74 MMHG | HEIGHT: 62 IN | SYSTOLIC BLOOD PRESSURE: 138 MMHG | BODY MASS INDEX: 33.68 KG/M2

## 2024-05-28 DIAGNOSIS — Z90.13 ACQUIRED ABSENCE OF BOTH BREASTS AND NIPPLES: ICD-10-CM

## 2024-05-28 DIAGNOSIS — Z85.3 PERSONAL HISTORY OF MALIGNANT NEOPLASM OF BREAST: Primary | ICD-10-CM

## 2024-05-28 PROCEDURE — 11921 CORRECT SKN COLOR 6.1-20.0CM: CPT | Performed by: PHYSICIAN ASSISTANT

## 2024-05-28 PROCEDURE — 11922 CORRECT SKIN COLOR EA 20.0CM: CPT | Performed by: PHYSICIAN ASSISTANT

## 2024-05-30 ENCOUNTER — TELEPHONE (OUTPATIENT)
Dept: PULMONOLOGY | Facility: CLINIC | Age: 59
End: 2024-05-30
Payer: MEDICARE

## 2024-05-30 NOTE — TELEPHONE ENCOUNTER
Called and reminded patient of the CXR Dr. Aguirre ordered for her to have done before her follow up. Patient voiced understanding.

## 2024-05-31 ENCOUNTER — HOSPITAL ENCOUNTER (OUTPATIENT)
Dept: GENERAL RADIOLOGY | Facility: HOSPITAL | Age: 59
Discharge: HOME | End: 2024-05-31
Payer: MEDICARE

## 2024-05-31 DIAGNOSIS — J98.4 PULMONARY SCARRING: ICD-10-CM

## 2024-05-31 DIAGNOSIS — J45.40 MODERATE PERSISTENT ASTHMA WITHOUT COMPLICATION: ICD-10-CM

## 2024-05-31 PROCEDURE — 71045 X-RAY EXAM CHEST 1 VIEW: CPT

## 2024-06-06 ENCOUNTER — OFFICE VISIT (OUTPATIENT)
Dept: PULMONOLOGY | Facility: CLINIC | Age: 59
End: 2024-06-06
Payer: MEDICARE

## 2024-06-06 VITALS
DIASTOLIC BLOOD PRESSURE: 86 MMHG | SYSTOLIC BLOOD PRESSURE: 112 MMHG | OXYGEN SATURATION: 97 % | WEIGHT: 180.2 LBS | HEART RATE: 88 BPM | HEIGHT: 61 IN | BODY MASS INDEX: 34.02 KG/M2

## 2024-06-06 DIAGNOSIS — J98.4 PULMONARY SCARRING: ICD-10-CM

## 2024-06-06 DIAGNOSIS — E66.09 CLASS 1 OBESITY DUE TO EXCESS CALORIES WITH SERIOUS COMORBIDITY AND BODY MASS INDEX (BMI) OF 33.0 TO 33.9 IN ADULT: ICD-10-CM

## 2024-06-06 DIAGNOSIS — G47.33 OSA (OBSTRUCTIVE SLEEP APNEA): ICD-10-CM

## 2024-06-06 DIAGNOSIS — Z78.9 NON-SMOKER: ICD-10-CM

## 2024-06-06 DIAGNOSIS — J45.40 MODERATE PERSISTENT ASTHMA WITHOUT COMPLICATION: Primary | ICD-10-CM

## 2024-06-06 DIAGNOSIS — J30.89 NON-SEASONAL ALLERGIC RHINITIS, UNSPECIFIED TRIGGER: ICD-10-CM

## 2024-06-06 DIAGNOSIS — Z86.16 HISTORY OF COVID-19: ICD-10-CM

## 2024-06-06 DIAGNOSIS — G25.81 RESTLESS LEGS SYNDROME (RLS): ICD-10-CM

## 2024-06-06 PROCEDURE — 3074F SYST BP LT 130 MM HG: CPT | Performed by: INTERNAL MEDICINE

## 2024-06-06 PROCEDURE — 3079F DIAST BP 80-89 MM HG: CPT | Performed by: INTERNAL MEDICINE

## 2024-06-06 PROCEDURE — 99214 OFFICE O/P EST MOD 30 MIN: CPT | Performed by: INTERNAL MEDICINE

## 2024-06-06 NOTE — PROGRESS NOTES
[facility] OUTPATIENT PROGRESS NOTE    Patient: Luna Cruz  : 1965  Age: 58 y.o.  Date of Service: 2024    REASON FOR CLINIC VISIT:  Chief Complaint   Patient presents with    Asthma       Subjective:    History of Present Illness:  Luna Cruz is a 58 y.o. female who presents to the office today to be seen for    Diagnosis Plan   1. Moderate persistent asthma without complication        2. Non-smoker        3. History of COVID-19        4. Pulmonary scarring        5. Restless legs syndrome (RLS)        6. Class 1 obesity due to excess calories with serious comorbidity and body mass index (BMI) of 33.0 to 33.9 in adult        7. Non-seasonal allergic rhinitis, unspecified trigger        8. MAMADOU (obstructive sleep apnea)        .  Other problems per record.    History:    Patient is a very pleasant middle-aged  female was seen in the pulmonary clinic for follow-up visit.    Patient is a non-smoker but has history of moderate asthma which is noted in the last pulmonary function test and she is currently using Wixela or Advair and also uses albuterol rescue inhaler and nebulizer as needed but not frequently.  She has restless leg syndrome and is on Requip.  She also is on theophylline for long time.  She has nasal allergy and rhinosinusitis and currently using fluticasone nasal spray, Astelin nasal spray, loratadine and Accolate.  Her medications are filled by the primary care provider and she did not need any medication refills today.  She recently had a fall and a left foot ankle injury and her left foot is on the brace currently.  She has obesity and has some sleep disturbance and snoring with tiredness and fatigue during the daytime.  She told me she had a sleep study done many years ago and that her sleep apnea not detected but no further follow-up was done.  Sleep study was ordered to rule out obstructive sleep apnea.  She had recent chest x-ray done which  shows clear lung fields    Patient had all the vaccinations needed.  She lives with her .  She is retired.  She has no send hospitalizations and ER visit and urgent care visit any other new complaints.  She is not using any supplemental oxygen or any CPAP at this time    PFT done today:  Not done today    PFT Values          2023    10:00   Pre Drug PFT Results   FVC 62   FEV1 57   FEF 25-75% 43   FEV1/FVC 74   Other Tests PFT Results   TLC 70   RV 76   DLCO 84   D/VAsb 121     Results for orders placed in visit on 23    Pulmonary Function Test    Narrative  Pulmonary Function Test  Performed by: Sara Cramer, RRT  Authorized by: Kevin Aguirre MD    Pre Drug % Predicted  FVC: 62%  FEV1: 57%  FEF 25-75%: 43%  FEV1/FVC: 74%  T%  RV: 76%  DLCO: 84%  D/VAsb: 121%    Interpretation  Overall comments:  Above test results are acceptable and reproducible by ATS criteria.  From analysis of the above test results the patient showed evidence of moderate obstructive airway dysfunction.  No bronchodilator challenge was done.  The lung volumes are decreased supporting evidence of moderate restrictive dysfunction.  Diffusion capacity corrected for alveolar volume is above normal limits.    Comparison of the prior pulmonary function test done a few years ago there is no significant changes noted.  Clinical correlation was indicated.    Kevin Aguirre MD  Pulmonologist/Intensivist  2023 12:38 CDT      Results for orders placed in visit on 21    Pulmonary Function Test    Narrative  Pulmonary Function Test  Performed by: Sara Cramer, JHOAN  Authorized by: Kevin Aguirre MD    Pre Drug % Predicted  FVC: 69%  FEV1: 53%  FEF 25-75%: 24%  FEV1/FVC: 62.94%  T%  RV: 120%  DLCO: 88%  D/VAsb: 134%    Interpretation  Overall comments:  The above test results were acceptable and reproducible by ATS criteria.  From analysis of the above test results the patient showed evidence of  moderate to severe obstructive airway dysfunction.  No bronchodilator challenge was done.  There was mild restrictive dysfunction noted from decreased TLC  Air trapping noted from increased residual volume  Patient capacity corrected for single breath was within normal limits  No prior test result was available for comparison  Clinical correlation was indicated.    Kevin Aguirre MD  Pulmonologist/Intensivist  11/9/2021 14:37 CST      Results for orders placed in visit on 10/29/19    Pulmonary Function Test    Narrative  Pulmonary Function Test  Performed by: Klaus Wagner APRN  Authorized by: Klaus Wagner APRN    Pre Drug  FVC: 62%  FEV1: 52%  FEF 25-75%: 31%  FEV1/FVC: 69.23%         Bronchodilator therapy: Wixela and Albuterol nebulizer and rescue inhaler    Smoking Status:   Social History     Tobacco Use   Smoking Status Never    Passive exposure: Never   Smokeless Tobacco Never     Pulm Rehab: no  Sleep: yes    Support System: lives with their spouse    Code Status:   There are no questions and answers to display.        Review of Systems:  A complete review of systems is performed and all other systems were reviewed and negative as note above in the HPI.  Review of Systems   Constitutional:  Positive for fatigue.        Sleep disturbances,Snoring    HENT:  Positive for congestion, postnasal drip and sinus pressure.    Eyes: Negative.    Respiratory:  Positive for cough, chest tightness and shortness of breath.    Cardiovascular: Negative.    Gastrointestinal: Negative.    Endocrine: Negative.    Genitourinary: Negative.    Musculoskeletal:  Positive for arthralgias and back pain.        Left foot tendon injury   Skin: Negative.    Allergic/Immunologic: Positive for environmental allergies.   Neurological:  Positive for weakness and light-headedness.   Hematological: Negative.    Psychiatric/Behavioral: Negative.         CAT/ACT Score:  Not done today    Medications:  Outpatient  Encounter Medications as of 6/6/2024   Medication Sig Dispense Refill    albuterol (PROVENTIL) (2.5 MG/3ML) 0.083% nebulizer solution USE 1 VIAL BY NEBULIZATION EVERY 4 HOURS AS NEEDED FOR WHEEZING 360 mL 5    albuterol sulfate  (90 Base) MCG/ACT inhaler USE 2 INHALATIONS EVERY 4 HOURS AS NEEDED FOR WHEEZING 25.5 g 3    alendronate (FOSAMAX) 70 MG tablet TAKE 1 TABLET ON SUNDAY EVERY 7 DAYS 12 tablet 3    aspirin 81 MG EC tablet Take 1 tablet by mouth Daily.      atorvastatin (LIPITOR) 40 MG tablet TAKE 1 TABLET DAILY 90 tablet 3    azelastine (ASTELIN) 0.1 % nasal spray USE 2 SPRAYS INTO THE NOSTRIL(S)  TWICE A DAY AS DIRECTED BY PROVIDER 30 mL 13    calcitriol (ROCALTROL) 0.5 MCG capsule TAKE 1 CAPSULE DAILY 90 capsule 3    Calcium Citrate-Vitamin D (CALCIUM + D PO) Take 1 tablet by mouth Daily.      docusate sodium (COLACE) 100 MG capsule Take 1 capsule by mouth As Needed.      fluticasone (FLONASE) 50 MCG/ACT nasal spray USE 2 SPRAYS INTO THE NOSTRIL(S) DAILY AS DIRECTED BY PROVIDER 16 g 11    Fluticasone-Salmeterol (Wixela Inhub) 250-50 MCG/ACT DISKUS Inhale 1 puff 2 (Two) Times a Day. 180 each 3    HYDROcodone-acetaminophen (NORCO) 5-325 MG per tablet Take 1 tablet by mouth 2 (Two) Times a Day As Needed for Moderate Pain.      ibuprofen (ADVIL,MOTRIN) 400 MG tablet Take 1 tablet by mouth Every 8 (Eight) Hours As Needed for Mild Pain.      levothyroxine (SYNTHROID, LEVOTHROID) 50 MCG tablet TAKE 1 TABLET BY MOUTH DAILY 90 tablet 3    loratadine (CLARITIN) 10 MG tablet TAKE 1 TABLET DAILY AS NEEDED FOR ALLERGIES 90 tablet 3    losartan (COZAAR) 100 MG tablet TAKE 1 TABLET DAILY 90 tablet 3    methocarbamol (ROBAXIN) 750 MG tablet Take 1 tablet by mouth.      naloxone (NARCAN) 4 MG/0.1ML nasal spray 1 spray into the nostril(s) as directed by provider.      pantoprazole (PROTONIX) 40 MG EC tablet Take 1 tablet by mouth Daily. 90 tablet 3    potassium chloride ER (K-TAB) 20 MEQ tablet controlled-release ER  "tablet TAKE 1 TABLET BY MOUTH DAILY 90 tablet 3    rOPINIRole (REQUIP) 0.25 MG tablet TAKE 1 TABLET EVERY NIGHT 90 tablet 3    theophylline (UNIPHYL) 400 MG 24 hr tablet TAKE 1 TABLET DAILY 90 tablet 3    tiZANidine (ZANAFLEX) 4 MG tablet Take 1 tablet by mouth Every 8 (Eight) Hours As Needed for Muscle Spasms.      zafirlukast (ACCOLATE) 20 MG tablet TAKE 1 TABLET TWICE A  tablet 3     Facility-Administered Encounter Medications as of 6/6/2024   Medication Dose Route Frequency Provider Last Rate Last Admin    heparin flush (porcine) 100 UNIT/ML injection 500 Units  500 Units Intracatheter PRN Ki Manriquez MD   500 Units at 09/27/16 1108    sodium chloride 0.9 % flush 10 mL  10 mL Intravenous PRN Ki Manriquez MD   10 mL at 09/27/16 1107       Allergies:  Allergies   Allergen Reactions    Cephalosporins Hives    Keflex [Cephalexin] Rash       Immunizations:  Immunization History   Administered Date(s) Administered    COVID-19 (PFIZER) BIVALENT 12+YRS 11/16/2022    COVID-19 (PFIZER) Purple Cap Monovalent 03/24/2021, 04/14/2021, 11/09/2021, 07/02/2022    COVID-19 F23 (PFIZER) 12YRS+ (COMIRNATY) 11/24/2023    Covid-19 (Pfizer) Gray Cap Monovalent 07/02/2022    Flu Vaccine Intradermal Quad 18-64YR 09/28/2018, 10/29/2019, 09/29/2020    Flublok 18+yrs 11/16/2022    Fluzone (or Fluarix & Flulaval for VFC) >6mos 10/10/2016, 09/30/2020, 10/12/2021    Influenza Injectable Mdck Pf Quad 09/10/2023    Pneumococcal Conjugate 13-Valent (PCV13) 01/01/2012    Pneumococcal Polysaccharide (PPSV23) 10/28/2014    Shingrix 12/02/2020, 02/16/2021    Tdap 12/08/2021    flucelvax quad pfs =>4 YRS 09/28/2018, 10/29/2019       Objective:    Vitals:  /86   Pulse 88   Ht 154.9 cm (61\")   Wt 81.7 kg (180 lb 3.2 oz)   LMP  (LMP Unknown)   SpO2 97% Comment: RA  Breastfeeding No   BMI 34.05 kg/m²     Physical Exam:  General: Patient is a 58 y.o. pleasant middle aged  female. Looks stated age. " Appears to be in no acute distress.  Eyes: EOMI. PERRLA. Vision intact. No scleral icterus.  Ear, Nose, Mouth and Throat: Hearing is grossly intact. No Leukoplakia, pharyngitis, stomatitis or thrush. Swollen nasal mucosa with post nasal drop.  Neck: Range of motion of neck normal. No thyromegaly or masses. Mallampati Class 3  Respiratory: Clear to auscultation bilaterally. No use of accessory muscles. Decreased breath sounds.  Cardiovascular: Normal heart sounds. Regularly regular rhythm without murmur.  Gastrointestinal: Non tender, non distended, soft. Bowel sounds positive in all four quadrants. No organomegaly.  Skin: No obvious rashes, lesions, ulcers or large amount of bruising. No edema. Left foot boot .  Neurological: No new motor deficits. Cranial nerves appear intact.  Psychiatric: Patient is alert and oriented to person, place and time.    Chest Imaging:      Study Result    Narrative & Impression   EXAM: XR CHEST 1 VW-      DATE: 5/31/2024 9:01 AM     HISTORY: Asthma; J45.40-Moderate persistent asthma, uncomplicated;  J98.4-Other disorders of lung       COMPARISON: 6/1/2023.     TECHNIQUE:  Frontal view(s) of the chest submitted.     FINDINGS:    The lungs are grossly clear. No effusion or pneumothorax is seen. Heart  and mediastinum are unremarkable.        IMPRESSION:     1. No active disease in the chest.     This report was signed and finalized on 5/31/2024 10:10 AM by Amaury Caruso.       Assessment:  1. Moderate persistent asthma without complication    2. Non-smoker    3. History of COVID-19    4. Pulmonary scarring    5. Restless legs syndrome (RLS)    6. Class 1 obesity due to excess calories with serious comorbidity and body mass index (BMI) of 33.0 to 33.9 in adult    7. Non-seasonal allergic rhinitis, unspecified trigger    8. MAMADOU (obstructive sleep apnea)        Plan/Recommendations:    1.  I reviewed the patient's chest x-ray which is clear.  She has moderate asthma noted in the PFT and  continue using the Advair with albuterol rescue and nebulizer as needed.  She is also taking theophylline which is a long-term medications and she wanted to stay on it and when it was stopped and that before she had no problem.  We will need to monitor the troponin level from time to time  2.  For her nasal allergy she will continue fluticasone nasal spray, Astelin nasal spray, Accolate and loratadine.  No prescription refill was needed.  3.  Patient has restless syndrome and has sleep disturbances and she was advised to continue on ropinirole and ordered a sleep study to rule out the sleep apnea which may need CPAP treatment which was in his left close recommended.  Patient gained some weight since the last visit and recently had a left foot tendon injury for which she is on a foot brace and could not walk very well.  4.  Continue all other treatment as before.  Patient history of COVID infection in the past.  She will be returning to pulmonary clinic in 6 months time or earlier if needed.  She is up-to-date on her vaccinations.  She will continue follow-up with primary care provider.    Follow up:  6 Months    Time Spent:  30 minutes    I appreciate the opportunity of participating in this patient's care. I would like to thank the PCP for the referral.  Please feel free to contact me with any other questions.    Kevin Aguirre MD   Pulmonologist/Intensivist     Electronically signed by: Kevin Aguirre MD, 6/6/2024 09:55 CDT

## 2024-06-17 ENCOUNTER — OFFICE VISIT (OUTPATIENT)
Dept: INTERNAL MEDICINE | Facility: CLINIC | Age: 59
End: 2024-06-17
Payer: MEDICARE

## 2024-06-17 VITALS
TEMPERATURE: 98.6 F | HEIGHT: 61 IN | DIASTOLIC BLOOD PRESSURE: 80 MMHG | SYSTOLIC BLOOD PRESSURE: 142 MMHG | HEART RATE: 84 BPM | BODY MASS INDEX: 33.99 KG/M2 | WEIGHT: 180 LBS | OXYGEN SATURATION: 97 %

## 2024-06-17 DIAGNOSIS — E55.9 VITAMIN D DEFICIENCY: ICD-10-CM

## 2024-06-17 DIAGNOSIS — E66.09 CLASS 1 OBESITY DUE TO EXCESS CALORIES WITH SERIOUS COMORBIDITY AND BODY MASS INDEX (BMI) OF 34.0 TO 34.9 IN ADULT: ICD-10-CM

## 2024-06-17 DIAGNOSIS — M85.80 OSTEOPENIA, UNSPECIFIED LOCATION: ICD-10-CM

## 2024-06-17 DIAGNOSIS — Z79.899 ENCOUNTER FOR LONG-TERM CURRENT USE OF MEDICATION: ICD-10-CM

## 2024-06-17 DIAGNOSIS — S99.912D INJURY OF LEFT ANKLE, SUBSEQUENT ENCOUNTER: ICD-10-CM

## 2024-06-17 DIAGNOSIS — I10 ESSENTIAL HYPERTENSION: Primary | ICD-10-CM

## 2024-06-17 DIAGNOSIS — D50.8 OTHER IRON DEFICIENCY ANEMIA: ICD-10-CM

## 2024-06-17 DIAGNOSIS — J45.40 MODERATE PERSISTENT ASTHMA WITHOUT COMPLICATION: ICD-10-CM

## 2024-06-17 DIAGNOSIS — C50.919 MALIGNANT NEOPLASM OF FEMALE BREAST, UNSPECIFIED ESTROGEN RECEPTOR STATUS, UNSPECIFIED LATERALITY, UNSPECIFIED SITE OF BREAST: ICD-10-CM

## 2024-06-17 PROCEDURE — 1126F AMNT PAIN NOTED NONE PRSNT: CPT | Performed by: INTERNAL MEDICINE

## 2024-06-17 PROCEDURE — G2211 COMPLEX E/M VISIT ADD ON: HCPCS | Performed by: INTERNAL MEDICINE

## 2024-06-17 PROCEDURE — 3079F DIAST BP 80-89 MM HG: CPT | Performed by: INTERNAL MEDICINE

## 2024-06-17 PROCEDURE — 99214 OFFICE O/P EST MOD 30 MIN: CPT | Performed by: INTERNAL MEDICINE

## 2024-06-17 PROCEDURE — 3077F SYST BP >= 140 MM HG: CPT | Performed by: INTERNAL MEDICINE

## 2024-06-17 NOTE — PROGRESS NOTES
"      Chief Complaint  Hypertension (6 month follow up/Wellness due after 12/14/24- schedule apt for labs )    Subjective        Luna Cruz presents to [facility]    HPI    Patient here for the above problems.  See Assessment and Plan for further HPI components.      Review of Systems    Objective   Vital Signs:  /80 (BP Location: Left arm, Patient Position: Sitting, Cuff Size: Adult) Comment: has not had medication for morning  Pulse 84   Temp 98.6 °F (37 °C) (Temporal)   Ht 154.9 cm (61\")   Wt 81.6 kg (180 lb)   SpO2 97%   BMI 34.01 kg/m²   Estimated body mass index is 34.01 kg/m² as calculated from the following:    Height as of this encounter: 154.9 cm (61\").    Weight as of this encounter: 81.6 kg (180 lb).      Physical Exam  Vitals and nursing note reviewed.   Constitutional:       Appearance: She is not ill-appearing.      Comments: Left foot in boot   Eyes:      General: No scleral icterus.     Conjunctiva/sclera: Conjunctivae normal.   Cardiovascular:      Rate and Rhythm: Normal rate and regular rhythm.   Pulmonary:      Effort: Pulmonary effort is normal. No respiratory distress.   Skin:     General: Skin is warm.      Coloration: Skin is not pale.   Neurological:      General: No focal deficit present.      Mental Status: She is alert and oriented to person, place, and time.   Psychiatric:         Mood and Affect: Mood normal.         Behavior: Behavior normal.                       Assessment and Plan   Diagnoses and all orders for this visit:    1. Essential hypertension (Primary)  -     CBC & Differential; Future  -     Comprehensive Metabolic Panel; Future  -     Urinalysis With Microscopic - Urine, Clean Catch; Future    2. Other iron deficiency anemia  -     CBC & Differential; Future    3. Vitamin D deficiency  -     Vitamin D,25-Hydroxy; Future    4. Osteopenia, unspecified location  -     Vitamin D,25-Hydroxy; Future    5. Encounter for " long-term current use of medication  -     CBC & Differential; Future  -     Comprehensive Metabolic Panel; Future  -     Urinalysis With Microscopic - Urine, Clean Catch; Future  -     Lipid Panel; Future  -     TSH Rfx On Abnormal To Free T4; Future    6. Injury of left ankle, subsequent encounter    7. Moderate persistent asthma without complication    8. Class 1 obesity due to excess calories with serious comorbidity and body mass index (BMI) of 34.0 to 34.9 in adult    9. Malignant neoplasm of female breast, unspecified estrogen receptor status, unspecified laterality, unspecified site of breast      Patient has a BMI > 30.  Obesity increases risks of diseases such as diabetes, coronary artery disease, hypertension, sleep apnea, hyperlipidemia, arthritis, and stroke.  Recommend focusing on portion control and making healthy diet choices.  Avoid fast food.  Avoid calorie beverages including sweet tea, juice, soft drinks, and alcohol.  Recommend increasing your activity and starting a regular exercise program. Can consider utilizing calorie counting apps such as [employer].      Patient has a history of breast cancer.  The patient follows with Dr. Zuleta.  Patient reports that her last checkup she got a good report.  Patient also has osteopenia.  We will check vitamin D.    Patient had injury to her left ankle.  Patient reports that it is a tendon issue.  Patient is currently in a boot.  Discussed with her to try to monitor dietary intake while she is limited in her mobility to avoid any extra weight gain.      Patient has hypertension.  BP is not at goal  The patient's BP goal is < 130/80.  Recommend ambulatory BP monitoring after patient has taken medication.  Recommend varying the times of the blood pressure readings.  Patient is currently on losartan 100 mg.  Recommend we continue current regimen.    Continue to monitor.  Patient has not had her medication this AM yet.      Patient has asthma.  Follows with  pulmonary.      Result Review :  The following data was reviewed by: Chaz Gaines MD on 06/17/2024:  CMP          12/11/2023    07:47 4/29/2024    10:05   CMP   Glucose 91  97    BUN 12  14    Creatinine 1.06  1.05    EGFR  61.3    Sodium 142  141    Potassium 4.4  4.5    Chloride 105  105    Calcium 9.7  C 10.0    Total Protein 6.1     Total Protein  7.1    Albumin 4.3  4.2    Globulin 1.8     Globulin  2.9    Total Bilirubin 0.4  0.3    Alkaline Phosphatase 106  111    AST (SGOT) 17  14    ALT (SGPT) 12  12    Albumin/Globulin Ratio  1.4    BUN/Creatinine Ratio 11.3  13.3    Anion Gap  10.0       Details         C Corrected result             CBC w/diff          12/11/2023    07:47 4/29/2024    10:05   CBC w/Diff   WBC 7.43  7.97    RBC 4.89  5.02    Hemoglobin 14.2  14.3    Hematocrit 42.9  44.7    MCV 87.7  89.0    MCH 29.0  28.5    MCHC 33.1  32.0    RDW 13.7  13.9    Platelets 338  349    Neutrophil Rel % 60.8  64.5    Immature Granulocyte Rel %  0.5    Lymphocyte Rel % 22.5  21.1    Monocyte Rel % 7.8  6.9    Eosinophil Rel % 7.5  6.0    Basophil Rel % 1.1  1.0           BMI is >= 30 and <35. (Class 1 Obesity). The following options were offered after discussion;: exercise counseling/recommendations and nutrition counseling/recommendations      BMI is >= 30 and <35. (Class 1 Obesity). The following options were offered after discussion;: exercise counseling/recommendations and nutrition counseling/recommendations          Follow Up   Return in about 6 months (around 12/17/2024), or if symptoms worsen or fail to improve, for Medicare Wellness - Labs prior to visit, follow up for above problems. Longitudinal care..  Patient was given instructions and counseling regarding her condition or for health maintenance advice. Please see specific information pulled into the AVS if appropriate.       CELESTINE Gaines MD, FACP, [facility]      Electronically signed by Chaz Gaines MD, 06/17/24, 12:54 PM  CDT.

## 2024-08-05 ENCOUNTER — PROCEDURE VISIT (OUTPATIENT)
Dept: SURGERY | Age: 59
End: 2024-08-05
Payer: MEDICARE

## 2024-08-05 VITALS
SYSTOLIC BLOOD PRESSURE: 124 MMHG | BODY MASS INDEX: 32.94 KG/M2 | DIASTOLIC BLOOD PRESSURE: 72 MMHG | WEIGHT: 179 LBS | HEIGHT: 62 IN

## 2024-08-05 DIAGNOSIS — Z90.13 ACQUIRED ABSENCE OF BOTH BREASTS AND NIPPLES: ICD-10-CM

## 2024-08-05 DIAGNOSIS — Z85.3 PERSONAL HISTORY OF MALIGNANT NEOPLASM OF BREAST: Primary | ICD-10-CM

## 2024-08-05 PROCEDURE — 11921 CORRECT SKN COLOR 6.1-20.0CM: CPT | Performed by: PHYSICIAN ASSISTANT

## 2024-08-05 PROCEDURE — 11922 CORRECT SKIN COLOR EA 20.0CM: CPT | Performed by: PHYSICIAN ASSISTANT

## 2024-08-05 NOTE — PROGRESS NOTES
Ms. Gould presents for a nipple tattoo touch up.  Due to radiation on the right side and scarring on the left, the pigment did not take as well as hoped.  The tattoo sites were chosen and marked with a stencil on her bilateral breast mounds after cleansing her skin with alcohol.  Pigments were chosen with the patient's input.   A 9 magnum needle was used for the outline of the left areola.  Topical anesthesia cream was then applied to the area.  A 9 magnum needle was used to outline the right areola.  Topical anesthesia cream was applied to the area.  Attention was returned to the left side.  The lidocaine cream was wiped off.  A 9 magnum needle was used to shade in the areola.  This part of the procedure was repeated on the right side. The 9 magnum needle was used for final shading of the nipple halo and spotlight.  Bacitracin ointment and plastic wrap were applied bilaterally.  She tolerated the procedure well.     She may shower tomorrow.  She will not let the shower water hit the tattoos directly.  She will pat them dry.  She will apply bacitracin BID for 3 days and keep the tattoos covered in plastic wrap.  She will apply vaseline for 4 days BID and not cover them.  I will see her in 2 months for a touch up appointment.            Measurements were taken as follows:  Left NAC diameter 4.2 cm  Right NAC diameter 4.2 cm  Left NAC surface area 13.2 cm ^2  Right NAC surface area 13.2 cm^2  Total surface area tattooed 26.4 cm^2

## 2024-08-23 ENCOUNTER — HOSPITAL ENCOUNTER (OUTPATIENT)
Dept: MRI IMAGING | Age: 59
Discharge: HOME | End: 2024-08-23
Payer: MEDICARE

## 2024-08-23 DIAGNOSIS — M77.52 OTHER ENTHESOPATHY OF LEFT FOOT AND ANKLE: ICD-10-CM

## 2024-08-23 DIAGNOSIS — M79.672 LEFT FOOT PAIN: ICD-10-CM

## 2024-08-23 DIAGNOSIS — M76.72 PERONEAL TENDINITIS OF LEFT LOWER EXTREMITY: ICD-10-CM

## 2024-08-23 PROCEDURE — 73721 MRI JNT OF LWR EXTRE W/O DYE: CPT

## 2024-08-28 ENCOUNTER — HOSPITAL ENCOUNTER (OUTPATIENT)
Dept: SLEEP CENTER | Age: 59
Discharge: HOME | End: 2024-08-30
Payer: MEDICARE

## 2024-08-28 PROCEDURE — 95810 POLYSOM 6/> YRS 4/> PARAM: CPT

## 2024-08-29 NOTE — PROGRESS NOTES
[facility]  [address] [place] [address]  [id number]  Phone [phone] Fax [phone]     Sleep Study Technician Review    Patient Name:  Bridgette Gould  :   1965  Referring Provider: Misty Chiu MD    [facility] Fall Risk Assessment    Have you fallen in the past year? YES[] NO[x]  Do you feel unsteady when standing or walking? YES[x] NO[]  Are you worried about falling? YES[] NO[x]     aFall Risk screening requirement has been met    At risk due to: Left foot pain    Brief History:  Bridgette Gould is a 58 y.o. female with a history of asthma, fatigue, hypertension, osteopenia, obesity, osteopenia, RLS, and snoring who presented for a split-night PSG if the AHI was ? 15. Pt did not qualify for a spilt night PSG.     Height:   61\"  Weight: 180lbs  BMI:  34  Neck Circ: 16\"  Mallampati  4  ESS:  2    Type of Study: PSG  Time Stage Position Snore Hypopnea Obs Apnea Delilah Apnea PAP O2   2200 N2 Supine Yes Yes No No N/A RA   2300 N2 Supine Yes No No No N/A RA   2400 N2 Right Yes No No No N/A RA   0100 Wake Left Yes No No No N/A RA   0200 Wake Supine No No No No N/A RA   0300 N2 Supine Yes Yes No No N/A RA   0400 N2 Supine Yes Yes No No N/A RA   0420 L/on N2/W Supine No No No No N/A RA     Summary: Pt arrived at the sleep center on time. Tech introduced self, verified pt's name and , and escorted pt to room. Tech explained procedure and answered questions. Pt was instructed in supine sleep. Pt verbalized understanding of procedure. Pt was prepared for PSG per protocol without complication. Sleep latency was within normal limits. The estimated AHI is ? 5. The SpO2 lindsey was 89%. EKG showed NSR. Limb movements were  noted. Nocturia x 1. Pt did not qualify for a split-night PSG. 0420 Pt was awake, up to use the restroom, and requested to end study      [facility]         The study was reviewed briefly with Bridgette Gould.   will be notified of the formal  results and recommendations after the study is scored and interpreted.  The report will be sent to the patient's referring provider.    Technician: TIFFANY Terry, JALEEL,RPSGT,RST

## 2024-08-30 NOTE — PROGRESS NOTES
[facility]  [facility] [place] [address]  Phone [phone] Fax [phone]       Polysomnography Report  Patient Name Bridgette Gould Account Number [id number]    1965 Referring Provider Misty Chiu M.D.   Age/ Gender 58 years/F Interpreting physician Guicho Peck M.D., [facility], [facility]   Neck circumference/  Mallampati classification 16.0 in/class 4 Night Technician Randal Terry, CRT, RPSGT   [place] score  Scoring Technician Marcela Macias, CRT, RPSGT   Height 61.0 in Indications for the test excessive daytime somnolence   Weight 180.0 lbs Test Diagnostic Polysomnogram   BMI 34.0 Date of test 2024     Procedure  A Diagnostic Polysomnogram was conducted on the night of 2024.  The study was performed and scored per AASM guidelines.  The following were monitored: frontal, central, and occipital EEG, electrooculogram (EOG), submentalis EMG, nasal and oral airflow, intranasal pressure, thoracic plethysmography, abdominal plethysmography, anterior tibialis EMG, electrocardiogram, body position, and positive airway pressure (PAP).  Arterial oxygen saturation was monitored with a pulse oximeter.  The study was scored utilizing 30 second epochs.  Hypopneas were scored using per AASM definition VIII, D, 1B.    Sleep Scoring Data  Lights out 9:00:43 PM Sleep latency 2.7 min Time in N1 54.0 min N1% 16.5%   Lights on 4:20:25 AM WASO 109.5 min Time in N2 222.0 min N2% 67.8%   TIB/.7 min Sleep efficiency 76.1% Time in N3 0.0 min N3% 0.0%   .5 min REM latency 201.5 min Time in R 51.5 min R% 15.7%     Respiratory Events Summary   NREM REM Total   Hypopnea index 0.0 0.0 0.2   Apnea index 0.0 1.2 0.2   RERA index 0.0 0.0 0.0   AHI 0.0 1.2 0.4   RDI (AHI + RERA index) 0.0 1.2 0.4     Respiratory Events by Sleep Stage   Obstructive Apneas OA Index Central Apneas CA Index Mixed Apneas MA Index   Hypopneas H Index   RERAs   R Index   NREM 0

## 2024-09-05 ENCOUNTER — FOLLOWUP TELEPHONE ENCOUNTER (OUTPATIENT)
Dept: SLEEP CENTER | Age: 59
End: 2024-09-05

## 2024-09-06 ENCOUNTER — TELEPHONE (OUTPATIENT)
Dept: PULMONOLOGY | Facility: CLINIC | Age: 59
End: 2024-09-06
Payer: MEDICARE

## 2024-09-06 DIAGNOSIS — G47.33 OSA (OBSTRUCTIVE SLEEP APNEA): ICD-10-CM

## 2024-09-06 NOTE — TELEPHONE ENCOUNTER
The [facility] received a fax that requires your attention. The document has been indexed to the patient’s chart for your review.      Reason for sending: RESULTS    Documents Description: POLYSOMNOGRAPHY REPORT-[facility]-8/28/24    Name of Sender: [facility]    Date Indexed: 9/6/24    Notes (if needed):

## 2024-09-30 ENCOUNTER — OFFICE VISIT (OUTPATIENT)
Age: 59
End: 2024-09-30
Payer: MEDICARE

## 2024-09-30 VITALS — HEIGHT: 62 IN | BODY MASS INDEX: 32.94 KG/M2 | WEIGHT: 179 LBS

## 2024-09-30 DIAGNOSIS — J45.998 OTHER ASTHMA: ICD-10-CM

## 2024-09-30 DIAGNOSIS — S86.312D PERONEAL TENDON TEAR, LEFT, SUBSEQUENT ENCOUNTER: Primary | ICD-10-CM

## 2024-09-30 PROCEDURE — 3017F COLORECTAL CA SCREEN DOC REV: CPT | Performed by: NURSE PRACTITIONER

## 2024-09-30 PROCEDURE — 1036F TOBACCO NON-USER: CPT | Performed by: NURSE PRACTITIONER

## 2024-09-30 PROCEDURE — G8427 DOCREV CUR MEDS BY ELIG CLIN: HCPCS | Performed by: NURSE PRACTITIONER

## 2024-09-30 PROCEDURE — G8417 CALC BMI ABV UP PARAM F/U: HCPCS | Performed by: NURSE PRACTITIONER

## 2024-09-30 PROCEDURE — 99213 OFFICE O/P EST LOW 20 MIN: CPT | Performed by: NURSE PRACTITIONER

## 2024-09-30 RX ORDER — THEOPHYLLINE 400 MG/1
400 TABLET, EXTENDED RELEASE ORAL DAILY
Qty: 90 TABLET | Refills: 3 | Status: SHIPPED | OUTPATIENT
Start: 2024-09-30

## 2024-09-30 RX ORDER — ROPINIROLE 0.25 MG/1
TABLET, FILM COATED ORAL
Qty: 90 TABLET | Refills: 3 | Status: SHIPPED | OUTPATIENT
Start: 2024-09-30

## 2024-09-30 NOTE — PROGRESS NOTES
daily      calcium citrate-vitamin D (CITRICAL + D) 315-250 MG-UNIT TABS per tablet Take 1 tablet by mouth daily      aspirin EC 81 MG EC tablet Take 1 tablet by mouth 2 times daily 60 tablet 0    ibuprofen (ADVIL;MOTRIN) 400 MG tablet Take 1 tablet by mouth every 8 hours as needed for Pain 30 tablet 0    loratadine (CLARITIN) 10 MG tablet Take 1 tablet by mouth      pantoprazole (PROTONIX) 40 MG tablet Take 1 tablet by mouth daily      tiZANidine (ZANAFLEX) 4 MG tablet Take 1 tablet by mouth nightly      alendronate (FOSAMAX) 70 MG tablet Take 1 tablet by mouth every 7 days Indications: Sunday  3    calcitRIOL (ROCALTROL) 0.5 MCG capsule Take 1 capsule by mouth daily  6    rOPINIRole (REQUIP) 0.25 MG tablet Take 1 tablet by mouth nightly  3    fluticasone-salmeterol (ADVAIR) 500-50 MCG/DOSE diskus inhaler Inhale 1 puff into the lungs in the morning and 1 puff in the evening.      losartan (COZAAR) 25 MG tablet Take 4 tablets by mouth daily      albuterol (PROVENTIL) (2.5 MG/3ML) 0.083% nebulizer solution Take 3 mLs by nebulization every 4 hours as needed for Wheezing      LIPITOR 40 MG tablet Take 1 tablet by mouth daily      zafirlukast (ACCOLATE) 20 MG tablet Take 1 tablet by mouth 2 times daily       No current facility-administered medications for this visit.        Allergies  Allergies   Allergen Reactions    Cephalexin Rash    Cephalosporins Hives and Rash        Review of Systems  System  Neg/Pos  Details  Constitutional  Negative  Chills, Fatigue, Fever and Night Sweats  Respiratory  Negative  Chest Pain, Cough and Dyspnea  Cardio   Negative  Leg Swelling  GI   Negative  Abdominal Pain, Constipation, Nausea and Vomiting     Negative  Urinary Incontinence   Endocrine  Negative  Weight Gain and Weight Loss  MS   Negative  Except as noted in HPI and Chief Complaint    Ht 1.575 m (5' 2\")   Wt 81.2 kg (179 lb)   LMP 05/20/2008   BMI 32.74 kg/m²      Physical Exam   Physical Examination:  General: The

## 2024-10-14 ENCOUNTER — TELEPHONE (OUTPATIENT)
Age: 59
End: 2024-10-14

## 2024-10-14 NOTE — TELEPHONE ENCOUNTER
----- Message from Juli ZABALA sent at 10/14/2024  2:38 PM CDT -----  Regarding: [facility]  [facility]    Reason for Message: Dr. Land patient-   [facility]less[facility] asking what quantity of medication Levenox the patient needs.     Additional Information: [facility] [place] [facility] advise.     Call Back Number: [phone]  Caller Relationship: Other   Caller Name: Alessia

## 2024-10-16 ENCOUNTER — OFFICE VISIT (OUTPATIENT)
Age: 59
End: 2024-10-16

## 2024-10-16 VITALS — BODY MASS INDEX: 32.76 KG/M2 | HEIGHT: 62 IN | WEIGHT: 178 LBS

## 2024-10-16 DIAGNOSIS — S86.302S PERONEAL TENDON INJURY, LEFT, SEQUELA: ICD-10-CM

## 2024-10-16 DIAGNOSIS — Z47.89 AFTERCARE FOLLOWING SURGERY OF THE MUSCULOSKELETAL SYSTEM: Primary | ICD-10-CM

## 2024-10-16 RX ORDER — OXYCODONE AND ACETAMINOPHEN 7.5; 325 MG/1; MG/1
TABLET ORAL
COMMUNITY
Start: 2024-10-09

## 2024-10-16 RX ORDER — ONDANSETRON 4 MG/1
TABLET, FILM COATED ORAL
COMMUNITY
Start: 2024-10-09

## 2024-10-16 RX ORDER — OXYCODONE AND ACETAMINOPHEN 7.5; 325 MG/1; MG/1
1 TABLET ORAL EVERY 4 HOURS PRN
Qty: 16 TABLET | Refills: 0 | Status: SHIPPED | OUTPATIENT
Start: 2024-10-16 | End: 2024-10-21

## 2024-10-16 RX ORDER — ENOXAPARIN SODIUM 100 MG/ML
INJECTION SUBCUTANEOUS
COMMUNITY
Start: 2024-10-15

## 2024-10-16 NOTE — PROGRESS NOTES
Casting Notes:    Type of cast/splint: Short Leg Cast Application     Material Used:  1. 3 rolls 6inch ,1 roll 4 inch cast padding     2.      3.    Fiberglass Cast Tape: 3 rolls 4 inch fiberglass cast tape    Reason for Application:     ICD-10-CM    1. Aftercare following surgery of the musculoskeletal system  Z47.89 oxyCODONE-acetaminophen (PERCOCET) 7.5-325 MG per tablet     APPLY SHORT LEG CAST     CT CAST SUPL SHORT LEG ADULT FIBERGLASS      2. Peroneal tendon injury, left, sequela  S86.302S oxyCODONE-acetaminophen (PERCOCET) 7.5-325 MG per tablet     APPLY SHORT LEG CAST     CT CAST SUPL SHORT LEG ADULT FIBERGLASS      Patent was placed in a well padded short leg fiberglass cast without and complications.    Patient was given cast care instructions, and told to call the office with any complaints, or concerns.     Patient was also told to keep their routine follow up appointment.    Reji Ash MA

## 2024-10-16 NOTE — PROGRESS NOTES
[facility]  [facility]  [address]  [facility] [id number] [phone]     Patient: Bridgette Gould   YOB: 1965   Date: 10/16/2024   Visit Type:  Post op    Body Part: foot left    What was the date of surgery? 10/09/2024    Pain medication refill? yes    History of Present Illness  Chief Complaint   Patient presents with    Post-Op Check     Left foot       This is a 59 y.o. female  presents today for follow up of peroneus brevis tendon repair left.  She is doing well.  Does need more pain medication.    Review of Systems  System  Neg/Pos  Details  Constitutional  Negative  Chills, Fatigue, Fever and Night Sweats  Respiratory  Negative  Chest Pain, Cough and Dyspnea  Cardio   Negative  Leg Swelling  GI   Negative  Abdominal Pain, Constipation, Nausea and Vomiting     Negative  Urinary Incontinence   Endocrine  Negative  Weight Gain and Weight Loss  MS   Negative  Except as noted in HPI and Chief Complaint    Past Medical History:   Diagnosis Date    Asthma     Cancer (HCC)     Breast Cancer    GERD (gastroesophageal reflux disease)     History of blood transfusion     1993    History of therapeutic radiation     Hx antineoplastic chemo     Hyperlipidemia     Hypertension     Osteoporosis     Pneumonia     PONV (postoperative nausea and vomiting)     Post-menopausal     Thyroid disease     Wears glasses       Past Surgical History:   Procedure Laterality Date    APPENDECTOMY      BREAST BIOPSY  6/6/2008    Rt Breast Stereotactic Biopsy, Infiltrating ductal carcinoma, grade 1, Focus of low grade ductal carcinoma in-situ    BREAST ENHANCEMENT SURGERY Bilateral 12/28/2021    BILATERAL IMPLANT EXCHANGE (300-500, EXTRA PROJECTION & COHESIVE),SURGERY OF THE LEFT CAPSULE performed by Deandre George MD at [facility] OR    BREAST RECONSTRUCTION Bilateral 9/14/2021    BILATERAL SKIN SPARING MASTECTOMY VIA URBANO PATTERN WITH TISSUE

## 2024-10-23 ENCOUNTER — OFFICE VISIT (OUTPATIENT)
Age: 59
End: 2024-10-23

## 2024-10-23 VITALS — BODY MASS INDEX: 30.73 KG/M2 | HEIGHT: 64 IN | WEIGHT: 180 LBS

## 2024-10-23 DIAGNOSIS — Z47.89 AFTERCARE FOLLOWING SURGERY OF THE MUSCULOSKELETAL SYSTEM: Primary | ICD-10-CM

## 2024-10-23 PROCEDURE — 99024 POSTOP FOLLOW-UP VISIT: CPT | Performed by: PODIATRIST

## 2024-10-23 NOTE — PROGRESS NOTES
[facility]  [facility]  [address]  [facility] [id number] [phone]     Patient: Bridgette Gould   YOB: 1965   Date: 10/23/2024   Visit Type:  Post op    Body Part: Foot left      History of Present Illness  Chief Complaint   Patient presents with    Follow-up     2 week cast removal and follow up       This is a 59 y.o. female  presents today for follow up of peroneus brevis tendon repair left no complaints.  She did come in early to have a cast change her cast was getting loose.    Review of Systems  System  Neg/Pos  Details  Constitutional  Negative  Chills, Fatigue, Fever and Night Sweats  Respiratory  Negative  Chest Pain, Cough and Dyspnea  Cardio   Negative  Leg Swelling  GI   Negative  Abdominal Pain, Constipation, Nausea and Vomiting     Negative  Urinary Incontinence   Endocrine  Negative  Weight Gain and Weight Loss  MS   Negative  Except as noted in HPI and Chief Complaint    Past Medical History:   Diagnosis Date    Asthma     Cancer (HCC)     Breast Cancer    GERD (gastroesophageal reflux disease)     History of blood transfusion     1993    History of therapeutic radiation     Hx antineoplastic chemo     Hyperlipidemia     Hypertension     Osteoporosis     Pneumonia     PONV (postoperative nausea and vomiting)     Post-menopausal     Thyroid disease     Wears glasses       Past Surgical History:   Procedure Laterality Date    APPENDECTOMY      BREAST BIOPSY  6/6/2008    Rt Breast Stereotactic Biopsy, Infiltrating ductal carcinoma, grade 1, Focus of low grade ductal carcinoma in-situ    BREAST ENHANCEMENT SURGERY Bilateral 12/28/2021    BILATERAL IMPLANT EXCHANGE (300-500, EXTRA PROJECTION & COHESIVE),SURGERY OF THE LEFT CAPSULE performed by Deandre George MD at [facility] OR    BREAST RECONSTRUCTION Bilateral 9/14/2021    BILATERAL SKIN SPARING MASTECTOMY VIA URBANO PATTERN WITH TISSUE EXPANDER RECONSTRUCTION

## 2024-11-13 ENCOUNTER — TELEPHONE (OUTPATIENT)
Age: 59
End: 2024-11-13

## 2024-12-06 ENCOUNTER — LAB (OUTPATIENT)
Dept: INTERNAL MEDICINE | Facility: CLINIC | Age: 59
End: 2024-12-06
Payer: MEDICARE

## 2024-12-06 ENCOUNTER — OFFICE VISIT (OUTPATIENT)
Dept: PULMONOLOGY | Facility: CLINIC | Age: 59
End: 2024-12-06
Payer: MEDICARE

## 2024-12-06 VITALS
WEIGHT: 174 LBS | DIASTOLIC BLOOD PRESSURE: 80 MMHG | OXYGEN SATURATION: 99 % | SYSTOLIC BLOOD PRESSURE: 132 MMHG | BODY MASS INDEX: 32.85 KG/M2 | HEART RATE: 86 BPM | HEIGHT: 61 IN

## 2024-12-06 DIAGNOSIS — E66.09 CLASS 1 OBESITY DUE TO EXCESS CALORIES WITH SERIOUS COMORBIDITY AND BODY MASS INDEX (BMI) OF 34.0 TO 34.9 IN ADULT: ICD-10-CM

## 2024-12-06 DIAGNOSIS — J98.4 PULMONARY SCARRING: ICD-10-CM

## 2024-12-06 DIAGNOSIS — I10 ESSENTIAL HYPERTENSION: ICD-10-CM

## 2024-12-06 DIAGNOSIS — Z79.899 ENCOUNTER FOR LONG-TERM CURRENT USE OF MEDICATION: ICD-10-CM

## 2024-12-06 DIAGNOSIS — D50.8 OTHER IRON DEFICIENCY ANEMIA: ICD-10-CM

## 2024-12-06 DIAGNOSIS — E66.811 CLASS 1 OBESITY DUE TO EXCESS CALORIES WITH SERIOUS COMORBIDITY AND BODY MASS INDEX (BMI) OF 34.0 TO 34.9 IN ADULT: ICD-10-CM

## 2024-12-06 DIAGNOSIS — E55.9 VITAMIN D DEFICIENCY: ICD-10-CM

## 2024-12-06 DIAGNOSIS — M85.80 OSTEOPENIA, UNSPECIFIED LOCATION: ICD-10-CM

## 2024-12-06 DIAGNOSIS — Z86.16 HISTORY OF COVID-19: ICD-10-CM

## 2024-12-06 DIAGNOSIS — J45.40 MODERATE PERSISTENT ASTHMA WITHOUT COMPLICATION: Primary | ICD-10-CM

## 2024-12-06 DIAGNOSIS — G25.81 RESTLESS LEGS SYNDROME (RLS): ICD-10-CM

## 2024-12-06 DIAGNOSIS — G47.33 OSA (OBSTRUCTIVE SLEEP APNEA): ICD-10-CM

## 2024-12-06 DIAGNOSIS — Z78.9 NON-SMOKER: ICD-10-CM

## 2024-12-06 DIAGNOSIS — J30.89 NON-SEASONAL ALLERGIC RHINITIS, UNSPECIFIED TRIGGER: ICD-10-CM

## 2024-12-06 PROCEDURE — 99214 OFFICE O/P EST MOD 30 MIN: CPT | Performed by: INTERNAL MEDICINE

## 2024-12-06 PROCEDURE — 3075F SYST BP GE 130 - 139MM HG: CPT | Performed by: INTERNAL MEDICINE

## 2024-12-06 PROCEDURE — 3079F DIAST BP 80-89 MM HG: CPT | Performed by: INTERNAL MEDICINE

## 2024-12-06 NOTE — PROGRESS NOTES
RESPIRATORY DISEASE CLINIC OUTPATIENT PROGRESS NOTE    Patient: Luna Cruz  : 1965  Age: 59 y.o.  Date of Service: 2024    REASON FOR CLINIC VISIT:  Chief Complaint   Patient presents with    Asthma       Subjective:    History of Present Illness:  Luna Cruz is a 59 y.o. female who presents to the office today to be seen for    Diagnosis Plan   1. Moderate persistent asthma without complication        2. MAMADOU (obstructive sleep apnea)        3. Non-smoker        4. History of COVID-19        5. Restless legs syndrome (RLS)        6. Pulmonary scarring        7. Non-seasonal allergic rhinitis, unspecified trigger        8. Class 1 obesity due to excess calories with serious comorbidity and body mass index (BMI) of 34.0 to 34.9 in adult        .  Other problems per record.    History:    Patient is a very pleasant middle-aged  female was seen in the pulmonary clinic for follow-up visit.    Patient is a non-smoker and has history of moderate asthma and also has sleep apnea but she does not wear any CPAP.  She has morbid obesity with BMI of 32.88.  She had a history of COVID-19 infection and has restless leg syndrome.  She has allergic rhinitis as well.  For her asthma she is currently using Wixela and albuterol rescue inhaler but her asthma is well-controlled and she does not need albuterol rescue inhaler much.  She recently had a left ankle ligament injury and had a surgery done and is currently on a supportive boot and could not walk and is using wheelchair but otherwise she is stable and is fairly active.  She takes ropinirole for restless leg syndrome.  She is not on any oxygen at home.  Her nasal allergies she is currently using loratadine and Astelin nasal spray and fluticasone nasal spray.    She had no other new complaints and no other hospitalizations except surgery done for recent ankle injury.  She did not need any medication refills and overall doing well she is  up-to-date on her vaccinations.    PFT done today:  Not done today    PFT Values          2023    10:00   Pre Drug PFT Results   FVC 62   FEV1 57   FEF 25-75% 43   FEV1/FVC 74   Other Tests PFT Results   TLC 70   RV 76   DLCO 84   D/VAsb 121     Results for orders placed in visit on 23    Pulmonary Function Test    Narrative  Pulmonary Function Test  Performed by: Sara Cramer, JHOAN  Authorized by: Kevin Aguirre MD    Pre Drug % Predicted  FVC: 62%  FEV1: 57%  FEF 25-75%: 43%  FEV1/FVC: 74%  T%  RV: 76%  DLCO: 84%  D/VAsb: 121%    Interpretation  Overall comments:  Above test results are acceptable and reproducible by ATS criteria.  From analysis of the above test results the patient showed evidence of moderate obstructive airway dysfunction.  No bronchodilator challenge was done.  The lung volumes are decreased supporting evidence of moderate restrictive dysfunction.  Diffusion capacity corrected for alveolar volume is above normal limits.    Comparison of the prior pulmonary function test done a few years ago there is no significant changes noted.  Clinical correlation was indicated.    Kevin Aguirre MD  Pulmonologist/Intensivist  2023 12:38 CDT      Results for orders placed in visit on 21    Pulmonary Function Test    Narrative  Pulmonary Function Test  Performed by: Sara Cramer, JHOAN  Authorized by: Kevin Aguirre MD    Pre Drug % Predicted  FVC: 69%  FEV1: 53%  FEF 25-75%: 24%  FEV1/FVC: 62.94%  T%  RV: 120%  DLCO: 88%  D/VAsb: 134%    Interpretation  Overall comments:  The above test results were acceptable and reproducible by ATS criteria.  From analysis of the above test results the patient showed evidence of moderate to severe obstructive airway dysfunction.  No bronchodilator challenge was done.  There was mild restrictive dysfunction noted from decreased TLC  Air trapping noted from increased residual volume  Patient capacity corrected for single  breath was within normal limits  No prior test result was available for comparison  Clinical correlation was indicated.    Kevin Aguirre MD  Pulmonologist/Intensivist  11/9/2021 14:37 CST      Results for orders placed in visit on 10/29/19    Pulmonary Function Test    Narrative  Pulmonary Function Test  Performed by: Klaus Wagner APRN  Authorized by: Klaus Wagner APRN    Pre Drug  FVC: 62%  FEV1: 52%  FEF 25-75%: 31%  FEV1/FVC: 69.23%         Bronchodilator therapy: Advair and albuterol rescue inhaler    Smoking Status:   Social History     Tobacco Use   Smoking Status Never    Passive exposure: Never   Smokeless Tobacco Never     Pulm Rehab: no  Sleep: yes not using any CPAP or oxygen    Support System: lives with their spouse    Code Status:   There are no questions and answers to display.        Review of Systems:  A complete review of systems is performed and all other systems were reviewed and negative as note above in the HPI.  Review of Systems   Constitutional:  Positive for fatigue.   HENT:  Positive for congestion, postnasal drip and sinus pressure.    Eyes: Negative.    Respiratory:  Positive for cough, chest tightness and shortness of breath.    Cardiovascular: Negative.    Gastrointestinal: Negative.    Endocrine: Negative.    Musculoskeletal:  Positive for arthralgias and back pain.   Skin: Negative.    Allergic/Immunologic: Positive for environmental allergies.   Neurological: Negative.    Hematological: Negative.    Psychiatric/Behavioral: Negative.         CAT/ACT Score:  Not done today    Medications:  Outpatient Encounter Medications as of 12/6/2024   Medication Sig Dispense Refill    albuterol (PROVENTIL) (2.5 MG/3ML) 0.083% nebulizer solution USE 1 VIAL BY NEBULIZATION EVERY 4 HOURS AS NEEDED FOR WHEEZING 360 mL 5    albuterol sulfate  (90 Base) MCG/ACT inhaler USE 2 INHALATIONS EVERY 4 HOURS AS NEEDED FOR WHEEZING 25.5 g 3    alendronate (FOSAMAX) 70 MG  tablet TAKE 1 TABLET ON SUNDAY EVERY 7 DAYS 12 tablet 3    aspirin 81 MG EC tablet Take 1 tablet by mouth Daily.      atorvastatin (LIPITOR) 40 MG tablet TAKE 1 TABLET DAILY 90 tablet 3    azelastine (ASTELIN) 0.1 % nasal spray USE 2 SPRAYS INTO THE NOSTRIL(S)  TWICE A DAY AS DIRECTED BY PROVIDER 30 mL 13    calcitriol (ROCALTROL) 0.5 MCG capsule TAKE 1 CAPSULE DAILY 90 capsule 3    Calcium Citrate-Vitamin D (CALCIUM + D PO) Take 1 tablet by mouth Daily.      docusate sodium (COLACE) 100 MG capsule Take 1 capsule by mouth As Needed.      fluticasone (FLONASE) 50 MCG/ACT nasal spray USE 2 SPRAYS INTO THE NOSTRIL(S) DAILY AS DIRECTED BY PROVIDER 16 g 11    Fluticasone-Salmeterol (Wixela Inhub) 250-50 MCG/ACT DISKUS Inhale 1 puff 2 (Two) Times a Day. 180 each 3    HYDROcodone-acetaminophen (NORCO) 5-325 MG per tablet Take 1 tablet by mouth 2 (Two) Times a Day As Needed for Moderate Pain.      ibuprofen (ADVIL,MOTRIN) 400 MG tablet Take 1 tablet by mouth Every 8 (Eight) Hours As Needed for Mild Pain.      levothyroxine (SYNTHROID, LEVOTHROID) 50 MCG tablet TAKE 1 TABLET BY MOUTH DAILY 90 tablet 3    loratadine (CLARITIN) 10 MG tablet TAKE 1 TABLET DAILY AS NEEDED FOR ALLERGIES 90 tablet 3    losartan (COZAAR) 100 MG tablet TAKE 1 TABLET DAILY 90 tablet 3    methocarbamol (ROBAXIN) 750 MG tablet Take 1 tablet by mouth.      naloxone (NARCAN) 4 MG/0.1ML nasal spray Administer 1 spray into the nostril(s) as directed by provider.      pantoprazole (PROTONIX) 40 MG EC tablet Take 1 tablet by mouth Daily. 90 tablet 3    potassium chloride ER (K-TAB) 20 MEQ tablet controlled-release ER tablet TAKE 1 TABLET BY MOUTH DAILY 90 tablet 3    rOPINIRole (REQUIP) 0.25 MG tablet TAKE 1 TABLET EVERY NIGHT 90 tablet 3    theophylline (UNIPHYL) 400 MG 24 hr tablet Take 1 tablet by mouth Daily. 90 tablet 3    tiZANidine (ZANAFLEX) 4 MG tablet Take 1 tablet by mouth Every 8 (Eight) Hours As Needed for Muscle Spasms.      zafirlukast  "(ACCOLATE) 20 MG tablet TAKE 1 TABLET TWICE A  tablet 3     Facility-Administered Encounter Medications as of 12/6/2024   Medication Dose Route Frequency Provider Last Rate Last Admin    heparin flush (porcine) 100 UNIT/ML injection 500 Units  500 Units Intracatheter PRN Ki Manriquez MD   500 Units at 09/27/16 1108    sodium chloride 0.9 % flush 10 mL  10 mL Intravenous PRN Ki Manriquez MD   10 mL at 09/27/16 1107       Allergies:  Allergies   Allergen Reactions    Cephalosporins Hives    Keflex [Cephalexin] Rash       Immunizations:  Immunization History   Administered Date(s) Administered    COVID-19 (PFIZER) 12YRS+ (COMIRNATY) 11/24/2023, 09/02/2024    COVID-19 (PFIZER) BIVALENT 12+YRS 11/16/2022    COVID-19 (PFIZER) Purple Cap Monovalent 03/24/2021, 04/14/2021, 11/09/2021, 07/02/2022    Covid-19 (Pfizer) Gray Cap Monovalent 07/02/2022    Flu Vaccine Intradermal Quad 18-64YR 09/28/2018, 10/29/2019, 09/29/2020    Flublok 18+yrs 11/16/2022    Fluzone (or Fluarix & Flulaval for VFC) >6mos 10/10/2016, 09/30/2020, 10/12/2021    Influenza Inj MDCK Preserative Free 09/02/2024    Influenza Injectable Mdck Pf Quad 09/10/2023    Pneumococcal Conjugate 13-Valent (PCV13) 01/01/2012    Pneumococcal Polysaccharide (PPSV23) 10/28/2014    Shingrix 12/02/2020, 02/16/2021    Tdap 12/08/2021    flucelvax quad pfs =>4 YRS 09/28/2018, 10/29/2019       Objective:    Vitals:  /80   Pulse 86   Ht 154.9 cm (61\")   Wt 78.9 kg (174 lb)   LMP  (LMP Unknown)   SpO2 99% Comment: RA  Breastfeeding No   BMI 32.88 kg/m²     Physical Exam:  General: Patient is a 59 y.o. pleasant middle aged  female. Looks stated age. Appears to be in no acute distress. In a wheel chair after recent foot surgery   Eyes: EOMI. PERRLA. Vision intact. No scleral icterus.  Ear, Nose, Mouth and Throat: Hearing is grossly intact. No Leukoplakia, pharyngitis, stomatitis or thrush. Swollen nasal mucosa with post " nasal drop.  Neck: Range of motion of neck normal. No thyromegaly or masses. Mallampati Class 3  Respiratory: Clear to auscultation bilaterally. No use of accessory muscles. Decreased breath sounds.  Cardiovascular: Normal heart sounds. Regularly regular rhythm without murmur.  Gastrointestinal: Non tender, non distended, soft. Bowel sounds positive in all four quadrants. No organomegaly.  Skin: No obvious rashes, lesions, ulcers or large amount of bruising. No edema.   Neurological: No new motor deficits. Cranial nerves appear intact.  Psychiatric: Patient is alert and oriented to person, place and time.    Chest Imaging:    Study Result    Narrative & Impression   EXAM: XR CHEST 1 VW-      DATE: 5/31/2024 9:01 AM     HISTORY: Asthma; J45.40-Moderate persistent asthma, uncomplicated;  J98.4-Other disorders of lung       COMPARISON: 6/1/2023.     TECHNIQUE:  Frontal view(s) of the chest submitted.     FINDINGS:    The lungs are grossly clear. No effusion or pneumothorax is seen. Heart  and mediastinum are unremarkable.        IMPRESSION:     1. No active disease in the chest.     This report was signed and finalized on 5/31/2024 10:10 AM by       Assessment:  1. Moderate persistent asthma without complication    2. MAMADOU (obstructive sleep apnea)    3. Non-smoker    4. History of COVID-19    5. Restless legs syndrome (RLS)    6. Pulmonary scarring    7. Non-seasonal allergic rhinitis, unspecified trigger    8. Class 1 obesity due to excess calories with serious comorbidity and body mass index (BMI) of 34.0 to 34.9 in adult        Plan/Recommendations:    1.  Patient agrees chest x-ray done in May 2024 which showed clear lung fields and no continued study done.  Her CT scan of the chest done in the past showed pulmonary scarring from COVID-19 infection in the past.  2.  She has obesity and probably obstructive sleep apnea but she does not wear any CPAP and does not use any sleep study.  She is keeping her head and  elevated during sleep she is not on any oxygen.  She is using ropinirole for restless leg syndrome.  3.  Patient has mild to moderate asthma and is well-controlled with Wixela or Advair with albuterol rescue inhaler to be continued as before.  Patient will also continue using Astelin nasal spray fluticasone nasal spray and loratadine for nasal allergy.  No medication refill needed at this time.  4.  She is living with her .  She has a recent ankle surgery done and is currently recovering and using wheelchair but overall she was stable.  She will continue follow-up with orthopedic physician and other physicians and return to pulmonary clinic for a follow-up visit in 6 months time or earlier if needed.    Follow up:  6 Months    Time Spent:  30 minutes    I appreciate the opportunity of participating in this patient's care. I would like to thank the PCP for the referral.  Please feel free to contact me with any other questions.    Kevin Aguirre MD   Pulmonologist/Intensivist     Electronically signed by: Kevin Aguirre MD, 12/6/2024 11:32 CST

## 2024-12-07 LAB
25(OH)D3+25(OH)D2 SERPL-MCNC: 57.4 NG/ML (ref 30–100)
ALBUMIN SERPL-MCNC: 4.3 G/DL (ref 3.8–4.9)
ALP SERPL-CCNC: 127 IU/L (ref 44–121)
ALT SERPL-CCNC: 15 IU/L (ref 0–32)
APPEARANCE UR: CLEAR
AST SERPL-CCNC: 18 IU/L (ref 0–40)
BACTERIA #/AREA URNS HPF: NORMAL /[HPF]
BASOPHILS # BLD AUTO: 0.1 X10E3/UL (ref 0–0.2)
BASOPHILS NFR BLD AUTO: 1 %
BILIRUB SERPL-MCNC: 0.3 MG/DL (ref 0–1.2)
BILIRUB UR QL STRIP: NEGATIVE
BUN SERPL-MCNC: 13 MG/DL (ref 8–27)
BUN/CREAT SERPL: 10 (ref 12–28)
CALCIUM SERPL-MCNC: 9.4 MG/DL (ref 8.7–10.3)
CASTS URNS QL MICRO: NORMAL /LPF
CHLORIDE SERPL-SCNC: 104 MMOL/L (ref 96–106)
CHOLEST SERPL-MCNC: 193 MG/DL (ref 100–199)
CO2 SERPL-SCNC: 24 MMOL/L (ref 20–29)
COLOR UR: YELLOW
CREAT SERPL-MCNC: 1.24 MG/DL (ref 0.57–1)
EGFRCR SERPLBLD CKD-EPI 2021: 50 ML/MIN/1.73
EOSINOPHIL # BLD AUTO: 0.5 X10E3/UL (ref 0–0.4)
EOSINOPHIL NFR BLD AUTO: 5 %
EPI CELLS #/AREA URNS HPF: NORMAL /HPF (ref 0–10)
ERYTHROCYTE [DISTWIDTH] IN BLOOD BY AUTOMATED COUNT: 13.6 % (ref 11.7–15.4)
GLOBULIN SER CALC-MCNC: 2.3 G/DL (ref 1.5–4.5)
GLUCOSE SERPL-MCNC: 83 MG/DL (ref 70–99)
GLUCOSE UR QL STRIP: NEGATIVE
HCT VFR BLD AUTO: 47.9 % (ref 34–46.6)
HDLC SERPL-MCNC: 49 MG/DL
HGB BLD-MCNC: 14.9 G/DL (ref 11.1–15.9)
HGB UR QL STRIP: NEGATIVE
IMM GRANULOCYTES # BLD AUTO: 0.1 X10E3/UL (ref 0–0.1)
IMM GRANULOCYTES NFR BLD AUTO: 1 %
KETONES UR QL STRIP: NEGATIVE
LDLC SERPL CALC-MCNC: 120 MG/DL (ref 0–99)
LEUKOCYTE ESTERASE UR QL STRIP: NEGATIVE
LYMPHOCYTES # BLD AUTO: 2.2 X10E3/UL (ref 0.7–3.1)
LYMPHOCYTES NFR BLD AUTO: 24 %
MCH RBC QN AUTO: 27.8 PG (ref 26.6–33)
MCHC RBC AUTO-ENTMCNC: 31.1 G/DL (ref 31.5–35.7)
MCV RBC AUTO: 89 FL (ref 79–97)
MICRO URNS: NORMAL
MICRO URNS: NORMAL
MONOCYTES # BLD AUTO: 0.7 X10E3/UL (ref 0.1–0.9)
MONOCYTES NFR BLD AUTO: 8 %
NEUTROPHILS # BLD AUTO: 5.6 X10E3/UL (ref 1.4–7)
NEUTROPHILS NFR BLD AUTO: 61 %
NITRITE UR QL STRIP: NEGATIVE
PH UR STRIP: 5.5 [PH] (ref 5–7.5)
PLATELET # BLD AUTO: 434 X10E3/UL (ref 150–450)
POTASSIUM SERPL-SCNC: 4.2 MMOL/L (ref 3.5–5.2)
PROT SERPL-MCNC: 6.6 G/DL (ref 6–8.5)
PROT UR QL STRIP: NEGATIVE
RBC # BLD AUTO: 5.36 X10E6/UL (ref 3.77–5.28)
RBC #/AREA URNS HPF: NORMAL /HPF (ref 0–2)
SODIUM SERPL-SCNC: 144 MMOL/L (ref 134–144)
SP GR UR STRIP: 1.02 (ref 1–1.03)
TRIGL SERPL-MCNC: 136 MG/DL (ref 0–149)
TSH SERPL DL<=0.005 MIU/L-ACNC: 3.37 UIU/ML (ref 0.45–4.5)
UROBILINOGEN UR STRIP-MCNC: 0.2 MG/DL (ref 0.2–1)
VLDLC SERPL CALC-MCNC: 24 MG/DL (ref 5–40)
WBC # BLD AUTO: 9.1 X10E3/UL (ref 3.4–10.8)
WBC #/AREA URNS HPF: NORMAL /HPF (ref 0–5)

## 2024-12-16 ENCOUNTER — OFFICE VISIT (OUTPATIENT)
Age: 59
End: 2024-12-16

## 2024-12-16 VITALS — BODY MASS INDEX: 32.02 KG/M2 | HEIGHT: 62 IN | WEIGHT: 174 LBS

## 2024-12-16 DIAGNOSIS — Z47.89 AFTERCARE FOLLOWING SURGERY OF THE MUSCULOSKELETAL SYSTEM: ICD-10-CM

## 2024-12-16 DIAGNOSIS — S86.312D PERONEAL TENDON TEAR, LEFT, SUBSEQUENT ENCOUNTER: Primary | ICD-10-CM

## 2024-12-16 PROCEDURE — 99024 POSTOP FOLLOW-UP VISIT: CPT | Performed by: NURSE PRACTITIONER

## 2024-12-16 NOTE — PROGRESS NOTES
[facility]  [facility]  [address]  [facility] [id number] [phone]     Patient: Bridgette Gould   YOB: 1965   Date: 12/16/2024   Visit Type:  Post op    Body Part: Foot left    What was the date of surgery? 10/09/24    Pain medication refill? No        
   Received from [facility]    Family and Community Support   Intimate Partner Violence: Not At Risk (4/29/2024)    Received from [facility]    Abuse Screen     Feels Unsafe at Home or Work/School: no     Feels Threatened by Someone: no     Does Anyone Try to Keep You From Having Contact with Others or Doing Things Outside Your Home?: no     Physical Signs of Abuse Present: no    [facility]    Housing Stability      Social History     Occupational History    Not on file   Tobacco Use    Smoking status: Never    Smokeless tobacco: Never   Vaping Use    Vaping status: Never Used   Substance and Sexual Activity    Alcohol use: No    Drug use: No    Sexual activity: Not on file        Tobacco Use      Smoking status: Never      Smokeless tobacco: Never     Family History   Problem Relation Age of Onset    Diabetes Mother     Colon Polyps Mother     Breast Cancer Sister 47        Medications  Current Outpatient Medications   Medication Sig Dispense Refill    ondansetron (ZOFRAN) 4 MG tablet TAKE ONE TO TWO TABLETS BY MOUTH EVERY 6 TO 8 HOURS FOR TWO DAYS      oxyCODONE-acetaminophen (PERCOCET) 7.5-325 MG per tablet TAKE 1 TABLET BY MOUTH EVERY 4 TO 6 HOURS AS NEEDED      clotrimazole-betamethasone (LOTRISONE) 1-0.05 % cream Apply topically 2 times daily. 1 each 0    docusate sodium (COLACE) 100 MG capsule Take 1 capsule by mouth daily as needed for Constipation      mupirocin (BACTROBAN NASAL) 2 % nasal ointment Take by Nasal route 2 times daily for 5 days prior to surgery 1 each 0    Diclofenac Sodium (PENNSAID) 2 % SOLN Apply topically as needed       THEOPHYLLINE CR PO Take 40 mg by mouth daily      levothyroxine (SYNTHROID) 50 MCG tablet Take 1 tablet by mouth daily      calcium citrate-vitamin D (CITRICAL + D) 315-250 MG-UNIT TABS per tablet Take 1 tablet by mouth daily

## 2024-12-17 ENCOUNTER — OFFICE VISIT (OUTPATIENT)
Dept: INTERNAL MEDICINE | Facility: CLINIC | Age: 59
End: 2024-12-17
Payer: MEDICARE

## 2024-12-17 VITALS
OXYGEN SATURATION: 98 % | HEART RATE: 88 BPM | BODY MASS INDEX: 32.85 KG/M2 | DIASTOLIC BLOOD PRESSURE: 78 MMHG | HEIGHT: 61 IN | WEIGHT: 174 LBS | SYSTOLIC BLOOD PRESSURE: 132 MMHG | TEMPERATURE: 98 F

## 2024-12-17 DIAGNOSIS — E66.09 CLASS 1 OBESITY DUE TO EXCESS CALORIES WITH SERIOUS COMORBIDITY AND BODY MASS INDEX (BMI) OF 32.0 TO 32.9 IN ADULT: ICD-10-CM

## 2024-12-17 DIAGNOSIS — Z90.13 S/P BILATERAL MASTECTOMY: ICD-10-CM

## 2024-12-17 DIAGNOSIS — I10 ESSENTIAL HYPERTENSION: ICD-10-CM

## 2024-12-17 DIAGNOSIS — G25.81 RESTLESS LEGS SYNDROME (RLS): ICD-10-CM

## 2024-12-17 DIAGNOSIS — E66.811 CLASS 1 OBESITY DUE TO EXCESS CALORIES WITH SERIOUS COMORBIDITY AND BODY MASS INDEX (BMI) OF 32.0 TO 32.9 IN ADULT: ICD-10-CM

## 2024-12-17 DIAGNOSIS — J45.40 MODERATE PERSISTENT ASTHMA WITHOUT COMPLICATION: ICD-10-CM

## 2024-12-17 DIAGNOSIS — M81.0 OSTEOPOROSIS, UNSPECIFIED OSTEOPOROSIS TYPE, UNSPECIFIED PATHOLOGICAL FRACTURE PRESENCE: ICD-10-CM

## 2024-12-17 DIAGNOSIS — D50.8 OTHER IRON DEFICIENCY ANEMIA: ICD-10-CM

## 2024-12-17 DIAGNOSIS — E78.00 ELEVATED CHOLESTEROL: Primary | ICD-10-CM

## 2024-12-17 PROCEDURE — G0439 PPPS, SUBSEQ VISIT: HCPCS | Performed by: INTERNAL MEDICINE

## 2024-12-17 PROCEDURE — 1126F AMNT PAIN NOTED NONE PRSNT: CPT | Performed by: INTERNAL MEDICINE

## 2024-12-17 PROCEDURE — 3078F DIAST BP <80 MM HG: CPT | Performed by: INTERNAL MEDICINE

## 2024-12-17 PROCEDURE — 1170F FXNL STATUS ASSESSED: CPT | Performed by: INTERNAL MEDICINE

## 2024-12-17 PROCEDURE — 3075F SYST BP GE 130 - 139MM HG: CPT | Performed by: INTERNAL MEDICINE

## 2024-12-17 NOTE — PROGRESS NOTES
Subjective   The ABCs of the Annual Wellness Visit  Medicare Wellness Visit      Luna Cruz is a 59 y.o. patient who presents for a Medicare Wellness Visit.    The following portions of the patient's history were reviewed and   updated as appropriate: allergies, current medications, past family history, past medical history, past social history, past surgical history, and problem list.    Compared to one year ago, the patient's physical   health is the same.  Compared to one year ago, the patient's mental   health is the same.    Recent Hospitalizations:  She was not admitted to the hospital during the last year.     Current Medical Providers:  Patient Care Team:  Chaz Gaines MD as PCP - General (Internal Medicine)  Kevin Aguirre MD as Consulting Physician (Pulmonary Disease)  Ksenia Fox MD as Consulting Physician (Gastroenterology)    Outpatient Medications Prior to Visit   Medication Sig Dispense Refill    albuterol (PROVENTIL) (2.5 MG/3ML) 0.083% nebulizer solution USE 1 VIAL BY NEBULIZATION EVERY 4 HOURS AS NEEDED FOR WHEEZING 360 mL 5    albuterol sulfate  (90 Base) MCG/ACT inhaler USE 2 INHALATIONS EVERY 4 HOURS AS NEEDED FOR WHEEZING 25.5 g 3    alendronate (FOSAMAX) 70 MG tablet TAKE 1 TABLET ON SUNDAY EVERY 7 DAYS 12 tablet 3    aspirin 81 MG EC tablet Take 1 tablet by mouth Daily.      atorvastatin (LIPITOR) 40 MG tablet TAKE 1 TABLET DAILY 90 tablet 3    azelastine (ASTELIN) 0.1 % nasal spray USE 2 SPRAYS INTO THE NOSTRIL(S)  TWICE A DAY AS DIRECTED BY PROVIDER 30 mL 13    calcitriol (ROCALTROL) 0.5 MCG capsule TAKE 1 CAPSULE DAILY 90 capsule 3    Calcium Citrate-Vitamin D (CALCIUM + D PO) Take 1 tablet by mouth Daily.      docusate sodium (COLACE) 100 MG capsule Take 1 capsule by mouth As Needed.      fluticasone (FLONASE) 50 MCG/ACT nasal spray USE 2 SPRAYS INTO THE NOSTRIL(S) DAILY AS DIRECTED BY PROVIDER 16 g 11    Fluticasone-Salmeterol (Wixela Inhub) 250-50  MCG/ACT DISKUS Inhale 1 puff 2 (Two) Times a Day. 180 each 3    HYDROcodone-acetaminophen (NORCO) 5-325 MG per tablet Take 1 tablet by mouth 2 (Two) Times a Day As Needed for Moderate Pain.      ibuprofen (ADVIL,MOTRIN) 400 MG tablet Take 1 tablet by mouth Every 8 (Eight) Hours As Needed for Mild Pain.      levothyroxine (SYNTHROID, LEVOTHROID) 50 MCG tablet TAKE 1 TABLET BY MOUTH DAILY 90 tablet 3    loratadine (CLARITIN) 10 MG tablet TAKE 1 TABLET DAILY AS NEEDED FOR ALLERGIES 90 tablet 3    losartan (COZAAR) 100 MG tablet TAKE 1 TABLET DAILY 90 tablet 3    naloxone (NARCAN) 4 MG/0.1ML nasal spray Administer 1 spray into the nostril(s) as directed by provider.      pantoprazole (PROTONIX) 40 MG EC tablet Take 1 tablet by mouth Daily. 90 tablet 3    potassium chloride ER (K-TAB) 20 MEQ tablet controlled-release ER tablet TAKE 1 TABLET BY MOUTH DAILY 90 tablet 3    rOPINIRole (REQUIP) 0.25 MG tablet TAKE 1 TABLET EVERY NIGHT 90 tablet 3    theophylline (UNIPHYL) 400 MG 24 hr tablet Take 1 tablet by mouth Daily. 90 tablet 3    tiZANidine (ZANAFLEX) 4 MG tablet Take 1 tablet by mouth Every 8 (Eight) Hours As Needed for Muscle Spasms.      zafirlukast (ACCOLATE) 20 MG tablet TAKE 1 TABLET TWICE A  tablet 3    methocarbamol (ROBAXIN) 750 MG tablet Take 1 tablet by mouth. (Patient not taking: Reported on 12/17/2024)       Facility-Administered Medications Prior to Visit   Medication Dose Route Frequency Provider Last Rate Last Admin    heparin flush (porcine) 100 UNIT/ML injection 500 Units  500 Units Intracatheter PRN Ki Manriquez MD   500 Units at 09/27/16 1108    sodium chloride 0.9 % flush 10 mL  10 mL Intravenous PRN Ki Manriquez MD   10 mL at 09/27/16 1107     Opioid medication/s are on active medication list.  and I have evaluated her active treatment plan and pain score trends (see table).  Vitals:    12/17/24 0722   PainSc: 0-No pain     I have reviewed the chart for potential  "of high risk medication and harmful drug interactions in the elderly.        Aspirin is on active medication list. Aspirin use is indicated based on review of current medical condition/s. Pros and cons of this therapy have been discussed today. Benefits of this medication outweigh potential harm.  Patient has been encouraged to continue taking this medication.  .      Patient Active Problem List   Diagnosis    Essential hypertension    Esophageal reflux    Family history of malignant neoplasm of breast    History of malignant neoplasm of breast    Hyperplastic polyp of transverse colon    Iron deficiency anemia    Malignant neoplasm of lower-inner quadrant of left breast in female, estrogen receptor positive    Elevated cholesterol    Left carotid bruit    Myxedema heart disease    Osteoporosis    Vitamin D deficiency    Breast cancer    Fatty liver    Rectal bleeding    Non-smoker    Non-seasonal allergic rhinitis    Restless legs syndrome (RLS)    Pulmonary scarring    Atypical ductal hyperplasia of breast    S/P bilateral mastectomy    Moderate persistent asthma without complication    Ductal carcinoma in situ (DCIS) of right breast    History of COVID-19    Class 1 obesity due to excess calories with body mass index (BMI) of 32.0 to 32.9 in adult    MAMADOU (obstructive sleep apnea)     Advance Care Planning Advance Directive is not on file.  ACP discussion was held with the patient during this visit. Patient has an advance directive (not in EMR), copy requested.            Objective   Vitals:    12/17/24 0722   BP: 132/78   BP Location: Left arm   Patient Position: Sitting   Cuff Size: Adult   Pulse: 88   Temp: 98 °F (36.7 °C)   TempSrc: Temporal   SpO2: 98%   Weight: 78.9 kg (174 lb)   Height: 154.9 cm (61\")   PainSc: 0-No pain       Estimated body mass index is 32.88 kg/m² as calculated from the following:    Height as of this encounter: 154.9 cm (61\").    Weight as of this encounter: 78.9 kg (174 lb).            "     Does the patient have evidence of cognitive impairment? No  Lab Results   Component Value Date    CHLPL 193 2024    TRIG 136 2024    HDL 49 2024     (H) 2024    VLDL 24 2024                                                                                               Health  Risk Assessment    Smoking Status:  Social History     Tobacco Use   Smoking Status Never    Passive exposure: Never   Smokeless Tobacco Never     Alcohol Consumption:  Social History     Substance and Sexual Activity   Alcohol Use Never       Fall Risk Screen  STEADI Fall Risk Assessment was completed, and patient is at MODERATE risk for falls. Assessment completed on:2024    Depression Screening   Little interest or pleasure in doing things? Not at all   Feeling down, depressed, or hopeless? Not at all   PHQ-2 Total Score 0      Health Habits and Functional and Cognitive Screenin/17/2024     7:26 AM   Functional & Cognitive Status   Do you have difficulty preparing food and eating? No   Do you have difficulty bathing yourself, getting dressed or grooming yourself? No   Do you have difficulty using the toilet? No   Do you have difficulty moving around from place to place? Yes   Do you have trouble with steps or getting out of a bed or a chair? No   Current Diet Limited Junk Food   Dental Exam Up to date   Eye Exam Up to date   Exercise (times per week) 0 times per week   Current Exercises Include No Regular Exercise   Do you need help using the phone?  No   Are you deaf or do you have serious difficulty hearing?  No   Do you need help to go to places out of walking distance? Yes   Do you need help shopping? No   Do you need help preparing meals?  No   Do you need help with housework?  Yes   Do you need help with laundry? No   Do you need help taking your medications? No   Do you need help managing money? No   Do you ever drive or ride in a car without wearing a seat belt? No   Have you  felt unusual stress, anger or loneliness in the last month? No   Who do you live with? Spouse   If you need help, do you have trouble finding someone available to you? No   Have you been bothered in the last four weeks by sexual problems? No   Do you have difficulty concentrating, remembering or making decisions? No           Age-appropriate Screening Schedule:  Refer to the list below for future screening recommendations based on patient's age, sex and/or medical conditions. Orders for these recommended tests are listed in the plan section. The patient has been provided with a written plan.    Health Maintenance List  Health Maintenance   Topic Date Due    BMI FOLLOWUP  06/17/2025    COLORECTAL CANCER SCREENING  07/24/2025    LIPID PANEL  12/06/2025    ANNUAL WELLNESS VISIT  12/17/2025    PAP SMEAR  01/13/2026    Pneumococcal Vaccine 0-64 (3 of 3 - PPSV23 or PCV20) 09/14/2029    TDAP/TD VACCINES (2 - Td or Tdap) 12/08/2031    HEPATITIS C SCREENING  Completed    COVID-19 Vaccine  Completed    INFLUENZA VACCINE  Completed    ZOSTER VACCINE  Completed    MAMMOGRAM  Discontinued                                                                                                                                                 CMS Preventative Services Quick Reference  Risk Factors Identified During Encounter  Dental Screening Recommended  Vision Screening Recommended    The above risks/problems have been discussed with the patient.  Pertinent information has been shared with the patient in the After Visit Summary.  An After Visit Summary and PPPS were made available to the patient.    Follow Up:   Next Medicare Wellness visit to be scheduled in 1 year.        Diagnoses and all orders for this visit:    1. Elevated cholesterol (Primary)    2. Essential hypertension    3. Osteoporosis, unspecified osteoporosis type, unspecified pathological fracture presence    4. Restless legs syndrome (RLS)    5. Moderate persistent asthma  without complication    6. Other iron deficiency anemia    7. S/P bilateral mastectomy    8. Class 1 obesity due to excess calories with serious comorbidity and body mass index (BMI) of 32.0 to 32.9 in adult      Recommend at least annual dental and vision screening.  Recommend annual influenza vaccination  Recommend a varied diet and appropriate portion sizes.   CDC recommendations for physical activity:  At least 150 minutes a week (for example, 30 minutes a day, 5 days a week) of moderate-intensity activity such as brisk walking. Or can consider 75 minutes a week of vigorous-intensity activity such as hiking, jogging, or running.  At least 2 days a week of activities that strengthen muscles.  Plus activities to improve balance.    There are no preventive care reminders to display for this patient.    History of Present Illness  The patient presents for a Medicare wellness visit.    She reports no significant change in her overall health status compared to the previous year. She has not required any hospital admissions within the past year. She does not have an advanced directive or living will in place. She has been a Medicare beneficiary since 2015.    She underwent a surgical procedure to repair a partially torn peroneal tendon, performed by Dr. Harvey. The origin of the injury remains uncertain, but it is speculated that it may have resulted from an incident involving a falling wood rack during a summer activity or stepping on a pod from a magnolia tree. She anticipates being able to run by [employer].    She also reports experiencing edema in her feet and ankles.    IMMUNIZATIONS  She received her influenza vaccine on 09/02/2024. She has had the pneumonia vaccine and is up to date on her tetanus vaccine.    Assessment & Plan  1. Medicare wellness visit.  Her overall health status remains stable compared to the previous year. She has not required any hospital admissions within the past year. She does not have an  advanced directive or living will in place. She has been a Medicare beneficiary since 2015. Her eosinophil levels are consistently elevated, suggesting a potential allergic response. Renal function has shown a slight decline, necessitating increased hydration. Cholesterol levels have increased slightly, but not to a level requiring medication. Vitamin D and thyroid levels are within normal limits. Blood glucose levels are also within the normal range. She was advised to maintain adequate hydration and limit the use of ibuprofen and Aleve. Dietary modifications were recommended to reduce fat intake, particularly trans fats.    2. Partial peroneal tendon tear.  She is currently in a boot and is expected to be in it for a total of 9 weeks. She was advised to continue wearing the boot and to avoid activities that could strain the tendon.    3. Edema. Venous insufficiency  She reports swelling in her feet and ankles. She was advised to use compression socks, particularly on her unaffected foot, and to elevate her feet when at rest.  Patient has venous insufficiency.  This is gravity dependent.  Worse at the end of the day.  Better in AM.  Recommend low salt diet.  Recommend elevation of lower extremities.  Recommend compression stockings as tolerated.  If persists, then we may need to consider secondary causes or other treatment.      PROCEDURE  The patient underwent a surgical procedure to repair a partially torn peroneal tendon, performed by Dr. Harvey.    Patient or patient representative verbalized consent for the use of Ambient Listening during the visit with  Chaz Gaines MD for chart documentation. 12/17/2024  08:07 CST        Result Review :  The following data was reviewed by: Chaz Gaines MD on 12/17/2024:  CMP          4/29/2024    10:05 12/6/2024    07:39   CMP   Glucose 97  83    BUN 14  13    Creatinine 1.05  1.24    EGFR 61.3     Sodium 141  144    Potassium 4.5  4.2    Chloride 105  104     Calcium 10.0  9.4    Total Protein  6.6    Total Protein 7.1     Albumin 4.2  4.3    Globulin  2.3    Globulin 2.9     Total Bilirubin 0.3  0.3    Alkaline Phosphatase 111  127    AST (SGOT) 14  18    ALT (SGPT) 12  15    Albumin/Globulin Ratio 1.4     BUN/Creatinine Ratio 13.3  10    Anion Gap 10.0       CBC w/diff          4/29/2024    10:05 12/6/2024    07:39   CBC w/Diff   WBC 7.97  9.1    RBC 5.02  5.36    Hemoglobin 14.3  14.9    Hematocrit 44.7  47.9    MCV 89.0  89    MCH 28.5  27.8    MCHC 32.0  31.1    RDW 13.9  13.6    Platelets 349  434    Neutrophil Rel % 64.5  61    Immature Granulocyte Rel % 0.5     Lymphocyte Rel % 21.1  24    Monocyte Rel % 6.9  8    Eosinophil Rel % 6.0  5    Basophil Rel % 1.0  1      Lipid Panel          12/6/2024    07:39   Lipid Panel   Total Cholesterol 193    Triglycerides 136    HDL Cholesterol 49    VLDL Cholesterol 24    LDL Cholesterol  120      TSH          12/6/2024    07:39   TSH   TSH 3.370          UA          12/6/2024    07:39   Urinalysis   Blood, UA Negative    Leukocytes, UA Negative    Nitrite, UA Negative    RBC, UA 0-2    Bacteria, UA None seen             Follow Up:   Return in about 6 months (around 6/17/2025), or if symptoms worsen or fail to improve, for follow up for above problems. Longitudinal care..     An After Visit Summary and PPPS were made available to the patient.                   CELESTINE Gaines MD, FACP, FHM      Electronically signed by Chaz Gaines MD, 12/17/24, 7:37 AM CST.

## 2024-12-18 DIAGNOSIS — I10 ESSENTIAL HYPERTENSION: ICD-10-CM

## 2024-12-18 RX ORDER — LOSARTAN POTASSIUM 100 MG/1
100 TABLET ORAL DAILY
Qty: 90 TABLET | Refills: 3 | Status: SHIPPED | OUTPATIENT
Start: 2024-12-18

## 2024-12-23 RX ORDER — LEVOTHYROXINE SODIUM 50 UG/1
50 TABLET ORAL DAILY
Qty: 90 TABLET | Refills: 3 | Status: SHIPPED | OUTPATIENT
Start: 2024-12-23

## 2024-12-23 RX ORDER — POTASSIUM CHLORIDE 1500 MG/1
20 TABLET, EXTENDED RELEASE ORAL DAILY
Qty: 90 TABLET | Refills: 3 | Status: SHIPPED | OUTPATIENT
Start: 2024-12-23

## 2025-01-07 ENCOUNTER — OFFICE VISIT (OUTPATIENT)
Dept: GASTROENTEROLOGY | Facility: CLINIC | Age: 60
End: 2025-01-07
Payer: MEDICARE

## 2025-01-07 VITALS
HEIGHT: 61 IN | SYSTOLIC BLOOD PRESSURE: 128 MMHG | WEIGHT: 174 LBS | OXYGEN SATURATION: 98 % | HEART RATE: 98 BPM | BODY MASS INDEX: 32.85 KG/M2 | TEMPERATURE: 98.2 F | DIASTOLIC BLOOD PRESSURE: 82 MMHG

## 2025-01-07 DIAGNOSIS — R13.19 ESOPHAGEAL DYSPHAGIA: ICD-10-CM

## 2025-01-07 DIAGNOSIS — K21.9 GASTROESOPHAGEAL REFLUX DISEASE, UNSPECIFIED WHETHER ESOPHAGITIS PRESENT: Primary | ICD-10-CM

## 2025-01-07 DIAGNOSIS — K21.9 GASTROESOPHAGEAL REFLUX DISEASE: ICD-10-CM

## 2025-01-07 DIAGNOSIS — K76.0 FATTY LIVER: ICD-10-CM

## 2025-01-07 PROCEDURE — 1159F MED LIST DOCD IN RCRD: CPT | Performed by: NURSE PRACTITIONER

## 2025-01-07 PROCEDURE — 3079F DIAST BP 80-89 MM HG: CPT | Performed by: NURSE PRACTITIONER

## 2025-01-07 PROCEDURE — 99214 OFFICE O/P EST MOD 30 MIN: CPT | Performed by: NURSE PRACTITIONER

## 2025-01-07 PROCEDURE — 1160F RVW MEDS BY RX/DR IN RCRD: CPT | Performed by: NURSE PRACTITIONER

## 2025-01-07 PROCEDURE — 3074F SYST BP LT 130 MM HG: CPT | Performed by: NURSE PRACTITIONER

## 2025-01-07 RX ORDER — PANTOPRAZOLE SODIUM 40 MG/1
40 TABLET, DELAYED RELEASE ORAL DAILY
Qty: 90 TABLET | Refills: 3 | Status: SHIPPED | OUTPATIENT
Start: 2025-01-07

## 2025-01-07 NOTE — ASSESSMENT & PLAN NOTE
Surveillance liver ultrasound recommended.    ..The principles of management of steatohepatitis are weight loss through a structured diet and exercise as well as meticulous control of any component of metabolic syndrome, including blood glucose levels, cholesterol and blood pressure.   The patient should strive to lose 2-4 monthly until reaching 7-10% reduction in weight.      Coffee consumption has been shown to decrease the risk of HCC in patients with chronic liver disease.  In these patients, coffee consumption should be encouraged.    I have advised to avoid fructose containing food and drink.     Daily alcohol intake should strickly be below 30gm in men and 20 gm in women.    A physical activity program should in 150-200 min/week of moderate intensity in 3-5 sessions. Resistance training to promote musculoskeletal fitness and improve metabolic factors was reviewed.

## 2025-01-07 NOTE — H&P (VIEW-ONLY)
Primary Physician: Chaz Gaines MD    Chief Complaint   Patient presents with    Med Refill     Pt presents today for yearly OV for GERD-Needs 90-day refills on Pantoprazole; Pt states overall she feels good and feels like the medication works well for her        Subjective     Luna Kriss Cruz is a 59 y.o. female.    HPI  Fatty liver  Patient here today for her yearly follow-up on her hepatic steatosis.  12/11/2023 labs normal LFT's, Fib-4 Score 0.86  12/6/2024 Fib-4 Score= 0.64  12/6/2024 vitamin D level normal     .Fib-4 scoring system  Points <1.45:                        Cirrhosis less likely  Points >=1.45 and <=3.25:       Indeterminate  Points >3.25:                        Cirrhosis more likely     AFP normal at 4.46 on 1/4/2023  Liver ultrasound with elastography 8/31/2022: No liver masses with liver parenchyma somewhat coarsened echotexture, Metavir score F2   1/11/2024 liver ultrasound with evidence of hepatic steatosis.  No solid liver lesions.     Pt does NOT drink any alcohol.  1/7/2025 Weight 174 pounds  12/17/2024 Weight 174 pounds  6/17/2024 Weight  180 pounds     GERD  Last upper endoscopy 9/7/2022 with a medium size hiatal hernia, normal stomach, nonobstructing Schatzki's ring dilated up to 20 mm.  Pt reports acid reflux is under control with only occassional night time heartburn.  12/27/2022 bone density study: Osteopenia     Has trouble swallowing her larger pills and foods. Foods such as breads are getting stuck and she feels like she may need to have esophageal stretching again.  Acid reflux at night from time to time and she will use Pepcid prn.  Takes her Pantoprazole 40mg once daily.     Personal Hx Colon Polyps  Removed hyperplastic polyp  7/2020.  Due for repeat colonoscopy in 7/2025.  No change in bowels.  No blood in stool. No current GI issues.  NO family hx colon cancer.    Pt due for repeat colonoscopy July 2025.    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    Obesity     Osteoarthritis     Osteopenia     Osteoporosis     Ovarian cyst 1994    PONV (postoperative nausea and vomiting)     RLS (restless legs syndrome)     Scoliosis     Varicella 1966       Past Surgical History:   Procedure Laterality Date    APPENDECTOMY  1994    BREAST AUGMENTATION  12/2021    BREAST BIOPSY      BREAST LUMPECTOMY Right 2008    Right sentinel lymph nodes were also removed for biopsy    BREAST RECONSTRUCTION Right 09/2022    CARDIAC CATHETERIZATION      CHOLECYSTECTOMY  1993    COLONOSCOPY  05/03/2013    Erythematous mucosa in the rectum-biopsied; Repeat 4 years     COLONOSCOPY N/A 06/14/2017    Normal; Repeat 5 years    COLONOSCOPY  04/23/2010    Dr. Arzola-Extremely poorly prepped colon    COLONOSCOPY N/A 07/24/2020    Hemorrhoids found on perianal exam; One 4mm hyperplastic polyp in the transverse colon; Normal mucosa in the entire examined colon-biopsied; Non-bleeding internal hemorrhoids; Repeat 5 years    COSMETIC SURGERY  09/26/2023    D & C HYSTEROSCOPY  1993    D & C HYSTEROSCOPY MYOSURE N/A 01/13/2021    Procedure: DILATATION AND CURETTAGE HYSTEROSCOPY WITH MYOSURE, polypectomy;  Surgeon: Marcello Salas MD;  Location: [facility] OR;  Service: Obstetrics/Gynecology;  Laterality: N/A;    D & C HYSTEROSCOPY MYOSURE N/A 06/14/2023    Procedure: DILATATION AND CURETTAGE HYSTEROSCOPY;  Surgeon: Marcello Salas MD;  Location: [facility] OR;  Service: Obstetrics/Gynecology;  Laterality: N/A;    DILATATION AND CURETTAGE      ENDOSCOPY N/A  10/05/2016    Medium-sized HH; Widely patent and non-obstructing Schatzki ring-dilated; Procedure: ESOPHAGOGASTRODUODENOSCOPY WITH ANESTHESIA;  Surgeon: Ksenia Fox MD;  Location: [facility] ENDOSCOPY;  Service:     ENDOSCOPY N/A 02/27/2019    Normal 1st portion of the duodenum and 2 portion of the duodenum; A few gastric polyps-biopsied; Small HH; Widely patent Schatzki ring-dilated; Abnormal esophageal motility, suspicious for presbyesophagus; Arrange for barium swallow to assess for dysmotility    ENDOSCOPY N/A 07/24/2020    Medium-sized HH; LA Grade A reflux esophagitis; No endoscopic esophageal abnormality to explain patient's dysphagia-Esophagus dilated; Normal stomach; Normal examined duodenum; No specimens collected    ENDOSCOPY N/A 09/07/2022    Medium-sized HH; Non-obstructing Schatzki ring-Dilated; Normal stomach; Normal examined duodenum; No specimens collected    HERNIA REPAIR  09/26/2022    HYSTEROSCOPY      KNEE ARTHROSCOPY Left 11/13/2018    Procedure: LEFT KNEE ARTHROSCOPIC PARTIAL MEDIAL MENISCECTOMY;  Surgeon: Matt Maki MD;  Location: [facility] OR;  Service: Orthopedics    MASTECTOMY Bilateral 09/14/2021    MEDIPORT INSERTION, SINGLE  2008    MEDIPORT REMOVAL      PARATHYROIDECTOMY  2011    REPLACEMENT TOTAL KNEE Left 05/23/2019    TENDON REPAIR Left 10/19/2024    TONSILLECTOMY AND ADENOIDECTOMY  1970    TUBAL ABDOMINAL LIGATION          Current Outpatient Medications:     albuterol (PROVENTIL) (2.5 MG/3ML) 0.083% nebulizer solution, USE 1 VIAL BY NEBULIZATION EVERY 4 HOURS AS NEEDED FOR WHEEZING, Disp: 360 mL, Rfl: 5    albuterol sulfate  (90 Base) MCG/ACT inhaler, USE 2 INHALATIONS EVERY 4 HOURS AS NEEDED FOR WHEEZING, Disp: 25.5 g, Rfl: 3    alendronate (FOSAMAX) 70 MG tablet, TAKE 1 TABLET ON SUNDAY EVERY 7 DAYS, Disp: 12 tablet, Rfl: 3    aspirin 81 MG EC tablet, Take 1 tablet by mouth Daily., Disp: , Rfl:     atorvastatin (LIPITOR) 40 MG tablet, TAKE 1 TABLET DAILY, Disp: 90  tablet, Rfl: 3    azelastine (ASTELIN) 0.1 % nasal spray, USE 2 SPRAYS INTO THE NOSTRIL(S)  TWICE A DAY AS DIRECTED BY PROVIDER, Disp: 30 mL, Rfl: 13    calcitriol (ROCALTROL) 0.5 MCG capsule, TAKE 1 CAPSULE DAILY, Disp: 90 capsule, Rfl: 3    Calcium Citrate-Vitamin D (CALCIUM + D PO), Take 1 tablet by mouth Daily., Disp: , Rfl:     docusate sodium (COLACE) 100 MG capsule, Take 1 capsule by mouth As Needed., Disp: , Rfl:     fluticasone (FLONASE) 50 MCG/ACT nasal spray, USE 2 SPRAYS INTO THE NOSTRIL(S) DAILY AS DIRECTED BY PROVIDER, Disp: 16 g, Rfl: 11    Fluticasone-Salmeterol (Wixela Inhub) 250-50 MCG/ACT DISKUS, Inhale 1 puff 2 (Two) Times a Day., Disp: 180 each, Rfl: 3    HYDROcodone-acetaminophen (NORCO) 5-325 MG per tablet, Take 1 tablet by mouth 2 (Two) Times a Day As Needed for Moderate Pain., Disp: , Rfl:     ibuprofen (ADVIL,MOTRIN) 400 MG tablet, Take 1 tablet by mouth Every 8 (Eight) Hours As Needed for Mild Pain., Disp: , Rfl:     levothyroxine (SYNTHROID, LEVOTHROID) 50 MCG tablet, TAKE 1 TABLET BY MOUTH DAILY, Disp: 90 tablet, Rfl: 3    loratadine (CLARITIN) 10 MG tablet, TAKE 1 TABLET DAILY AS NEEDED FOR ALLERGIES, Disp: 90 tablet, Rfl: 3    losartan (COZAAR) 100 MG tablet, TAKE 1 TABLET DAILY, Disp: 90 tablet, Rfl: 3    naloxone (NARCAN) 4 MG/0.1ML nasal spray, Administer 1 spray into the nostril(s) as directed by provider., Disp: , Rfl:     pantoprazole (PROTONIX) 40 MG EC tablet, Take 1 tablet by mouth Daily., Disp: 90 tablet, Rfl: 3    potassium chloride ER (K-TAB) 20 MEQ tablet controlled-release ER tablet, TAKE 1 TABLET BY MOUTH DAILY, Disp: 90 tablet, Rfl: 3    rOPINIRole (REQUIP) 0.25 MG tablet, TAKE 1 TABLET EVERY NIGHT, Disp: 90 tablet, Rfl: 3    theophylline (UNIPHYL) 400 MG 24 hr tablet, Take 1 tablet by mouth Daily., Disp: 90 tablet, Rfl: 3    tiZANidine (ZANAFLEX) 4 MG tablet, Take 1 tablet by mouth Every 8 (Eight) Hours As Needed for Muscle Spasms., Disp: , Rfl:     zafirlukast  (ACCOLATE) 20 MG tablet, TAKE 1 TABLET TWICE A DAY, Disp: 180 tablet, Rfl: 3  No current facility-administered medications for this visit.    Facility-Administered Medications Ordered in Other Visits:     heparin flush (porcine) 100 UNIT/ML injection 500 Units, 500 Units, Intracatheter, PRN, Ki Manriquez MD, 500 Units at 09/27/16 1108    sodium chloride 0.9 % flush 10 mL, 10 mL, Intravenous, PRN, Ki Manirquez MD, 10 mL at 09/27/16 1107    Allergies   Allergen Reactions    Cephalosporins Hives    Keflex [Cephalexin] Rash       Social History     Socioeconomic History    Marital status:    Tobacco Use    Smoking status: Never     Passive exposure: Never    Smokeless tobacco: Never   Vaping Use    Vaping status: Never Used   Substance and Sexual Activity    Alcohol use: Never    Drug use: No    Sexual activity: Yes     Partners: Male     Birth control/protection: Post-menopausal       Family History   Problem Relation Age of Onset    Colon polyps Mother         < 60 years of age     Cirrhosis Mother     Diabetes Mother     Liver disease Mother     Colon polyps Sister 54        < 60 years of age     Breast cancer Sister     Cancer Sister     No Known Problems Father     No Known Problems Brother     No Known Problems Daughter     No Known Problems Son     Diabetes Maternal Grandmother     Heart disease Maternal Grandmother     No Known Problems Paternal Grandmother     No Known Problems Maternal Aunt     No Known Problems Paternal Aunt     Hyperlipidemia Maternal Grandfather     Stroke Maternal Grandfather     Colon cancer Neg Hx     Crohn's disease Neg Hx     Irritable bowel syndrome Neg Hx     Liver cancer Neg Hx     Rectal cancer Neg Hx     Stomach cancer Neg Hx     BRCA 1/2 Neg Hx     Endometrial cancer Neg Hx     Ovarian cancer Neg Hx     Uterine cancer Neg Hx     Esophageal cancer Neg Hx        Review of Systems   Constitutional:  Negative for unexpected weight change.   HENT:   "Positive for trouble swallowing.    Respiratory:  Negative for shortness of breath.        Objective     /82 (BP Location: Left arm, Patient Position: Sitting, Cuff Size: Adult)   Pulse 98   Temp 98.2 °F (36.8 °C) (Infrared)   Ht 154.9 cm (61\")   Wt 78.9 kg (174 lb)   LMP  (LMP Unknown)   SpO2 98%   Breastfeeding No   BMI 32.88 kg/m²     Physical Exam  Vitals reviewed.   Constitutional:       Appearance: Normal appearance.   Cardiovascular:      Rate and Rhythm: Normal rate and regular rhythm.      Heart sounds: Normal heart sounds.   Pulmonary:      Effort: Pulmonary effort is normal.      Breath sounds: Normal breath sounds.   Neurological:      Mental Status: She is alert.         Lab Results - Last 18 Months   Lab Units 12/06/24  0739 04/29/24  1005 12/11/23  0747   GLUCOSE mg/dL 83 97 91   BUN mg/dL 13 14 12   CREATININE mg/dL 1.24* 1.05* 1.06*   SODIUM mmol/L 144 141 142   POTASSIUM mmol/L 4.2 4.5 4.4   CHLORIDE mmol/L 104 105 105   CO2 mmol/L 24 26.0 25.3   TOTAL PROTEIN g/dL  --  7.1  --    ALBUMIN g/dL 4.3 4.2 4.3   ALT (SGPT) IU/L 15 12 12   AST (SGOT) IU/L 18 14 17   ALK PHOS IU/L 127* 111 106   BILIRUBIN mg/dL 0.3 0.3 0.4   GLOBULIN gm/dL  --  2.9  --        Lab Results - Last 18 Months   Lab Units 12/06/24  0739 04/29/24  1005 12/11/23  0747   HEMOGLOBIN g/dL 14.9 14.3 14.2   HEMATOCRIT % 47.9* 44.7 42.9   MCV fL 89 89.0 87.7   WBC x10E3/uL 9.1 7.97 7.43   RDW % 13.6 13.9 13.7   MPV fL  --  10.0  --    PLATELETS x10E3/uL 434 349 338       Lab Results - Last 18 Months   Lab Units 12/06/24  0739 12/11/23  0747   TSH uIU/mL 3.370 2.450   VIT D 25 HYDROXY ng/mL 57.4 55.4      EXAM/TECHNIQUE: US LIVER 1/11/2024-     INDICATION: fatty liver; K76.0-Fatty (change of) liver, not elsewhere  classified     COMPARISON: 8/31/2022     FINDINGS:     Visualized portion of the pancreas is unremarkable. Visualized portion  of the abdominal aorta and IVC are unremarkable.     Liver echogenicity is diffusely " increased. No liver echotexture  coarsening or surface contour nodularity. No solid liver lesion. Patent  portal vein with appropriate directional flow.     Postoperative change of cholecystectomy. No biliary ductal dilatation.  Common bile duct is 4 mm diameter.     Visualized portion of the right kidney is unremarkable without evidence  of hydronephrosis.     IMPRESSION:  1.  Hepatic steatosis.  2.  No biliary ductal dilatation status post cholecystectomy.     This report was signed and finalized on 1/11/2024 11:11 AM by Dr. Nabil Alvarenga MD.       IMPRESSION/PLAN:    Assessment & Plan      Problem List Items Addressed This Visit       Esophageal reflux - Primary    Overview     Regular acid reflux.  3 pepsi per day    LA A esopahgitis seen on endoscopy 7/2020.      Anti-reflux measures were reviewed and discussed with patient.  They were advised to refrain from chocolate, alcohol, smoking, peppermint and caffeine.  They were advised to limit fatty foods, large meals, and eating late at nighttime.  They were advised to reach an ideal body mass index.    She is also on Fosamax.  I advised her to drink a large glass of water after taking this pill as this can cause esophagitis.    12/6/2024 creatinine slightly elevated 1.24           Relevant Medications    pantoprazole (PROTONIX) 40 MG EC tablet    Other Relevant Orders    Case Request (Completed)    Follow Anesthesia Guidelines / Protocol    Dysphagia    Overview     Difficulty swallowing solids and large pills.  She has Schatzki's ring that was dilated in 2016.  Repeat endoscopy 2/2019 showed what looked like dysmotility.  Dilation to 20mm was done.  BaS 3/2019 showed a transient delay in the pill traversing the GEJ.  Endoscopy 7/2020 unremarkable--dilated up to 18mm.    She was advised to drink a large glass of water after taking Fosamax, diclofenac, potassium. This has been helpful.      Endo 9/2022 = SR dilated to 20mm         Relevant Orders    Case  Request (Completed)    Follow Anesthesia Guidelines / Protocol    Fatty liver    Overview     12/6/2024 normal ALT/AST, ALKP slightly elevated 127  12/11/2023 labs normal LFT's, Fib-4 Score 0.86 (Cirrhosis unlikely)  Alpha-fetoprotein normal at 4.46 this was collected January 4, 2023  Remaining LFTs within normal limits on 1/4/2023  Liver ultrasound with elastography 8/31/2022: No liver masses with liver parenchyma somewhat coarsened echotexture, Metavir score F2  1/11/2024 liver ultrasound with hepatic steatosis but no solid liver lesions    APRI 0.16 April 2022: consistent with unlikely cirrhosis or significant fibrosis  APRI Jan 2023= 0.2  12/6/2024 Fib-4 Score= 0.64    ..Fib-4 scoring system  Points <1.45:                      Cirrhosis less likely  Points >=1.45 and <=3.25:       Indeterminate  Points >3.25:                      Cirrhosis more likely           Current Assessment & Plan     Surveillance liver ultrasound recommended.    ..The principles of management of steatohepatitis are weight loss through a structured diet and exercise as well as meticulous control of any component of metabolic syndrome, including blood glucose levels, cholesterol and blood pressure.   The patient should strive to lose 2-4 monthly until reaching 7-10% reduction in weight.      Coffee consumption has been shown to decrease the risk of HCC in patients with chronic liver disease.  In these patients, coffee consumption should be encouraged.    I have advised to avoid fructose containing food and drink.     Daily alcohol intake should strickly be below 30gm in men and 20 gm in women.    A physical activity program should in 150-200 min/week of moderate intensity in 3-5 sessions. Resistance training to promote musculoskeletal fitness and improve metabolic factors was reviewed.          Relevant Orders    US Liver     Other Visit Diagnoses       Gastroesophageal reflux disease        Relevant Medications    pantoprazole (PROTONIX)  40 MG EC tablet    Other Relevant Orders    Case Request (Completed)    Follow Anesthesia Guidelines / Protocol          Repeat Liver us for surveillance  Continue Pantoprazole and plan endoscopy per Dr Fox  Repeat Colonoscopy July 2025          ..The risks, benefits, and alternatives of endoscopy were reviewed with the patient today.  Risks including perforation, with or without dilation, possibly requiring surgery.  Additional risks include risk of bleeding.  There is also the risk of a drug reaction or problems with anesthesia.  This will be discussed with the further by the anesthesia team on the day of the procedure. The benefits include the diagnosis and management of disease of the upper digestive tract.  Alternatives to endoscopy include upper GI series, laboratory testing, radiographic evaluation, or no intervention.  The patient verbalizes understanding and agrees.    In accordance with requirements under the Affordable Care Act, [facility] has provided pricing for all hospital services and items on each of its websites. However, a patient's actual cost may differ based on the services the patient receives to meet individual healthcare needs and based on the benefits provided under the patient’s insurance coverage.        Lovely Brito, APRN  01/07/25  13:43 CST    Part of this note may be an electronic transcription/translation of spoken language to printed text.

## 2025-01-07 NOTE — PROGRESS NOTES
Primary Physician: Chaz Gaines MD    Chief Complaint   Patient presents with    Med Refill     Pt presents today for yearly OV for GERD-Needs 90-day refills on Pantoprazole; Pt states overall she feels good and feels like the medication works well for her        Subjective     Luna Kriss Cruz is a 59 y.o. female.    HPI  Fatty liver  Patient here today for her yearly follow-up on her hepatic steatosis.  12/11/2023 labs normal LFT's, Fib-4 Score 0.86  12/6/2024 Fib-4 Score= 0.64  12/6/2024 vitamin D level normal     .Fib-4 scoring system  Points <1.45:                        Cirrhosis less likely  Points >=1.45 and <=3.25:       Indeterminate  Points >3.25:                        Cirrhosis more likely     AFP normal at 4.46 on 1/4/2023  Liver ultrasound with elastography 8/31/2022: No liver masses with liver parenchyma somewhat coarsened echotexture, Metavir score F2   1/11/2024 liver ultrasound with evidence of hepatic steatosis.  No solid liver lesions.     Pt does NOT drink any alcohol.  1/7/2025 Weight 174 pounds  12/17/2024 Weight 174 pounds  6/17/2024 Weight  180 pounds     GERD  Last upper endoscopy 9/7/2022 with a medium size hiatal hernia, normal stomach, nonobstructing Schatzki's ring dilated up to 20 mm.  Pt reports acid reflux is under control with only occassional night time heartburn.  12/27/2022 bone density study: Osteopenia     Has trouble swallowing her larger pills and foods. Foods such as breads are getting stuck and she feels like she may need to have esophageal stretching again.  Acid reflux at night from time to time and she will use Pepcid prn.  Takes her Pantoprazole 40mg once daily.     Personal Hx Colon Polyps  Removed hyperplastic polyp  7/2020.  Due for repeat colonoscopy in 7/2025.  No change in bowels.  No blood in stool. No current GI issues.  NO family hx colon cancer.    Pt due for repeat colonoscopy July 2025.    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    Obesity     Osteoarthritis     Osteopenia     Osteoporosis     Ovarian cyst 1994    PONV (postoperative nausea and vomiting)     RLS (restless legs syndrome)     Scoliosis     Varicella 1966       Past Surgical History:   Procedure Laterality Date    APPENDECTOMY  1994    BREAST AUGMENTATION  12/2021    BREAST BIOPSY      BREAST LUMPECTOMY Right 2008    Right sentinel lymph nodes were also removed for biopsy    BREAST RECONSTRUCTION Right 09/2022    CARDIAC CATHETERIZATION      CHOLECYSTECTOMY  1993    COLONOSCOPY  05/03/2013    Erythematous mucosa in the rectum-biopsied; Repeat 4 years     COLONOSCOPY N/A 06/14/2017    Normal; Repeat 5 years    COLONOSCOPY  04/23/2010    Dr. Arzola-Extremely poorly prepped colon    COLONOSCOPY N/A 07/24/2020    Hemorrhoids found on perianal exam; One 4mm hyperplastic polyp in the transverse colon; Normal mucosa in the entire examined colon-biopsied; Non-bleeding internal hemorrhoids; Repeat 5 years    COSMETIC SURGERY  09/26/2023    D & C HYSTEROSCOPY  1993    D & C HYSTEROSCOPY MYOSURE N/A 01/13/2021    Procedure: DILATATION AND CURETTAGE HYSTEROSCOPY WITH MYOSURE, polypectomy;  Surgeon: Marcello Salas MD;  Location: [facility] OR;  Service: Obstetrics/Gynecology;  Laterality: N/A;    D & C HYSTEROSCOPY MYOSURE N/A 06/14/2023    Procedure: DILATATION AND CURETTAGE HYSTEROSCOPY;  Surgeon: Marcello Salas MD;  Location: [facility] OR;  Service: Obstetrics/Gynecology;  Laterality: N/A;    DILATATION AND CURETTAGE      ENDOSCOPY N/A  10/05/2016    Medium-sized HH; Widely patent and non-obstructing Schatzki ring-dilated; Procedure: ESOPHAGOGASTRODUODENOSCOPY WITH ANESTHESIA;  Surgeon: Ksenia Fox MD;  Location: [facility] ENDOSCOPY;  Service:     ENDOSCOPY N/A 02/27/2019    Normal 1st portion of the duodenum and 2 portion of the duodenum; A few gastric polyps-biopsied; Small HH; Widely patent Schatzki ring-dilated; Abnormal esophageal motility, suspicious for presbyesophagus; Arrange for barium swallow to assess for dysmotility    ENDOSCOPY N/A 07/24/2020    Medium-sized HH; LA Grade A reflux esophagitis; No endoscopic esophageal abnormality to explain patient's dysphagia-Esophagus dilated; Normal stomach; Normal examined duodenum; No specimens collected    ENDOSCOPY N/A 09/07/2022    Medium-sized HH; Non-obstructing Schatzki ring-Dilated; Normal stomach; Normal examined duodenum; No specimens collected    HERNIA REPAIR  09/26/2022    HYSTEROSCOPY      KNEE ARTHROSCOPY Left 11/13/2018    Procedure: LEFT KNEE ARTHROSCOPIC PARTIAL MEDIAL MENISCECTOMY;  Surgeon: Matt Maki MD;  Location: [facility] OR;  Service: Orthopedics    MASTECTOMY Bilateral 09/14/2021    MEDIPORT INSERTION, SINGLE  2008    MEDIPORT REMOVAL      PARATHYROIDECTOMY  2011    REPLACEMENT TOTAL KNEE Left 05/23/2019    TENDON REPAIR Left 10/19/2024    TONSILLECTOMY AND ADENOIDECTOMY  1970    TUBAL ABDOMINAL LIGATION          Current Outpatient Medications:     albuterol (PROVENTIL) (2.5 MG/3ML) 0.083% nebulizer solution, USE 1 VIAL BY NEBULIZATION EVERY 4 HOURS AS NEEDED FOR WHEEZING, Disp: 360 mL, Rfl: 5    albuterol sulfate  (90 Base) MCG/ACT inhaler, USE 2 INHALATIONS EVERY 4 HOURS AS NEEDED FOR WHEEZING, Disp: 25.5 g, Rfl: 3    alendronate (FOSAMAX) 70 MG tablet, TAKE 1 TABLET ON SUNDAY EVERY 7 DAYS, Disp: 12 tablet, Rfl: 3    aspirin 81 MG EC tablet, Take 1 tablet by mouth Daily., Disp: , Rfl:     atorvastatin (LIPITOR) 40 MG tablet, TAKE 1 TABLET DAILY, Disp: 90  tablet, Rfl: 3    azelastine (ASTELIN) 0.1 % nasal spray, USE 2 SPRAYS INTO THE NOSTRIL(S)  TWICE A DAY AS DIRECTED BY PROVIDER, Disp: 30 mL, Rfl: 13    calcitriol (ROCALTROL) 0.5 MCG capsule, TAKE 1 CAPSULE DAILY, Disp: 90 capsule, Rfl: 3    Calcium Citrate-Vitamin D (CALCIUM + D PO), Take 1 tablet by mouth Daily., Disp: , Rfl:     docusate sodium (COLACE) 100 MG capsule, Take 1 capsule by mouth As Needed., Disp: , Rfl:     fluticasone (FLONASE) 50 MCG/ACT nasal spray, USE 2 SPRAYS INTO THE NOSTRIL(S) DAILY AS DIRECTED BY PROVIDER, Disp: 16 g, Rfl: 11    Fluticasone-Salmeterol (Wixela Inhub) 250-50 MCG/ACT DISKUS, Inhale 1 puff 2 (Two) Times a Day., Disp: 180 each, Rfl: 3    HYDROcodone-acetaminophen (NORCO) 5-325 MG per tablet, Take 1 tablet by mouth 2 (Two) Times a Day As Needed for Moderate Pain., Disp: , Rfl:     ibuprofen (ADVIL,MOTRIN) 400 MG tablet, Take 1 tablet by mouth Every 8 (Eight) Hours As Needed for Mild Pain., Disp: , Rfl:     levothyroxine (SYNTHROID, LEVOTHROID) 50 MCG tablet, TAKE 1 TABLET BY MOUTH DAILY, Disp: 90 tablet, Rfl: 3    loratadine (CLARITIN) 10 MG tablet, TAKE 1 TABLET DAILY AS NEEDED FOR ALLERGIES, Disp: 90 tablet, Rfl: 3    losartan (COZAAR) 100 MG tablet, TAKE 1 TABLET DAILY, Disp: 90 tablet, Rfl: 3    naloxone (NARCAN) 4 MG/0.1ML nasal spray, Administer 1 spray into the nostril(s) as directed by provider., Disp: , Rfl:     pantoprazole (PROTONIX) 40 MG EC tablet, Take 1 tablet by mouth Daily., Disp: 90 tablet, Rfl: 3    potassium chloride ER (K-TAB) 20 MEQ tablet controlled-release ER tablet, TAKE 1 TABLET BY MOUTH DAILY, Disp: 90 tablet, Rfl: 3    rOPINIRole (REQUIP) 0.25 MG tablet, TAKE 1 TABLET EVERY NIGHT, Disp: 90 tablet, Rfl: 3    theophylline (UNIPHYL) 400 MG 24 hr tablet, Take 1 tablet by mouth Daily., Disp: 90 tablet, Rfl: 3    tiZANidine (ZANAFLEX) 4 MG tablet, Take 1 tablet by mouth Every 8 (Eight) Hours As Needed for Muscle Spasms., Disp: , Rfl:     zafirlukast  (ACCOLATE) 20 MG tablet, TAKE 1 TABLET TWICE A DAY, Disp: 180 tablet, Rfl: 3  No current facility-administered medications for this visit.    Facility-Administered Medications Ordered in Other Visits:     heparin flush (porcine) 100 UNIT/ML injection 500 Units, 500 Units, Intracatheter, PRN, Ki Manriquez MD, 500 Units at 09/27/16 1108    sodium chloride 0.9 % flush 10 mL, 10 mL, Intravenous, PRN, Ki Manriquez MD, 10 mL at 09/27/16 1107    Allergies   Allergen Reactions    Cephalosporins Hives    Keflex [Cephalexin] Rash       Social History     Socioeconomic History    Marital status:    Tobacco Use    Smoking status: Never     Passive exposure: Never    Smokeless tobacco: Never   Vaping Use    Vaping status: Never Used   Substance and Sexual Activity    Alcohol use: Never    Drug use: No    Sexual activity: Yes     Partners: Male     Birth control/protection: Post-menopausal       Family History   Problem Relation Age of Onset    Colon polyps Mother         < 60 years of age     Cirrhosis Mother     Diabetes Mother     Liver disease Mother     Colon polyps Sister 54        < 60 years of age     Breast cancer Sister     Cancer Sister     No Known Problems Father     No Known Problems Brother     No Known Problems Daughter     No Known Problems Son     Diabetes Maternal Grandmother     Heart disease Maternal Grandmother     No Known Problems Paternal Grandmother     No Known Problems Maternal Aunt     No Known Problems Paternal Aunt     Hyperlipidemia Maternal Grandfather     Stroke Maternal Grandfather     Colon cancer Neg Hx     Crohn's disease Neg Hx     Irritable bowel syndrome Neg Hx     Liver cancer Neg Hx     Rectal cancer Neg Hx     Stomach cancer Neg Hx     BRCA 1/2 Neg Hx     Endometrial cancer Neg Hx     Ovarian cancer Neg Hx     Uterine cancer Neg Hx     Esophageal cancer Neg Hx        Review of Systems   Constitutional:  Negative for unexpected weight change.   HENT:   "Positive for trouble swallowing.    Respiratory:  Negative for shortness of breath.        Objective     /82 (BP Location: Left arm, Patient Position: Sitting, Cuff Size: Adult)   Pulse 98   Temp 98.2 °F (36.8 °C) (Infrared)   Ht 154.9 cm (61\")   Wt 78.9 kg (174 lb)   LMP  (LMP Unknown)   SpO2 98%   Breastfeeding No   BMI 32.88 kg/m²     Physical Exam  Vitals reviewed.   Constitutional:       Appearance: Normal appearance.   Cardiovascular:      Rate and Rhythm: Normal rate and regular rhythm.      Heart sounds: Normal heart sounds.   Pulmonary:      Effort: Pulmonary effort is normal.      Breath sounds: Normal breath sounds.   Neurological:      Mental Status: She is alert.         Lab Results - Last 18 Months   Lab Units 12/06/24  0739 04/29/24  1005 12/11/23  0747   GLUCOSE mg/dL 83 97 91   BUN mg/dL 13 14 12   CREATININE mg/dL 1.24* 1.05* 1.06*   SODIUM mmol/L 144 141 142   POTASSIUM mmol/L 4.2 4.5 4.4   CHLORIDE mmol/L 104 105 105   CO2 mmol/L 24 26.0 25.3   TOTAL PROTEIN g/dL  --  7.1  --    ALBUMIN g/dL 4.3 4.2 4.3   ALT (SGPT) IU/L 15 12 12   AST (SGOT) IU/L 18 14 17   ALK PHOS IU/L 127* 111 106   BILIRUBIN mg/dL 0.3 0.3 0.4   GLOBULIN gm/dL  --  2.9  --        Lab Results - Last 18 Months   Lab Units 12/06/24  0739 04/29/24  1005 12/11/23  0747   HEMOGLOBIN g/dL 14.9 14.3 14.2   HEMATOCRIT % 47.9* 44.7 42.9   MCV fL 89 89.0 87.7   WBC x10E3/uL 9.1 7.97 7.43   RDW % 13.6 13.9 13.7   MPV fL  --  10.0  --    PLATELETS x10E3/uL 434 349 338       Lab Results - Last 18 Months   Lab Units 12/06/24  0739 12/11/23  0747   TSH uIU/mL 3.370 2.450   VIT D 25 HYDROXY ng/mL 57.4 55.4      EXAM/TECHNIQUE: US LIVER 1/11/2024-     INDICATION: fatty liver; K76.0-Fatty (change of) liver, not elsewhere  classified     COMPARISON: 8/31/2022     FINDINGS:     Visualized portion of the pancreas is unremarkable. Visualized portion  of the abdominal aorta and IVC are unremarkable.     Liver echogenicity is diffusely " increased. No liver echotexture  coarsening or surface contour nodularity. No solid liver lesion. Patent  portal vein with appropriate directional flow.     Postoperative change of cholecystectomy. No biliary ductal dilatation.  Common bile duct is 4 mm diameter.     Visualized portion of the right kidney is unremarkable without evidence  of hydronephrosis.     IMPRESSION:  1.  Hepatic steatosis.  2.  No biliary ductal dilatation status post cholecystectomy.     This report was signed and finalized on 1/11/2024 11:11 AM by Dr. Nabil Alvarenga MD.       IMPRESSION/PLAN:    Assessment & Plan      Problem List Items Addressed This Visit       Esophageal reflux - Primary    Overview     Regular acid reflux.  3 pepsi per day    LA A esopahgitis seen on endoscopy 7/2020.      Anti-reflux measures were reviewed and discussed with patient.  They were advised to refrain from chocolate, alcohol, smoking, peppermint and caffeine.  They were advised to limit fatty foods, large meals, and eating late at nighttime.  They were advised to reach an ideal body mass index.    She is also on Fosamax.  I advised her to drink a large glass of water after taking this pill as this can cause esophagitis.    12/6/2024 creatinine slightly elevated 1.24           Relevant Medications    pantoprazole (PROTONIX) 40 MG EC tablet    Other Relevant Orders    Case Request (Completed)    Follow Anesthesia Guidelines / Protocol    Dysphagia    Overview     Difficulty swallowing solids and large pills.  She has Schatzki's ring that was dilated in 2016.  Repeat endoscopy 2/2019 showed what looked like dysmotility.  Dilation to 20mm was done.  BaS 3/2019 showed a transient delay in the pill traversing the GEJ.  Endoscopy 7/2020 unremarkable--dilated up to 18mm.    She was advised to drink a large glass of water after taking Fosamax, diclofenac, potassium. This has been helpful.      Endo 9/2022 = SR dilated to 20mm         Relevant Orders    Case  Request (Completed)    Follow Anesthesia Guidelines / Protocol    Fatty liver    Overview     12/6/2024 normal ALT/AST, ALKP slightly elevated 127  12/11/2023 labs normal LFT's, Fib-4 Score 0.86 (Cirrhosis unlikely)  Alpha-fetoprotein normal at 4.46 this was collected January 4, 2023  Remaining LFTs within normal limits on 1/4/2023  Liver ultrasound with elastography 8/31/2022: No liver masses with liver parenchyma somewhat coarsened echotexture, Metavir score F2  1/11/2024 liver ultrasound with hepatic steatosis but no solid liver lesions    APRI 0.16 April 2022: consistent with unlikely cirrhosis or significant fibrosis  APRI Jan 2023= 0.2  12/6/2024 Fib-4 Score= 0.64    ..Fib-4 scoring system  Points <1.45:                      Cirrhosis less likely  Points >=1.45 and <=3.25:       Indeterminate  Points >3.25:                      Cirrhosis more likely           Current Assessment & Plan     Surveillance liver ultrasound recommended.    ..The principles of management of steatohepatitis are weight loss through a structured diet and exercise as well as meticulous control of any component of metabolic syndrome, including blood glucose levels, cholesterol and blood pressure.   The patient should strive to lose 2-4 monthly until reaching 7-10% reduction in weight.      Coffee consumption has been shown to decrease the risk of HCC in patients with chronic liver disease.  In these patients, coffee consumption should be encouraged.    I have advised to avoid fructose containing food and drink.     Daily alcohol intake should strickly be below 30gm in men and 20 gm in women.    A physical activity program should in 150-200 min/week of moderate intensity in 3-5 sessions. Resistance training to promote musculoskeletal fitness and improve metabolic factors was reviewed.          Relevant Orders    US Liver     Other Visit Diagnoses       Gastroesophageal reflux disease        Relevant Medications    pantoprazole (PROTONIX)  40 MG EC tablet    Other Relevant Orders    Case Request (Completed)    Follow Anesthesia Guidelines / Protocol          Repeat Liver us for surveillance  Continue Pantoprazole and plan endoscopy per Dr Fox  Repeat Colonoscopy July 2025          ..The risks, benefits, and alternatives of endoscopy were reviewed with the patient today.  Risks including perforation, with or without dilation, possibly requiring surgery.  Additional risks include risk of bleeding.  There is also the risk of a drug reaction or problems with anesthesia.  This will be discussed with the further by the anesthesia team on the day of the procedure. The benefits include the diagnosis and management of disease of the upper digestive tract.  Alternatives to endoscopy include upper GI series, laboratory testing, radiographic evaluation, or no intervention.  The patient verbalizes understanding and agrees.    In accordance with requirements under the Affordable Care Act, [facility] has provided pricing for all hospital services and items on each of its websites. However, a patient's actual cost may differ based on the services the patient receives to meet individual healthcare needs and based on the benefits provided under the patient’s insurance coverage.        Lovely Brito, APRN  01/07/25  13:43 CST    Part of this note may be an electronic transcription/translation of spoken language to printed text.

## 2025-01-13 ENCOUNTER — OFFICE VISIT (OUTPATIENT)
Age: 60
End: 2025-01-13

## 2025-01-13 VITALS — WEIGHT: 174 LBS | BODY MASS INDEX: 32.02 KG/M2 | HEIGHT: 62 IN

## 2025-01-13 DIAGNOSIS — Z47.89 AFTERCARE FOLLOWING SURGERY OF THE MUSCULOSKELETAL SYSTEM: ICD-10-CM

## 2025-01-13 DIAGNOSIS — S86.302S PERONEAL TENDON INJURY, LEFT, SEQUELA: Primary | ICD-10-CM

## 2025-01-13 RX ORDER — TRIAMCINOLONE ACETONIDE 40 MG/ML
60 INJECTION, SUSPENSION INTRA-ARTICULAR; INTRAMUSCULAR ONCE
Status: COMPLETED | OUTPATIENT
Start: 2025-01-13 | End: 2025-01-13

## 2025-01-13 RX ADMIN — TRIAMCINOLONE ACETONIDE 60 MG: 40 INJECTION, SUSPENSION INTRA-ARTICULAR; INTRAMUSCULAR at 14:08

## 2025-01-13 NOTE — PROGRESS NOTES
[facility]  [facility]  [address]  [facility] [id number] [phone]     Patient: Bridgette Gould   YOB: 1965   Date: 1/13/2025   Visit Type:  Follow up    Body Part: Foot left    What was the date of surgery? 10/9/24  
tablet Take 1 tablet by mouth daily      tiZANidine (ZANAFLEX) 4 MG tablet Take 1 tablet by mouth nightly      alendronate (FOSAMAX) 70 MG tablet Take 1 tablet by mouth every 7 days Indications: Sunday  3    calcitRIOL (ROCALTROL) 0.5 MCG capsule Take 1 capsule by mouth daily  6    rOPINIRole (REQUIP) 0.25 MG tablet Take 1 tablet by mouth nightly  3    fluticasone-salmeterol (ADVAIR) 500-50 MCG/DOSE diskus inhaler Inhale 1 puff into the lungs in the morning and 1 puff in the evening.      losartan (COZAAR) 25 MG tablet Take 4 tablets by mouth daily      albuterol (PROVENTIL) (2.5 MG/3ML) 0.083% nebulizer solution Take 3 mLs by nebulization every 4 hours as needed for Wheezing      LIPITOR 40 MG tablet Take 1 tablet by mouth daily      zafirlukast (ACCOLATE) 20 MG tablet Take 1 tablet by mouth 2 times daily      enoxaparin (LOVENOX) 40 MG/0.4ML  (Patient not taking: Reported on 12/16/2024)      nitroglycerin (NITRO-BID) 2 % ointment Place 0.5 inches onto the skin 2 times daily (Patient not taking: Reported on 12/16/2024) 1 each 3    aspirin EC 81 MG EC tablet Take 1 tablet by mouth 2 times daily (Patient not taking: Reported on 12/16/2024) 60 tablet 0    ibuprofen (ADVIL;MOTRIN) 400 MG tablet Take 1 tablet by mouth every 8 hours as needed for Pain (Patient not taking: Reported on 12/16/2024) 30 tablet 0     No current facility-administered medications for this visit.        Allergies  Allergies   Allergen Reactions    Cephalexin Rash    Cephalosporins Hives and Rash        Review of Systems  System  Neg/Pos  Details  Constitutional  Negative  Chills, Fatigue, Fever and Night Sweats  Respiratory  Negative  Chest Pain, Cough and Dyspnea  Cardio   Negative  Leg Swelling  GI   Negative  Abdominal Pain, Constipation, Nausea and Vomiting     Negative  Urinary Incontinence   Endocrine  Negative  Weight Gain and Weight Loss  MS   Negative  Except as noted in HPI and Chief Complaint    Ht 1.575 m (5' 2\")   Wt 78.9 kg (174

## 2025-01-15 RX ORDER — CALCITRIOL 0.5 UG/1
0.5 CAPSULE, LIQUID FILLED ORAL DAILY
Qty: 90 CAPSULE | Refills: 3 | Status: SHIPPED | OUTPATIENT
Start: 2025-01-15

## 2025-01-22 ENCOUNTER — ANESTHESIA EVENT (OUTPATIENT)
Dept: GASTROENTEROLOGY | Facility: HOSPITAL | Age: 60
End: 2025-01-22
Payer: MEDICARE

## 2025-01-22 ENCOUNTER — ANESTHESIA (OUTPATIENT)
Dept: GASTROENTEROLOGY | Facility: HOSPITAL | Age: 60
End: 2025-01-22
Payer: MEDICARE

## 2025-01-22 ENCOUNTER — HOSPITAL ENCOUNTER (OUTPATIENT)
Facility: HOSPITAL | Age: 60
Setting detail: HOSPITAL OUTPATIENT SURGERY
Discharge: HOME | End: 2025-01-22
Attending: INTERNAL MEDICINE | Admitting: INTERNAL MEDICINE
Payer: MEDICARE

## 2025-01-22 VITALS
RESPIRATION RATE: 20 BRPM | SYSTOLIC BLOOD PRESSURE: 135 MMHG | BODY MASS INDEX: 32.76 KG/M2 | TEMPERATURE: 97 F | HEIGHT: 62 IN | WEIGHT: 178 LBS | DIASTOLIC BLOOD PRESSURE: 76 MMHG | HEART RATE: 85 BPM | OXYGEN SATURATION: 97 %

## 2025-01-22 DIAGNOSIS — R13.19 ESOPHAGEAL DYSPHAGIA: ICD-10-CM

## 2025-01-22 DIAGNOSIS — K21.9 GASTROESOPHAGEAL REFLUX DISEASE, UNSPECIFIED WHETHER ESOPHAGITIS PRESENT: ICD-10-CM

## 2025-01-22 PROCEDURE — 43249 ESOPH EGD DILATION <30 MM: CPT | Performed by: INTERNAL MEDICINE

## 2025-01-22 PROCEDURE — C1726 CATH, BAL DIL, NON-VASCULAR: HCPCS | Performed by: INTERNAL MEDICINE

## 2025-01-22 PROCEDURE — 25010000002 LIDOCAINE PF 2% 2 % SOLUTION: Performed by: NURSE ANESTHETIST, CERTIFIED REGISTERED

## 2025-01-22 PROCEDURE — 25010000002 PROPOFOL 10 MG/ML EMULSION: Performed by: NURSE ANESTHETIST, CERTIFIED REGISTERED

## 2025-01-22 RX ORDER — SODIUM CHLORIDE 0.9 % (FLUSH) 0.9 %
10 SYRINGE (ML) INJECTION EVERY 12 HOURS SCHEDULED
Status: CANCELLED | OUTPATIENT
Start: 2025-01-22

## 2025-01-22 RX ORDER — LIDOCAINE HYDROCHLORIDE 10 MG/ML
0.5 INJECTION, SOLUTION EPIDURAL; INFILTRATION; INTRACAUDAL; PERINEURAL ONCE AS NEEDED
Status: DISCONTINUED | OUTPATIENT
Start: 2025-01-22 | End: 2025-01-22 | Stop reason: HOSPADM

## 2025-01-22 RX ORDER — SODIUM CHLORIDE 9 MG/ML
500 INJECTION, SOLUTION INTRAVENOUS CONTINUOUS PRN
Status: DISCONTINUED | OUTPATIENT
Start: 2025-01-22 | End: 2025-01-22 | Stop reason: HOSPADM

## 2025-01-22 RX ORDER — PROPOFOL 10 MG/ML
VIAL (ML) INTRAVENOUS AS NEEDED
Status: DISCONTINUED | OUTPATIENT
Start: 2025-01-22 | End: 2025-01-22 | Stop reason: SURG

## 2025-01-22 RX ORDER — LIDOCAINE HYDROCHLORIDE 20 MG/ML
INJECTION, SOLUTION EPIDURAL; INFILTRATION; INTRACAUDAL; PERINEURAL AS NEEDED
Status: DISCONTINUED | OUTPATIENT
Start: 2025-01-22 | End: 2025-01-22 | Stop reason: SURG

## 2025-01-22 RX ORDER — SODIUM CHLORIDE 0.9 % (FLUSH) 0.9 %
10 SYRINGE (ML) INJECTION AS NEEDED
Status: DISCONTINUED | OUTPATIENT
Start: 2025-01-22 | End: 2025-01-22 | Stop reason: HOSPADM

## 2025-01-22 RX ORDER — SODIUM CHLORIDE 9 MG/ML
40 INJECTION, SOLUTION INTRAVENOUS AS NEEDED
Status: CANCELLED | OUTPATIENT
Start: 2025-01-22

## 2025-01-22 RX ORDER — SODIUM CHLORIDE 0.9 % (FLUSH) 0.9 %
10 SYRINGE (ML) INJECTION AS NEEDED
Status: CANCELLED | OUTPATIENT
Start: 2025-01-22

## 2025-01-22 RX ADMIN — LIDOCAINE HYDROCHLORIDE 100 MG: 20 INJECTION, SOLUTION EPIDURAL; INFILTRATION; INTRACAUDAL; PERINEURAL at 08:13

## 2025-01-22 RX ADMIN — Medication 10 ML: at 07:55

## 2025-01-22 RX ADMIN — PROPOFOL 100 MG: 10 INJECTION, EMULSION INTRAVENOUS at 08:13

## 2025-01-22 NOTE — ANESTHESIA POSTPROCEDURE EVALUATION
Patient: Luna Cruz    Procedure Summary       Date: 01/22/25 Room / Location: [facility] ENDOSCOPY 5 / [facility] ENDOSCOPY    Anesthesia Start: 0810 Anesthesia Stop: 0823    Procedure: ESOPHAGOGASTRODUODENOSCOPY WITH ANESTHESIA Diagnosis:       Gastroesophageal reflux disease, unspecified whether esophagitis present      Esophageal dysphagia      (Gastroesophageal reflux disease, unspecified whether esophagitis present [K21.9])      (Esophageal dysphagia [R13.19])    Surgeons: Ksenia Fox MD Provider: Shen Brantley CRNA    Anesthesia Type: MAC ASA Status: 3            Anesthesia Type: MAC    Vitals  Vitals Value Taken Time   /65 01/22/25 0841   Temp     Pulse 78 01/22/25 0841   Resp 19 01/22/25 0840   SpO2 99 % 01/22/25 0841   Vitals shown include unfiled device data.        Post Anesthesia Care and Evaluation    Patient location during evaluation: PHASE II  Patient participation: complete - patient participated  Level of consciousness: awake and sleepy but conscious  Pain score: 0  Pain management: adequate    Airway patency: patent  Anesthetic complications: No anesthetic complications    Cardiovascular status: acceptable  Respiratory status: acceptable  Hydration status: acceptable

## 2025-01-22 NOTE — ANESTHESIA PREPROCEDURE EVALUATION
Anesthesia Evaluation     Patient summary reviewed and Nursing notes reviewed   history of anesthetic complications:  PONV  NPO Solid Status: > 8 hours  NPO Liquid Status: > 8 hours           Airway   Mallampati: II  TM distance: >3 FB  Neck ROM: full  Possible difficult intubation  Dental          Pulmonary    (+) asthma (uses inhalers regularly),shortness of breath (asthma related)  (-) not a smoker  Cardiovascular   Exercise tolerance: poor (<4 METS)    (+) hypertension, hyperlipidemia  (-) past MI, CAD    ROS comment: Low risk stress test 7/2020      Neuro/Psych  (-) seizures, TIA, CVA  GI/Hepatic/Renal/Endo    (+) obesity, GERD well controlled, liver disease fatty liver disease, thyroid problem   (-) no renal disease, diabetes    Musculoskeletal     Abdominal    Substance History      OB/GYN    (-)  Pregnant        Other   arthritis,   history of cancer (breast cancer)                      Anesthesia Plan    ASA 3     MAC     intravenous induction     Anesthetic plan, risks, benefits, and alternatives have been provided, discussed and informed consent has been obtained with: patient.    Plan discussed with CRNA.

## 2025-01-27 ENCOUNTER — ANCILLARY ORDERS (OUTPATIENT)
Dept: GASTROENTEROLOGY | Facility: CLINIC | Age: 60
End: 2025-01-27
Payer: MEDICARE

## 2025-01-27 ENCOUNTER — HOSPITAL ENCOUNTER (OUTPATIENT)
Dept: ULTRASOUND IMAGING | Facility: HOSPITAL | Age: 60
Discharge: HOME | End: 2025-01-27
Payer: MEDICARE

## 2025-01-27 DIAGNOSIS — K76.0 FATTY LIVER: ICD-10-CM

## 2025-01-27 DIAGNOSIS — K76.0 FATTY LIVER: Primary | ICD-10-CM

## 2025-01-27 PROCEDURE — 76705 ECHO EXAM OF ABDOMEN: CPT

## 2025-01-27 PROCEDURE — 0690T QUAN US TIS CHARAC W/DX US: CPT

## 2025-02-03 NOTE — PROGRESS NOTES
HISTORY OF PRESENT ILLNESS:    Ms. Gould present today for a breast exam followed by bilateral mastectomy and immediate reconstruction with expanders and AlloDerm on 9/14/2021. She had her surgery by Dr. Gonzalez October of 2022.  This was a Aspen flap on the right due to inability to expand the radiated right breast.    She is status post bilateral implant exchange on 12/28/2021.  She had placement of bilateral [id number] cohesive gel implants    She underwent a Right ultrasound guided breast biopsy.  Pathology demonstrated intraductal papilloma with atypia.    She has appropriate postoperative discomfort, and no significant complaints.    This is on the same side as her right breast cancer treated with lumpectomy and radiation in 2008.  She has had enough and wishes to consider bilateral mastectomies and reconstruction    PATHOLOGY REVEALS:  FINAL DIAGNOSIS:    A. Right breast, mastectomy:   Ductal carcinoma in situ, papillary and solid subtype.   Maximum tumor diameter is 0.6 cm.   The surgical margins and nipple are free of tumor.     B.  Left breast, mastectomy:   No malignancy identified.   Benign breast tissue.   The surgical margins are free of tumor.     She called last weekend concerned about swelling and pain in the left breast and I had her come in for evaluation.  I think it was just the block wearing off    Unilateral Ultrasound-8/1/2022  Unilateral left ultrasound 8/1/2022 demonstrates an area of fat necrosis under her lateral aspect of her Wise pattern incision.  There is nothing suspicious for malignancy and nothing worrisome at all.      PHYSICAL EXAM:  She looks quite good after her Asepn flap on the right.  She is symmetrical and soft.  She is ready for nipple tattooing.  There are no palpable masses, no other abnormalities.    She has new concern about a possible low midline hernia where she had her donor site.  I feel some weakness but no definitive hernia.      IMPRESSION:    Doing well s/p

## 2025-02-05 ENCOUNTER — OFFICE VISIT (OUTPATIENT)
Dept: OBSTETRICS AND GYNECOLOGY | Age: 60
End: 2025-02-05
Payer: MEDICARE

## 2025-02-05 VITALS
BODY MASS INDEX: 32.76 KG/M2 | HEIGHT: 62 IN | WEIGHT: 178 LBS | SYSTOLIC BLOOD PRESSURE: 142 MMHG | DIASTOLIC BLOOD PRESSURE: 76 MMHG

## 2025-02-05 DIAGNOSIS — Z01.411 ENCOUNTER FOR GYNECOLOGICAL EXAMINATION WITH ABNORMAL FINDING: Primary | ICD-10-CM

## 2025-02-05 DIAGNOSIS — Z78.9 NON-SMOKER: ICD-10-CM

## 2025-02-05 DIAGNOSIS — Z85.3 HISTORY OF MALIGNANT NEOPLASM OF BREAST: ICD-10-CM

## 2025-02-05 DIAGNOSIS — N95.0 POSTMENOPAUSAL BLEEDING: ICD-10-CM

## 2025-02-05 DIAGNOSIS — Q50.5 PARA-OVARIAN CYST: ICD-10-CM

## 2025-02-05 DIAGNOSIS — Z92.29 HISTORY OF TAMOXIFEN THERAPY: ICD-10-CM

## 2025-02-05 NOTE — PROGRESS NOTES
Chief Complaint  Annual Exam (Pt is here for an annual exam and US to f/u on PMB /Last pap 1/13/23 WNL /Breast followed by Dr Houston/Pt states that in Jan 2025 she did have some bleeding again where there was enough that she had to wear a pad.  )  History of Present Illness  The patient is a 59-year-old female who presents for evaluation of postmenopausal bleeding.    She experienced an episode of bleeding on 01/22/2025, which persisted for approximately 5 to 6 days. The bleeding was not severe, but it necessitated the use of a light pad daily. She reports no recent sexual activity.  Her current medication regimen includes aspirin, but she is not on any hormone replacement therapy due to a history of hormone-positive breast cancer. She underwent a dilation and curettage (D & C) procedure in 05/2023, during which a biopsy was performed that yielded negative results.   She also reports persistent abdominal pain since her surgery, attributing it to extensive scar tissue formation.      Subjective          Luna Cruz presents to [facility]  History of Present Illness    Review of Systems   Constitutional:  Negative for activity change, appetite change, fatigue and fever.   HENT:  Negative for congestion, sore throat and trouble swallowing.    Eyes:  Negative for pain, discharge and visual disturbance.   Respiratory:  Negative for apnea, shortness of breath and wheezing.    Cardiovascular:  Negative for chest pain, palpitations and leg swelling.   Gastrointestinal:  Negative for abdominal pain, constipation and diarrhea.   Genitourinary:  Positive for vaginal bleeding (January). Negative for frequency, pelvic pain, urgency and vaginal discharge.   Musculoskeletal:  Negative for back pain and gait problem.   Skin:  Negative for color change and rash.   Neurological:  Negative for dizziness, weakness and numbness.   Psychiatric/Behavioral:  Negative for confusion and sleep disturbance.      "    Objective   Vital Signs:   /76   Ht 157.5 cm (62\")   Wt 80.7 kg (178 lb)   BMI 32.56 kg/m²     Physical Exam  Vitals and nursing note reviewed. Exam conducted with a chaperone present.   Constitutional:       General: She is not in acute distress.     Appearance: She is well-developed. She is not diaphoretic.   HENT:      Head: Normocephalic.      Right Ear: External ear normal.      Left Ear: External ear normal.      Nose: Nose normal.   Eyes:      General: No scleral icterus.        Right eye: No discharge.         Left eye: No discharge.      Conjunctiva/sclera: Conjunctivae normal.      Pupils: Pupils are equal, round, and reactive to light.   Neck:      Thyroid: No thyromegaly.      Vascular: No carotid bruit.      Trachea: No tracheal deviation.   Cardiovascular:      Rate and Rhythm: Normal rate and regular rhythm.      Heart sounds: Normal heart sounds. No murmur heard.  Pulmonary:      Effort: Pulmonary effort is normal. No respiratory distress.      Breath sounds: Normal breath sounds. No wheezing.   Chest:   Breasts:     Right: No swelling, bleeding, inverted nipple, mass, nipple discharge, skin change or tenderness.      Left: No swelling, bleeding, inverted nipple, mass, nipple discharge, skin change or tenderness.      Comments: Bilateral mastectomy with reconstruction.   Abdominal:      General: There is no distension.      Palpations: Abdomen is soft. There is no mass.      Tenderness: There is no abdominal tenderness. There is no right CVA tenderness, left CVA tenderness or guarding.      Hernia: No hernia is present. There is no hernia in the left inguinal area or right inguinal area.   Genitourinary:     General: Normal vulva.      Exam position: Lithotomy position.      Labia:         Right: No rash, tenderness, lesion or injury.         Left: No rash, tenderness, lesion or injury.       Vagina: Normal. No signs of injury and foreign body. No vaginal discharge, erythema, tenderness " or bleeding.      Cervix: Normal.      Uterus: Normal. Not enlarged, not fixed and not tender.       Adnexa: Right adnexa normal and left adnexa normal.        Right: No mass, tenderness or fullness.          Left: No mass, tenderness or fullness.        Rectum: Normal. No mass.      Comments:   BSU normal  Urethral meatus  Normal  Perineum  Normal  Musculoskeletal:         General: No tenderness. Normal range of motion.      Cervical back: Normal range of motion and neck supple.   Lymphadenopathy:      Head:      Right side of head: No submental, submandibular, tonsillar, preauricular, posterior auricular or occipital adenopathy.      Left side of head: No submental, submandibular, tonsillar, preauricular, posterior auricular or occipital adenopathy.      Cervical: No cervical adenopathy.      Right cervical: No superficial, deep or posterior cervical adenopathy.     Left cervical: No superficial, deep or posterior cervical adenopathy.      Upper Body:      Right upper body: No supraclavicular, axillary or pectoral adenopathy.      Left upper body: No supraclavicular, axillary or pectoral adenopathy.      Lower Body: No right inguinal adenopathy. No left inguinal adenopathy.   Skin:     General: Skin is warm and dry.      Findings: No bruising, erythema or rash.   Neurological:      Mental Status: She is alert and oriented to person, place, and time.      Coordination: Coordination normal.   Psychiatric:         Mood and Affect: Mood normal.         Behavior: Behavior normal.         Thought Content: Thought content normal.         Judgment: Judgment normal.       Physical Exam  The cervix appears well healed with no issues. There is no vaginal irritation or sources of bleeding in the genitourinary area.  Result Review :   The following data was reviewed by: HEATHER Rachel on 02/05/2025:    Data reviewed : Radiologic studies transvaginal US    Impression:     1.  Uterus: Normal size and Retroverted      2.  Endometrium:  2.8 mm     3.  Myometrium: Normal homogenous texture     4.  Ovaries  Left:    Simple cyst 1.4 cm  Right:  Normal/unremarkable     Relevant comparison data: No relevant comparison data  Chaz Figueroa MD  2/5/2025 11:57 CST              Assessment and Plan      Well woman GYN exam.   Pap smear not indicated per ASCCP guidelines.   Pelvic exam with chaperone present.     Will have lab work at PCP.     Colonoscopy due in July, pt reports she will schedule.     Bone density pt reports followed by PCP.     Discussed STD prevention and testing.   Pt declines Chlamydia/Gonorrhea/Trichomonas, RPR, Hep panel and HIV testing.     Mammogram will be scheduled at [facility], followed by Dr. Houston.     Assessment & Plan  1. Postmenopausal bleeding.  The endometrial lining is notably thin, which is atypical for postmenopausal bleeding. The cyst remains unchanged, and there is no evidence of recent bleeding or irritation upon examination. The patient had a D and C in May 2023. She is not on hormone replacement therapy due to her history of hormone-positive breast cancer.   A follow-up ultrasound will be scheduled in 3 months to monitor for any growth in the lining or recurrence of bleeding.   A consultation with Dr. Salas is recommended to discuss the best course of action based on the ultrasound results. If there is no growth and no bleeding in that 3-month timeframe then we need to decide do we need to do repeat a D and C or do we need to do something different.        Diagnoses and all orders for this visit:    1. Encounter for gynecological examination with abnormal finding (Primary)    2. Postmenopausal bleeding    3. Para-ovarian cyst    4. History of tamoxifen therapy    5. Non-smoker    6. History of malignant neoplasm of breast                  Follow Up   Return in about 3 months (around 5/5/2025) for US and OVDANI [place].    Patient was given instructions and counseling regarding her  condition or for health maintenance advice. Please see specific information pulled into the AVS if appropriate.     Patient or patient representative verbalized consent for the use of Ambient Listening during the visit with  HEATHER Rachel for chart documentation. 2/6/2025  22:27 CST

## 2025-02-06 DIAGNOSIS — M81.0 OSTEOPOROSIS WITHOUT CURRENT PATHOLOGICAL FRACTURE, UNSPECIFIED OSTEOPOROSIS TYPE: ICD-10-CM

## 2025-02-06 RX ORDER — ALENDRONATE SODIUM 70 MG/1
70 TABLET ORAL
Qty: 12 TABLET | Refills: 3 | Status: SHIPPED | OUTPATIENT
Start: 2025-02-06

## 2025-02-06 NOTE — TELEPHONE ENCOUNTER
Caller: Luna Cruz    Relationship: Self    Best call back number:   Telephone Information:   Mobile [phone]         Requested Prescriptions:   Requested Prescriptions     Pending Prescriptions Disp Refills    alendronate (FOSAMAX) 70 MG tablet 12 tablet 3        Pharmacy where request should be sent: [id number] - [place], [place] - [address] AT [facility] [address] - [phone]  - [phone]     Last office visit with prescribing clinician: 12/17/2024   Last telemedicine visit with prescribing clinician: Visit date not found   Next office visit with prescribing clinician: 6/17/2025     Additional details provided by patient: COMPLETELY OUT    Does the patient have less than a 3 day supply:  [x] Yes  [] No    Would you like a call back once the refill request has been completed: [] Yes [x] No    If the office needs to give you a call back, can they leave a voicemail: [] Yes [x] No    Papito Contreras Rep   02/06/25 09:03 CST

## 2025-02-07 NOTE — PATIENT INSTRUCTIONS
"BMI for Adults  Body mass index (BMI) is a number found using a person's weight and height. BMI can help tell how much of a person's weight is made up of fat. BMI does not measure body fat directly. It is used instead of tests that directly measure body fat, which can be difficult and expensive.  What are BMI measurements used for?  BMI is useful to:  Find out if your weight puts you at higher risk for medical problems.  Help recommend changes, such as in diet and exercise. This can help you reach a healthy weight. BMI screening can be done again to see if these changes are working.  How is BMI calculated?  Your height and weight are measured. The BMI is found from those numbers. This can be done with U.S. or metric measurements. Note that charts and online BMI calculators are available to help you find your BMI quickly and easily without doing these calculations.  To calculate your BMI in U.S. measurements:  Measure your weight in pounds (lb).  Multiply the number of pounds by 703.  So, for an adult who weighs 150 lb, multiply that number by 703: 150 x 703, which equals 105,450.  Measure your height in inches. Then multiply that number by itself to get a measurement called \"inches squared.\"  So, for an adult who is 70 inches tall, the \"inches squared\" measurement is 70 inches x 70 inches, which equals 4,900 inches squared.  Divide the total from step 2 (number of lb x 703) by the total from step 3 (inches squared): 105,450 ÷ 4,900 = 21.5. This is your BMI.  To calculate your BMI in metric measurements:    Measure your weight in kilograms (kg).  For this example, the weight is 70 kg.  Measure your height in meters (m). Then multiply that number by itself to get a measurement called \"meters squared.\"  So, for an adult who is 1.75 m tall, the \"meters squared\" measurement is 1.75 m x 1.75 m, which equals 3.1 meters squared.  Divide the number of kilograms (your weight) by the meters squared number. In this example: 70 " ÷ 3.1 = 22.6. This is your BMI.  What do the results mean?  BMI charts are used to see if you are underweight, normal weight, overweight, or obese. The following guidelines will be used:  Underweight: BMI less than 18.5.  Normal weight: BMI between 18.5 and 24.9.  Overweight: BMI between 25 and 29.9.  Obese: BMI of 30 or above.  BMI is a tool and cannot diagnose a condition. Talk with your health care provider about what your BMI means for you. Keep these notes in mind:  Weight includes fat and muscle. Someone with a muscular build, such as an athlete, may have a BMI that is higher than 24.9. In cases like these, BMI is not a correct measure of body fat.  If you have a BMI of 25 or higher, your provider may need to do more testing to find out if excess body fat is the cause.  BMI is measured the same way for males and females. Females usually have more body fat than males of the same height and weight.  Where to find more information  For more information about BMI, including tools to quickly find your BMI, go to:  Centers for Disease Control and Prevention: cdc.gov  American Heart Association: heart.org  National Heart, Lung, and Blood [place]: nhlbi.nih.gov  This information is not intended to replace advice given to you by your health care provider. Make sure you discuss any questions you have with your health care provider.  Document Revised: 09/07/2023 Document Reviewed: 08/31/2023  [employer] Patient Education © 2024 [employer]

## 2025-02-10 ENCOUNTER — OFFICE VISIT (OUTPATIENT)
Dept: SURGERY | Age: 60
End: 2025-02-10

## 2025-02-10 VITALS — HEART RATE: 100 BPM | HEIGHT: 62 IN | BODY MASS INDEX: 32.76 KG/M2 | WEIGHT: 178 LBS | OXYGEN SATURATION: 98 %

## 2025-02-10 DIAGNOSIS — Z98.890 S/P LUMPECTOMY, RIGHT BREAST: ICD-10-CM

## 2025-02-10 DIAGNOSIS — Z85.3 PERSONAL HISTORY OF MALIGNANT NEOPLASM OF BREAST: ICD-10-CM

## 2025-02-10 DIAGNOSIS — Z90.13 S/P BILATERAL MASTECTOMY: Primary | ICD-10-CM

## 2025-02-11 ENCOUNTER — OFFICE VISIT (OUTPATIENT)
Age: 60
End: 2025-02-11
Payer: MEDICARE

## 2025-02-11 VITALS — BODY MASS INDEX: 32.76 KG/M2 | HEIGHT: 62 IN | WEIGHT: 178 LBS

## 2025-02-11 DIAGNOSIS — S86.302S PERONEAL TENDON INJURY, LEFT, SEQUELA: Primary | ICD-10-CM

## 2025-02-11 PROCEDURE — G8417 CALC BMI ABV UP PARAM F/U: HCPCS | Performed by: NURSE PRACTITIONER

## 2025-02-11 PROCEDURE — 3017F COLORECTAL CA SCREEN DOC REV: CPT | Performed by: NURSE PRACTITIONER

## 2025-02-11 PROCEDURE — 1036F TOBACCO NON-USER: CPT | Performed by: NURSE PRACTITIONER

## 2025-02-11 PROCEDURE — G8427 DOCREV CUR MEDS BY ELIG CLIN: HCPCS | Performed by: NURSE PRACTITIONER

## 2025-02-11 PROCEDURE — 99213 OFFICE O/P EST LOW 20 MIN: CPT | Performed by: NURSE PRACTITIONER

## 2025-02-11 ASSESSMENT — ENCOUNTER SYMPTOMS
BLOOD IN STOOL: 0
DIARRHEA: 0
COLOR CHANGE: 0
NAUSEA: 0
COUGH: 0
CONSTIPATION: 0
SHORTNESS OF BREATH: 0
VOMITING: 0

## 2025-02-11 NOTE — PROGRESS NOTES
[facility]  [facility]  [address]  [facility] [id number] [phone]     Patient: Bridgette Gould   YOB: 1965   Date: 2/11/2025   Visit Type:  Follow up    Body Part: Foot left    What was the date of surgery? 10/9/24    Pain medication refill? No    Patient states her foot is still sore, but doing OK.    Review of Systems    Review of Systems   Constitutional:  Negative for appetite change, chills, fatigue and fever.   Respiratory:  Negative for cough and shortness of breath.    Cardiovascular:  Negative for chest pain, palpitations and leg swelling.   Gastrointestinal:  Negative for blood in stool, constipation, diarrhea, nausea and vomiting.   Musculoskeletal:  Negative for arthralgias, gait problem and joint swelling.   Skin:  Negative for color change and wound.   Neurological:  Negative for weakness.        
Community Support   Intimate Partner Violence: Not At Risk (1/22/2025)    Received from [facility]    Abuse Screen     Feels Unsafe at Home or Work/School: no     Feels Threatened by Someone: no     Does Anyone Try to Keep You From Having Contact with Others or Doing Things Outside Your Home?: no     Physical Signs of Abuse Present: no    [facility], [facility]    Housing Stability      Social History     Occupational History    Not on file   Tobacco Use    Smoking status: Never    Smokeless tobacco: Never   Vaping Use    Vaping status: Never Used   Substance and Sexual Activity    Alcohol use: No    Drug use: No    Sexual activity: Not on file        Tobacco Use      Smoking status: Never      Smokeless tobacco: Never     Family History   Problem Relation Age of Onset    Diabetes Mother     Colon Polyps Mother     Breast Cancer Sister 47        Medications  Current Outpatient Medications   Medication Sig Dispense Refill    ondansetron (ZOFRAN) 4 MG tablet TAKE ONE TO TWO TABLETS BY MOUTH EVERY 6 TO 8 HOURS FOR TWO DAYS      oxyCODONE-acetaminophen (PERCOCET) 7.5-325 MG per tablet TAKE 1 TABLET BY MOUTH EVERY 4 TO 6 HOURS AS NEEDED      enoxaparin (LOVENOX) 40 MG/0.4ML       clotrimazole-betamethasone (LOTRISONE) 1-0.05 % cream Apply topically 2 times daily. 1 each 0    docusate sodium (COLACE) 100 MG capsule Take 1 capsule by mouth daily as needed for Constipation      mupirocin (BACTROBAN NASAL) 2 % nasal ointment Take by Nasal route 2 times daily for 5 days prior to surgery 1 each 0    Diclofenac Sodium (PENNSAID) 2 % SOLN Apply topically as needed       THEOPHYLLINE CR PO Take 40 mg by mouth daily      levothyroxine (SYNTHROID) 50 MCG tablet Take 1 tablet by mouth daily      calcium citrate-vitamin D (CITRICAL + D) 315-250 MG-UNIT TABS per tablet Take 1 tablet by mouth daily      loratadine (CLARITIN) 10 MG tablet Take 1 tablet by mouth      pantoprazole

## 2025-02-22 DIAGNOSIS — J45.40 MODERATE PERSISTENT ASTHMA WITHOUT COMPLICATION: ICD-10-CM

## 2025-02-24 ENCOUNTER — TELEPHONE (OUTPATIENT)
Dept: PULMONOLOGY | Facility: CLINIC | Age: 60
End: 2025-02-24
Payer: MEDICARE

## 2025-02-24 RX ORDER — LORATADINE 10 MG/1
TABLET ORAL
Qty: 90 TABLET | Refills: 3 | Status: SHIPPED | OUTPATIENT
Start: 2025-02-24

## 2025-02-24 NOTE — TELEPHONE ENCOUNTER
Pharmacy sent a request for refills on Loratadine. Refill request passed protocol and sent to the pharmacy.  Rx Refill Note  Requested Prescriptions     Pending Prescriptions Disp Refills    loratadine (CLARITIN) 10 MG tablet [Pharmacy Med Name: LORATADINE TABS-OTC 10MG] 90 tablet 3     Sig: TAKE 1 TABLET DAILY AS NEEDED FOR ALLERGIES      Last office visit with prescribing clinician: 12/6/2024   Last telemedicine visit with prescribing clinician: Visit date not found   Next office visit with prescribing clinician: 6/6/2025                         Would you like a call back once the refill request has been completed: [] Yes [] No    If the office needs to give you a call back, can they leave a voicemail: [] Yes [] No    Osmany Merino, [facility]  02/24/25, 08:41 CST

## 2025-02-26 DIAGNOSIS — J45.998 OTHER ASTHMA: ICD-10-CM

## 2025-02-26 RX ORDER — ZAFIRLUKAST 20 MG/1
20 TABLET, FILM COATED ORAL 2 TIMES DAILY
Qty: 180 TABLET | Refills: 3 | Status: SHIPPED | OUTPATIENT
Start: 2025-02-26

## 2025-02-26 NOTE — TELEPHONE ENCOUNTER
Pharmacy sent a request for refills on Zafirlukast. Refill request passed protocol and sent to the pharmacy.   Rx Refill Note  Requested Prescriptions     Pending Prescriptions Disp Refills    zafirlukast (ACCOLATE) 20 MG tablet 180 tablet 3     Sig: Take 1 tablet by mouth 2 (Two) Times a Day.      Last office visit with prescribing clinician: 12/6/2024   Last telemedicine visit with prescribing clinician: Visit date not found   Next office visit with prescribing clinician: 6/6/2025                         Would you like a call back once the refill request has been completed: [] Yes [] No    If the office needs to give you a call back, can they leave a voicemail: [] Yes [] No    Osmany Merino, [facility]  02/26/25, 10:52 CST

## 2025-03-03 DIAGNOSIS — J45.40 MODERATE PERSISTENT ASTHMA WITHOUT COMPLICATION: ICD-10-CM

## 2025-03-03 DIAGNOSIS — J30.89 NON-SEASONAL ALLERGIC RHINITIS, UNSPECIFIED TRIGGER: ICD-10-CM

## 2025-03-03 RX ORDER — AZELASTINE 1 MG/ML
1 SPRAY, METERED NASAL 2 TIMES DAILY
Qty: 90 ML | Refills: 3 | Status: SHIPPED | OUTPATIENT
Start: 2025-03-03

## 2025-03-03 RX ORDER — ALBUTEROL SULFATE 90 UG/1
2 INHALANT RESPIRATORY (INHALATION) EVERY 4 HOURS PRN
Qty: 25.5 G | Refills: 3 | Status: SHIPPED | OUTPATIENT
Start: 2025-03-03

## 2025-03-03 RX ORDER — FLUTICASONE PROPIONATE 50 MCG
2 SPRAY, SUSPENSION (ML) NASAL DAILY
Qty: 48 G | Refills: 3 | Status: SHIPPED | OUTPATIENT
Start: 2025-03-03

## 2025-03-03 RX ORDER — FLUTICASONE PROPIONATE AND SALMETEROL 250; 50 UG/1; UG/1
1 POWDER RESPIRATORY (INHALATION)
Qty: 180 EACH | Refills: 3 | Status: SHIPPED | OUTPATIENT
Start: 2025-03-03

## 2025-03-03 NOTE — TELEPHONE ENCOUNTER
Patient called to request refills on Wixela, Albuterol hfa, Flonase and Azelastine. Refill request routed to Dr. Aguirre.  Rx Refill Note  Requested Prescriptions     Pending Prescriptions Disp Refills    Fluticasone-Salmeterol (Wixela Inhub) 250-50 MCG/ACT DISKUS 180 each 3     Sig: Inhale 1 puff 2 (Two) Times a Day.    albuterol sulfate  (90 Base) MCG/ACT inhaler 25.5 g 3     Sig: Inhale 2 puffs Every 4 (Four) Hours As Needed for Wheezing.    fluticasone (FLONASE) 50 MCG/ACT nasal spray 48 g 3     Sig: Administer 2 sprays into the nostril(s) as directed by provider Daily.    azelastine (ASTELIN) 0.1 % nasal spray 90 mL 3     Sig: Administer 1 spray into the nostril(s) as directed by provider 2 (Two) Times a Day. Use in each nostril as directed      Last office visit with prescribing clinician: 12/6/2024   Last telemedicine visit with prescribing clinician: Visit date not found   Next office visit with prescribing clinician: 6/6/2025                         Would you like a call back once the refill request has been completed: [] Yes [] No    If the office needs to give you a call back, can they leave a voicemail: [] Yes [] No    Osmany Merino CMA  03/03/25, 14:04 CST

## 2025-04-03 DIAGNOSIS — J45.998 OTHER ASTHMA: ICD-10-CM

## 2025-04-03 RX ORDER — ZAFIRLUKAST 20 MG/1
20 TABLET, FILM COATED ORAL 2 TIMES DAILY
Qty: 180 TABLET | Refills: 3 | Status: SHIPPED | OUTPATIENT
Start: 2025-04-03

## 2025-04-03 NOTE — TELEPHONE ENCOUNTER
Rx Refill Note  Requested Prescriptions     Pending Prescriptions Disp Refills    zafirlukast (ACCOLATE) 20 MG tablet [Pharmacy Med Name: ZAFIRLUKAST TABS 60'S 20MG] 180 tablet 3     Sig: TAKE 1 TABLET TWICE A DAY      Last office visit with prescribing clinician: 12/6/2024   Last telemedicine visit with prescribing clinician: Visit date not found   Next office visit with prescribing clinician: 6/6/2025                         Would you like a call back once the refill request has been completed: [] Yes [] No    If the office needs to give you a call back, can they leave a voicemail: [] Yes [] No    Sara Franco MA  04/03/25, 15:41 CDT

## 2025-04-10 RX ORDER — ATORVASTATIN CALCIUM 40 MG/1
40 TABLET, FILM COATED ORAL DAILY
Qty: 90 TABLET | Refills: 3 | Status: SHIPPED | OUTPATIENT
Start: 2025-04-10

## 2025-04-22 NOTE — PROGRESS NOTES
[facility] GROUP HEMATOLOGY & ONCOLOGY  [address]  [facility] [place] [address] [phone]    Patient Name: Luna Cruz  Encounter Date: 04/29/2025  YOB: 1965  Patient Number: [id number]      REASON FOR FOLLOW-UP: Luna Cruz is a pleasant 59 y.o.  female who is seen on follow-up for stage IIA right breast carcinoma of the lower inner quadrant, receptor positive and HER2/leonila negative.  She had completed 10 years of adjuvant tamoxifen 77 months ago. She is seen with spouse, Derek.  History taken from patient.  History is considered reliable.           Oncology/Hematology History Overview Note   Ms. Luna Cruz is a pleasant 49 year old female with diagnosis of pT1pN1(mi)M0 right breast cancer, ER positive, W[place] positive, HER2/  leonila normal diagnosed in August 2008. She has passed the 3 yearmark.  Back then, the patient underwent lumpectomy. Tumor was  located in the lower inner quadrant of the right breast. [place] lymph node dissection found to have residual DCIS. She went on to receive  4 cycles of non anthracycline based chemotherapy with Taxotere and Cytoxan. This was followed by radiation therapy and has been  tamoxifen since 11/25/08.  She is here today for regular scheduled followup. . We will request sestamibi scan anyway. Her tumor markers have been stable. No  evidence of effusion or lymphadenopathy. Thyroid ultrasound on 07/25/11 ordered by Dr. Villasenor for hyperthyroidism study show small  thyroid, no lesions identified.  INTERVAL HISTORY  This patient had a lumpectomy for a stage IIA, estrogen receptorpositive,  HER2/  neunegative  breast cancer in 2008. She received  adjuvant interventions that included 5 years of tamoxifen administration.      DIAGNOSTIC ABNORMALITIES:DCIS:  Mammogram 06/25/2021.  New solid nodule within the upper outer quadrant of the right breast anterior depth. She was admitted  09/14/2021 and discharged 09/15/2021.  Right breast mastectomy 09/14/2021: Ductal carcinoma in situ, papillary and solid type.  Maximum tumor diameter 0.6 cm.  The surgical margins and nipple are free of tumor.Left breast mastectomy 09/14/2021: No malignancy identified.  Benign breast tissue.  The surgical margins are free of tumor.      PREVIOUS INTERVENTIONS:  Bilateral mastectomies 09/14/2021.         Ductal carcinoma in situ (DCIS) of right breast   4/1/2022 Initial Diagnosis    Ductal carcinoma in situ (DCIS) of right breast         PAST MEDICAL HISTORY:  ALLERGIES:  Allergies   Allergen Reactions    Cephalosporins Hives    Keflex [Cephalexin] Rash     CURRENT MEDICATIONS:  Outpatient Encounter Medications as of 4/29/2025   Medication Sig Dispense Refill    albuterol (PROVENTIL) (2.5 MG/3ML) 0.083% nebulizer solution USE 1 VIAL BY NEBULIZATION EVERY 4 HOURS AS NEEDED FOR WHEEZING 360 mL 5    albuterol sulfate  (90 Base) MCG/ACT inhaler Inhale 2 puffs Every 4 (Four) Hours As Needed for Wheezing. 25.5 g 3    alendronate (FOSAMAX) 70 MG tablet Take 1 tablet by mouth Every 7 (Seven) Days. 12 tablet 3    aspirin 81 MG EC tablet Take 1 tablet by mouth Daily.      atorvastatin (LIPITOR) 40 MG tablet TAKE 1 TABLET DAILY 90 tablet 3    azelastine (ASTELIN) 0.1 % nasal spray Administer 1 spray into the nostril(s) as directed by provider 2 (Two) Times a Day. Use in each nostril as directed 90 mL 3    calcitriol (ROCALTROL) 0.5 MCG capsule TAKE 1 CAPSULE DAILY 90 capsule 3    Calcium Citrate-Vitamin D (CALCIUM + D PO) Take 1 tablet by mouth Daily.      docusate sodium (COLACE) 100 MG capsule Take 1 capsule by mouth As Needed.      fluticasone (FLONASE) 50 MCG/ACT nasal spray Administer 2 sprays into the nostril(s) as directed by provider Daily. 48 g 3    Fluticasone-Salmeterol (Wixela Inhub) 250-50 MCG/ACT DISKUS Inhale 1 puff 2 (Two) Times a Day. 180 each 3    HYDROcodone-acetaminophen (NORCO) 5-325 MG per tablet  Take 1 tablet by mouth 2 (Two) Times a Day As Needed for Moderate Pain.      ibuprofen (ADVIL,MOTRIN) 400 MG tablet Take 1 tablet by mouth Every 8 (Eight) Hours As Needed for Mild Pain.      levothyroxine (SYNTHROID, LEVOTHROID) 50 MCG tablet TAKE 1 TABLET BY MOUTH DAILY 90 tablet 3    loratadine (CLARITIN) 10 MG tablet TAKE 1 TABLET DAILY AS NEEDED FOR ALLERGIES 90 tablet 3    losartan (COZAAR) 100 MG tablet TAKE 1 TABLET DAILY 90 tablet 3    naloxone (NARCAN) 4 MG/0.1ML nasal spray Administer 1 spray into the nostril(s) as directed by provider.      pantoprazole (PROTONIX) 40 MG EC tablet Take 1 tablet by mouth Daily. 90 tablet 3    potassium chloride ER (K-TAB) 20 MEQ tablet controlled-release ER tablet TAKE 1 TABLET BY MOUTH DAILY 90 tablet 3    rOPINIRole (REQUIP) 0.25 MG tablet TAKE 1 TABLET EVERY NIGHT 90 tablet 3    theophylline (UNIPHYL) 400 MG 24 hr tablet Take 1 tablet by mouth Daily. 90 tablet 3    tiZANidine (ZANAFLEX) 4 MG tablet Take 1 tablet by mouth Every 8 (Eight) Hours As Needed for Muscle Spasms.      zafirlukast (ACCOLATE) 20 MG tablet TAKE 1 TABLET TWICE A  tablet 3     Facility-Administered Encounter Medications as of 4/29/2025   Medication Dose Route Frequency Provider Last Rate Last Admin    heparin flush (porcine) 100 UNIT/ML injection 500 Units  500 Units Intracatheter PRN Ki Manriquez MD   500 Units at 09/27/16 1108    sodium chloride 0.9 % flush 10 mL  10 mL Intravenous PRN Ki Manriquez MD   10 mL at 09/27/16 1107     ADULT ILLNESSES:  Patient Active Problem List   Diagnosis Code    Essential hypertension I10    Esophageal reflux K21.9    Dysphagia R13.10    Family history of malignant neoplasm of breast Z80.3    History of malignant neoplasm of breast Z85.3    Hyperplastic polyp of transverse colon K63.5    Iron deficiency anemia D50.9    Malignant neoplasm of lower-inner quadrant of left breast in female, estrogen receptor positive C50.312, Z17.0     Elevated cholesterol E78.00    Left carotid bruit R09.89    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, 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    Osteoporosis M81.0    Vitamin D deficiency E55.9    Breast cancer C50.919    Fatty liver K76.0    Rectal bleeding K62.5    Non-smoker Z78.9    Non-seasonal allergic rhinitis J30.89    Restless legs syndrome (RLS) G25.81    Pulmonary scarring J98.4    Atypical ductal hyperplasia of breast N60.99    S/P bilateral mastectomy Z90.13    Moderate persistent asthma without complication J45.40    Ductal carcinoma in situ (DCIS) of right breast D05.11    History of COVID-19 Z86.16    Class 1 obesity due to excess calories with body mass index (BMI) of 32.0 to 32.9 in adult E66.811, E66.09, Z68.32    MAMADOU (obstructive sleep apnea) G47.33     SURGERIES:  Past Surgical History:   Procedure Laterality Date    APPENDECTOMY  1994    BREAST AUGMENTATION  12/2021    BREAST BIOPSY      BREAST LUMPECTOMY Right 2008    Right sentinel lymph nodes were also removed for biopsy    BREAST RECONSTRUCTION Right 09/2022    CARDIAC CATHETERIZATION      CHOLECYSTECTOMY  1993    COLONOSCOPY  05/03/2013    Erythematous mucosa in the rectum-biopsied; Repeat 4 years     COLONOSCOPY N/A 06/14/2017    Normal; Repeat 5 years    COLONOSCOPY  04/23/2010    Dr. Arzola-Extremely poorly prepped colon    COLONOSCOPY N/A 07/24/2020    Hemorrhoids found on perianal exam; One 4mm hyperplastic polyp in the transverse colon; Normal mucosa in the entire examined colon-biopsied; Non-bleeding internal hemorrhoids; Repeat 5 years    COSMETIC SURGERY  09/26/2023    D & C HYSTEROSCOPY  1993    D & C HYSTEROSCOPY MYOSURE N/A 01/13/2021    Procedure: DILATATION AND CURETTAGE HYSTEROSCOPY WITH MYOSURE, polypectomy;  Surgeon: Marcello Salas MD;  Location: [facility] OR;  Service: Obstetrics/Gynecology;  Laterality: N/A;    D & C HYSTEROSCOPY MYOSURE N/A 06/14/2023    Procedure: DILATATION AND CURETTAGE HYSTEROSCOPY;  Surgeon: Marcello Salas MD;  Location: [facility]  OR;  Service: Obstetrics/Gynecology;  Laterality: N/A;    DILATATION AND CURETTAGE      ENDOSCOPY N/A 10/05/2016    Medium-sized HH; Widely patent and non-obstructing Schatzki ring-dilated; Procedure: ESOPHAGOGASTRODUODENOSCOPY WITH ANESTHESIA;  Surgeon: Ksenia Fox MD;  Location: [facility] ENDOSCOPY;  Service:     ENDOSCOPY N/A 02/27/2019    Normal 1st portion of the duodenum and 2 portion of the duodenum; A few gastric polyps-biopsied; Small HH; Widely patent Schatzki ring-dilated; Abnormal esophageal motility, suspicious for presbyesophagus; Arrange for barium swallow to assess for dysmotility    ENDOSCOPY N/A 07/24/2020    Medium-sized HH; LA Grade A reflux esophagitis; No endoscopic esophageal abnormality to explain patient's dysphagia-Esophagus dilated; Normal stomach; Normal examined duodenum; No specimens collected    ENDOSCOPY N/A 09/07/2022    Medium-sized HH; Non-obstructing Schatzki ring-Dilated; Normal stomach; Normal examined duodenum; No specimens collected    ENDOSCOPY N/A 1/22/2025    Procedure: ESOPHAGOGASTRODUODENOSCOPY WITH ANESTHESIA;  Surgeon: Ksenia Fox MD;  Location: [facility] ENDOSCOPY;  Service: Gastroenterology;  Laterality: N/A;  pre op; GERD, Dysphagia  post op: hiatal hernia, balloon  pcp: Chaz Gaines MD    HERNIA REPAIR  09/26/2022    HYSTEROSCOPY      KNEE ARTHROSCOPY Left 11/13/2018    Procedure: LEFT KNEE ARTHROSCOPIC PARTIAL MEDIAL MENISCECTOMY;  Surgeon: Matt Maki MD;  Location: [facility] OR;  Service: Orthopedics    MASTECTOMY Bilateral 09/14/2021    MEDIPORT INSERTION, SINGLE  2008    MEDIPORT REMOVAL      PARATHYROIDECTOMY  2011    REPLACEMENT TOTAL KNEE Left 05/23/2019    TENDON REPAIR Left 10/19/2024    TONSILLECTOMY AND ADENOIDECTOMY  1970    TUBAL ABDOMINAL LIGATION       HEALTH MAINTENANCE ITEMS:  Health Maintenance Due   Topic Date Due    Pneumococcal Vaccine 50+ (3 of 3 - PCV20 or PCV21) 10/28/2019    COLORECTAL CANCER SCREENING  07/24/2025       <no  information>  Last Completed Colonoscopy    This patient has no relevant Health Maintenance data.       Immunization History   Administered Date(s) Administered    COVID-19 (PFIZER) 12YRS+ (COMIRNATY) 11/24/2023, 09/02/2024    COVID-19 (PFIZER) BIVALENT 12+YRS 11/16/2022    COVID-19 (PFIZER) Purple Cap Monovalent 03/24/2021, 04/14/2021, 11/09/2021, 07/02/2022    Covid-19 (Pfizer) Gray Cap Monovalent 07/02/2022    Flu Vaccine Intradermal Quad 18-64YR 09/28/2018, 10/29/2019, 09/29/2020    Flublok 18+yrs 11/16/2022    Fluzone (or Fluarix & Flulaval for VFC) >6mos 10/10/2016, 09/30/2020, 10/12/2021    Influenza Inj MDCK Preserative Free 09/02/2024    Influenza Injectable Mdck Pf Quad 09/10/2023    Pneumococcal Conjugate 13-Valent (PCV13) 01/01/2012    Pneumococcal Polysaccharide (PPSV23) 10/28/2014    Shingrix 12/02/2020, 02/16/2021    Tdap 12/08/2021    flucelvax quad pfs =>4 YRS 09/28/2018, 10/29/2019     Last Completed Mammogram            Completed or No Longer Recommended       MAMMOGRAM  Discontinued        Frequency changed to Never automatically (Topic No Longer Applies)    06/25/2021  Outside Claim: HC MAMMOGRAM DIAGNOSTIC UNILAT DIGITAL W CAD    06/23/2021  SCANNED - MAMMO    12/21/2020  Mammo Additional View Left    06/17/2020  MAMMO DIAGNOSTIC LEFT W CAD     Only the first 5 history entries have been loaded, but more history exists.                            FAMILY HISTORY:  Family History   Problem Relation Age of Onset    Colon polyps Mother         < 60 years of age     Cirrhosis Mother     Diabetes Mother     Liver disease Mother     Colon polyps Sister 54        < 60 years of age     Breast cancer Sister     Cancer Sister     No Known Problems Father     No Known Problems Brother     No Known Problems Daughter     No Known Problems Son     Diabetes Maternal Grandmother     Heart disease Maternal Grandmother     No Known Problems Paternal Grandmother     No Known Problems Maternal Aunt     No Known  "Problems Paternal Aunt     Hyperlipidemia Maternal Grandfather     Stroke Maternal Grandfather     Colon cancer Neg Hx     Crohn's disease Neg Hx     Irritable bowel syndrome Neg Hx     Liver cancer Neg Hx     Rectal cancer Neg Hx     Stomach cancer Neg Hx     BRCA 1/2 Neg Hx     Endometrial cancer Neg Hx     Ovarian cancer Neg Hx     Uterine cancer Neg Hx     Esophageal cancer Neg Hx      SOCIAL HISTORY:  Social History     Socioeconomic History    Marital status:    Tobacco Use    Smoking status: Never     Passive exposure: Never    Smokeless tobacco: Never   Vaping Use    Vaping status: Never Used   Substance and Sexual Activity    Alcohol use: Never    Drug use: No    Sexual activity: Yes     Partners: Male     Birth control/protection: Post-menopausal       REVIEW OF SYSTEMS:    Review of Systems   Constitutional:  Positive for fatigue. Negative for fever and unexpected weight change.        \"Just tired.\"   HENT:  Positive for congestion. Negative for hearing loss.    Eyes:  Negative for redness and visual disturbance.   Respiratory:  Negative for shortness of breath and wheezing.    Cardiovascular:  Negative for chest pain and palpitations.   Gastrointestinal:  Negative for abdominal pain, nausea and vomiting.   Endocrine: Negative for cold intolerance and heat intolerance.   Genitourinary:  Negative for difficulty urinating and dysuria.   Musculoskeletal:  Negative for gait problem and myalgias.   Skin:  Negative for pallor.   Neurological:  Negative for dizziness, speech difficulty and weakness.   Hematological:  Negative for adenopathy. Bruises/bleeds easily.   Psychiatric/Behavioral:  Negative for agitation and confusion. The patient is not nervous/anxious.        VITAL SIGNS: /80   Pulse 83   Temp 97.1 °F (36.2 °C)   Resp 16   Ht 157.5 cm (62\")   Wt 83.5 kg (184 lb)   LMP  (LMP Unknown)   SpO2 99%   Breastfeeding No   BMI 33.65 kg/m²  Gained 5 pounds.   Pain Score    04/29/25 0934 "   PainSc: 2   Comment: just my usual back pain,       PHYSICAL EXAMINATION:     Physical Exam  Vitals reviewed.   Constitutional:       General: She is not in acute distress.  HENT:      Head: Normocephalic and atraumatic.   Eyes:      General: No scleral icterus.  Cardiovascular:      Rate and Rhythm: Normal rate.   Pulmonary:      Effort: No respiratory distress.      Breath sounds: No wheezing.   Abdominal:      General: Bowel sounds are normal.      Palpations: Abdomen is soft.   Musculoskeletal:         General: No swelling.   Skin:     Coloration: Skin is not pale.   Neurological:      Mental Status: She is alert and oriented to person, place, and time.   Psychiatric:         Mood and Affect: Mood normal.         Behavior: Behavior normal.         Thought Content: Thought content normal.         Judgment: Judgment normal.         LABS    Lab Results - Last 18 Months   Lab Units 04/29/25  0928 12/06/24  0739 04/29/24  1005 12/11/23  0747   HEMOGLOBIN g/dL 14.3 14.9 14.3 14.2   HEMATOCRIT % 44.7 47.9* 44.7 42.9   MCV fL 88.9 89 89.0 87.7   WBC 10*3/mm3 8.91 9.1 7.97 7.43   RDW % 14.1 13.6 13.9 13.7   MPV fL 10.1  --  10.0  --    PLATELETS 10*3/mm3 403 434 349 338   IMM GRAN % % 0.3  --  0.5  --    NEUTROS ABS 10*3/mm3 5.97 5.6 5.14 4.52   LYMPHS ABS 10*3/mm3 1.90 2.2 1.68 1.67   MONOS ABS 10*3/mm3 0.63 0.7 0.55 0.58   EOS ABS 10*3/mm3 0.29 0.5* 0.48* 0.56*   BASOS ABS 10*3/mm3 0.09 0.1 0.08 0.08   IMMATURE GRANS (ABS) 10*3/mm3 0.03  --  0.04  --    NRBC /100 WBC 0.0  --  0.0 0.0       Lab Results - Last 18 Months   Lab Units 12/06/24  0739 04/29/24  1005 12/11/23  0747   GLUCOSE mg/dL 83 97 91   SODIUM mmol/L 144 141 142   POTASSIUM mmol/L 4.2 4.5 4.4   CO2 mmol/L 24 26.0 25.3   CHLORIDE mmol/L 104 105 105   ANION GAP mmol/L  --  10.0  --    CREATININE mg/dL 1.24* 1.05* 1.06*   BUN mg/dL 13 14 12   BUN / CREAT RATIO  10* 13.3 11.3   CALCIUM mg/dL 9.4 10.0 9.7   ALK PHOS IU/L 127* 111 106   TOTAL PROTEIN g/dL 6.6  "7.1 6.1   ALT (SGPT) IU/L 15 12 12   AST (SGOT) IU/L 18 14 17   BILIRUBIN mg/dL 0.3 0.3 0.4   ALBUMIN g/dL 4.3 4.2 4.3   GLOBULIN gm/dL  --  2.9  --    GLOBULINREF g/dL 2.3  --  1.8       No results for input(s): \"MSPIKE\", \"KAPPALAMB\", \"IGLFLC\", \"URICACID\", \"FREEKAPPAL\", \"CEA\", \"LDH\", \"REFLABREPO\" in the last [id number] hours.    Lab Results - Last 18 Months   Lab Units 12/06/24  0739 12/11/23  0747   TSH uIU/mL 3.370 2.450       Luna Cruz reports a pain score of 2. \"Degenerative disc and scoliosis.\" Given her pain assessment as noted, treatment options were discussed and the following options were decided upon as a follow-up plan to address the patient's pain: continuation of current treatment plan for pain.          ASSESSMENT:  1.   Right breast cancer.  Lower inner quadrant.  AJCC stage:IIA (pT1, pN1mi, cM0).  Receptor status: Estrogen positive, progesterone positive and HER-2/leonila negative.  Treatment status: Post lumpectomy, post 4 cycles of adjuvant docetaxel and Cytoxan.  Post adjuvant radiation.  Post adjuvant tamoxifen 11/25/2008 through 11/24/2018.  2.   Right breast ductal carcinoma in situ.  Tumor size 0.6 cm.  AJCC stage: 0 (pTis, cN0, cM0, G2)  Treatment status: Post skin sparing mastectomy, right and left simple mastectomy.  3.   Performance status of 1.  4.   Anemia from iron deficiency post Venofer 05/14/2018 through 06/08/2018, 8 doses.  Post ferric carboxymaltose 05/15/2020 and 05/22/2020.               PLAN:  1.   Re: Heme status.  WBC 8.9, ANC 5.97, eosinophil 0.29, hemoglobin 14.3, MCV 88.9 and platelets 403.   2.   Re: CMP.  GFR latest 50 from 61.3 ml/min.  3.   Re: Endoscopy on 1/22/2025. Medium-sized hiatal hernia.  There is no endoscopic evidence of Ely's esophagus. Non-obstructing Schatzki ring. Dilated. Normal stomach. Normal examined duodenum.  No specimens collected.  4.   Re: Note from Bety Gresham, APRN 2/5/2025.  Patient presented with " postmenopausal bleeding.  She had undergone dilatation curettage 5/2023 and yielded negative results.  Exam showed cervix appears well-healed with no issues.  No vaginal irritation or source of bleeding in the genitourinary area.  Follow-up in 3 months.  5.   Re: Stable for observation, breast carcinoma.  6.   Re: No mammogram.  She is post bilateral mastectomy.   7  Continue care per primary care physician and the other specialists.  8.  Plan of care discussed with patient and spouse.  Understanding expressed.  She is agreeable to proceed.  9.  Return to office in 12 months with CBC with differential and CMP, same day.             I have reviewed the assessment and plan and verified the accuracy of it. No changes to assessment and plan since the information was documented. Salvador Zuleta MD 04/29/25       I spent 30 total minutes, face-to-face, caring for Luna today. Greater than 50% of this time involved counseling and/or coordination of care as documented within this note.              (Ksenia Fox MD)  (Kevin Aguirre MD)  (Marcello Salas MD)  (Chaz Gaines MD)

## 2025-04-29 ENCOUNTER — OFFICE VISIT (OUTPATIENT)
Dept: ONCOLOGY | Facility: CLINIC | Age: 60
End: 2025-04-29
Payer: MEDICARE

## 2025-04-29 ENCOUNTER — RESULTS FOLLOW-UP (OUTPATIENT)
Dept: LAB | Facility: HOSPITAL | Age: 60
End: 2025-04-29
Payer: MEDICARE

## 2025-04-29 ENCOUNTER — LAB (OUTPATIENT)
Dept: LAB | Facility: HOSPITAL | Age: 60
End: 2025-04-29
Payer: MEDICARE

## 2025-04-29 VITALS
BODY MASS INDEX: 33.86 KG/M2 | TEMPERATURE: 97.1 F | RESPIRATION RATE: 16 BRPM | WEIGHT: 184 LBS | HEIGHT: 62 IN | SYSTOLIC BLOOD PRESSURE: 144 MMHG | HEART RATE: 83 BPM | DIASTOLIC BLOOD PRESSURE: 80 MMHG | OXYGEN SATURATION: 99 %

## 2025-04-29 DIAGNOSIS — C50.312 MALIGNANT NEOPLASM OF LOWER-INNER QUADRANT OF LEFT BREAST IN FEMALE, ESTROGEN RECEPTOR POSITIVE: Primary | ICD-10-CM

## 2025-04-29 DIAGNOSIS — Z17.0 MALIGNANT NEOPLASM OF LOWER-INNER QUADRANT OF LEFT BREAST IN FEMALE, ESTROGEN RECEPTOR POSITIVE: Primary | ICD-10-CM

## 2025-04-29 DIAGNOSIS — Z85.3 HISTORY OF MALIGNANT NEOPLASM OF BREAST: ICD-10-CM

## 2025-04-29 LAB
ALBUMIN SERPL-MCNC: 3.9 G/DL (ref 3.5–5.2)
ALBUMIN/GLOB SERPL: 1.3 G/DL
ALP SERPL-CCNC: 115 U/L (ref 39–117)
ALT SERPL W P-5'-P-CCNC: 14 U/L (ref 1–33)
ANION GAP SERPL CALCULATED.3IONS-SCNC: 12 MMOL/L (ref 5–15)
AST SERPL-CCNC: 17 U/L (ref 1–32)
BASOPHILS # BLD AUTO: 0.09 10*3/MM3 (ref 0–0.2)
BASOPHILS NFR BLD AUTO: 1 % (ref 0–1.5)
BILIRUB SERPL-MCNC: 0.4 MG/DL (ref 0–1.2)
BUN SERPL-MCNC: 12 MG/DL (ref 8–23)
BUN/CREAT SERPL: 11.1 (ref 7–25)
CALCIUM SPEC-SCNC: 8.9 MG/DL (ref 8.6–10.5)
CHLORIDE SERPL-SCNC: 105 MMOL/L (ref 98–107)
CO2 SERPL-SCNC: 24 MMOL/L (ref 22–29)
CREAT SERPL-MCNC: 1.08 MG/DL (ref 0.57–1)
DEPRECATED RDW RBC AUTO: 45.6 FL (ref 37–54)
EGFRCR SERPLBLD CKD-EPI 2021: 58.9 ML/MIN/1.73
EOSINOPHIL # BLD AUTO: 0.29 10*3/MM3 (ref 0–0.4)
EOSINOPHIL NFR BLD AUTO: 3.3 % (ref 0.3–6.2)
ERYTHROCYTE [DISTWIDTH] IN BLOOD BY AUTOMATED COUNT: 14.1 % (ref 12.3–15.4)
GLOBULIN UR ELPH-MCNC: 2.9 GM/DL
GLUCOSE SERPL-MCNC: 92 MG/DL (ref 65–99)
HCT VFR BLD AUTO: 44.7 % (ref 34–46.6)
HGB BLD-MCNC: 14.3 G/DL (ref 12–15.9)
IMM GRANULOCYTES # BLD AUTO: 0.03 10*3/MM3 (ref 0–0.05)
IMM GRANULOCYTES NFR BLD AUTO: 0.3 % (ref 0–0.5)
LYMPHOCYTES # BLD AUTO: 1.9 10*3/MM3 (ref 0.7–3.1)
LYMPHOCYTES NFR BLD AUTO: 21.3 % (ref 19.6–45.3)
MCH RBC QN AUTO: 28.4 PG (ref 26.6–33)
MCHC RBC AUTO-ENTMCNC: 32 G/DL (ref 31.5–35.7)
MCV RBC AUTO: 88.9 FL (ref 79–97)
MONOCYTES # BLD AUTO: 0.63 10*3/MM3 (ref 0.1–0.9)
MONOCYTES NFR BLD AUTO: 7.1 % (ref 5–12)
NEUTROPHILS NFR BLD AUTO: 5.97 10*3/MM3 (ref 1.7–7)
NEUTROPHILS NFR BLD AUTO: 67 % (ref 42.7–76)
NRBC BLD AUTO-RTO: 0 /100 WBC (ref 0–0.2)
PLATELET # BLD AUTO: 403 10*3/MM3 (ref 140–450)
PMV BLD AUTO: 10.1 FL (ref 6–12)
POTASSIUM SERPL-SCNC: 4.2 MMOL/L (ref 3.5–5.2)
PROT SERPL-MCNC: 6.8 G/DL (ref 6–8.5)
RBC # BLD AUTO: 5.03 10*6/MM3 (ref 3.77–5.28)
SODIUM SERPL-SCNC: 141 MMOL/L (ref 136–145)
WBC NRBC COR # BLD AUTO: 8.91 10*3/MM3 (ref 3.4–10.8)

## 2025-04-29 PROCEDURE — 3077F SYST BP >= 140 MM HG: CPT | Performed by: INTERNAL MEDICINE

## 2025-04-29 PROCEDURE — 1159F MED LIST DOCD IN RCRD: CPT | Performed by: INTERNAL MEDICINE

## 2025-04-29 PROCEDURE — 3079F DIAST BP 80-89 MM HG: CPT | Performed by: INTERNAL MEDICINE

## 2025-04-29 PROCEDURE — 99214 OFFICE O/P EST MOD 30 MIN: CPT | Performed by: INTERNAL MEDICINE

## 2025-04-29 PROCEDURE — 85025 COMPLETE CBC W/AUTO DIFF WBC: CPT

## 2025-04-29 PROCEDURE — 36415 COLL VENOUS BLD VENIPUNCTURE: CPT

## 2025-04-29 PROCEDURE — 80053 COMPREHEN METABOLIC PANEL: CPT

## 2025-04-29 PROCEDURE — G2211 COMPLEX E/M VISIT ADD ON: HCPCS | Performed by: INTERNAL MEDICINE

## 2025-04-29 PROCEDURE — 1160F RVW MEDS BY RX/DR IN RCRD: CPT | Performed by: INTERNAL MEDICINE

## 2025-04-29 PROCEDURE — 1125F AMNT PAIN NOTED PAIN PRSNT: CPT | Performed by: INTERNAL MEDICINE

## 2025-04-29 NOTE — TELEPHONE ENCOUNTER
Contacted patient Luna Cruz, she was informed labs have been reviewed and Dr Zuleta recommeds that she increase her oral hydration (drink more water) due to GFR decline 58.9  Patient v/u of instruction.

## 2025-05-05 ENCOUNTER — OFFICE VISIT (OUTPATIENT)
Age: 60
End: 2025-05-05
Payer: MEDICARE

## 2025-05-05 VITALS
HEIGHT: 62 IN | DIASTOLIC BLOOD PRESSURE: 78 MMHG | BODY MASS INDEX: 33.49 KG/M2 | WEIGHT: 182 LBS | SYSTOLIC BLOOD PRESSURE: 128 MMHG

## 2025-05-05 DIAGNOSIS — N95.0 POSTMENOPAUSAL BLEEDING: Primary | ICD-10-CM

## 2025-05-05 PROCEDURE — 3078F DIAST BP <80 MM HG: CPT | Performed by: OBSTETRICS & GYNECOLOGY

## 2025-05-05 PROCEDURE — 99213 OFFICE O/P EST LOW 20 MIN: CPT | Performed by: OBSTETRICS & GYNECOLOGY

## 2025-05-05 PROCEDURE — 3074F SYST BP LT 130 MM HG: CPT | Performed by: OBSTETRICS & GYNECOLOGY

## 2025-05-05 NOTE — PROGRESS NOTES
"Luna Cruz is a 59 y.o. female here today for evaluation of postmenopausal bleeding.  Earlier this year she had an episode of vaginal bleeding that lasted 5 to 6 days requiring the use of a pad.  She had 1 day of light bleeding in April, and none since.  She is not on anticoagulation except for a 81 mg aspirin.  She has previously undergone evaluation of postmenopausal bleeding with D&C June 2023.  No pathology was found on hysteroscopy D&C.  She does have a history of breast cancer and is not on hormone replacement therapy.  She describes the bleeding as bright red and associated with some mild burning.    Visit Vitals  /78 (BP Location: Right arm, Patient Position: Sitting, Cuff Size: Adult)   Ht 157.5 cm (62.01\")   Wt 82.6 kg (182 lb)   LMP  (LMP Unknown)   Breastfeeding No   BMI 33.28 kg/m²     Pleasant female no acute distress  Mood and affect normal  Breathing unlabored    Pelvic ultrasound ordered and performed in the office today shows the endometrium to measure 4.6 mm at the fundus, otherwise very thin appearing  Pelvic ultrasound done February 5, 2025 showed the endometrium to measure 2.8 mm.  This is consistent with the overall appearance on today's ultrasound.    Assessment: Postmenopausal bleeding    I suspect that her bleeding is from the lower genital tract likely related to mucosal fissure.  She is asymptomatic today so exam not performed.  If she has the recurrence of symptoms then she will return for exam.  In the meantime my suspicion of endometrial pathology is quite low.  Previous evaluation, and recent ultrasound, do not suggest endometrial neoplasia.  I recommend that she use moisturizers to try to prevent fissures.  Return for exam if she has future bleeding.  Call with questions or concerns.      "

## 2025-06-06 ENCOUNTER — OFFICE VISIT (OUTPATIENT)
Dept: PULMONOLOGY | Facility: CLINIC | Age: 60
End: 2025-06-06
Payer: MEDICARE

## 2025-06-06 ENCOUNTER — CLINICAL SUPPORT (OUTPATIENT)
Dept: GASTROENTEROLOGY | Facility: CLINIC | Age: 60
End: 2025-06-06
Payer: MEDICARE

## 2025-06-06 VITALS
HEIGHT: 62 IN | WEIGHT: 183 LBS | OXYGEN SATURATION: 97 % | HEART RATE: 82 BPM | BODY MASS INDEX: 33.68 KG/M2 | SYSTOLIC BLOOD PRESSURE: 173 MMHG | DIASTOLIC BLOOD PRESSURE: 79 MMHG

## 2025-06-06 DIAGNOSIS — J98.4 PULMONARY SCARRING: ICD-10-CM

## 2025-06-06 DIAGNOSIS — Z78.9 NON-SMOKER: ICD-10-CM

## 2025-06-06 DIAGNOSIS — Z86.16 HISTORY OF COVID-19: ICD-10-CM

## 2025-06-06 DIAGNOSIS — J30.89 NON-SEASONAL ALLERGIC RHINITIS, UNSPECIFIED TRIGGER: ICD-10-CM

## 2025-06-06 DIAGNOSIS — E66.9 OBESITY (BMI 30-39.9): ICD-10-CM

## 2025-06-06 DIAGNOSIS — J45.40 MODERATE PERSISTENT ASTHMA WITHOUT COMPLICATION: Primary | ICD-10-CM

## 2025-06-06 DIAGNOSIS — K63.5 HYPERPLASTIC POLYP OF TRANSVERSE COLON: Primary | ICD-10-CM

## 2025-06-06 DIAGNOSIS — G25.81 RESTLESS LEGS SYNDROME (RLS): ICD-10-CM

## 2025-06-06 DIAGNOSIS — K62.5 RECTAL BLEEDING: ICD-10-CM

## 2025-06-06 DIAGNOSIS — Z83.719 FH: COLON POLYPS: ICD-10-CM

## 2025-06-06 PROCEDURE — 3077F SYST BP >= 140 MM HG: CPT | Performed by: INTERNAL MEDICINE

## 2025-06-06 PROCEDURE — 99214 OFFICE O/P EST MOD 30 MIN: CPT | Performed by: INTERNAL MEDICINE

## 2025-06-06 PROCEDURE — 3078F DIAST BP <80 MM HG: CPT | Performed by: INTERNAL MEDICINE

## 2025-06-06 RX ORDER — ALBUTEROL SULFATE 90 UG/1
2 INHALANT RESPIRATORY (INHALATION) EVERY 4 HOURS PRN
Qty: 25.5 G | Refills: 3 | Status: SHIPPED | OUTPATIENT
Start: 2025-06-06

## 2025-06-06 RX ORDER — LORATADINE 10 MG/1
10 TABLET ORAL DAILY
Qty: 90 TABLET | Refills: 3 | Status: SHIPPED | OUTPATIENT
Start: 2025-06-06

## 2025-06-06 RX ORDER — FLUTICASONE PROPIONATE 50 MCG
2 SPRAY, SUSPENSION (ML) NASAL DAILY
Qty: 48 G | Refills: 3 | Status: SHIPPED | OUTPATIENT
Start: 2025-06-06

## 2025-06-06 RX ORDER — FLUTICASONE PROPIONATE AND SALMETEROL 250; 50 UG/1; UG/1
1 POWDER RESPIRATORY (INHALATION)
Qty: 180 EACH | Refills: 3 | Status: SHIPPED | OUTPATIENT
Start: 2025-06-06

## 2025-06-06 RX ORDER — AZELASTINE 1 MG/ML
1 SPRAY, METERED NASAL 2 TIMES DAILY
Qty: 90 ML | Refills: 3 | Status: SHIPPED | OUTPATIENT
Start: 2025-06-06

## 2025-06-06 NOTE — PROGRESS NOTES
Primary Physician: Chaz Gaines MD    Chief Complaint   Patient presents with    Colon Cancer Screening     Pt presents today for colon recall-last colon was 7/24/2020; Personal history of hyperplastic polyps; Family history of colon polyps       Subjective     Luna Cruz is a 59 y.o. female.    HPI  Hx Colon Polyps  7/24/2020 Colonoscopy: non bleeding internal hemorrhoids, 4mm hyperplastic polyp in the transverse colon. Normal mucosa through entire colon.    Pt denies any change in bowel habits. No diarrhea, constipation, or abdominal pain.  At least once or twice per month she will have bright red blood on the toilet tissue or in the toilet bowl after her BM's. This is a chronic issue for her given her history of hemorrhoids.    No family hx colon cancer.    Family Hx Colon Polyps  Mother & Sister with colon polyps < 60.      Fatty Liver  1/27/2025 Liver with elastography:  mild hepatic steatosis with fat fraction of 9%, focal lesions.  4/29/2025 all LFT's normal  4/29/2025 Fib-4 Score= 0.68  ..Fib-4 scoring system  Points <1.45:                      Cirrhosis less likely  Points >=1.45 and <=3.25:       Indeterminate  Points >3.25:                      Cirrhosis more likely      1/27/24 US: mild fatty liver 9%    Past Medical History:   Diagnosis Date    Abnormal uterine bleeding due to endometrial polyp     Arthritis     Asthma     Chronic back pain     Drug therapy     Ectopic pregnancy 1993    Family history of colonic polyps     Fatty liver     GERD (gastroesophageal reflux disease)     History of breast cancer     History of colon polyps     Hx of radiation therapy     Hyperlipidemia     Hypertension     Hypothyroidism     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/26/2016    Obesity     Osteoarthritis     Osteopenia     Osteoporosis     Ovarian cyst 1994    PONV (postoperative nausea and vomiting)     RLS (restless legs syndrome)     Scoliosis     Varicella 1966        Past Surgical History:   Procedure Laterality Date    APPENDECTOMY  1994    BREAST AUGMENTATION  12/2021    BREAST BIOPSY      BREAST LUMPECTOMY Right 2008    Right sentinel lymph nodes were also removed for biopsy    BREAST RECONSTRUCTION Right 09/2022    CARDIAC CATHETERIZATION      CHOLECYSTECTOMY  1993    COLONOSCOPY  05/03/2013    Erythematous mucosa in the rectum-biopsied; Repeat 4 years     COLONOSCOPY N/A 06/14/2017    Normal; Repeat 5 years    COLONOSCOPY  04/23/2010    Dr. Arzola-Extremely poorly prepped colon    COLONOSCOPY N/A 07/24/2020    Hemorrhoids found on perianal exam; One 4mm hyperplastic polyp in the transverse colon; Normal mucosa in the entire examined colon-biopsied; Non-bleeding internal hemorrhoids; Repeat 5 years    COSMETIC SURGERY  09/26/2023    D & C HYSTEROSCOPY  1993    D & C HYSTEROSCOPY MYOSURE N/A 01/13/2021    Procedure: DILATATION AND CURETTAGE HYSTEROSCOPY WITH MYOSURE, polypectomy;  Surgeon: Marcello Salas MD;  Location: [facility] OR;  Service: Obstetrics/Gynecology;  Laterality: N/A;    D & C HYSTEROSCOPY MYOSURE N/A 06/14/2023    Procedure: DILATATION AND CURETTAGE HYSTEROSCOPY;  Surgeon: Marcello Salas MD;  Location: [facility] OR;  Service: Obstetrics/Gynecology;  Laterality: N/A;    DILATATION AND CURETTAGE      ENDOSCOPY N/A 10/05/2016    Medium-sized HH; Widely patent and non-obstructing Schatzki ring-dilated; Procedure: ESOPHAGOGASTRODUODENOSCOPY WITH ANESTHESIA;  Surgeon: Ksenia Fox MD;  Location: [facility] ENDOSCOPY;  Service:     ENDOSCOPY N/A 02/27/2019    Normal 1st portion of the duodenum and 2 portion of the duodenum; A few gastric polyps-biopsied; Small HH; Widely patent Schatzki ring-dilated; Abnormal esophageal motility, suspicious for presbyesophagus; Arrange for barium swallow to assess for dysmotility    ENDOSCOPY N/A 07/24/2020    Medium-sized HH; LA Grade A reflux esophagitis; No endoscopic esophageal abnormality to explain patient's  dysphagia-Esophagus dilated; Normal stomach; Normal examined duodenum; No specimens collected    ENDOSCOPY N/A 09/07/2022    Medium-sized HH; Non-obstructing Schatzki ring-Dilated; Normal stomach; Normal examined duodenum; No specimens collected    ENDOSCOPY N/A 01/22/2025    Medium-sized HH; There is no endoscopic evidence of Ely's esophagus; Non-obstructing Schatzki ring-Dilated; Normal stomach; Normal examined duodenum; No specimens collected    HERNIA REPAIR  09/26/2022    HYSTEROSCOPY      KNEE ARTHROSCOPY Left 11/13/2018    Procedure: LEFT KNEE ARTHROSCOPIC PARTIAL MEDIAL MENISCECTOMY;  Surgeon: Matt Maki MD;  Location: [facility] OR;  Service: Orthopedics    MASTECTOMY Bilateral 09/14/2021    MEDIPORT INSERTION, SINGLE  2008    MEDIPORT REMOVAL      PARATHYROIDECTOMY  2011    REPLACEMENT TOTAL KNEE Left 05/23/2019    TENDON REPAIR Left 10/19/2024    TONSILLECTOMY AND ADENOIDECTOMY  1970    TUBAL ABDOMINAL LIGATION          Current Outpatient Medications:     albuterol (PROVENTIL) (2.5 MG/3ML) 0.083% nebulizer solution, USE 1 VIAL BY NEBULIZATION EVERY 4 HOURS AS NEEDED FOR WHEEZING, Disp: 360 mL, Rfl: 5    albuterol sulfate  (90 Base) MCG/ACT inhaler, Inhale 2 puffs Every 4 (Four) Hours As Needed for Wheezing., Disp: 25.5 g, Rfl: 3    alendronate (FOSAMAX) 70 MG tablet, Take 1 tablet by mouth Every 7 (Seven) Days., Disp: 12 tablet, Rfl: 3    aspirin 81 MG EC tablet, Take 1 tablet by mouth Daily., Disp: , Rfl:     atorvastatin (LIPITOR) 40 MG tablet, TAKE 1 TABLET DAILY, Disp: 90 tablet, Rfl: 3    azelastine (ASTELIN) 0.1 % nasal spray, Administer 1 spray into the nostril(s) as directed by provider 2 (Two) Times a Day. Use in each nostril as directed, Disp: 90 mL, Rfl: 3    calcitriol (ROCALTROL) 0.5 MCG capsule, TAKE 1 CAPSULE DAILY, Disp: 90 capsule, Rfl: 3    Calcium Citrate-Vitamin D (CALCIUM + D PO), Take 1 tablet by mouth Daily., Disp: , Rfl:     docusate sodium (COLACE) 100 MG  capsule, Take 1 capsule by mouth As Needed., Disp: , Rfl:     fluticasone (FLONASE) 50 MCG/ACT nasal spray, Administer 2 sprays into the nostril(s) as directed by provider Daily., Disp: 48 g, Rfl: 3    Fluticasone-Salmeterol (Wixela Inhub) 250-50 MCG/ACT DISKUS, Inhale 1 puff 2 (Two) Times a Day., Disp: 180 each, Rfl: 3    HYDROcodone-acetaminophen (NORCO) 5-325 MG per tablet, Take 1 tablet by mouth 2 (Two) Times a Day As Needed for Moderate Pain., Disp: , Rfl:     ibuprofen (ADVIL,MOTRIN) 400 MG tablet, Take 1 tablet by mouth Every 8 (Eight) Hours As Needed for Mild Pain., Disp: , Rfl:     levothyroxine (SYNTHROID, LEVOTHROID) 50 MCG tablet, TAKE 1 TABLET BY MOUTH DAILY, Disp: 90 tablet, Rfl: 3    loratadine (CLARITIN) 10 MG tablet, TAKE 1 TABLET DAILY AS NEEDED FOR ALLERGIES (Patient taking differently: Take 1 tablet by mouth Daily As Needed.), Disp: 90 tablet, Rfl: 3    losartan (COZAAR) 100 MG tablet, TAKE 1 TABLET DAILY, Disp: 90 tablet, Rfl: 3    naloxone (NARCAN) 4 MG/0.1ML nasal spray, Administer 1 spray into the nostril(s) as directed by provider., Disp: , Rfl:     pantoprazole (PROTONIX) 40 MG EC tablet, Take 1 tablet by mouth Daily., Disp: 90 tablet, Rfl: 3    potassium chloride ER (K-TAB) 20 MEQ tablet controlled-release ER tablet, TAKE 1 TABLET BY MOUTH DAILY, Disp: 90 tablet, Rfl: 3    rOPINIRole (REQUIP) 0.25 MG tablet, TAKE 1 TABLET EVERY NIGHT, Disp: 90 tablet, Rfl: 3    theophylline (UNIPHYL) 400 MG 24 hr tablet, Take 1 tablet by mouth Daily., Disp: 90 tablet, Rfl: 3    tiZANidine (ZANAFLEX) 4 MG tablet, Take 1 tablet by mouth Every 8 (Eight) Hours As Needed for Muscle Spasms., Disp: , Rfl:     zafirlukast (ACCOLATE) 20 MG tablet, TAKE 1 TABLET TWICE A DAY, Disp: 180 tablet, Rfl: 3    polyethylene glycol (GoLYTELY) 236 g solution, Take as directed split dose, Disp: 4000 mL, Rfl: 0  No current facility-administered medications for this visit.    Facility-Administered Medications Ordered in Other  Visits:     heparin flush (porcine) 100 UNIT/ML injection 500 Units, 500 Units, Intracatheter, PRN, Ki Manriquez MD, 500 Units at 09/27/16 1108    sodium chloride 0.9 % flush 10 mL, 10 mL, Intravenous, PRN, Ki Manriquez MD, 10 mL at 09/27/16 1107    Allergies   Allergen Reactions    Cephalosporins Hives    Keflex [Cephalexin] Rash       Social History     Socioeconomic History    Marital status:    Tobacco Use    Smoking status: Never     Passive exposure: Never    Smokeless tobacco: Never   Vaping Use    Vaping status: Never Used   Substance and Sexual Activity    Alcohol use: Never    Drug use: No    Sexual activity: Yes     Partners: Male     Birth control/protection: Post-menopausal       Family History   Problem Relation Age of Onset    Colon polyps Mother         < 60 years of age     Cirrhosis Mother     Diabetes Mother     Liver disease Mother     Colon polyps Sister 54        < 60 years of age     Breast cancer Sister     Cancer Sister     No Known Problems Father     No Known Problems Brother     No Known Problems Daughter     No Known Problems Son     Diabetes Maternal Grandmother     Heart disease Maternal Grandmother     No Known Problems Paternal Grandmother     No Known Problems Maternal Aunt     No Known Problems Paternal Aunt     Hyperlipidemia Maternal Grandfather     Stroke Maternal Grandfather     Colon cancer Neg Hx     Crohn's disease Neg Hx     Irritable bowel syndrome Neg Hx     Liver cancer Neg Hx     Rectal cancer Neg Hx     Stomach cancer Neg Hx     BRCA 1/2 Neg Hx     Endometrial cancer Neg Hx     Ovarian cancer Neg Hx     Uterine cancer Neg Hx     Esophageal cancer Neg Hx        Review of Systems   Constitutional:  Negative for unexpected weight change.   Respiratory:  Positive for shortness of breath.         Pt reports chronic SOB with her asthma   Cardiovascular:  Negative for chest pain.       Objective     LMP  (LMP Unknown)   Breastfeeding No      Physical Exam  Vitals reviewed: telephone encounter so no vital signs or physical exam performed.         Lab Results - Last 18 Months   Lab Units 04/29/25  0928 12/06/24  0739 04/29/24  1005 12/11/23  0747   GLUCOSE mg/dL 92 83 97 91   BUN mg/dL 12 13 14 12   CREATININE mg/dL 1.08* 1.24* 1.05* 1.06*   SODIUM mmol/L 141 144 141 142   POTASSIUM mmol/L 4.2 4.2 4.5 4.4   CHLORIDE mmol/L 105 104 105 105   CO2 mmol/L 24.0 24 26.0 25.3   TOTAL PROTEIN g/dL 6.8 6.6 7.1 6.1   ALBUMIN g/dL 3.9 4.3 4.2 4.3   ALT (SGPT) U/L 14 15 12 12   AST (SGOT) U/L 17 18 14 17   ALK PHOS U/L 115 127* 111 106   BILIRUBIN mg/dL 0.4 0.3 0.3 0.4   GLOBULIN gm/dL 2.9  --  2.9  --    GLOBULINREF g/dL  --  2.3  --  1.8       Lab Results - Last 18 Months   Lab Units 04/29/25  0928 12/06/24  0739 04/29/24  1005 12/11/23  0747   HEMOGLOBIN g/dL 14.3 14.9 14.3 14.2   HEMATOCRIT % 44.7 47.9* 44.7 42.9   MCV fL 88.9 89 89.0 87.7   WBC 10*3/mm3 8.91 9.1 7.97 7.43   RDW % 14.1 13.6 13.9 13.7   MPV fL 10.1  --  10.0  --    PLATELETS 10*3/mm3 403 434 349 338       Lab Results - Last 18 Months   Lab Units 12/06/24  0739 12/11/23  0747   TSH uIU/mL 3.370 2.450   VIT D 25 HYDROXY ng/mL 57.4 55.4      EXAMINATION: US LIVER-, US DERIVED FAT FRACTION (UDFF)-  1/27/2025 2:55  PM     REASON FOR EXAM: hepatic steatosis; K76.0-Fatty (change of) liver, not  elsewhere classified     COMPARISON: 1/11/2024     TECHNIQUE: Multiple longitudinal and transverse real time sonographic  images of the right upper quadrant of the abdomen are obtained. Doppler  and grayscale images were provided. Images and report are stored per  institutional and state regulations. Ultrasound derived fat fraction was  also provided.     FINDINGS:    PANCREAS: Normal in size and echogenicity.     LIVER: Mild increased echogenicity. No focal lesion. No intrapelvic  ductal dilation. Mild coarsened echotexture. No macro nodularity.  Appropriate directionality of flow in the main portal vein.  Ultrasound  derived fat fraction measurement is 9%, consistent with mild hepatic  steatosis.     GALLBLADDER: Surgically absent.     BILE DUCTS: The CBD measures 0.4 cm in diameter. There is no  intrahepatic or extrahepatic ductal dilation.     OTHER: The visualized abdominal aorta is normal in caliber.        IMPRESSION:     1.  Mild hepatic steatosis with ultrasound derived fat fraction of 9%.     2.  Mild coarsened echotexture of the liver may suggest a component of  fibrofatty change.     3.  Previous cholecystectomy.     This report was signed and finalized on 1/27/2025 2:57 PM by Dr. Ruben Yo MD.       IMPRESSION/PLAN:    Assessment & Plan      Problem List Items Addressed This Visit       FH: colon polyps    Overview   Mom & sister had colon polyps < 60 years old.           Hyperplastic polyp of transverse colon - Primary    Overview   Removed hyperplastic polyp  7/2020.  Due for repeat colonoscopy in 7/2025.         Relevant Medications    polyethylene glycol (GoLYTELY) 236 g solution    Other Relevant Orders    Case Request (Completed)    Follow Anesthesia Guidelines / Protocol    Rectal bleeding    Overview   Rectal bleeding 1-2 times per month, this is fairly chronic. Hx non internal hemorrhoids.  She does carry history of proctitis seen in 2013 with resolution on colonoscopy 2017.  Colonoscopy 7/2020 showed internal hemorrhoids.           Colonoscopy per Dr Ruddy Joel Prep          ..The risks, benefits, and alternatives of colonoscopy were reviewed with the patient today.  Risks including perforation of the colon possibly requiring surgery or colostomy.  Additional risks include risk of bleeding from biopsies or removal of colon tissue.  There is also the risk of a drug reaction or problems with anesthesia.  This will be discussed with the further by the anesthesia team on the day of the procedure.  Lastly there is a possibility of missing a colon polyp or cancer.  The benefits include the  diagnosis and management of disease of the colon and rectum.  Alternatives to colonoscopy include barium enema, laboratory testing, radiographic evaluation, or no intervention.  The patient verbalizes understanding and agrees.    In accordance with requirements under the Affordable Care Act, [facility] has provided pricing for all hospital services and items on each of its websites. However, a patient's actual cost may differ based on the services the patient receives to meet individual healthcare needs and based on the benefits provided under the patient’s insurance coverage.        Lovely Brito, HEATHER  06/06/25  09:18 CDT    Part of this note may be an electronic transcription/translation of spoken language to printed text.

## 2025-06-06 NOTE — PROGRESS NOTES
[facility] OUTPATIENT PROGRESS NOTE    Patient: Luna Cruz  : 1965  Age: 59 y.o.  Date of Service: 2025    REASON FOR CLINIC VISIT:  Chief Complaint   Patient presents with    Moderate persistent asthma without complication.       Subjective:    History of Present Illness:  Luna Cruz is a 59 y.o. female who presents to the office today to be seen for    Diagnosis Plan   1. Moderate persistent asthma without complication        2. Non-seasonal allergic rhinitis, unspecified trigger        3. Restless legs syndrome (RLS)        4. Non-smoker        5. History of COVID-19        6. Pulmonary scarring        7. Obesity (BMI 30-39.9)        .  Other problems per record.    History:    Patient is a very pleasant middle-aged  female who was seen in the pulmonary clinic for a follow-up visit.      She is a non-smoker.  She lives with her .  She has morbid obesity and was suspected to have sleep apnea but her sleep study showed no evidence of sleep apnea.  She has not been using any oxygen.  She has restless leg syndrome and uses ropinirole.  She had history of COVID-19 in the past.  She had ongoing shortness of breath and last PFT showed she had FEV1 of 57% S to moderate obstructive airway dysfunction.  She is currently using Wixela and albuterol rescue inhaler.  She told me she is having some worsening allergy symptoms lately but otherwise she is stable.    She had no recent hospital admissions and ER and urgent care visit.  The new complaints.  She is requesting the refill of her medications.  Allergies she is using fluticasone nasal spray, Astelin nasal spray and loratadine.  She had a chest last chest x-ray done in May 2024 but no further follow-up imaging study was done.    PFT done today:  Not done today      Results for orders placed in visit on 23    Pulmonary Function Test    Narrative  Pulmonary Function Test  Performed by: Sara Cramer  RRT  Authorized by: Kevin Aguirre MD    Pre Drug % Predicted  FVC: 62%  FEV1: 57%  FEF 25-75%: 43%  FEV1/FVC: 74%  T%  RV: 76%  DLCO: 84%  D/VAsb: 121%    Interpretation  Overall comments:  Above test results are acceptable and reproducible by ATS criteria.  From analysis of the above test results the patient showed evidence of moderate obstructive airway dysfunction.  No bronchodilator challenge was done.  The lung volumes are decreased supporting evidence of moderate restrictive dysfunction.  Diffusion capacity corrected for alveolar volume is above normal limits.    Comparison of the prior pulmonary function test done a few years ago there is no significant changes noted.  Clinical correlation was indicated.    Kevin Aguirre MD  Pulmonologist/Intensivist  2023 12:38 CDT      Results for orders placed in visit on 21    Pulmonary Function Test    Narrative  Pulmonary Function Test  Performed by: Sara Cramer, RRT  Authorized by: Kevin Aguirre MD    Pre Drug % Predicted  FVC: 69%  FEV1: 53%  FEF 25-75%: 24%  FEV1/FVC: 62.94%  T%  RV: 120%  DLCO: 88%  D/VAsb: 134%    Interpretation  Overall comments:  The above test results were acceptable and reproducible by ATS criteria.  From analysis of the above test results the patient showed evidence of moderate to severe obstructive airway dysfunction.  No bronchodilator challenge was done.  There was mild restrictive dysfunction noted from decreased TLC  Air trapping noted from increased residual volume  Patient capacity corrected for single breath was within normal limits  No prior test result was available for comparison  Clinical correlation was indicated.    Kevin Aguirre MD  Pulmonologist/Intensivist  2021 14:37 CST      Results for orders placed in visit on 10/29/19    Pulmonary Function Test    Narrative  Pulmonary Function Test  Performed by: Kalus Wagner APRN  Authorized by: Klaus Wagner  APRN    Pre Drug  FVC: 62%  FEV1: 52%  FEF 25-75%: 31%  FEV1/FVC: 69.23%         Bronchodilator therapy: Wixela and Albuterol rescue inhaler    Smoking Status:   Social History     Tobacco Use   Smoking Status Never    Passive exposure: Never   Smokeless Tobacco Never     Pulm Rehab: no  Sleep: yes    Support System: lives with their spouse    Code Status:   There are no questions and answers to display.        Review of Systems:  A complete review of systems is performed and all other systems were reviewed and negative as note above in the HPI.  Review of Systems   Constitutional:  Positive for fatigue.   HENT:  Positive for congestion, postnasal drip and sinus pressure.    Respiratory:  Positive for cough, chest tightness and shortness of breath.    Cardiovascular: Negative.    Gastrointestinal: Negative.    Endocrine: Negative.    Genitourinary: Negative.    Musculoskeletal:  Positive for arthralgias and back pain.   Skin: Negative.    Allergic/Immunologic: Positive for environmental allergies.   Neurological: Negative.    Hematological: Negative.    Psychiatric/Behavioral: Negative.         CAT/ACT Score:  Not done today    Medications:  Outpatient Encounter Medications as of 6/6/2025   Medication Sig Dispense Refill    albuterol (PROVENTIL) (2.5 MG/3ML) 0.083% nebulizer solution USE 1 VIAL BY NEBULIZATION EVERY 4 HOURS AS NEEDED FOR WHEEZING 360 mL 5    albuterol sulfate  (90 Base) MCG/ACT inhaler Inhale 2 puffs Every 4 (Four) Hours As Needed for Wheezing. 25.5 g 3    alendronate (FOSAMAX) 70 MG tablet Take 1 tablet by mouth Every 7 (Seven) Days. 12 tablet 3    aspirin 81 MG EC tablet Take 1 tablet by mouth Daily.      atorvastatin (LIPITOR) 40 MG tablet TAKE 1 TABLET DAILY 90 tablet 3    azelastine (ASTELIN) 0.1 % nasal spray Administer 1 spray into the nostril(s) as directed by provider 2 (Two) Times a Day. Use in each nostril as directed 90 mL 3    calcitriol (ROCALTROL) 0.5 MCG capsule TAKE 1 CAPSULE  DAILY 90 capsule 3    Calcium Citrate-Vitamin D (CALCIUM + D PO) Take 1 tablet by mouth Daily.      docusate sodium (COLACE) 100 MG capsule Take 1 capsule by mouth As Needed.      fluticasone (FLONASE) 50 MCG/ACT nasal spray Administer 2 sprays into the nostril(s) as directed by provider Daily. 48 g 3    Fluticasone-Salmeterol (Wixela Inhub) 250-50 MCG/ACT DISKUS Inhale 1 puff 2 (Two) Times a Day. 180 each 3    HYDROcodone-acetaminophen (NORCO) 5-325 MG per tablet Take 1 tablet by mouth 2 (Two) Times a Day As Needed for Moderate Pain.      ibuprofen (ADVIL,MOTRIN) 400 MG tablet Take 1 tablet by mouth Every 8 (Eight) Hours As Needed for Mild Pain.      levothyroxine (SYNTHROID, LEVOTHROID) 50 MCG tablet TAKE 1 TABLET BY MOUTH DAILY 90 tablet 3    loratadine (CLARITIN) 10 MG tablet TAKE 1 TABLET DAILY AS NEEDED FOR ALLERGIES (Patient taking differently: Take 1 tablet by mouth Daily As Needed.) 90 tablet 3    losartan (COZAAR) 100 MG tablet TAKE 1 TABLET DAILY 90 tablet 3    naloxone (NARCAN) 4 MG/0.1ML nasal spray Administer 1 spray into the nostril(s) as directed by provider.      pantoprazole (PROTONIX) 40 MG EC tablet Take 1 tablet by mouth Daily. 90 tablet 3    polyethylene glycol (GoLYTELY) 236 g solution Take as directed split dose 4000 mL 0    potassium chloride ER (K-TAB) 20 MEQ tablet controlled-release ER tablet TAKE 1 TABLET BY MOUTH DAILY 90 tablet 3    rOPINIRole (REQUIP) 0.25 MG tablet TAKE 1 TABLET EVERY NIGHT 90 tablet 3    theophylline (UNIPHYL) 400 MG 24 hr tablet Take 1 tablet by mouth Daily. 90 tablet 3    tiZANidine (ZANAFLEX) 4 MG tablet Take 1 tablet by mouth Every 8 (Eight) Hours As Needed for Muscle Spasms.      zafirlukast (ACCOLATE) 20 MG tablet TAKE 1 TABLET TWICE A  tablet 3     Facility-Administered Encounter Medications as of 6/6/2025   Medication Dose Route Frequency Provider Last Rate Last Admin    heparin flush (porcine) 100 UNIT/ML injection 500 Units  500 Units Intracatheter  "PRN Ki Manriquez MD   500 Units at 09/27/16 1108    sodium chloride 0.9 % flush 10 mL  10 mL Intravenous PRN Ki Manriquez MD   10 mL at 09/27/16 1107       Allergies:  Allergies   Allergen Reactions    Cephalosporins Hives    Keflex [Cephalexin] Rash       Immunizations:  Immunization History   Administered Date(s) Administered    Arexvy (RSV, Adults 60+ yrs) 12/17/2024    COVID-19 (PFIZER) 12YRS+ (COMIRNATY) 11/24/2023, 09/02/2024    COVID-19 (PFIZER) BIVALENT 12+YRS 11/16/2022    COVID-19 (PFIZER) Purple Cap Monovalent 03/24/2021, 04/14/2021, 11/09/2021, 07/02/2022    Covid-19 (Pfizer) Gray Cap Monovalent 07/02/2022    Flu Vaccine Intradermal Quad 18-64YR 09/28/2018, 10/29/2019, 09/29/2020    Flublok 18+yrs 11/16/2022    Fluzone (or Fluarix & Flulaval for VFC) >6mos 10/10/2016, 09/30/2020, 10/12/2021    Influenza Inj MDCK Preserative Free 09/02/2024    Influenza Injectable Mdck Pf Quad 09/10/2023    Pneumococcal Conjugate 13-Valent (PCV13) 01/01/2012    Pneumococcal Polysaccharide (PPSV23) 10/28/2014    Shingrix 12/02/2020, 02/16/2021    Tdap 12/08/2021    flucelvax quad pfs =>4 YRS 09/28/2018, 10/29/2019       Objective:    Vitals:  /79   Pulse 82   Ht 157.5 cm (62.01\")   Wt 83 kg (183 lb)   LMP  (LMP Unknown)   SpO2 97% Comment: RA  Breastfeeding No   BMI 33.46 kg/m²     Physical Exam:  General: Patient is a 59 y.o. pleasant middle aged  female. Looks stated age. Appears to be in no acute distress.  Eyes: EOMI. PERRLA. Vision intact. No scleral icterus.  Ear, Nose, Mouth and Throat: Hearing is grossly intact. No Leukoplakia, pharyngitis, stomatitis or thrush. Swollen nasal mucosa with post nasal drop.  Neck: Range of motion of neck normal. No thyromegaly or masses. Mallampati Class 3  Respiratory: Clear to auscultation bilaterally. No use of accessory muscles. Decreased breath sounds.  Cardiovascular: Normal heart sounds. Regularly regular rhythm without " murmur.  Gastrointestinal: Non tender, non distended, soft. Bowel sounds positive in all four quadrants. No organomegaly.  Skin: No obvious rashes, lesions, ulcers or large amount of bruising. No edema.   Neurological: No new motor deficits. Cranial nerves appear intact.  Psychiatric: Patient is alert and oriented to person, place and time.    Chest Imaging:      Study Result    Narrative & Impression   EXAM: XR CHEST 1 VW-      DATE: 5/31/2024 9:01 AM     HISTORY: Asthma; J45.40-Moderate persistent asthma, uncomplicated;  J98.4-Other disorders of lung       COMPARISON: 6/1/2023.     TECHNIQUE:  Frontal view(s) of the chest submitted.     FINDINGS:    The lungs are grossly clear. No effusion or pneumothorax is seen. Heart  and mediastinum are unremarkable.        IMPRESSION:     1. No active disease in the chest.     This report was signed and finalized on 5/31/2024 10:10 AM by Amaury Caruso.       Assessment:  1. Moderate persistent asthma without complication    2. Non-seasonal allergic rhinitis, unspecified trigger    3. Restless legs syndrome (RLS)    4. Non-smoker    5. History of COVID-19    6. Pulmonary scarring    7. Obesity (BMI 30-39.9)        Plan/Recommendations:    Her last chest x-ray was normal and I ordered a follow-up chest x-ray before the next clinic visit in 6 months time  Patient will continue using Wixela and albuterol rescue inhaler.  Prescription refill sent to the pharmacy  Patient will continue Cenestin nasal spray fluticasone nasal spray and loratadine for nasal allergy and ropinirole for restless leg syndrome.  Prescription refills were sent to the pharmacy.  Continue follow-up with the primary care provider and she is up-to-date on her vaccinations.  She will be returning to pulmonary clinic for follow-up visit in 6 months time or earlier if needed.    Follow up:  6 Months    Time Spent:  30 minutes    I appreciate the opportunity of participating in this patient's care. I would like  to thank the PCP for the referral.  Please feel free to contact me with any other questions.    Kevin Aguirre MD   Pulmonologist/Intensivist     Electronically signed by: Kevin Aguirre MD, 6/6/2025 11:44 CDT

## 2025-06-17 ENCOUNTER — OFFICE VISIT (OUTPATIENT)
Dept: INTERNAL MEDICINE | Facility: CLINIC | Age: 60
End: 2025-06-17
Payer: MEDICARE

## 2025-06-17 VITALS
BODY MASS INDEX: 34.04 KG/M2 | HEART RATE: 110 BPM | HEIGHT: 62 IN | WEIGHT: 185 LBS | DIASTOLIC BLOOD PRESSURE: 74 MMHG | TEMPERATURE: 98.2 F | SYSTOLIC BLOOD PRESSURE: 142 MMHG | OXYGEN SATURATION: 97 %

## 2025-06-17 DIAGNOSIS — Z98.890 H/O BREAST RECONSTRUCTION: ICD-10-CM

## 2025-06-17 DIAGNOSIS — J45.40 MODERATE PERSISTENT ASTHMA WITHOUT COMPLICATION: ICD-10-CM

## 2025-06-17 DIAGNOSIS — E66.09 CLASS 1 OBESITY DUE TO EXCESS CALORIES WITH SERIOUS COMORBIDITY AND BODY MASS INDEX (BMI) OF 33.0 TO 33.9 IN ADULT: ICD-10-CM

## 2025-06-17 DIAGNOSIS — C77.3 CARCINOMA OF RIGHT BREAST METASTATIC TO AXILLARY LYMPH NODE: ICD-10-CM

## 2025-06-17 DIAGNOSIS — I10 ESSENTIAL HYPERTENSION: Primary | ICD-10-CM

## 2025-06-17 DIAGNOSIS — H91.90 HEARING LOSS, UNSPECIFIED HEARING LOSS TYPE, UNSPECIFIED LATERALITY: ICD-10-CM

## 2025-06-17 DIAGNOSIS — E66.811 CLASS 1 OBESITY DUE TO EXCESS CALORIES WITH SERIOUS COMORBIDITY AND BODY MASS INDEX (BMI) OF 33.0 TO 33.9 IN ADULT: ICD-10-CM

## 2025-06-17 DIAGNOSIS — H61.23 BILATERAL IMPACTED CERUMEN: ICD-10-CM

## 2025-06-17 DIAGNOSIS — Z90.13 S/P BILATERAL MASTECTOMY: ICD-10-CM

## 2025-06-17 DIAGNOSIS — C50.911 CARCINOMA OF RIGHT BREAST METASTATIC TO AXILLARY LYMPH NODE: ICD-10-CM

## 2025-06-17 NOTE — PROGRESS NOTES
"      Chief Complaint  Hypertension (6 month follow up ), Abdominal Pain (Lower abd pain- area where they took fat for the breast CA/It has been over two years is a dull ach all the time but is worse when she moves ), and Ear Fullness (Left ear is worse )    Subjective        Luna Cruz presents to [facility]    HPI    Patient here for the above problems.  See Assessment and Plan for further HPI components.      Review of Systems    Objective   Vital Signs:  /74 (BP Location: Left arm, Patient Position: Sitting, Cuff Size: Adult)   Pulse 110   Temp 98.2 °F (36.8 °C) (Temporal)   Ht 157.5 cm (62.01\")   Wt 83.9 kg (185 lb)   SpO2 97%   BMI 33.83 kg/m²   Estimated body mass index is 33.83 kg/m² as calculated from the following:    Height as of this encounter: 157.5 cm (62.01\").    Weight as of this encounter: 83.9 kg (185 lb).      Physical Exam  Vitals and nursing note reviewed.   Constitutional:       Appearance: She is not ill-appearing.   HENT:      Right Ear: Tympanic membrane normal. There is impacted cerumen.      Left Ear: Tympanic membrane normal. There is impacted cerumen.   Eyes:      General: No scleral icterus.     Conjunctiva/sclera: Conjunctivae normal.   Cardiovascular:      Rate and Rhythm: Normal rate and regular rhythm.   Pulmonary:      Effort: Pulmonary effort is normal. No respiratory distress.   Abdominal:      Tenderness: There is abdominal tenderness.   Skin:     General: Skin is warm.      Coloration: Skin is not pale.   Neurological:      General: No focal deficit present.      Mental Status: She is alert and oriented to person, place, and time.   Psychiatric:         Mood and Affect: Mood normal.         Behavior: Behavior normal.                     Assessment and Plan   Diagnoses and all orders for this visit:    1. Essential hypertension (Primary)    2. Class 1 obesity due to excess calories with serious comorbidity and body mass index " (BMI) of 33.0 to 33.9 in adult    3. S/P bilateral mastectomy    4. Moderate persistent asthma without complication    5. H/O breast reconstruction    6. Carcinoma of right breast metastatic to axillary lymph node    7. Bilateral impacted cerumen    8. Hearing loss, unspecified hearing loss type, unspecified laterality  -     Ambulatory Referral to Audiology    Other orders  -     Ear Cerumen Removal        History of Present Illness  The patient presents today for follow-up.    She reports experiencing abdominal pain, which she attributes to a previous reconstructive surgery on her right side following a mastectomy due to breast cancer. The reconstruction was necessary as the skin would not stretch post-chemoradiation. She has been attempting weight loss but struggles with maintaining it. Her diet consists of two meals per day, and she engages in occasional walking for exercise.    She also reports a sensation of fullness in her left ear, accompanied by hearing difficulties. She manages her allergies with Claritin and two nasal sprays.    She has asthma and gets short of breath with exertion.    PAST SURGICAL HISTORY:  She has a history of mastectomy and reconstructive surgery due to breast cancer, as well as cataract surgery.      Assessment & Plan  Hypertension.  Blood pressure today is 142, improved from previous reading of 173. Patient has not taken her blood pressure medication yet today. Continued monitoring of blood pressure is necessary, and patient is reminded to take her medication as prescribed. Encouraged to maintain regular medication schedule and follow-up for blood pressure management.    Abdominal pain. Obesity. Breast carcinoma s/p mastectomy and reconstruction  Abdominal discomfort likely due to scar tissue formation following reconstructive surgery. CT scan may not provide significant insights into this issue. Advised to lose approximately 5 to 10 pounds to alleviate the stretching of the scar  tissue. Weight loss strategies discussed include using the Zooplus doris to monitor caloric intake and engaging in regular physical activity such as walking.    Patient has a BMI > 30.  Obesity increases risks of diseases such as diabetes, coronary artery disease, hypertension, sleep apnea, hyperlipidemia, arthritis, and stroke.  Recommend focusing on portion control and making healthy diet choices.  Avoid fast food.  Avoid calorie beverages including sweet tea, juice, soft drinks, and alcohol.  Recommend increasing your activity and starting a regular exercise program. Can consider utilizing calorie counting apps such as Zooplus.        Left ear fullness.  Bilateral cerumen impaction; can minimally see the TM, but when I view TM no signs of fluid or infection. No fluid is currently present in either ear. Audiology or hearing test will be scheduled to assess any potential damage to the inner ear. Both ears will be cleaned during this visit.    Asthma.  Walking is sometimes difficult due to shortness of breath from asthma. Advised to continue walking as tolerated to improve lung function and overall health. Encouraged to consult with lung doctor for further management and support.    Ear Cerumen Removal    Date/Time: 6/17/2025 12:37 PM    Performed by: Chaz Gaines MD  Authorized by: Chaz Gaines MD  Location details: left ear and right ear  Patient tolerance: patient tolerated the procedure well with no immediate complications  Comments: Improved afterwards   Procedure type: instrumentation, irrigation, curette           Result Review :  The following data was reviewed by: Chaz Gaines MD on 06/17/2025:  CMP          12/6/2024    07:39 4/29/2025    09:28   CMP   Glucose 83  92    BUN 13  12    Creatinine 1.24  1.08    EGFR 50  58.9    Sodium 144  141    Potassium 4.2  4.2    Chloride 104  105    Calcium 9.4  8.9    Total Protein 6.6  6.8    Albumin 4.3  3.9    Globulin 2.3  2.9    Total  Bilirubin 0.3  0.4    Alkaline Phosphatase 127  115    AST (SGOT) 18  17    ALT (SGPT) 15  14    Albumin/Globulin Ratio  1.3    BUN/Creatinine Ratio 10  11.1    Anion Gap  12.0      CBC w/diff          12/6/2024    07:39 4/29/2025    09:28   CBC w/Diff   WBC 9.1  8.91    RBC 5.36  5.03    Hemoglobin 14.9  14.3    Hematocrit 47.9  44.7    MCV 89  88.9    MCH 27.8  28.4    MCHC 31.1  32.0    RDW 13.6  14.1    Platelets 434  403    Neutrophil Rel % 61  67.0    Immature Granulocyte Rel %  0.3    Lymphocyte Rel % 24  21.3    Monocyte Rel % 8  7.1    Eosinophil Rel % 5  3.3    Basophil Rel % 1  1.0           BMI is >= 30 and <35. (Class 1 Obesity). The following options were offered after discussion;: exercise counseling/recommendations and nutrition counseling/recommendations      BMI is >= 30 and <35. (Class 1 Obesity). The following options were offered after discussion;: exercise counseling/recommendations and nutrition counseling/recommendations            Follow Up   Return in about 6 months (around 12/17/2025), or if symptoms worsen or fail to improve, for follow up for above problems. Longitudinal care., Medicare Wellness - Labs prior to visit.  Patient was given instructions and counseling regarding her condition or for health maintenance advice. Please see specific information pulled into the AVS if appropriate.       CELESTINE Gaines MD, FACP, [facility]      Electronically signed by Chaz Gaines MD, 06/17/25, 12:31 PM CDT.    Patient or patient representative verbalized consent for the use of Ambient Listening during the visit with  Chaz Gaines MD for chart documentation. 6/17/2025  12:37 CDT

## 2025-06-24 DIAGNOSIS — M81.0 OSTEOPOROSIS WITHOUT CURRENT PATHOLOGICAL FRACTURE, UNSPECIFIED OSTEOPOROSIS TYPE: ICD-10-CM

## 2025-06-24 RX ORDER — ALENDRONATE SODIUM 70 MG/1
70 TABLET ORAL
Qty: 12 TABLET | Refills: 3 | Status: SHIPPED | OUTPATIENT
Start: 2025-06-24

## 2025-07-08 ENCOUNTER — TELEPHONE (OUTPATIENT)
Dept: PULMONOLOGY | Facility: CLINIC | Age: 60
End: 2025-07-08
Payer: MEDICARE

## 2025-07-08 DIAGNOSIS — J45.998 OTHER ASTHMA: ICD-10-CM

## 2025-07-08 RX ORDER — THEOPHYLLINE 400 MG/1
400 TABLET, EXTENDED RELEASE ORAL DAILY
Qty: 90 TABLET | Refills: 3 | Status: SHIPPED | OUTPATIENT
Start: 2025-07-08 | End: 2025-07-09

## 2025-07-08 NOTE — TELEPHONE ENCOUNTER
Patient called stating that [facility] nor [facility] can get the theophylline 400 mg.  I also called [facility] and [facility] and they cannot get it either.  Please advise.

## 2025-07-09 RX ORDER — THEOPHYLLINE 400 MG/1
400 TABLET, EXTENDED RELEASE ORAL DAILY
Qty: 90 TABLET | Refills: 3 | Status: SHIPPED | OUTPATIENT
Start: 2025-07-09

## 2025-07-09 NOTE — TELEPHONE ENCOUNTER
Per Dr. Aguirre: Please let her know I sent the prescription to [facility] mail order pharmacy and see if they can fill out the prescription order. Thank you

## 2025-08-07 ENCOUNTER — ANESTHESIA EVENT (OUTPATIENT)
Dept: GASTROENTEROLOGY | Facility: HOSPITAL | Age: 60
End: 2025-08-07
Payer: MEDICARE

## 2025-08-07 ENCOUNTER — ANESTHESIA (OUTPATIENT)
Dept: GASTROENTEROLOGY | Facility: HOSPITAL | Age: 60
End: 2025-08-07
Payer: MEDICARE

## 2025-08-07 ENCOUNTER — HOSPITAL ENCOUNTER (OUTPATIENT)
Facility: HOSPITAL | Age: 60
Setting detail: HOSPITAL OUTPATIENT SURGERY
Discharge: HOME | End: 2025-08-07
Attending: INTERNAL MEDICINE | Admitting: INTERNAL MEDICINE
Payer: MEDICARE

## 2025-08-07 ENCOUNTER — TELEPHONE (OUTPATIENT)
Dept: GASTROENTEROLOGY | Facility: CLINIC | Age: 60
End: 2025-08-07
Payer: MEDICARE

## 2025-08-07 VITALS
HEIGHT: 62 IN | RESPIRATION RATE: 18 BRPM | BODY MASS INDEX: 33.49 KG/M2 | WEIGHT: 182 LBS | SYSTOLIC BLOOD PRESSURE: 156 MMHG | HEART RATE: 88 BPM | OXYGEN SATURATION: 99 % | TEMPERATURE: 96.1 F | DIASTOLIC BLOOD PRESSURE: 86 MMHG

## 2025-08-07 DIAGNOSIS — K63.5 HYPERPLASTIC POLYP OF TRANSVERSE COLON: ICD-10-CM

## 2025-08-07 PROCEDURE — G0105 COLORECTAL SCRN; HI RISK IND: HCPCS | Performed by: INTERNAL MEDICINE

## 2025-08-07 PROCEDURE — 25010000002 VASOPRESSIN 20 UNIT/ML SOLUTION: Performed by: NURSE ANESTHETIST, CERTIFIED REGISTERED

## 2025-08-07 PROCEDURE — 25010000002 PROPOFOL 10 MG/ML EMULSION: Performed by: NURSE ANESTHETIST, CERTIFIED REGISTERED

## 2025-08-07 PROCEDURE — 25810000003 SODIUM CHLORIDE 0.9 % SOLUTION: Performed by: ANESTHESIOLOGY

## 2025-08-07 PROCEDURE — 25010000002 LIDOCAINE PF 2% 2 % SOLUTION: Performed by: NURSE ANESTHETIST, CERTIFIED REGISTERED

## 2025-08-07 RX ORDER — SODIUM CHLORIDE 0.9 % (FLUSH) 0.9 %
10 SYRINGE (ML) INJECTION AS NEEDED
Status: DISCONTINUED | OUTPATIENT
Start: 2025-08-07 | End: 2025-08-07 | Stop reason: HOSPADM

## 2025-08-07 RX ORDER — LIDOCAINE HYDROCHLORIDE 20 MG/ML
INJECTION, SOLUTION EPIDURAL; INFILTRATION; INTRACAUDAL; PERINEURAL AS NEEDED
Status: DISCONTINUED | OUTPATIENT
Start: 2025-08-07 | End: 2025-08-07 | Stop reason: SURG

## 2025-08-07 RX ORDER — PROPOFOL 10 MG/ML
VIAL (ML) INTRAVENOUS AS NEEDED
Status: DISCONTINUED | OUTPATIENT
Start: 2025-08-07 | End: 2025-08-07 | Stop reason: SURG

## 2025-08-07 RX ORDER — SODIUM CHLORIDE 9 MG/ML
500 INJECTION, SOLUTION INTRAVENOUS CONTINUOUS PRN
Status: DISCONTINUED | OUTPATIENT
Start: 2025-08-07 | End: 2025-08-07 | Stop reason: HOSPADM

## 2025-08-07 RX ADMIN — PROPOFOL 200 MG: 10 INJECTION, EMULSION INTRAVENOUS at 08:52

## 2025-08-07 RX ADMIN — LIDOCAINE HYDROCHLORIDE 100 MG: 20 INJECTION, SOLUTION EPIDURAL; INFILTRATION; INTRACAUDAL; PERINEURAL at 08:52

## 2025-08-07 RX ADMIN — SODIUM CHLORIDE 500 ML: 9 INJECTION, SOLUTION INTRAVENOUS at 08:20

## 2025-08-25 RX ORDER — ROPINIROLE 0.25 MG/1
0.25 TABLET, FILM COATED ORAL NIGHTLY
Qty: 90 TABLET | Refills: 3 | Status: SHIPPED | OUTPATIENT
Start: 2025-08-25

## (undated) DEVICE — TBG SMPL FLTR LINE NASL 02/C02 A/ BX/100

## (undated) DEVICE — SUTURE VCRL SZ 3-0 L36IN ABSRB UD L36MM CT-1 1/2 CIR J944H

## (undated) DEVICE — THE CHANNEL CLEANING BRUSH IS A NYLON FLEXI BRUSH ATTACHED TO A FLEXIBLE PLASTIC SHEATH DESIGNED TO SAFELY REMOVE DEBRIS FROM FLEXIBLE ENDOSCOPES.

## (undated) DEVICE — TBG CONN OUTFLOW AQUILEX/MYOSURE

## (undated) DEVICE — SUTURE PDS + SZ 2 0 L27IN ABSRB VLT L26MM CT 2 1 2 CIR PDP333H

## (undated) DEVICE — COLLECTION SOCK, GENERAL: Brand: DEROYAL

## (undated) DEVICE — MASK O2 SAMPLING AD M LUER CAPNOXYGEN

## (undated) DEVICE — PAD,PREPPING,CUFFED,24X48,7",NONSTERILE: Brand: MEDLINE

## (undated) DEVICE — CONMED SCOPE SAVER BITE BLOCK, 20X27 MM: Brand: SCOPE SAVER

## (undated) DEVICE — BANDAGE,GAUZE,4.5"X4.1YD,STERILE,LF: Brand: MEDLINE

## (undated) DEVICE — GOWN,PREVENTION PLUS,XLONG/XLARGE,STRL: Brand: MEDLINE

## (undated) DEVICE — CUFF,BP,DISP,1 TUBE,ADULT,HP: Brand: MEDLINE

## (undated) DEVICE — STAPLER EXT SKIN 35 WIDE S STL STPL SQUEEZE HNDL VISISTAT

## (undated) DEVICE — GLV SURG BIOGEL LTX PF 8

## (undated) DEVICE — GLOVE SURG SZ 85 L12IN FNGR THK13MIL BRN LTX SYN POLYMER W

## (undated) DEVICE — GOWN,PREVENTION PLUS,XL,ST,24/CS: Brand: MEDLINE

## (undated) DEVICE — Device

## (undated) DEVICE — SKIN MARKER,REGULAR TIP WITH RULER: Brand: DEVON

## (undated) DEVICE — VAGINAL PREP TRAY: Brand: MEDLINE INDUSTRIES, INC.

## (undated) DEVICE — ESOPHAGEAL BALLOON DILATATION CATHETER: Brand: CRE FIXED WIRE

## (undated) DEVICE — SENSR O2 OXIMAX FNGR A/ 18IN NONSTR

## (undated) DEVICE — DEFENDO AIR WATER SUCTION AND BIOPSY VALVE KIT: Brand: DEFENDO AIR/WATER/SUCTION AND BIOPSY VALVE

## (undated) DEVICE — 3M™ STERI-STRIP™ REINFORCED ADHESIVE SKIN CLOSURES, R1547, 1/2 IN X 4 IN (12 MM X 100 MM), 6 STRIPS/ENVELOPE: Brand: 3M™ STERI-STRIP™

## (undated) DEVICE — NDL HYPO PRECISIONGLIDE/REG 18G 11/2 PNK

## (undated) DEVICE — ENDOGATOR AUXILIARY WATER JET CONNECTOR: Brand: ENDOGATOR

## (undated) DEVICE — THE SINGLE USE ETRAP – POLYP TRAP IS USED FOR SUCTION RETRIEVAL OF ENDOSCOPICALLY REMOVED POLYPS.: Brand: ETRAP

## (undated) DEVICE — SYSTEM SKIN CLSR 22CM DERMBND PRINEO

## (undated) DEVICE — DEV TISS REMOV MYOSURE REACH

## (undated) DEVICE — CHLORAPREP 26ML ORANGE

## (undated) DEVICE — PK TURNOVER RM ADV

## (undated) DEVICE — GLOVE SURG SZ 6 L12IN FNGR THK126MIL CRM LTX FREE

## (undated) DEVICE — SUTURE VCRL SZ 3-0 L27IN ABSRB UD L19MM PS-2 3/8 CIR PRIM J427H

## (undated) DEVICE — PK KN ARTHSCP 30

## (undated) DEVICE — GLOVE SURG SZ 85 L12IN FNGR THK79MIL GRN LTX FREE

## (undated) DEVICE — SPONGE LAP W18XL18IN WHT COT 4 PLY FLD STRUNG RADPQ DISP ST

## (undated) DEVICE — SHEET,DRAPE,53X77,STERILE: Brand: MEDLINE

## (undated) DEVICE — SOLUTION IV IRRIG POUR BRL 0.9% SODIUM CHL 2F7124

## (undated) DEVICE — DISCONTINUED USE 416956 GLOVE 8.5 LTX ST TRIFLEX POWDER LK CF

## (undated) DEVICE — GLV SURG BIOGEL M LTX PF 8

## (undated) DEVICE — DRSNG TELFA PAD NONADH STR 1S 3X8IN

## (undated) DEVICE — GAUZE,SPONGE,FLUFF,6"X6.75",STRL,10/TRAY: Brand: MEDLINE

## (undated) DEVICE — GOWN,PREVENTION PLUS,2XL,ST,22/CS: Brand: MEDLINE

## (undated) DEVICE — GLOVE SURG SZ 85 L12IN FNGR ORTHO 126MIL CRM LTX FREE

## (undated) DEVICE — SUTURE ETHLN SZ 2-0 L30IN NONABSORBABLE BLK L36MM FSLX 3/8 1674H

## (undated) DEVICE — Device: Brand: DEFENDO AIR/WATER/SUCTION AND BIOPSY VALVE

## (undated) DEVICE — YANKAUER,BULB TIP WITH VENT: Brand: ARGYLE

## (undated) DEVICE — 4-PORT MANIFOLD: Brand: NEPTUNE 2

## (undated) DEVICE — MAJOR CDS

## (undated) DEVICE — CONNECTOR CIRCUIT ANESTH 15MM/22MM WYE W/O PORT HUDSON RCI

## (undated) DEVICE — SNAR POLYP SENSATION MICRO OVL 13 240X40

## (undated) DEVICE — SUT MONOCRYL PLS ANTIB UND 3/0  PS1 27IN

## (undated) DEVICE — GLV SURG TRIUMPH GREEN W/ALOE PF LTX 8 STRL

## (undated) DEVICE — SUTURE PDS II SZ 2-0 L18IN ABSRB VLT SH L26MM 1/2 CIR TAPR Z775D

## (undated) DEVICE — TOWEL,OR,DSP,ST,BLUE,STD,4/PK,20PK/CS: Brand: MEDLINE

## (undated) DEVICE — GLOVE SURG SZ 75 CRM LTX FREE POLYISOPRENE POLYMER BEAD ANTI

## (undated) DEVICE — SEALER ENDOSCP NANO COAT OPN DIV CRV L JAW LIGASURE IMPACT

## (undated) DEVICE — MAJOR BSIN SETUP PK

## (undated) DEVICE — SUCTION MAT (LOW PROFILE), 50X34: Brand: NEPTUNE

## (undated) DEVICE — DUAL CUT SAGITTAL BLADE

## (undated) DEVICE — DEV INFL ALLIANCE2 SYS

## (undated) DEVICE — SUTURE VCRL SZ 3-0 L27IN ABSRB UD L26MM SH 1/2 CIR J416H

## (undated) DEVICE — SUTURE VCRL SZ 2-0 L36IN ABSRB UD L36MM CT-1 1/2 CIR J945H

## (undated) DEVICE — BUR BRL FORMLA 6FLUT 5MM

## (undated) DEVICE — PAD SANI MAXI W/ADHS SNG WRP 11IN

## (undated) DEVICE — DRESSING TRNSPAR W5XL4.5IN FLM SHT SEMIPERMEABLE WIND

## (undated) DEVICE — Z DISCONTINUED USE 2272117 DRAPE SURG 3 QTR N INVASIVE 2 LAYR DISP

## (undated) DEVICE — BAPTIST TURNOVER KIT: Brand: MEDLINE INDUSTRIES, INC.

## (undated) DEVICE — CVR HNDL LIGHT RIGID

## (undated) DEVICE — SUTURE ABSRB BRAID COAT UD CP NO 2 27IN VCRL J195H

## (undated) DEVICE — PACK,UNIVERSAL,NO GOWNS: Brand: MEDLINE

## (undated) DEVICE — THREE QUARTER SHEET: Brand: CONVERTORS

## (undated) DEVICE — SUTURE MCRYL SZ 4-0 L18IN ABSRB UD L19MM PS-2 3/8 CIR PRIM Y496G

## (undated) DEVICE — ARGYLE YANKAUER BULB TIP WITH VENT: Brand: ARGYLE

## (undated) DEVICE — DRESSING FOAM W4XL12IN SIL RECT ADH WTRPRF FLM BK W/ BORD

## (undated) DEVICE — Device: Brand: STABLECUT® OXFORD™

## (undated) DEVICE — Device: Brand: POWER-FLO®

## (undated) DEVICE — BLADE SURG SAW RECIP S STL DBL SIDE 70MM LEN 12.5MM CUT 0277096278] STRYKER INSTRUMENT DIV]

## (undated) DEVICE — PENCIL CAUT PUSH BTTN W HOLSTER AND CRD 15FT

## (undated) DEVICE — TBG CONN INFLOW AQUILEX/MYOSURE

## (undated) DEVICE — SUTURE STRATAFIX SPRL MCRYL + SZ 4-0 L12IN ABSRB UD PS-2 SXMP1B117

## (undated) DEVICE — CVR BRD ARM 13X30

## (undated) DEVICE — UNDERCAST PADDING: Brand: DEROYAL

## (undated) DEVICE — SYSTEM IMPL DEL FOR BRST IMPL FUN (SEE COMMENT)

## (undated) DEVICE — PACK,SET UP,NO DRAPES: Brand: MEDLINE

## (undated) DEVICE — 3M™ IOBAN™ 2 ANTIMICROBIAL INCISE DRAPE 6650EZ: Brand: IOBAN™ 2

## (undated) DEVICE — MASK,OXYGEN,MED CONC,ADLT,7' TUB, UC: Brand: PENDING

## (undated) DEVICE — BRA COMPR 2XL NYL LYCRA SPANDEX WHT CURAD

## (undated) DEVICE — GOWN,PREVENTION PLUS,L,ST,24/CS: Brand: MEDLINE

## (undated) DEVICE — ANOSCP PLSTC 9/10DX3 3/8IN

## (undated) DEVICE — SOLUTION IV IRRIG WATER 1000ML POUR BRL 2F7114

## (undated) DEVICE — RETRACTOR SURG WIDE FLAT LO PROF LTWT PHOTONGUIDE

## (undated) DEVICE — STERILE LATEX POWDER FREE SURGICAL GLOVES WITH HYDROGEL COATING: Brand: PROTEXIS

## (undated) DEVICE — SEAL HYSTERSCOPE/OUTFLOW CHANNEL MYOSURE

## (undated) DEVICE — FRCP BX RADJAW4 NDL 2.8 240 STD OG

## (undated) DEVICE — SUCTION CANISTER, 2500CC, RIGID: Brand: DEROYAL

## (undated) DEVICE — C-ARM: Brand: UNBRANDED

## (undated) DEVICE — MSK O2 MD CONCENTR A/ LF 7FT 1P/U

## (undated) DEVICE — GAUZE,SPONGE,4"X4",16PLY,XRAY,STRL,LF: Brand: MEDLINE

## (undated) DEVICE — VERESS PNEUMOPERITONEUM NEEDLE: Brand: N.A.

## (undated) DEVICE — DISPOSABLE TOURNIQUET CUFF SINGLE BLADDER, SINGLE PORT AND QUICK CONNECT CONNECTOR: Brand: COLOR CUFF

## (undated) DEVICE — GAUZE,SPONGE,8"X4",12PLY,XRAY,STRL,LF: Brand: MEDLINE

## (undated) DEVICE — GOWN,SIRUS,NON REINFRCD,LARGE,SET IN SL: Brand: MEDLINE

## (undated) DEVICE — TBG ARTHRO FLOWSTEADY/ST DISP

## (undated) DEVICE — BLADE SURG NO10 C STL DISP ST

## (undated) DEVICE — INTENDED FOR TISSUE SEPARATION, AND OTHER PROCEDURES THAT REQUIRE A SHARP SURGICAL BLADE TO PUNCTURE OR CUT.: Brand: BARD-PARKER ® SAFETYLOCK CARBON RIB-BACK BLADES

## (undated) DEVICE — ZIMMER® STERILE DISPOSABLE TOURNIQUET CUFF WITH PLC, DUAL PORT, SINGLE BLADDER, 34 IN. (86 CM)

## (undated) DEVICE — SURGICAL PROCEDURE PACK KNEE TOT DBD CDS LOURDES HOSP LF

## (undated) DEVICE — SEALER TISS L20CM DIA13MM ADV BPLR L CRV JAW OPN APPRCH

## (undated) DEVICE — SYR LL TP 10ML STRL

## (undated) DEVICE — GLOVE SURG SZ 8 CRM LTX FREE POLYISOPRENE POLYMER BEAD ANTI

## (undated) DEVICE — [TOMCAT CUTTER, ARTHROSCOPIC SHAVER BLADE,  DO NOT RESTERILIZE,  DO NOT USE IF PACKAGE IS DAMAGED,  KEEP DRY,  KEEP AWAY FROM SUNLIGHT]: Brand: FORMULA

## (undated) DEVICE — SOLUTION IRRIG 1000ML 09% SOD CHL USP PIC PLAS CONTAINER

## (undated) DEVICE — BANDAGE COMPR W3INXL15FT BGE E SGL LAYERED CLP CLSR